# Patient Record
Sex: MALE | Race: WHITE | NOT HISPANIC OR LATINO | Employment: OTHER | URBAN - NONMETROPOLITAN AREA
[De-identification: names, ages, dates, MRNs, and addresses within clinical notes are randomized per-mention and may not be internally consistent; named-entity substitution may affect disease eponyms.]

---

## 2017-03-06 ENCOUNTER — OFFICE VISIT (OUTPATIENT)
Dept: UROLOGY | Facility: CLINIC | Age: 72
End: 2017-03-06

## 2017-03-06 ENCOUNTER — HOSPITAL ENCOUNTER (OUTPATIENT)
Dept: GENERAL RADIOLOGY | Facility: HOSPITAL | Age: 72
Discharge: HOME | End: 2017-03-06
Attending: UROLOGY | Admitting: UROLOGY

## 2017-03-06 VITALS
SYSTOLIC BLOOD PRESSURE: 120 MMHG | TEMPERATURE: 97.5 F | HEIGHT: 67 IN | BODY MASS INDEX: 17.74 KG/M2 | WEIGHT: 113 LBS | DIASTOLIC BLOOD PRESSURE: 80 MMHG

## 2017-03-06 DIAGNOSIS — N20.0 RENAL CALCULUS, BILATERAL: Primary | ICD-10-CM

## 2017-03-06 DIAGNOSIS — N20.0 KIDNEY STONE: Primary | ICD-10-CM

## 2017-03-06 LAB
BILIRUB BLD-MCNC: NEGATIVE MG/DL
CLARITY, POC: CLEAR
COLOR UR: YELLOW
GLUCOSE UR STRIP-MCNC: ABNORMAL MG/DL
KETONES UR QL: NEGATIVE
LEUKOCYTE EST, POC: ABNORMAL
NITRITE UR-MCNC: POSITIVE MG/ML
PH UR: 6.5 [PH] (ref 5–8)
PROT UR STRIP-MCNC: ABNORMAL MG/DL
RBC # UR STRIP: ABNORMAL /UL
SP GR UR: 1.02 (ref 1–1.03)
UROBILINOGEN UR QL: NORMAL

## 2017-03-06 PROCEDURE — 99203 OFFICE O/P NEW LOW 30 MIN: CPT | Performed by: UROLOGY

## 2017-03-06 PROCEDURE — 81003 URINALYSIS AUTO W/O SCOPE: CPT | Performed by: UROLOGY

## 2017-03-06 PROCEDURE — 74000 HC ABDOMEN KUB: CPT

## 2017-03-06 RX ORDER — ATORVASTATIN CALCIUM 80 MG/1
TABLET, FILM COATED ORAL
Status: ON HOLD | COMMUNITY
End: 2018-08-13

## 2017-03-06 RX ORDER — HYDROCODONE BITARTRATE AND ACETAMINOPHEN 10; 325 MG/1; MG/1
1 TABLET ORAL EVERY 6 HOURS PRN
Status: ON HOLD | COMMUNITY
End: 2018-10-07

## 2017-03-06 RX ORDER — CALCIUM CARBONATE 500(1250)
TABLET ORAL
Status: ON HOLD | COMMUNITY
End: 2018-08-13

## 2017-03-06 NOTE — PROGRESS NOTES
Mr. Wilson is 71 y.o. male    CHIEF COMPLAINT: I am here today because of a kidney stone    HPI  Renal Urolithiasis  The patient presents with bilateral renal urolithiasis. This was initially diagnosed approximately 6 months ago. This was identified on CT that included abdominal cuts. This was found in the context of an evaluation of recurrent UTI. This is a(n) established problem for the patient. The onset of this was unknown. The course is stable. The patient is not currently being managed by hydration, alkalinization and citrate supplementation. Current symptoms that may or may not be related to this stone include recurrent UTI.    The following portions of the patient's history were reviewed and updated as appropriate: allergies, current medications, past family history, past medical history, past social history, past surgical history and problem list.    Review of Systems   Constitutional: Negative for appetite change and fever.   HENT: Negative for hearing loss and sore throat.    Eyes: Negative for pain and redness.   Respiratory: Negative for cough and shortness of breath.    Cardiovascular: Negative for chest pain and leg swelling.   Gastrointestinal: Negative for anal bleeding, nausea and vomiting.   Endocrine: Negative for cold intolerance and heat intolerance.   Genitourinary: Positive for flank pain. Negative for dysuria and hematuria.   Musculoskeletal: Negative for joint swelling and myalgias.   Skin: Negative for color change and rash.   Allergic/Immunologic: Negative for immunocompromised state.   Neurological: Negative for dizziness and speech difficulty.   Hematological: Negative for adenopathy. Does not bruise/bleed easily.   Psychiatric/Behavioral: Negative for dysphoric mood and suicidal ideas.         Current Outpatient Prescriptions:   •  atorvastatin (LIPITOR) 80 MG tablet, Take 80 mg by mouth daily, Disp: , Rfl:   •  Calcium 500 MG tablet, Take 1,000 mg by mouth daily, Disp: , Rfl:   •   "HYDROcodone-acetaminophen (NORCO)  MG per tablet, Every 6 (Six) Hours., Disp: , Rfl:   •  insulin aspart (novoLOG) 100 UNIT/ML injection, Inject 7 Units into the skin nightly, Disp: , Rfl:   •  insulin lispro (humaLOG) 100 UNIT/ML injection, Inject 5 Units into the skin 3 times daily (before meals), Disp: , Rfl:   •  metoprolol tartrate (LOPRESSOR) 25 MG tablet, Take 25 mg by mouth 2 times daily Indications: 1/2 tab, Disp: , Rfl:     Past Medical History   Diagnosis Date   • Diabetes mellitus        Past Surgical History   Procedure Laterality Date   • Esophagectomy     • Bladder surgery         Social History     Social History   • Marital status:      Spouse name: N/A   • Number of children: N/A   • Years of education: N/A     Social History Main Topics   • Smoking status: Current Every Day Smoker   • Smokeless tobacco: None   • Alcohol use None   • Drug use: None   • Sexual activity: Not Asked     Other Topics Concern   • None     Social History Narrative   • None       Family History   Problem Relation Age of Onset   • No Known Problems Father    • No Known Problems Mother        Visit Vitals   • /80   • Temp 97.5 °F (36.4 °C)   • Ht 67\" (170.2 cm)   • Wt 113 lb (51.3 kg)   • BMI 17.7 kg/m2       Physical Exam   Constitutional: He is oriented to person, place, and time. He appears well-developed and well-nourished. No distress.   HENT:   Head: Normocephalic and atraumatic.   Right Ear: External ear and ear canal normal.   Left Ear: External ear and ear canal normal.   Nose: No nasal deformity. No epistaxis.   Mouth/Throat: Oropharynx is clear and moist. Mucous membranes are not pale, not dry and not cyanotic. Normal dentition. No oropharyngeal exudate.   Neck: Trachea normal. No tracheal tenderness present. No tracheal deviation present. No thyroid mass and no thyromegaly present.   Pulmonary/Chest: Effort normal. No accessory muscle usage. No respiratory distress. Chest wall is not dull to " percussion (No flatness or hyperresonance). He exhibits no mass and no tenderness.   On palpation, no tactile fremitus. All movements are symmetric. No intercostal retraction noted.    Abdominal: Soft. Normal appearance. He exhibits no distension and no mass. There is no hepatosplenomegaly. There is no tenderness. No hernia.   Rectal examination or stool specimen is not indicated.  Right lower quadrant stoma appears pink and patent   Musculoskeletal:   Normal gait and station. The spine, ribs, and pelvis are examined. No obvious misalignment or asymmetry. ROM is reasonable for age. No instability. No obvious atrophy, flaccidity or spasticity.    Lymphadenopathy:     He has no cervical adenopathy.        Right: No inguinal adenopathy present.        Left: No inguinal adenopathy present.   Neurological: He is alert and oriented to person, place, and time.   Skin: Skin is warm, dry and intact. No lesion and no rash noted. He is not diaphoretic. No cyanosis. No pallor. Nails show no clubbing.   On palpation, there were no induration, subcutaneous nodules, or tightening   Psychiatric: His speech is normal and behavior is normal. Judgment and thought content normal. His mood appears not anxious. His affect is not labile. He does not exhibit a depressed mood.   Vitals reviewed.        Results for orders placed or performed in visit on 03/06/17   POC Urinalysis Dipstick, Automated   Result Value Ref Range    Color Yellow Yellow, Straw, Dark Yellow, Michelle    Clarity, UA Clear Clear    Glucose,  mg/dL (A) Negative, 1000 mg/dL (3+) mg/dL    Bilirubin Negative Negative    Ketones, UA Negative Negative    Specific Gravity  1.025 1.005 - 1.030    Blood, UA Moderate (A) Negative    pH, Urine 6.5 5.0 - 8.0    Protein, POC 30 mg/dL (A) Negative mg/dL    Urobilinogen, UA Normal Normal    Leukocytes Large (3+) (A) Negative    Nitrite, UA Positive (A) Negative       Independent review of CT scan of the abdomen/pelvis The patient  has undergone a CT scan of the abdomen and pelvis Without contrast. The images are available for me to review as an independent interpretation for evaluation and management.  Assessment of the renal parenchyma with regards to thickness, scarring, symmetry in appearance and function, presence of masses both pre-and postcontrast, and calcifications are noted.  The collecting system with regard to dilatation, presence of calcifications, and masses were reviewed.  The course and caliber the ureters also noted.  The renal vessels and retroperitoneum is inspected for pathology.  The solid viscera and bowel pattern are briefly reviewed, but will also be inspected by the radiologist. The renal pelvis is inspected.  This study shows bilateral hydronephrosis much worse on the right side with moderate stones including one at the renal pelvis that appears to be slightly obstructive.  He has a large abdominal aortic aneurysm and at the urinary diversion..         Assessment and Plan  Diagnoses and all orders for this visit:    Renal calculus, bilateral  -     POC Urinalysis Dipstick, Automated    I reviewed the films and think probably the best option for him given the presence of an abdominal aortic aneurysm would be to do percutaneous nephrostolithotomy.  While ESWL would be the optimal management for the stone burden, I do not think he could undergo this secondary to the aneurysm.  Given presence of all these comorbidities as well as a propensity to sepsis, I think this patient should be taken care of at one of the [facility] tertiary hospitals.      Pete Monreal MD  03/06/17  11:33 AM    EMR Dragon/Transcription disclaimer:  Much of this encounter note is an electronic transcription/translation of spoken language to printed text. The electronic translation of spoken language may permit erroneous, or at times, nonsensical words or phrases to be inadvertently transcribed; although I have reviewed the note for such errors,  some may still exist.

## 2018-08-08 ENCOUNTER — APPOINTMENT (OUTPATIENT)
Dept: GENERAL RADIOLOGY | Facility: HOSPITAL | Age: 73
End: 2018-08-08

## 2018-08-08 ENCOUNTER — HOSPITAL ENCOUNTER (INPATIENT)
Facility: HOSPITAL | Age: 73
LOS: 10 days | Discharge: HOME | End: 2018-08-18
Attending: INTERNAL MEDICINE | Admitting: INTERNAL MEDICINE

## 2018-08-08 DIAGNOSIS — I50.22 CHRONIC SYSTOLIC CONGESTIVE HEART FAILURE (HCC): ICD-10-CM

## 2018-08-08 DIAGNOSIS — N18.4 CHRONIC KIDNEY DISEASE, STAGE 4 (SEVERE) (HCC): ICD-10-CM

## 2018-08-08 DIAGNOSIS — Z72.0 TOBACCO USE: Primary | ICD-10-CM

## 2018-08-08 PROBLEM — E87.70 VOLUME OVERLOAD: Status: ACTIVE | Noted: 2018-08-08

## 2018-08-08 LAB
ALBUMIN SERPL-MCNC: 3.7 G/DL (ref 3.5–5)
ANION GAP SERPL CALCULATED.3IONS-SCNC: 16 MMOL/L (ref 4–13)
BUN BLD-MCNC: 39 MG/DL (ref 5–21)
BUN/CREAT SERPL: 12.7 (ref 7–25)
CALCIUM SPEC-SCNC: 8.8 MG/DL (ref 8.4–10.4)
CHLORIDE SERPL-SCNC: 107 MMOL/L (ref 98–110)
CO2 SERPL-SCNC: 16 MMOL/L (ref 24–31)
CREAT BLD-MCNC: 3.06 MG/DL (ref 0.5–1.4)
GFR SERPL CREATININE-BSD FRML MDRD: 20 ML/MIN/1.73
GLUCOSE BLD-MCNC: 237 MG/DL (ref 70–100)
GLUCOSE BLDC GLUCOMTR-MCNC: 282 MG/DL (ref 70–130)
GLUCOSE BLDC GLUCOMTR-MCNC: 314 MG/DL (ref 70–130)
GLUCOSE BLDC GLUCOMTR-MCNC: 340 MG/DL (ref 70–130)
GLUCOSE BLDC GLUCOMTR-MCNC: 350 MG/DL (ref 70–130)
GLUCOSE BLDC GLUCOMTR-MCNC: 365 MG/DL (ref 70–130)
HBA1C MFR BLD: 8 %
PHOSPHATE SERPL-MCNC: 4.7 MG/DL (ref 2.5–4.5)
POTASSIUM BLD-SCNC: 5.6 MMOL/L (ref 3.5–5.3)
SODIUM BLD-SCNC: 139 MMOL/L (ref 135–145)

## 2018-08-08 PROCEDURE — 25010000002 MORPHINE PER 10 MG: Performed by: INTERNAL MEDICINE

## 2018-08-08 PROCEDURE — 63710000001 INSULIN LISPRO (HUMAN) PER 5 UNITS: Performed by: INTERNAL MEDICINE

## 2018-08-08 PROCEDURE — 80069 RENAL FUNCTION PANEL: CPT | Performed by: INTERNAL MEDICINE

## 2018-08-08 PROCEDURE — 94799 UNLISTED PULMONARY SVC/PX: CPT

## 2018-08-08 PROCEDURE — 83036 HEMOGLOBIN GLYCOSYLATED A1C: CPT | Performed by: INTERNAL MEDICINE

## 2018-08-08 PROCEDURE — 94760 N-INVAS EAR/PLS OXIMETRY 1: CPT

## 2018-08-08 PROCEDURE — 99406 BEHAV CHNG SMOKING 3-10 MIN: CPT

## 2018-08-08 PROCEDURE — 82962 GLUCOSE BLOOD TEST: CPT

## 2018-08-08 PROCEDURE — 71045 X-RAY EXAM CHEST 1 VIEW: CPT

## 2018-08-08 RX ORDER — HYDROCODONE BITARTRATE AND ACETAMINOPHEN 10; 325 MG/1; MG/1
1 TABLET ORAL EVERY 4 HOURS PRN
Status: DISCONTINUED | OUTPATIENT
Start: 2018-08-08 | End: 2018-08-18 | Stop reason: HOSPADM

## 2018-08-08 RX ORDER — NICOTINE 21 MG/24HR
1 PATCH, TRANSDERMAL 24 HOURS TRANSDERMAL
Status: DISCONTINUED | OUTPATIENT
Start: 2018-08-08 | End: 2018-08-08

## 2018-08-08 RX ORDER — ASPIRIN 81 MG/1
81 TABLET, CHEWABLE ORAL DAILY
COMMUNITY

## 2018-08-08 RX ORDER — ASPIRIN 81 MG/1
81 TABLET, CHEWABLE ORAL DAILY
Status: DISCONTINUED | OUTPATIENT
Start: 2018-08-08 | End: 2018-08-18 | Stop reason: HOSPADM

## 2018-08-08 RX ORDER — ATORVASTATIN CALCIUM 40 MG/1
80 TABLET, FILM COATED ORAL DAILY
Status: DISCONTINUED | OUTPATIENT
Start: 2018-08-08 | End: 2018-08-18 | Stop reason: HOSPADM

## 2018-08-08 RX ORDER — LORAZEPAM 1 MG/1
1 TABLET ORAL EVERY 6 HOURS PRN
Status: DISPENSED | OUTPATIENT
Start: 2018-08-08 | End: 2018-08-18

## 2018-08-08 RX ORDER — NICOTINE POLACRILEX 4 MG
15 LOZENGE BUCCAL
Status: DISCONTINUED | OUTPATIENT
Start: 2018-08-08 | End: 2018-08-18 | Stop reason: HOSPADM

## 2018-08-08 RX ORDER — MORPHINE SULFATE 4 MG/ML
4 INJECTION, SOLUTION INTRAMUSCULAR; INTRAVENOUS EVERY 4 HOURS PRN
Status: DISCONTINUED | OUTPATIENT
Start: 2018-08-08 | End: 2018-08-09 | Stop reason: ALTCHOICE

## 2018-08-08 RX ORDER — ONDANSETRON 2 MG/ML
4 INJECTION INTRAMUSCULAR; INTRAVENOUS EVERY 6 HOURS PRN
Status: DISCONTINUED | OUTPATIENT
Start: 2018-08-08 | End: 2018-08-18 | Stop reason: HOSPADM

## 2018-08-08 RX ORDER — NICOTINE 21 MG/24HR
1 PATCH, TRANSDERMAL 24 HOURS TRANSDERMAL
Status: DISCONTINUED | OUTPATIENT
Start: 2018-08-08 | End: 2018-08-18 | Stop reason: HOSPADM

## 2018-08-08 RX ORDER — SODIUM CHLORIDE 0.9 % (FLUSH) 0.9 %
1-10 SYRINGE (ML) INJECTION AS NEEDED
Status: DISCONTINUED | OUTPATIENT
Start: 2018-08-08 | End: 2018-08-18 | Stop reason: HOSPADM

## 2018-08-08 RX ORDER — BUMETANIDE 1 MG/1
1 TABLET ORAL ONCE
Status: COMPLETED | OUTPATIENT
Start: 2018-08-08 | End: 2018-08-08

## 2018-08-08 RX ORDER — NALOXONE HCL 0.4 MG/ML
0.4 VIAL (ML) INJECTION
Status: DISCONTINUED | OUTPATIENT
Start: 2018-08-08 | End: 2018-08-18 | Stop reason: HOSPADM

## 2018-08-08 RX ORDER — SODIUM POLYSTYRENE SULFONATE 15 G/60ML
15 SUSPENSION ORAL; RECTAL ONCE
Status: COMPLETED | OUTPATIENT
Start: 2018-08-08 | End: 2018-08-08

## 2018-08-08 RX ORDER — DEXTROSE MONOHYDRATE 25 G/50ML
25 INJECTION, SOLUTION INTRAVENOUS
Status: DISCONTINUED | OUTPATIENT
Start: 2018-08-08 | End: 2018-08-18 | Stop reason: HOSPADM

## 2018-08-08 RX ADMIN — INSULIN LISPRO 3 UNITS: 100 INJECTION, SOLUTION INTRAVENOUS; SUBCUTANEOUS at 08:11

## 2018-08-08 RX ADMIN — INSULIN LISPRO 5 UNITS: 100 INJECTION, SOLUTION INTRAVENOUS; SUBCUTANEOUS at 21:22

## 2018-08-08 RX ADMIN — METOPROLOL TARTRATE 25 MG: 25 TABLET, FILM COATED ORAL at 08:11

## 2018-08-08 RX ADMIN — HYDROCODONE BITARTRATE AND ACETAMINOPHEN 1 TABLET: 10; 325 TABLET ORAL at 17:38

## 2018-08-08 RX ADMIN — SODIUM POLYSTYRENE SULFONATE 15 G: 15 SUSPENSION ORAL; RECTAL at 11:02

## 2018-08-08 RX ADMIN — BUMETANIDE 1 MG: 1 TABLET ORAL at 11:01

## 2018-08-08 RX ADMIN — LORAZEPAM 1 MG: 1 TABLET ORAL at 21:27

## 2018-08-08 RX ADMIN — Medication 1 TABLET: at 08:11

## 2018-08-08 RX ADMIN — METOPROLOL TARTRATE 25 MG: 25 TABLET, FILM COATED ORAL at 21:17

## 2018-08-08 RX ADMIN — HYDROCODONE BITARTRATE AND ACETAMINOPHEN 1 TABLET: 10; 325 TABLET ORAL at 12:11

## 2018-08-08 RX ADMIN — MORPHINE SULFATE 4 MG: 4 INJECTION, SOLUTION INTRAMUSCULAR; INTRAVENOUS at 05:31

## 2018-08-08 RX ADMIN — ASPIRIN 81 MG CHEWABLE TABLET 81 MG: 81 TABLET CHEWABLE at 08:11

## 2018-08-08 RX ADMIN — INSULIN LISPRO 6 UNITS: 100 INJECTION, SOLUTION INTRAVENOUS; SUBCUTANEOUS at 12:17

## 2018-08-08 RX ADMIN — ATORVASTATIN CALCIUM 80 MG: 40 TABLET, FILM COATED ORAL at 08:11

## 2018-08-08 RX ADMIN — HYDROCODONE BITARTRATE AND ACETAMINOPHEN 1 TABLET: 10; 325 TABLET ORAL at 21:22

## 2018-08-08 RX ADMIN — INSULIN LISPRO 5 UNITS: 100 INJECTION, SOLUTION INTRAVENOUS; SUBCUTANEOUS at 17:39

## 2018-08-08 RX ADMIN — NICOTINE 1 PATCH: 14 PATCH, EXTENDED RELEASE TRANSDERMAL at 21:18

## 2018-08-08 NOTE — CONSULTS
Nephrology ([facility]) Consult Note      Patient:  Gavin Wilson  YOB: 1945  Date of Service: 8/8/2018  MRN: [id number]   Acct: [id number]   Primary Care Physician: Provider, No Known  Advance Directive:   Code Status and Medical Interventions:   Ordered at: 08/08/18 0522     Level Of Support Discussed With:    Patient     Code Status:    No CPR     Medical Interventions (Level of Support Prior to Arrest):    Full     Admit Date: 8/8/2018       Hospital Day: 0  Referring Provider: Arcenio Ahn MD      Patient personally seen and examined.  Complete chart including Consults, Notes, Operative Reports, Labs, Cardiology, and Radiology studies reviewed as able.        Subjective:  Gavin Wilson is a 73 y.o. male  whom we were consulted for volume overload in patient approaching end stage renal disease.  Baseline of chronic kidney disease stage 4/5, follows with nephrologist at [facility].  Recently had left arm AV fistula placed (also at [facility]).  History of type 2 diabetes, hypertension, chronic congestive heart failure, prior bladder cancer with urostomy.  Admitted as a direct admit from [facility].  He had presented to their facility with dyspnea and stated he was ready to start dialysis to relieve his symptoms.  Apparently patient has been advised to start dialysis in the past but has deferred any decisions until now.  Denies recent change in urine output, no hematuria.  no n/v/d.  At time of exam patient is up walking in halls; trying to find his nurse in order to get something to eat.  Minimal dyspnea, able to provide a lengthy explanation of current medical condition without distress noted.  No peripheral edema.  Pt does admit he is feeling better today than when he arrived at [facility].  Urine output is nonoliguric.    Allergies:  Sulfa antibiotics    Home Meds:  Prescriptions Prior to Admission   Medication Sig Dispense Refill  Last Dose   • aspirin 81 MG chewable tablet Chew 81 mg Daily.   8/7/2018 at Unknown time   • atorvastatin (LIPITOR) 80 MG tablet Take 80 mg by mouth daily      • Calcium 500 MG tablet Take 1,000 mg by mouth daily      • HYDROcodone-acetaminophen (NORCO)  MG per tablet Every 6 (Six) Hours.      • insulin aspart (novoLOG) 100 UNIT/ML injection Inject 7 Units into the skin nightly      • insulin lispro (humaLOG) 100 UNIT/ML injection Inject 5 Units into the skin 3 times daily (before meals)      • metoprolol tartrate (LOPRESSOR) 25 MG tablet Take 25 mg by mouth 2 times daily Indications: 1/2 tab          Medicines:  Current Facility-Administered Medications   Medication Dose Route Frequency Provider Last Rate Last Dose   • aspirin chewable tablet 81 mg  81 mg Oral Daily Arcenio Ahn MD   81 mg at 08/08/18 0811   • atorvastatin (LIPITOR) tablet 80 mg  80 mg Oral Daily Arcenio Ahn MD   80 mg at 08/08/18 0811   • bumetanide (BUMEX) tablet 1 mg  1 mg Oral Once Roberto Carlos Myrick, APRN       • calcium carb-cholecalciferol 600-800 MG-UNIT tablet 1 tablet  1 tablet Oral Daily Arcenio Ahn MD   1 tablet at 08/08/18 0811   • dextrose (D50W) solution 25 g  25 g Intravenous Q15 Min PRN Arcenio Ahn MD       • dextrose (GLUTOSE) oral gel 15 g  15 g Oral Q15 Min PRN Arcenio Ahn MD       • glucagon (human recombinant) (GLUCAGEN DIAGNOSTIC) injection 1 mg  1 mg Subcutaneous PRN Arcenio Ahn MD       • HYDROcodone-acetaminophen (NORCO)  MG per tablet 1 tablet  1 tablet Oral Q4H PRN Arcenio Ahn MD       • insulin lispro (humaLOG) injection 2-7 Units  2-7 Units Subcutaneous 4x Daily With Meals & Nightly Arcenio Ahn MD   3 Units at 08/08/18 0811   • LORazepam (ATIVAN) tablet 1 mg  1 mg Oral Q6H PRN Arcenio Ahn MD       • metoprolol tartrate (LOPRESSOR) tablet 25 mg  25 mg Oral Q12H Arcenio Ahn MD   25 mg at 08/08/18 0811   • Morphine sulfate (PF) injection 4  mg  4 mg Intravenous Q4H PRN Arcenio Ahn MD   4 mg at 08/08/18 0531    And   • naloxone (NARCAN) injection 0.4 mg  0.4 mg Intravenous Q5 Min PRN Arcenio Ahn MD       • ondansetron (ZOFRAN) injection 4 mg  4 mg Intravenous Q6H PRN Arcenio Ahn MD       • sodium chloride 0.9 % flush 1-10 mL  1-10 mL Intravenous PRN Arcenio Ahn MD       • sodium polystyrene (KAYEXALATE) 15 GM/60ML suspension 15 g  15 g Oral Once Roberto Carlos Myrick, APRN           Past Medical History:  Past Medical History:   Diagnosis Date   • CHF (congestive heart failure) (CMS/HCC)    • Coronary stent occlusion    • Diabetes mellitus (CMS/HCC)    • Hyperlipidemia    • Hypertension    • Stroke (CMS/HCC)        Past Surgical History:  Past Surgical History:   Procedure Laterality Date   • BLADDER SURGERY     • ESOPHAGECTOMY         Family History  Family History   Problem Relation Age of Onset   • No Known Problems Father    • No Known Problems Mother        Social History  Social History     Social History   • Marital status:      Spouse name: N/A   • Number of children: N/A   • Years of education: N/A     Occupational History   • Not on file.     Social History Main Topics   • Smoking status: Current Every Day Smoker   • Smokeless tobacco: Never Used   • Alcohol use No   • Drug use: No   • Sexual activity: Not on file     Other Topics Concern   • Not on file     Social History Narrative   • No narrative on file         Review of Systems:  History obtained from chart review and the patient  General ROS: No fever or chills  Respiratory ROS: No cough, shortness of breath, wheezing  Cardiovascular ROS: positive for - dyspnea on exertion  No chest pain or palpitations  Gastrointestinal ROS: No abdominal pain or melena  Genito-Urinary ROS: No dysuria or hematuria  14 point ROS reviewed with the patient and negative except as noted above and in the HPI unless unable to obtain.    Objective:  /90 (BP Location: Right  "arm, Patient Position: Lying)   Pulse 108   Temp 97.9 °F (36.6 °C) (Temporal Artery )   Resp 18   Ht 170.2 cm (67.01\")   Wt 48.8 kg (107 lb 9.4 oz)   SpO2 98%   BMI 16.85 kg/m²     Intake/Output Summary (Last 24 hours) at 08/08/18 1020  Last data filed at 08/08/18 0443   Gross per 24 hour   Intake                0 ml   Output              500 ml   Net             -500 ml     General: awake/alert   Chest:  bilateral crackles lower lobes  CVS: regular rate and rhythm  Abdominal: soft, nontender, normal bowel sounds  Extremities: no cyanosis or edema  Skin: warm and dry without rash      Labs:            Results from last 7 days  Lab Units 08/08/18  0629   SODIUM mmol/L 139   POTASSIUM mmol/L 5.6*   CHLORIDE mmol/L 107   CO2 mmol/L 16.0*   BUN mg/dL 39*   CREATININE mg/dL 3.06*   CALCIUM mg/dL 8.8   GLUCOSE mg/dL 237*       Radiology:   Imaging Results (last 72 hours)     ** No results found for the last 72 hours. **          Culture:         Assessment   1.  Baseline chronic kidney disease; reportedly nearing stage 5  2.  Acute exacerbation chronic congestive heart failure  3.  Hyperkalemia  4.  Type 2 diabetes  5.  Hypertension  6.  Prior bladder CA; status post urostomy creation    Plan:  1.  Mild hyperkalemia and mild volume overload; no acute indication for urgent dialysis.  2.  Will need to get old records to determine his baseline renal function  3.  Will also need records from vascular surgery at [facility] confirming maturity of AV fistula prior to any use  4.  Bumex 1 mg oral once now  5.  Kayexalate 15 gm once now  6.  Further assessment and plan pending Dr Aragon's evaluation of patient      Thank you for the consult, we appreciate the opportunity to provide care to your patients.  Feel free to contact me if I can be of any further assistance.      Roberto Carlos Myrick, APRN  8/8/2018  10:20 AM  "

## 2018-08-08 NOTE — H&P
[facility] Medicine Services  HISTORY AND PHYSICAL    Date of Admission: 8/8/2018  Primary Care Physician: Provider, No Known    Subjective     Chief Complaint: Shortness of breath    History of Present Illness  Patient is a 73-year-old white male past medical history of peripheral vascular disease and end-stage renal disease stage V with a previous fistula artery placed.  He states that he is an been debating initiating dialysis but has hesitated multiple times recently.  He still makes urine through urostomy.  He states now that he has become more short of breath and never he can no longer go on and he wants to go on dialysis.  He presented to an [facility] hospital that he does not have dialysis.  [facility] so he was transferred here for initiation of dialysis.  He is otherwise stable and no complaints.  His only problem is volume overload his potassium was slightly elevated but otherwise nothing remarkable on his labs at the [facility].  His BUN and creatinine obviously are elevated but not significantly.        Review of Systems   14 point review of systems negative except for as per HPI    Otherwise complete ROS reviewed and negative except as mentioned in the HPI.    Past Medical History:   Past Medical History:   Diagnosis Date   • CHF (congestive heart failure) (CMS/HCC)    • Coronary stent occlusion    • Diabetes mellitus (CMS/HCC)    • Hyperlipidemia    • Hypertension    • Stroke (CMS/HCC)      Past Surgical History:  Past Surgical History:   Procedure Laterality Date   • BLADDER SURGERY     • ESOPHAGECTOMY       Social History:  reports that he has been smoking.  He has never used smokeless tobacco. He reports that he does not drink alcohol or use drugs.    Family History: family history includes No Known Problems in his father and mother.       Allergies:  Allergies   Allergen Reactions  "  • Sulfa Antibiotics      Medications:  Prior to Admission medications    Medication Sig Start Date End Date Taking? Authorizing Provider   aspirin 81 MG chewable tablet Chew 81 mg Daily.   Yes Dom Lou MD   atorvastatin (LIPITOR) 80 MG tablet Take 80 mg by mouth daily    Dom Lou MD   Calcium 500 MG tablet Take 1,000 mg by mouth daily    Dom Lou MD   HYDROcodone-acetaminophen (NORCO)  MG per tablet Every 6 (Six) Hours.    Dom Lou MD   insulin aspart (novoLOG) 100 UNIT/ML injection Inject 7 Units into the skin nightly    Dom Lou MD   insulin lispro (humaLOG) 100 UNIT/ML injection Inject 5 Units into the skin 3 times daily (before meals)    Dom Lou MD   metoprolol tartrate (LOPRESSOR) 25 MG tablet Take 25 mg by mouth 2 times daily Indications: 1/2 tab    Dom Lou MD     Objective     Vital Signs: /78 (BP Location: Right arm, Patient Position: Lying)   Pulse 114   Temp 96.9 °F (36.1 °C) (Temporal Artery )   Resp 18   Ht 170.2 cm (67.01\")   Wt 48.8 kg (107 lb 8 oz)   SpO2 96%   BMI 16.83 kg/m²   Physical Exam  Gen. well-nourished well-developed white male in no acute distress  HEENT: Atraumatic normocephalic pupils equal round reactive to light extraocular movements intact sclerae anicteric TMs negative mucous membranes moist neck is supple without lymphadenopathy no JVD is noted no carotid bruits are auscultated oropharynx is without erythema or exudate  Chest: Patient has rales bilaterally half way up the lung fields  CV: Regular rate and rhythm normal S1-S2 no gallops murmurs or rubs  Abdomen: Soft nontender nondistended active bowel sounds no hepatosplenomegaly no masses no hernias  Extremities: No clubbing edema or cyanosis capillary refill is normal pulses are 2+ and symmetric at radial and dorsalis pedis posterior tibial pulses are intact and 2+ palpable patient has a thrill on his left axilla " consistent with his renal shunt  Neuro: Patient is awake alert and oriented ×3, cranial nerves II through XII are grossly intact, motor is 5 out of 5 bilaterally, DTRs are 2+ and symmetric bilaterally sensory exam is nonfocal  Skin: Warm dry and intact no evidence of breakdown  Sensorium: Normal thought and affect  Nursing notes and vital signs reviewed        Results Reviewed:  Lab Results (last 24 hours)     ** No results found for the last 24 hours. **        Imaging Results (last 24 hours)     ** No results found for the last 24 hours. **        I have personally reviewed and interpreted the radiology studies and ECG obtained at time of admission.     Assessment / Plan     Assessment:   Hospital Problem List     Volume overload      1.  End-stage renal disease and volume overload plans to admit patient consult nephrology for initiation of hemodialysis.  Patient is agreeable to this.  I see no using giving him IV Lasix as they try to get him an outlying hospital due to no good.  We will continue his other medications for blood pressure control and monitor.  2.  Type II diabetes Accu-Cheks sliding scale insulin and hemoglobin A1c monitor while here.  3.  Mild hyperkalemia monitor and recheck labs  4.  Peripheral vascular disease stable continue current treatment.  5.  Tobacco habituation encourage smoking cessation will offer nicotine patch.        Patient seen after midnight          Code Status: No CPR     I discussed the patient's findings and my recommendations with the patient and he designates his nephew not his son as his healthcare power surrogate    Estimated length of stay to be determined    Arcenio Ahn MD   08/08/18   5:23 AM

## 2018-08-08 NOTE — PLAN OF CARE
Problem: Patient Care Overview  Goal: Plan of Care Review  Outcome: Ongoing (interventions implemented as appropriate)   08/08/18 0334   Coping/Psychosocial   Plan of Care Reviewed With patient   Plan of Care Review   Progress no change   OTHER   Outcome Summary Patient arrived to  from EMS transfer direct admit from [facility]. Admitted for CHF. Monitor closely.     Goal: Individualization and Mutuality  Outcome: Ongoing (interventions implemented as appropriate)    Goal: Discharge Needs Assessment  Outcome: Ongoing (interventions implemented as appropriate)    Goal: Interprofessional Rounds/Family Conf  Outcome: Ongoing (interventions implemented as appropriate)      Problem: Cardiac: Heart Failure (Adult)  Goal: Signs and Symptoms of Listed Potential Problems Will be Absent, Minimized or Managed (Cardiac: Heart Failure)  Outcome: Ongoing (interventions implemented as appropriate)

## 2018-08-08 NOTE — PROGRESS NOTES
Discharge Planning Assessment  [facility]     Patient Name: Gavin Wilson  MRN: [id number]  Today's Date: 8/8/2018    Admit Date: 8/8/2018          Discharge Needs Assessment     Row Name 08/08/18 1517       Living Environment    Lives With alone    Current Living Arrangements home/apartment/condo    Primary Care Provided by self    Provides Primary Care For no one    Family Caregiver if Needed child(yudy), adult    Quality of Family Relationships involved    Able to Return to Prior Arrangements yes       Resource/Environmental Concerns    Resource/Environmental Concerns none    Transportation Concerns car, none       Transition Planning    Patient/Family Anticipates Transition to home    Patient/Family Anticipated Services at Transition none    Transportation Anticipated family or friend will provide       Discharge Needs Assessment    Readmission Within the Last 30 Days no previous admission in last 30 days    Concerns to be Addressed no discharge needs identified;denies needs/concerns at this time    Equipment Currently Used at Home colostomy/ostomy supplies;cane, quad;walker, standard    Anticipated Changes Related to Illness none    Equipment Needed After Discharge none            Discharge Plan     Row Name 08/08/18 1518       Plan    Patient/Family in Agreement with Plan yes    Plan Comments PT resides at home alone and is independent. PT has [facility] coverage. PT is requesting HH upon dc and states taht he used [facility] in the past and wants to use them again. This may need to be arranged through the [facility] if PT does not have Medicare        Destination     No service coordination in this encounter.      Durable Medical Equipment     No service coordination in this encounter.      Dialysis/Infusion     No service coordination in this encounter.      Home Medical Care     No service coordination in this encounter.      Social Care     No service coordination in this encounter.                Demographic Summary    No  documentation.           Functional Status    No documentation.           Psychosocial    No documentation.           Abuse/Neglect    No documentation.           Legal    No documentation.           Substance Abuse    No documentation.           Patient Forms    No documentation.         Trisha Moran MSW

## 2018-08-08 NOTE — PROGRESS NOTES
"Patient was admitted earlier this morning by Dr. Ahn and his history and physical have been reviewed.    Patient does not recall when he had placement of his fistula in preparations for dialysis, and it looks like nephrology is going to get old records to determine his baseline renal function, and to see if we can get a better idea of when his AV fistula was placed to confirm maturity prior to use.  Patient was given a dose of Bumex earlier today, and does report having some urine output.    He states that his primary symptom has been worsening shortness of breath that has progressively gotten worse over the past couple of weeks.  He does report having some orthopnea.  He reports that he is been told by his providers in [place] that he will need dialysis soon.  Patient does state that his breathing is a little bit better today as compared to yesterday.  He reports he was able to ambulate senior care down the hallway earlier this morning, and only got symptoms of shortness of breath when he returned to his room.  Typically, patient states that he gets shortness of breath \"trying to put on my socks\".  He reports no chest pain, palpitations.    Patient is a history of bladder cancer in the past, and reports having his bladder removed and currently has a urostomy in place.  At baseline, he reports normally having very little urine output.  Appreciate nephrology input as it pertains to consideration of a dialysis regimen moving forward.  Await outside hospital records.    CBC, conference metabolic profile, magnesium, phosphorus in the a.m.    Chest x-ray today.  Telemetry monitoring    Workup ongoing.    Farooq Edwards MD  08/08/18  11:39 AM    "

## 2018-08-08 NOTE — SIGNIFICANT NOTE
Patient became very agitated when he realized his cigarettes were not in his shirt pocket.  States he will rob someone to get them back.  Tried to explain to patient that I had not seen any cigarettes since he arrived at [facility].  Patient convinced that [facility] stole them.

## 2018-08-08 NOTE — PLAN OF CARE
Problem: Patient Care Overview  Goal: Plan of Care Review  Outcome: Ongoing (interventions implemented as appropriate)   08/08/18 1504   Coping/Psychosocial   Plan of Care Reviewed With patient   Plan of Care Review   Progress no change   OTHER   Outcome Summary Pt co pain, medication given with relief. Dr. Aragon consult for dialysis. Pt had kayexalate for a K level of 5.6. pt had a one time PO bumex. pt has urostomy with good output. cont to monitor.      Goal: Individualization and Mutuality  Outcome: Ongoing (interventions implemented as appropriate)    Goal: Discharge Needs Assessment  Outcome: Ongoing (interventions implemented as appropriate)    Goal: Interprofessional Rounds/Family Conf  Outcome: Ongoing (interventions implemented as appropriate)      Problem: Cardiac: Heart Failure (Adult)  Goal: Signs and Symptoms of Listed Potential Problems Will be Absent, Minimized or Managed (Cardiac: Heart Failure)  Outcome: Ongoing (interventions implemented as appropriate)

## 2018-08-09 LAB
25(OH)D3 SERPL-MCNC: 75.6 NG/ML (ref 30–100)
ALBUMIN SERPL-MCNC: 3.4 G/DL (ref 3.5–5)
ALBUMIN/GLOB SERPL: 1.4 G/DL (ref 1.1–2.5)
ALP SERPL-CCNC: 82 U/L (ref 24–120)
ALT SERPL W P-5'-P-CCNC: 26 U/L (ref 0–54)
ANION GAP SERPL CALCULATED.3IONS-SCNC: 12 MMOL/L (ref 4–13)
AST SERPL-CCNC: 14 U/L (ref 7–45)
BILIRUB SERPL-MCNC: 0.2 MG/DL (ref 0.1–1)
BUN BLD-MCNC: 49 MG/DL (ref 5–21)
BUN/CREAT SERPL: 14.3 (ref 7–25)
CALCIUM SPEC-SCNC: 8.2 MG/DL (ref 8.4–10.4)
CHLORIDE SERPL-SCNC: 106 MMOL/L (ref 98–110)
CO2 SERPL-SCNC: 21 MMOL/L (ref 24–31)
CREAT BLD-MCNC: 3.43 MG/DL (ref 0.5–1.4)
DEPRECATED RDW RBC AUTO: 50.1 FL (ref 40–54)
ERYTHROCYTE [DISTWIDTH] IN BLOOD BY AUTOMATED COUNT: 16.1 % (ref 12–15)
FERRITIN SERPL-MCNC: 27.6 NG/ML (ref 17.9–464)
GFR SERPL CREATININE-BSD FRML MDRD: 18 ML/MIN/1.73
GLOBULIN UR ELPH-MCNC: 2.5 GM/DL
GLUCOSE BLD-MCNC: 146 MG/DL (ref 70–100)
GLUCOSE BLDC GLUCOMTR-MCNC: 122 MG/DL (ref 70–130)
GLUCOSE BLDC GLUCOMTR-MCNC: 139 MG/DL (ref 70–130)
GLUCOSE BLDC GLUCOMTR-MCNC: 226 MG/DL (ref 70–130)
GLUCOSE BLDC GLUCOMTR-MCNC: 361 MG/DL (ref 70–130)
HCT VFR BLD AUTO: 27 % (ref 40–52)
HGB BLD-MCNC: 8.5 G/DL (ref 14–18)
IRON 24H UR-MRATE: 20 MCG/DL (ref 42–180)
IRON SATN MFR SERPL: 6 % (ref 20–45)
MAGNESIUM SERPL-MCNC: 2.2 MG/DL (ref 1.4–2.2)
MCH RBC QN AUTO: 27.2 PG (ref 28–32)
MCHC RBC AUTO-ENTMCNC: 31.5 G/DL (ref 33–36)
MCV RBC AUTO: 86.5 FL (ref 82–95)
PHOSPHATE SERPL-MCNC: 5.4 MG/DL (ref 2.5–4.5)
PLATELET # BLD AUTO: 168 10*3/MM3 (ref 130–400)
PMV BLD AUTO: 9.9 FL (ref 6–12)
POTASSIUM BLD-SCNC: 4.7 MMOL/L (ref 3.5–5.3)
PROT SERPL-MCNC: 5.9 G/DL (ref 6.3–8.7)
PTH-INTACT SERPL-MCNC: 427.4 PG/ML (ref 7.5–53.5)
RBC # BLD AUTO: 3.12 10*6/MM3 (ref 4.8–5.9)
SODIUM BLD-SCNC: 139 MMOL/L (ref 135–145)
TIBC SERPL-MCNC: 345 MCG/DL (ref 225–420)
URATE SERPL-MCNC: 8.4 MG/DL (ref 3.5–8.5)
WBC NRBC COR # BLD: 6.45 10*3/MM3 (ref 4.8–10.8)

## 2018-08-09 PROCEDURE — 82306 VITAMIN D 25 HYDROXY: CPT | Performed by: INTERNAL MEDICINE

## 2018-08-09 PROCEDURE — 84550 ASSAY OF BLOOD/URIC ACID: CPT | Performed by: INTERNAL MEDICINE

## 2018-08-09 PROCEDURE — 94799 UNLISTED PULMONARY SVC/PX: CPT

## 2018-08-09 PROCEDURE — 94760 N-INVAS EAR/PLS OXIMETRY 1: CPT

## 2018-08-09 PROCEDURE — 83550 IRON BINDING TEST: CPT | Performed by: INTERNAL MEDICINE

## 2018-08-09 PROCEDURE — 94640 AIRWAY INHALATION TREATMENT: CPT

## 2018-08-09 PROCEDURE — 80053 COMPREHEN METABOLIC PANEL: CPT | Performed by: INTERNAL MEDICINE

## 2018-08-09 PROCEDURE — 82728 ASSAY OF FERRITIN: CPT | Performed by: INTERNAL MEDICINE

## 2018-08-09 PROCEDURE — 83735 ASSAY OF MAGNESIUM: CPT | Performed by: INTERNAL MEDICINE

## 2018-08-09 PROCEDURE — 82962 GLUCOSE BLOOD TEST: CPT

## 2018-08-09 PROCEDURE — 85027 COMPLETE CBC AUTOMATED: CPT | Performed by: INTERNAL MEDICINE

## 2018-08-09 PROCEDURE — 84100 ASSAY OF PHOSPHORUS: CPT | Performed by: INTERNAL MEDICINE

## 2018-08-09 PROCEDURE — 83540 ASSAY OF IRON: CPT | Performed by: INTERNAL MEDICINE

## 2018-08-09 PROCEDURE — 83970 ASSAY OF PARATHORMONE: CPT | Performed by: INTERNAL MEDICINE

## 2018-08-09 PROCEDURE — 63710000001 INSULIN LISPRO (HUMAN) PER 5 UNITS: Performed by: INTERNAL MEDICINE

## 2018-08-09 RX ORDER — CALCITRIOL 0.25 UG/1
0.25 CAPSULE, LIQUID FILLED ORAL DAILY
Status: DISCONTINUED | OUTPATIENT
Start: 2018-08-09 | End: 2018-08-14

## 2018-08-09 RX ORDER — GUAIFENESIN 600 MG/1
1200 TABLET, EXTENDED RELEASE ORAL EVERY 12 HOURS SCHEDULED
Status: DISCONTINUED | OUTPATIENT
Start: 2018-08-09 | End: 2018-08-18 | Stop reason: HOSPADM

## 2018-08-09 RX ORDER — IPRATROPIUM BROMIDE AND ALBUTEROL SULFATE 2.5; .5 MG/3ML; MG/3ML
3 SOLUTION RESPIRATORY (INHALATION)
Status: DISCONTINUED | OUTPATIENT
Start: 2018-08-09 | End: 2018-08-10

## 2018-08-09 RX ADMIN — IPRATROPIUM BROMIDE AND ALBUTEROL SULFATE 3 ML: 2.5; .5 SOLUTION RESPIRATORY (INHALATION) at 19:05

## 2018-08-09 RX ADMIN — HYDROCODONE BITARTRATE AND ACETAMINOPHEN 1 TABLET: 10; 325 TABLET ORAL at 11:59

## 2018-08-09 RX ADMIN — ASPIRIN 81 MG CHEWABLE TABLET 81 MG: 81 TABLET CHEWABLE at 10:20

## 2018-08-09 RX ADMIN — LORAZEPAM 1 MG: 1 TABLET ORAL at 20:28

## 2018-08-09 RX ADMIN — CALCITRIOL 0.25 MCG: 0.25 CAPSULE ORAL at 17:43

## 2018-08-09 RX ADMIN — GUAIFENESIN 1200 MG: 600 TABLET, EXTENDED RELEASE ORAL at 20:28

## 2018-08-09 RX ADMIN — INSULIN LISPRO 6 UNITS: 100 INJECTION, SOLUTION INTRAVENOUS; SUBCUTANEOUS at 20:28

## 2018-08-09 RX ADMIN — GUAIFENESIN 1200 MG: 600 TABLET, EXTENDED RELEASE ORAL at 13:02

## 2018-08-09 RX ADMIN — HYDROCODONE BITARTRATE AND ACETAMINOPHEN 1 TABLET: 10; 325 TABLET ORAL at 20:28

## 2018-08-09 RX ADMIN — NICOTINE 1 PATCH: 14 PATCH, EXTENDED RELEASE TRANSDERMAL at 20:27

## 2018-08-09 RX ADMIN — INSULIN LISPRO 3 UNITS: 100 INJECTION, SOLUTION INTRAVENOUS; SUBCUTANEOUS at 10:21

## 2018-08-09 RX ADMIN — Medication 1 TABLET: at 10:20

## 2018-08-09 RX ADMIN — METOPROLOL TARTRATE 25 MG: 25 TABLET, FILM COATED ORAL at 20:28

## 2018-08-09 RX ADMIN — IPRATROPIUM BROMIDE AND ALBUTEROL SULFATE 3 ML: 2.5; .5 SOLUTION RESPIRATORY (INHALATION) at 15:02

## 2018-08-09 RX ADMIN — METOPROLOL TARTRATE 25 MG: 25 TABLET, FILM COATED ORAL at 10:21

## 2018-08-09 RX ADMIN — ATORVASTATIN CALCIUM 80 MG: 40 TABLET, FILM COATED ORAL at 10:20

## 2018-08-09 NOTE — PROGRESS NOTES
Nephrology ([facility]) Progress Note      Patient:  Gavin Wilson  YOB: 1945  Date of Service: 8/9/2018  MRN: [id number]   Acct: [id number]   Primary Care Physician: Provider, No Known  Advance Directive:   Code Status and Medical Interventions:   Ordered at: 08/08/18 0522     Level Of Support Discussed With:    Patient     Code Status:    No CPR     Medical Interventions (Level of Support Prior to Arrest):    Full     Admit Date: 8/8/2018       Hospital Day: 1  Referring Provider: Arcenio Ahn MD      Patient personally seen and examined.  Complete chart including Consults, Notes, Operative Reports, Labs, Cardiology, and Radiology studies reviewed as able.        Subjective:  Gavin Wilson is a 73 y.o. male  whom we were consulted for volume overload in patient approaching end stage renal disease.  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  Recently had left arm AV fistula placed (also at [facility]).  History of type 2 diabetes, hypertension, chronic congestive heart failure, prior bladder cancer with urostomy.  Admitted as a direct admit from [facility].  He had presented to their facility with dyspnea and stated he was ready to start dialysis to relieve his symptoms.  Apparently patient has been advised to start dialysis in the past but has deferred any decisions until now.  Denied recent change in urine output, no hematuria.  no n/v/d.  Minimal dyspnea, able to provide a lengthy explanation of current medical condition without distress noted initially.  No peripheral edema.      Today, no new events.  More SOA. No n/v/d.       Allergies:  Sulfa antibiotics    Home Meds:  Prescriptions Prior to Admission   Medication Sig Dispense Refill Last Dose   • aspirin 81 MG chewable tablet Chew 81 mg Daily.   8/7/2018 at Unknown time   • atorvastatin (LIPITOR) 80 MG tablet Take 80 mg by mouth daily      •  Calcium 500 MG tablet Take 1,000 mg by mouth daily      • HYDROcodone-acetaminophen (NORCO)  MG per tablet Every 6 (Six) Hours.      • insulin aspart (novoLOG) 100 UNIT/ML injection Inject 7 Units into the skin nightly      • insulin lispro (humaLOG) 100 UNIT/ML injection Inject 5 Units into the skin 3 times daily (before meals)      • metoprolol tartrate (LOPRESSOR) 25 MG tablet Take 25 mg by mouth 2 times daily Indications: 1/2 tab          Medicines:  Current Facility-Administered Medications   Medication Dose Route Frequency Provider Last Rate Last Dose   • aspirin chewable tablet 81 mg  81 mg Oral Daily Arcenio Ahn MD   81 mg at 08/09/18 1020   • atorvastatin (LIPITOR) tablet 80 mg  80 mg Oral Daily Arcenio Ahn MD   80 mg at 08/09/18 1020   • calcitriol (ROCALTROL) capsule 0.25 mcg  0.25 mcg Oral Daily Samson Aragon MD       • calcium carb-cholecalciferol 600-800 MG-UNIT tablet 1 tablet  1 tablet Oral Daily Arcenio Ahn MD   1 tablet at 08/09/18 1020   • dextrose (D50W) solution 25 g  25 g Intravenous Q15 Min PRN Arcenio Ahn MD       • dextrose (GLUTOSE) oral gel 15 g  15 g Oral Q15 Min PRN Arcenio Ahn MD       • glucagon (human recombinant) (GLUCAGEN DIAGNOSTIC) injection 1 mg  1 mg Subcutaneous PRN Arcenio Ahn MD       • guaiFENesin (MUCINEX) 12 hr tablet 1,200 mg  1,200 mg Oral Q12H Oleg Madrid DO   1,200 mg at 08/09/18 1302   • HYDROcodone-acetaminophen (NORCO)  MG per tablet 1 tablet  1 tablet Oral Q4H PRN Arcenio Ahn MD   1 tablet at 08/09/18 1159   • insulin lispro (humaLOG) injection 2-7 Units  2-7 Units Subcutaneous 4x Daily With Meals & Nightly Arcenio Ahn MD   3 Units at 08/09/18 1021   • ipratropium-albuterol (DUO-NEB) nebulizer solution 3 mL  3 mL Nebulization 4x Daily - RT Oleg Madrid DO   3 mL at 08/09/18 1502   • LORazepam (ATIVAN) tablet 1 mg  1 mg Oral Q6H PRN Arcenio Ahn MD   1 mg at  08/08/18 2127   • metoprolol tartrate (LOPRESSOR) tablet 25 mg  25 mg Oral Q12H Arcenio Ahn MD   25 mg at 08/09/18 1021   • naloxone (NARCAN) injection 0.4 mg  0.4 mg Intravenous Q5 Min PRN Arcenio Ahn MD       • nicotine (NICODERM CQ) 14 MG/24HR patch 1 patch  1 patch Transdermal Q24H Farooq Edwards MD   1 patch at 08/08/18 2118   • ondansetron (ZOFRAN) injection 4 mg  4 mg Intravenous Q6H PRN Arcenio Ahn MD       • sodium chloride 0.9 % flush 1-10 mL  1-10 mL Intravenous PRN Arcenio Ahn MD           Past Medical History:  Past Medical History:   Diagnosis Date   • CHF (congestive heart failure) (CMS/HCC)    • Coronary stent occlusion    • Diabetes mellitus (CMS/HCC)    • Hyperlipidemia    • Hypertension    • Stroke (CMS/HCC)        Past Surgical History:  Past Surgical History:   Procedure Laterality Date   • BLADDER SURGERY     • ESOPHAGECTOMY         Family History  Family History   Problem Relation Age of Onset   • No Known Problems Father    • No Known Problems Mother        Social History  Social History     Social History   • Marital status:      Spouse name: N/A   • Number of children: N/A   • Years of education: N/A     Occupational History   • Not on file.     Social History Main Topics   • Smoking status: Current Every Day Smoker   • Smokeless tobacco: Never Used   • Alcohol use No   • Drug use: No   • Sexual activity: Not on file     Other Topics Concern   • Not on file     Social History Narrative   • No narrative on file         Review of Systems:  History obtained from chart review and the patient  General ROS: No fever or chills  Respiratory ROS: + cough, shortness of breath  Cardiovascular ROS: No chest pain or palpitations  Gastrointestinal ROS: No abdominal pain or melena  Genito-Urinary ROS: No dysuria or hematuria    Objective:  /63 (BP Location: Right arm, Patient Position: Lying)   Pulse 98   Temp 97.4 °F (36.3 °C) (Temporal Artery )   Resp  "20   Ht 170.2 cm (67.01\")   Wt 47.6 kg (105 lb)   SpO2 97%   BMI 16.44 kg/m²     Intake/Output Summary (Last 24 hours) at 08/09/18 1735  Last data filed at 08/09/18 1357   Gross per 24 hour   Intake              120 ml   Output              150 ml   Net              -30 ml     General: awake/alert   Chest:  Bilateral wheezes  CVS: regular rate and rhythm  Abdominal: soft, nontender, normal bowel sounds  Extremities: no cyanosis or edema  Skin: warm and dry without rash      Labs:    Results from last 7 days  Lab Units 08/09/18  0406   WBC 10*3/mm3 6.45   HEMOGLOBIN g/dL 8.5*   HEMATOCRIT % 27.0*   PLATELETS 10*3/mm3 168           Results from last 7 days  Lab Units 08/09/18  0406 08/08/18  0629   SODIUM mmol/L 139 139   POTASSIUM mmol/L 4.7 5.6*   CHLORIDE mmol/L 106 107   CO2 mmol/L 21.0* 16.0*   BUN mg/dL 49* 39*   CREATININE mg/dL 3.43* 3.06*   CALCIUM mg/dL 8.2* 8.8   BILIRUBIN mg/dL 0.2  --    ALK PHOS U/L 82  --    ALT (SGPT) U/L 26  --    AST (SGOT) U/L 14  --    GLUCOSE mg/dL 146* 237*       Radiology:   Imaging Results (last 72 hours)     Procedure Component Value Units Date/Time    XR Chest 1 View [[id number]] Collected:  08/08/18 1450     Updated:  08/08/18 1454    Narrative:       EXAMINATION:   XR CHEST 1 VW-  8/8/2018 2:50 PM CDT     HISTORY: Difficulty breathing     Frontal upright radiograph of the chest 8/8/2018 1:23 PM CDT     COMPARISON: None.     FINDINGS:   Mild basilar interstitial infiltrates are present. Small bilateral  pleural effusions are present. This is suspicious for early congestive  failure.. Cardiac silhouette is upper limits of normal. Vascular  calcifications present aortic arch. Endovascular aortic stent graft is  present in the abdomen..      The osseous structures and surrounding soft tissues demonstrate no acute  abnormality.       Impression:       1. Mild or early changes of pulmonary vascular congestion.        This report was finalized on 08/08/2018 14:51 by " Duc Garibay MD.          Culture:         Assessment   CKD 4  Acute/chronic CHF exacerbation  Hyperkalemia  HTN  DM2  Acidosis  Anemia  Secondary Hyperparathyroidism     Plan:  Continue medical management  No need for HD at this time  D/w pt  Calcitriol  Check iron studies      Samson Aragon MD  8/9/2018  5:35 PM

## 2018-08-09 NOTE — PLAN OF CARE
Problem: Patient Care Overview  Goal: Plan of Care Review  Outcome: Ongoing (interventions implemented as appropriate)   08/09/18 1512   Coping/Psychosocial   Plan of Care Reviewed With patient   Plan of Care Review   Progress no change   OTHER   Outcome Summary VSS, MEDICATED X 1 FOR PAIN WITH RELIEF. SLEEPY TODAY, NOT EATING WELL.      Goal: Individualization and Mutuality  Outcome: Ongoing (interventions implemented as appropriate)    Goal: Discharge Needs Assessment  Outcome: Ongoing (interventions implemented as appropriate)    Goal: Interprofessional Rounds/Family Conf  Outcome: Ongoing (interventions implemented as appropriate)      Problem: Cardiac: Heart Failure (Adult)  Goal: Signs and Symptoms of Listed Potential Problems Will be Absent, Minimized or Managed (Cardiac: Heart Failure)  Outcome: Ongoing (interventions implemented as appropriate)

## 2018-08-09 NOTE — PLAN OF CARE
Problem: Patient Care Overview  Goal: Plan of Care Review  Outcome: Ongoing (interventions implemented as appropriate)   08/09/18 0224   Coping/Psychosocial   Plan of Care Reviewed With patient   Plan of Care Review   Progress no change   OTHER   Outcome Summary VSS, c/o pain with relief from PRN pain med. Ativan given for irritability and sleep. Bumex ongoing. Urostomy intact with good output. Cont to monitor     Goal: Individualization and Mutuality  Outcome: Ongoing (interventions implemented as appropriate)    Goal: Discharge Needs Assessment  Outcome: Ongoing (interventions implemented as appropriate)      Problem: Cardiac: Heart Failure (Adult)  Goal: Signs and Symptoms of Listed Potential Problems Will be Absent, Minimized or Managed (Cardiac: Heart Failure)  Outcome: Ongoing (interventions implemented as appropriate)   08/08/18 1504   Goal/Outcome Evaluation   Problems Assessed (Heart Failure) all   Problems Present (Heart Failure) fluid/electrolyte imbalance;respiratory compromise;situational response

## 2018-08-09 NOTE — PROGRESS NOTES
[facility] Medicine Services  INPATIENT PROGRESS NOTE    Patient Name: Gavin Wilson  Date of Admission: 8/8/2018  Today's Date: 08/09/18  Length of Stay: 1  Primary Care Physician: Provider, No Known    Subjective   Chief Complaint: Cough  HPI   72 yo CM admitted on 8/8/18. Patient states that he does not feel well today. Coughed a lot last night but doesn't feel like he is getting anything up. Sleepy. Hasn't eaten breakfast at bedside. Cheeks are flushed. No family at bedside. No BM since admission. SOA has improved. [facility] [place]         Review of Systems   Cough  All pertinent negatives and positives are as above. All other systems have been reviewed and are negative unless otherwise stated.     Objective    Temp:  [97.5 °F (36.4 °C)-98.6 °F (37 °C)] 97.5 °F (36.4 °C)  Heart Rate:  [] 104  Resp:  [18-20] 18  BP: (108-146)/(59-66) 146/63  Physical Exam   Constitutional:   Thin and frail. Doesn't appear to feel well. Thin and frail.    HENT:   Head: Normocephalic and atraumatic.   Nose: Nose normal.   OM dry. Cheeks flushed.    Eyes: Conjunctivae and EOM are normal.   Neck: Normal range of motion. Neck supple.   Cardiovascular: Normal rate, regular rhythm and normal heart sounds.    Pulmonary/Chest: Effort normal. He has rales.   Abdominal: Soft. Bowel sounds are normal.   Musculoskeletal: He exhibits no edema or tenderness.   Neurological: He is alert. No cranial nerve deficit.   Skin: Skin is warm and dry.   Psychiatric: He has a normal mood and affect.   Vitals reviewed.          Results Review:  I have reviewed the labs, radiology results, and diagnostic studies.    Laboratory Data:     Results from last 7 days  Lab Units 08/09/18  0406   WBC 10*3/mm3 6.45   HEMOGLOBIN g/dL 8.5*   HEMATOCRIT % 27.0*   PLATELETS 10*3/mm3 168          Results from last 7 days  Lab Units 08/09/18  0406 08/08/18  0629   SODIUM mmol/L 139 139   POTASSIUM  mmol/L 4.7 5.6*   CHLORIDE mmol/L 106 107   CO2 mmol/L 21.0* 16.0*   BUN mg/dL 49* 39*   CREATININE mg/dL 3.43* 3.06*   CALCIUM mg/dL 8.2* 8.8   BILIRUBIN mg/dL 0.2  --    ALK PHOS U/L 82  --    ALT (SGPT) U/L 26  --    AST (SGOT) U/L 14  --    GLUCOSE mg/dL 146* 237*       Culture Data:        Radiology Data:   Imaging Results (last 24 hours)     Procedure Component Value Units Date/Time    XR Chest 1 View [[id number]] Collected:  08/08/18 1450     Updated:  08/08/18 1454    Narrative:       EXAMINATION:   XR CHEST 1 VW-  8/8/2018 2:50 PM CDT     HISTORY: Difficulty breathing     Frontal upright radiograph of the chest 8/8/2018 1:23 PM CDT     COMPARISON: None.     FINDINGS:   Mild basilar interstitial infiltrates are present. Small bilateral  pleural effusions are present. This is suspicious for early congestive  failure.. Cardiac silhouette is upper limits of normal. Vascular  calcifications present aortic arch. Endovascular aortic stent graft is  present in the abdomen..      The osseous structures and surrounding soft tissues demonstrate no acute  abnormality.       Impression:       1. Mild or early changes of pulmonary vascular congestion.        This report was finalized on 08/08/2018 14:51 by Dr. Duc Garibay MD.          I have reviewed the patient's current medications.     Assessment/Plan     Hospital Problem List     Volume overload          1. Volume overload with pulmonary vascular congestion   2. ESRD with anticipated dialysis soon, recent AV fistula placed  3. DM II  4. Hyperkalemia  5. PVD  6. Tobacco abuse  7. Anemia, probably related to renal disease.   8. Constipation.    Plan:  Duonebs and mucinex  Consult Nephrology  Continue to follow labs, poatssium, CR and HBG  Start bowel program  SSI and accu-checks        Discharge Planning: I expect the patient to be discharged to home in 2-3 days.    Oleg Madrid DO   08/09/18   11:22 AM

## 2018-08-10 ENCOUNTER — APPOINTMENT (OUTPATIENT)
Dept: GENERAL RADIOLOGY | Facility: HOSPITAL | Age: 73
End: 2018-08-10

## 2018-08-10 LAB
ANION GAP SERPL CALCULATED.3IONS-SCNC: 13 MMOL/L (ref 4–13)
BASOPHILS # BLD AUTO: 0.03 10*3/MM3 (ref 0–0.2)
BASOPHILS NFR BLD AUTO: 0.5 % (ref 0–2)
BUN BLD-MCNC: 46 MG/DL (ref 5–21)
BUN/CREAT SERPL: 13.7 (ref 7–25)
CALCIUM SPEC-SCNC: 8.4 MG/DL (ref 8.4–10.4)
CHLORIDE SERPL-SCNC: 108 MMOL/L (ref 98–110)
CO2 SERPL-SCNC: 21 MMOL/L (ref 24–31)
CREAT BLD-MCNC: 3.35 MG/DL (ref 0.5–1.4)
DEPRECATED RDW RBC AUTO: 52.3 FL (ref 40–54)
EOSINOPHIL # BLD AUTO: 0.24 10*3/MM3 (ref 0–0.7)
EOSINOPHIL NFR BLD AUTO: 3.7 % (ref 0–4)
ERYTHROCYTE [DISTWIDTH] IN BLOOD BY AUTOMATED COUNT: 16.2 % (ref 12–15)
FOLATE SERPL-MCNC: 8.99 NG/ML
GFR SERPL CREATININE-BSD FRML MDRD: 18 ML/MIN/1.73
GLUCOSE BLD-MCNC: 138 MG/DL (ref 70–100)
GLUCOSE BLDC GLUCOMTR-MCNC: 136 MG/DL (ref 70–130)
GLUCOSE BLDC GLUCOMTR-MCNC: 159 MG/DL (ref 70–130)
GLUCOSE BLDC GLUCOMTR-MCNC: 230 MG/DL (ref 70–130)
GLUCOSE BLDC GLUCOMTR-MCNC: 259 MG/DL (ref 70–130)
HCT VFR BLD AUTO: 28.4 % (ref 40–52)
HGB BLD-MCNC: 8.8 G/DL (ref 14–18)
IMM GRANULOCYTES # BLD: 0.01 10*3/MM3 (ref 0–0.03)
IMM GRANULOCYTES NFR BLD: 0.2 % (ref 0–5)
LYMPHOCYTES # BLD AUTO: 1.4 10*3/MM3 (ref 0.72–4.86)
LYMPHOCYTES NFR BLD AUTO: 21.5 % (ref 15–45)
MCH RBC QN AUTO: 27.5 PG (ref 28–32)
MCHC RBC AUTO-ENTMCNC: 31 G/DL (ref 33–36)
MCV RBC AUTO: 88.8 FL (ref 82–95)
MONOCYTES # BLD AUTO: 0.35 10*3/MM3 (ref 0.19–1.3)
MONOCYTES NFR BLD AUTO: 5.4 % (ref 4–12)
NEUTROPHILS # BLD AUTO: 4.49 10*3/MM3 (ref 1.87–8.4)
NEUTROPHILS NFR BLD AUTO: 68.7 % (ref 39–78)
NRBC BLD MANUAL-RTO: 0 /100 WBC (ref 0–0)
PLATELET # BLD AUTO: 160 10*3/MM3 (ref 130–400)
PMV BLD AUTO: 10 FL (ref 6–12)
POTASSIUM BLD-SCNC: 4.1 MMOL/L (ref 3.5–5.3)
RBC # BLD AUTO: 3.2 10*6/MM3 (ref 4.8–5.9)
SODIUM BLD-SCNC: 142 MMOL/L (ref 135–145)
VIT B12 BLD-MCNC: 241 PG/ML (ref 239–931)
WBC NRBC COR # BLD: 6.52 10*3/MM3 (ref 4.8–10.8)

## 2018-08-10 PROCEDURE — 85025 COMPLETE CBC W/AUTO DIFF WBC: CPT | Performed by: INTERNAL MEDICINE

## 2018-08-10 PROCEDURE — 80048 BASIC METABOLIC PNL TOTAL CA: CPT | Performed by: INTERNAL MEDICINE

## 2018-08-10 PROCEDURE — 82962 GLUCOSE BLOOD TEST: CPT

## 2018-08-10 PROCEDURE — 63710000001 INSULIN LISPRO (HUMAN) PER 5 UNITS: Performed by: INTERNAL MEDICINE

## 2018-08-10 PROCEDURE — 94799 UNLISTED PULMONARY SVC/PX: CPT

## 2018-08-10 PROCEDURE — 71046 X-RAY EXAM CHEST 2 VIEWS: CPT

## 2018-08-10 PROCEDURE — 82607 VITAMIN B-12: CPT | Performed by: INTERNAL MEDICINE

## 2018-08-10 PROCEDURE — 94760 N-INVAS EAR/PLS OXIMETRY 1: CPT

## 2018-08-10 PROCEDURE — 82746 ASSAY OF FOLIC ACID SERUM: CPT | Performed by: INTERNAL MEDICINE

## 2018-08-10 RX ORDER — IPRATROPIUM BROMIDE AND ALBUTEROL SULFATE 2.5; .5 MG/3ML; MG/3ML
3 SOLUTION RESPIRATORY (INHALATION) EVERY 4 HOURS PRN
Status: DISCONTINUED | OUTPATIENT
Start: 2018-08-10 | End: 2018-08-18 | Stop reason: HOSPADM

## 2018-08-10 RX ORDER — BUMETANIDE 1 MG/1
1 TABLET ORAL DAILY
Status: DISCONTINUED | OUTPATIENT
Start: 2018-08-10 | End: 2018-08-13

## 2018-08-10 RX ORDER — IPRATROPIUM BROMIDE AND ALBUTEROL SULFATE 2.5; .5 MG/3ML; MG/3ML
3 SOLUTION RESPIRATORY (INHALATION)
Status: DISCONTINUED | OUTPATIENT
Start: 2018-08-10 | End: 2018-08-11

## 2018-08-10 RX ADMIN — HYDROCODONE BITARTRATE AND ACETAMINOPHEN 1 TABLET: 10; 325 TABLET ORAL at 20:47

## 2018-08-10 RX ADMIN — IPRATROPIUM BROMIDE AND ALBUTEROL SULFATE 3 ML: 2.5; .5 SOLUTION RESPIRATORY (INHALATION) at 19:41

## 2018-08-10 RX ADMIN — NICOTINE 1 PATCH: 14 PATCH, EXTENDED RELEASE TRANSDERMAL at 20:48

## 2018-08-10 RX ADMIN — HYDROCODONE BITARTRATE AND ACETAMINOPHEN 1 TABLET: 10; 325 TABLET ORAL at 03:37

## 2018-08-10 RX ADMIN — ATORVASTATIN CALCIUM 80 MG: 40 TABLET, FILM COATED ORAL at 09:06

## 2018-08-10 RX ADMIN — NYSTATIN 500000 UNITS: 100000 SUSPENSION ORAL at 20:48

## 2018-08-10 RX ADMIN — INSULIN LISPRO 4 UNITS: 100 INJECTION, SOLUTION INTRAVENOUS; SUBCUTANEOUS at 20:47

## 2018-08-10 RX ADMIN — METOPROLOL TARTRATE 25 MG: 25 TABLET, FILM COATED ORAL at 09:06

## 2018-08-10 RX ADMIN — IPRATROPIUM BROMIDE AND ALBUTEROL SULFATE 3 ML: 2.5; .5 SOLUTION RESPIRATORY (INHALATION) at 06:42

## 2018-08-10 RX ADMIN — GUAIFENESIN 1200 MG: 600 TABLET, EXTENDED RELEASE ORAL at 09:06

## 2018-08-10 RX ADMIN — CALCITRIOL 0.25 MCG: 0.25 CAPSULE ORAL at 09:06

## 2018-08-10 RX ADMIN — METOPROLOL TARTRATE 25 MG: 25 TABLET, FILM COATED ORAL at 22:59

## 2018-08-10 RX ADMIN — GUAIFENESIN 1200 MG: 600 TABLET, EXTENDED RELEASE ORAL at 20:47

## 2018-08-10 RX ADMIN — Medication 1 TABLET: at 09:06

## 2018-08-10 RX ADMIN — INSULIN LISPRO 2 UNITS: 100 INJECTION, SOLUTION INTRAVENOUS; SUBCUTANEOUS at 09:06

## 2018-08-10 RX ADMIN — BUMETANIDE 1 MG: 1 TABLET ORAL at 09:06

## 2018-08-10 RX ADMIN — ASPIRIN 81 MG CHEWABLE TABLET 81 MG: 81 TABLET CHEWABLE at 09:06

## 2018-08-10 RX ADMIN — IPRATROPIUM BROMIDE AND ALBUTEROL SULFATE 3 ML: 2.5; .5 SOLUTION RESPIRATORY (INHALATION) at 15:29

## 2018-08-10 NOTE — PROGRESS NOTES
Continued Stay Note  [facility]     Patient Name: Gavin Wilson  MRN: [id number]  Today's Date: 8/10/2018    Admit Date: 8/8/2018          Discharge Plan     Row Name 08/10/18 6829       Plan    Plan Home with possible home health    Patient/Family in Agreement with Plan yes    Plan Comments Pt may d/c home soon. Informed Dr. Madrid that pt is requesting home health at d/c.               Discharge Codes    No documentation.           LINDSEY Rivera

## 2018-08-10 NOTE — PROGRESS NOTES
[facility] Medicine Services  INPATIENT PROGRESS NOTE    Patient Name: Gavin Wilson  Date of Admission: 8/8/2018  Today's Date: 08/10/18  Length of Stay: 2  Primary Care Physician: Provider, No Known    Subjective   Chief Complaint: Cough  HPI   72 yo CM admitted on 8/8/18. Patient states that he does not feel well again today. Has not had a coughing episode again but does feel very weak.  No family at bedside. Had a BM last night. SOA has improved. [facility] [place]         Review of Systems   Generalized weakness.     All pertinent negatives and positives are as above. All other systems have been reviewed and are negative unless otherwise stated.     Objective    Temp:  [97.4 °F (36.3 °C)-98.8 °F (37.1 °C)] 98.2 °F (36.8 °C)  Heart Rate:  [] 93  Resp:  [18-20] 18  BP: (110-136)/(58-63) 110/61  Physical Exam   Constitutional:   Thin and frail. Doesn't appear to feel well. Thin and frail.    HENT:   Head: Normocephalic and atraumatic.   Nose: Nose normal.   OM dry.   Eyes: Conjunctivae and EOM are normal.   Neck: Normal range of motion. Neck supple.   Cardiovascular: Normal rate, regular rhythm and normal heart sounds.    Pulmonary/Chest: Effort normal. He has rales.   Abdominal: Soft. Bowel sounds are normal.   Musculoskeletal: He exhibits no edema or tenderness.   Neurological: He is alert. No cranial nerve deficit.   Skin: Skin is warm and dry.   Psychiatric: He has a normal mood and affect.   Vitals reviewed.          Results Review:  I have reviewed the labs, radiology results, and diagnostic studies.    Laboratory Data:     Results from last 7 days  Lab Units 08/10/18  0513 08/09/18  0406   WBC 10*3/mm3 6.52 6.45   HEMOGLOBIN g/dL 8.8* 8.5*   HEMATOCRIT % 28.4* 27.0*   PLATELETS 10*3/mm3 160 168          Results from last 7 days  Lab Units 08/10/18  0513 08/09/18  0406 08/08/18  0629   SODIUM mmol/L 142 139 139   POTASSIUM mmol/L 4.1  4.7 5.6*   CHLORIDE mmol/L 108 106 107   CO2 mmol/L 21.0* 21.0* 16.0*   BUN mg/dL 46* 49* 39*   CREATININE mg/dL 3.35* 3.43* 3.06*   CALCIUM mg/dL 8.4 8.2* 8.8   BILIRUBIN mg/dL  --  0.2  --    ALK PHOS U/L  --  82  --    ALT (SGPT) U/L  --  26  --    AST (SGOT) U/L  --  14  --    GLUCOSE mg/dL 138* 146* 237*       Culture Data:        Radiology Data:   Imaging Results (last 24 hours)     ** No results found for the last 24 hours. **          I have reviewed the patient's current medications.     Assessment/Plan     Hospital Problem List     Volume overload          1. Volume overload with pulmonary vascular congestion   2. ESRD with anticipated dialysis soon, recent AV fistula placed  3. DM II  4. Hyperkalemia  5. PVD  6. Tobacco abuse  7. Anemia, probably related to renal disease.   8. Constipation.    Plan:  Nathenonebs and mucinex  Nephrology consulted, continue Bumex   Continue to follow labs, poatssium, CR and HBG  Start bowel program  SSI and accu-checks  Repeat CXR PA and lateral  Nystatin swish and swallow      Discharge Planning: I expect the patient to be discharged to home in 2-3 days.    Oleg Madrid DO   08/10/18   1:33 PM

## 2018-08-10 NOTE — PLAN OF CARE
Problem: Patient Care Overview  Goal: Plan of Care Review  Outcome: Ongoing (interventions implemented as appropriate)   08/10/18 0038   Coping/Psychosocial   Plan of Care Reviewed With patient   Plan of Care Review   Progress improving   OTHER   Outcome Summary VSS, c/o back pain with relief from PRN pain med. Ativan given for anxiety and sleep. Pt felt much better this afternoon and ambulated down [address] with his friend and I. Did very well. S/ST on tele. Cont to monitor     Goal: Individualization and Mutuality  Outcome: Ongoing (interventions implemented as appropriate)    Goal: Discharge Needs Assessment  Outcome: Ongoing (interventions implemented as appropriate)      Problem: Cardiac: Heart Failure (Adult)  Goal: Signs and Symptoms of Listed Potential Problems Will be Absent, Minimized or Managed (Cardiac: Heart Failure)  Outcome: Ongoing (interventions implemented as appropriate)   08/08/18 1504   Goal/Outcome Evaluation   Problems Assessed (Heart Failure) all   Problems Present (Heart Failure) fluid/electrolyte imbalance;respiratory compromise;situational response

## 2018-08-10 NOTE — PROGRESS NOTES
Nephrology ([facility]) Progress Note      Patient:  Gavin Wilson  YOB: 1945  Date of Service: 8/10/2018  MRN: [id number]   Acct: [id number]   Primary Care Physician: Provider, No Known  Advance Directive:   Code Status and Medical Interventions:   Ordered at: 08/08/18 0522     Level Of Support Discussed With:    Patient     Code Status:    No CPR     Medical Interventions (Level of Support Prior to Arrest):    Full     Admit Date: 8/8/2018       Hospital Day: 2  Referring Provider: Arcenio Ahn MD      Patient personally seen and examined.  Complete chart including Consults, Notes, Operative Reports, Labs, Cardiology, and Radiology studies reviewed as able.        Subjective:  Gavin Wilson is a 73 y.o. male  whom we were consulted for volume overload in patient approaching end stage renal disease.  Baseline of chronic kidney disease stage 4/5, follows with nephrologist at [facility].  Recently had left arm AV fistula placed (also at [facility]).  History of type 2 diabetes, hypertension, chronic congestive heart failure, prior bladder cancer with urostomy.  Admitted as a direct admit from [facility].  He had presented to their facility with dyspnea and stated he was ready to start dialysis to relieve his symptoms.  Apparently patient has been advised to start dialysis in the past but has deferred any decisions until now.  Denies recent change in urine output, no hematuria.  no n/v/d. Hospital course remarkable for good response to PO diuretics, improving dyspnea.    Today is feeling better, still has some fatigue.  Able to ambulate in [place] without dyspnea.  Urine output nonoliguric    Allergies:  Sulfa antibiotics    Home Meds:  Prescriptions Prior to Admission   Medication Sig Dispense Refill Last Dose   • aspirin 81 MG chewable tablet Chew 81 mg Daily.   8/7/2018 at Unknown time   • atorvastatin (LIPITOR) 80 MG tablet Take 80 mg by mouth  daily      • Calcium 500 MG tablet Take 1,000 mg by mouth daily      • HYDROcodone-acetaminophen (NORCO)  MG per tablet Every 6 (Six) Hours.      • insulin aspart (novoLOG) 100 UNIT/ML injection Inject 7 Units into the skin nightly      • insulin lispro (humaLOG) 100 UNIT/ML injection Inject 5 Units into the skin 3 times daily (before meals)      • metoprolol tartrate (LOPRESSOR) 25 MG tablet Take 25 mg by mouth 2 times daily Indications: 1/2 tab          Medicines:  Current Facility-Administered Medications   Medication Dose Route Frequency Provider Last Rate Last Dose   • aspirin chewable tablet 81 mg  81 mg Oral Daily Arcenio Ahn MD   81 mg at 08/10/18 0906   • atorvastatin (LIPITOR) tablet 80 mg  80 mg Oral Daily Arcenio Ahn MD   80 mg at 08/10/18 0906   • bumetanide (BUMEX) tablet 1 mg  1 mg Oral Daily Roberto Carlos Myrick APRN   1 mg at 08/10/18 0906   • calcitriol (ROCALTROL) capsule 0.25 mcg  0.25 mcg Oral Daily Samson Aragon MD   0.25 mcg at 08/10/18 0906   • calcium carb-cholecalciferol 600-800 MG-UNIT tablet 1 tablet  1 tablet Oral Daily Arcenio Ahn MD   1 tablet at 08/10/18 0906   • dextrose (D50W) solution 25 g  25 g Intravenous Q15 Min PRN Arcenio Ahn MD       • dextrose (GLUTOSE) oral gel 15 g  15 g Oral Q15 Min PRN Arcenio Ahn MD       • glucagon (human recombinant) (GLUCAGEN DIAGNOSTIC) injection 1 mg  1 mg Subcutaneous PRN Arcenio Ahn MD       • guaiFENesin (MUCINEX) 12 hr tablet 1,200 mg  1,200 mg Oral Q12H Oleg Madrid DO   1,200 mg at 08/10/18 0906   • HYDROcodone-acetaminophen (NORCO)  MG per tablet 1 tablet  1 tablet Oral Q4H PRN Arcenio Ahn MD   1 tablet at 08/10/18 0337   • insulin lispro (humaLOG) injection 2-7 Units  2-7 Units Subcutaneous 4x Daily With Meals & Nightly Arcenio Ahn MD   2 Units at 08/10/18 0906   • ipratropium-albuterol (DUO-NEB) nebulizer solution 3 mL  3 mL Nebulization Q4H PRN Julius  Oleg Sorto DO       • ipratropium-albuterol (DUO-NEB) nebulizer solution 3 mL  3 mL Nebulization TID - RT Oleg Madrid DO       • LORazepam (ATIVAN) tablet 1 mg  1 mg Oral Q6H PRN Arcenio Ahn MD   1 mg at 08/09/18 2028   • metoprolol tartrate (LOPRESSOR) tablet 25 mg  25 mg Oral Q12H Arcenio Ahn MD   25 mg at 08/10/18 0906   • naloxone (NARCAN) injection 0.4 mg  0.4 mg Intravenous Q5 Min PRN Arcenio Ahn MD       • nicotine (NICODERM CQ) 14 MG/24HR patch 1 patch  1 patch Transdermal Q24H Farooq Edwards MD   1 patch at 08/09/18 2027   • ondansetron (ZOFRAN) injection 4 mg  4 mg Intravenous Q6H PRN Arcenio Ahn MD       • sodium chloride 0.9 % flush 1-10 mL  1-10 mL Intravenous PRN Arcenio Ahn MD           Past Medical History:  Past Medical History:   Diagnosis Date   • CHF (congestive heart failure) (CMS/HCC)    • Coronary stent occlusion    • Diabetes mellitus (CMS/HCC)    • Hyperlipidemia    • Hypertension    • Stroke (CMS/HCC)        Past Surgical History:  Past Surgical History:   Procedure Laterality Date   • BLADDER SURGERY     • ESOPHAGECTOMY         Family History  Family History   Problem Relation Age of Onset   • No Known Problems Father    • No Known Problems Mother        Social History  Social History     Social History   • Marital status:      Spouse name: N/A   • Number of children: N/A   • Years of education: N/A     Occupational History   • Not on file.     Social History Main Topics   • Smoking status: Current Every Day Smoker   • Smokeless tobacco: Never Used   • Alcohol use No   • Drug use: No   • Sexual activity: Not on file     Other Topics Concern   • Not on file     Social History Narrative   • No narrative on file         Review of Systems:  History obtained from chart review and the patient  General ROS: No fever or chills  Respiratory ROS: positive for - shortness of breath  Cardiovascular ROS: positive for - dyspnea on exertion  No  "chest pain or palpitations  Gastrointestinal ROS: No abdominal pain or melena  Genito-Urinary ROS: No dysuria or hematuria    Objective:  /58 (BP Location: Right arm, Patient Position: Lying)   Pulse 99   Temp 98.8 °F (37.1 °C) (Temporal Artery )   Resp 18   Ht 170.2 cm (67.01\")   Wt 46.4 kg (102 lb 6.4 oz)   SpO2 95%   BMI 16.03 kg/m²     Intake/Output Summary (Last 24 hours) at 08/10/18 0917  Last data filed at 08/09/18 1845   Gross per 24 hour   Intake              600 ml   Output              400 ml   Net              200 ml     General: awake/alert   Chest:  clear to auscultation bilaterally without respiratory distress  CVS: regular rate and rhythm  Abdominal: soft, nontender, normal bowel sounds  Extremities: no cyanosis or edema  Skin: warm and dry without rash      Labs:    Results from last 7 days  Lab Units 08/10/18  0513 08/09/18  0406   WBC 10*3/mm3 6.52 6.45   HEMOGLOBIN g/dL 8.8* 8.5*   HEMATOCRIT % 28.4* 27.0*   PLATELETS 10*3/mm3 160 168           Results from last 7 days  Lab Units 08/10/18  0513 08/09/18  0406 08/08/18  0629   SODIUM mmol/L 142 139 139   POTASSIUM mmol/L 4.1 4.7 5.6*   CHLORIDE mmol/L 108 106 107   CO2 mmol/L 21.0* 21.0* 16.0*   BUN mg/dL 46* 49* 39*   CREATININE mg/dL 3.35* 3.43* 3.06*   CALCIUM mg/dL 8.4 8.2* 8.8   BILIRUBIN mg/dL  --  0.2  --    ALK PHOS U/L  --  82  --    ALT (SGPT) U/L  --  26  --    AST (SGOT) U/L  --  14  --    GLUCOSE mg/dL 138* 146* 237*       Radiology:   Imaging Results (last 72 hours)     Procedure Component Value Units Date/Time    XR Chest 1 View [[id number]] Collected:  08/08/18 1450     Updated:  08/08/18 1454    Narrative:       EXAMINATION:   XR CHEST 1 VW-  8/8/2018 2:50 PM CDT     HISTORY: Difficulty breathing     Frontal upright radiograph of the chest 8/8/2018 1:23 PM CDT     COMPARISON: None.     FINDINGS:   Mild basilar interstitial infiltrates are present. Small bilateral  pleural effusions are present. This is suspicious for " early congestive  failure.. Cardiac silhouette is upper limits of normal. Vascular  calcifications present aortic arch. Endovascular aortic stent graft is  present in the abdomen..      The osseous structures and surrounding soft tissues demonstrate no acute  abnormality.       Impression:       1. Mild or early changes of pulmonary vascular congestion.        This report was finalized on 08/08/2018 14:51 by Dr. Duc Garibay MD.          Culture:         Assessment   1.  Chronic kidney disease stage 4  2.  Acute on chronic congestive heart failure  3.  Hyperkalemia--resolved  4.  Hypertension  5.  Type 2 diabetes  6.  Anemia   7.  Secondary hyperparathyroidism    Plan:  1.  Continue diuretic  2.  Dialysis not indicated at this time  3.  Monitor labs      MACO Cole  8/10/2018  9:17 AM

## 2018-08-11 LAB
ALBUMIN SERPL-MCNC: 3.4 G/DL (ref 3.5–5)
ALBUMIN/GLOB SERPL: 1.3 G/DL (ref 1.1–2.5)
ALP SERPL-CCNC: 84 U/L (ref 24–120)
ALT SERPL W P-5'-P-CCNC: 30 U/L (ref 0–54)
ANION GAP SERPL CALCULATED.3IONS-SCNC: 13 MMOL/L (ref 4–13)
AST SERPL-CCNC: 9 U/L (ref 7–45)
BACTERIA UR QL AUTO: ABNORMAL /HPF
BASOPHILS # BLD AUTO: 0.05 10*3/MM3 (ref 0–0.2)
BASOPHILS NFR BLD AUTO: 0.8 % (ref 0–2)
BILIRUB SERPL-MCNC: 0.6 MG/DL (ref 0.1–1)
BILIRUB UR QL STRIP: NEGATIVE
BUN BLD-MCNC: 45 MG/DL (ref 5–21)
BUN/CREAT SERPL: 14.6 (ref 7–25)
CALCIUM SPEC-SCNC: 8.6 MG/DL (ref 8.4–10.4)
CHLORIDE SERPL-SCNC: 110 MMOL/L (ref 98–110)
CLARITY UR: ABNORMAL
CO2 SERPL-SCNC: 21 MMOL/L (ref 24–31)
COLOR UR: YELLOW
CREAT BLD-MCNC: 3.09 MG/DL (ref 0.5–1.4)
DEPRECATED RDW RBC AUTO: 50.7 FL (ref 40–54)
EOSINOPHIL # BLD AUTO: 0.33 10*3/MM3 (ref 0–0.7)
EOSINOPHIL NFR BLD AUTO: 5.2 % (ref 0–4)
ERYTHROCYTE [DISTWIDTH] IN BLOOD BY AUTOMATED COUNT: 16 % (ref 12–15)
GFR SERPL CREATININE-BSD FRML MDRD: 20 ML/MIN/1.73
GLOBULIN UR ELPH-MCNC: 2.6 GM/DL
GLUCOSE BLD-MCNC: 131 MG/DL (ref 70–100)
GLUCOSE BLDC GLUCOMTR-MCNC: 153 MG/DL (ref 70–130)
GLUCOSE BLDC GLUCOMTR-MCNC: 164 MG/DL (ref 70–130)
GLUCOSE BLDC GLUCOMTR-MCNC: 175 MG/DL (ref 70–130)
GLUCOSE BLDC GLUCOMTR-MCNC: 296 MG/DL (ref 70–130)
GLUCOSE UR STRIP-MCNC: ABNORMAL MG/DL
HCT VFR BLD AUTO: 30.2 % (ref 40–52)
HGB BLD-MCNC: 9.3 G/DL (ref 14–18)
HGB UR QL STRIP.AUTO: ABNORMAL
HYALINE CASTS UR QL AUTO: ABNORMAL /LPF
IMM GRANULOCYTES # BLD: 0.03 10*3/MM3 (ref 0–0.03)
IMM GRANULOCYTES NFR BLD: 0.5 % (ref 0–5)
KETONES UR QL STRIP: NEGATIVE
LEUKOCYTE ESTERASE UR QL STRIP.AUTO: ABNORMAL
LYMPHOCYTES # BLD AUTO: 1.29 10*3/MM3 (ref 0.72–4.86)
LYMPHOCYTES NFR BLD AUTO: 20.5 % (ref 15–45)
MCH RBC QN AUTO: 26.9 PG (ref 28–32)
MCHC RBC AUTO-ENTMCNC: 30.8 G/DL (ref 33–36)
MCV RBC AUTO: 87.3 FL (ref 82–95)
MONOCYTES # BLD AUTO: 0.46 10*3/MM3 (ref 0.19–1.3)
MONOCYTES NFR BLD AUTO: 7.3 % (ref 4–12)
NEUTROPHILS # BLD AUTO: 4.14 10*3/MM3 (ref 1.87–8.4)
NEUTROPHILS NFR BLD AUTO: 65.7 % (ref 39–78)
NITRITE UR QL STRIP: NEGATIVE
NRBC BLD MANUAL-RTO: 0 /100 WBC (ref 0–0)
PH UR STRIP.AUTO: 6.5 [PH] (ref 5–8)
PLATELET # BLD AUTO: 159 10*3/MM3 (ref 130–400)
PMV BLD AUTO: 9.5 FL (ref 6–12)
POTASSIUM BLD-SCNC: 4.2 MMOL/L (ref 3.5–5.3)
PROT SERPL-MCNC: 6 G/DL (ref 6.3–8.7)
PROT UR QL STRIP: ABNORMAL
RBC # BLD AUTO: 3.46 10*6/MM3 (ref 4.8–5.9)
RBC # UR: ABNORMAL /HPF
REF LAB TEST METHOD: ABNORMAL
SODIUM BLD-SCNC: 144 MMOL/L (ref 135–145)
SP GR UR STRIP: 1.01 (ref 1–1.03)
SQUAMOUS #/AREA URNS HPF: ABNORMAL /HPF
UROBILINOGEN UR QL STRIP: ABNORMAL
WBC NRBC COR # BLD: 6.3 10*3/MM3 (ref 4.8–10.8)
WBC UR QL AUTO: ABNORMAL /HPF
YEAST URNS QL MICRO: ABNORMAL /HPF

## 2018-08-11 PROCEDURE — 0W993ZX DRAINAGE OF RIGHT PLEURAL CAVITY, PERCUTANEOUS APPROACH, DIAGNOSTIC: ICD-10-PCS | Performed by: RADIOLOGY

## 2018-08-11 PROCEDURE — 82962 GLUCOSE BLOOD TEST: CPT

## 2018-08-11 PROCEDURE — 94760 N-INVAS EAR/PLS OXIMETRY 1: CPT

## 2018-08-11 PROCEDURE — 85025 COMPLETE CBC W/AUTO DIFF WBC: CPT | Performed by: INTERNAL MEDICINE

## 2018-08-11 PROCEDURE — 81001 URINALYSIS AUTO W/SCOPE: CPT | Performed by: INTERNAL MEDICINE

## 2018-08-11 PROCEDURE — 87086 URINE CULTURE/COLONY COUNT: CPT | Performed by: INTERNAL MEDICINE

## 2018-08-11 PROCEDURE — 80053 COMPREHEN METABOLIC PANEL: CPT | Performed by: INTERNAL MEDICINE

## 2018-08-11 PROCEDURE — 94799 UNLISTED PULMONARY SVC/PX: CPT

## 2018-08-11 PROCEDURE — 25010000002 MEROPENEM PER 100 MG: Performed by: INTERNAL MEDICINE

## 2018-08-11 PROCEDURE — 63710000001 INSULIN LISPRO (HUMAN) PER 5 UNITS: Performed by: INTERNAL MEDICINE

## 2018-08-11 RX ORDER — SODIUM BICARBONATE 650 MG/1
325 TABLET ORAL 2 TIMES DAILY
Status: DISCONTINUED | OUTPATIENT
Start: 2018-08-11 | End: 2018-08-13

## 2018-08-11 RX ADMIN — INSULIN LISPRO 2 UNITS: 100 INJECTION, SOLUTION INTRAVENOUS; SUBCUTANEOUS at 12:11

## 2018-08-11 RX ADMIN — ATORVASTATIN CALCIUM 80 MG: 40 TABLET, FILM COATED ORAL at 08:35

## 2018-08-11 RX ADMIN — CALCITRIOL 0.25 MCG: 0.25 CAPSULE ORAL at 08:35

## 2018-08-11 RX ADMIN — INSULIN LISPRO 4 UNITS: 100 INJECTION, SOLUTION INTRAVENOUS; SUBCUTANEOUS at 17:42

## 2018-08-11 RX ADMIN — NYSTATIN 500000 UNITS: 100000 SUSPENSION ORAL at 08:35

## 2018-08-11 RX ADMIN — IPRATROPIUM BROMIDE AND ALBUTEROL SULFATE 3 ML: 2.5; .5 SOLUTION RESPIRATORY (INHALATION) at 07:11

## 2018-08-11 RX ADMIN — INSULIN LISPRO 2 UNITS: 100 INJECTION, SOLUTION INTRAVENOUS; SUBCUTANEOUS at 20:12

## 2018-08-11 RX ADMIN — ASPIRIN 81 MG CHEWABLE TABLET 81 MG: 81 TABLET CHEWABLE at 08:35

## 2018-08-11 RX ADMIN — SODIUM BICARBONATE 325 MG: 650 TABLET ORAL at 12:11

## 2018-08-11 RX ADMIN — NICOTINE 1 PATCH: 14 PATCH, EXTENDED RELEASE TRANSDERMAL at 20:13

## 2018-08-11 RX ADMIN — INSULIN LISPRO 2 UNITS: 100 INJECTION, SOLUTION INTRAVENOUS; SUBCUTANEOUS at 08:33

## 2018-08-11 RX ADMIN — BUMETANIDE 1 MG: 1 TABLET ORAL at 08:35

## 2018-08-11 RX ADMIN — GUAIFENESIN 1200 MG: 600 TABLET, EXTENDED RELEASE ORAL at 20:12

## 2018-08-11 RX ADMIN — MEROPENEM 1 G: 1 INJECTION, POWDER, FOR SOLUTION INTRAVENOUS at 17:41

## 2018-08-11 RX ADMIN — LORAZEPAM 1 MG: 1 TABLET ORAL at 20:12

## 2018-08-11 RX ADMIN — SODIUM BICARBONATE 325 MG: 650 TABLET ORAL at 20:12

## 2018-08-11 RX ADMIN — GUAIFENESIN 1200 MG: 600 TABLET, EXTENDED RELEASE ORAL at 08:35

## 2018-08-11 RX ADMIN — Medication 1 TABLET: at 08:35

## 2018-08-11 RX ADMIN — METOPROLOL TARTRATE 25 MG: 25 TABLET, FILM COATED ORAL at 08:35

## 2018-08-11 RX ADMIN — METOPROLOL TARTRATE 25 MG: 25 TABLET, FILM COATED ORAL at 20:12

## 2018-08-11 RX ADMIN — HYDROCODONE BITARTRATE AND ACETAMINOPHEN 1 TABLET: 10; 325 TABLET ORAL at 20:12

## 2018-08-11 NOTE — PROGRESS NOTES
[facility] Medicine Services  INPATIENT PROGRESS NOTE    Patient Name: Gavin Wilson  Date of Admission: 8/8/2018  Today's Date: 08/11/18  Length of Stay: 3  Primary Care Physician: Provider, No Known    Subjective   Chief Complaint: Cough  HPI   72 yo CM admitted on 8/8/18. Patient states that he does not feel well again today. Says that he normally does not lie around. Has not had a coughing episode again but does feel very weak.  No family at bedside. [facility] [place]         Review of Systems   Generalized weakness.     All pertinent negatives and positives are as above. All other systems have been reviewed and are negative unless otherwise stated.     Objective    Temp:  [98 °F (36.7 °C)-98.7 °F (37.1 °C)] 98.7 °F (37.1 °C)  Heart Rate:  [] 93  Resp:  [12-18] 16  BP: (103-131)/(50-68) 124/59  Physical Exam   Constitutional:   Thin and frail. Doesn't appear to feel well.     HENT:   Head: Normocephalic and atraumatic.   Nose: Nose normal.   OM dry.   Eyes: Conjunctivae and EOM are normal.   Neck: Normal range of motion. Neck supple.   Cardiovascular: Normal rate, regular rhythm and normal heart sounds.    Pulmonary/Chest: Effort normal. He has rales.   Abdominal: Soft. Bowel sounds are normal.   Musculoskeletal: He exhibits no edema or tenderness.   Neurological: He is alert. No cranial nerve deficit.   Skin: Skin is warm and dry.   Psychiatric: He has a normal mood and affect.   Vitals reviewed.          Results Review:  I have reviewed the labs, radiology results, and diagnostic studies.    Laboratory Data:     Results from last 7 days  Lab Units 08/11/18  0554 08/10/18  0513 08/09/18  0406   WBC 10*3/mm3 6.30 6.52 6.45   HEMOGLOBIN g/dL 9.3* 8.8* 8.5*   HEMATOCRIT % 30.2* 28.4* 27.0*   PLATELETS 10*3/mm3 159 160 168          Results from last 7 days  Lab Units 08/11/18  0554 08/10/18  0513 08/09/18  0406   SODIUM mmol/L 144 142 139    POTASSIUM mmol/L 4.2 4.1 4.7   CHLORIDE mmol/L 110 108 106   CO2 mmol/L 21.0* 21.0* 21.0*   BUN mg/dL 45* 46* 49*   CREATININE mg/dL 3.09* 3.35* 3.43*   CALCIUM mg/dL 8.6 8.4 8.2*   BILIRUBIN mg/dL 0.6  --  0.2   ALK PHOS U/L 84  --  82   ALT (SGPT) U/L 30  --  26   AST (SGOT) U/L 9  --  14   GLUCOSE mg/dL 131* 138* 146*       Culture Data:        Radiology Data:   Imaging Results (last 24 hours)     ** No results found for the last 24 hours. **          I have reviewed the patient's current medications.     Assessment/Plan     Hospital Problem List     Volume overload          1. Volume overload with pulmonary vascular congestion   2. ESRD with anticipated dialysis soon, recent AV fistula placed  3. DM II  4. Hyperkalemia  5. PVD  6. Tobacco abuse  7. Anemia, probably related to renal disease.   8. Constipation.  9. Dense retrocardiac consolidation, likely atelectasis or pneumonia.    Plan:  Marco and mucinex  Nephrology consulted, continue Bumex   Continue to follow labs, poatssium, CR and HBG  Bowel program  SSI and accu-checks  Repeat CXR PA and lateral demonstrates possible PNA, may need CT to further characterize. Will start Merrem for now, and see if improves.   Nystatin swish and swallow       Discharge Planning: I expect the patient to be discharged to home in 2-3 days.    Oleg Madrid,    08/11/18   4:20 PM

## 2018-08-11 NOTE — PLAN OF CARE
Problem: Patient Care Overview  Goal: Plan of Care Review  Outcome: Ongoing (interventions implemented as appropriate)   08/11/18 0529   Coping/Psychosocial   Plan of Care Reviewed With patient   Plan of Care Review   Progress improving   OTHER   Outcome Summary VSS. C/o pain in lower back before bed. PRN pain medication given with relief. NSR/ST  on tele. Pt has rested throughout the night. Will cont to monitor.      Goal: Individualization and Mutuality  Outcome: Ongoing (interventions implemented as appropriate)    Goal: Discharge Needs Assessment  Outcome: Ongoing (interventions implemented as appropriate)    Goal: Interprofessional Rounds/Family Conf  Outcome: Ongoing (interventions implemented as appropriate)

## 2018-08-11 NOTE — PLAN OF CARE
Problem: Patient Care Overview  Goal: Plan of Care Review  Outcome: Ongoing (interventions implemented as appropriate)   08/11/18 0682   Coping/Psychosocial   Plan of Care Reviewed With patient   Plan of Care Review   Progress no change   OTHER   Outcome Summary no co pain. Pt started on IV ABX. pt had good urine output. cont to monitor.      Goal: Individualization and Mutuality  Outcome: Ongoing (interventions implemented as appropriate)    Goal: Discharge Needs Assessment  Outcome: Ongoing (interventions implemented as appropriate)    Goal: Interprofessional Rounds/Family Conf  Outcome: Ongoing (interventions implemented as appropriate)      Problem: Cardiac: Heart Failure (Adult)  Goal: Signs and Symptoms of Listed Potential Problems Will be Absent, Minimized or Managed (Cardiac: Heart Failure)  Outcome: Ongoing (interventions implemented as appropriate)

## 2018-08-11 NOTE — PROGRESS NOTES
Nephrology ([facility]) Progress Note      Patient:  Gavin Wilson  YOB: 1945  Date of Service: 8/11/2018  MRN: [id number]   Acct: [id number]   Primary Care Physician: Provider, No Known  Advance Directive:   Code Status and Medical Interventions:   Ordered at: 08/08/18 0522     Level Of Support Discussed With:    Patient     Code Status:    No CPR     Medical Interventions (Level of Support Prior to Arrest):    Full     Admit Date: 8/8/2018       Hospital Day: 3  Referring Provider: Arcenio Ahn MD      Patient personally seen and examined.  Complete chart including Consults, Notes, Operative Reports, Labs, Cardiology, and Radiology studies reviewed as able.        Subjective:  Gavin Wilson is a 73 y.o. male  whom we were consulted for volume overload in patient approaching end stage renal disease.  Baseline of chronic kidney disease stage 4/5, follows with nephrologist at [facility].  Recently had left arm AV fistula placed (also at [facility]).  History of type 2 diabetes, hypertension, chronic congestive heart failure, prior bladder cancer with urostomy.  Admitted as a direct admit from [facility].  He had presented to their facility with dyspnea and stated he was ready to start dialysis to relieve his symptoms.  Apparently patient has been advised to start dialysis in the past but has deferred any decisions until now.  Denied recent change in urine output, no hematuria.  no n/v/d.  Minimal dyspnea, able to provide a lengthy explanation of current medical condition without distress noted initially.  No peripheral edema.      Today, no new events.  Improved SOA. No n/v/d.  No family present.  Anxious for d/c.        Allergies:  Sulfa antibiotics    Home Meds:  Prescriptions Prior to Admission   Medication Sig Dispense Refill Last Dose   • aspirin 81 MG chewable tablet Chew 81 mg Daily.   8/7/2018 at Unknown time   • atorvastatin (LIPITOR) 80  MG tablet Take 80 mg by mouth daily      • Calcium 500 MG tablet Take 1,000 mg by mouth daily      • HYDROcodone-acetaminophen (NORCO)  MG per tablet Every 6 (Six) Hours.      • insulin aspart (novoLOG) 100 UNIT/ML injection Inject 7 Units into the skin nightly      • insulin lispro (humaLOG) 100 UNIT/ML injection Inject 5 Units into the skin 3 times daily (before meals)      • metoprolol tartrate (LOPRESSOR) 25 MG tablet Take 25 mg by mouth 2 times daily Indications: 1/2 tab          Medicines:  Current Facility-Administered Medications   Medication Dose Route Frequency Provider Last Rate Last Dose   • aspirin chewable tablet 81 mg  81 mg Oral Daily Arcenio Ahn MD   81 mg at 08/11/18 0835   • atorvastatin (LIPITOR) tablet 80 mg  80 mg Oral Daily Arcenio Ahn MD   80 mg at 08/11/18 0835   • bumetanide (BUMEX) tablet 1 mg  1 mg Oral Daily Roberto Carlos Myrick APRN   1 mg at 08/11/18 0835   • calcitriol (ROCALTROL) capsule 0.25 mcg  0.25 mcg Oral Daily Samson Aragon MD   0.25 mcg at 08/11/18 0835   • calcium carb-cholecalciferol 600-800 MG-UNIT tablet 1 tablet  1 tablet Oral Daily Arcenio Ahn MD   1 tablet at 08/11/18 0835   • dextrose (D50W) solution 25 g  25 g Intravenous Q15 Min PRN Arcenio Ahn MD       • dextrose (GLUTOSE) oral gel 15 g  15 g Oral Q15 Min PRN Arcenio Ahn MD       • glucagon (human recombinant) (GLUCAGEN DIAGNOSTIC) injection 1 mg  1 mg Subcutaneous PRN Arcenio Ahn MD       • guaiFENesin (MUCINEX) 12 hr tablet 1,200 mg  1,200 mg Oral Q12H Oleg Madrid DO   1,200 mg at 08/11/18 0835   • HYDROcodone-acetaminophen (NORCO)  MG per tablet 1 tablet  1 tablet Oral Q4H PRN Arcenio Ahn MD   1 tablet at 08/10/18 2047   • insulin lispro (humaLOG) injection 2-7 Units  2-7 Units Subcutaneous 4x Daily With Meals & Nightly Arcenio Ahn MD   2 Units at 08/11/18 0825   • ipratropium-albuterol (DUO-NEB) nebulizer solution 3 mL  3  mL Nebulization Q4H PRN Oleg Madrid DO       • LORazepam (ATIVAN) tablet 1 mg  1 mg Oral Q6H PRN Arcenio Ahn MD   1 mg at 08/09/18 2028   • metoprolol tartrate (LOPRESSOR) tablet 25 mg  25 mg Oral Q12H Arcenio Ahn MD   25 mg at 08/11/18 0835   • naloxone (NARCAN) injection 0.4 mg  0.4 mg Intravenous Q5 Min PRN Arcenio Ahn MD       • nicotine (NICODERM CQ) 14 MG/24HR patch 1 patch  1 patch Transdermal Q24H Farooq Edwards MD   1 patch at 08/10/18 2048   • NON FORMULARY  500 mg Oral TID With Meals Samson Aragon MD       • nystatin (MYCOSTATIN) 998744 UNIT/ML suspension 500,000 Units  5 mL Oral 4x Daily Oleg Madrid DO   500,000 Units at 08/11/18 0835   • ondansetron (ZOFRAN) injection 4 mg  4 mg Intravenous Q6H PRN Arcenio Ahn MD       • sodium bicarbonate tablet 325 mg  325 mg Oral BID Samson Aragon MD       • sodium chloride 0.9 % flush 1-10 mL  1-10 mL Intravenous PRN Arcenio Ahn MD           Past Medical History:  Past Medical History:   Diagnosis Date   • CHF (congestive heart failure) (CMS/HCC)    • Coronary stent occlusion    • Diabetes mellitus (CMS/HCC)    • Hyperlipidemia    • Hypertension    • Stroke (CMS/HCC)        Past Surgical History:  Past Surgical History:   Procedure Laterality Date   • BLADDER SURGERY     • ESOPHAGECTOMY         Family History  Family History   Problem Relation Age of Onset   • No Known Problems Father    • No Known Problems Mother        Social History  Social History     Social History   • Marital status:      Spouse name: N/A   • Number of children: N/A   • Years of education: N/A     Occupational History   • Not on file.     Social History Main Topics   • Smoking status: Current Every Day Smoker   • Smokeless tobacco: Never Used   • Alcohol use No   • Drug use: No   • Sexual activity: Not on file     Other Topics Concern   • Not on file     Social History Narrative   • No narrative on file  "        Review of Systems:  History obtained from chart review and the patient  General ROS: No fever or chills  Respiratory ROS: + cough, shortness of breath  Cardiovascular ROS: No chest pain or palpitations  Gastrointestinal ROS: No abdominal pain or melena  Genito-Urinary ROS: No dysuria or hematuria    Objective:  /64 (BP Location: Right arm, Patient Position: Lying)   Pulse 114   Temp 98.3 °F (36.8 °C) (Temporal Artery )   Resp 16   Ht 170.2 cm (67.01\")   Wt 47.8 kg (105 lb 7 oz)   SpO2 100%   BMI 16.51 kg/m²     Intake/Output Summary (Last 24 hours) at 08/11/18 1154  Last data filed at 08/11/18 0837   Gross per 24 hour   Intake              660 ml   Output             1725 ml   Net            -1065 ml     General: awake/alert   Chest:  ctab  CVS: regular rate and rhythm  Abdominal: soft, nontender, normal bowel sounds  Extremities: no cyanosis or edema  Skin: warm and dry without rash      Labs:    Results from last 7 days  Lab Units 08/11/18  0554 08/10/18  0513 08/09/18  0406   WBC 10*3/mm3 6.30 6.52 6.45   HEMOGLOBIN g/dL 9.3* 8.8* 8.5*   HEMATOCRIT % 30.2* 28.4* 27.0*   PLATELETS 10*3/mm3 159 160 168           Results from last 7 days  Lab Units 08/11/18  0554 08/10/18  0513 08/09/18  0406   SODIUM mmol/L 144 142 139   POTASSIUM mmol/L 4.2 4.1 4.7   CHLORIDE mmol/L 110 108 106   CO2 mmol/L 21.0* 21.0* 21.0*   BUN mg/dL 45* 46* 49*   CREATININE mg/dL 3.09* 3.35* 3.43*   CALCIUM mg/dL 8.6 8.4 8.2*   BILIRUBIN mg/dL 0.6  --  0.2   ALK PHOS U/L 84  --  82   ALT (SGPT) U/L 30  --  26   AST (SGOT) U/L 9  --  14   GLUCOSE mg/dL 131* 138* 146*       Radiology:   Imaging Results (last 72 hours)     Procedure Component Value Units Date/Time    XR Chest 1 View [[id number]] Collected:  08/08/18 1450     Updated:  08/08/18 1454    Narrative:       EXAMINATION:   XR CHEST 1 VW-  8/8/2018 2:50 PM CDT     HISTORY: Difficulty breathing     Frontal upright radiograph of the chest 8/8/2018 1:23 PM CDT   "   COMPARISON: None.     FINDINGS:   Mild basilar interstitial infiltrates are present. Small bilateral  pleural effusions are present. This is suspicious for early congestive  failure.. Cardiac silhouette is upper limits of normal. Vascular  calcifications present aortic arch. Endovascular aortic stent graft is  present in the abdomen..      The osseous structures and surrounding soft tissues demonstrate no acute  abnormality.       Impression:       1. Mild or early changes of pulmonary vascular congestion.        This report was finalized on 08/08/2018 14:51 by Dr. Duc Garibay MD.          Culture:         Assessment   CKD 4  Acute/chronic CHF exacerbation  Hyperkalemia  HTN  DM2  Acidosis  Anemia  Secondary Hyperparathyroidism     Plan:  Continue medical management  No need for HD at this time  D/w pt  Calcitriol  Iron (auryxia)  bicarb    Samson Aragon MD  8/11/2018  11:54 AM

## 2018-08-12 ENCOUNTER — APPOINTMENT (OUTPATIENT)
Dept: CT IMAGING | Facility: HOSPITAL | Age: 73
End: 2018-08-12

## 2018-08-12 LAB
ALBUMIN SERPL-MCNC: 3.2 G/DL (ref 3.5–5)
ALBUMIN/GLOB SERPL: 1.3 G/DL (ref 1.1–2.5)
ALP SERPL-CCNC: 83 U/L (ref 24–120)
ALT SERPL W P-5'-P-CCNC: 26 U/L (ref 0–54)
ANION GAP SERPL CALCULATED.3IONS-SCNC: 13 MMOL/L (ref 4–13)
AST SERPL-CCNC: 7 U/L (ref 7–45)
BASOPHILS # BLD AUTO: 0.03 10*3/MM3 (ref 0–0.2)
BASOPHILS NFR BLD AUTO: 0.5 % (ref 0–2)
BILIRUB SERPL-MCNC: 0.6 MG/DL (ref 0.1–1)
BUN BLD-MCNC: 45 MG/DL (ref 5–21)
BUN/CREAT SERPL: 15.4 (ref 7–25)
CALCIUM SPEC-SCNC: 8.4 MG/DL (ref 8.4–10.4)
CHLORIDE SERPL-SCNC: 110 MMOL/L (ref 98–110)
CO2 SERPL-SCNC: 21 MMOL/L (ref 24–31)
CREAT BLD-MCNC: 2.92 MG/DL (ref 0.5–1.4)
DEPRECATED RDW RBC AUTO: 50.6 FL (ref 40–54)
EOSINOPHIL # BLD AUTO: 0.36 10*3/MM3 (ref 0–0.7)
EOSINOPHIL NFR BLD AUTO: 6.5 % (ref 0–4)
ERYTHROCYTE [DISTWIDTH] IN BLOOD BY AUTOMATED COUNT: 16.1 % (ref 12–15)
GFR SERPL CREATININE-BSD FRML MDRD: 21 ML/MIN/1.73
GLOBULIN UR ELPH-MCNC: 2.5 GM/DL
GLUCOSE BLD-MCNC: 139 MG/DL (ref 70–100)
GLUCOSE BLDC GLUCOMTR-MCNC: 149 MG/DL (ref 70–130)
GLUCOSE BLDC GLUCOMTR-MCNC: 170 MG/DL (ref 70–130)
GLUCOSE BLDC GLUCOMTR-MCNC: 218 MG/DL (ref 70–130)
GLUCOSE BLDC GLUCOMTR-MCNC: 294 MG/DL (ref 70–130)
HCT VFR BLD AUTO: 28.5 % (ref 40–52)
HGB BLD-MCNC: 9 G/DL (ref 14–18)
IMM GRANULOCYTES # BLD: 0.02 10*3/MM3 (ref 0–0.03)
IMM GRANULOCYTES NFR BLD: 0.4 % (ref 0–5)
LYMPHOCYTES # BLD AUTO: 0.99 10*3/MM3 (ref 0.72–4.86)
LYMPHOCYTES NFR BLD AUTO: 17.8 % (ref 15–45)
MCH RBC QN AUTO: 27.5 PG (ref 28–32)
MCHC RBC AUTO-ENTMCNC: 31.6 G/DL (ref 33–36)
MCV RBC AUTO: 87.2 FL (ref 82–95)
MONOCYTES # BLD AUTO: 0.47 10*3/MM3 (ref 0.19–1.3)
MONOCYTES NFR BLD AUTO: 8.4 % (ref 4–12)
NEUTROPHILS # BLD AUTO: 3.7 10*3/MM3 (ref 1.87–8.4)
NEUTROPHILS NFR BLD AUTO: 66.4 % (ref 39–78)
NRBC BLD MANUAL-RTO: 0 /100 WBC (ref 0–0)
PLATELET # BLD AUTO: 143 10*3/MM3 (ref 130–400)
PMV BLD AUTO: 9.9 FL (ref 6–12)
POTASSIUM BLD-SCNC: 4.2 MMOL/L (ref 3.5–5.3)
PROT SERPL-MCNC: 5.7 G/DL (ref 6.3–8.7)
RBC # BLD AUTO: 3.27 10*6/MM3 (ref 4.8–5.9)
SODIUM BLD-SCNC: 144 MMOL/L (ref 135–145)
WBC NRBC COR # BLD: 5.57 10*3/MM3 (ref 4.8–10.8)

## 2018-08-12 PROCEDURE — 25010000002 MEROPENEM: Performed by: INTERNAL MEDICINE

## 2018-08-12 PROCEDURE — 71250 CT THORAX DX C-: CPT

## 2018-08-12 PROCEDURE — 85025 COMPLETE CBC W/AUTO DIFF WBC: CPT | Performed by: INTERNAL MEDICINE

## 2018-08-12 PROCEDURE — 82962 GLUCOSE BLOOD TEST: CPT

## 2018-08-12 PROCEDURE — 63710000001 INSULIN LISPRO (HUMAN) PER 5 UNITS: Performed by: INTERNAL MEDICINE

## 2018-08-12 PROCEDURE — 63510000001 EPOETIN ALFA PER 1000 UNITS: Performed by: INTERNAL MEDICINE

## 2018-08-12 PROCEDURE — 80053 COMPREHEN METABOLIC PANEL: CPT | Performed by: INTERNAL MEDICINE

## 2018-08-12 RX ADMIN — Medication 1 TABLET: at 08:27

## 2018-08-12 RX ADMIN — CALCITRIOL 0.25 MCG: 0.25 CAPSULE ORAL at 08:27

## 2018-08-12 RX ADMIN — MEROPENEM 1 G: 1 INJECTION, POWDER, FOR SOLUTION INTRAVENOUS at 03:06

## 2018-08-12 RX ADMIN — GUAIFENESIN 1200 MG: 600 TABLET, EXTENDED RELEASE ORAL at 08:27

## 2018-08-12 RX ADMIN — MEROPENEM 1 G: 1 INJECTION, POWDER, FOR SOLUTION INTRAVENOUS at 15:45

## 2018-08-12 RX ADMIN — GUAIFENESIN 1200 MG: 600 TABLET, EXTENDED RELEASE ORAL at 20:23

## 2018-08-12 RX ADMIN — INSULIN LISPRO 2 UNITS: 100 INJECTION, SOLUTION INTRAVENOUS; SUBCUTANEOUS at 08:29

## 2018-08-12 RX ADMIN — METOPROLOL TARTRATE 25 MG: 25 TABLET, FILM COATED ORAL at 08:27

## 2018-08-12 RX ADMIN — METOPROLOL TARTRATE 25 MG: 25 TABLET, FILM COATED ORAL at 20:23

## 2018-08-12 RX ADMIN — LORAZEPAM 1 MG: 1 TABLET ORAL at 19:43

## 2018-08-12 RX ADMIN — INSULIN LISPRO 3 UNITS: 100 INJECTION, SOLUTION INTRAVENOUS; SUBCUTANEOUS at 12:03

## 2018-08-12 RX ADMIN — INSULIN LISPRO 4 UNITS: 100 INJECTION, SOLUTION INTRAVENOUS; SUBCUTANEOUS at 20:23

## 2018-08-12 RX ADMIN — SODIUM BICARBONATE 325 MG: 650 TABLET ORAL at 20:23

## 2018-08-12 RX ADMIN — ERYTHROPOIETIN 10000 UNITS: 10000 INJECTION, SOLUTION INTRAVENOUS; SUBCUTANEOUS at 15:53

## 2018-08-12 RX ADMIN — ATORVASTATIN CALCIUM 80 MG: 40 TABLET, FILM COATED ORAL at 08:27

## 2018-08-12 RX ADMIN — BUMETANIDE 1 MG: 1 TABLET ORAL at 08:27

## 2018-08-12 RX ADMIN — FERRIC CITRATE 210 MG: 210 TABLET, COATED ORAL at 20:23

## 2018-08-12 RX ADMIN — ASPIRIN 81 MG CHEWABLE TABLET 81 MG: 81 TABLET CHEWABLE at 08:27

## 2018-08-12 RX ADMIN — NICOTINE 1 PATCH: 14 PATCH, EXTENDED RELEASE TRANSDERMAL at 19:43

## 2018-08-12 RX ADMIN — HYDROCODONE BITARTRATE AND ACETAMINOPHEN 1 TABLET: 10; 325 TABLET ORAL at 19:43

## 2018-08-12 RX ADMIN — SODIUM BICARBONATE 325 MG: 650 TABLET ORAL at 08:27

## 2018-08-12 NOTE — PLAN OF CARE
Problem: Patient Care Overview  Goal: Plan of Care Review  Outcome: Ongoing (interventions implemented as appropriate)   08/12/18 1610   Coping/Psychosocial   Plan of Care Reviewed With patient   Plan of Care Review   Progress no change   OTHER   Outcome Summary No co pain. cont IV ABX. CT chest done today. Procrit given. con   08/12/18 1610   Coping/Psychosocial   Plan of Care Reviewed With patient   Plan of Care Review   Progress no change   OTHER   Outcome Summary No co pain. cont IV ABX. CT chest done today. Procrit given. cont to monitor.    t to monitor.      Goal: Individualization and Mutuality  Outcome: Ongoing (interventions implemented as appropriate)    Goal: Discharge Needs Assessment  Outcome: Ongoing (interventions implemented as appropriate)    Goal: Interprofessional Rounds/Family Conf  Outcome: Ongoing (interventions implemented as appropriate)      Problem: Cardiac: Heart Failure (Adult)  Goal: Signs and Symptoms of Listed Potential Problems Will be Absent, Minimized or Managed (Cardiac: Heart Failure)  Outcome: Ongoing (interventions implemented as appropriate)

## 2018-08-12 NOTE — PROGRESS NOTES
Nephrology ([facility]) Progress Note      Patient:  Gavin Wilson  YOB: 1945  Date of Service: 8/12/2018  MRN: [id number]   Acct: [id number]   Primary Care Physician: Provider, No Known  Advance Directive:   Code Status and Medical Interventions:   Ordered at: 08/08/18 0522     Level Of Support Discussed With:    Patient     Code Status:    No CPR     Medical Interventions (Level of Support Prior to Arrest):    Full     Admit Date: 8/8/2018       Hospital Day: 4  Referring Provider: Arcenio Ahn MD      Patient personally seen and examined.  Complete chart including Consults, Notes, Operative Reports, Labs, Cardiology, and Radiology studies reviewed as able.        Subjective:  Gavin Wilson is a 73 y.o. male  whom we were consulted for volume overload in patient approaching end stage renal disease.  Baseline of chronic kidney disease stage 4/5, follows with nephrologist at [facility].  Recently had left arm AV fistula placed (also at [facility]).  History of type 2 diabetes, hypertension, chronic congestive heart failure, prior bladder cancer with urostomy.  Admitted as a direct admit from [facility].  He had presented to their facility with dyspnea and stated he was ready to start dialysis to relieve his symptoms.  Apparently patient has been advised to start dialysis in the past but has deferred any decisions until now.  Denied recent change in urine output, no hematuria.  no n/v/d.  Minimal dyspnea, able to provide a lengthy explanation of current medical condition without distress noted initially.  No peripheral edema.      Today, no new events.  Improved SOA. No n/v/d.  No family present.  Chronic [place].       Allergies:  Sulfa antibiotics    Home Meds:  Prescriptions Prior to Admission   Medication Sig Dispense Refill Last Dose   • aspirin 81 MG chewable tablet Chew 81 mg Daily.   8/7/2018 at Unknown time   • atorvastatin (LIPITOR) 80 MG  tablet Take 80 mg by mouth daily      • Calcium 500 MG tablet Take 1,000 mg by mouth daily      • HYDROcodone-acetaminophen (NORCO)  MG per tablet Every 6 (Six) Hours.      • insulin aspart (novoLOG) 100 UNIT/ML injection Inject 7 Units into the skin nightly      • insulin lispro (humaLOG) 100 UNIT/ML injection Inject 5 Units into the skin 3 times daily (before meals)      • metoprolol tartrate (LOPRESSOR) 25 MG tablet Take 25 mg by mouth 2 times daily Indications: 1/2 tab          Medicines:  Current Facility-Administered Medications   Medication Dose Route Frequency Provider Last Rate Last Dose   • aspirin chewable tablet 81 mg  81 mg Oral Daily Arcenio Ahn MD   81 mg at 08/12/18 0827   • atorvastatin (LIPITOR) tablet 80 mg  80 mg Oral Daily Arcenio Ahn MD   80 mg at 08/12/18 0827   • bumetanide (BUMEX) tablet 1 mg  1 mg Oral Daily Roberto Carlos Myrick APRN   1 mg at 08/12/18 0827   • calcitriol (ROCALTROL) capsule 0.25 mcg  0.25 mcg Oral Daily Samson Aragon MD   0.25 mcg at 08/12/18 0827   • calcium carb-cholecalciferol 600-800 MG-UNIT tablet 1 tablet  1 tablet Oral Daily Arcenio Ahn MD   1 tablet at 08/12/18 0827   • dextrose (D50W) solution 25 g  25 g Intravenous Q15 Min PRN Arcenio Ahn MD       • dextrose (GLUTOSE) oral gel 15 g  15 g Oral Q15 Min PRN Arcenio Ahn MD       • glucagon (human recombinant) (GLUCAGEN DIAGNOSTIC) injection 1 mg  1 mg Subcutaneous PRN Arcenio Ahn MD       • guaiFENesin (MUCINEX) 12 hr tablet 1,200 mg  1,200 mg Oral Q12H Oleg Madrid DO   1,200 mg at 08/12/18 0827   • HYDROcodone-acetaminophen (NORCO)  MG per tablet 1 tablet  1 tablet Oral Q4H PRN Arcenio Ahn MD   1 tablet at 08/11/18 2012   • insulin lispro (humaLOG) injection 2-7 Units  2-7 Units Subcutaneous 4x Daily With Meals & Nightly Arcenio Ahn MD   3 Units at 08/12/18 1203   • ipratropium-albuterol (DUO-NEB) nebulizer solution 3 mL  3 mL  Nebulization Q4H PRN Oleg Madrid DO       • LORazepam (ATIVAN) tablet 1 mg  1 mg Oral Q6H PRN Arcenio Ahn MD   1 mg at 08/11/18 2012   • meropenem (MERREM) 1 g/100 mL 0.9% NS VTB (mbp)  1 g Intravenous Q12H Oleg Madrid DO 0 mL/hr at 08/11/18 1851 1 g at 08/12/18 0306   • metoprolol tartrate (LOPRESSOR) tablet 25 mg  25 mg Oral Q12H Arcenio Ahn MD   25 mg at 08/12/18 0827   • naloxone (NARCAN) injection 0.4 mg  0.4 mg Intravenous Q5 Min PRN Arcenio Ahn MD       • nicotine (NICODERM CQ) 14 MG/24HR patch 1 patch  1 patch Transdermal Q24H Farooq Edwards MD   1 patch at 08/11/18 2013   • NON FORMULARY  500 mg Oral TID With Meals Samson Aragon MD       • nystatin (MYCOSTATIN) 246766 UNIT/ML suspension 500,000 Units  5 mL Oral 4x Daily Oleg Madrid DO   500,000 Units at 08/11/18 0835   • ondansetron (ZOFRAN) injection 4 mg  4 mg Intravenous Q6H PRN Arcenio Ahn MD       • sodium bicarbonate tablet 325 mg  325 mg Oral BID Samson Aragon MD   325 mg at 08/12/18 0827   • sodium chloride 0.9 % flush 1-10 mL  1-10 mL Intravenous PRN Arcenio Ahn MD           Past Medical History:  Past Medical History:   Diagnosis Date   • CHF (congestive heart failure) (CMS/HCC)    • Coronary stent occlusion    • Diabetes mellitus (CMS/HCC)    • Hyperlipidemia    • Hypertension    • Stroke (CMS/HCC)        Past Surgical History:  Past Surgical History:   Procedure Laterality Date   • BLADDER SURGERY     • ESOPHAGECTOMY         Family History  Family History   Problem Relation Age of Onset   • No Known Problems Father    • No Known Problems Mother        Social History  Social History     Social History   • Marital status:      Spouse name: N/A   • Number of children: N/A   • Years of education: N/A     Occupational History   • Not on file.     Social History Main Topics   • Smoking status: Current Every Day Smoker   • Smokeless tobacco: Never Used  "  • Alcohol use No   • Drug use: No   • Sexual activity: Not on file     Other Topics Concern   • Not on file     Social History Narrative   • No narrative on file         Review of Systems:  History obtained from chart review and the patient  General ROS: No fever or chills  Respiratory ROS: + cough, shortness of breath  Cardiovascular ROS: No chest pain or palpitations  Gastrointestinal ROS: No abdominal pain or melena  Genito-Urinary ROS: No dysuria or hematuria    Objective:  /53 (BP Location: Right arm, Patient Position: Lying)   Pulse 89   Temp 97.8 °F (36.6 °C) (Temporal Artery )   Resp 18   Ht 170.2 cm (67.01\")   Wt 45 kg (99 lb 3.2 oz)   SpO2 99%   BMI 15.53 kg/m²     Intake/Output Summary (Last 24 hours) at 08/12/18 1532  Last data filed at 08/12/18 1300   Gross per 24 hour   Intake              700 ml   Output              650 ml   Net               50 ml     General: awake/alert   Chest:  Ctab, decreased air entry  CVS: regular rate and rhythm  Abdominal: soft, nontender, normal bowel sounds  Extremities: no cyanosis or edema  Skin: warm and dry without rash      Labs:    Results from last 7 days  Lab Units 08/12/18  0557 08/11/18  0554 08/10/18  0513   WBC 10*3/mm3 5.57 6.30 6.52   HEMOGLOBIN g/dL 9.0* 9.3* 8.8*   HEMATOCRIT % 28.5* 30.2* 28.4*   PLATELETS 10*3/mm3 143 159 160           Results from last 7 days  Lab Units 08/12/18  0557 08/11/18  0554 08/10/18  0513 08/09/18  0406   SODIUM mmol/L 144 144 142 139   POTASSIUM mmol/L 4.2 4.2 4.1 4.7   CHLORIDE mmol/L 110 110 108 106   CO2 mmol/L 21.0* 21.0* 21.0* 21.0*   BUN mg/dL 45* 45* 46* 49*   CREATININE mg/dL 2.92* 3.09* 3.35* 3.43*   CALCIUM mg/dL 8.4 8.6 8.4 8.2*   BILIRUBIN mg/dL 0.6 0.6  --  0.2   ALK PHOS U/L 83 84  --  82   ALT (SGPT) U/L 26 30  --  26   AST (SGOT) U/L 7 9  --  14   GLUCOSE mg/dL 139* 131* 138* 146*       Radiology:   Imaging Results (last 72 hours)     Procedure Component Value Units Date/Time    XR Chest 1 View " [[id number]] Collected:  08/08/18 1450     Updated:  08/08/18 1454    Narrative:       EXAMINATION:   XR CHEST 1 VW-  8/8/2018 2:50 PM CDT     HISTORY: Difficulty breathing     Frontal upright radiograph of the chest 8/8/2018 1:23 PM CDT     COMPARISON: None.     FINDINGS:   Mild basilar interstitial infiltrates are present. Small bilateral  pleural effusions are present. This is suspicious for early congestive  failure.. Cardiac silhouette is upper limits of normal. Vascular  calcifications present aortic arch. Endovascular aortic stent graft is  present in the abdomen..      The osseous structures and surrounding soft tissues demonstrate no acute  abnormality.       Impression:       1. Mild or early changes of pulmonary vascular congestion.        This report was finalized on 08/08/2018 14:51 by Dr. Duc Garibay MD.          Culture:         Assessment   CKD 4  Acute/chronic CHF exacerbation  Hyperkalemia  HTN  DM2  Acidosis  Anemia  Secondary Hyperparathyroidism     Plan:  Continue medical management  No need for HD at this time  D/w pt  Calcitriol  Iron (auryxia)  Bicarb  MOSES  CT chest report pending    Samson Aragon MD  8/12/2018  3:32 PM

## 2018-08-12 NOTE — PLAN OF CARE
Problem: Patient Care Overview  Goal: Plan of Care Review  Outcome: Ongoing (interventions implemented as appropriate)   08/12/18 0429   Coping/Psychosocial   Plan of Care Reviewed With patient   Plan of Care Review   Progress improving   OTHER   Outcome Summary VSS. S/ST  on tele. C/o pain in lower back early in shift. Norco given PRN with good relief. Pt has been resting throughout night. IV Merrem cont. Will cont to monitor.      Goal: Individualization and Mutuality  Outcome: Ongoing (interventions implemented as appropriate)    Goal: Discharge Needs Assessment  Outcome: Ongoing (interventions implemented as appropriate)    Goal: Interprofessional Rounds/Family Conf  Outcome: Ongoing (interventions implemented as appropriate)      Problem: Cardiac: Heart Failure (Adult)  Goal: Signs and Symptoms of Listed Potential Problems Will be Absent, Minimized or Managed (Cardiac: Heart Failure)  Outcome: Ongoing (interventions implemented as appropriate)

## 2018-08-12 NOTE — PROGRESS NOTES
[facility] Medicine Services  INPATIENT PROGRESS NOTE    Patient Name: Gavin Wilson  Date of Admission: 8/8/2018  Today's Date: 08/12/18  Length of Stay: 4  Primary Care Physician: Provider, No Known    Subjective   Chief Complaint: Cough  HPI   74 yo CM admitted on 8/8/18. Patient states that he does not feel well again today.  He tells me he has not felt well over the last 8 months however has then declining in the past few weeks.  He tells me normally is not lie around like this however currently is unable to even take care of himself.  He has a chronic worsening cough.  He has ongoing tobacco use.  He tells me he has lost at least 15 pounds even just in the last couple of weeks but has had ongoing weight loss for some time now.  He has not been evaluated by physical therapy since being here.  He does not particularly feel better over the last day or 2 at which time this is when antibiotics were initiated.        Review of Systems   Generalized weakness.     All pertinent negatives and positives are as above. All other systems have been reviewed and are negative unless otherwise stated.     Objective    Temp:  [97.7 °F (36.5 °C)-98.2 °F (36.8 °C)] 97.8 °F (36.6 °C)  Heart Rate:  [76-98] 89  Resp:  [16-18] 18  BP: (105-134)/(53-66) 121/53  Physical Exam   Constitutional:   Thin and frail. Doesn't appear to feel well.     HENT:   Head: Normocephalic and atraumatic.   Nose: Nose normal.   OM dry.   Eyes: Conjunctivae and EOM are normal.   Neck: Normal range of motion. Neck supple.   Cardiovascular: Normal rate, regular rhythm and normal heart sounds.    Pulmonary/Chest: Effort normal. He has rales.   Abdominal: Soft. Bowel sounds are normal.   Musculoskeletal: He exhibits no edema or tenderness.   Neurological: He is alert. No cranial nerve deficit.   Skin: Skin is warm and dry.   Psychiatric: He has a normal mood and affect.   Vitals reviewed.          Results Review:  I have  reviewed the labs, radiology results, and diagnostic studies.    Laboratory Data:     Results from last 7 days  Lab Units 08/12/18  0557 08/11/18  0554 08/10/18  0513   WBC 10*3/mm3 5.57 6.30 6.52   HEMOGLOBIN g/dL 9.0* 9.3* 8.8*   HEMATOCRIT % 28.5* 30.2* 28.4*   PLATELETS 10*3/mm3 143 159 160          Results from last 7 days  Lab Units 08/12/18  0557 08/11/18  0554 08/10/18  0513 08/09/18  0406   SODIUM mmol/L 144 144 142 139   POTASSIUM mmol/L 4.2 4.2 4.1 4.7   CHLORIDE mmol/L 110 110 108 106   CO2 mmol/L 21.0* 21.0* 21.0* 21.0*   BUN mg/dL 45* 45* 46* 49*   CREATININE mg/dL 2.92* 3.09* 3.35* 3.43*   CALCIUM mg/dL 8.4 8.6 8.4 8.2*   BILIRUBIN mg/dL 0.6 0.6  --  0.2   ALK PHOS U/L 83 84  --  82   ALT (SGPT) U/L 26 30  --  26   AST (SGOT) U/L 7 9  --  14   GLUCOSE mg/dL 139* 131* 138* 146*       Culture Data:        Radiology Data:   Imaging Results (last 24 hours)     ** No results found for the last 24 hours. **          I have reviewed the patient's current medications.     Assessment/Plan     Hospital Problem List     Volume overload          1. Volume overload with pulmonary vascular congestion   2. ESRD with anticipated dialysis soon, recent AV fistula placed  3. DM II  4. Hyperkalemia  5. PVD  6. Tobacco abuse  7. Anemia, probably related to renal disease.   8. Constipation.  9. Dense retrocardiac consolidation, likely atelectasis or pneumonia.    Plan:  Duonebs and mucinex  Nephrology consulted, continue Bumex , no need for HD now  Continue to follow labs, poatssium, CR and HBG  Bowel program  SSI and accu-checks  Repeat CXR PA and lateral demonstrates possible PNA,will order CT chest without contrast for further eval. . Will start Merrem for now, and see if improves.   Nystatin swish and swallow   PT consult  SW consult      Discharge Planning: I expect the patient to be discharged to home in 1-2 days    Frida Lagunas MD   08/12/18   2:29 PM

## 2018-08-13 ENCOUNTER — APPOINTMENT (OUTPATIENT)
Dept: CARDIOLOGY | Facility: HOSPITAL | Age: 73
End: 2018-08-13
Attending: FAMILY MEDICINE

## 2018-08-13 LAB
ANION GAP SERPL CALCULATED.3IONS-SCNC: 12 MMOL/L (ref 4–13)
BACTERIA SPEC AEROBE CULT: ABNORMAL
BUN BLD-MCNC: 44 MG/DL (ref 5–21)
BUN/CREAT SERPL: 16.3 (ref 7–25)
CALCIUM SPEC-SCNC: 8 MG/DL (ref 8.4–10.4)
CHLORIDE SERPL-SCNC: 113 MMOL/L (ref 98–110)
CO2 SERPL-SCNC: 21 MMOL/L (ref 24–31)
CREAT BLD-MCNC: 2.7 MG/DL (ref 0.5–1.4)
GFR SERPL CREATININE-BSD FRML MDRD: 23 ML/MIN/1.73
GLUCOSE BLD-MCNC: 129 MG/DL (ref 70–100)
GLUCOSE BLDC GLUCOMTR-MCNC: 150 MG/DL (ref 70–130)
GLUCOSE BLDC GLUCOMTR-MCNC: 167 MG/DL (ref 70–130)
GLUCOSE BLDC GLUCOMTR-MCNC: 248 MG/DL (ref 70–130)
GLUCOSE BLDC GLUCOMTR-MCNC: 257 MG/DL (ref 70–130)
POTASSIUM BLD-SCNC: 4.1 MMOL/L (ref 3.5–5.3)
SODIUM BLD-SCNC: 146 MMOL/L (ref 135–145)

## 2018-08-13 PROCEDURE — 93306 TTE W/DOPPLER COMPLETE: CPT | Performed by: INTERNAL MEDICINE

## 2018-08-13 PROCEDURE — 93306 TTE W/DOPPLER COMPLETE: CPT

## 2018-08-13 PROCEDURE — 63710000001 INSULIN LISPRO (HUMAN) PER 5 UNITS: Performed by: INTERNAL MEDICINE

## 2018-08-13 PROCEDURE — 82962 GLUCOSE BLOOD TEST: CPT

## 2018-08-13 PROCEDURE — 80048 BASIC METABOLIC PNL TOTAL CA: CPT | Performed by: INTERNAL MEDICINE

## 2018-08-13 PROCEDURE — 25010000002 MEROPENEM: Performed by: INTERNAL MEDICINE

## 2018-08-13 RX ORDER — SODIUM BICARBONATE 650 MG/1
650 TABLET ORAL 2 TIMES DAILY
Status: DISCONTINUED | OUTPATIENT
Start: 2018-08-13 | End: 2018-08-17

## 2018-08-13 RX ORDER — PHENOL 1.4 %
600 AEROSOL, SPRAY (ML) MUCOUS MEMBRANE DAILY
COMMUNITY

## 2018-08-13 RX ORDER — ATORVASTATIN CALCIUM 80 MG/1
40 TABLET, FILM COATED ORAL DAILY
COMMUNITY

## 2018-08-13 RX ORDER — BUMETANIDE 1 MG/1
1 TABLET ORAL DAILY
Status: DISCONTINUED | OUTPATIENT
Start: 2018-08-14 | End: 2018-08-16

## 2018-08-13 RX ORDER — BUMETANIDE 0.25 MG/ML
1 INJECTION INTRAMUSCULAR; INTRAVENOUS EVERY 12 HOURS
Status: DISCONTINUED | OUTPATIENT
Start: 2018-08-13 | End: 2018-08-13

## 2018-08-13 RX ADMIN — Medication 1 TABLET: at 09:20

## 2018-08-13 RX ADMIN — LORAZEPAM 1 MG: 1 TABLET ORAL at 20:33

## 2018-08-13 RX ADMIN — FERRIC CITRATE 210 MG: 210 TABLET, COATED ORAL at 09:19

## 2018-08-13 RX ADMIN — HYDROCODONE BITARTRATE AND ACETAMINOPHEN 1 TABLET: 10; 325 TABLET ORAL at 13:18

## 2018-08-13 RX ADMIN — NYSTATIN 500000 UNITS: 100000 SUSPENSION ORAL at 20:32

## 2018-08-13 RX ADMIN — MEROPENEM 1 G: 1 INJECTION, POWDER, FOR SOLUTION INTRAVENOUS at 15:41

## 2018-08-13 RX ADMIN — GUAIFENESIN 1200 MG: 600 TABLET, EXTENDED RELEASE ORAL at 20:33

## 2018-08-13 RX ADMIN — NICOTINE 1 PATCH: 14 PATCH, EXTENDED RELEASE TRANSDERMAL at 20:32

## 2018-08-13 RX ADMIN — MEROPENEM 1 G: 1 INJECTION, POWDER, FOR SOLUTION INTRAVENOUS at 04:15

## 2018-08-13 RX ADMIN — FERRIC CITRATE 210 MG: 210 TABLET, COATED ORAL at 12:30

## 2018-08-13 RX ADMIN — BUMETANIDE 1 MG: 1 TABLET ORAL at 09:20

## 2018-08-13 RX ADMIN — FERRIC CITRATE 210 MG: 210 TABLET, COATED ORAL at 17:24

## 2018-08-13 RX ADMIN — METOPROLOL TARTRATE 25 MG: 25 TABLET, FILM COATED ORAL at 09:20

## 2018-08-13 RX ADMIN — GUAIFENESIN 1200 MG: 600 TABLET, EXTENDED RELEASE ORAL at 09:20

## 2018-08-13 RX ADMIN — ASPIRIN 81 MG CHEWABLE TABLET 81 MG: 81 TABLET CHEWABLE at 09:19

## 2018-08-13 RX ADMIN — HYDROCODONE BITARTRATE AND ACETAMINOPHEN 1 TABLET: 10; 325 TABLET ORAL at 20:32

## 2018-08-13 RX ADMIN — METOPROLOL TARTRATE 25 MG: 25 TABLET, FILM COATED ORAL at 20:33

## 2018-08-13 RX ADMIN — SODIUM BICARBONATE 325 MG: 650 TABLET ORAL at 09:20

## 2018-08-13 RX ADMIN — ATORVASTATIN CALCIUM 80 MG: 40 TABLET, FILM COATED ORAL at 09:19

## 2018-08-13 RX ADMIN — CALCITRIOL 0.25 MCG: 0.25 CAPSULE ORAL at 09:20

## 2018-08-13 RX ADMIN — INSULIN LISPRO 4 UNITS: 100 INJECTION, SOLUTION INTRAVENOUS; SUBCUTANEOUS at 12:30

## 2018-08-13 RX ADMIN — SODIUM BICARBONATE 650 MG: 650 TABLET ORAL at 20:33

## 2018-08-13 NOTE — PLAN OF CARE
Problem: Patient Care Overview  Goal: Plan of Care Review  Outcome: Ongoing (interventions implemented as appropriate)   08/13/18 0045   Coping/Psychosocial   Plan of Care Reviewed With patient   Plan of Care Review   Progress no change   OTHER   Outcome Summary Pt cont with fair appetite. His diet has been changed to a regular diet. He has consumed 50% of the last 5 meals. Pt is receiving Glucerna TID with meals. Pt seemed agitated at time of visit and did not want to talk with RD today. Will cont with current POC.

## 2018-08-13 NOTE — PLAN OF CARE
Problem: Patient Care Overview  Goal: Plan of Care Review  Outcome: Ongoing (interventions implemented as appropriate)   08/13/18 1602   Coping/Psychosocial   Plan of Care Reviewed With patient   Plan of Care Review   Progress improving   OTHER   Outcome Summary VSS, medicated for pain per MAR with relief, no need for dialysis currently per Dr. Soto, awaiting PT eval, refusing to wear monitor, will continue to monitor for changes.       Problem: Cardiac: Heart Failure (Adult)  Goal: Signs and Symptoms of Listed Potential Problems Will be Absent, Minimized or Managed (Cardiac: Heart Failure)  Outcome: Ongoing (interventions implemented as appropriate)   08/13/18 1602   Goal/Outcome Evaluation   Problems Assessed (Heart Failure) all   Problems Present (Heart Failure) situational response       Problem: Fall Risk (Adult)  Goal: Identify Related Risk Factors and Signs and Symptoms  Outcome: Outcome(s) achieved Date Met: 08/13/18 08/13/18 1602   Fall Risk (Adult)   Related Risk Factors (Fall Risk) confusion/agitation;gait/mobility problems;history of falls   Signs and Symptoms (Fall Risk) presence of risk factors     Goal: Absence of Fall  Outcome: Ongoing (interventions implemented as appropriate)   08/13/18 1602   Fall Risk (Adult)   Absence of Fall making progress toward outcome

## 2018-08-13 NOTE — PROGRESS NOTES
Nephrology ([facility]) Progress Note      Patient:  Gavin Wilson  YOB: 1945  Date of Service: 8/13/2018  MRN: [id number]   Acct: [id number]   Primary Care Physician: Provider, No Known  Advance Directive:   Code Status and Medical Interventions:   Ordered at: 08/08/18 0522     Level Of Support Discussed With:    Patient     Code Status:    No CPR     Medical Interventions (Level of Support Prior to Arrest):    Full     Admit Date: 8/8/2018       Hospital Day: 5  Referring Provider: Arcenio Ahn MD      Patient personally seen and examined.  Complete chart including Consults, Notes, Operative Reports, Labs, Cardiology, and Radiology studies reviewed as able.        Subjective:  Gavin Wilson is a 73 y.o. male  whom we were consulted for volume overload in patient approaching end stage renal disease.  Baseline of chronic kidney disease stage 4/5, follows with nephrologist at [facility].  Recently had left arm AV fistula placed (also at [facility]).  History of type 2 diabetes, hypertension, chronic congestive heart failure, prior bladder cancer with urostomy.  Admitted as a direct admit from [facility].  He had presented to their facility with dyspnea.  Apparently patient has been advised to start dialysis in the past but has deferred any decisions until now.  Denies recent change in urine output, no hematuria.  no n/v/d. Hospital course remarkable for good response to PO diuretics, improving dyspnea.    Today has complaint of fatigue, productive cough.  No new overnight issues.  Urine output nonoliguric    Allergies:  Sulfa antibiotics    Home Meds:  Prescriptions Prior to Admission   Medication Sig Dispense Refill Last Dose   • aspirin 81 MG chewable tablet Chew 81 mg Daily.   8/7/2018 at Unknown time   • atorvastatin (LIPITOR) 80 MG tablet Take 80 mg by mouth daily      • Calcium 500 MG tablet Take 1,000 mg by mouth daily      •  HYDROcodone-acetaminophen (NORCO)  MG per tablet Every 6 (Six) Hours.      • insulin aspart (novoLOG) 100 UNIT/ML injection Inject 7 Units into the skin nightly      • insulin lispro (humaLOG) 100 UNIT/ML injection Inject 5 Units into the skin 3 times daily (before meals)      • metoprolol tartrate (LOPRESSOR) 25 MG tablet Take 25 mg by mouth 2 times daily Indications: 1/2 tab          Medicines:  Current Facility-Administered Medications   Medication Dose Route Frequency Provider Last Rate Last Dose   • aspirin chewable tablet 81 mg  81 mg Oral Daily Arcenio Ahn MD   81 mg at 08/13/18 0919   • atorvastatin (LIPITOR) tablet 80 mg  80 mg Oral Daily Arcenio Ahn MD   80 mg at 08/13/18 0919   • bumetanide (BUMEX) tablet 1 mg  1 mg Oral Daily Roberto Carlos Myrick APRN   1 mg at 08/13/18 0920   • calcitriol (ROCALTROL) capsule 0.25 mcg  0.25 mcg Oral Daily Samson Aragon MD   0.25 mcg at 08/13/18 0920   • calcium carb-cholecalciferol 600-800 MG-UNIT tablet 1 tablet  1 tablet Oral Daily Arcenio Ahn MD   1 tablet at 08/13/18 0920   • dextrose (D50W) solution 25 g  25 g Intravenous Q15 Min PRN Arcenio Ahn MD       • dextrose (GLUTOSE) oral gel 15 g  15 g Oral Q15 Min PRN Arcenio Ahn MD       • Ferric Citrate tablet 210 mg  210 mg Oral TID With Meals Samson Aragon MD   210 mg at 08/13/18 0919   • glucagon (human recombinant) (GLUCAGEN DIAGNOSTIC) injection 1 mg  1 mg Subcutaneous PRN Arcenio Ahn MD       • guaiFENesin (MUCINEX) 12 hr tablet 1,200 mg  1,200 mg Oral Q12H Oleg Madrid DO   1,200 mg at 08/13/18 0920   • HYDROcodone-acetaminophen (NORCO)  MG per tablet 1 tablet  1 tablet Oral Q4H PRN Arcenio Ahn MD   1 tablet at 08/12/18 1943   • insulin lispro (humaLOG) injection 2-7 Units  2-7 Units Subcutaneous 4x Daily With Meals & Nightly Arcenio Ahn MD   4 Units at 08/12/18 2023   • ipratropium-albuterol (DUO-NEB) nebulizer  solution 3 mL  3 mL Nebulization Q4H PRN Oleg Madrid DO       • LORazepam (ATIVAN) tablet 1 mg  1 mg Oral Q6H PRN Arcenio Ahn MD   1 mg at 08/12/18 1943   • meropenem (MERREM) 1 g/100 mL 0.9% NS VTB (mbp)  1 g Intravenous Q12H Oleg Madrid DO 0 mL/hr at 08/11/18 1851 1 g at 08/13/18 0415   • metoprolol tartrate (LOPRESSOR) tablet 25 mg  25 mg Oral Q12H Arcenio Ahn MD   25 mg at 08/13/18 0920   • naloxone (NARCAN) injection 0.4 mg  0.4 mg Intravenous Q5 Min PRN Arcenio Ahn MD       • nicotine (NICODERM CQ) 14 MG/24HR patch 1 patch  1 patch Transdermal Q24H Farooq Edwards MD   1 patch at 08/12/18 1943   • nystatin (MYCOSTATIN) 611000 UNIT/ML suspension 500,000 Units  5 mL Oral 4x Daily Oleg Madrid DO   500,000 Units at 08/11/18 0835   • ondansetron (ZOFRAN) injection 4 mg  4 mg Intravenous Q6H PRN Arcenio Ahn MD       • sodium bicarbonate tablet 325 mg  325 mg Oral BID Samson Aragon MD   325 mg at 08/13/18 0920   • sodium chloride 0.9 % flush 1-10 mL  1-10 mL Intravenous PRN Arcenio Ahn MD           Past Medical History:  Past Medical History:   Diagnosis Date   • CHF (congestive heart failure) (CMS/HCC)    • Coronary stent occlusion    • Diabetes mellitus (CMS/HCC)    • Hyperlipidemia    • Hypertension    • Stroke (CMS/HCC)        Past Surgical History:  Past Surgical History:   Procedure Laterality Date   • BLADDER SURGERY     • ESOPHAGECTOMY         Family History  Family History   Problem Relation Age of Onset   • No Known Problems Father    • No Known Problems Mother        Social History  Social History     Social History   • Marital status:      Spouse name: N/A   • Number of children: N/A   • Years of education: N/A     Occupational History   • Not on file.     Social History Main Topics   • Smoking status: Current Every Day Smoker   • Smokeless tobacco: Never Used   • Alcohol use No   • Drug use: No   • Sexual activity: Not  "on file     Other Topics Concern   • Not on file     Social History Narrative   • No narrative on file         Review of Systems:  History obtained from chart review and the patient  General ROS: No fever or chills, +fatigue  Respiratory ROS: positive for  shortness of breath, +cough  Cardiovascular ROS: positive for  dyspnea on exertion  No chest pain or palpitations  Gastrointestinal ROS: No abdominal pain or melena  Genito-Urinary ROS: No dysuria or hematuria  Neurological ROS: no headache or dizziness    Objective:  /58 (BP Location: Right arm, Patient Position: Lying)   Pulse 97   Temp 97.3 °F (36.3 °C) (Temporal Artery )   Resp 18   Ht 170.2 cm (67.01\")   Wt 44.5 kg (98 lb 2 oz)   SpO2 94%   BMI 15.36 kg/m²     Intake/Output Summary (Last 24 hours) at 08/13/18 1007  Last data filed at 08/13/18 0700   Gross per 24 hour   Intake              600 ml   Output              950 ml   Net             -350 ml     General: awake/alert    Neck: Supple, no JVD  Chest:  bilateral wheezes  CVS: regular rate and rhythm  Abdominal: soft, nontender, normal bowel sounds  Extremities: no cyanosis or edema  Skin: warm and dry without rash  Neuro: no focal motor deficits    Labs:    Results from last 7 days  Lab Units 08/12/18  0557 08/11/18  0554 08/10/18  0513   WBC 10*3/mm3 5.57 6.30 6.52   HEMOGLOBIN g/dL 9.0* 9.3* 8.8*   HEMATOCRIT % 28.5* 30.2* 28.4*   PLATELETS 10*3/mm3 143 159 160           Results from last 7 days  Lab Units 08/13/18  0658 08/12/18  0557 08/11/18  0554  08/09/18  0406   SODIUM mmol/L 146* 144 144  < > 139   POTASSIUM mmol/L 4.1 4.2 4.2  < > 4.7   CHLORIDE mmol/L 113* 110 110  < > 106   CO2 mmol/L 21.0* 21.0* 21.0*  < > 21.0*   BUN mg/dL 44* 45* 45*  < > 49*   CREATININE mg/dL 2.70* 2.92* 3.09*  < > 3.43*   CALCIUM mg/dL 8.0* 8.4 8.6  < > 8.2*   BILIRUBIN mg/dL  --  0.6 0.6  --  0.2   ALK PHOS U/L  --  83 84  --  82   ALT (SGPT) U/L  --  26 30  --  26   AST (SGOT) U/L  --  7 9  --  14 "   GLUCOSE mg/dL 129* 139* 131*  < > 146*   < > = values in this interval not displayed.    Radiology:   Imaging Results (last 72 hours)     Procedure Component Value Units Date/Time    XR Chest 1 View [[id number]] Collected:  08/08/18 1450     Updated:  08/08/18 1454    Narrative:       EXAMINATION:   XR CHEST 1 VW-  8/8/2018 2:50 PM CDT     HISTORY: Difficulty breathing     Frontal upright radiograph of the chest 8/8/2018 1:23 PM CDT     COMPARISON: None.     FINDINGS:   Mild basilar interstitial infiltrates are present. Small bilateral  pleural effusions are present. This is suspicious for early congestive  failure.. Cardiac silhouette is upper limits of normal. Vascular  calcifications present aortic arch. Endovascular aortic stent graft is  present in the abdomen..      The osseous structures and surrounding soft tissues demonstrate no acute  abnormality.       Impression:       1. Mild or early changes of pulmonary vascular congestion.        This report was finalized on 08/08/2018 14:51 by Dr. Duc Garibay MD.          Culture:         Assessment   1.  Chronic kidney disease stage 4  2.  Acute on chronic congestive heart failure  3.  Hyperkalemia--resolved  4.  Hypertension  5.  Type 2 diabetes  6.  Anemia   7.  Secondary hyperparathyroidism  8.  Acidosis     Plan:  1.  Continue oral Bumex  2.  Increase Bicarbonate dose  3.  Monitor labs      Roberto Carlos Myrick, MACO  8/13/2018  10:07 AM

## 2018-08-13 NOTE — PROGRESS NOTES
Continued Stay Note   [place]     Patient Name: Gavin Wilson  MRN: [id number]  Today's Date: 8/13/2018    Admit Date: 8/8/2018          Discharge Plan     Row Name 08/13/18 0841       Plan    Plan Home with HH    Plan Comments Social service consult received regarding disposition.  As previously documented, patient plans to return home with possible HH services.  Attending physician was notified Friday that patient is requesting HH.  Will need physician's orders to arrange HH.  Patient may benefit from physical therapy if mobility is a concern prior to discharge.              Discharge Codes    No documentation.           MARIPOSA Vila

## 2018-08-13 NOTE — PLAN OF CARE
Problem: Patient Care Overview  Goal: Plan of Care Review  Outcome: Ongoing (interventions implemented as appropriate)   08/13/18 0357   Coping/Psychosocial   Plan of Care Reviewed With patient   Plan of Care Review   Progress no change   OTHER   Outcome Summary S/ST  on tele. VSS. C/o pain in lower back. Pain medication given PRN with relief. Cont IV Merrem. Started first dose of Ferric Citrate last night. Will cont to monitor.      Goal: Individualization and Mutuality  Outcome: Ongoing (interventions implemented as appropriate)    Goal: Discharge Needs Assessment  Outcome: Ongoing (interventions implemented as appropriate)    Goal: Interprofessional Rounds/Family Conf  Outcome: Ongoing (interventions implemented as appropriate)      Problem: Cardiac: Heart Failure (Adult)  Goal: Signs and Symptoms of Listed Potential Problems Will be Absent, Minimized or Managed (Cardiac: Heart Failure)  Outcome: Ongoing (interventions implemented as appropriate)

## 2018-08-13 NOTE — PROGRESS NOTES
[facility] Medicine Services  INPATIENT PROGRESS NOTE    Patient Name: Gavin Wilson  Date of Admission: 8/8/2018  Today's Date: 08/13/18  Length of Stay: 5  Primary Care Physician: Provider, No Known    Subjective   Chief Complaint: Cough  HPI   72 yo CM admitted on 8/8/18. Patient tells me he is maybe feeling a little bit better.  He is feeling a little stronger.  Still has SOB. Cough is a little better        Review of Systems   Generalized weakness.     All pertinent negatives and positives are as above. All other systems have been reviewed and are negative unless otherwise stated.     Objective    Temp:  [97.3 °F (36.3 °C)-97.9 °F (36.6 °C)] 97.3 °F (36.3 °C)  Heart Rate:  [] 97  Resp:  [18-20] 18  BP: (119-133)/(58-60) 133/58  Physical Exam   Constitutional:   Thin and frail. Appears a little better than yesterday   HENT:   Head: Normocephalic and atraumatic.   Nose: Nose normal.   OM dry.   Eyes: Conjunctivae and EOM are normal.   Neck: Normal range of motion. Neck supple.   Cardiovascular: Normal rate, regular rhythm and normal heart sounds.    Pulmonary/Chest: Effort normal. He has rales.   Abdominal: Soft. Bowel sounds are normal.   Musculoskeletal: He exhibits no edema or tenderness.   Neurological: He is alert. No cranial nerve deficit.   Skin: Skin is warm and dry.   Psychiatric: He has a normal mood and affect.   Vitals reviewed.          Results Review:  I have reviewed the labs, radiology results, and diagnostic studies.    Laboratory Data:     Results from last 7 days  Lab Units 08/12/18  0557 08/11/18  0554 08/10/18  0513   WBC 10*3/mm3 5.57 6.30 6.52   HEMOGLOBIN g/dL 9.0* 9.3* 8.8*   HEMATOCRIT % 28.5* 30.2* 28.4*   PLATELETS 10*3/mm3 143 159 160          Results from last 7 days  Lab Units 08/13/18  0658 08/12/18  0557 08/11/18  0554  08/09/18  0406   SODIUM mmol/L 146* 144 144  < > 139   POTASSIUM mmol/L 4.1 4.2 4.2  < > 4.7   CHLORIDE mmol/L 113* 110  110  < > 106   CO2 mmol/L 21.0* 21.0* 21.0*  < > 21.0*   BUN mg/dL 44* 45* 45*  < > 49*   CREATININE mg/dL 2.70* 2.92* 3.09*  < > 3.43*   CALCIUM mg/dL 8.0* 8.4 8.6  < > 8.2*   BILIRUBIN mg/dL  --  0.6 0.6  --  0.2   ALK PHOS U/L  --  83 84  --  82   ALT (SGPT) U/L  --  26 30  --  26   AST (SGOT) U/L  --  7 9  --  14   GLUCOSE mg/dL 129* 139* 131*  < > 146*   < > = values in this interval not displayed.    Culture Data:        Radiology Data:   Imaging Results (last 24 hours)     Procedure Component Value Units Date/Time    CT Chest Without Contrast [[id number]] Collected:  08/12/18 1526     Updated:  08/12/18 1534    Narrative:       CT CHEST without contrast dated 8/12/2018 3:14 PM CDT     HISTORY: Dyspnea chronic, prior xray     COMPARISON: Chest x-ray August 10, 2018     DLP: 160 mGy cm     TECHNIQUE: Serial helical tomographic images of the chest were acquired.  Bone and soft tissue algorithms were provided. Coronal reformatted  images were also provided for review.     FINDINGS:  The imaged portion of the neck and thyroid gland is unremarkable.      Moderate to large right pleural effusion moderate to large left pleural  effusion. This obscures the right and left lower lobe..    . The trachea  and bronchial tree are patent.     Extensive vascular calcifications present in the aortic arch and the  great vessels coronary stents and calcifications are noted. There is no  pericardial effusion. No enlarged axillary or mediastinal lymph nodes  are present.      The osseous structures of the thorax and surrounding soft tissues are  unremarkable.     In the upper abdomen in the endovascular aortic stent graft is present  in an abdominal aortic aneurysm       Impression:       1. Moderate to large bilateral pleural effusions most likely this is  congestive failure  2. Endovascular aortic stent graft is present in an abdominal aortic  aneurysm the native aorta is 5 cm.        This report was finalized on 08/12/2018 15:31  by Dr. Duc Garibay MD.          I have reviewed the patient's current medications.     Assessment/Plan     Hospital Problem List     Volume overload          1. Volume overload with pulmonary vascular congestion   2. ESRD, recent AV fistula placed  3. DM II  4. Hyperkalemia  5. PVD  6. Tobacco abuse  7. Anemia, probably related to renal disease.   8. Constipation.  9. Dense retrocardiac consolidation, likely atelectasis or pneumonia.  10. Large bilateral pleural effusions by CT chest  11. 70-80K yeast in urine    Plan:  Marco and mucinex  Nephrology consulted, continue Bumex , no need for HD now, although will ask them opinion on HD versus inc bumex versus thoracentesis for large pleural effusions  Continue to follow labs, poatssium, CR and HBG  Bowel program  SSI and accu-checks.   Nystatin swish and swallow   PT consult  SW consult  Plans for home with HH. Has not worked with PT. Pt doest not want to go to rehab  Continue abx for now as pt is feeling some better today      Discharge Planning: I expect the patient to be discharged to home in 1-2 days    Frida Lagunas MD   08/13/18   11:30 AM

## 2018-08-14 LAB
ANION GAP SERPL CALCULATED.3IONS-SCNC: 11 MMOL/L (ref 4–13)
BH CV ECHO MEAS - AO MAX PG (FULL): 24.6 MMHG
BH CV ECHO MEAS - AO MAX PG: 27.2 MMHG
BH CV ECHO MEAS - AO MEAN PG (FULL): 15 MMHG
BH CV ECHO MEAS - AO MEAN PG: 16 MMHG
BH CV ECHO MEAS - AO ROOT AREA (BSA CORRECTED): 2.1
BH CV ECHO MEAS - AO ROOT AREA: 7.5 CM^2
BH CV ECHO MEAS - AO ROOT DIAM: 3.1 CM
BH CV ECHO MEAS - AO V2 MAX: 261 CM/SEC
BH CV ECHO MEAS - AO V2 MEAN: 185 CM/SEC
BH CV ECHO MEAS - AO V2 VTI: 43.9 CM
BH CV ECHO MEAS - AVA(I,A): 1.1 CM^2
BH CV ECHO MEAS - AVA(I,D): 1.1 CM^2
BH CV ECHO MEAS - AVA(V,A): 0.99 CM^2
BH CV ECHO MEAS - AVA(V,D): 0.99 CM^2
BH CV ECHO MEAS - BSA(HAYCOCK): 1.4 M^2
BH CV ECHO MEAS - BSA: 1.5 M^2
BH CV ECHO MEAS - BZI_BMI: 15.3 KILOGRAMS/M^2
BH CV ECHO MEAS - BZI_METRIC_HEIGHT: 170.2 CM
BH CV ECHO MEAS - BZI_METRIC_WEIGHT: 44.5 KG
BH CV ECHO MEAS - CONTRAST EF (2CH): 34.8 ML/M^2
BH CV ECHO MEAS - CONTRAST EF 4CH: 38.6 ML/M^2
BH CV ECHO MEAS - EDV(CUBED): 135.8 ML
BH CV ECHO MEAS - EDV(MOD-SP2): 124 ML
BH CV ECHO MEAS - EDV(MOD-SP4): 132 ML
BH CV ECHO MEAS - EDV(TEICH): 126.1 ML
BH CV ECHO MEAS - EF(CUBED): 48.1 %
BH CV ECHO MEAS - EF(MOD-SP2): 34.8 %
BH CV ECHO MEAS - EF(MOD-SP4): 38.6 %
BH CV ECHO MEAS - EF(TEICH): 40.1 %
BH CV ECHO MEAS - ESV(CUBED): 70.4 ML
BH CV ECHO MEAS - ESV(MOD-SP2): 80.8 ML
BH CV ECHO MEAS - ESV(MOD-SP4): 81 ML
BH CV ECHO MEAS - ESV(TEICH): 75.5 ML
BH CV ECHO MEAS - FS: 19.6 %
BH CV ECHO MEAS - IVS/LVPW: 0.73
BH CV ECHO MEAS - IVSD: 1.2 CM
BH CV ECHO MEAS - LA DIMENSION: 3.6 CM
BH CV ECHO MEAS - LA/AO: 1.2
BH CV ECHO MEAS - LAT PEAK E' VEL: 5.6 CM/SEC
BH CV ECHO MEAS - LV DIASTOLIC VOL/BSA (35-75): 88.4 ML/M^2
BH CV ECHO MEAS - LV MASS(C)D: 305.7 GRAMS
BH CV ECHO MEAS - LV MASS(C)DI: 204.7 GRAMS/M^2
BH CV ECHO MEAS - LV MAX PG: 2.7 MMHG
BH CV ECHO MEAS - LV MEAN PG: 1 MMHG
BH CV ECHO MEAS - LV SYSTOLIC VOL/BSA (12-30): 54.2 ML/M^2
BH CV ECHO MEAS - LV V1 MAX: 82 CM/SEC
BH CV ECHO MEAS - LV V1 MEAN: 54.1 CM/SEC
BH CV ECHO MEAS - LV V1 VTI: 15.2 CM
BH CV ECHO MEAS - LVIDD: 5.1 CM
BH CV ECHO MEAS - LVIDS: 4.1 CM
BH CV ECHO MEAS - LVLD AP2: 8.5 CM
BH CV ECHO MEAS - LVLD AP4: 8.4 CM
BH CV ECHO MEAS - LVLS AP2: 8.5 CM
BH CV ECHO MEAS - LVLS AP4: 8.2 CM
BH CV ECHO MEAS - LVOT AREA (M): 3.1 CM^2
BH CV ECHO MEAS - LVOT AREA: 3.1 CM^2
BH CV ECHO MEAS - LVOT DIAM: 2 CM
BH CV ECHO MEAS - LVPWD: 1.6 CM
BH CV ECHO MEAS - MV A MAX VEL: 95.6 CM/SEC
BH CV ECHO MEAS - MV DEC TIME: 0.12 SEC
BH CV ECHO MEAS - MV E MAX VEL: 55.3 CM/SEC
BH CV ECHO MEAS - MV E/A: 0.58
BH CV ECHO MEAS - SI(AO): 221.9 ML/M^2
BH CV ECHO MEAS - SI(CUBED): 43.8 ML/M^2
BH CV ECHO MEAS - SI(LVOT): 32 ML/M^2
BH CV ECHO MEAS - SI(MOD-SP2): 28.9 ML/M^2
BH CV ECHO MEAS - SI(MOD-SP4): 34.2 ML/M^2
BH CV ECHO MEAS - SI(TEICH): 33.9 ML/M^2
BH CV ECHO MEAS - SV(AO): 331.3 ML
BH CV ECHO MEAS - SV(CUBED): 65.4 ML
BH CV ECHO MEAS - SV(LVOT): 47.8 ML
BH CV ECHO MEAS - SV(MOD-SP2): 43.2 ML
BH CV ECHO MEAS - SV(MOD-SP4): 51 ML
BH CV ECHO MEAS - SV(TEICH): 50.6 ML
BUN BLD-MCNC: 45 MG/DL (ref 5–21)
BUN/CREAT SERPL: 15 (ref 7–25)
CALCIUM SPEC-SCNC: 8.5 MG/DL (ref 8.4–10.4)
CHLORIDE SERPL-SCNC: 112 MMOL/L (ref 98–110)
CO2 SERPL-SCNC: 23 MMOL/L (ref 24–31)
CREAT BLD-MCNC: 3.01 MG/DL (ref 0.5–1.4)
GFR SERPL CREATININE-BSD FRML MDRD: 20 ML/MIN/1.73
GLUCOSE BLD-MCNC: 134 MG/DL (ref 70–100)
GLUCOSE BLDC GLUCOMTR-MCNC: 148 MG/DL (ref 70–130)
GLUCOSE BLDC GLUCOMTR-MCNC: 197 MG/DL (ref 70–130)
GLUCOSE BLDC GLUCOMTR-MCNC: 199 MG/DL (ref 70–130)
GLUCOSE BLDC GLUCOMTR-MCNC: 242 MG/DL (ref 70–130)
LEFT ATRIUM VOLUME INDEX: 58 ML/M2
MAXIMAL PREDICTED HEART RATE: 146 BPM
POTASSIUM BLD-SCNC: 4.5 MMOL/L (ref 3.5–5.3)
SODIUM BLD-SCNC: 146 MMOL/L (ref 135–145)
STRESS TARGET HR: 124 BPM

## 2018-08-14 PROCEDURE — 97161 PT EVAL LOW COMPLEX 20 MIN: CPT | Performed by: PHYSICAL THERAPIST

## 2018-08-14 PROCEDURE — 94640 AIRWAY INHALATION TREATMENT: CPT

## 2018-08-14 PROCEDURE — G8978 MOBILITY CURRENT STATUS: HCPCS | Performed by: PHYSICAL THERAPIST

## 2018-08-14 PROCEDURE — G8979 MOBILITY GOAL STATUS: HCPCS | Performed by: PHYSICAL THERAPIST

## 2018-08-14 PROCEDURE — 25010000002 MEROPENEM: Performed by: INTERNAL MEDICINE

## 2018-08-14 PROCEDURE — 80048 BASIC METABOLIC PNL TOTAL CA: CPT | Performed by: INTERNAL MEDICINE

## 2018-08-14 PROCEDURE — G8980 MOBILITY D/C STATUS: HCPCS | Performed by: PHYSICAL THERAPIST

## 2018-08-14 PROCEDURE — 82962 GLUCOSE BLOOD TEST: CPT

## 2018-08-14 PROCEDURE — 94799 UNLISTED PULMONARY SVC/PX: CPT

## 2018-08-14 PROCEDURE — 94760 N-INVAS EAR/PLS OXIMETRY 1: CPT

## 2018-08-14 PROCEDURE — 63710000001 INSULIN LISPRO (HUMAN) PER 5 UNITS: Performed by: INTERNAL MEDICINE

## 2018-08-14 RX ORDER — CALCITRIOL 0.25 UG/1
0.5 CAPSULE, LIQUID FILLED ORAL DAILY
Status: DISCONTINUED | OUTPATIENT
Start: 2018-08-15 | End: 2018-08-18 | Stop reason: HOSPADM

## 2018-08-14 RX ORDER — BUDESONIDE AND FORMOTEROL FUMARATE DIHYDRATE 160; 4.5 UG/1; UG/1
2 AEROSOL RESPIRATORY (INHALATION)
Status: DISCONTINUED | OUTPATIENT
Start: 2018-08-14 | End: 2018-08-18 | Stop reason: HOSPADM

## 2018-08-14 RX ADMIN — ATORVASTATIN CALCIUM 80 MG: 40 TABLET, FILM COATED ORAL at 10:13

## 2018-08-14 RX ADMIN — HYDROCODONE BITARTRATE AND ACETAMINOPHEN 1 TABLET: 10; 325 TABLET ORAL at 21:50

## 2018-08-14 RX ADMIN — MEROPENEM 1 G: 1 INJECTION, POWDER, FOR SOLUTION INTRAVENOUS at 06:12

## 2018-08-14 RX ADMIN — SODIUM BICARBONATE 650 MG: 650 TABLET ORAL at 10:13

## 2018-08-14 RX ADMIN — INSULIN LISPRO 3 UNITS: 100 INJECTION, SOLUTION INTRAVENOUS; SUBCUTANEOUS at 12:44

## 2018-08-14 RX ADMIN — INSULIN LISPRO 2 UNITS: 100 INJECTION, SOLUTION INTRAVENOUS; SUBCUTANEOUS at 20:53

## 2018-08-14 RX ADMIN — METOPROLOL TARTRATE 25 MG: 25 TABLET, FILM COATED ORAL at 10:13

## 2018-08-14 RX ADMIN — NYSTATIN 500000 UNITS: 100000 SUSPENSION ORAL at 10:12

## 2018-08-14 RX ADMIN — ASPIRIN 81 MG CHEWABLE TABLET 81 MG: 81 TABLET CHEWABLE at 10:13

## 2018-08-14 RX ADMIN — FERRIC CITRATE 210 MG: 210 TABLET, COATED ORAL at 12:44

## 2018-08-14 RX ADMIN — GUAIFENESIN 1200 MG: 600 TABLET, EXTENDED RELEASE ORAL at 20:52

## 2018-08-14 RX ADMIN — NICOTINE 1 PATCH: 14 PATCH, EXTENDED RELEASE TRANSDERMAL at 20:52

## 2018-08-14 RX ADMIN — SODIUM BICARBONATE 650 MG: 650 TABLET ORAL at 20:53

## 2018-08-14 RX ADMIN — METOPROLOL TARTRATE 25 MG: 25 TABLET, FILM COATED ORAL at 20:52

## 2018-08-14 RX ADMIN — BUMETANIDE 1 MG: 1 TABLET ORAL at 10:13

## 2018-08-14 RX ADMIN — LORAZEPAM 1 MG: 1 TABLET ORAL at 21:50

## 2018-08-14 RX ADMIN — GUAIFENESIN 1200 MG: 600 TABLET, EXTENDED RELEASE ORAL at 10:13

## 2018-08-14 RX ADMIN — INSULIN LISPRO 2 UNITS: 100 INJECTION, SOLUTION INTRAVENOUS; SUBCUTANEOUS at 18:17

## 2018-08-14 RX ADMIN — FERRIC CITRATE 210 MG: 210 TABLET, COATED ORAL at 18:17

## 2018-08-14 RX ADMIN — FERRIC CITRATE 210 MG: 210 TABLET, COATED ORAL at 10:13

## 2018-08-14 RX ADMIN — CALCITRIOL 0.25 MCG: 0.25 CAPSULE ORAL at 12:44

## 2018-08-14 RX ADMIN — Medication 1 TABLET: at 10:13

## 2018-08-14 RX ADMIN — BUDESONIDE AND FORMOTEROL FUMARATE DIHYDRATE 2 PUFF: 160; 4.5 AEROSOL RESPIRATORY (INHALATION) at 20:36

## 2018-08-14 RX ADMIN — INSULIN LISPRO 3 UNITS: 100 INJECTION, SOLUTION INTRAVENOUS; SUBCUTANEOUS at 01:57

## 2018-08-14 RX ADMIN — MEROPENEM 1 G: 1 INJECTION, POWDER, FOR SOLUTION INTRAVENOUS at 18:18

## 2018-08-14 NOTE — PLAN OF CARE
Problem: Patient Care Overview  Goal: Plan of Care Review  Outcome: Ongoing (interventions implemented as appropriate)   08/14/18 7018   Coping/Psychosocial   Plan of Care Reviewed With patient   Plan of Care Review   Progress no change   OTHER   Outcome Summary PT evaluation completed. The patient was agitiated that PT was ordered for him and that he had to get up out of bed. He states that he is going home and that he will be able to care for himself. He walked down to the nutrition room and prepared himself a cup of coffee and was able to carry it back to his room without spilling it. He does not want therapy and at this time the patient is most lilkely at his functional baseline. He does demonstrate slight balance deficits with standing and gait which is most likely from his stiffness from arthritis. PT will sign off per patient request.

## 2018-08-14 NOTE — THERAPY DISCHARGE NOTE
Acute Care - Physical Therapy Initial Eval/Discharge  [facility]     Patient Name: Gavin Wilson  : 1945  MRN: [id number]  Today's Date: 2018   Onset of Illness/Injury or Date of Surgery: 18     Referring Physician: Dr. Lagunas      Admit Date: 2018    Visit Dx:    ICD-10-CM ICD-9-CM   1. Tobacco use Z72.0 305.1     Patient Active Problem List   Diagnosis   • Volume overload     Past Medical History:   Diagnosis Date   • CHF (congestive heart failure) (CMS/HCC)    • Coronary stent occlusion    • Diabetes mellitus (CMS/HCC)    • Hyperlipidemia    • Hypertension    • Stroke (CMS/HCC)      Past Surgical History:   Procedure Laterality Date   • BLADDER SURGERY     • ESOPHAGECTOMY            PT ASSESSMENT (last 12 hours)      Physical Therapy Evaluation     Row Name 18 1040          PT Evaluation Time/Intention    Subjective Information complains of;pain  -MS     Document Type evaluation  -MS     Mode of Treatment physical therapy  -MS     Row Name 18 1040          General Information    Patient Profile Reviewed? yes  -MS     Onset of Illness/Injury or Date of Surgery 18  -MS     Referring Physician Dr. Lagunas  -MS     Patient Observations alert;agree to therapy  -MS     General Observations of Patient pt laying in bed, awake and in no apparent distress  -MS     Prior Level of Function independent:;all household mobility;ADL's  -MS     Pertinent History of Current Functional Problem volume overload, ESRD-recent AV fistula placed, large B pleural effusions  -MS     Risks Reviewed patient:;increased discomfort  -MS     Benefits Reviewed patient:;increase knowledge;improve function  -MS     Barriers to Rehab uncooperative  -MS     Row Name 18 1040          Cognitive Assessment/Interventions    Additional Documentation Cognitive Assessment/Intervention (Group)  -MS     Row Name 18 1040          Cognitive Assessment/Intervention- PT/OT    Affect/Mental Status (Cognitive)  agitated  -MS     Orientation Status (Cognition) oriented x 4  -MS     Follows Commands (Cognition) WNL  -MS     Personal Safety Interventions nonskid shoes/slippers when out of bed;supervised activity   pt refused gait belt  -MS     Row Name 08/14/18 1040          Safety Issues, Functional Mobility    Impairments Affecting Function (Mobility) balance  -MS     Row Name 08/14/18 1040          Bed Mobility Assessment/Treatment    Bed Mobility Assessment/Treatment supine-sit-supine  -MS     Supine-Sit-Supine Wayne (Bed Mobility) conditional independence  -MS     Assistive Device (Bed Mobility) bed rails;head of bed elevated  -MS     Row Name 08/14/18 1040          Transfer Assessment/Treatment    Transfer Assessment/Treatment sit-stand transfer;stand-sit transfer  -MS     Sit-Stand Wayne (Transfers) independent  -MS     Stand-Sit Wayne (Transfers) independent  -MS     Row Name 08/14/18 1040          Gait/Stairs Assessment/Training    Wayne Level (Gait) supervision  -MS     Distance in Feet (Gait) 200ft with a stop in the nutrition room for coffee  -MS     Row Name 08/14/18 1040          General ROM    GENERAL ROM COMMENTS WFL, pt is overall stiff from his arthritis  -MS     Row Name 08/14/18 1040          General Assessment (Manual Muscle Testing)    Comment, General Manual Muscle Testing (MMT) Assessment functionally 5/5  -MS     Row Name 08/14/18 1040          Motor Assessment/Intervention    Additional Documentation Balance (Group)  -MS     Row Name 08/14/18 1040          Balance    Balance dynamic standing balance  -MS     Row Name 08/14/18 1040          Dynamic Standing Balance    Comment, Reaches Outside Midline (Standing, Dynamic Balance) pt was able to walk down the hallway with a cup of coffee and reach to prepare his coffee  -MS     Row Name 08/14/18 1040          Pain Assessment    Additional Documentation Pain Scale: FACES Pre/Post-Treatment (Group)  -MS     Row Name 08/14/18  1040          Pain Scale: Numbers Pre/Post-Treatment    Pain Location - Orientation generalized  -MS     Pain Intervention(s) Ambulation/increased activity  -MS     Row Name 08/14/18 1040          Pain Scale: FACES Pre/Post-Treatment    Pain: FACES Scale, Pretreatment 2-->hurts little bit  -MS     Pain: FACES Scale, Post-Treatment 2-->hurts little bit  -MS     Pre/Post Treatment Pain Comment no change  -MS     Row Name 08/14/18 1040          Plan of Care Review    Plan of Care Reviewed With patient  -MS     Row Name 08/14/18 1040          Physical Therapy Clinical Impression    Patient/Family Goals Statement (PT Clinical Impression) go home  -MS     Criteria for Skilled Interventions Met (PT Clinical Impression) patient/family refuse skilled intervention at this time  -MS     Pathology/Pathophysiology Noted (Describe Specifically for Each System) musculoskeletal  -MS     Impairments Found (describe specific impairments) gait, locomotion, and balance;joint integrity and mobility  -MS     Row Name 08/14/18 1040          Positioning and Restraints    Post Treatment Position bed  -MS     In Bed fowlers;encouraged to call for assist;call light within reach;side rails up x2  -MS     Row Name 08/14/18 1040          Physical Therapy Discharge Summary    Additional Documentation Discharge Summary, PT Eval (Group)  -MS     Row Name 08/14/18 1040          Discharge Summary, PT Eval    Reason for Discharge (PT Discharge Summary) patient/family request discontinuation of services  -MS     Outcomes Achieved Upon Discharge (PT Discharge Summary) evaluation only  -MS       User Key  (r) = Recorded By, (t) = Taken By, (c) = Cosigned By    Initials Name Provider Type    MS Indiana Benavides R, PT, DPT, NCS Physical Therapist              PT Recommendation and Plan  Anticipated Discharge Disposition (PT): home  Therapy Frequency (PT Clinical Impression): evaluation only  Outcome Summary/Treatment Plan (PT)  Anticipated Discharge  Disposition (PT): home  Reason for Discharge (PT Discharge Summary): patient/family request discontinuation of services  Plan of Care Reviewed With: patient  Progress: no change  Outcome Summary: PT evaluation completed. The patient was agitiated that PT was ordered for him and that he had to get up out of bed. He states that he is going home and that he will be able to care for himself. He walked down to the nutrition room and prepared himself a cup of coffee and was able to carry it back to his room without spilling it. He does not want therapy and at this time the patient is most lilkely at his functional  baseline. He does demonstrate slight balance deficits with standing and gait which is most likely from his stiffness from arthritis. PT will sign off per patient request.           Outcome Measures     Row Name 08/14/18 1040             How much help from another person do you currently need...    Turning from your back to your side while in flat bed without using bedrails? 4  -MS      Moving from lying on back to sitting on the side of a flat bed without bedrails? 4  -MS      Moving to and from a bed to a chair (including a wheelchair)? 4  -MS      Standing up from a chair using your arms (e.g., wheelchair, bedside chair)? 4  -MS      Climbing 3-5 steps with a railing? 4  -MS      To walk in hospital room? 4  -MS      AM-PAC 6 Clicks Score 24  -MS         Functional Assessment    Outcome Measure Options AM-PAC 6 Clicks Basic Mobility (PT)  -MS        User Key  (r) = Recorded By, (t) = Taken By, (c) = Cosigned By    Initials Name Provider Type    MS Indiana Benavides ANNAMARIA, PT, DPT, NCS Physical Therapist           Time Calculation:         PT Charges     Row Name 08/14/18 1201             Time Calculation    Start Time 1040  -MS      Stop Time 1121  -MS      Time Calculation (min) 41 min  -MS      PT Received On 08/14/18  -MS        User Key  (r) = Recorded By, (t) = Taken By, (c) = Cosigned By    Initials Name  Provider Type    MS Indiana Benavides PT, DPT, NCS Physical Therapist        Therapy Suggested Charges     Code   Minutes Charges    None           Therapy Charges for Today     Code Description Service Date Service Provider Modifiers Qty    27908454175 HC PT MOBILITY CURRENT 8/14/2018 Indiana Benavides, PT, DPT, NCS GP,  1    53940217513 HC PT MOBILITY PROJECTED 8/14/2018 Indiana Benavides PT, DPT, NCS GP,  1    18428183499 HC PT MOBILITY DISCHARGE 8/14/2018 Indiana Benavides PT, DPT, NCS GP,  1    90157115808 HC PT EVAL LOW COMPLEXITY 3 8/14/2018 Indiana Benavides PT, DPT, NCS GP 1          PT G-Codes  Outcome Measure Options: AM-PAC 6 Clicks Basic Mobility (PT)  Score: 24  Functional Limitation: Mobility: Walking and moving around  Mobility: Walking and Moving Around Current Status (): 0 percent impaired, limited or restricted  Mobility: Walking and Moving Around Goal Status (): 0 percent impaired, limited or restricted  Mobility: Walking and Moving Around Discharge Status (): 0 percent impaired, limited or restricted    PT Discharge Summary  Anticipated Discharge Disposition (PT): home  Reason for Discharge: Patient/Caregiver request  Outcomes Achieved: Refer to plan of care for updates on goals achieved  Discharge Destination: Home    Indiana Benavides PT, GIUSEPPE, ARNEL  8/14/2018

## 2018-08-14 NOTE — PROGRESS NOTES
Nephrology ([facility]) Progress Note      Patient:  Gavin Wilson  YOB: 1945  Date of Service: 8/14/2018  MRN: [id number]   Acct: [id number]   Primary Care Physician: Provider, No Known  Advance Directive:   Code Status and Medical Interventions:   Ordered at: 08/08/18 0522     Level Of Support Discussed With:    Patient     Code Status:    No CPR     Medical Interventions (Level of Support Prior to Arrest):    Full     Admit Date: 8/8/2018       Hospital Day: 6  Referring Provider: Arcenio Ahn MD      Patient personally seen and examined.  Complete chart including Consults, Notes, Operative Reports, Labs, Cardiology, and Radiology studies reviewed as able.        Subjective:  Gavin Wilson is a 73 y.o. male  whom we were consulted for volume overload in patient approaching end stage renal disease.  Baseline of chronic kidney disease stage 4/5, follows with nephrologist at [facility].  Recently had left arm AV fistula placed (also at [facility]).  History of type 2 diabetes, hypertension, chronic congestive heart failure, prior bladder cancer with urostomy.  Admitted as a direct admit from [facility].  He had presented to their facility with dyspnea.  Apparently patient has been advised to start dialysis in the past but has deferred any decisions until now.  Denies recent change in urine output, no hematuria.  no n/v/d. Hospital course remarkable for good response to PO diuretics, improving dyspnea. Patient has severe hearing impairment but offered no new complaints today.     Allergies:  Sulfa antibiotics    Home Meds:  Prescriptions Prior to Admission   Medication Sig Dispense Refill Last Dose   • aspirin 81 MG chewable tablet Chew 81 mg Daily.   Past Week at Unknown time   • atorvastatin (LIPITOR) 80 MG tablet Take 80 mg by mouth Daily.   Past Week at Unknown time   • calcium carbonate (OS-TASHI) 600 MG tablet Take 600 mg by mouth Daily.    Past Week at Unknown time   • HYDROcodone-acetaminophen (NORCO)  MG per tablet Take 1 tablet by mouth Every 6 (Six) Hours As Needed (back pain).   Past Week at Unknown time   • insulin aspart (novoLOG) 100 UNIT/ML injection Inject 6 Units under the skin into the appropriate area as directed Every Night.   Past Week at Unknown time   • insulin lispro (humaLOG) 100 UNIT/ML injection Inject 6 Units under the skin into the appropriate area as directed 3 (Three) Times a Day Before Meals.   Past Week at Unknown time   • metoprolol tartrate (LOPRESSOR) 25 MG tablet Take 25 mg by mouth 2 (Two) Times a Day.   Past Week at Unknown time       Medicines:  Current Facility-Administered Medications   Medication Dose Route Frequency Provider Last Rate Last Dose   • aspirin chewable tablet 81 mg  81 mg Oral Daily Arcenio Ahn MD   81 mg at 08/14/18 1013   • atorvastatin (LIPITOR) tablet 80 mg  80 mg Oral Daily Arcenio Ahn MD   80 mg at 08/14/18 1013   • bumetanide (BUMEX) tablet 1 mg  1 mg Oral Daily Frida Lagunas MD   1 mg at 08/14/18 1013   • calcitriol (ROCALTROL) capsule 0.25 mcg  0.25 mcg Oral Daily Samson Aragon MD   0.25 mcg at 08/14/18 1244   • calcium carb-cholecalciferol 600-800 MG-UNIT tablet 1 tablet  1 tablet Oral Daily Arcenio Ahn MD   1 tablet at 08/14/18 1013   • dextrose (D50W) solution 25 g  25 g Intravenous Q15 Min PRN Arcenio Ahn MD       • dextrose (GLUTOSE) oral gel 15 g  15 g Oral Q15 Min PRN Arcenio Ahn MD       • Ferric Citrate tablet 210 mg  210 mg Oral TID With Meals Samson Aragon MD   210 mg at 08/14/18 1244   • glucagon (human recombinant) (GLUCAGEN DIAGNOSTIC) injection 1 mg  1 mg Subcutaneous PRN Arcenio Ahn MD       • guaiFENesin (MUCINEX) 12 hr tablet 1,200 mg  1,200 mg Oral Q12H Oleg Madrid DO   1,200 mg at 08/14/18 1013   • HYDROcodone-acetaminophen (NORCO)  MG per tablet 1 tablet  1 tablet Oral Q4H PRN  Arcenio Ahn MD   1 tablet at 08/13/18 2032   • insulin lispro (humaLOG) injection 2-7 Units  2-7 Units Subcutaneous 4x Daily With Meals & Nightly Arcenio Ahn MD   3 Units at 08/14/18 1244   • ipratropium-albuterol (DUO-NEB) nebulizer solution 3 mL  3 mL Nebulization Q4H PRN Oleg Madrid DO       • LORazepam (ATIVAN) tablet 1 mg  1 mg Oral Q6H PRN Arcenio Ahn MD   1 mg at 08/13/18 2033   • meropenem (MERREM) 1 g/100 mL 0.9% NS VTB (mbp)  1 g Intravenous Q12H Oleg Madrid DO   1 g at 08/14/18 0616   • metoprolol tartrate (LOPRESSOR) tablet 25 mg  25 mg Oral Q12H Arcenio Ahn MD   25 mg at 08/14/18 1013   • naloxone (NARCAN) injection 0.4 mg  0.4 mg Intravenous Q5 Min PRN Arcenio Ahn MD       • nicotine (NICODERM CQ) 14 MG/24HR patch 1 patch  1 patch Transdermal Q24H Farooq Edwards MD   1 patch at 08/13/18 2032   • nystatin (MYCOSTATIN) 755833 UNIT/ML suspension 500,000 Units  5 mL Oral 4x Daily Oleg Madrid DO   500,000 Units at 08/14/18 1012   • ondansetron (ZOFRAN) injection 4 mg  4 mg Intravenous Q6H PRN Arcenio Ahn MD       • sodium bicarbonate tablet 650 mg  650 mg Oral BID Roberto Carlos Myrick, APRN   650 mg at 08/14/18 1013   • sodium chloride 0.9 % flush 1-10 mL  1-10 mL Intravenous PRN Arcenio Ahn MD           Past Medical History:  Past Medical History:   Diagnosis Date   • CHF (congestive heart failure) (CMS/HCC)    • Coronary stent occlusion    • Diabetes mellitus (CMS/HCC)    • Hyperlipidemia    • Hypertension    • Stroke (CMS/HCC)        Past Surgical History:  Past Surgical History:   Procedure Laterality Date   • BLADDER SURGERY     • ESOPHAGECTOMY         Family History  Family History   Problem Relation Age of Onset   • No Known Problems Father    • No Known Problems Mother        Social History  Social History     Social History   • Marital status:      Spouse name: N/A   • Number of children: N/A   • Years of  "education: N/A     Occupational History   • Not on file.     Social History Main Topics   • Smoking status: Current Every Day Smoker   • Smokeless tobacco: Never Used   • Alcohol use No   • Drug use: No   • Sexual activity: Not on file     Other Topics Concern   • Not on file     Social History Narrative   • No narrative on file         Review of Systems:  History obtained from chart review and the patient  General ROS: No fever or chills, +fatigue  Respiratory ROS: positive for  shortness of breath, +cough  Cardiovascular ROS: positive for  dyspnea on exertion  No chest pain or palpitations  Gastrointestinal ROS: No abdominal pain or melena  Genito-Urinary ROS: No dysuria or hematuria  Neurological ROS: no headache or dizziness    Objective:  /50 (BP Location: Right arm, Patient Position: Lying)   Pulse 86   Temp 98.3 °F (36.8 °C) (Temporal Artery )   Resp 16   Ht 170.2 cm (67.01\")   Wt 44.5 kg (98 lb)   SpO2 96%   BMI 15.35 kg/m²     Intake/Output Summary (Last 24 hours) at 08/14/18 1314  Last data filed at 08/14/18 0616   Gross per 24 hour   Intake              460 ml   Output              600 ml   Net             -140 ml     General: awake/alert    Neck: Supple, no JVD  Chest:  bilateral wheezes  CVS: regular rate and rhythm, 3/6 soft systolic murmur  Abdominal: soft, nontender, normal bowel sounds  Extremities: no cyanosis or edema  Skin: warm and dry without rash  Neuro: no focal motor deficits    Labs:    Results from last 7 days  Lab Units 08/12/18  0557 08/11/18  0554 08/10/18  0513   WBC 10*3/mm3 5.57 6.30 6.52   HEMOGLOBIN g/dL 9.0* 9.3* 8.8*   HEMATOCRIT % 28.5* 30.2* 28.4*   PLATELETS 10*3/mm3 143 159 160           Results from last 7 days  Lab Units 08/14/18  0820 08/13/18  0658 08/12/18  0557 08/11/18  0554  08/09/18  0406   SODIUM mmol/L 146* 146* 144 144  < > 139   POTASSIUM mmol/L 4.5 4.1 4.2 4.2  < > 4.7   CHLORIDE mmol/L 112* 113* 110 110  < > 106   CO2 mmol/L 23.0* 21.0* 21.0* 21.0* "  < > 21.0*   BUN mg/dL 45* 44* 45* 45*  < > 49*   CREATININE mg/dL 3.01* 2.70* 2.92* 3.09*  < > 3.43*   CALCIUM mg/dL 8.5 8.0* 8.4 8.6  < > 8.2*   BILIRUBIN mg/dL  --   --  0.6 0.6  --  0.2   ALK PHOS U/L  --   --  83 84  --  82   ALT (SGPT) U/L  --   --  26 30  --  26   AST (SGOT) U/L  --   --  7 9  --  14   GLUCOSE mg/dL 134* 129* 139* 131*  < > 146*   < > = values in this interval not displayed.    Radiology:   Imaging Results (last 72 hours)     Procedure Component Value Units Date/Time    XR Chest 1 View [[id number]] Collected:  08/08/18 1450     Updated:  08/08/18 1454    Narrative:       EXAMINATION:   XR CHEST 1 VW-  8/8/2018 2:50 PM CDT     HISTORY: Difficulty breathing     Frontal upright radiograph of the chest 8/8/2018 1:23 PM CDT     COMPARISON: None.     FINDINGS:   Mild basilar interstitial infiltrates are present. Small bilateral  pleural effusions are present. This is suspicious for early congestive  failure.. Cardiac silhouette is upper limits of normal. Vascular  calcifications present aortic arch. Endovascular aortic stent graft is  present in the abdomen..      The osseous structures and surrounding soft tissues demonstrate no acute  abnormality.       Impression:       1. Mild or early changes of pulmonary vascular congestion.        This report was finalized on 08/08/2018 14:51 by Dr. Duc Garibay MD.          Culture:         Assessment   1.  Chronic kidney disease stage 4  2.  Acute on chronic congestive heart failure  3.  Hyperkalemia--resolved  4.  Hypertension  5.  Type 2 diabetes  6.  Anemia   7.  Secondary hyperparathyroidism  8.  Acidosis     Plan:  1.  Continue oral Bumex  2.  Increase calcitriol dose  3.  Monitor labs  4.  If patient is discharged, then will need to follow up at the renal clinic within 7-10 days.       Scott Soto MD  8/14/2018  1:14 PM

## 2018-08-14 NOTE — PROGRESS NOTES
"    [facility] Medicine Services  INPATIENT PROGRESS NOTE    Patient Name: Gavin Wilson  Date of Admission: 8/8/2018  Today's Date: 08/14/18  Length of Stay: 6  Primary Care Physician: Provider, No Known    Subjective   Chief Complaint: \"I need a couple of more days\"  HPI   Patient feels like he needs a couple of more days in the hospital.  He reports that after being started on IV Merrem, which is currently having at his bedside, that he is feeling much better.  He was able to get up and ambulate to the nurse min room earlier today, and ambulate back with little difficulty.  Physical therapy notes a been reviewed.  It looks like that he wanted to perform well so that it was clear of his desire that he does not need any help at home.  Daughter is at bedside and has been updated on his clinical status.        Review of Systems     All pertinent negatives and positives are as above. All other systems have been reviewed and are negative unless otherwise stated.     Objective    Temp:  [97.8 °F (36.6 °C)-99 °F (37.2 °C)] 99 °F (37.2 °C)  Heart Rate:  [] 97  Resp:  [16-18] 16  BP: (108-117)/(50-58) 113/58  Physical Exam   Constitutional: He is oriented to person, place, and time. No distress.   Currently on no supplemental 02   HENT:   Head: Normocephalic.   Mouth/Throat: No oropharyngeal exudate.   Eyes: No scleral icterus.   Pulmonary/Chest: He has wheezes.   Diminished BSs at bases   Abdominal:   Urostomy in place   Musculoskeletal: He exhibits no edema.   Neurological: He is alert and oriented to person, place, and time.   Skin: Skin is warm and dry. He is not diaphoretic.   Psychiatric: He has a normal mood and affect.   Vitals reviewed.      Results Review:  I have reviewed the labs, radiology results, and diagnostic studies.    Laboratory Data:     Results from last 7 days  Lab Units 08/12/18  0557 08/11/18  0554 08/10/18  0513   WBC 10*3/mm3 5.57 6.30 6.52   HEMOGLOBIN " g/dL 9.0* 9.3* 8.8*   HEMATOCRIT % 28.5* 30.2* 28.4*   PLATELETS 10*3/mm3 143 159 160          Results from last 7 days  Lab Units 08/14/18  0820 08/13/18  0658 08/12/18  0557 08/11/18  0554  08/09/18  0406   SODIUM mmol/L 146* 146* 144 144  < > 139   POTASSIUM mmol/L 4.5 4.1 4.2 4.2  < > 4.7   CHLORIDE mmol/L 112* 113* 110 110  < > 106   CO2 mmol/L 23.0* 21.0* 21.0* 21.0*  < > 21.0*   BUN mg/dL 45* 44* 45* 45*  < > 49*   CREATININE mg/dL 3.01* 2.70* 2.92* 3.09*  < > 3.43*   CALCIUM mg/dL 8.5 8.0* 8.4 8.6  < > 8.2*   BILIRUBIN mg/dL  --   --  0.6 0.6  --  0.2   ALK PHOS U/L  --   --  83 84  --  82   ALT (SGPT) U/L  --   --  26 30  --  26   AST (SGOT) U/L  --   --  7 9  --  14   GLUCOSE mg/dL 134* 129* 139* 131*  < > 146*   < > = values in this interval not displayed.    Culture Data:   Urine Culture   Date Value Ref Range Status   08/11/2018 70,000-80,000 CFU/mL Yeast isolated (A)  Final       Radiology Data:   Imaging Results (last 24 hours)     ** No results found for the last 24 hours. **          I have reviewed the patient's current medications.     Assessment/Plan     Hospital Problem List     Volume overload        1. Volume overload with pulmonary vascular congestion   2. ESRD, recent AV fistula placed  3. DM II  4. Hyperkalemia - improved  5. PVD  6. Tobacco abuse  7. Anemia, probably related to chronic renal disease.   8. Constipation.  9. Dense retrocardiac consolidation, likely compressive atelectasis but cannot rule out pneumonia.  10. Moderate to large bilateral pleural effusions by CT chest    Plan:  1.  Update daughter today of patient's clinical status  2.  Patient reports he's starting to feel better after IV Merrem was started; continue Merrem for now  3.  Bilateral lower lobes are obscured by pleural effusion and patient likely has compressive atelectasis; less likely pneumonia.  WBC has been normal.  Afebrile.  Check procalcitonin and BMP in AM.  4.  Physical therapy note reviewed; patient does  not want physical therapy; patient feels confident that he can go home and feels that he will be able to take care of himself  5.  PO Bumex  6.  PA and LAT CXR in AM tomorrow  7.  Off supplemental 02  8.  Scheduled nebs  9.  Trial of Symbicort  10.  Anticipate home in 1-2 days; daughter at bedside reports that she doesn't think he is ready for discharge today.    Farooq Edwards MD   08/14/18   4:23 PM

## 2018-08-14 NOTE — PLAN OF CARE
Problem: Patient Care Overview  Goal: Plan of Care Review  Outcome: Ongoing (interventions implemented as appropriate)   08/14/18 6630   Coping/Psychosocial   Plan of Care Reviewed With patient   Plan of Care Review   Progress no change   OTHER   Outcome Summary Patient becomes agitated with care. No c/o pain, very hard of hearing. Assisted pt with emptying of urostomy.     Goal: Individualization and Mutuality  Outcome: Ongoing (interventions implemented as appropriate)    Goal: Discharge Needs Assessment  Outcome: Ongoing (interventions implemented as appropriate)    Goal: Interprofessional Rounds/Family Conf  Outcome: Ongoing (interventions implemented as appropriate)      Problem: Cardiac: Heart Failure (Adult)  Goal: Signs and Symptoms of Listed Potential Problems Will be Absent, Minimized or Managed (Cardiac: Heart Failure)  Outcome: Ongoing (interventions implemented as appropriate)      Problem: Fall Risk (Adult)  Goal: Absence of Fall  Outcome: Ongoing (interventions implemented as appropriate)

## 2018-08-14 NOTE — PLAN OF CARE
Problem: Patient Care Overview  Goal: Plan of Care Review  Outcome: Ongoing (interventions implemented as appropriate)   08/14/18 0640   Coping/Psychosocial   Plan of Care Reviewed With patient   Plan of Care Review   Progress improving   OTHER   Outcome Summary Medicated for pain x1. Sleeping between care this shift. Cont. to refuse to wear telemetry. Urostomy bag emptied 2x this shift total 600cc urine. Hoping to go home soon.        Problem: Cardiac: Heart Failure (Adult)  Goal: Signs and Symptoms of Listed Potential Problems Will be Absent, Minimized or Managed (Cardiac: Heart Failure)  Outcome: Ongoing (interventions implemented as appropriate)   08/14/18 0640   Goal/Outcome Evaluation   Problems Assessed (Heart Failure) all   Problems Present (Heart Failure) situational response       Problem: Fall Risk (Adult)  Goal: Absence of Fall  Outcome: Ongoing (interventions implemented as appropriate)   08/14/18 0640   Fall Risk (Adult)   Absence of Fall making progress toward outcome

## 2018-08-15 ENCOUNTER — APPOINTMENT (OUTPATIENT)
Dept: GENERAL RADIOLOGY | Facility: HOSPITAL | Age: 73
End: 2018-08-15

## 2018-08-15 LAB
ANION GAP SERPL CALCULATED.3IONS-SCNC: 10 MMOL/L (ref 4–13)
BUN BLD-MCNC: 44 MG/DL (ref 5–21)
BUN/CREAT SERPL: 14.3 (ref 7–25)
CALCIUM SPEC-SCNC: 8.3 MG/DL (ref 8.4–10.4)
CHLORIDE SERPL-SCNC: 109 MMOL/L (ref 98–110)
CO2 SERPL-SCNC: 24 MMOL/L (ref 24–31)
CREAT BLD-MCNC: 3.08 MG/DL (ref 0.5–1.4)
GFR SERPL CREATININE-BSD FRML MDRD: 20 ML/MIN/1.73
GLUCOSE BLD-MCNC: 124 MG/DL (ref 70–100)
GLUCOSE BLDC GLUCOMTR-MCNC: 140 MG/DL (ref 70–130)
GLUCOSE BLDC GLUCOMTR-MCNC: 218 MG/DL (ref 70–130)
GLUCOSE BLDC GLUCOMTR-MCNC: 237 MG/DL (ref 70–130)
GLUCOSE BLDC GLUCOMTR-MCNC: 293 MG/DL (ref 70–130)
POTASSIUM BLD-SCNC: 4.5 MMOL/L (ref 3.5–5.3)
PROCALCITONIN SERPL-MCNC: <0.25 NG/ML
SODIUM BLD-SCNC: 143 MMOL/L (ref 135–145)

## 2018-08-15 PROCEDURE — 25010000002 MEROPENEM PER 100 MG: Performed by: INTERNAL MEDICINE

## 2018-08-15 PROCEDURE — 80048 BASIC METABOLIC PNL TOTAL CA: CPT | Performed by: INTERNAL MEDICINE

## 2018-08-15 PROCEDURE — 71046 X-RAY EXAM CHEST 2 VIEWS: CPT

## 2018-08-15 PROCEDURE — 94799 UNLISTED PULMONARY SVC/PX: CPT

## 2018-08-15 PROCEDURE — 63710000001 INSULIN LISPRO (HUMAN) PER 5 UNITS: Performed by: INTERNAL MEDICINE

## 2018-08-15 PROCEDURE — 82962 GLUCOSE BLOOD TEST: CPT

## 2018-08-15 PROCEDURE — 94760 N-INVAS EAR/PLS OXIMETRY 1: CPT

## 2018-08-15 PROCEDURE — 84145 PROCALCITONIN (PCT): CPT | Performed by: INTERNAL MEDICINE

## 2018-08-15 RX ORDER — MEGESTROL ACETATE 40 MG/ML
800 SUSPENSION ORAL DAILY
Status: DISCONTINUED | OUTPATIENT
Start: 2018-08-15 | End: 2018-08-18 | Stop reason: HOSPADM

## 2018-08-15 RX ADMIN — MEGESTROL ACETATE 800 MG: 40 SUSPENSION ORAL at 16:49

## 2018-08-15 RX ADMIN — BUDESONIDE AND FORMOTEROL FUMARATE DIHYDRATE 2 PUFF: 160; 4.5 AEROSOL RESPIRATORY (INHALATION) at 09:19

## 2018-08-15 RX ADMIN — BUMETANIDE 1 MG: 1 TABLET ORAL at 08:43

## 2018-08-15 RX ADMIN — MEROPENEM 1 G: 1 INJECTION, POWDER, FOR SOLUTION INTRAVENOUS at 06:20

## 2018-08-15 RX ADMIN — SODIUM BICARBONATE 650 MG: 650 TABLET ORAL at 20:48

## 2018-08-15 RX ADMIN — MEROPENEM 1 G: 1 INJECTION, POWDER, FOR SOLUTION INTRAVENOUS at 17:48

## 2018-08-15 RX ADMIN — METOPROLOL TARTRATE 25 MG: 25 TABLET, FILM COATED ORAL at 20:48

## 2018-08-15 RX ADMIN — METOPROLOL TARTRATE 25 MG: 25 TABLET, FILM COATED ORAL at 08:42

## 2018-08-15 RX ADMIN — CALCITRIOL 0.5 MCG: 0.25 CAPSULE ORAL at 08:43

## 2018-08-15 RX ADMIN — INSULIN LISPRO 3 UNITS: 100 INJECTION, SOLUTION INTRAVENOUS; SUBCUTANEOUS at 20:48

## 2018-08-15 RX ADMIN — Medication 10 ML: at 06:20

## 2018-08-15 RX ADMIN — NICOTINE 1 PATCH: 14 PATCH, EXTENDED RELEASE TRANSDERMAL at 20:49

## 2018-08-15 RX ADMIN — GUAIFENESIN 1200 MG: 600 TABLET, EXTENDED RELEASE ORAL at 08:43

## 2018-08-15 RX ADMIN — FERRIC CITRATE 210 MG: 210 TABLET, COATED ORAL at 08:43

## 2018-08-15 RX ADMIN — Medication 1 TABLET: at 08:43

## 2018-08-15 RX ADMIN — INSULIN LISPRO 4 UNITS: 100 INJECTION, SOLUTION INTRAVENOUS; SUBCUTANEOUS at 12:18

## 2018-08-15 RX ADMIN — FERRIC CITRATE 210 MG: 210 TABLET, COATED ORAL at 12:19

## 2018-08-15 RX ADMIN — NYSTATIN 500000 UNITS: 100000 SUSPENSION ORAL at 08:42

## 2018-08-15 RX ADMIN — GUAIFENESIN 1200 MG: 600 TABLET, EXTENDED RELEASE ORAL at 20:48

## 2018-08-15 RX ADMIN — ATORVASTATIN CALCIUM 80 MG: 40 TABLET, FILM COATED ORAL at 08:43

## 2018-08-15 RX ADMIN — SODIUM BICARBONATE 650 MG: 650 TABLET ORAL at 08:43

## 2018-08-15 RX ADMIN — BUDESONIDE AND FORMOTEROL FUMARATE DIHYDRATE 2 PUFF: 160; 4.5 AEROSOL RESPIRATORY (INHALATION) at 19:18

## 2018-08-15 RX ADMIN — INSULIN LISPRO 3 UNITS: 100 INJECTION, SOLUTION INTRAVENOUS; SUBCUTANEOUS at 17:18

## 2018-08-15 RX ADMIN — ASPIRIN 81 MG CHEWABLE TABLET 81 MG: 81 TABLET CHEWABLE at 08:43

## 2018-08-15 RX ADMIN — FERRIC CITRATE 210 MG: 210 TABLET, COATED ORAL at 17:18

## 2018-08-15 NOTE — PLAN OF CARE
Problem: Patient Care Overview  Goal: Plan of Care Review  Outcome: Ongoing (interventions implemented as appropriate)   08/15/18 2969   Coping/Psychosocial   Plan of Care Reviewed With patient   Plan of Care Review   Progress improving   OTHER   Outcome Summary Patient had some complaints of pain earlier this am but refuses prn pain medication. Has had no complaints of pain since. Did have some diarrhea earlier today but has had none further. Started on megace today for appetite.

## 2018-08-15 NOTE — PLAN OF CARE
Problem: Patient Care Overview  Goal: Plan of Care Review  Outcome: Ongoing (interventions implemented as appropriate)   08/15/18 7031   Plan of Care Review   Progress improving   OTHER   Outcome Summary RD nutrition follow up completed. Pt with po intake for last 48 hrs is 75% of 1 meal charted. Supplements offered. Will continue to follow for d/c needs, however none are noted at this time

## 2018-08-15 NOTE — PLAN OF CARE
Problem: Patient Care Overview  Goal: Plan of Care Review  Outcome: Ongoing (interventions implemented as appropriate)   08/15/18 0232   Coping/Psychosocial   Plan of Care Reviewed With patient   Plan of Care Review   Progress no change   OTHER   Outcome Summary No c/o SOA, medicated for chronic pain sleeping after med given. Productive cough. Cont. to refuse to wear telemetry.       Problem: Cardiac: Heart Failure (Adult)  Goal: Signs and Symptoms of Listed Potential Problems Will be Absent, Minimized or Managed (Cardiac: Heart Failure)  Outcome: Ongoing (interventions implemented as appropriate)   08/15/18 0232   Goal/Outcome Evaluation   Problems Assessed (Heart Failure) all   Problems Present (Heart Failure) functional decline/self-care deficit;situational response       Problem: Fall Risk (Adult)  Goal: Absence of Fall  Outcome: Ongoing (interventions implemented as appropriate)   08/15/18 0232   Fall Risk (Adult)   Absence of Fall making progress toward outcome

## 2018-08-15 NOTE — PROGRESS NOTES
"    [facility]  INPATIENT PROGRESS NOTE    Patient Name: Gavin Wilson  Date of Admission: 8/8/2018  Today's Date: 08/15/18  Length of Stay: 7  Primary Care Physician: Provider, No Known    Subjective   Chief Complaint: \"I need a couple of more days\"  HPI   Patient feels like he needs a couple of more days in the hospital.  Continues to tell me that he isn't ready to go home. Thin. Lives alone. When I discussed with him going to rehab for a couple weeks to improve, he telss me that he doesn't want to go because he doesn't want people staring at him and his ostomy.       Review of Systems     All pertinent negatives and positives are as above. All other systems have been reviewed and are negative unless otherwise stated.     Objective    Temp:  [97.8 °F (36.6 °C)-99 °F (37.2 °C)] 98.1 °F (36.7 °C)  Heart Rate:  [] 90  Resp:  [12-16] 16  BP: (103-113)/(50-60) 106/60  Physical Exam   Constitutional: He is oriented to person, place, and time. No distress.   Currently on no supplemental 02   HENT:   Head: Normocephalic.   Mouth/Throat: No oropharyngeal exudate.   Eyes: No scleral icterus.   Pulmonary/Chest: He has wheezes.   Diminished BSs at bases   Abdominal:   Urostomy in place   Musculoskeletal: He exhibits no edema.   Neurological: He is alert and oriented to person, place, and time.   Skin: Skin is warm and dry. He is not diaphoretic.   Excoriation at the bottom of ostomy site.    Psychiatric: He has a normal mood and affect.   Vitals reviewed.      Results Review:  I have reviewed the labs, radiology results, and diagnostic studies.    Laboratory Data:     Results from last 7 days  Lab Units 08/12/18  0557 08/11/18  0554 08/10/18  0513   WBC 10*3/mm3 5.57 6.30 6.52   HEMOGLOBIN g/dL 9.0* 9.3* 8.8*   HEMATOCRIT % 28.5* 30.2* 28.4*   PLATELETS 10*3/mm3 143 159 160          Results from last 7 days  Lab Units 08/15/18  0542 08/14/18  0820 08/13/18  0658 " 08/12/18  0557 08/11/18  0554  08/09/18  0406   SODIUM mmol/L 143 146* 146* 144 144  < > 139   POTASSIUM mmol/L 4.5 4.5 4.1 4.2 4.2  < > 4.7   CHLORIDE mmol/L 109 112* 113* 110 110  < > 106   CO2 mmol/L 24.0 23.0* 21.0* 21.0* 21.0*  < > 21.0*   BUN mg/dL 44* 45* 44* 45* 45*  < > 49*   CREATININE mg/dL 3.08* 3.01* 2.70* 2.92* 3.09*  < > 3.43*   CALCIUM mg/dL 8.3* 8.5 8.0* 8.4 8.6  < > 8.2*   BILIRUBIN mg/dL  --   --   --  0.6 0.6  --  0.2   ALK PHOS U/L  --   --   --  83 84  --  82   ALT (SGPT) U/L  --   --   --  26 30  --  26   AST (SGOT) U/L  --   --   --  7 9  --  14   GLUCOSE mg/dL 124* 134* 129* 139* 131*  < > 146*   < > = values in this interval not displayed.    Culture Data:   Urine Culture   Date Value Ref Range Status   08/11/2018 70,000-80,000 CFU/mL Yeast isolated (A)  Final       Radiology Data:   Imaging Results (last 24 hours)     Procedure Component Value Units Date/Time    XR Chest PA & Lateral [[id number]] Collected:  08/15/18 1131     Updated:  08/15/18 1135    Narrative:       EXAMINATION: XR CHEST PA AND LATERAL-     8/15/2018 11:21 AM CDT     HISTORY: follow-up effusions/infiltrates; Z72.0-Tobacco use.     2 view chest x-ray compared with 08/10/2018.     Stable heart size.  Aortic arch calcification.     Improved aeration of the retrocardiac left lower lobe compatible with  resolution of infiltrate or atelectasis.     Chronic interstitial lung disease with mild hyperexpansion.     Trace pleural fluid is decreased and there may be a component of pleural  thickening.     There is no new or increased lung disease.     Summary:  1. Chronic lung changes with decreased basilar infiltrate/atelectasis  and decreased trace pleural fluid.  2. No new abnormality.  This report was finalized on 08/15/2018 11:32 by Dr. Vijay Beach MD.          I have reviewed the patient's current medications.     Assessment/Plan     Hospital Problem List     Volume overload        1. Volume overload with pulmonary vascular  congestion   2. ESRD, recent AV fistula placed  3. DM II  4. Hyperkalemia - improved  5. PVD  6. Tobacco abuse  7. Anemia, probably related to chronic renal disease.   8. Constipation.  9. Dense retrocardiac consolidation, likely compressive atelectasis but cannot rule out pneumonia.  10. Moderate to large bilateral pleural effusions by CT chest    Plan:  1.  No family present today.   2.  Patient reports he's starting to feel better after IV Merrem was started; continue Merrem for now  3.  Bilateral lower lobes are obscured by pleural effusion and patient likely has compressive atelectasis; less likely pneumonia.  WBC has been normal.  Afebrile.  Check procalcitonin and BMP in AM.  4.  Physical therapy note reviewed; patient does not want physical therapy; patient feels confident that he can go home and feels that he will be able to take care of himself  5.  PO Bumex  6.  PA and LAT CXR appears improved   7.  Off supplemental 02  8.  Scheduled nebs  9.  Trial of Symbicort  10.  Trial of Kishan Madrid DO   08/15/18   3:24 PM

## 2018-08-15 NOTE — PROGRESS NOTES
Nephrology ([facility]) Progress Note      Patient:  Gavin Wilson  YOB: 1945  Date of Service: 8/15/2018  MRN: [id number]   Acct: [id number]   Primary Care Physician: Provider, No Known  Advance Directive:   Code Status and Medical Interventions:   Ordered at: 08/08/18 0522     Level Of Support Discussed With:    Patient     Code Status:    No CPR     Medical Interventions (Level of Support Prior to Arrest):    Full     Admit Date: 8/8/2018       Hospital Day: 7  Referring Provider: Arcenio Ahn MD      Patient personally seen and examined.  Complete chart including Consults, Notes, Operative Reports, Labs, Cardiology, and Radiology studies reviewed as able.        Subjective:  Gavin Wilson is a 73 y.o. male  whom we were consulted for volume overload in patient approaching end stage renal disease.  Baseline of chronic kidney disease stage 4/5, follows with nephrologist at [facility].  Recently had left arm AV fistula placed (also at [facility]).  History of type 2 diabetes, hypertension, chronic congestive heart failure, prior bladder cancer with urostomy.  Admitted as a direct admit from [facility].  He had presented to their facility with dyspnea.  Apparently patient has been advised to start dialysis in the past but has deferred any decisions until now.  Denies recent change in urine output, no hematuria.  no n/v/d. Hospital course remarkable for good response to PO diuretics, improving dyspnea. Patient has severe hearing impairment. Today, he has been doing well with no new complaints.      Allergies:  Sulfa antibiotics    Home Meds:  Prescriptions Prior to Admission   Medication Sig Dispense Refill Last Dose   • aspirin 81 MG chewable tablet Chew 81 mg Daily.   Past Week at Unknown time   • atorvastatin (LIPITOR) 80 MG tablet Take 80 mg by mouth Daily.   Past Week at Unknown time   • calcium carbonate (OS-TASHI) 600 MG tablet Take 600 mg  by mouth Daily.   Past Week at Unknown time   • HYDROcodone-acetaminophen (NORCO)  MG per tablet Take 1 tablet by mouth Every 6 (Six) Hours As Needed (back pain).   Past Week at Unknown time   • insulin aspart (novoLOG) 100 UNIT/ML injection Inject 6 Units under the skin into the appropriate area as directed Every Night.   Past Week at Unknown time   • insulin lispro (humaLOG) 100 UNIT/ML injection Inject 6 Units under the skin into the appropriate area as directed 3 (Three) Times a Day Before Meals.   Past Week at Unknown time   • metoprolol tartrate (LOPRESSOR) 25 MG tablet Take 25 mg by mouth 2 (Two) Times a Day.   Past Week at Unknown time       Medicines:  Current Facility-Administered Medications   Medication Dose Route Frequency Provider Last Rate Last Dose   • aspirin chewable tablet 81 mg  81 mg Oral Daily Arcenio Ahn MD   81 mg at 08/15/18 0843   • atorvastatin (LIPITOR) tablet 80 mg  80 mg Oral Daily Arcenio Ahn MD   80 mg at 08/15/18 0843   • budesonide-formoterol (SYMBICORT) 160-4.5 MCG/ACT inhaler 2 puff  2 puff Inhalation BID - RT Farooq Edwards MD   2 puff at 08/15/18 0919   • bumetanide (BUMEX) tablet 1 mg  1 mg Oral Daily Frida Lagunas MD   1 mg at 08/15/18 0843   • calcitriol (ROCALTROL) capsule 0.5 mcg  0.5 mcg Oral Daily Scott Soto MD   0.5 mcg at 08/15/18 0843   • calcium carb-cholecalciferol 600-800 MG-UNIT tablet 1 tablet  1 tablet Oral Daily Arcenio Ahn MD   1 tablet at 08/15/18 0843   • dextrose (D50W) solution 25 g  25 g Intravenous Q15 Min PRN Arcenio Ahn MD       • dextrose (GLUTOSE) oral gel 15 g  15 g Oral Q15 Min PRN Arcenio Ahn MD       • Ferric Citrate tablet 210 mg  210 mg Oral TID With Meals Samson Aragon MD   210 mg at 08/15/18 1219   • glucagon (human recombinant) (GLUCAGEN DIAGNOSTIC) injection 1 mg  1 mg Subcutaneous PRN Arcenio Ahn MD       • guaiFENesin (MUCINEX) 12 hr tablet 1,200 mg   1,200 mg Oral Q12H Oleg Madrid DO   1,200 mg at 08/15/18 0843   • HYDROcodone-acetaminophen (NORCO)  MG per tablet 1 tablet  1 tablet Oral Q4H PRN Arcenio Ahn MD   1 tablet at 08/14/18 2150   • insulin lispro (humaLOG) injection 2-7 Units  2-7 Units Subcutaneous 4x Daily With Meals & Nightly Arcenio Ahn MD   4 Units at 08/15/18 1218   • ipratropium-albuterol (DUO-NEB) nebulizer solution 3 mL  3 mL Nebulization Q4H PRN Oleg Madrid DO       • LORazepam (ATIVAN) tablet 1 mg  1 mg Oral Q6H PRN Arcenio Ahn MD   1 mg at 08/14/18 2150   • megestrol (MEGACE) 40 MG/ML suspension 800 mg  800 mg Oral Daily Oleg Madrid DO       • meropenem (MERREM) 1 g/100 mL 0.9% NS VTB (mbp)  1 g Intravenous Q12H Oleg Madrid DO 0 mL/hr at 08/14/18 0800 1 g at 08/15/18 0620   • metoprolol tartrate (LOPRESSOR) tablet 25 mg  25 mg Oral Q12H Arcenio Ahn MD   25 mg at 08/15/18 0842   • naloxone (NARCAN) injection 0.4 mg  0.4 mg Intravenous Q5 Min PRN Arcenio Ahn MD       • nicotine (NICODERM CQ) 14 MG/24HR patch 1 patch  1 patch Transdermal Q24H Farooq Edwards MD   1 patch at 08/14/18 2052   • nystatin (MYCOSTATIN) 652754 UNIT/ML suspension 500,000 Units  5 mL Oral 4x Daily Oleg Madrid DO   500,000 Units at 08/15/18 0842   • ondansetron (ZOFRAN) injection 4 mg  4 mg Intravenous Q6H PRN Arcenio Ahn MD       • sodium bicarbonate tablet 650 mg  650 mg Oral BID Roberto Carlos Myrick APRN   650 mg at 08/15/18 0843   • sodium chloride 0.9 % flush 1-10 mL  1-10 mL Intravenous PRN Arcenio Ahn, MD   10 mL at 08/15/18 0620       Past Medical History:  Past Medical History:   Diagnosis Date   • CHF (congestive heart failure) (CMS/HCC)    • Coronary stent occlusion    • Diabetes mellitus (CMS/HCC)    • Hyperlipidemia    • Hypertension    • Stroke (CMS/HCC)        Past Surgical History:  Past Surgical History:   Procedure Laterality Date   • BLADDER SURGERY    "  • ESOPHAGECTOMY         Family History  Family History   Problem Relation Age of Onset   • No Known Problems Father    • No Known Problems Mother        Social History  Social History     Social History   • Marital status:      Spouse name: N/A   • Number of children: N/A   • Years of education: N/A     Occupational History   • Not on file.     Social History Main Topics   • Smoking status: Current Every Day Smoker   • Smokeless tobacco: Never Used   • Alcohol use No   • Drug use: No   • Sexual activity: Not on file     Other Topics Concern   • Not on file     Social History Narrative   • No narrative on file         Review of Systems:  History obtained from chart review and the patient  General ROS: No fever or chills, +fatigue  Respiratory ROS: positive for  shortness of breath, +cough  Cardiovascular ROS: positive for  dyspnea on exertion  No chest pain or palpitations  Gastrointestinal ROS: No abdominal pain or melena  Genito-Urinary ROS: No dysuria or hematuria  Neurological ROS: no headache or dizziness    Objective:  /60 (BP Location: Right arm, Patient Position: Lying)   Pulse 90   Temp 98.1 °F (36.7 °C) (Temporal Artery )   Resp 16   Ht 170.2 cm (67.01\")   Wt 45 kg (99 lb 2 oz)   SpO2 94%   BMI 15.52 kg/m²     Intake/Output Summary (Last 24 hours) at 08/15/18 1538  Last data filed at 08/15/18 1432   Gross per 24 hour   Intake              600 ml   Output              700 ml   Net             -100 ml     General: awake/alert    Neck: Supple, no JVD  Chest:  bilateral wheezes  CVS: regular rate and rhythm, 3/6 soft systolic murmur  Abdominal: soft, nontender, normal bowel sounds  Extremities: no cyanosis or edema  Skin: warm and dry without rash  Neuro: no focal motor deficits    Labs:    Results from last 7 days  Lab Units 08/12/18  0557 08/11/18  0554 08/10/18  0513   WBC 10*3/mm3 5.57 6.30 6.52   HEMOGLOBIN g/dL 9.0* 9.3* 8.8*   HEMATOCRIT % 28.5* 30.2* 28.4*   PLATELETS 10*3/mm3 143 " 159 160           Results from last 7 days  Lab Units 08/15/18  0542 08/14/18  0820 08/13/18  0658 08/12/18  0557 08/11/18  0554  08/09/18  0406   SODIUM mmol/L 143 146* 146* 144 144  < > 139   POTASSIUM mmol/L 4.5 4.5 4.1 4.2 4.2  < > 4.7   CHLORIDE mmol/L 109 112* 113* 110 110  < > 106   CO2 mmol/L 24.0 23.0* 21.0* 21.0* 21.0*  < > 21.0*   BUN mg/dL 44* 45* 44* 45* 45*  < > 49*   CREATININE mg/dL 3.08* 3.01* 2.70* 2.92* 3.09*  < > 3.43*   CALCIUM mg/dL 8.3* 8.5 8.0* 8.4 8.6  < > 8.2*   BILIRUBIN mg/dL  --   --   --  0.6 0.6  --  0.2   ALK PHOS U/L  --   --   --  83 84  --  82   ALT (SGPT) U/L  --   --   --  26 30  --  26   AST (SGOT) U/L  --   --   --  7 9  --  14   GLUCOSE mg/dL 124* 134* 129* 139* 131*  < > 146*   < > = values in this interval not displayed.    Radiology:   Imaging Results (last 72 hours)     Procedure Component Value Units Date/Time    XR Chest 1 View [[id number]] Collected:  08/08/18 1450     Updated:  08/08/18 1454    Narrative:       EXAMINATION:   XR CHEST 1 VW-  8/8/2018 2:50 PM CDT     HISTORY: Difficulty breathing     Frontal upright radiograph of the chest 8/8/2018 1:23 PM CDT     COMPARISON: None.     FINDINGS:   Mild basilar interstitial infiltrates are present. Small bilateral  pleural effusions are present. This is suspicious for early congestive  failure.. Cardiac silhouette is upper limits of normal. Vascular  calcifications present aortic arch. Endovascular aortic stent graft is  present in the abdomen..      The osseous structures and surrounding soft tissues demonstrate no acute  abnormality.       Impression:       1. Mild or early changes of pulmonary vascular congestion.        This report was finalized on 08/08/2018 14:51 by Dr. Duc Garibay MD.          Culture:         Assessment   1.  Chronic kidney disease stage 4  2.  Acute on chronic congestive heart failure  3.  Hyperkalemia--resolved  4.  Hypertension  5.  Type 2 diabetes  6.  Anemia   7.  Secondary  hyperparathyroidism  8.  Acidosis     Plan:  1.  Continue oral Bumex  2.  Continue calcitriol and follow up PTH level  3.  Monitor labs  4.  If patient is discharged, then will need to follow up at the renal clinic within 7-10 days.       Scott Soto MD  8/15/2018  3:38 PM

## 2018-08-15 NOTE — NURSING NOTE
08/15/18 1405   Urostomy RLQ   Placement Date/Time: 08/09/18 0800   Inserted by: present on admission, just now being assessed, previously charted as nephrostomy   Location: RLQ   Stomal Appliance Changed;1 piece;Drainable   Stoma Appearance moist;rosebud appearance;protruding above skin level   Peristomal Assessment Blanchable erythema;Excoriation   Treatment Bag change;Site care  (No Sting Barrier Film wipes used)   Output (mL) 250 mL     Patient presents with Urostomy that he has had since '97.  Patient has [place] Urostomy bags at bedside from home.  Removed bag to visualize reported rash.  Patient does have skin breakdown proximal and distal to stoma.  Peristomal skin is red and irritated with open areas that are slightly bleeding.  Spoke with patient about use of stomahesive paste and other products that are available.  He refused everything except the No Sting Barrier Film.  He stated they didn't work, but would use those.  Area was cleaned and barrier wipe was used.  Applied appliance.  Discussed type of appliance he was using because the bag is a convex wafer 1-piece.  Patient's stoma is protruding about 1.5 to 2 cm above skin level.  Patient stated he was not interested in changing bags because that is what he has used since '97.      Spoke with JERMAN Lozano.  Suggested use of barrier film before applying appliance and to provide patient with some to take home.  No other needs at this time.  Will continue to follow.

## 2018-08-15 NOTE — PROGRESS NOTES
Continued Stay Note  [facility]     Patient Name: Gavin Wilson  MRN: [id number]  Today's Date: 8/15/2018    Admit Date: 8/8/2018          Discharge Plan     Row Name 08/15/18 1555       Plan    Plan Home w/HH    Plan Comments PT is anticipating DC for 8/16/18 and is declining any type of inpatient rehab or SNF placement. PT is agreeable to  services and would like to use [facility] as he has in the past. SW will arrange HH once orders are written. Dr. Madrid has been notified PT is agreeable to HH and needs orders.     Final Discharge Disposition Code 06 - home with home health care              Discharge Codes    No documentation.           ISSA Batres

## 2018-08-16 ENCOUNTER — APPOINTMENT (OUTPATIENT)
Dept: GENERAL RADIOLOGY | Facility: HOSPITAL | Age: 73
End: 2018-08-16

## 2018-08-16 LAB
GLUCOSE BLDC GLUCOMTR-MCNC: 150 MG/DL (ref 70–130)
GLUCOSE BLDC GLUCOMTR-MCNC: 160 MG/DL (ref 70–130)
GLUCOSE BLDC GLUCOMTR-MCNC: 232 MG/DL (ref 70–130)
GLUCOSE BLDC GLUCOMTR-MCNC: 238 MG/DL (ref 70–130)

## 2018-08-16 PROCEDURE — 94799 UNLISTED PULMONARY SVC/PX: CPT

## 2018-08-16 PROCEDURE — 25010000002 MEROPENEM: Performed by: INTERNAL MEDICINE

## 2018-08-16 PROCEDURE — 94760 N-INVAS EAR/PLS OXIMETRY 1: CPT

## 2018-08-16 PROCEDURE — 63710000001 INSULIN LISPRO (HUMAN) PER 5 UNITS: Performed by: INTERNAL MEDICINE

## 2018-08-16 PROCEDURE — 82962 GLUCOSE BLOOD TEST: CPT

## 2018-08-16 PROCEDURE — 71046 X-RAY EXAM CHEST 2 VIEWS: CPT

## 2018-08-16 PROCEDURE — 25010000002 MEROPENEM PER 100 MG: Performed by: INTERNAL MEDICINE

## 2018-08-16 RX ORDER — BUMETANIDE 2 MG/1
2 TABLET ORAL
Status: DISCONTINUED | OUTPATIENT
Start: 2018-08-16 | End: 2018-08-18 | Stop reason: HOSPADM

## 2018-08-16 RX ORDER — METOLAZONE 5 MG/1
5 TABLET ORAL ONCE
Status: COMPLETED | OUTPATIENT
Start: 2018-08-16 | End: 2018-08-16

## 2018-08-16 RX ORDER — BUMETANIDE 1 MG/1
1 TABLET ORAL
Status: DISCONTINUED | OUTPATIENT
Start: 2018-08-16 | End: 2018-08-16

## 2018-08-16 RX ADMIN — FERRIC CITRATE 210 MG: 210 TABLET, COATED ORAL at 12:58

## 2018-08-16 RX ADMIN — SODIUM BICARBONATE 650 MG: 650 TABLET ORAL at 10:19

## 2018-08-16 RX ADMIN — ASPIRIN 81 MG CHEWABLE TABLET 81 MG: 81 TABLET CHEWABLE at 10:19

## 2018-08-16 RX ADMIN — SODIUM BICARBONATE 650 MG: 650 TABLET ORAL at 20:24

## 2018-08-16 RX ADMIN — INSULIN LISPRO 3 UNITS: 100 INJECTION, SOLUTION INTRAVENOUS; SUBCUTANEOUS at 12:58

## 2018-08-16 RX ADMIN — HYDROCODONE BITARTRATE AND ACETAMINOPHEN 1 TABLET: 10; 325 TABLET ORAL at 20:29

## 2018-08-16 RX ADMIN — BUDESONIDE AND FORMOTEROL FUMARATE DIHYDRATE 2 PUFF: 160; 4.5 AEROSOL RESPIRATORY (INHALATION) at 19:09

## 2018-08-16 RX ADMIN — BUMETANIDE 1 MG: 1 TABLET ORAL at 10:19

## 2018-08-16 RX ADMIN — Medication 10 ML: at 06:12

## 2018-08-16 RX ADMIN — GUAIFENESIN 1200 MG: 600 TABLET, EXTENDED RELEASE ORAL at 20:24

## 2018-08-16 RX ADMIN — LORAZEPAM 1 MG: 1 TABLET ORAL at 20:29

## 2018-08-16 RX ADMIN — CALCITRIOL 0.5 MCG: 0.25 CAPSULE ORAL at 10:19

## 2018-08-16 RX ADMIN — INSULIN LISPRO 3 UNITS: 100 INJECTION, SOLUTION INTRAVENOUS; SUBCUTANEOUS at 17:42

## 2018-08-16 RX ADMIN — MEROPENEM 1 G: 1 INJECTION, POWDER, FOR SOLUTION INTRAVENOUS at 17:43

## 2018-08-16 RX ADMIN — ATORVASTATIN CALCIUM 80 MG: 40 TABLET, FILM COATED ORAL at 10:19

## 2018-08-16 RX ADMIN — FERRIC CITRATE 210 MG: 210 TABLET, COATED ORAL at 10:19

## 2018-08-16 RX ADMIN — FERRIC CITRATE 210 MG: 210 TABLET, COATED ORAL at 17:42

## 2018-08-16 RX ADMIN — Medication 1 TABLET: at 10:19

## 2018-08-16 RX ADMIN — METOPROLOL TARTRATE 25 MG: 25 TABLET, FILM COATED ORAL at 20:24

## 2018-08-16 RX ADMIN — NICOTINE 1 PATCH: 14 PATCH, EXTENDED RELEASE TRANSDERMAL at 20:25

## 2018-08-16 RX ADMIN — BUDESONIDE AND FORMOTEROL FUMARATE DIHYDRATE 2 PUFF: 160; 4.5 AEROSOL RESPIRATORY (INHALATION) at 07:45

## 2018-08-16 RX ADMIN — MEROPENEM 1 G: 1 INJECTION, POWDER, FOR SOLUTION INTRAVENOUS at 06:12

## 2018-08-16 RX ADMIN — METOPROLOL TARTRATE 25 MG: 25 TABLET, FILM COATED ORAL at 10:19

## 2018-08-16 RX ADMIN — GUAIFENESIN 1200 MG: 600 TABLET, EXTENDED RELEASE ORAL at 10:18

## 2018-08-16 RX ADMIN — METOLAZONE 5 MG: 5 TABLET ORAL at 17:42

## 2018-08-16 RX ADMIN — BUMETANIDE 2 MG: 2 TABLET ORAL at 17:43

## 2018-08-16 NOTE — PROGRESS NOTES
Nephrology ([facility]) Progress Note      Patient:  Gavin Wilson  YOB: 1945  Date of Service: 8/16/2018  MRN: [id number]   Acct: [id number]   Primary Care Physician: Provider, No Known  Advance Directive:   Code Status and Medical Interventions:   Ordered at: 08/08/18 0522     Level Of Support Discussed With:    Patient     Code Status:    No CPR     Medical Interventions (Level of Support Prior to Arrest):    Full     Admit Date: 8/8/2018       Hospital Day: 8  Referring Provider: Arcenio Ahn MD      Patient personally seen and examined.  Complete chart including Consults, Notes, Operative Reports, Labs, Cardiology, and Radiology studies reviewed as able.        Subjective:  Gavin Wilson is a 73 y.o. male  whom we were consulted for volume overload in patient approaching end stage renal disease.  Baseline of chronic kidney disease stage 4/5, follows with nephrologist at [facility].  Recently had left arm AV fistula placed (also at [facility]).  History of type 2 diabetes, hypertension, chronic congestive heart failure, prior bladder cancer with urostomy.  Admitted as a direct admit from [facility].  He had presented to their facility with dyspnea.  Apparently patient has been advised to start dialysis in the past but has deferred any decisions until now.  Denies recent change in urine output, no hematuria.  no n/v/d. Hospital course remarkable for good response to PO diuretics, improving dyspnea. Patient has severe hearing impairment.   Today, he has been doing well with no new complaints. He denies any significant dyspnea.     Allergies:  Sulfa antibiotics    Home Meds:  Prescriptions Prior to Admission   Medication Sig Dispense Refill Last Dose   • aspirin 81 MG chewable tablet Chew 81 mg Daily.   Past Week at Unknown time   • atorvastatin (LIPITOR) 80 MG tablet Take 80 mg by mouth Daily.   Past Week at Unknown time   • calcium carbonate  (OS-TASHI) 600 MG tablet Take 600 mg by mouth Daily.   Past Week at Unknown time   • HYDROcodone-acetaminophen (NORCO)  MG per tablet Take 1 tablet by mouth Every 6 (Six) Hours As Needed (back pain).   Past Week at Unknown time   • insulin aspart (novoLOG) 100 UNIT/ML injection Inject 6 Units under the skin into the appropriate area as directed Every Night.   Past Week at Unknown time   • insulin lispro (humaLOG) 100 UNIT/ML injection Inject 6 Units under the skin into the appropriate area as directed 3 (Three) Times a Day Before Meals.   Past Week at Unknown time   • metoprolol tartrate (LOPRESSOR) 25 MG tablet Take 25 mg by mouth 2 (Two) Times a Day.   Past Week at Unknown time       Medicines:  Current Facility-Administered Medications   Medication Dose Route Frequency Provider Last Rate Last Dose   • aspirin chewable tablet 81 mg  81 mg Oral Daily Arcenio Ahn MD   81 mg at 08/16/18 1019   • atorvastatin (LIPITOR) tablet 80 mg  80 mg Oral Daily Arcenio Ahn MD   80 mg at 08/16/18 1019   • budesonide-formoterol (SYMBICORT) 160-4.5 MCG/ACT inhaler 2 puff  2 puff Inhalation BID - RT Farooq Edwards MD   2 puff at 08/16/18 0745   • bumetanide (BUMEX) tablet 1 mg  1 mg Oral BID Oleg Madrid,        • calcitriol (ROCALTROL) capsule 0.5 mcg  0.5 mcg Oral Daily Scott Soto MD   0.5 mcg at 08/16/18 1019   • calcium carb-cholecalciferol 600-800 MG-UNIT tablet 1 tablet  1 tablet Oral Daily Arcenio Ahn MD   1 tablet at 08/16/18 1019   • dextrose (D50W) solution 25 g  25 g Intravenous Q15 Min PRN Arcenio Ahn MD       • dextrose (GLUTOSE) oral gel 15 g  15 g Oral Q15 Min PRN Arcenio Ahn MD       • Ferric Citrate tablet 210 mg  210 mg Oral TID With Meals Samson Aragon MD   210 mg at 08/16/18 1258   • glucagon (human recombinant) (GLUCAGEN DIAGNOSTIC) injection 1 mg  1 mg Subcutaneous PRN Arcenio Ahn MD       • guaiFENesin (MUCINEX) 12 hr tablet  1,200 mg  1,200 mg Oral Q12H Oleg Madrid DO   1,200 mg at 08/16/18 1018   • Hold medication  1 each Does not apply Continuous PRN Oleg Madrid DO       • HYDROcodone-acetaminophen (NORCO)  MG per tablet 1 tablet  1 tablet Oral Q4H PRN Arcenio Ahn MD   1 tablet at 08/14/18 2150   • insulin lispro (humaLOG) injection 2-7 Units  2-7 Units Subcutaneous 4x Daily With Meals & Nightly Arcenio Ahn MD   3 Units at 08/16/18 1258   • ipratropium-albuterol (DUO-NEB) nebulizer solution 3 mL  3 mL Nebulization Q4H PRN Oleg Madrid DO       • LORazepam (ATIVAN) tablet 1 mg  1 mg Oral Q6H PRN Arcenio Ahn MD   1 mg at 08/14/18 2150   • megestrol (MEGACE) 40 MG/ML suspension 800 mg  800 mg Oral Daily Oleg Madrid DO   800 mg at 08/15/18 1649   • meropenem (MERREM) 1 g/100 mL 0.9% NS VTB (mbp)  1 g Intravenous Q12H Oleg Madrid DO 0 mL/hr at 08/15/18 0830 1 g at 08/16/18 0612   • metoprolol tartrate (LOPRESSOR) tablet 25 mg  25 mg Oral Q12H Arcenio Ahn MD   25 mg at 08/16/18 1019   • naloxone (NARCAN) injection 0.4 mg  0.4 mg Intravenous Q5 Min PRN Arcenio Ahn MD       • nicotine (NICODERM CQ) 14 MG/24HR patch 1 patch  1 patch Transdermal Q24H Farooq Edwards MD   1 patch at 08/15/18 2049   • nystatin (MYCOSTATIN) 060777 UNIT/ML suspension 500,000 Units  5 mL Oral 4x Daily Oleg Madrid DO   500,000 Units at 08/15/18 0842   • ondansetron (ZOFRAN) injection 4 mg  4 mg Intravenous Q6H PRN Arcenio Ahn MD       • sodium bicarbonate tablet 650 mg  650 mg Oral BID Roberto Carlos Myrick APRN   650 mg at 08/16/18 1019   • sodium chloride 0.9 % flush 1-10 mL  1-10 mL Intravenous PRN Arcenio Ahn MD   10 mL at 08/16/18 0612       Past Medical History:  Past Medical History:   Diagnosis Date   • CHF (congestive heart failure) (CMS/[facility])    • Coronary stent occlusion    • Diabetes mellitus (CMS/[facility])    • Hyperlipidemia    • Hypertension    • Stroke  "(CMS/[facility])        Past Surgical History:  Past Surgical History:   Procedure Laterality Date   • BLADDER SURGERY     • ESOPHAGECTOMY         Family History  Family History   Problem Relation Age of Onset   • No Known Problems Father    • No Known Problems Mother        Social History  Social History     Social History   • Marital status:      Spouse name: N/A   • Number of children: N/A   • Years of education: N/A     Occupational History   • Not on file.     Social History Main Topics   • Smoking status: Current Every Day Smoker   • Smokeless tobacco: Never Used   • Alcohol use No   • Drug use: No   • Sexual activity: Not on file     Other Topics Concern   • Not on file     Social History Narrative   • No narrative on file         Review of Systems:  History obtained from chart review and the patient  General ROS: No fever or chills, +fatigue  Respiratory ROS: positive for  shortness of breath, +cough  Cardiovascular ROS: positive for  dyspnea on exertion  No chest pain or palpitations  Gastrointestinal ROS: No abdominal pain or melena  Genito-Urinary ROS: No dysuria or hematuria  Neurological ROS: no headache or dizziness    Objective:  BP 98/49 (BP Location: Right arm, Patient Position: Lying)   Pulse 93   Temp 98.3 °F (36.8 °C) (Oral)   Resp 20   Ht 170.2 cm (67.01\")   Wt 45.2 kg (99 lb 9.6 oz)   SpO2 98%   BMI 15.60 kg/m²     Intake/Output Summary (Last 24 hours) at 08/16/18 1651  Last data filed at 08/16/18 1406   Gross per 24 hour   Intake             1080 ml   Output              900 ml   Net              180 ml     General: awake/alert    Neck: Supple, no JVD  Chest:  Decreased breath sounds at the bases  CVS: regular rate and rhythm, 3/6 soft systolic murmur  Abdominal: soft, nontender, normal bowel sounds  Extremities: no cyanosis or edema  Skin: warm and dry without rash  Neuro: no focal motor deficits    Labs:    Results from last 7 days  Lab Units 08/12/18  0557 08/11/18  0554 " 08/10/18  0513   WBC 10*3/mm3 5.57 6.30 6.52   HEMOGLOBIN g/dL 9.0* 9.3* 8.8*   HEMATOCRIT % 28.5* 30.2* 28.4*   PLATELETS 10*3/mm3 143 159 160           Results from last 7 days  Lab Units 08/15/18  0542 08/14/18  0820 08/13/18  0658 08/12/18  0557 08/11/18  0554   SODIUM mmol/L 143 146* 146* 144 144   POTASSIUM mmol/L 4.5 4.5 4.1 4.2 4.2   CHLORIDE mmol/L 109 112* 113* 110 110   CO2 mmol/L 24.0 23.0* 21.0* 21.0* 21.0*   BUN mg/dL 44* 45* 44* 45* 45*   CREATININE mg/dL 3.08* 3.01* 2.70* 2.92* 3.09*   CALCIUM mg/dL 8.3* 8.5 8.0* 8.4 8.6   BILIRUBIN mg/dL  --   --   --  0.6 0.6   ALK PHOS U/L  --   --   --  83 84   ALT (SGPT) U/L  --   --   --  26 30   AST (SGOT) U/L  --   --   --  7 9   GLUCOSE mg/dL 124* 134* 129* 139* 131*       Radiology:   Imaging Results (last 72 hours)     Procedure Component Value Units Date/Time    XR Chest 1 View [[id number]] Collected:  08/08/18 1450     Updated:  08/08/18 1454    Narrative:       EXAMINATION:   XR CHEST 1 VW-  8/8/2018 2:50 PM CDT     HISTORY: Difficulty breathing     Frontal upright radiograph of the chest 8/8/2018 1:23 PM CDT     COMPARISON: None.     FINDINGS:   Mild basilar interstitial infiltrates are present. Small bilateral  pleural effusions are present. This is suspicious for early congestive  failure.. Cardiac silhouette is upper limits of normal. Vascular  calcifications present aortic arch. Endovascular aortic stent graft is  present in the abdomen..      The osseous structures and surrounding soft tissues demonstrate no acute  abnormality.       Impression:       1. Mild or early changes of pulmonary vascular congestion.        This report was finalized on 08/08/2018 14:51 by Dr. Duc Garibay MD.          Culture:         Assessment   1.  Chronic kidney disease stage 4  2.  Acute on chronic congestive heart failure  3.  Hyperkalemia--resolved  4.  Hypertension  5.  Type 2 diabetes  6.  Anemia of ckd  7.  Secondary hyperparathyroidism  8.  Acidosis   9..   Bilateral pleural effusions (mostly noticed on the CT scan and not the chest xray)    Plan:  1.  Will increase Bumex to 2 mg po bid and give one dose of metolazone to optimize diuretic therapy and leaving dialysis to the last resort since he has no uremic signs  2.  Continue calcitriol and follow up PTH level  3.  Suggest pleurocentesis if not done for further rinvestigation   4.  If patient is discharged, then will need to follow up at the renal clinic within 7-10 days.       Scott Soto MD  8/16/2018  4:51 PM

## 2018-08-16 NOTE — PLAN OF CARE
Problem: Cardiac: Heart Failure (Adult)  Goal: Signs and Symptoms of Listed Potential Problems Will be Absent, Minimized or Managed (Cardiac: Heart Failure)  Outcome: Ongoing (interventions implemented as appropriate)   08/16/18 0249   Goal/Outcome Evaluation   Problems Assessed (Heart Failure) all   Problems Present (Heart Failure) functional decline/self-care deficit;situational response       Problem: Fall Risk (Adult)  Goal: Absence of Fall  Outcome: Ongoing (interventions implemented as appropriate)   08/16/18 0249   Fall Risk (Adult)   Absence of Fall making progress toward outcome

## 2018-08-16 NOTE — PROGRESS NOTES
"    [facility] Medicine Services  INPATIENT PROGRESS NOTE    Patient Name: Gavin Wilson  Date of Admission: 8/8/2018  Today's Date: 08/16/18  Length of Stay: 8  Primary Care Physician: Provider, No Known    Subjective   Chief Complaint: \"I need a couple of more days\"  HPI   Patient feels like he needs a couple of more days in the hospital.  Continues to tell me that he isn't ready to go home. Thin. Lives alone. When I discussed with him going to rehab for a couple weeks to improve, he telss me that he doesn't want to go because he doesn't want people staring at him and his ostomy.     Patient remains SOA. Worsens with activity. Echo reviewed.     Review of Systems     All pertinent negatives and positives are as above. All other systems have been reviewed and are negative unless otherwise stated.     Objective    Temp:  [97.4 °F (36.3 °C)-99.8 °F (37.7 °C)] 98.3 °F (36.8 °C)  Heart Rate:  [84-95] 84  Resp:  [16-18] 18  BP: ()/(49-60) 103/54  Physical Exam   Constitutional: He is oriented to person, place, and time. No distress.   Currently on no supplemental 02   HENT:   Head: Normocephalic.   Mouth/Throat: No oropharyngeal exudate.   Eyes: No scleral icterus.   Pulmonary/Chest: He has wheezes.   Diminished BSs at bases   Abdominal:   Urostomy in place   Musculoskeletal: He exhibits no edema.   Neurological: He is alert and oriented to person, place, and time.   Skin: Skin is warm and dry. He is not diaphoretic.   Excoriation at the bottom of ostomy site.    Psychiatric: He has a normal mood and affect.   Vitals reviewed.  Thin and frail    Results Review:  I have reviewed the labs, radiology results, and diagnostic studies.    Laboratory Data:     Results from last 7 days  Lab Units 08/12/18  0557 08/11/18  0554 08/10/18  0513   WBC 10*3/mm3 5.57 6.30 6.52   HEMOGLOBIN g/dL 9.0* 9.3* 8.8*   HEMATOCRIT % 28.5* 30.2* 28.4*   PLATELETS 10*3/mm3 143 159 160          Results from " last 7 days  Lab Units 08/15/18  0542 08/14/18  0820 08/13/18  0658 08/12/18  0557 08/11/18  0554   SODIUM mmol/L 143 146* 146* 144 144   POTASSIUM mmol/L 4.5 4.5 4.1 4.2 4.2   CHLORIDE mmol/L 109 112* 113* 110 110   CO2 mmol/L 24.0 23.0* 21.0* 21.0* 21.0*   BUN mg/dL 44* 45* 44* 45* 45*   CREATININE mg/dL 3.08* 3.01* 2.70* 2.92* 3.09*   CALCIUM mg/dL 8.3* 8.5 8.0* 8.4 8.6   BILIRUBIN mg/dL  --   --   --  0.6 0.6   ALK PHOS U/L  --   --   --  83 84   ALT (SGPT) U/L  --   --   --  26 30   AST (SGOT) U/L  --   --   --  7 9   GLUCOSE mg/dL 124* 134* 129* 139* 131*       Culture Data:   Urine Culture   Date Value Ref Range Status   08/11/2018 70,000-80,000 CFU/mL Yeast isolated (A)  Final       Radiology Data:   Imaging Results (last 24 hours)     ** No results found for the last 24 hours. **          I have reviewed the patient's current medications.     Assessment/Plan     Hospital Problem List     Volume overload        1. Volume overload with pulmonary vascular congestion   2. ESRD, recent AV fistula placed  3. DM II  4. Hyperkalemia - improved  5. PVD  6. Tobacco abuse  7. Anemia, probably related to chronic renal disease.   8. Constipation.  9. Dense retrocardiac consolidation, likely compressive atelectasis but cannot rule out pneumonia.  10. Moderate to large bilateral pleural effusions by CT chest    Plan:  1.  No family present today.   2.  DC Merrem  3.  Continued pulmonary effusions. Question if the patient needs dialysis to assist with removal of these, increase in diuretic, or thoracentesis. Consult radiology for guided thoracentesis and evaluate fluid to see if it is transudate. Patient does have HX of bladder cancer.   4.  Patient now agreeable to home health.   5.  PO Bumex, increase dose  6.  PA and LAT CXR appears improved   7.  Off supplemental 02  8.  Scheduled nebs  9.  Trial of Symbicort  10.  Trial of Megace   11. Question if patient needs stress test:  ·        Left ventricular systolic function  is moderately decreased. Estimated EF appears to be in the range of 31 - 35%.  · Left ventricular diastolic dysfunction (grade I) consistent with impaired relaxation.  · Left ventricular wall thickness is consistent with moderate posterior asymmetric hypertrophy.  · Mild mitral valve regurgitation is present  · Mild to moderate aortic valve regurgitation is present.  Mild aortic valve stenosis is present.    Oleg Madrid DO   08/16/18   4:05 PM

## 2018-08-16 NOTE — PLAN OF CARE
Problem: Patient Care Overview  Goal: Plan of Care Review  Outcome: Ongoing (interventions implemented as appropriate)   08/16/18 0250   Coping/Psychosocial   Plan of Care Reviewed With patient   Plan of Care Review   Progress improving   OTHER   Outcome Summary No c/o voiced this shift. Up with assist to BR with steady gait., pt states walks fine just weakness at times. No further diarrhea this shift. Refusing to wear tele.       Problem: Cardiac: Heart Failure (Adult)  Goal: Signs and Symptoms of Listed Potential Problems Will be Absent, Minimized or Managed (Cardiac: Heart Failure)  Outcome: Ongoing (interventions implemented as appropriate)   08/16/18 0250   Goal/Outcome Evaluation   Problems Assessed (Heart Failure) all   Problems Present (Heart Failure) cardiac pump dysfunction;functional decline/self-care deficit;situational response

## 2018-08-16 NOTE — PLAN OF CARE
Problem: Patient Care Overview  Goal: Plan of Care Review  Outcome: Ongoing (interventions implemented as appropriate)   08/16/18 0250 08/16/18 0800 08/16/18 1644   Coping/Psychosocial   Plan of Care Reviewed With --  patient --    Plan of Care Review   Progress improving --  --    OTHER   Outcome Summary --  --  Patient up ad aleta, no c/o pain, thoracentesis ordered. Refuses Nystatin and Megace.     Goal: Individualization and Mutuality  Outcome: Ongoing (interventions implemented as appropriate)    Goal: Discharge Needs Assessment  Outcome: Ongoing (interventions implemented as appropriate)    Goal: Interprofessional Rounds/Family Conf  Outcome: Ongoing (interventions implemented as appropriate)      Problem: Cardiac: Heart Failure (Adult)  Goal: Signs and Symptoms of Listed Potential Problems Will be Absent, Minimized or Managed (Cardiac: Heart Failure)  Outcome: Ongoing (interventions implemented as appropriate)      Problem: Fall Risk (Adult)  Goal: Absence of Fall  Outcome: Ongoing (interventions implemented as appropriate)

## 2018-08-17 ENCOUNTER — APPOINTMENT (OUTPATIENT)
Dept: GENERAL RADIOLOGY | Facility: HOSPITAL | Age: 73
End: 2018-08-17

## 2018-08-17 ENCOUNTER — APPOINTMENT (OUTPATIENT)
Dept: ULTRASOUND IMAGING | Facility: HOSPITAL | Age: 73
End: 2018-08-17

## 2018-08-17 LAB
ANION GAP SERPL CALCULATED.3IONS-SCNC: 11 MMOL/L (ref 4–13)
APPEARANCE FLD: ABNORMAL
BASOPHILS # BLD AUTO: 0.02 10*3/MM3 (ref 0–0.2)
BASOPHILS NFR BLD AUTO: 0.5 % (ref 0–2)
BUN BLD-MCNC: 42 MG/DL (ref 5–21)
BUN/CREAT SERPL: 14 (ref 7–25)
CALCIUM SPEC-SCNC: 8.6 MG/DL (ref 8.4–10.4)
CHLORIDE SERPL-SCNC: 105 MMOL/L (ref 98–110)
CO2 SERPL-SCNC: 24 MMOL/L (ref 24–31)
COLOR FLD: YELLOW
CREAT BLD-MCNC: 3.01 MG/DL (ref 0.5–1.4)
DEPRECATED RDW RBC AUTO: 50.7 FL (ref 40–54)
EOSINOPHIL # BLD AUTO: 0.23 10*3/MM3 (ref 0–0.7)
EOSINOPHIL NFR BLD AUTO: 6.2 % (ref 0–4)
ERYTHROCYTE [DISTWIDTH] IN BLOOD BY AUTOMATED COUNT: 16.4 % (ref 12–15)
GFR SERPL CREATININE-BSD FRML MDRD: 20 ML/MIN/1.73
GLUCOSE BLD-MCNC: 198 MG/DL (ref 70–100)
GLUCOSE BLDC GLUCOMTR-MCNC: 180 MG/DL (ref 70–130)
GLUCOSE BLDC GLUCOMTR-MCNC: 218 MG/DL (ref 70–130)
GLUCOSE BLDC GLUCOMTR-MCNC: 263 MG/DL (ref 70–130)
GLUCOSE BLDC GLUCOMTR-MCNC: 267 MG/DL (ref 70–130)
HCT VFR BLD AUTO: 27.3 % (ref 40–52)
HGB BLD-MCNC: 8.7 G/DL (ref 14–18)
IMM GRANULOCYTES # BLD: 0.01 10*3/MM3 (ref 0–0.03)
IMM GRANULOCYTES NFR BLD: 0.3 % (ref 0–5)
INR PPP: 1.09 (ref 0.91–1.09)
LYMPHOCYTES # BLD AUTO: 1 10*3/MM3 (ref 0.72–4.86)
LYMPHOCYTES NFR BLD AUTO: 27 % (ref 15–45)
LYMPHOCYTES NFR FLD MANUAL: 43 %
MCH RBC QN AUTO: 27.6 PG (ref 28–32)
MCHC RBC AUTO-ENTMCNC: 31.9 G/DL (ref 33–36)
MCV RBC AUTO: 86.7 FL (ref 82–95)
MONOCYTES # BLD AUTO: 0.4 10*3/MM3 (ref 0.19–1.3)
MONOCYTES NFR BLD AUTO: 10.8 % (ref 4–12)
MONOCYTES NFR FLD: 5 %
NEUTROPHILS # BLD AUTO: 2.05 10*3/MM3 (ref 1.87–8.4)
NEUTROPHILS NFR BLD AUTO: 55.2 % (ref 39–78)
NEUTROPHILS NFR FLD MANUAL: 12 %
NRBC BLD MANUAL-RTO: 0 /100 WBC (ref 0–0)
PLATELET # BLD AUTO: 130 10*3/MM3 (ref 130–400)
PMV BLD AUTO: 10.4 FL (ref 6–12)
POTASSIUM BLD-SCNC: 4.3 MMOL/L (ref 3.5–5.3)
PROTHROMBIN TIME: 14.5 SECONDS (ref 11.9–14.6)
PTH-INTACT SERPL-MCNC: 242.6 PG/ML (ref 7.5–53.5)
RBC # BLD AUTO: 3.15 10*6/MM3 (ref 4.8–5.9)
RBC # FLD AUTO: 1000 /MM3
SODIUM BLD-SCNC: 140 MMOL/L (ref 135–145)
UNCLASSIFIED CELLS, FLUID: 40 %
WBC # FLD: 323 /MM3
WBC NRBC COR # BLD: 3.71 10*3/MM3 (ref 4.8–10.8)

## 2018-08-17 PROCEDURE — 80048 BASIC METABOLIC PNL TOTAL CA: CPT | Performed by: INTERNAL MEDICINE

## 2018-08-17 PROCEDURE — 71045 X-RAY EXAM CHEST 1 VIEW: CPT

## 2018-08-17 PROCEDURE — 94799 UNLISTED PULMONARY SVC/PX: CPT

## 2018-08-17 PROCEDURE — 87015 SPECIMEN INFECT AGNT CONCNTJ: CPT | Performed by: INTERNAL MEDICINE

## 2018-08-17 PROCEDURE — 83970 ASSAY OF PARATHORMONE: CPT | Performed by: INTERNAL MEDICINE

## 2018-08-17 PROCEDURE — 99222 1ST HOSP IP/OBS MODERATE 55: CPT | Performed by: INTERNAL MEDICINE

## 2018-08-17 PROCEDURE — 63510000001 EPOETIN ALFA PER 1000 UNITS: Performed by: INTERNAL MEDICINE

## 2018-08-17 PROCEDURE — 85610 PROTHROMBIN TIME: CPT | Performed by: INTERNAL MEDICINE

## 2018-08-17 PROCEDURE — 83615 LACTATE (LD) (LDH) ENZYME: CPT | Performed by: INTERNAL MEDICINE

## 2018-08-17 PROCEDURE — 76942 ECHO GUIDE FOR BIOPSY: CPT

## 2018-08-17 PROCEDURE — 25010000002 MEROPENEM PER 100 MG: Performed by: INTERNAL MEDICINE

## 2018-08-17 PROCEDURE — 88112 CYTOPATH CELL ENHANCE TECH: CPT | Performed by: INTERNAL MEDICINE

## 2018-08-17 PROCEDURE — 94760 N-INVAS EAR/PLS OXIMETRY 1: CPT

## 2018-08-17 PROCEDURE — 82962 GLUCOSE BLOOD TEST: CPT

## 2018-08-17 PROCEDURE — 89051 BODY FLUID CELL COUNT: CPT | Performed by: INTERNAL MEDICINE

## 2018-08-17 PROCEDURE — 87205 SMEAR GRAM STAIN: CPT | Performed by: INTERNAL MEDICINE

## 2018-08-17 PROCEDURE — 63710000001 INSULIN LISPRO (HUMAN) PER 5 UNITS: Performed by: INTERNAL MEDICINE

## 2018-08-17 PROCEDURE — 88312 SPECIAL STAINS GROUP 1: CPT | Performed by: INTERNAL MEDICINE

## 2018-08-17 PROCEDURE — 85025 COMPLETE CBC W/AUTO DIFF WBC: CPT | Performed by: INTERNAL MEDICINE

## 2018-08-17 PROCEDURE — 88305 TISSUE EXAM BY PATHOLOGIST: CPT | Performed by: INTERNAL MEDICINE

## 2018-08-17 PROCEDURE — 84157 ASSAY OF PROTEIN OTHER: CPT | Performed by: INTERNAL MEDICINE

## 2018-08-17 PROCEDURE — 94640 AIRWAY INHALATION TREATMENT: CPT

## 2018-08-17 PROCEDURE — 76604 US EXAM CHEST: CPT

## 2018-08-17 PROCEDURE — 82042 OTHER SOURCE ALBUMIN QUAN EA: CPT | Performed by: INTERNAL MEDICINE

## 2018-08-17 PROCEDURE — 87070 CULTURE OTHR SPECIMN AEROBIC: CPT | Performed by: INTERNAL MEDICINE

## 2018-08-17 RX ORDER — SODIUM BICARBONATE 650 MG/1
325 TABLET ORAL 2 TIMES DAILY
Status: DISCONTINUED | OUTPATIENT
Start: 2018-08-17 | End: 2018-08-18 | Stop reason: HOSPADM

## 2018-08-17 RX ADMIN — HYDROCODONE BITARTRATE AND ACETAMINOPHEN 1 TABLET: 10; 325 TABLET ORAL at 04:06

## 2018-08-17 RX ADMIN — FERRIC CITRATE 210 MG: 210 TABLET, COATED ORAL at 17:30

## 2018-08-17 RX ADMIN — LORAZEPAM 1 MG: 1 TABLET ORAL at 20:18

## 2018-08-17 RX ADMIN — ERYTHROPOIETIN 10000 UNITS: 10000 INJECTION, SOLUTION INTRAVENOUS; SUBCUTANEOUS at 17:31

## 2018-08-17 RX ADMIN — BUDESONIDE AND FORMOTEROL FUMARATE DIHYDRATE 2 PUFF: 160; 4.5 AEROSOL RESPIRATORY (INHALATION) at 07:37

## 2018-08-17 RX ADMIN — SODIUM BICARBONATE 650 MG: 650 TABLET ORAL at 08:33

## 2018-08-17 RX ADMIN — ASPIRIN 81 MG CHEWABLE TABLET 81 MG: 81 TABLET CHEWABLE at 08:34

## 2018-08-17 RX ADMIN — INSULIN LISPRO 4 UNITS: 100 INJECTION, SOLUTION INTRAVENOUS; SUBCUTANEOUS at 11:17

## 2018-08-17 RX ADMIN — FERRIC CITRATE 210 MG: 210 TABLET, COATED ORAL at 08:34

## 2018-08-17 RX ADMIN — CALCITRIOL 0.5 MCG: 0.25 CAPSULE ORAL at 11:15

## 2018-08-17 RX ADMIN — GUAIFENESIN 1200 MG: 600 TABLET, EXTENDED RELEASE ORAL at 08:34

## 2018-08-17 RX ADMIN — BUMETANIDE 2 MG: 2 TABLET ORAL at 08:34

## 2018-08-17 RX ADMIN — GUAIFENESIN 1200 MG: 600 TABLET, EXTENDED RELEASE ORAL at 20:18

## 2018-08-17 RX ADMIN — LORAZEPAM 1 MG: 1 TABLET ORAL at 04:06

## 2018-08-17 RX ADMIN — INSULIN LISPRO 4 UNITS: 100 INJECTION, SOLUTION INTRAVENOUS; SUBCUTANEOUS at 20:18

## 2018-08-17 RX ADMIN — NICOTINE 1 PATCH: 14 PATCH, EXTENDED RELEASE TRANSDERMAL at 20:18

## 2018-08-17 RX ADMIN — BUDESONIDE AND FORMOTEROL FUMARATE DIHYDRATE 2 PUFF: 160; 4.5 AEROSOL RESPIRATORY (INHALATION) at 20:54

## 2018-08-17 RX ADMIN — HYDROCODONE BITARTRATE AND ACETAMINOPHEN 1 TABLET: 10; 325 TABLET ORAL at 21:27

## 2018-08-17 RX ADMIN — INSULIN LISPRO 3 UNITS: 100 INJECTION, SOLUTION INTRAVENOUS; SUBCUTANEOUS at 17:30

## 2018-08-17 RX ADMIN — MEROPENEM 1 G: 1 INJECTION, POWDER, FOR SOLUTION INTRAVENOUS at 06:15

## 2018-08-17 RX ADMIN — METOPROLOL TARTRATE 25 MG: 25 TABLET, FILM COATED ORAL at 20:18

## 2018-08-17 RX ADMIN — INSULIN LISPRO 2 UNITS: 100 INJECTION, SOLUTION INTRAVENOUS; SUBCUTANEOUS at 08:33

## 2018-08-17 RX ADMIN — SODIUM BICARBONATE 325 MG: 650 TABLET ORAL at 20:18

## 2018-08-17 RX ADMIN — Medication 1 TABLET: at 08:34

## 2018-08-17 RX ADMIN — HYDROCODONE BITARTRATE AND ACETAMINOPHEN 1 TABLET: 10; 325 TABLET ORAL at 17:41

## 2018-08-17 RX ADMIN — BUMETANIDE 2 MG: 2 TABLET ORAL at 17:30

## 2018-08-17 RX ADMIN — FERRIC CITRATE 210 MG: 210 TABLET, COATED ORAL at 11:15

## 2018-08-17 RX ADMIN — ATORVASTATIN CALCIUM 80 MG: 40 TABLET, FILM COATED ORAL at 08:34

## 2018-08-17 NOTE — PROGRESS NOTES
"    [facility] Medicine Services  INPATIENT PROGRESS NOTE    Patient Name: Gavin Wilson  Date of Admission: 8/8/2018  Today's Date: 08/17/18  Length of Stay: 9  Primary Care Physician: Provider, No Known    Subjective   Chief Complaint: \"I need a couple of more days\"  HPI   Patient feels like he can go home tomorrow. Patient is not a candidate for dialysis currently and not a candidate for heart cath at this time. Patient states that today he is starting to feel better. No acute bowel or kidney changes. SP thoracentesis with removal of 450cc of fluid. Cytology pending.      Patient remains SOA. Worsens with activity. Echo reviewed.     Review of Systems     All pertinent negatives and positives are as above. All other systems have been reviewed and are negative unless otherwise stated.     Objective    Temp:  [98 °F (36.7 °C)-98.7 °F (37.1 °C)] 98 °F (36.7 °C)  Heart Rate:  [] 76  Resp:  [18-20] 18  BP: ()/(49-59) 96/56  Physical Exam   Constitutional: He is oriented to person, place, and time. No distress.   Currently on no supplemental 02   HENT:   Head: Normocephalic.   Mouth/Throat: No oropharyngeal exudate.   Eyes: No scleral icterus.   Pulmonary/Chest: He has wheezes.   Diminished BSs at bases   Abdominal:   Urostomy in place   Musculoskeletal: He exhibits no edema.   Neurological: He is alert and oriented to person, place, and time.   Skin: Skin is warm and dry. He is not diaphoretic.   Excoriation at the bottom of ostomy site.    Psychiatric: He has a normal mood and affect.   Vitals reviewed.  Thin and frail    Results Review:  I have reviewed the labs, radiology results, and diagnostic studies.    Laboratory Data:     Results from last 7 days  Lab Units 08/17/18  0535 08/12/18  0557 08/11/18  0554   WBC 10*3/mm3 3.71* 5.57 6.30   HEMOGLOBIN g/dL 8.7* 9.0* 9.3*   HEMATOCRIT % 27.3* 28.5* 30.2*   PLATELETS 10*3/mm3 130 143 159          Results from last 7 " days  Lab Units 08/17/18  0535 08/15/18  0542 08/14/18  0820  08/12/18  0557 08/11/18  0554   SODIUM mmol/L 140 143 146*  < > 144 144   POTASSIUM mmol/L 4.3 4.5 4.5  < > 4.2 4.2   CHLORIDE mmol/L 105 109 112*  < > 110 110   CO2 mmol/L 24.0 24.0 23.0*  < > 21.0* 21.0*   BUN mg/dL 42* 44* 45*  < > 45* 45*   CREATININE mg/dL 3.01* 3.08* 3.01*  < > 2.92* 3.09*   CALCIUM mg/dL 8.6 8.3* 8.5  < > 8.4 8.6   BILIRUBIN mg/dL  --   --   --   --  0.6 0.6   ALK PHOS U/L  --   --   --   --  83 84   ALT (SGPT) U/L  --   --   --   --  26 30   AST (SGOT) U/L  --   --   --   --  7 9   GLUCOSE mg/dL 198* 124* 134*  < > 139* 131*   < > = values in this interval not displayed.    Culture Data:   Urine Culture   Date Value Ref Range Status   08/11/2018 70,000-80,000 CFU/mL Yeast isolated (A)  Final       Radiology Data:   Imaging Results (last 24 hours)     Procedure Component Value Units Date/Time    XR Chest 1 View [[id number]] Collected:  08/17/18 1411     Updated:  08/17/18 1418    Narrative:       HISTORY: Postthoracentesis on the right     CXR: Frontal view the chest is performed.     COMPARISON: 8/16/2018     FINDINGS: The lungs are hyperinflated. There is no appreciable  pneumothorax following right-sided thoracentesis. Patient has undergone  previous esophagectomy with gastric pull-through.  There is no focal  parenchymal consolidation. There is thoracic aortic calcification.  Mediastinal contours are similar. Small left and trace right pleural  effusions suspected on the current exam. There is a right convexity  thoracic spine.       Impression:       1. No pneumothorax following right-sided thoracentesis. Hyperinflated  lungs. There are small left and trace right pleural effusions suspected.  Probable basilar atelectasis. Prior esophagectomy.  This report was finalized on 08/17/2018 14:15 by Dr. Sheri Johnson MD.     Thoracentesis [[id number]] Collected:  08/17/18 1346    Specimen:  Body Fluid Updated:  08/17/18 1358     Narrative:       EXAMINATION: US THORACENTESIS- 8/17/2018 1:46 PM CDT     HISTORY: Shortness breath, history of bladder cancer     COMPARISON: Chest radiograph 8/16/2018     PREPROCEDURE: The risks, benefits, and alternatives to the procedure  were discussed in detail with the patient, including, but not limited  to, bleeding, infection, and damage to surrounding structures. Specific  risk of possible pneumothorax with chest tube placement was discussed.  The patient verbalized understanding of these risks, and informed  consent was signed. All questions were answered prior to the procedure.  A timeout was performed prior to the procedure.     PROCEDURE: An appropriate entry site for thoracentesis was marked over  the posterior right chest under ultrasound guidance. This area was  prepped and draped in the usual sterile fashion. 1% lidocaine was used  for local anesthesia. Next, a 5 Lao InflowControl catheter was advanced into  the pleural space without difficulty. Aspiration was performed with  removal of 450 mL of clear, straw-colored fluid. The catheter was then  removed. The patient tolerated the procedure well, and there were no  immediate complications. A post procedure chest radiograph is pending.       Impression:       Successful ultrasound-guided right thoracentesis yielding  450 mL of clear, straw-colored fluid.     This report was finalized on 08/17/2018 13:50 by Dr. Florentino Rodgers MD.    US Chest [[id number]] Updated:  08/17/18 1343    XR Chest 2 View [[id number]] Collected:  08/16/18 1727     Updated:  08/16/18 1732    Narrative:       EXAMINATION: Chest 2 views 8/16/2018     HISTORY: Preprocedure thoracentesis     FINDINGS: Upright frontal and lateral projection of the chest  demonstrate small effusions with minimal blunting of the lateral  costophrenic angles. The posterior angles are blunted. These are  unchanged from the previous study of 8/15/2018. They are diminished from  a previous study of 8/8/2018.        Impression:       . Small bilateral pleural effusions. The lungs are otherwise  clear.  This report was finalized on 08/16/2018 17:29 by Dr. Slava Snider MD.          I have reviewed the patient's current medications.     Assessment/Plan     Hospital Problem List     Volume overload        1. Volume overload with pulmonary vascular congestion   2. ESRD, recent AV fistula placed  3. DM II  4. Hyperkalemia - improved  5. PVD  6. Tobacco abuse  7. Anemia, probably related to chronic renal disease.   8. Constipation.  9. Dense retrocardiac consolidation, likely compressive atelectasis but cannot rule out pneumonia.  10. Moderate to large bilateral pleural effusions by CT chest    Plan:  1.  No family present today.   2.  DC [place]  3. Radiology removed small amount of pleural fluids but the patient states that this has not helped, cytology pending.    4.  Patient now agreeable to home health.   5.  PO Bumex,  Dose increased  6.  PA and LAT CXR appears improved   7.  Off supplemental 02  8.  Scheduled nebs  9.  Trial of Symbicort  10.  Trial of Megace   11. Question if patient needs stress test:  ·        Left ventricular systolic function is moderately decreased. Estimated EF appears to be in the range of 31 - 35%.  · Left ventricular diastolic dysfunction (grade I) consistent with impaired relaxation.  · Left ventricular wall thickness is consistent with moderate posterior asymmetric hypertrophy.  · Mild mitral valve regurgitation is present  · Mild to moderate aortic valve regurgitation is present.  Mild aortic valve stenosis is present.  Home tomorrow.  Not currently ready for dialysis or stress test.     Oleg Madrid,    08/17/18   3:52 PM

## 2018-08-17 NOTE — PROGRESS NOTES
Continued Stay Note   Jie     Patient Name: Gavin Wilson  MRN: [id number]  Today's Date: 8/17/2018    Admit Date: 8/8/2018          Discharge Plan     Row Name 08/17/18 0683       Plan    Plan Comments PT continues to plan to dc home and agrees to HH services upon dc. SW notified [facility]; however, [facility] is [facility] by [facility] [place]. [facility] is not in network with [facility]. PT will either need to use Medicare and have a civilian PCP to use [facility]. PT states he does not see anyone other than the [facility] and is unsure of any other coverage. HH will need to be arranged through [facility] once orders are written.               Discharge Codes    No documentation.           ISSA Batres

## 2018-08-17 NOTE — PROGRESS NOTES
Nephrology ([facility]) Progress Note      Patient:  Gavin Wilson  YOB: 1945  Date of Service: 8/17/2018  MRN: [id number]   Acct: [id number]   Primary Care Physician: Provider, No Known  Advance Directive:   Code Status and Medical Interventions:   Ordered at: 08/08/18 0522     Level Of Support Discussed With:    Patient     Code Status:    No CPR     Medical Interventions (Level of Support Prior to Arrest):    Full     Admit Date: 8/8/2018       Hospital Day: 9  Referring Provider: Arcenio Ahn MD      Patient personally seen and examined.  Complete chart including Consults, Notes, Operative Reports, Labs, Cardiology, and Radiology studies reviewed as able.        Subjective:  Gavin Wilson is a 73 y.o. male  whom we were consulted for volume overload in patient approaching end stage renal disease.  Baseline of chronic kidney disease stage 4/5, follows with nephrologist at [facility].  Recently had left arm AV fistula placed (also at [facility]).  History of type 2 diabetes, hypertension, chronic congestive heart failure, prior bladder cancer with urostomy.  Admitted as a direct admit from [facility].  He had presented to their facility with dyspnea.  Apparently patient has been advised to start dialysis in the past but has deferred any decisions until now.  Denies recent change in urine output, no hematuria.  no n/v/d. Hospital course remarkable for good response to PO diuretics, improving dyspnea. Patient has severe hearing impairment.   Today, he underwent right thoracocentesis. He feels a bit better since the procedure.     Allergies:  Sulfa antibiotics    Home Meds:  Prescriptions Prior to Admission   Medication Sig Dispense Refill Last Dose   • aspirin 81 MG chewable tablet Chew 81 mg Daily.   Past Week at Unknown time   • atorvastatin (LIPITOR) 80 MG tablet Take 80 mg by mouth Daily.   Past Week at Unknown time   • calcium carbonate  (OS-TASHI) 600 MG tablet Take 600 mg by mouth Daily.   Past Week at Unknown time   • HYDROcodone-acetaminophen (NORCO)  MG per tablet Take 1 tablet by mouth Every 6 (Six) Hours As Needed (back pain).   Past Week at Unknown time   • insulin aspart (novoLOG) 100 UNIT/ML injection Inject 6 Units under the skin into the appropriate area as directed Every Night.   Past Week at Unknown time   • insulin lispro (humaLOG) 100 UNIT/ML injection Inject 6 Units under the skin into the appropriate area as directed 3 (Three) Times a Day Before Meals.   Past Week at Unknown time   • metoprolol tartrate (LOPRESSOR) 25 MG tablet Take 25 mg by mouth 2 (Two) Times a Day.   Past Week at Unknown time       Medicines:  Current Facility-Administered Medications   Medication Dose Route Frequency Provider Last Rate Last Dose   • aspirin chewable tablet 81 mg  81 mg Oral Daily Arcenio Ahn MD   81 mg at 08/17/18 0834   • atorvastatin (LIPITOR) tablet 80 mg  80 mg Oral Daily Arcenio Ahn MD   80 mg at 08/17/18 0834   • budesonide-formoterol (SYMBICORT) 160-4.5 MCG/ACT inhaler 2 puff  2 puff Inhalation BID - RT Farooq Edwards MD   2 puff at 08/17/18 0737   • bumetanide (BUMEX) tablet 2 mg  2 mg Oral BID Scott Soto MD   2 mg at 08/17/18 0834   • calcitriol (ROCALTROL) capsule 0.5 mcg  0.5 mcg Oral Daily Scott Soto MD   0.5 mcg at 08/17/18 1115   • calcium carb-cholecalciferol 600-800 MG-UNIT tablet 1 tablet  1 tablet Oral Daily Arcenio Ahn MD   1 tablet at 08/17/18 0834   • dextrose (D50W) solution 25 g  25 g Intravenous Q15 Min PRN Arcenio Ahn MD       • dextrose (GLUTOSE) oral gel 15 g  15 g Oral Q15 Min PRN Arcenio Ahn MD       • Ferric Citrate tablet 210 mg  210 mg Oral TID With Meals Samson Aragon MD   210 mg at 08/17/18 1115   • glucagon (human recombinant) (GLUCAGEN DIAGNOSTIC) injection 1 mg  1 mg Subcutaneous PRN Arcenio Ahn MD       •  guaiFENesin (MUCINEX) 12 hr tablet 1,200 mg  1,200 mg Oral Q12H Oleg Madrid DO   1,200 mg at 08/17/18 0834   • Hold medication  1 each Does not apply Continuous PRN Oleg Madrid DO       • HYDROcodone-acetaminophen (NORCO)  MG per tablet 1 tablet  1 tablet Oral Q4H PRN Arcenio Ahn MD   1 tablet at 08/17/18 0406   • insulin lispro (humaLOG) injection 2-7 Units  2-7 Units Subcutaneous 4x Daily With Meals & Nightly Arcenio Ahn MD   4 Units at 08/17/18 1117   • ipratropium-albuterol (DUO-NEB) nebulizer solution 3 mL  3 mL Nebulization Q4H PRN Oleg Madrid DO       • LORazepam (ATIVAN) tablet 1 mg  1 mg Oral Q6H PRN Arcenio Ahn MD   1 mg at 08/17/18 0406   • megestrol (MEGACE) 40 MG/ML suspension 800 mg  800 mg Oral Daily Oleg Madrid DO   800 mg at 08/15/18 1649   • metoprolol tartrate (LOPRESSOR) tablet 25 mg  25 mg Oral Q12H Arcenio Ahn MD   25 mg at 08/16/18 2024   • naloxone (NARCAN) injection 0.4 mg  0.4 mg Intravenous Q5 Min PRN Arcenio Ahn MD       • nicotine (NICODERM CQ) 14 MG/24HR patch 1 patch  1 patch Transdermal Q24H Farooq Edwards MD   1 patch at 08/16/18 2025   • nystatin (MYCOSTATIN) 213056 UNIT/ML suspension 500,000 Units  5 mL Oral 4x Daily Oleg Madrid DO   500,000 Units at 08/15/18 0842   • ondansetron (ZOFRAN) injection 4 mg  4 mg Intravenous Q6H PRN Arcenio Ahn MD       • sodium bicarbonate tablet 650 mg  650 mg Oral BID Roberto Carlos Myrick APRN   650 mg at 08/17/18 0833   • sodium chloride 0.9 % flush 1-10 mL  1-10 mL Intravenous PRN Arcenio Ahn MD   10 mL at 08/16/18 0612       Past Medical History:  Past Medical History:   Diagnosis Date   • CHF (congestive heart failure) (CMS/HCC)    • Coronary stent occlusion    • Diabetes mellitus (CMS/HCC)    • Hyperlipidemia    • Hypertension    • Stroke (CMS/HCC)        Past Surgical History:  Past Surgical History:   Procedure Laterality Date   • BLADDER  "SURGERY     • ESOPHAGECTOMY         Family History  Family History   Problem Relation Age of Onset   • No Known Problems Father    • No Known Problems Mother        Social History  Social History     Social History   • Marital status:      Spouse name: N/A   • Number of children: N/A   • Years of education: N/A     Occupational History   • Not on file.     Social History Main Topics   • Smoking status: Current Every Day Smoker   • Smokeless tobacco: Never Used   • Alcohol use No   • Drug use: No   • Sexual activity: Not on file     Other Topics Concern   • Not on file     Social History Narrative   • No narrative on file         Review of Systems:  History obtained from chart review and the patient  General ROS: No fever or chills, +fatigue  Respiratory ROS: positive for  shortness of breath, +cough  Cardiovascular ROS: positive for  dyspnea on exertion  No chest pain or palpitations  Gastrointestinal ROS: No abdominal pain or melena  Genito-Urinary ROS: No dysuria or hematuria  Neurological ROS: no headache or dizziness    Objective:  BP 96/56 (BP Location: Right arm, Patient Position: Lying)   Pulse 76   Temp 98 °F (36.7 °C) (Temporal Artery )   Resp 18   Ht 170.2 cm (67.01\")   Wt 45 kg (99 lb 3.2 oz)   SpO2 98%   BMI 15.53 kg/m²     Intake/Output Summary (Last 24 hours) at 08/17/18 1557  Last data filed at 08/17/18 0724   Gross per 24 hour   Intake              100 ml   Output                0 ml   Net              100 ml     General: awake/alert    Neck: Supple, no JVD  Chest:  Decreased breath sounds at the bases  CVS: regular rate and rhythm, 3/6 soft systolic murmur  Abdominal: soft, nontender, normal bowel sounds  Extremities: no cyanosis or edema  Skin: warm and dry without rash  Neuro: no focal motor deficits    Labs:    Results from last 7 days  Lab Units 08/17/18  0535 08/12/18  0557 08/11/18  0554   WBC 10*3/mm3 3.71* 5.57 6.30   HEMOGLOBIN g/dL 8.7* 9.0* 9.3*   HEMATOCRIT % 27.3* 28.5* " 30.2*   PLATELETS 10*3/mm3 130 143 159           Results from last 7 days  Lab Units 08/17/18  0535 08/15/18  0542 08/14/18  0820  08/12/18  0557 08/11/18  0554   SODIUM mmol/L 140 143 146*  < > 144 144   POTASSIUM mmol/L 4.3 4.5 4.5  < > 4.2 4.2   CHLORIDE mmol/L 105 109 112*  < > 110 110   CO2 mmol/L 24.0 24.0 23.0*  < > 21.0* 21.0*   BUN mg/dL 42* 44* 45*  < > 45* 45*   CREATININE mg/dL 3.01* 3.08* 3.01*  < > 2.92* 3.09*   CALCIUM mg/dL 8.6 8.3* 8.5  < > 8.4 8.6   BILIRUBIN mg/dL  --   --   --   --  0.6 0.6   ALK PHOS U/L  --   --   --   --  83 84   ALT (SGPT) U/L  --   --   --   --  26 30   AST (SGOT) U/L  --   --   --   --  7 9   GLUCOSE mg/dL 198* 124* 134*  < > 139* 131*   < > = values in this interval not displayed.    Radiology:   Imaging Results (last 72 hours)     Procedure Component Value Units Date/Time    XR Chest 1 View [[id number]] Collected:  08/08/18 1450     Updated:  08/08/18 1454    Narrative:       EXAMINATION:   XR CHEST 1 VW-  8/8/2018 2:50 PM CDT     HISTORY: Difficulty breathing     Frontal upright radiograph of the chest 8/8/2018 1:23 PM CDT     COMPARISON: None.     FINDINGS:   Mild basilar interstitial infiltrates are present. Small bilateral  pleural effusions are present. This is suspicious for early congestive  failure.. Cardiac silhouette is upper limits of normal. Vascular  calcifications present aortic arch. Endovascular aortic stent graft is  present in the abdomen..      The osseous structures and surrounding soft tissues demonstrate no acute  abnormality.       Impression:       1. Mild or early changes of pulmonary vascular congestion.        This report was finalized on 08/08/2018 14:51 by Dr. Duc Garibay MD.          Culture:         Assessment   1.  Chronic kidney disease stage 4  2.  Acute on chronic congestive heart failure  3.  Hyperkalemia--resolved  4.  Hypertension  5.  Type 2 diabetes  6.  Anemia of ckd  7.  Secondary hyperparathyroidism (PTH has improved under  calcitriol)  8.  Acidosis   9..  Bilateral pleural effusions (mostly noticed on the CT scan and not the chest xray)    Plan:  -Continue Bumex at current dose, therapy and would leave dialysis to the last resort since he has no uremic signs  -Would add procrit for anemia management  -If patient is discharged, then will need to follow up at the renal clinic within 7-10 days.       Scott Soto MD  8/17/2018  3:57 PM

## 2018-08-17 NOTE — PLAN OF CARE
Problem: Patient Care Overview  Goal: Plan of Care Review  Outcome: Ongoing (interventions implemented as appropriate)   08/17/18 0543   Coping/Psychosocial   Plan of Care Reviewed With patient   Plan of Care Review   Progress improving   OTHER   Outcome Summary Patient to have thorocentesis (guided) Right today, consent signed. Minimal edema noted. Good urine output of urostomy. Stoma raised and pink. Patient showered due to bag leakage. Refused nystatin. C/o Back pain, prn pain med given with good results. No falls noted, patient up ad aleta in room. AV fistula Left upper arm, never used, bruit and thrill noted. Patient refuses tele. Patient very underweight. Encourage food. Continue to monitor.      Goal: Individualization and Mutuality  Outcome: Ongoing (interventions implemented as appropriate)    Goal: Discharge Needs Assessment  Outcome: Ongoing (interventions implemented as appropriate)    Goal: Interprofessional Rounds/Family Conf  Outcome: Ongoing (interventions implemented as appropriate)      Problem: Cardiac: Heart Failure (Adult)  Goal: Signs and Symptoms of Listed Potential Problems Will be Absent, Minimized or Managed (Cardiac: Heart Failure)  Outcome: Ongoing (interventions implemented as appropriate)      Problem: Fall Risk (Adult)  Goal: Absence of Fall  Outcome: Ongoing (interventions implemented as appropriate)

## 2018-08-17 NOTE — PLAN OF CARE
Problem: Patient Care Overview  Goal: Plan of Care Review   08/17/18 1333   Coping/Psychosocial   Plan of Care Reviewed With patient   Plan of Care Review   Progress improving   OTHER   Outcome Summary Pt down for thorocentesis today. Denies SOB, but does C/O fatigue. Refuses his nystatin and megace. Denies pain. Creat 3- but good urine out of urostomy. Pt refusing tele. Encouraged pt to eat. Cont to monitor      Goal: Individualization and Mutuality  Outcome: Ongoing (interventions implemented as appropriate)   08/14/18 1632   Individualization   Patient Specific Interventions Very hard of hearing, speak loudly while looking at patient.      Goal: Discharge Needs Assessment  Outcome: Ongoing (interventions implemented as appropriate)   08/08/18 1517 08/14/18 1632   Discharge Needs Assessment   Readmission Within the Last 30 Days --  no previous admission in last 30 days   Concerns to be Addressed no discharge needs identified;denies needs/concerns at this time --    Patient/Family Anticipates Transition to home --    Patient/Family Anticipated Services at Transition none --    Transportation Concerns car, none --    Transportation Anticipated family or friend will provide --    Anticipated Changes Related to Illness none --    Equipment Needed After Discharge none --    Disability   Equipment Currently Used at Home colostomy/ostomy supplies;cane, quad;walker, standard --        Problem: Cardiac: Heart Failure (Adult)  Goal: Signs and Symptoms of Listed Potential Problems Will be Absent, Minimized or Managed (Cardiac: Heart Failure)  Outcome: Ongoing (interventions implemented as appropriate)   08/16/18 0250 08/16/18 1644   Goal/Outcome Evaluation   Problems Assessed (Heart Failure) --  all   Problems Present (Heart Failure) cardiac pump dysfunction;functional decline/self-care deficit;situational response --        Problem: Fall Risk (Adult)  Goal: Absence of Fall  Outcome: Ongoing (interventions implemented as  appropriate)   08/16/18 9728   Fall Risk (Adult)   Absence of Fall making progress toward outcome

## 2018-08-17 NOTE — PROGRESS NOTES
Chief Complaint: Shortness of breath    HPI: This is a 73-year-old male with a history of coronary artery disease status post reported PCI, reported congestive heart failure, previous bacterial endocarditis according to the patient's report, near end-stage renal disease, hypertension, hyperlipidemia, tobacco abuse who presents with a chief complaint of shortness of breath.  The patient presented on 8/8/18 with complaints of shortness of breath and concern for volume overload.  Apparently, this patient has been wavering on his decision to start dialysis for quite some time.  It was some concern that he may start dialysis urgently therefore he was transferred here.  The patient denies any chest pain.  He continues to have some shortness of breath that he says has improved but is still present.  When he arrived, he was complaining of orthopnea but he says this is no longer the case.  The patient denies any lower extremity edema.  He does have some occasional wheezing and occasional cough but denies any recent fevers.  The patient was found to have pleural effusions and is reportedly scheduled to undergo a thoracentesis later today.  Cardiology is asked to see this patient given an abnormal echocardiogram and concern for possible need for stress test.  In talking with the patient, the patient tells me that he had bacterial endocarditis that was treated with long-term IV antibiotics.  He says that at that time he was told that his heart was somewhat weak but he does not know the full details of this.  Most of his care has been at the [facility] in [place].  He tells me that there have been multiple echocardiograms done there.  As stated, he still is having shortness of breath but he says that he is improved.  He admits to generalized weakness and fatigue.    Past Medical History:   Diagnosis Date   • CHF (congestive heart failure) (CMS/[facility])    • Coronary stent occlusion    • Diabetes mellitus (CMS/[facility])    •  Hyperlipidemia    • Hypertension    • Stroke (CMS/HCC)      Past Surgical History:   Procedure Laterality Date   • BLADDER SURGERY     • ESOPHAGECTOMY         Current Facility-Administered Medications:   •  aspirin chewable tablet 81 mg, 81 mg, Oral, Daily, Arcenio Ahn MD, 81 mg at 08/16/18 1019  •  atorvastatin (LIPITOR) tablet 80 mg, 80 mg, Oral, Daily, Arcenio Ahn MD, 80 mg at 08/16/18 1019  •  budesonide-formoterol (SYMBICORT) 160-4.5 MCG/ACT inhaler 2 puff, 2 puff, Inhalation, BID - RT, Farooq Edwards MD, 2 puff at 08/16/18 1909  •  bumetanide (BUMEX) tablet 2 mg, 2 mg, Oral, BID, Scott Soto MD, 2 mg at 08/16/18 1743  •  calcitriol (ROCALTROL) capsule 0.5 mcg, 0.5 mcg, Oral, Daily, Scott Soto MD, 0.5 mcg at 08/16/18 1019  •  calcium carb-cholecalciferol 600-800 MG-UNIT tablet 1 tablet, 1 tablet, Oral, Daily, Arcenio Ahn MD, 1 tablet at 08/16/18 1019  •  dextrose (D50W) solution 25 g, 25 g, Intravenous, Q15 Min PRN, Arcenio Ahn MD  •  dextrose (GLUTOSE) oral gel 15 g, 15 g, Oral, Q15 Min PRN, Arcenio Ahn MD  •  Ferric Citrate tablet 210 mg, 210 mg, Oral, TID With Meals, Samson Aragon MD, 210 mg at 08/16/18 1742  •  glucagon (human recombinant) (GLUCAGEN DIAGNOSTIC) injection 1 mg, 1 mg, Subcutaneous, PRN, Arcenio Anh MD  •  guaiFENesin (MUCINEX) 12 hr tablet 1,200 mg, 1,200 mg, Oral, Q12H, Oleg Madrid DO, 1,200 mg at 08/16/18 2024  •  Hold medication, 1 each, Does not apply, Continuous PRN, Oleg Madrid DO  •  HYDROcodone-acetaminophen (NORCO)  MG per tablet 1 tablet, 1 tablet, Oral, Q4H PRN, Arcenio Ahn MD, 1 tablet at 08/17/18 0406  •  insulin lispro (humaLOG) injection 2-7 Units, 2-7 Units, Subcutaneous, 4x Daily With Meals & Nightly, Arcenio hAn MD, 3 Units at 08/16/18 1742  •  ipratropium-albuterol (DUO-NEB) nebulizer solution 3 mL, 3 mL, Nebulization, Q4H PRN, Oleg Madrid,  DO  •  LORazepam (ATIVAN) tablet 1 mg, 1 mg, Oral, Q6H PRN, Arcenio Ahn MD, 1 mg at 08/17/18 0406  •  megestrol (MEGACE) 40 MG/ML suspension 800 mg, 800 mg, Oral, Daily, Oleg Madrid DO, 800 mg at 08/15/18 1649  •  meropenem (MERREM) 1 g/100 mL 0.9% NS VTB (mbp), 1 g, Intravenous, Q12H, Oleg Madrid DO, Stopped at 08/17/18 0724  •  metoprolol tartrate (LOPRESSOR) tablet 25 mg, 25 mg, Oral, Q12H, Arcenio Ahn MD, 25 mg at 08/16/18 2024  •  [DISCONTINUED] Morphine sulfate (PF) injection 4 mg, 4 mg, Intravenous, Q4H PRN, 4 mg at 08/08/18 0531 **AND** naloxone (NARCAN) injection 0.4 mg, 0.4 mg, Intravenous, Q5 Min PRN, Arcenio Ahn MD  •  nicotine (NICODERM CQ) 14 MG/24HR patch 1 patch, 1 patch, Transdermal, Q24H, Farooq Edwards MD, 1 patch at 08/16/18 2025  •  nystatin (MYCOSTATIN) 557768 UNIT/ML suspension 500,000 Units, 5 mL, Oral, 4x Daily, Oleg Madrid DO, 500,000 Units at 08/15/18 0842  •  ondansetron (ZOFRAN) injection 4 mg, 4 mg, Intravenous, Q6H PRN, Arcenio Ahn MD  •  sodium bicarbonate tablet 650 mg, 650 mg, Oral, BID, Roberto Carlos Myrick, APRN, 650 mg at 08/16/18 2024  •  sodium chloride 0.9 % flush 1-10 mL, 1-10 mL, Intravenous, PRN, Arcenio Ahn MD, 10 mL at 08/16/18 0612    Allergies   Allergen Reactions   • Sulfa Antibiotics      Social History   Substance Use Topics   • Smoking status: Current Every Day Smoker   • Smokeless tobacco: Never Used   • Alcohol use No     Family History   Problem Relation Age of Onset   • No Known Problems Father    • No Known Problems Mother      Review of Systems   Constitution: Positive for decreased appetite, weakness, malaise/fatigue and weight loss. Negative for chills, fever and night sweats.   HENT: Negative for congestion and hearing loss.    Eyes: Negative for blurred vision and pain.   Cardiovascular: Positive for dyspnea on exertion. Negative for chest pain, claudication, irregular heartbeat, leg  "swelling, orthopnea, palpitations, paroxysmal nocturnal dyspnea and syncope.   Respiratory: Positive for cough and shortness of breath. Negative for hemoptysis and wheezing.    Endocrine: Negative for cold intolerance, heat intolerance, polydipsia and polyuria.   Hematologic/Lymphatic: Negative for adenopathy and bleeding problem. Does not bruise/bleed easily.   Skin: Negative for color change, poor wound healing and rash.   Musculoskeletal: Positive for arthritis and back pain. Negative for joint pain, joint swelling, myalgias and neck pain.   Gastrointestinal: Negative for abdominal pain, change in bowel habit, constipation, diarrhea, heartburn, hematochezia, melena, nausea and vomiting.   Genitourinary: Negative for bladder incontinence, dysuria, frequency, hematuria and nocturia.   Neurological: Negative for dizziness, focal weakness, headaches, light-headedness, loss of balance, numbness and seizures.   Psychiatric/Behavioral: Negative for altered mental status, memory loss and substance abuse.   Allergic/Immunologic: Negative for hives and persistent infections.     Physical Exam:    /59 (BP Location: Right arm, Patient Position: Lying)   Pulse 101   Temp 98.7 °F (37.1 °C) (Temporal Artery )   Resp 20   Ht 170.2 cm (67.01\")   Wt 45 kg (99 lb 3.2 oz)   SpO2 99%   BMI 15.53 kg/m²     Physical Exam   Constitutional: He is oriented to person, place, and time. Vital signs are normal. He appears well-nourished. He appears cachectic. He is cooperative.  Non-toxic appearance. He appears ill. No distress.   HENT:   Head: Normocephalic and atraumatic.   Right Ear: External ear normal.   Left Ear: External ear normal.   Nose: Nose normal.   Mouth/Throat: Uvula is midline, oropharynx is clear and moist and mucous membranes are normal. Mucous membranes are not pale, not dry and not cyanotic. No oropharyngeal exudate.   Eyes: Pupils are equal, round, and reactive to light. EOM and lids are normal.   Neck: " Normal range of motion. Neck supple. No hepatojugular reflux and no JVD present. Carotid bruit is not present. No tracheal deviation and no edema present. No thyroid mass and no thyromegaly present.   Cardiovascular: Normal rate, regular rhythm, S1 normal, S2 normal, normal heart sounds, intact distal pulses and normal pulses.   No extrasystoles are present. PMI is not displaced.  Exam reveals no gallop and no friction rub.    No murmur heard.  Pulses:       Radial pulses are 2+ on the right side, and 2+ on the left side.        Femoral pulses are 2+ on the right side, and 2+ on the left side.       Dorsalis pedis pulses are 2+ on the right side, and 2+ on the left side.        Posterior tibial pulses are 2+ on the right side, and 2+ on the left side.   Pulmonary/Chest: Effort normal. No accessory muscle usage. No respiratory distress. He has decreased breath sounds. He has wheezes. He has no rales. He exhibits no tenderness.   Abdominal: Soft. Normal appearance and bowel sounds are normal. He exhibits no distension, no abdominal bruit and no pulsatile midline mass. There is no hepatosplenomegaly. There is no tenderness.   Musculoskeletal: Normal range of motion. He exhibits no edema, tenderness or deformity.   Lymphadenopathy:     He has no cervical adenopathy.   Neurological: He is oriented to person, place, and time. He has normal strength. No cranial nerve deficit.   Skin: Skin is warm, dry and intact. No rash noted. He is not diaphoretic. No cyanosis or erythema. Nails show no clubbing.   Psychiatric: He has a normal mood and affect. His speech is normal and behavior is normal. Thought content normal.   Vitals reviewed.    Diagnostic Data:    Lab Results   Component Value Date    WBC 3.71 (L) 08/17/2018    HGB 8.7 (L) 08/17/2018    HCT 27.3 (L) 08/17/2018    MCV 86.7 08/17/2018     08/17/2018     Lab Results   Component Value Date    GLUCOSE 198 (H) 08/17/2018    CALCIUM 8.6 08/17/2018      08/17/2018    K 4.3 08/17/2018    CO2 24.0 08/17/2018     08/17/2018    BUN 42 (H) 08/17/2018    CREATININE 3.01 (H) 08/17/2018    EGFRIFNONA 20 (L) 08/17/2018    BCR 14.0 08/17/2018    ANIONGAP 11.0 08/17/2018     Echocardiogram:  · Left ventricular systolic function is moderately decreased. Estimated EF appears to be in the range of 31 - 35%.  · Left ventricular diastolic dysfunction (grade I) consistent with impaired relaxation.  · Left ventricular wall thickness is consistent with moderate posterior asymmetric hypertrophy.  · Mild mitral valve regurgitation is present  · Mild to moderate aortic valve regurgitation is present.  · Mild aortic valve stenosis is present.    ASSESSMENT/PLAN:    1.  Chronic left ventricular systolic dysfunction and diastolic dysfunction, likely some degree of decompensation in the setting of #2  2.  Chronic kidney disease, stage IV  3.  Shortness of breath, potentially multifactorial in the setting of problem #1,2 and 4  4.  Pleural effusion, bilateral  5.  Aortic stenosis and aortic insufficiency, native, nonrheumatic  6.  Anemia  7.  Volume overload on arrival, a product of problem #2 predominantly but also probably some contribution from #1  8.  Tobacco abuse  9.  Peripheral vascular disease    - At this time, we are asked to see this patient regarding the possible need for cardiac stress testing.  I did discuss at length with the patient has cardiac history.  It seems like that he has had chronic systolic dysfunction in the past, although there are no echocardiograms currently available.  I have requested records from the  in [place].  I am not certain that the patient shortness of breath is related to his systolic dysfunction alone and he likely has multifactorial reasons for his shortness of breath.  On examination today, he seems to be near euvolemic, although he still has pleural effusions and is still short of breath.  He is to undergo thoracentesis  later today according to his report.  - I do not feel that there is any urgent need for inpatient cardiac stress testing.  Certainly, any abnormal cardiac stress test may lead to the recommendation of cardiac catheterization which the patient has currently refused due to his advanced kidney disease and the potential for dialysis.  Therefore, with no plans for cardiac catheterization, do not feel that this patient is in need of an inpatient cardiac stress test.  - Continue diuretic management per nephrology.  - As stated, we have requested records from the [facility] in [place], although the results of previous echocardiograms would not necessarily change the current recommendations are management.  - With chronic left ventricular systolic dysfunction, patient would likely benefit from ACE inhibitor therapy if this is feasible from nephrology standpoint although with the patient's near end-stage renal disease, this may not be indicated at this particular time.  The patient is on beta blocker therapy as well as high-dose statin and aspirin therapies in the setting of his heart failure and coronary artery disease.  - Thank you for this consultation.  Please feel free to call if there are further questions regarding the care of this patient.

## 2018-08-18 VITALS
RESPIRATION RATE: 18 BRPM | HEART RATE: 101 BPM | OXYGEN SATURATION: 95 % | SYSTOLIC BLOOD PRESSURE: 96 MMHG | WEIGHT: 96.2 LBS | DIASTOLIC BLOOD PRESSURE: 49 MMHG | HEIGHT: 67 IN | BODY MASS INDEX: 15.1 KG/M2 | TEMPERATURE: 97.7 F

## 2018-08-18 PROBLEM — N18.4 CHRONIC KIDNEY DISEASE, STAGE 4 (SEVERE) (HCC): Status: ACTIVE | Noted: 2018-08-18

## 2018-08-18 PROBLEM — I50.22 CHRONIC SYSTOLIC CONGESTIVE HEART FAILURE (HCC): Status: ACTIVE | Noted: 2018-08-18

## 2018-08-18 PROBLEM — J90 PLEURAL EFFUSION: Status: ACTIVE | Noted: 2018-08-18

## 2018-08-18 LAB
ALBUMIN FLD-MCNC: 2.1 G/DL
ANION GAP SERPL CALCULATED.3IONS-SCNC: 10 MMOL/L (ref 4–13)
BUN BLD-MCNC: 46 MG/DL (ref 5–21)
BUN/CREAT SERPL: 14.4 (ref 7–25)
CALCIUM SPEC-SCNC: 8.4 MG/DL (ref 8.4–10.4)
CHLORIDE SERPL-SCNC: 100 MMOL/L (ref 98–110)
CO2 SERPL-SCNC: 27 MMOL/L (ref 24–31)
CREAT BLD-MCNC: 3.19 MG/DL (ref 0.5–1.4)
GFR SERPL CREATININE-BSD FRML MDRD: 19 ML/MIN/1.73
GLUCOSE BLD-MCNC: 129 MG/DL (ref 70–100)
GLUCOSE BLDC GLUCOMTR-MCNC: 138 MG/DL (ref 70–130)
LDH FLD-CCNC: 111 IU/L
POTASSIUM BLD-SCNC: 4.2 MMOL/L (ref 3.5–5.3)
PROT FLD-MCNC: 3.4 G/DL
SODIUM BLD-SCNC: 137 MMOL/L (ref 135–145)

## 2018-08-18 PROCEDURE — 94799 UNLISTED PULMONARY SVC/PX: CPT

## 2018-08-18 PROCEDURE — 80048 BASIC METABOLIC PNL TOTAL CA: CPT | Performed by: INTERNAL MEDICINE

## 2018-08-18 PROCEDURE — 82962 GLUCOSE BLOOD TEST: CPT

## 2018-08-18 PROCEDURE — 94760 N-INVAS EAR/PLS OXIMETRY 1: CPT

## 2018-08-18 PROCEDURE — 99233 SBSQ HOSP IP/OBS HIGH 50: CPT | Performed by: INTERNAL MEDICINE

## 2018-08-18 RX ORDER — SODIUM BICARBONATE 325 MG/1
325 TABLET ORAL 2 TIMES DAILY
Qty: 60 TABLET | Refills: 1 | Status: ON HOLD | OUTPATIENT
Start: 2018-08-18 | End: 2018-10-07

## 2018-08-18 RX ORDER — BUMETANIDE 2 MG/1
2 TABLET ORAL DAILY
Qty: 30 TABLET | Refills: 0 | Status: SHIPPED | OUTPATIENT
Start: 2018-08-18 | End: 2018-10-25 | Stop reason: HOSPADM

## 2018-08-18 RX ORDER — BUDESONIDE AND FORMOTEROL FUMARATE DIHYDRATE 160; 4.5 UG/1; UG/1
2 AEROSOL RESPIRATORY (INHALATION)
Qty: 1 INHALER | Refills: 2 | Status: SHIPPED | OUTPATIENT
Start: 2018-08-18

## 2018-08-18 RX ORDER — CALCITRIOL 0.5 UG/1
0.5 CAPSULE, LIQUID FILLED ORAL DAILY
Qty: 30 CAPSULE | Refills: 2 | Status: ON HOLD | OUTPATIENT
Start: 2018-08-19 | End: 2018-10-07

## 2018-08-18 RX ADMIN — ASPIRIN 81 MG CHEWABLE TABLET 81 MG: 81 TABLET CHEWABLE at 09:09

## 2018-08-18 RX ADMIN — GUAIFENESIN 1200 MG: 600 TABLET, EXTENDED RELEASE ORAL at 09:09

## 2018-08-18 RX ADMIN — SODIUM BICARBONATE 325 MG: 650 TABLET ORAL at 09:09

## 2018-08-18 RX ADMIN — Medication 1 TABLET: at 09:08

## 2018-08-18 RX ADMIN — ATORVASTATIN CALCIUM 80 MG: 40 TABLET, FILM COATED ORAL at 09:09

## 2018-08-18 RX ADMIN — FERRIC CITRATE 210 MG: 210 TABLET, COATED ORAL at 12:28

## 2018-08-18 RX ADMIN — BUMETANIDE 2 MG: 2 TABLET ORAL at 09:08

## 2018-08-18 RX ADMIN — FERRIC CITRATE 210 MG: 210 TABLET, COATED ORAL at 09:08

## 2018-08-18 RX ADMIN — BUDESONIDE AND FORMOTEROL FUMARATE DIHYDRATE 2 PUFF: 160; 4.5 AEROSOL RESPIRATORY (INHALATION) at 07:53

## 2018-08-18 RX ADMIN — CALCITRIOL 0.5 MCG: 0.25 CAPSULE ORAL at 09:09

## 2018-08-18 RX ADMIN — METOPROLOL TARTRATE 25 MG: 25 TABLET, FILM COATED ORAL at 09:08

## 2018-08-18 NOTE — PROGRESS NOTES
Nephrology ([facility]) Progress Note      Patient:  Gavin Wilson  YOB: 1945  Date of Service: 8/18/2018  MRN: [id number]   Acct: [id number]   Primary Care Physician: Provider, No Known  Advance Directive:   Code Status and Medical Interventions:   Ordered at: 08/08/18 0522     Level Of Support Discussed With:    Patient     Code Status:    No CPR     Medical Interventions (Level of Support Prior to Arrest):    Full     Admit Date: 8/8/2018       Hospital Day: 10  Referring Provider: Arcenio Ahn MD      Patient personally seen and examined.  Complete chart including Consults, Notes, Operative Reports, Labs, Cardiology, and Radiology studies reviewed as able.        Subjective:  Gavin Wilson is a 73 y.o. male  whom we were consulted for volume overload in patient approaching end stage renal disease.  Baseline of chronic kidney disease stage 4/5, follows with nephrologist at [facility].  Recently had left arm AV fistula placed (also at [facility]).  History of type 2 diabetes, hypertension, chronic congestive heart failure, prior bladder cancer with urostomy.  Admitted as a direct admit from [facility].  He had presented to their facility with dyspnea.  Apparently patient has been advised to start dialysis in the past but has deferred any decisions until now.  Denies recent change in urine output, no hematuria.  no n/v/d. Hospital course remarkable for good response to PO diuretics, improving dyspnea. Patient has severe hearing impairment.   On 8/17, he underwent right thoracocentesis and has felt better since the procedure. Today, he is being discharged.     Allergies:  Sulfa antibiotics    Home Meds:  Prescriptions Prior to Admission   Medication Sig Dispense Refill Last Dose   • aspirin 81 MG chewable tablet Chew 81 mg Daily.   Past Week at Unknown time   • atorvastatin (LIPITOR) 80 MG tablet Take 80 mg by mouth Daily.   Past Week at Unknown  time   • calcium carbonate (OS-TASHI) 600 MG tablet Take 600 mg by mouth Daily.   Past Week at Unknown time   • HYDROcodone-acetaminophen (NORCO)  MG per tablet Take 1 tablet by mouth Every 6 (Six) Hours As Needed (back pain).   Past Week at Unknown time   • insulin aspart (novoLOG) 100 UNIT/ML injection Inject 6 Units under the skin into the appropriate area as directed Every Night.   Past Week at Unknown time   • insulin lispro (humaLOG) 100 UNIT/ML injection Inject 6 Units under the skin into the appropriate area as directed 3 (Three) Times a Day Before Meals.   Past Week at Unknown time   • metoprolol tartrate (LOPRESSOR) 25 MG tablet Take 25 mg by mouth 2 (Two) Times a Day.   Past Week at Unknown time       Medicines:  Current Facility-Administered Medications   Medication Dose Route Frequency Provider Last Rate Last Dose   • aspirin chewable tablet 81 mg  81 mg Oral Daily Arcenio Ahn MD   81 mg at 08/18/18 0909   • atorvastatin (LIPITOR) tablet 80 mg  80 mg Oral Daily Arcenio Ahn MD   80 mg at 08/18/18 0909   • budesonide-formoterol (SYMBICORT) 160-4.5 MCG/ACT inhaler 2 puff  2 puff Inhalation BID - RT Farooq Edwards MD   2 puff at 08/18/18 0753   • bumetanide (BUMEX) tablet 2 mg  2 mg Oral BID Scott Soto MD   2 mg at 08/18/18 0908   • calcitriol (ROCALTROL) capsule 0.5 mcg  0.5 mcg Oral Daily Scott Soto MD   0.5 mcg at 08/18/18 0909   • calcium carb-cholecalciferol 600-800 MG-UNIT tablet 1 tablet  1 tablet Oral Daily Arcenio Ahn MD   1 tablet at 08/18/18 0908   • dextrose (D50W) solution 25 g  25 g Intravenous Q15 Min PRN Arcenio Ahn MD       • dextrose (GLUTOSE) oral gel 15 g  15 g Oral Q15 Min PRN Arcenio Ahn MD       • epoetin efraín (EPOGEN,PROCRIT) injection 10,000 Units  10,000 Units Subcutaneous Weekly Scott Soto MD   10,000 Units at 08/17/18 1731   • Ferric Citrate tablet 210 mg  210 mg Oral TID With Meals Mahesh  Samson Moore MD   210 mg at 08/18/18 1228   • glucagon (human recombinant) (GLUCAGEN DIAGNOSTIC) injection 1 mg  1 mg Subcutaneous PRN Arcenio Ahn MD       • guaiFENesin (MUCINEX) 12 hr tablet 1,200 mg  1,200 mg Oral Q12H Oleg Madrid DO   1,200 mg at 08/18/18 0909   • HYDROcodone-acetaminophen (NORCO)  MG per tablet 1 tablet  1 tablet Oral Q4H PRN Arcenio Ahn MD   1 tablet at 08/17/18 2127   • insulin lispro (humaLOG) injection 2-7 Units  2-7 Units Subcutaneous 4x Daily With Meals & Nightly Arcenio Ahn MD   4 Units at 08/17/18 2018   • ipratropium-albuterol (DUO-NEB) nebulizer solution 3 mL  3 mL Nebulization Q4H PRN Oleg Madrid DO       • megestrol (MEGACE) 40 MG/ML suspension 800 mg  800 mg Oral Daily Oleg Madrid DO   800 mg at 08/15/18 1649   • metoprolol tartrate (LOPRESSOR) tablet 25 mg  25 mg Oral Q12H Arcenio Ahn MD   25 mg at 08/18/18 0908   • naloxone (NARCAN) injection 0.4 mg  0.4 mg Intravenous Q5 Min PRN Arcenio Ahn MD       • nicotine (NICODERM CQ) 14 MG/24HR patch 1 patch  1 patch Transdermal Q24H Farooq Edwards MD   1 patch at 08/17/18 2018   • nystatin (MYCOSTATIN) [id number] UNIT/ML suspension 500,000 Units  5 mL Oral 4x Daily Oleg Madrid DO   500,000 Units at 08/15/18 0842   • ondansetron (ZOFRAN) injection 4 mg  4 mg Intravenous Q6H PRN Arcenio Ahn MD       • sodium bicarbonate tablet 325 mg  325 mg Oral BID Scott Soto MD   325 mg at 08/18/18 0909   • sodium chloride 0.9 % flush 1-10 mL  1-10 mL Intravenous PRN Arcenio Ahn MD   10 mL at 08/16/18 0612       Past Medical History:  Past Medical History:   Diagnosis Date   • CHF (congestive heart failure) (CMS/HCC)    • Coronary stent occlusion    • Diabetes mellitus (CMS/HCC)    • Hyperlipidemia    • Hypertension    • Stroke (CMS/HCC)        Past Surgical History:  Past Surgical History:   Procedure Laterality Date   • BLADDER SURGERY     •  "ESOPHAGECTOMY         Family History  Family History   Problem Relation Age of Onset   • No Known Problems Father    • No Known Problems Mother        Social History  Social History     Social History   • Marital status:      Spouse name: N/A   • Number of children: N/A   • Years of education: N/A     Occupational History   • Not on file.     Social History Main Topics   • Smoking status: Current Every Day Smoker   • Smokeless tobacco: Never Used   • Alcohol use No   • Drug use: No   • Sexual activity: Not on file     Other Topics Concern   • Not on file     Social History Narrative   • No narrative on file         Review of Systems:  History obtained from chart review and the patient  General ROS: No fever or chills, +fatigue  Respiratory ROS: positive for  shortness of breath, +cough  Cardiovascular ROS: positive for  dyspnea on exertion  No chest pain or palpitations  Gastrointestinal ROS: No abdominal pain or melena  Genito-Urinary ROS: No dysuria or hematuria  Neurological ROS: no headache or dizziness    Objective:  BP 96/49 (BP Location: Right arm, Patient Position: Sitting)   Pulse 101   Temp 97.7 °F (36.5 °C) (Oral)   Resp 18   Ht 170.2 cm (67.01\")   Wt 43.6 kg (96 lb 3.2 oz)   SpO2 95%   BMI 15.06 kg/m²     Intake/Output Summary (Last 24 hours) at 08/18/18 1306  Last data filed at 08/18/18 1022   Gross per 24 hour   Intake              240 ml   Output                0 ml   Net              240 ml     General: awake/alert    Neck: Supple, no JVD  Chest:  Decreased breath sounds at the bases  CVS: regular rate and rhythm, 3/6 soft systolic murmur  Abdominal: soft, nontender, normal bowel sounds  Extremities: no cyanosis or edema  Skin: warm and dry without rash  Neuro: no focal motor deficits    Labs:    Results from last 7 days  Lab Units 08/17/18  0535 08/12/18  0557   WBC 10*3/mm3 3.71* 5.57   HEMOGLOBIN g/dL 8.7* 9.0*   HEMATOCRIT % 27.3* 28.5*   PLATELETS 10*3/mm3 130 143 "           Results from last 7 days  Lab Units 08/18/18  0435 08/17/18  0535 08/15/18  0542  08/12/18  0557   SODIUM mmol/L 137 140 143  < > 144   POTASSIUM mmol/L 4.2 4.3 4.5  < > 4.2   CHLORIDE mmol/L 100 105 109  < > 110   CO2 mmol/L 27.0 24.0 24.0  < > 21.0*   BUN mg/dL 46* 42* 44*  < > 45*   CREATININE mg/dL 3.19* 3.01* 3.08*  < > 2.92*   CALCIUM mg/dL 8.4 8.6 8.3*  < > 8.4   BILIRUBIN mg/dL  --   --   --   --  0.6   ALK PHOS U/L  --   --   --   --  83   ALT (SGPT) U/L  --   --   --   --  26   AST (SGOT) U/L  --   --   --   --  7   GLUCOSE mg/dL 129* 198* 124*  < > 139*   < > = values in this interval not displayed.    Radiology:   Imaging Results (last 72 hours)     Procedure Component Value Units Date/Time    XR Chest 1 View [[id number]] Collected:  08/08/18 1450     Updated:  08/08/18 1454    Narrative:       EXAMINATION:   XR CHEST 1 VW-  8/8/2018 2:50 PM CDT     HISTORY: Difficulty breathing     Frontal upright radiograph of the chest 8/8/2018 1:23 PM CDT     COMPARISON: None.     FINDINGS:   Mild basilar interstitial infiltrates are present. Small bilateral  pleural effusions are present. This is suspicious for early congestive  failure.. Cardiac silhouette is upper limits of normal. Vascular  calcifications present aortic arch. Endovascular aortic stent graft is  present in the abdomen..      The osseous structures and surrounding soft tissues demonstrate no acute  abnormality.       Impression:       1. Mild or early changes of pulmonary vascular congestion.        This report was finalized on 08/08/2018 14:51 by Dr. Duc Garibay MD.          Culture:         Assessment   1.  Chronic kidney disease stage 4  2.  Acute on chronic congestive heart failure  3.  Hyperkalemia--resolved  4.  Hypertension  5.  Type 2 diabetes  6.  Anemia of ckd  7.  Secondary hyperparathyroidism (PTH has improved under calcitriol)  8.  Acidosis   9..  Bilateral pleural effusions (mostly noticed on the CT scan and not the chest  xray)    Plan:  -Continue Bumex, [facility],   -Will need ouptaient hematology referral for  procrit injection and anemia management  -After patient is discharged,he will need to follow up at the renal clinic within 7-10 days. [facility].       Scott Soto MD  8/18/2018  1:06 PM

## 2018-08-18 NOTE — PROGRESS NOTES
Continued Stay Note   Jie     Patient Name: Gavin Wilson  MRN: [id number]  Today's Date: 8/18/2018    Admit Date: 8/8/2018          Discharge Plan     Row Name 08/18/18 1115       Plan    Patient/Family in Agreement with Plan yes    Final Discharge Disposition Code 06 - home with home health care    Final Note  [facility].  Pt will dc today.  MARIPOSA Soto.              Discharge Codes    No documentation.       Expected Discharge Date and Time     Expected Discharge Date Expected Discharge Time    Aug 18, 2018             MARIPOSA Herrera

## 2018-08-18 NOTE — DISCHARGE SUMMARY
[facility] Medicine Services  DISCHARGE SUMMARY       Date of Admission: 8/8/2018  Date of Discharge:  8/18/2018  Primary Care Physician: Provider, No Known    Presenting Problem/History of Present Illness:  Volume overload [E87.70]     Final Discharge Diagnoses:  Hospital Problem List     Volume overload    Chronic kidney disease, stage 4 (severe) (CMS/HCC)    Chronic systolic congestive heart failure (CMS/HCC)    Pleural effusion        Discharge Diagnosis  1. Volume overload with pulmonary vascular congestion; suspect acute on chronic systolic congestive heart failuroe  2. Chronic kidney disease stage IV with recent AV fistula placed; [facility] [place], [place]  3. Diabetes mellitus type II insulin dependent with A1c 8.0  4. Hyperkalemia - improved  5. PVD  6. Tobacco abuse  7. Anemia, probably related to chronic renal disease.   8. Constipation.  9. Dense retrocardiac consolidation, likely compressive atelectasis but cannot rule out pneumonia.  10. Moderate to large bilateral pleural effusions by CT chest; now s/p right thoracentesis on 8/17 with 450ml removed  11.  History of AAA with endovascular stent graft     Consults: Nephrology and Cardiology    Procedures Performed:   Imaging Results (last 7 days)     Procedure Component Value Units Date/Time    XR Chest 1 View [[id number]] Collected:  08/17/18 1411     Updated:  08/17/18 1418    Narrative:       HISTORY: Postthoracentesis on the right     CXR: Frontal view the chest is performed.     COMPARISON: 8/16/2018     FINDINGS: The lungs are hyperinflated. There is no appreciable  pneumothorax following right-sided thoracentesis. Patient has undergone  previous esophagectomy with gastric pull-through.  There is no focal  parenchymal consolidation. There is thoracic aortic calcification.  Mediastinal contours are similar. Small left and trace right pleural  effusions suspected on the current exam. There is a  right convexity  thoracic spine.       Impression:       1. No pneumothorax following right-sided thoracentesis. Hyperinflated  lungs. There are small left and trace right pleural effusions suspected.  Probable basilar atelectasis. Prior esophagectomy.  This report was finalized on 08/17/2018 14:15 by Dr. Sheri Johnson MD.    US Thoracentesis [[id number]] Collected:  08/17/18 1346    Specimen:  Body Fluid Updated:  08/17/18 4150    Narrative:       EXAMINATION: US THORACENTESIS- 8/17/2018 1:46 PM CDT     HISTORY: Shortness breath, history of bladder cancer     COMPARISON: Chest radiograph 8/16/2018     PREPROCEDURE: The risks, benefits, and alternatives to the procedure  were discussed in detail with the patient, including, but not limited  to, bleeding, infection, and damage to surrounding structures. Specific  risk of possible pneumothorax with chest tube placement was discussed.  The patient verbalized understanding of these risks, and informed  consent was signed. All questions were answered prior to the procedure.  A timeout was performed prior to the procedure.     PROCEDURE: An appropriate entry site for thoracentesis was marked over  the posterior right chest under ultrasound guidance. This area was  prepped and draped in the usual sterile fashion. 1% lidocaine was used  for local anesthesia. Next, a 5 Ukrainian Yueh catheter was advanced into  the pleural space without difficulty. Aspiration was performed with  removal of 450 mL of clear, straw-colored fluid. The catheter was then  removed. The patient tolerated the procedure well, and there were no  immediate complications. A post procedure chest radiograph is pending.       Impression:       Successful ultrasound-guided right thoracentesis yielding  450 mL of clear, straw-colored fluid.     This report was finalized on 08/17/2018 13:50 by Dr. Florentino Rodgers MD.    US Chest [[id number]] Updated:  08/17/18 1343    XR Chest 2 View [[id number]] Collected:   08/16/18 1727     Updated:  08/16/18 1732    Narrative:       EXAMINATION: Chest 2 views 8/16/2018     HISTORY: Preprocedure thoracentesis     FINDINGS: Upright frontal and lateral projection of the chest  demonstrate small effusions with minimal blunting of the lateral  costophrenic angles. The posterior angles are blunted. These are  unchanged from the previous study of 8/15/2018. They are diminished from  a previous study of 8/8/2018.       Impression:       . Small bilateral pleural effusions. The lungs are otherwise  clear.  This report was finalized on 08/16/2018 17:29 by Dr. Slava Snider MD.    XR Chest PA & Lateral [[id number]] Collected:  08/15/18 1131     Updated:  08/15/18 1135    Narrative:       EXAMINATION: XR CHEST PA AND LATERAL-     8/15/2018 11:21 AM CDT     HISTORY: follow-up effusions/infiltrates; Z72.0-Tobacco use.     2 view chest x-ray compared with 08/10/2018.     Stable heart size.  Aortic arch calcification.     Improved aeration of the retrocardiac left lower lobe compatible with  resolution of infiltrate or atelectasis.     Chronic interstitial lung disease with mild hyperexpansion.     Trace pleural fluid is decreased and there may be a component of pleural  thickening.     There is no new or increased lung disease.     Summary:  1. Chronic lung changes with decreased basilar infiltrate/atelectasis  and decreased trace pleural fluid.  2. No new abnormality.  This report was finalized on 08/15/2018 11:32 by Dr. Vijay Beach MD.    CT Chest Without Contrast [[id number]] Collected:  08/12/18 1526     Updated:  08/12/18 1534    Narrative:       CT CHEST without contrast dated 8/12/2018 3:14 PM CDT     HISTORY: Dyspnea chronic, prior xray     COMPARISON: Chest x-ray August 10, 2018     DLP: 160 mGy cm     TECHNIQUE: Serial helical tomographic images of the chest were acquired.  Bone and soft tissue algorithms were provided. Coronal reformatted  images were also provided for review.      FINDINGS:  The imaged portion of the neck and thyroid gland is unremarkable.      Moderate to large right pleural effusion moderate to large left pleural  effusion. This obscures the right and left lower lobe..    . The trachea  and bronchial tree are patent.     Extensive vascular calcifications present in the aortic arch and the  great vessels coronary stents and calcifications are noted. There is no  pericardial effusion. No enlarged axillary or mediastinal lymph nodes  are present.      The osseous structures of the thorax and surrounding soft tissues are  unremarkable.     In the upper abdomen in the endovascular aortic stent graft is present  in an abdominal aortic aneurysm       Impression:       1. Moderate to large bilateral pleural effusions most likely this is  congestive failure  2. Endovascular aortic stent graft is present in an abdominal aortic  aneurysm the native aorta is 5 cm.        This report was finalized on 08/12/2018 15:31 by Dr. Duc Garibay MD.        Interpretation Summary of Echocardiogram    · Left ventricular systolic function is moderately decreased. Estimated EF appears to be in the range of 31 - 35%.  · Left ventricular diastolic dysfunction (grade I) consistent with impaired relaxation.  · Left ventricular wall thickness is consistent with moderate posterior asymmetric hypertrophy.  · Mild mitral valve regurgitation is present  · Mild to moderate aortic valve regurgitation is present.  · Mild aortic valve stenosis is present.       Pertinent Test Results:   Lab Results (last 24 hours)     Procedure Component Value Units Date/Time    Lactate Dehydrogenase, Body Fluid - Body Fluid, Pleural Cavity [[id number]] Collected:  08/17/18 1334    Specimen:  Body Fluid from Pleural Cavity Updated:  08/18/18 0910     LD, Fluid 111 IU/L      Comment:  __________________________________________________________  : Peritoneal :       Pleural        : Synovial :    CSF     :  :____________:______________________:__________:___________:  :            : Transudate: Exudate  :          :           :  :____________:___________:__________:__________:___________:  : Not Estab. : <200 U/L  : >200 U/L : <240 U/L : Not Estab.:  :____________:___________:__________:______________________:   The method performance specifications have not been   established for this test in body fluid. The test result   should be integrated into the clinical context for   interpretation.  The reference intervals and other method performance specifications  have not been established for this test. The test result should be  integrated into the clinical context for interpretation.       Narrative:       Performed at:  [address]  [id number]  : Sonu Dunne PhD, Phone:  [phone]    Protein, Body Fluid - Pleural Fluid, Pleural Cavity [[id number]] Collected:  08/17/18 1335    Specimen:  Pleural Fluid from Pleural Cavity Updated:  08/18/18 0910     Protein, Fluid 3.4 g/dL      Comment:  ________________________________________________________  :  Peritoneal  :       Pleural          :   Synovial     :  :______________:________________________:________________:  :              : Transudate :  Exudate  :                :  :______________:____________:___________:________________:  :  Not Estab.  :   <3 g/dL  :  >3 g/dL  :    <2.5 g/dL   :  :______________:____________:___________:________________:   The method performance specifications have not been   established for this test in body fluid. The test result   should be integrated into the clinical context for   interpretation.  The method performance specifications have not been established for  this test in body fluid.  The test result should be integrated into  the clinical context for interpretation.       Narrative:       Performed at:   - [address]  [id number]  :  Sonu Dunne PhD, Phone:  [phone]    Albumin, Fluid - Pleural Fluid, Pleural Cavity [[id number]] Collected:  08/17/18 1334    Specimen:  Pleural Fluid from Pleural Cavity Updated:  08/18/18 0910     Albumin, Fluid 2.1 g/dL      Comment:  ________________________________________________________  :  Peritoneal  :       Pleural          :   Synovial     :  :______________:________________________:________________:  :              : Transudate :  Exudate  :                :  :______________:____________:___________:________________:  :  Not Estab.  : Not Estab. : Not Estab.:   Not Estab.   :  :______________:____________:___________:________________:   The method performance specifications have not been   established for this test in body fluid. The test result   should be integrated into the clinical context for   interpretation.  The reference intervals and other method performance specifications  have not been established for this test. The test result should be  integrated into the clinical context for interpretation.       Narrative:       Performed at:  [address]  [id number]  : Sonu Dunne PhD, Phone:  [phone]    POC Glucose Once [[id number]]  (Abnormal) Collected:  08/18/18 0811    Specimen:  Blood Updated:  08/18/18 0822     Glucose 138 (H) mg/dL      Comment: : [id number] Luisa KaleeMeter ID: [id number]       Basic Metabolic Panel [[id number]]  (Abnormal) Collected:  08/18/18 0435    Specimen:  Blood Updated:  08/18/18 0521     Glucose 129 (H) mg/dL      BUN 46 (H) mg/dL      Creatinine 3.19 (H) mg/dL      Sodium 137 mmol/L      Potassium 4.2 mmol/L      Chloride 100 mmol/L      CO2 27.0 mmol/L      Calcium 8.4 mg/dL      eGFR Non African Amer 19 (L) mL/min/1.73      BUN/Creatinine Ratio 14.4     Anion Gap 10.0 mmol/L     Narrative:       The MDRD GFR formula is only valid for adults with stable renal function between ages 18 and 70.     POC Glucose Once [[id number]]  (Abnormal) Collected:  08/17/18 2009    Specimen:  Blood Updated:  08/17/18 2038     Glucose 263 (H) mg/dL      Comment: : [id number] Albert Souza ID: [id number]       POC Glucose Once [[id number]]  (Abnormal) Collected:  08/17/18 1702    Specimen:  Blood Updated:  08/17/18 1715     Glucose 218 (H) mg/dL      Comment: : [id number] Alexandra CottonMeter ID: [id number]       Body Fluid Cell Count With Differential - Body Fluid, Pleural Cavity [[id number]] Collected:  08/17/18 1334    Specimen:  Body Fluid from Pleural Cavity Updated:  08/17/18 1603    Narrative:       The following orders were created for panel order Body Fluid Cell Count With Differential - Body Fluid, Pleural Cavity.  Procedure                               Abnormality         Status                     ---------                               -----------         ------                     Body fluid cell count - ...[[id number]]  Abnormal            Final result               Body fluid differential ...[[id number]]                      Final result                 Please view results for these tests on the individual orders.    Body fluid differential - Body Fluid, [[id number]] Collected:  08/17/18 1334    Specimen:  Body Fluid from Pleural Cavity Updated:  08/17/18 1603     Neutrophils, Fluid 12 %      Lymphocytes, Fluid 43 %      Monocytes, Fluid 5 %      Unclassified Cells, Fluid 40 %     Non-gynecologic Cytology [[id number]] Collected:  08/17/18 1351    Specimen:  Body Fluid from Pleural Cavity Updated:  08/17/18 1522    Body fluid cell count - Body Fluid, [[id number]]  (Abnormal) Collected:  08/17/18 1334    Specimen:  Body Fluid from Pleural Cavity Updated:  08/17/18 1509     Color, Fluid Yellow     Appearance, Fluid Hazy (A)     WBC, Fluid 323 /mm3      RBC, Fluid 1,000 /mm3     Body Fluid Culture - Body Fluid, Pleural Cavity [[id number]] Collected:  08/17/18 1334    Specimen:  Body Fluid from Pleural  "Cavity Updated:  08/17/18 1342    POC Glucose Once [[id number]]  (Abnormal) Collected:  08/17/18 1115    Specimen:  Blood Updated:  08/17/18 1129     Glucose 267 (H) mg/dL      Comment: : [id number] Alexandra Romero ID: [id number]             Chief Complaint on Day of Discharge: \"I want to go home today\"    Hospital Course:  The patient is a 73 y.o. male who presented to [facility] with a chief complaint of shortness of breath.  Patient has a known history of stage IV chronic kidney disease and [facility] [place], [place].  In fact, he recently had a left upper extremity AV fistula placed.  He also has a known history of peripheral arterial disease, suspect chronic obstructive pulmonary disease, reported chronic systolic congestive heart failure, history of prior bladder cancer with urostomy, in addition to insulin-dependent diabetes.  He was initially admitted to the hospitalist service given concern for volume overload, and concern for progression to possible end-stage renal disease with questionable need for hemodialysis.    Nephrology was consult and followed along with us during this hospitalization.  Patient was started on a diuretic regimen, which is now been transitioned to oral Bumex.  Nephrology has reported that no dialysis indicated at this time, and would leave decision to begin dialysis as more of a last resort as at this time he is experiencing no signs of uremia, his electrolytes remained stable, and his volume status is also stabilized.  Nephrology did add Procrit for anemia management with recommendations for Procrit every week.  Patient did have bilateral pleural effusions which were seen on chest imaging, and did undergo a right-sided thoracentesis yesterday with removal of approximately 450 cc of pleural fluid.  By LDH criteria suspect this is likely a transudate effusion related to his known history of chronic systolic congestive " "heart failure in addition to stage IV chronic kidney disease.  Final results of pleural fluid culture and cytology are pending at this time and will need to be followed up on an outpatient basis.    Nephrology is also started patient on calcitriol during this hospitalization.  Patient reports that he is very eager for discharge home today, and reports that he R he has follow-[facility] [place], [place] on 08/28/2018.  He will need a repeat basic metabolic panel that time.  He will be discharged on once daily Bumex therapy with instructions to abide by a cardiac, renal, diabetic diet.    Patient is agreeable to home health services to include physical therapy, occupational therapy, in addition to nursing care to make sure the patient is taking his medications appropriately and to continue monitoring for cardiopulmonary assessments especially given his history of chronic systolic congestive heart failure.  An echocardiogram was performed during this hospitalization which reveals an ejection fraction of 31-35%.  Unfortunately, patient reported to Dr. Stout that he does not want any further cardiology evaluation.  He refuses to wear telemetry.  He will not consider AICD if required in the future per Dr. Stout's progress note.  He does not want follow-up by cardiology here at [facility], but [facility] [place].  He is tolerating beta blocker at this time, but we have been unable to add an ACE inhibitor or ARB given BP trend.      This morning upon entering the room patient reports \"Doc, I want to go home.\"  He reports that his breathing is much better as compared to his symptoms upon admission.  He reports that he will work with home health on an outpatient basis, with efforts to continue to regain his strength, and he readily admits that he does have a component of generalized weakness and deconditioning related to his chronic medical problems " "and comorbidities.  He reports that he will follow up closely with his providers to the [facility] system in [place], [place] on an outpatient basis.  I would like patient to follow-up with his primary care provider within the next 1 week, and fortunately patient reports he R he has follow-up established with his nephrologist in [place] on 08/28/2018.      Condition on Discharge:  Medically stable    Physical Exam on Discharge:  BP 96/49 (BP Location: Right arm, Patient Position: Sitting)   Pulse 101   Temp 97.7 °F (36.5 °C) (Oral)   Resp 18   Ht 170.2 cm (67.01\")   Wt 43.6 kg (96 lb 3.2 oz)   SpO2 95%   BMI 15.06 kg/m²   Physical Exam  No acute distress, not requiring any supplemental oxygen, resting comfortably in bed and eager for discharge home, no significant wheezes, rhonchi, or Rales, but does have some mildly diminished breath sounds at bases.    Discharge Disposition:  Home or Self Care    Discharge Medications:     Discharge Medications      New Medications      Instructions Start Date   budesonide-formoterol 160-4.5 MCG/ACT inhaler  Commonly known as:  SYMBICORT   2 puffs, Inhalation, 2 Times Daily - RT      bumetanide 2 MG tablet  Commonly known as:  BUMEX   2 mg, Oral, Daily      calcitriol 0.5 MCG capsule  Commonly known as:  ROCALTROL   0.5 mcg, Oral, Daily      Ferric Citrate 1  MG(Fe) tablet  Commonly known as:  AURYXIA   210 mg, Oral, 3 Times Daily With Meals      sodium bicarbonate 325 MG tablet   325 mg, Oral, 2 Times Daily         Continue These Medications      Instructions Start Date   aspirin 81 MG chewable tablet   81 mg, Oral, Daily      atorvastatin 80 MG tablet  Commonly known as:  LIPITOR   80 mg, Oral, Daily      calcium carbonate 600 MG tablet  Commonly known as:  OS-TSAHI   600 mg, Oral, Daily      HYDROcodone-acetaminophen  MG per tablet  Commonly known as:  NORCO   1 tablet, Oral, Every 6 Hours PRN      insulin lispro 100 UNIT/ML injection  Commonly known as:  " humaLOG   6 Units, Subcutaneous, 3 Times Daily Before Meals      metoprolol tartrate 25 MG tablet  Commonly known as:  LOPRESSOR   25 mg, Oral, 2 Times Daily         Stop These Medications    insulin aspart 100 UNIT/ML injection  Commonly known as:  novoLOG            Discharge Diet: diabetic, renal, cardiac diet    Activity at Discharge: as tolerated    Discharge Care Plan/Instructions: Needs close outpatient follow-up within 1 week    Follow-up Appointments:   Patient reports that he has follow-up established with his nephrologist in [place], [place] on 08/28/2018.  I would like him to have follow-up with his primary care provider within one week as well for post hospitalization assessment regarding the above mentioned issues        Farooq Edwards MD  08/18/18  10:40 AM    Time: 35 min

## 2018-08-18 NOTE — DISCHARGE PLACEMENT REQUEST
"To:  [facility]  From:  MARIPOSA Soto  [phone]    Shiela Martinez (73 y.o. Male)     Date of Birth Social Security Number Address Home Phone MRN    [id number]  [id number] [place] [address] [phone] [id number]    Hinduism Marital Status          Druze        Admission Date Admission Type Admitting Provider Attending Provider Department, Room/Bed    8/8/18 Urgent Farooq Edwards MD Thompson, Benjamin H, MD [address], 409/1    Discharge Date Discharge Disposition Discharge Destination         Home or Self Care              Attending Provider:  Farooq Edwards MD    Allergies:  Sulfa Antibiotics    Isolation:  None   Infection:  None   Code Status:  No CPR    Ht:  170.2 cm (67.01\")   Wt:  43.6 kg (96 lb 3.2 oz)    Admission Cmt:  None   Principal Problem:  None                Active Insurance as of 8/8/2018     Primary Coverage     Payor Plan Insurance Group Employer/Plan Group    [employer] [id number]      Payor Plan Address Payor Plan Phone Number Effective From Effective To    [employer] [phone] 8/6/2018     [address] [facility] [address]       Subscriber Name Subscriber Birth Date Member ID       SHIELA MARTINEZ 1945 [id number]                 Emergency Contacts      (Rel.) Home Phone Work Phone Mobile Phone    Familia Martinez (Son) [phone] -- --    Tracey Prajapati (Other) [phone] -- --        Ambulatory Referral to Home Health [[id number]] (Order [id number])   Order   Date: 8/18/2018 Department: [address] Ordering/Authorizing: Farooq Edwards MD   Lab and Collection     Ambulatory Referral to Home Health (Order #[id number]) on 8/18/2018 - Lab and Collection Information   Order History   Outpatient   Date/Time Action Taken User Additional Information   08/18/18 1039 Sign Farooq Edwards MD    Order Details     Frequency Duration Priority Order Class   None None Routine " Internal Referral   Start Date/Time     Start Date   08/18/18   Order Questions     Question Answer Comment   Face to Face Visit Date 8/18/2018    Follow-up Provider for Plan of Care? I treated the patient in an acute care facility and will not continue treatment after discharge.    Follow-up Provider SONNY DURÁN [facility] [place] but cannot recall specific PCP's name   Reason/Clinical Findings stage IV CKD; chronic systolic CHF; generalized weakness    Describe mobility limitations that make leaving home difficult generalized weakness; stage IV CKD; chronic systolic CHF    Nursing/Therapeutic Services Requested Skilled Nursing     Physical Therapy     Occupational Therapy    Skilled nursing orders: Medication education     CHF management     Cardiopulmonary assessments    PT orders: Therapeutic exercise     Gait Training     Transfer training     Strengthening     Home safety assessment    Weight Bearing Status As Tolerated    Frequency: 1 Week 1           Source Order Set / Preference List     Preference List   AMB IM REFERRALS [[id number]]   Clinical Indications      ICD-10-CM ICD-9-CM   Chronic kidney disease, stage 4 (severe) (CMS/[facility])     N18.4 585.4   Chronic systolic congestive heart failure (CMS/[facility])     I50.22 428.22  428.0   Reprint Order Requisition     AMB REFERRAL TO HOME HEALTH (Order #[id number]) on 8/18/18   Encounter-Level Cardiology Documents:     There are no encounter-level cardiology documents.   Encounter     View Encounter       Order Provider Info         Office phone Pager E-mail   Ordering User Farooq Edwards [phone] -- --   Authorizing Provider Farooq Edwards [phone] -- --   Attending Provider Farooq Edwards [phone] -- --   Linked Charges     No charges linked to this procedure   Order Transmittal Tracking     AMB REFERRAL TO HOME HEALTH (Order #[id number]) on 8/18/18   Authorized by:  Farooq Edwards MD   (NPI: [id number])       Lab Component SmartPhrase Guide     AMB REFERRAL TO HOME HEALTH (Order #[id number]) on 8/18/18            History & Physical      Arcenio Ahn MD at 8/8/2018  5:22 AM              [facility] Medicine Services  HISTORY AND PHYSICAL    Date of Admission: 8/8/2018  Primary Care Physician: Provider, No Known    Subjective     Chief Complaint: Shortness of breath    History of Present Illness  Patient is a 73-year-old white male past medical history of peripheral vascular disease and end-stage renal disease stage V with a previous fistula artery placed.  He states that he is an been debating initiating dialysis but has hesitated multiple times recently.  He still makes urine through urostomy.  He states now that he has become more short of breath and never he can no longer go on and he wants to go on dialysis.  He presented to an [facility] hospital that he does not have dialysis.  [facility] and unable to [facility] him so he was transferred here for initiation of dialysis.  He is otherwise stable and no complaints.  His only problem is volume overload his potassium was slightly elevated but otherwise nothing remarkable on his labs at the [facility].  His BUN and creatinine obviously are elevated but not significantly.        Review of Systems   14 point review of systems negative except for as per HPI    Otherwise complete ROS reviewed and negative except as mentioned in the HPI.    Past Medical History:   Past Medical History:   Diagnosis Date   • CHF (congestive heart failure) (CMS/HCC)    • Coronary stent occlusion    • Diabetes mellitus (CMS/HCC)    • Hyperlipidemia    • Hypertension    • Stroke (CMS/HCC)      Past Surgical History:  Past Surgical History:   Procedure Laterality Date   • BLADDER SURGERY     • ESOPHAGECTOMY       Social History:  reports that he has been smoking.  He has never used  "smokeless tobacco. He reports that he does not drink alcohol or use drugs.    Family History: family history includes No Known Problems in his father and mother.       Allergies:  Allergies   Allergen Reactions   • Sulfa Antibiotics      Medications:  Prior to Admission medications    Medication Sig Start Date End Date Taking? Authorizing Provider   aspirin 81 MG chewable tablet Chew 81 mg Daily.   Yes Dom Lou MD   atorvastatin (LIPITOR) 80 MG tablet Take 80 mg by mouth daily    Dmo Lou MD   Calcium 500 MG tablet Take 1,000 mg by mouth daily    Dom Lou MD   HYDROcodone-acetaminophen (NORCO)  MG per tablet Every 6 (Six) Hours.    Dom Lou MD   insulin aspart (novoLOG) 100 UNIT/ML injection Inject 7 Units into the skin nightly    Dom Lou MD   insulin lispro (humaLOG) 100 UNIT/ML injection Inject 5 Units into the skin 3 times daily (before meals)    Dom Lou MD   metoprolol tartrate (LOPRESSOR) 25 MG tablet Take 25 mg by mouth 2 times daily Indications: 1/2 tab    Dom Lou MD     Objective     Vital Signs: /78 (BP Location: Right arm, Patient Position: Lying)   Pulse 114   Temp 96.9 °F (36.1 °C) (Temporal Artery )   Resp 18   Ht 170.2 cm (67.01\")   Wt 48.8 kg (107 lb 8 oz)   SpO2 96%   BMI 16.83 kg/m²    Physical Exam  Gen. well-nourished well-developed white male in no acute distress  HEENT: Atraumatic normocephalic pupils equal round reactive to light extraocular movements intact sclerae anicteric TMs negative mucous membranes moist neck is supple without lymphadenopathy no JVD is noted no carotid bruits are auscultated oropharynx is without erythema or exudate  Chest: Patient has rales bilaterally half way up the lung fields  CV: Regular rate and rhythm normal S1-S2 no gallops murmurs or rubs  Abdomen: Soft nontender nondistended active bowel sounds no hepatosplenomegaly no masses no " hernias  Extremities: No clubbing edema or cyanosis capillary refill is normal pulses are 2+ and symmetric at radial and dorsalis pedis posterior tibial pulses are intact and 2+ palpable patient has a thrill on his left axilla consistent with his renal shunt  Neuro: Patient is awake alert and oriented ×3, cranial nerves II through XII are grossly intact, motor is 5 out of 5 bilaterally, DTRs are 2+ and symmetric bilaterally sensory exam is nonfocal  Skin: Warm dry and intact no evidence of breakdown  Sensorium: Normal thought and affect  Nursing notes and vital signs reviewed        Results Reviewed:  Lab Results (last 24 hours)     ** No results found for the last 24 hours. **        Imaging Results (last 24 hours)     ** No results found for the last 24 hours. **        I have personally reviewed and interpreted the radiology studies and ECG obtained at time of admission.     Assessment / Plan     Assessment:   Hospital Problem List     Volume overload      1.  End-stage renal disease and volume overload plans to admit patient consult nephrology for initiation of hemodialysis.  Patient is agreeable to this.  I see no using giving him IV Lasix as they try to get him an outlying hospital due to no good.  We will continue his other medications for blood pressure control and monitor.  2.  Type II diabetes Accu-Cheks sliding scale insulin and hemoglobin A1c monitor while here.  3.  Mild hyperkalemia monitor and recheck labs  4.  Peripheral vascular disease stable continue current treatment.  5.  Tobacco habituation encourage smoking cessation will offer nicotine patch.        Patient seen after midnight          Code Status: No CPR     I discussed the patient's findings and my recommendations with the patient and he designates his nephew not his son as his healthcare power surrogate    Estimated length of stay to be determined    Arcenio Ahn MD   08/08/18   5:23 AM              Electronically signed by Anabelle  Arcenio CROWDER MD at 8/8/2018  5:32 AM       Operative/Procedure Notes (most recent note)     No notes of this type exist for this encounter.           Physician Progress Notes (most recent note)      Abdiel Stout MD at 8/18/2018  7:59 AM             LOS: 10 days   Patient Care Team:  Provider, No Known as PCP - General    Chief Complaint: Active Problems:    Volume overload    Shortness of breath    Subjective  Feeling  better  No chest pain   No excessive shortness of breath  No new complaints  Anxious  Generalized weakness  Easy fatigability    Interval History: Improved overall    Patient Complaints: Heart failure shortness of breath generalized weakness and fatigue      Telemetry: Refuses telemetry  Review of Systems     Constitutional: No chills   Has fatigue   No fever.   HENT: Negative.    Eyes: Negative.      Respiratory: Negative for cough,   No chest wall soreness,   Shortness of breath,   no wheezing, no stridor.      Cardiovascular: Negative for chest pain,   No palpitations   No significant  leg swelling.     Gastrointestinal: Negative for abdominal distention,  No abdominal pain,   No blood in stool,   No constipation,   No diarrhea,   No nausea   No vomiting.     Endocrine: Negative.    Genitourinary: Negative for difficulty urinating, dysuria, flank pain and hematuria.     Musculoskeletal: Negative.    Skin: Negative for rash and wound.   Allergic/Immunologic: Negative.      Neurological: Negative for dizziness, syncope, weakness,   No light-headedness  No  headaches.     Hematological: Does not bruise/bleed easily.     Psychiatric/Behavioral: Negative for agitation or behavioral problems,   No confusion,   the patient is  nervous/anxious.       History:   Past Medical History:   Diagnosis Date   • CHF (congestive heart failure) (CMS/HCC)    • Coronary stent occlusion    • Diabetes mellitus (CMS/HCC)    • Hyperlipidemia    • Hypertension    • Stroke (CMS/HCC)      Past Surgical History:   Procedure  Laterality Date   • BLADDER SURGERY     • ESOPHAGECTOMY       Social History     Social History   • Marital status:      Spouse name: N/A   • Number of children: N/A   • Years of education: N/A     Occupational History   • Not on file.     Social History Main Topics   • Smoking status: Current Every Day Smoker   • Smokeless tobacco: Never Used   • Alcohol use No   • Drug use: No   • Sexual activity: Not on file     Other Topics Concern   • Not on file     Social History Narrative   • No narrative on file     Family History   Problem Relation Age of Onset   • No Known Problems Father    • No Known Problems Mother        Labs:  WBC WBC   Date Value Ref Range Status   08/17/2018 3.71 (L) 4.80 - 10.80 10*3/mm3 Final      HGB Hemoglobin   Date Value Ref Range Status   08/17/2018 8.7 (L) 14.0 - 18.0 g/dL Final      HCT Hematocrit   Date Value Ref Range Status   08/17/2018 27.3 (L) 40.0 - 52.0 % Final      Platelets Platelets   Date Value Ref Range Status   08/17/2018 130 130 - 400 10*3/mm3 Final      MCV MCV   Date Value Ref Range Status   08/17/2018 86.7 82.0 - 95.0 fL Final        Results from last 7 days  Lab Units 08/18/18  0435 08/17/18  0535 08/15/18  0542  08/12/18  0557   SODIUM mmol/L 137 140 143  < > 144   POTASSIUM mmol/L 4.2 4.3 4.5  < > 4.2   CHLORIDE mmol/L 100 105 109  < > 110   CO2 mmol/L 27.0 24.0 24.0  < > 21.0*   BUN mg/dL 46* 42* 44*  < > 45*   CREATININE mg/dL 3.19* 3.01* 3.08*  < > 2.92*   CALCIUM mg/dL 8.4 8.6 8.3*  < > 8.4   BILIRUBIN mg/dL  --   --   --   --  0.6   ALK PHOS U/L  --   --   --   --  83   ALT (SGPT) U/L  --   --   --   --  26   AST (SGOT) U/L  --   --   --   --  7   GLUCOSE mg/dL 129* 198* 124*  < > 139*   < > = values in this interval not displayed.No results found for: CKTOTAL, CKMB, CKMBINDEX, TROPONINI, TROPONINT  PT/INR:    Protime   Date Value Ref Range Status   08/17/2018 14.5 11.9 - 14.6 Seconds Final   /  INR   Date Value Ref Range Status   08/17/2018 1.09 0.91 -  1.09 Final       Imaging Results (last 72 hours)     Procedure Component Value Units Date/Time    XR Chest 1 View [[id number]] Collected:  08/17/18 1411     Updated:  08/17/18 1418    Narrative:       HISTORY: Postthoracentesis on the right     CXR: Frontal view the chest is performed.     COMPARISON: 8/16/2018     FINDINGS: The lungs are hyperinflated. There is no appreciable  pneumothorax following right-sided thoracentesis. Patient has undergone  previous esophagectomy with gastric pull-through.  There is no focal  parenchymal consolidation. There is thoracic aortic calcification.  Mediastinal contours are similar. Small left and trace right pleural  effusions suspected on the current exam. There is a right convexity  thoracic spine.       Impression:       1. No pneumothorax following right-sided thoracentesis. Hyperinflated  lungs. There are small left and trace right pleural effusions suspected.  Probable basilar atelectasis. Prior esophagectomy.  This report was finalized on 08/17/2018 14:15 by Dr. Sheri Johnson MD.    US Thoracentesis [[id number]] Collected:  08/17/18 1346    Specimen:  Body Fluid Updated:  08/17/18 1353    Narrative:       EXAMINATION: US THORACENTESIS- 8/17/2018 1:46 PM CDT     HISTORY: Shortness breath, history of bladder cancer     COMPARISON: Chest radiograph 8/16/2018     PREPROCEDURE: The risks, benefits, and alternatives to the procedure  were discussed in detail with the patient, including, but not limited  to, bleeding, infection, and damage to surrounding structures. Specific  risk of possible pneumothorax with chest tube placement was discussed.  The patient verbalized understanding of these risks, and informed  consent was signed. All questions were answered prior to the procedure.  A timeout was performed prior to the procedure.     PROCEDURE: An appropriate entry site for thoracentesis was marked over  the posterior right chest under ultrasound guidance. This area was  prepped  and draped in the usual sterile fashion. 1% lidocaine was used  for local anesthesia. Next, a 5 Monegasque Yueh catheter was advanced into  the pleural space without difficulty. Aspiration was performed with  removal of 450 mL of clear, straw-colored fluid. The catheter was then  removed. The patient tolerated the procedure well, and there were no  immediate complications. A post procedure chest radiograph is pending.       Impression:       Successful ultrasound-guided right thoracentesis yielding  450 mL of clear, straw-colored fluid.     This report was finalized on 08/17/2018 13:50 by Dr. Florentino Rodgers MD.    US Chest [[id number]] Updated:  08/17/18 1343    XR Chest 2 View [[id number]] Collected:  08/16/18 1727     Updated:  08/16/18 1732    Narrative:       EXAMINATION: Chest 2 views 8/16/2018     HISTORY: Preprocedure thoracentesis     FINDINGS: Upright frontal and lateral projection of the chest  demonstrate small effusions with minimal blunting of the lateral  costophrenic angles. The posterior angles are blunted. These are  unchanged from the previous study of 8/15/2018. They are diminished from  a previous study of 8/8/2018.       Impression:       . Small bilateral pleural effusions. The lungs are otherwise  clear.  This report was finalized on 08/16/2018 17:29 by Dr. Slava Snider MD.    XR Chest PA & Lateral [[id number]] Collected:  08/15/18 1131     Updated:  08/15/18 1135    Narrative:       EXAMINATION: XR CHEST PA AND LATERAL-     8/15/2018 11:21 AM CDT     HISTORY: follow-up effusions/infiltrates; Z72.0-Tobacco use.     2 view chest x-ray compared with 08/10/2018.     Stable heart size.  Aortic arch calcification.     Improved aeration of the retrocardiac left lower lobe compatible with  resolution of infiltrate or atelectasis.     Chronic interstitial lung disease with mild hyperexpansion.     Trace pleural fluid is decreased and there may be a component of pleural  thickening.     There is no new  or increased lung disease.     Summary:  1. Chronic lung changes with decreased basilar infiltrate/atelectasis  and decreased trace pleural fluid.  2. No new abnormality.  This report was finalized on 08/15/2018 11:32 by Dr. Vijay Beach MD.          Objective     Allergies   Allergen Reactions   • Sulfa Antibiotics        Medication Review: Performed  Current Facility-Administered Medications   Medication Dose Route Frequency Provider Last Rate Last Dose   • aspirin chewable tablet 81 mg  81 mg Oral Daily Arcenio Ahn MD   81 mg at 08/17/18 0834   • atorvastatin (LIPITOR) tablet 80 mg  80 mg Oral Daily Arcenio Ahn MD   80 mg at 08/17/18 0834   • budesonide-formoterol (SYMBICORT) 160-4.5 MCG/ACT inhaler 2 puff  2 puff Inhalation BID - RT Farooq Edwards MD   2 puff at 08/18/18 0753   • bumetanide (BUMEX) tablet 2 mg  2 mg Oral BID Scott Soto MD   2 mg at 08/17/18 1730   • calcitriol (ROCALTROL) capsule 0.5 mcg  0.5 mcg Oral Daily Scott Soto MD   0.5 mcg at 08/17/18 1115   • calcium carb-cholecalciferol 600-800 MG-UNIT tablet 1 tablet  1 tablet Oral Daily Arcenio Ahn MD   1 tablet at 08/17/18 0834   • dextrose (D50W) solution 25 g  25 g Intravenous Q15 Min PRN Arcenio Ahn MD       • dextrose (GLUTOSE) oral gel 15 g  15 g Oral Q15 Min PRN Arcenio Ahn MD       • epoetin efraín (EPOGEN,PROCRIT) injection 10,000 Units  10,000 Units Subcutaneous Weekly Scott Soto MD   10,000 Units at 08/17/18 1731   • Ferric Citrate tablet 210 mg  210 mg Oral TID With Meals Samson Aragon MD   210 mg at 08/17/18 1730   • glucagon (human recombinant) (GLUCAGEN DIAGNOSTIC) injection 1 mg  1 mg Subcutaneous PRN Arcenio Ahn MD       • guaiFENesin (MUCINEX) 12 hr tablet 1,200 mg  1,200 mg Oral Q12H Oleg Madrid DO   1,200 mg at 08/17/18 2018   • HYDROcodone-acetaminophen (NORCO)  MG per tablet 1 tablet  1 tablet Oral Q4H PRN Anabelle  "Arcenio CROWDER MD   1 tablet at 08/17/18 2127   • insulin lispro (humaLOG) injection 2-7 Units  2-7 Units Subcutaneous 4x Daily With Meals & Nightly Arcenio Ahn MD   4 Units at 08/17/18 2018   • ipratropium-albuterol (DUO-NEB) nebulizer solution 3 mL  3 mL Nebulization Q4H PRN Oleg Madrid DO       • megestrol (MEGACE) 40 MG/ML suspension 800 mg  800 mg Oral Daily Oleg Madrid DO   800 mg at 08/15/18 1649   • metoprolol tartrate (LOPRESSOR) tablet 25 mg  25 mg Oral Q12H Arcenio Ahn MD   25 mg at 08/17/18 2018   • naloxone (NARCAN) injection 0.4 mg  0.4 mg Intravenous Q5 Min PRN Arcenio Ahn MD       • nicotine (NICODERM CQ) 14 MG/24HR patch 1 patch  1 patch Transdermal Q24H Farooq Edwards MD   1 patch at 08/17/18 2018   • nystatin (MYCOSTATIN) 867130 UNIT/ML suspension 500,000 Units  5 mL Oral 4x Daily Oleg Madrid DO   500,000 Units at 08/15/18 0842   • ondansetron (ZOFRAN) injection 4 mg  4 mg Intravenous Q6H PRN Arcenio Ahn MD       • sodium bicarbonate tablet 325 mg  325 mg Oral BID Scott Soto MD   325 mg at 08/17/18 2018   • sodium chloride 0.9 % flush 1-10 mL  1-10 mL Intravenous PRN Arcenio Ahn MD   10 mL at 08/16/18 0612       Vital Sign Min/Max for last 24 hours  Temp  Min: 97.7 °F (36.5 °C)  Max: 98.4 °F (36.9 °C)   BP  Min: 88/43  Max: 109/62   Pulse  Min: 72  Max: 109   Resp  Min: 16  Max: 18   SpO2  Min: 97 %  Max: 100 %   No Data Recorded   Weight  Min: 43.6 kg (96 lb 3.2 oz)  Max: 43.6 kg (96 lb 3.2 oz)     Flowsheet Rows      First Filed Value   Admission Height  170.2 cm (67.01\") Documented at 08/08/2018 0331   Admission Weight  48.8 kg (107 lb 8 oz) Documented at 08/08/2018 0331          Results for orders placed during the hospital encounter of 08/08/18   Adult Transthoracic Echo Complete W/ Cont if Necessary Per Protocol    Narrative · Left ventricular systolic function is moderately decreased. Estimated EF   appears to be in " the range of 31 - 35%.  · Left ventricular diastolic dysfunction (grade I) consistent with   impaired relaxation.  · Left ventricular wall thickness is consistent with moderate posterior   asymmetric hypertrophy.  · Mild mitral valve regurgitation is present  · Mild to moderate aortic valve regurgitation is present.  · Mild aortic valve stenosis is present.          Physical Exam:    General Appearance: Awake, alert, in no acute distress  Eyes: Pupils equal and reactive    Ears: Appear intact with no abnormalities noted  Nose: Nares normal, no drainage  Neck: supple, trachea midline, no carotid bruit and no JVD  Back: no kyphosis present,    Lungs: respirations regular, respirations even and respirations unlabored    Heart: normal S1, S2,  2/6 pansystolic murmur in the left sternal border,  no rub and no click    Abdomen: normal bowel sounds, no tenderness   Skin: no bleeding, bruising or rash  Extremities: no cyanosis  Psychiatric/Behavioral: Negative for agitation, behavioral problems, confusion, the patient does  appear to be nervous/anxious.          Results Review:   I reviewed the patient's new clinical results.  I reviewed the patient's new imaging results and agree with the interpretation.  I reviewed the patient's other test results and agree with the interpretation  I personally viewed and interpreted the patient's EKG/Telemetry data  Discussed with patient    Reviewed available prior notes, consults, prior visits, laboratory findings, radiology and cardiology relevant reports. Updated chart as applicable. I have reviewed the patient's medical history in detail and updated the computerized patient record as relevant.    Updated patient regarding any new or relevant abnormalities on review of records or any new findings on physical exam. Mentioned to patient about purpose of visit and desirable health short and long term goals and objectives.     Assessment/Plan     Active Problems:    Volume  overload  Chronic severe left ventricular systolic dysfunction  Chronic kidney disease stage IV  Pleural effusion  Aortic regurgitation aortic stenosis  Anemia  Tobacco abuse  Peripheral vascular disease  Issues with compliance to medical therapy and dietary advice      Plan      Patient does not want any further cardiology evaluation  Treat medically  Refuses to wear telemetry  Does not want to consider any AED placement or defibrillator if required in future  Wants to be seen by [facility] and does not want follow-up with Dr. Bateman his cardiologist here  For the time being continue current medical therapy  Supportive care  Due to hypotension cannot add further medications for heart failure  Telemetry  Avoid excessive beta agonists  Optimal medical therapy  Deep vein thrombosis prophylaxis/precautions  Appropriate diet, fluid, sodium, caffeine, stimulants intake   Compliance to diet and medications   Avoid NSAIDS    Abdiel Stout MD  08/18/18  7:59 AM    EMR Dragon/Transcription was used to dictate part of this note     Electronically signed by Abdiel Stout MD at 8/18/2018  8:02 AM          Consult Notes (most recent note)      Roberto Carlos Myrick APRN at 8/8/2018 10:20 AM      Consult Orders:    1. Inpatient Nephrology Consult [[id number]] ordered by Arcenio Ahn MD at 08/08/18 0522           Attestation signed by Samson Aragon MD at 8/8/2018  8:26 PM (Updated)    I have independently interviewed and examined the patient and reviewed the laboratory, imaging, notes and all other records as available.  I have discussed key elements of the care plan with the APRN and agree with the findings and care plan documented above except as noted.      Subjective:  Initially consulted for volume overload in patient approaching end stage renal disease.  Baseline of chronic kidney disease stage 4/5, follows with nephrologist at [facility].  Recently had left arm AV fistula placed (also at [facility]).   History of type 2 diabetes, hypertension, chronic congestive heart failure, prior bladder cancer with urostomy.  Admitted as a direct admit from [facility].  He had presented to their facility with dyspnea and stated he was ready to start dialysis to relieve his symptoms.  Apparently patient has been advised to start dialysis in the past but has deferred any decisions until now.  Denies recent change in urine output, no hematuria.  no n/v/d.  Minimal dyspnea, able to provide a lengthy explanation of current medical condition without distress noted.  No peripheral edema.  Pt does admit he is feeling better today than when he arrived at [facility].  Urine output is nonoliguric.  Pt anxious for d/c or starting dialysis    Objective:  Vitals/labs/studies/notes all reviewed  Exam independently verified with additional comments or findings as noted:  Ext: tr ble edema    Assessment:  CKD 4  Acute/chronic CHF exacerbation  Hyperkalemia  HTN  DM2    Plan:  Continue medical management  No need for HD at this time, responded symptomatically well to oral bumex  D/w pt/family    Samson Aragon MD  8/8/2018  8:18 PM                    Nephrology ([facility]) Consult Note      Patient:  Gavin Wilson  YOB: 1945  Date of Service: 8/8/2018  MRN: [id number]   Acct: [id number]   Primary Care Physician: Provider, No Known  Advance Directive:   Code Status and Medical Interventions:   Ordered at: 08/08/18 0522     Level Of Support Discussed With:    Patient     Code Status:    No CPR     Medical Interventions (Level of Support Prior to Arrest):    Full     Admit Date: 8/8/2018       Hospital Day: 0  Referring Provider: Arcenio Ahn MD      Patient personally seen and examined.  Complete chart including Consults, Notes, Operative Reports, Labs, Cardiology, and Radiology studies reviewed as able.        Subjective:  Gavin Wilson is a 73 y.o. male  whom  we were consulted for volume overload in patient approaching end stage renal disease.  Baseline of chronic kidney disease stage 4/5, follows with nephrologist at [facility].  Recently had left arm AV fistula placed (also at [facility]).  History of type 2 diabetes, hypertension, chronic congestive heart failure, prior bladder cancer with urostomy.  Admitted as a direct admit from [facility].  He had presented to their facility with dyspnea and stated he was ready to start dialysis to relieve his symptoms.  Apparently patient has been advised to start dialysis in the past but has deferred any decisions until now.  Denies recent change in urine output, no hematuria.  no n/v/d.  At time of exam patient is up walking in halls; trying to find his nurse in order to get something to eat.  Minimal dyspnea, able to provide a lengthy explanation of current medical condition without distress noted.  No peripheral edema.  Pt does admit he is feeling better today than when he arrived at [facility].  Urine output is nonoliguric.    Allergies:  Sulfa antibiotics    Home Meds:  Prescriptions Prior to Admission   Medication Sig Dispense Refill Last Dose   • aspirin 81 MG chewable tablet Chew 81 mg Daily.   8/7/2018 at Unknown time   • atorvastatin (LIPITOR) 80 MG tablet Take 80 mg by mouth daily      • Calcium 500 MG tablet Take 1,000 mg by mouth daily      • HYDROcodone-acetaminophen (NORCO)  MG per tablet Every 6 (Six) Hours.      • insulin aspart (novoLOG) 100 UNIT/ML injection Inject 7 Units into the skin nightly      • insulin lispro (humaLOG) 100 UNIT/ML injection Inject 5 Units into the skin 3 times daily (before meals)      • metoprolol tartrate (LOPRESSOR) 25 MG tablet Take 25 mg by mouth 2 times daily Indications: 1/2 tab          Medicines:  Current Facility-Administered Medications   Medication Dose Route Frequency Provider Last Rate Last Dose   • aspirin chewable tablet 81 mg  81 mg  Oral Daily Arcenio Ahn MD   81 mg at 08/08/18 0811   • atorvastatin (LIPITOR) tablet 80 mg  80 mg Oral Daily Arcenio Ahn MD   80 mg at 08/08/18 0811   • bumetanide (BUMEX) tablet 1 mg  1 mg Oral Once Roberto Carlos Myrick, APRN       • calcium carb-cholecalciferol 600-800 MG-UNIT tablet 1 tablet  1 tablet Oral Daily Arcenio Ahn MD   1 tablet at 08/08/18 0811   • dextrose (D50W) solution 25 g  25 g Intravenous Q15 Min PRN Arcenio Ahn MD       • dextrose (GLUTOSE) oral gel 15 g  15 g Oral Q15 Min PRN Arcenio Ahn MD       • glucagon (human recombinant) (GLUCAGEN DIAGNOSTIC) injection 1 mg  1 mg Subcutaneous PRN Arcenio Ahn MD       • HYDROcodone-acetaminophen (NORCO)  MG per tablet 1 tablet  1 tablet Oral Q4H PRN Arcenio Ahn MD       • insulin lispro (humaLOG) injection 2-7 Units  2-7 Units Subcutaneous 4x Daily With Meals & Nightly Arcenio Ahn MD   3 Units at 08/08/18 0811   • LORazepam (ATIVAN) tablet 1 mg  1 mg Oral Q6H PRN Arcenio Ahn MD       • metoprolol tartrate (LOPRESSOR) tablet 25 mg  25 mg Oral Q12H Arcenio Ahn MD   25 mg at 08/08/18 0811   • Morphine sulfate (PF) injection 4 mg  4 mg Intravenous Q4H PRN Arcenio Ahn MD   4 mg at 08/08/18 0531    And   • naloxone (NARCAN) injection 0.4 mg  0.4 mg Intravenous Q5 Min PRN Arcenio Ahn MD       • ondansetron (ZOFRAN) injection 4 mg  4 mg Intravenous Q6H PRN Arcenio Ahn MD       • sodium chloride 0.9 % flush 1-10 mL  1-10 mL Intravenous PRN Arcenio Ahn MD       • sodium polystyrene (KAYEXALATE) 15 GM/60ML suspension 15 g  15 g Oral Once Roberto Carlos Myrick, APRN           Past Medical History:  Past Medical History:   Diagnosis Date   • CHF (congestive heart failure) (CMS/HCC)    • Coronary stent occlusion    • Diabetes mellitus (CMS/HCC)    • Hyperlipidemia    • Hypertension    • Stroke (CMS/HCC)        Past Surgical History:  Past Surgical History:   Procedure  "Laterality Date   • BLADDER SURGERY     • ESOPHAGECTOMY         Family History  Family History   Problem Relation Age of Onset   • No Known Problems Father    • No Known Problems Mother        Social History  Social History     Social History   • Marital status:      Spouse name: N/A   • Number of children: N/A   • Years of education: N/A     Occupational History   • Not on file.     Social History Main Topics   • Smoking status: Current Every Day Smoker   • Smokeless tobacco: Never Used   • Alcohol use No   • Drug use: No   • Sexual activity: Not on file     Other Topics Concern   • Not on file     Social History Narrative   • No narrative on file         Review of Systems:  History obtained from chart review and the patient  General ROS: No fever or chills  Respiratory ROS: No cough, shortness of breath, wheezing  Cardiovascular ROS: positive for - dyspnea on exertion  No chest pain or palpitations  Gastrointestinal ROS: No abdominal pain or melena  Genito-Urinary ROS: No dysuria or hematuria  14 point ROS reviewed with the patient and negative except as noted above and in the HPI unless unable to obtain.    Objective:  /90 (BP Location: Right arm, Patient Position: Lying)   Pulse 108   Temp 97.9 °F (36.6 °C) (Temporal Artery )   Resp 18   Ht 170.2 cm (67.01\")   Wt 48.8 kg (107 lb 9.4 oz)   SpO2 98%   BMI 16.85 kg/m²      Intake/Output Summary (Last 24 hours) at 08/08/18 1020  Last data filed at 08/08/18 0443   Gross per 24 hour   Intake                0 ml   Output              500 ml   Net             -500 ml     General: awake/alert   Chest:  bilateral crackles lower lobes  CVS: regular rate and rhythm  Abdominal: soft, nontender, normal bowel sounds  Extremities: no cyanosis or edema  Skin: warm and dry without rash      Labs:            Results from last 7 days  Lab Units 08/08/18  0629   SODIUM mmol/L 139   POTASSIUM mmol/L 5.6*   CHLORIDE mmol/L 107   CO2 mmol/L 16.0*   BUN mg/dL 39* "   CREATININE mg/dL 3.06*   CALCIUM mg/dL 8.8   GLUCOSE mg/dL 237*       Radiology:   Imaging Results (last 72 hours)     ** No results found for the last 72 hours. **          Culture:         Assessment   1.  Baseline chronic kidney disease; reportedly nearing stage 5  2.  Acute exacerbation chronic congestive heart failure  3.  Hyperkalemia  4.  Type 2 diabetes  5.  Hypertension  6.  Prior bladder CA; status post urostomy creation    Plan:  1.  Mild hyperkalemia and mild volume overload; no acute indication for urgent dialysis.  2.  Will need to get old records to determine his baseline renal function  3.  Will also need records from vascular surgery at [facility] confirming maturity of AV fistula prior to any use  4.  Bumex 1 mg oral once now  5.  Kayexalate 15 gm once now  6.  Further assessment and plan pending Dr Aragon's evaluation of patient      Thank you for the consult, we appreciate the opportunity to provide care to your patients.  Feel free to contact me if I can be of any further assistance.      Roberto Carlos Myrick, APRN  2018  10:20 AM    Electronically signed by Samson Aragon MD at 2018  8:26 PM          Physical Therapy Notes (most recent note)      Indiana Benavides, PT, DPT, NCS at 2018 12:01 PM  Version 1 of 1         Acute Care - Physical Therapy Initial Eval/Discharge   [place]     Patient Name: Gavin Wilson  : 1945  MRN: [id number]  Today's Date: 2018   Onset of Illness/Injury or Date of Surgery: 18     Referring Physician: Dr. Lagunas      Admit Date: 2018    Visit Dx:    ICD-10-CM ICD-9-CM   1. Tobacco use Z72.0 305.1     Patient Active Problem List   Diagnosis   • Volume overload     Past Medical History:   Diagnosis Date   • CHF (congestive heart failure) (CMS/HCC)    • Coronary stent occlusion    • Diabetes mellitus (CMS/HCC)    • Hyperlipidemia    • Hypertension    • Stroke (CMS/HCC)      Past Surgical History:   Procedure  Laterality Date   • BLADDER SURGERY     • ESOPHAGECTOMY            PT ASSESSMENT (last 12 hours)      Physical Therapy Evaluation     Row Name 08/14/18 1040          PT Evaluation Time/Intention    Subjective Information complains of;pain  -MS     Document Type evaluation  -MS     Mode of Treatment physical therapy  -MS     Row Name 08/14/18 1040          General Information    Patient Profile Reviewed? yes  -MS     Onset of Illness/Injury or Date of Surgery 08/08/18  -MS     Referring Physician Dr. Lagunas  -MS     Patient Observations alert;agree to therapy  -MS     General Observations of Patient pt laying in bed, awake and in no apparent distress  -MS     Prior Level of Function independent:;all household mobility;ADL's  -MS     Pertinent History of Current Functional Problem volume overload, ESRD-recent AV fistula placed, large B pleural effusions  -MS     Risks Reviewed patient:;increased discomfort  -MS     Benefits Reviewed patient:;increase knowledge;improve function  -MS     Barriers to Rehab uncooperative  -MS     Row Name 08/14/18 1040          Cognitive Assessment/Interventions    Additional Documentation Cognitive Assessment/Intervention (Group)  -MS     Row Name 08/14/18 1040          Cognitive Assessment/Intervention- PT/OT    Affect/Mental Status (Cognitive) agitated  -MS     Orientation Status (Cognition) oriented x 4  -MS     Follows Commands (Cognition) WNL  -MS     Personal Safety Interventions nonskid shoes/slippers when out of bed;supervised activity   pt refused gait belt  -MS     Row Name 08/14/18 1040          Safety Issues, Functional Mobility    Impairments Affecting Function (Mobility) balance  -MS     Row Name 08/14/18 1040          Bed Mobility Assessment/Treatment    Bed Mobility Assessment/Treatment supine-sit-supine  -MS     Supine-Sit-Supine [place] (Bed Mobility) conditional independence  -MS     Assistive Device (Bed Mobility) bed rails;head of bed elevated  -MS     Row Name  08/14/18 1040          Transfer Assessment/Treatment    Transfer Assessment/Treatment sit-stand transfer;stand-sit transfer  -MS     Sit-Stand [place] (Transfers) independent  -MS     Stand-Sit [place] (Transfers) independent  -MS     Row Name 08/14/18 1040          Gait/Stairs Assessment/Training    [place] Level (Gait) supervision  -MS     Distance in Feet (Gait) 200ft with a stop in the nutrition room for coffee  -MS     Row Name 08/14/18 1040          General ROM    GENERAL ROM COMMENTS WFL, pt is overall stiff from his arthritis  -MS     Row Name 08/14/18 1040          General Assessment (Manual Muscle Testing)    Comment, General Manual Muscle Testing (MMT) Assessment functionally 5/5  -MS     Row Name 08/14/18 1040          Motor Assessment/Intervention    Additional Documentation Balance (Group)  -MS     Row Name 08/14/18 1040          Balance    Balance dynamic standing balance  -MS     Row Name 08/14/18 1040          Dynamic Standing Balance    Comment, Reaches Outside Midline (Standing, Dynamic Balance) pt was able to walk down the hallway with a cup of coffee and reach to prepare his coffee  -MS     Row Name 08/14/18 1040          Pain Assessment    Additional Documentation Pain Scale: FACES Pre/Post-Treatment (Group)  -MS     Row Name 08/14/18 1040          Pain Scale: Numbers Pre/Post-Treatment    Pain Location - Orientation generalized  -MS     Pain Intervention(s) Ambulation/increased activity  -MS     Row Name 08/14/18 1040          Pain Scale: FACES Pre/Post-Treatment    Pain: FACES Scale, Pretreatment 2-->hurts little bit  -MS     Pain: FACES Scale, Post-Treatment 2-->hurts little bit  -MS     Pre/Post Treatment Pain Comment no change  -MS     Row Name 08/14/18 1040          Plan of Care Review    Plan of Care Reviewed With patient  -MS     Row Name 08/14/18 1040          Physical Therapy Clinical Impression    Patient/Family Goals Statement (PT Clinical Impression) go home  -MS      Criteria for Skilled Interventions Met (PT Clinical Impression) patient/family refuse skilled intervention at this time  -MS     Pathology/Pathophysiology Noted (Describe Specifically for Each System) musculoskeletal  -MS     Impairments Found (describe specific impairments) gait, locomotion, and balance;joint integrity and mobility  -MS     Row Name 08/14/18 1040          Positioning and Restraints    Post Treatment Position bed  -MS     In Bed fowlers;encouraged to call for assist;call light within reach;side rails up x2  -MS     Row Name 08/14/18 1040          Physical Therapy Discharge Summary    Additional Documentation Discharge Summary, PT Eval (Group)  -MS     Row Name 08/14/18 1040          Discharge Summary, PT Eval    Reason for Discharge (PT Discharge Summary) patient/family request discontinuation of services  -MS     Outcomes Achieved Upon Discharge (PT Discharge Summary) evaluation only  -MS       User Key  (r) = Recorded By, (t) = Taken By, (c) = Cosigned By    Initials Name Provider Type    Indiana Quijano R, PT, DPT, NCS Physical Therapist              PT Recommendation and Plan  Anticipated Discharge Disposition (PT): home  Therapy Frequency (PT Clinical Impression): evaluation only  Outcome Summary/Treatment Plan (PT)  Anticipated Discharge Disposition (PT): home  Reason for Discharge (PT Discharge Summary): patient/family request discontinuation of services  Plan of Care Reviewed With: patient  Progress: no change  Outcome Summary: PT evaluation completed. The patient was agitiated that PT was ordered for him and that he had to get up out of bed. He states that he is going home and that he will be able to care for himself. He walked down to the nutrition room and prepared himself a cup of coffee and was able to carry it back to his room without spilling it. He does not want therapy and at this time the patient is most lilkely at his functional  baseline. He does demonstrate slight balance  deficits with standing and gait which is most likely from his stiffness from arthritis. PT will sign off per patient request.           Outcome Measures     Row Name 08/14/18 1040             How much help from another person do you currently need...    Turning from your back to your side while in flat bed without using bedrails? 4  -MS      Moving from lying on back to sitting on the side of a flat bed without bedrails? 4  -MS      Moving to and from a bed to a chair (including a wheelchair)? 4  -MS      Standing up from a chair using your arms (e.g., wheelchair, bedside chair)? 4  -MS      Climbing 3-5 steps with a railing? 4  -MS      To walk in hospital room? 4  -MS      AM-PAC 6 Clicks Score 24  -MS         Functional Assessment    Outcome Measure Options AM-PAC 6 Clicks Basic Mobility (PT)  -MS        User Key  (r) = Recorded By, (t) = Taken By, (c) = Cosigned By    Initials Name Provider Type    MS Indiana Benavides PT, DPT, NCS Physical Therapist           Time Calculation:         PT Charges     Row Name 08/14/18 1201             Time Calculation    Start Time 1040  -MS      Stop Time 1121  -MS      Time Calculation (min) 41 min  -MS      PT Received On 08/14/18  -MS        User Key  (r) = Recorded By, (t) = Taken By, (c) = Cosigned By    Initials Name Provider Type    MS Indiana Benavides PT, DPT, NCS Physical Therapist        Therapy Suggested Charges     Code   Minutes Charges    None           Therapy Charges for Today     Code Description Service Date Service Provider Modifiers Qty    82537519299 HC PT MOBILITY CURRENT 8/14/2018 Indiana Benavides PT, DPT, NCS GP,  1    86047849571 HC PT MOBILITY PROJECTED 8/14/2018 Indiana Benavides PT, DPT, NCS GP,  1    44746777352 HC PT MOBILITY DISCHARGE 8/14/2018 Indiana Benavides PT, DPT, NCS GP,  1    38976718504 HC PT EVAL LOW COMPLEXITY 3 8/14/2018 Indiana Benavides PT, DPT, NCS GP 1          PT G-Codes  Outcome Measure Options: AM-PAC 6 Clicks Basic  Mobility (PT)  Score: 24  Functional Limitation: Mobility: Walking and moving around  Mobility: Walking and Moving Around Current Status (): 0 percent impaired, limited or restricted  Mobility: Walking and Moving Around Goal Status (): 0 percent impaired, limited or restricted  Mobility: Walking and Moving Around Discharge Status (): 0 percent impaired, limited or restricted    PT Discharge Summary  Anticipated Discharge Disposition (PT): home  Reason for Discharge: Patient/Caregiver request  Outcomes Achieved: Refer to plan of care for updates on goals achieved  Discharge Destination: Home    Indiana Benavides, PT, DPT, NCS  8/14/2018         Electronically signed by Indiana Benavides PT, DPT, NCS at 8/14/2018 12:01 PM            Discharge Summary      Farooq Edwards MD at 8/18/2018 10:40 AM              [facility] Medicine Services  DISCHARGE SUMMARY       Date of Admission: 8/8/2018  Date of Discharge:  8/18/2018  Primary Care Physician: Provider, No Known    Presenting Problem/History of Present Illness:  Volume overload [E87.70]     Final Discharge Diagnoses:  Hospital Problem List     Volume overload    Chronic kidney disease, stage 4 (severe) (CMS/HCC)    Chronic systolic congestive heart failure (CMS/HCC)    Pleural effusion        Discharge Diagnosis  1. Volume overload with pulmonary vascular congestion; suspect acute on chronic systolic congestive heart failuroe  2. Chronic kidney disease stage IV with recent AV fistula placed; followed by [facility] in [place], [place]  3. Diabetes mellitus type II insulin dependent with A1c 8.0  4. Hyperkalemia - improved  5. PVD  6. Tobacco abuse  7. Anemia, probably related to chronic renal disease.   8. Constipation.  9. Dense retrocardiac consolidation, likely compressive atelectasis but cannot rule out pneumonia.  10. Moderate to large bilateral pleural effusions by CT chest; now s/p right thoracentesis on 8/17  with 450ml removed  11.  History of AAA with endovascular stent graft     Consults: Nephrology and Cardiology    Procedures Performed:   Imaging Results (last 7 days)     Procedure Component Value Units Date/Time    XR Chest 1 View [[id number]] Collected:  08/17/18 1411     Updated:  08/17/18 1418    Narrative:       HISTORY: Postthoracentesis on the right     CXR: Frontal view the chest is performed.     COMPARISON: 8/16/2018     FINDINGS: The lungs are hyperinflated. There is no appreciable  pneumothorax following right-sided thoracentesis. Patient has undergone  previous esophagectomy with gastric pull-through.  There is no focal  parenchymal consolidation. There is thoracic aortic calcification.  Mediastinal contours are similar. Small left and trace right pleural  effusions suspected on the current exam. There is a right convexity  thoracic spine.       Impression:       1. No pneumothorax following right-sided thoracentesis. Hyperinflated  lungs. There are small left and trace right pleural effusions suspected.  Probable basilar atelectasis. Prior esophagectomy.  This report was finalized on 08/17/2018 14:15 by Dr. Sheri Johnson MD.    US Thoracentesis [[id number]] Collected:  08/17/18 1346    Specimen:  Body Fluid Updated:  08/17/18 1353    Narrative:       EXAMINATION: US THORACENTESIS- 8/17/2018 1:46 PM CDT     HISTORY: Shortness breath, history of bladder cancer     COMPARISON: Chest radiograph 8/16/2018     PREPROCEDURE: The risks, benefits, and alternatives to the procedure  were discussed in detail with the patient, including, but not limited  to, bleeding, infection, and damage to surrounding structures. Specific  risk of possible pneumothorax with chest tube placement was discussed.  The patient verbalized understanding of these risks, and informed  consent was signed. All questions were answered prior to the procedure.  A timeout was performed prior to the procedure.     PROCEDURE: An appropriate  entry site for thoracentesis was marked over  the posterior right chest under ultrasound guidance. This area was  prepped and draped in the usual sterile fashion. 1% lidocaine was used  for local anesthesia. Next, a 5 Bulgarian Yueh catheter was advanced into  the pleural space without difficulty. Aspiration was performed with  removal of 450 mL of clear, straw-colored fluid. The catheter was then  removed. The patient tolerated the procedure well, and there were no  immediate complications. A post procedure chest radiograph is pending.       Impression:       Successful ultrasound-guided right thoracentesis yielding  450 mL of clear, straw-colored fluid.     This report was finalized on 08/17/2018 13:50 by Dr. Florentino Rodgers MD.    US Chest [[id number]] Updated:  08/17/18 1343    XR Chest 2 View [[id number]] Collected:  08/16/18 1727     Updated:  08/16/18 1732    Narrative:       EXAMINATION: Chest 2 views 8/16/2018     HISTORY: Preprocedure thoracentesis     FINDINGS: Upright frontal and lateral projection of the chest  demonstrate small effusions with minimal blunting of the lateral  costophrenic angles. The posterior angles are blunted. These are  unchanged from the previous study of 8/15/2018. They are diminished from  a previous study of 8/8/2018.       Impression:       . Small bilateral pleural effusions. The lungs are otherwise  clear.  This report was finalized on 08/16/2018 17:29 by Dr. Slava Snider MD.    XR Chest PA & Lateral [[id number]] Collected:  08/15/18 1131     Updated:  08/15/18 1135    Narrative:       EXAMINATION: XR CHEST PA AND LATERAL-     8/15/2018 11:21 AM CDT     HISTORY: follow-up effusions/infiltrates; Z72.0-Tobacco use.     2 view chest x-ray compared with 08/10/2018.     Stable heart size.  Aortic arch calcification.     Improved aeration of the retrocardiac left lower lobe compatible with  resolution of infiltrate or atelectasis.     Chronic interstitial lung disease with mild  hyperexpansion.     Trace pleural fluid is decreased and there may be a component of pleural  thickening.     There is no new or increased lung disease.     Summary:  1. Chronic lung changes with decreased basilar infiltrate/atelectasis  and decreased trace pleural fluid.  2. No new abnormality.  This report was finalized on 08/15/2018 11:32 by Dr. Vijay Beach MD.    CT Chest Without Contrast [[id number]] Collected:  08/12/18 1526     Updated:  08/12/18 1534    Narrative:       CT CHEST without contrast dated 8/12/2018 3:14 PM CDT     HISTORY: Dyspnea chronic, prior xray     COMPARISON: Chest x-ray August 10, 2018     DLP: 160 mGy cm     TECHNIQUE: Serial helical tomographic images of the chest were acquired.  Bone and soft tissue algorithms were provided. Coronal reformatted  images were also provided for review.     FINDINGS:  The imaged portion of the neck and thyroid gland is unremarkable.      Moderate to large right pleural effusion moderate to large left pleural  effusion. This obscures the right and left lower lobe..    . The trachea  and bronchial tree are patent.     Extensive vascular calcifications present in the aortic arch and the  great vessels coronary stents and calcifications are noted. There is no  pericardial effusion. No enlarged axillary or mediastinal lymph nodes  are present.      The osseous structures of the thorax and surrounding soft tissues are  unremarkable.     In the upper abdomen in the endovascular aortic stent graft is present  in an abdominal aortic aneurysm       Impression:       1. Moderate to large bilateral pleural effusions most likely this is  congestive failure  2. Endovascular aortic stent graft is present in an abdominal aortic  aneurysm the native aorta is 5 cm.        This report was finalized on 08/12/2018 15:31 by Dr. Duc Garibay MD.        Interpretation Summary of Echocardiogram    · Left ventricular systolic function is moderately decreased. Estimated EF  appears to be in the range of 31 - 35%.  · Left ventricular diastolic dysfunction (grade I) consistent with impaired relaxation.  · Left ventricular wall thickness is consistent with moderate posterior asymmetric hypertrophy.  · Mild mitral valve regurgitation is present  · Mild to moderate aortic valve regurgitation is present.  · Mild aortic valve stenosis is present.       Pertinent Test Results:   Lab Results (last 24 hours)     Procedure Component Value Units Date/Time    Lactate Dehydrogenase, Body Fluid - Body Fluid, Pleural Cavity [[id number]] Collected:  08/17/18 1334    Specimen:  Body Fluid from Pleural Cavity Updated:  08/18/18 0910     LD, Fluid 111 IU/L      Comment:  __________________________________________________________  : Peritoneal :       Pleural        : Synovial :    CSF    :  :____________:______________________:__________:___________:  :            : Transudate: Exudate  :          :           :  :____________:___________:__________:__________:___________:  : Not Estab. : <200 U/L  : >200 U/L : <240 U/L : Not Estab.:  :____________:___________:__________:______________________:   The method performance specifications have not been   established for this test in body fluid. The test result   should be integrated into the clinical context for   interpretation.  The reference intervals and other method performance specifications  have not been established for this test. The test result should be  integrated into the clinical context for interpretation.       Narrative:       Performed at:  [address] [place] [address]  [id number]  : Sonu Dunne PhD, Phone:  [phone]    Protein, Body Fluid - Pleural Fluid, Pleural Cavity [[id number]] Collected:  08/17/18 1335    Specimen:  Pleural Fluid from Pleural Cavity Updated:  08/18/18 0910     Protein, Fluid 3.4 g/dL      Comment:  ________________________________________________________  :  Peritoneal  :        Pleural          :   Synovial     :  :______________:________________________:________________:  :              : Transudate :  Exudate  :                :  :______________:____________:___________:________________:  :  Not Estab.  :   <3 g/dL  :  >3 g/dL  :    <2.5 g/dL   :  :______________:____________:___________:________________:   The method performance specifications have not been   established for this test in body fluid. The test result   should be integrated into the clinical context for   interpretation.  The method performance specifications have not been established for  this test in body fluid.  The test result should be integrated into  the clinical context for interpretation.       Narrative:       Performed at:   - [address]  [id number]  : Sonu Dunne PhD, Phone:  [phone]    Albumin, Fluid - Pleural Fluid, Pleural Cavity [[id number]] Collected:  08/17/18 1334    Specimen:  Pleural Fluid from Pleural Cavity Updated:  08/18/18 0910     Albumin, Fluid 2.1 g/dL      Comment:  ________________________________________________________  :  Peritoneal  :       Pleural          :   Synovial     :  :______________:________________________:________________:  :              : Transudate :  Exudate  :                :  :______________:____________:___________:________________:  :  Not Estab.  : Not Estab. : Not Estab.:   Not Estab.   :  :______________:____________:___________:________________:   The method performance specifications have not been   established for this test in body fluid. The test result   should be integrated into the clinical context for   interpretation.  The reference intervals and other method performance specifications  have not been established for this test. The test result should be  integrated into the clinical context for interpretation.       Narrative:       Performed at:  [address]   [id number]  : Sonu Dunne PhD, Phone:  [phone]    POC Glucose Once [[id number]]  (Abnormal) Collected:  08/18/18 0811    Specimen:  Blood Updated:  08/18/18 0822     Glucose 138 (H) mg/dL      Comment: : [id number] Luisa RubiMeter ID: [id number]       Basic Metabolic Panel [[id number]]  (Abnormal) Collected:  08/18/18 0435    Specimen:  Blood Updated:  08/18/18 0521     Glucose 129 (H) mg/dL      BUN 46 (H) mg/dL      Creatinine 3.19 (H) mg/dL      Sodium 137 mmol/L      Potassium 4.2 mmol/L      Chloride 100 mmol/L      CO2 27.0 mmol/L      Calcium 8.4 mg/dL      eGFR Non African Amer 19 (L) mL/min/1.73      BUN/Creatinine Ratio 14.4     Anion Gap 10.0 mmol/L     Narrative:       The MDRD GFR formula is only valid for adults with stable renal function between ages 18 and 70.    POC Glucose Once [[id number]]  (Abnormal) Collected:  08/17/18 2009    Specimen:  Blood Updated:  08/17/18 2038     Glucose 263 (H) mg/dL      Comment: : [id number] Albert Pa  GMeter ID: [id number]       POC Glucose Once [[id number]]  (Abnormal) Collected:  08/17/18 1702    Specimen:  Blood Updated:  08/17/18 1715     Glucose 218 (H) mg/dL      Comment: : [id number] Kaylastevieestelita ChristinaMeter ID: [id number]       Body Fluid Cell Count With Differential - Body Fluid, Pleural Cavity [[id number]] Collected:  08/17/18 1334    Specimen:  Body Fluid from Pleural Cavity Updated:  08/17/18 1603    Narrative:       The following orders were created for panel order Body Fluid Cell Count With Differential - Body Fluid, Pleural Cavity.  Procedure                               Abnormality         Status                     ---------                               -----------         ------                     Body fluid cell count - ...[[id number]]  Abnormal            Final result               Body fluid differential ...[[id number]]                      Final result                 Please view results for these tests on the  "individual orders.    Body fluid differential - Body Fluid, [[id number]] Collected:  08/17/18 1334    Specimen:  Body Fluid from Pleural Cavity Updated:  08/17/18 1603     Neutrophils, Fluid 12 %      Lymphocytes, Fluid 43 %      Monocytes, Fluid 5 %      Unclassified Cells, Fluid 40 %     Non-gynecologic Cytology [[id number]] Collected:  08/17/18 1351    Specimen:  Body Fluid from Pleural Cavity Updated:  08/17/18 1522    Body fluid cell count - Body Fluid, [[id number]]  (Abnormal) Collected:  08/17/18 1334    Specimen:  Body Fluid from Pleural Cavity Updated:  08/17/18 1509     Color, Fluid Yellow     Appearance, Fluid Hazy (A)     WBC, Fluid 323 /mm3      RBC, Fluid 1,000 /mm3     Body Fluid Culture - Body Fluid, Pleural Cavity [[id number]] Collected:  08/17/18 1334    Specimen:  Body Fluid from Pleural Cavity Updated:  08/17/18 1342    POC Glucose Once [[id number]]  (Abnormal) Collected:  08/17/18 1115    Specimen:  Blood Updated:  08/17/18 1129     Glucose 267 (H) mg/dL      Comment: : [id number] Kaylaabdelrahman FerrarainaMeter ID: [id number]             Chief Complaint on Day of Discharge: \"I want to go home today\"    Hospital Course:  The patient is a 73 y.o. male who presented to [facility] with a chief complaint of shortness of breath.  Patient has a known history of stage IV chronic kidney disease and is [facility] [place], [place].  In fact, he recently had a left upper extremity AV fistula placed.  He also has a known history of peripheral arterial disease, suspect chronic obstructive pulmonary disease, reported chronic systolic congestive heart failure, history of prior bladder cancer with urostomy, in addition to insulin-dependent diabetes.  He was initially admitted to the hospitalist service given concern for volume overload, and concern for progression to possible end-stage renal disease with questionable need for hemodialysis.    Nephrology was consult and " followed along with us during this hospitalization.  Patient was started on a diuretic regimen, which is now been transitioned to oral Bumex.  Nephrology has reported that no dialysis indicated at this time, and would leave decision to begin dialysis as more of a last resort as at this time he is experiencing no signs of uremia, his electrolytes remained stable, and his volume status is also stabilized.  Nephrology did add Procrit for anemia management with recommendations for Procrit every week.  Patient did have bilateral pleural effusions which were seen on chest imaging, and did undergo a right-sided thoracentesis yesterday with removal of approximately 450 cc of pleural fluid.  By LDH criteria suspect this is likely a transudate effusion related to his known history of chronic systolic congestive heart failure in addition to stage IV chronic kidney disease.  Final results of pleural fluid culture and cytology are pending at this time and will need to be followed up on an outpatient basis.    Nephrology is also started patient on calcitriol during this hospitalization.  Patient reports that he is very eager for discharge home today, and reports that he R he has follow-up [facility] [place], [place] on 08/28/2018.  He will need a repeat basic metabolic panel that time.  He will be discharged on once daily Bumex therapy with instructions to abide by a cardiac, renal, diabetic diet.    Patient is agreeable to home health services to include physical therapy, occupational therapy, in addition to nursing care to make sure the patient is taking his medications appropriately and to continue monitoring for cardiopulmonary assessments especially given his history of chronic systolic congestive heart failure.  An echocardiogram was performed during this hospitalization which reveals an ejection fraction of 31-35%.  Unfortunately, patient reported to Dr. Stout that he does not want any  "further cardiology evaluation.  He refuses to wear telemetry.  He will not consider AICD if required in the future per Dr. Stout's progress note.  He does not want follow-up by cardiology here at [facility], but will [facility] with the [facility] in [place].  He is tolerating beta blocker at this time, but we have been unable to add an ACE inhibitor or ARB given BP trend.      This morning upon entering the room patient reports \"Doc, I want to go home.\"  He reports that his breathing is much better as compared to his symptoms upon admission.  He reports that he will work with home health on an outpatient basis, with efforts to continue to regain his strength, and he readily admits that he does have a component of generalized weakness and deconditioning related to his chronic medical problems and comorbidities.  He reports that he will follow up closely with his providers [facility] [place], [place] on an outpatient basis.  I would like patient to follow-up with his primary care provider within the next 1 week, and fortunately patient reports he R he has follow-up established with his nephrologist in [place] on 08/28/2018.      Condition on Discharge:  Medically stable    Physical Exam on Discharge:  BP 96/49 (BP Location: Right arm, Patient Position: Sitting)   Pulse 101   Temp 97.7 °F (36.5 °C) (Oral)   Resp 18   Ht 170.2 cm (67.01\")   Wt 43.6 kg (96 lb 3.2 oz)   SpO2 95%   BMI 15.06 kg/m²    Physical Exam  No acute distress, not requiring any supplemental oxygen, resting comfortably in bed and eager for discharge home, no significant wheezes, rhonchi, or Rales, but does have some mildly diminished breath sounds at bases.    Discharge Disposition:  Home or Self Care    Discharge Medications:     Discharge Medications      New Medications      Instructions Start Date   budesonide-formoterol 160-4.5 MCG/ACT inhaler  Commonly known as:  SYMBICORT   2 puffs, Inhalation, 2 Times " Daily - RT      bumetanide 2 MG tablet  Commonly known as:  BUMEX   2 mg, Oral, Daily      calcitriol 0.5 MCG capsule  Commonly known as:  ROCALTROL   0.5 mcg, Oral, Daily      Ferric Citrate 1  MG(Fe) tablet  Commonly known as:  AURYXIA   210 mg, Oral, 3 Times Daily With Meals      sodium bicarbonate 325 MG tablet   325 mg, Oral, 2 Times Daily         Continue These Medications      Instructions Start Date   aspirin 81 MG chewable tablet   81 mg, Oral, Daily      atorvastatin 80 MG tablet  Commonly known as:  LIPITOR   80 mg, Oral, Daily      calcium carbonate 600 MG tablet  Commonly known as:  OS-TASHI   600 mg, Oral, Daily      HYDROcodone-acetaminophen  MG per tablet  Commonly known as:  NORCO   1 tablet, Oral, Every 6 Hours PRN      insulin lispro 100 UNIT/ML injection  Commonly known as:  humaLOG   6 Units, Subcutaneous, 3 Times Daily Before Meals      metoprolol tartrate 25 MG tablet  Commonly known as:  LOPRESSOR   25 mg, Oral, 2 Times Daily         Stop These Medications    insulin aspart 100 UNIT/ML injection  Commonly known as:  novoLOG            Discharge Diet: diabetic, renal, cardiac diet    Activity at Discharge: as tolerated    Discharge Care Plan/Instructions: Needs close outpatient follow-up within 1 week    Follow-up Appointments:   Patient reports that he has follow-up established with his nephrologist in [place], [place] on 08/28/2018.  I would like him to have follow-up with his primary care provider within one week as well for post hospitalization assessment regarding the above mentioned issues        Farooq Edwards MD  08/18/18  10:40 AM    Time: 35 min        Electronically signed by Farooq Edwards MD at 8/18/2018 10:55 AM       Discharge Order     Start     Ordered    08/18/18 1034  Discharge patient  Once     Expected Discharge Date:  08/18/18    Discharge Disposition:  Home or Self Care    Physician of Record for Attribution - Please select from Treatment  Team:  EVER PATRICIA [[id number]]    Review needed by CMO to determine Physician of Record:  No       Question Answer Comment   Physician of Record for Attribution - Please select from Treatment Team EVER PATRICIA    Review needed by CMO to determine Physician of Record No        08/18/18 103

## 2018-08-18 NOTE — PROGRESS NOTES
LOS: 10 days   Patient Care Team:  Provider, No Known as PCP - General    Chief Complaint: Active Problems:    Volume overload    Shortness of breath    Subjective  Feeling  better  No chest pain   No excessive shortness of breath  No new complaints  Anxious  Generalized weakness  Easy fatigability    Interval History: Improved overall    Patient Complaints: Heart failure shortness of breath generalized weakness and fatigue      Telemetry: Refuses telemetry  Review of Systems     Constitutional: No chills   Has fatigue   No fever.   HENT: Negative.    Eyes: Negative.      Respiratory: Negative for cough,   No chest wall soreness,   Shortness of breath,   no wheezing, no stridor.      Cardiovascular: Negative for chest pain,   No palpitations   No significant  leg swelling.     Gastrointestinal: Negative for abdominal distention,  No abdominal pain,   No blood in stool,   No constipation,   No diarrhea,   No nausea   No vomiting.     Endocrine: Negative.    Genitourinary: Negative for difficulty urinating, dysuria, flank pain and hematuria.     Musculoskeletal: Negative.    Skin: Negative for rash and wound.   Allergic/Immunologic: Negative.      Neurological: Negative for dizziness, syncope, weakness,   No light-headedness  No  headaches.     Hematological: Does not bruise/bleed easily.     Psychiatric/Behavioral: Negative for agitation or behavioral problems,   No confusion,   the patient is  nervous/anxious.       History:   Past Medical History:   Diagnosis Date   • CHF (congestive heart failure) (CMS/HCC)    • Coronary stent occlusion    • Diabetes mellitus (CMS/HCC)    • Hyperlipidemia    • Hypertension    • Stroke (CMS/HCC)      Past Surgical History:   Procedure Laterality Date   • BLADDER SURGERY     • ESOPHAGECTOMY       Social History     Social History   • Marital status:      Spouse name: N/A   • Number of children: N/A   • Years of education: N/A     Occupational History   • Not on file.      Social History Main Topics   • Smoking status: Current Every Day Smoker   • Smokeless tobacco: Never Used   • Alcohol use No   • Drug use: No   • Sexual activity: Not on file     Other Topics Concern   • Not on file     Social History Narrative   • No narrative on file     Family History   Problem Relation Age of Onset   • No Known Problems Father    • No Known Problems Mother        Labs:  WBC WBC   Date Value Ref Range Status   08/17/2018 3.71 (L) 4.80 - 10.80 10*3/mm3 Final      HGB Hemoglobin   Date Value Ref Range Status   08/17/2018 8.7 (L) 14.0 - 18.0 g/dL Final      HCT Hematocrit   Date Value Ref Range Status   08/17/2018 27.3 (L) 40.0 - 52.0 % Final      Platelets Platelets   Date Value Ref Range Status   08/17/2018 130 130 - 400 10*3/mm3 Final      MCV MCV   Date Value Ref Range Status   08/17/2018 86.7 82.0 - 95.0 fL Final        Results from last 7 days  Lab Units 08/18/18  0435 08/17/18  0535 08/15/18  0542  08/12/18  0557   SODIUM mmol/L 137 140 143  < > 144   POTASSIUM mmol/L 4.2 4.3 4.5  < > 4.2   CHLORIDE mmol/L 100 105 109  < > 110   CO2 mmol/L 27.0 24.0 24.0  < > 21.0*   BUN mg/dL 46* 42* 44*  < > 45*   CREATININE mg/dL 3.19* 3.01* 3.08*  < > 2.92*   CALCIUM mg/dL 8.4 8.6 8.3*  < > 8.4   BILIRUBIN mg/dL  --   --   --   --  0.6   ALK PHOS U/L  --   --   --   --  83   ALT (SGPT) U/L  --   --   --   --  26   AST (SGOT) U/L  --   --   --   --  7   GLUCOSE mg/dL 129* 198* 124*  < > 139*   < > = values in this interval not displayed.No results found for: CKTOTAL, CKMB, CKMBINDEX, TROPONINI, TROPONINT  PT/INR:    Protime   Date Value Ref Range Status   08/17/2018 14.5 11.9 - 14.6 Seconds Final   /  INR   Date Value Ref Range Status   08/17/2018 1.09 0.91 - 1.09 Final       Imaging Results (last 72 hours)     Procedure Component Value Units Date/Time    XR Chest 1 View [[id number]] Collected:  08/17/18 1411     Updated:  08/17/18 1418    Narrative:       HISTORY: Postthoracentesis on the right      CXR: Frontal view the chest is performed.     COMPARISON: 8/16/2018     FINDINGS: The lungs are hyperinflated. There is no appreciable  pneumothorax following right-sided thoracentesis. Patient has undergone  previous esophagectomy with gastric pull-through.  There is no focal  parenchymal consolidation. There is thoracic aortic calcification.  Mediastinal contours are similar. Small left and trace right pleural  effusions suspected on the current exam. There is a right convexity  thoracic spine.       Impression:       1. No pneumothorax following right-sided thoracentesis. Hyperinflated  lungs. There are small left and trace right pleural effusions suspected.  Probable basilar atelectasis. Prior esophagectomy.  This report was finalized on 08/17/2018 14:15 by Dr. Sheri Johnson MD.    US Thoracentesis [[id number]] Collected:  08/17/18 1346    Specimen:  Body Fluid Updated:  08/17/18 1353    Narrative:       EXAMINATION: US THORACENTESIS- 8/17/2018 1:46 PM CDT     HISTORY: Shortness breath, history of bladder cancer     COMPARISON: Chest radiograph 8/16/2018     PREPROCEDURE: The risks, benefits, and alternatives to the procedure  were discussed in detail with the patient, including, but not limited  to, bleeding, infection, and damage to surrounding structures. Specific  risk of possible pneumothorax with chest tube placement was discussed.  The patient verbalized understanding of these risks, and informed  consent was signed. All questions were answered prior to the procedure.  A timeout was performed prior to the procedure.     PROCEDURE: An appropriate entry site for thoracentesis was marked over  the posterior right chest under ultrasound guidance. This area was  prepped and draped in the usual sterile fashion. 1% lidocaine was used  for local anesthesia. Next, a 5 Sinhala ThemBideh catheter was advanced into  the pleural space without difficulty. Aspiration was performed with  removal of 450 mL of clear,  straw-colored fluid. The catheter was then  removed. The patient tolerated the procedure well, and there were no  immediate complications. A post procedure chest radiograph is pending.       Impression:       Successful ultrasound-guided right thoracentesis yielding  450 mL of clear, straw-colored fluid.     This report was finalized on 08/17/2018 13:50 by Dr. Florentino Rodgers MD.    US Chest [[id number]] Updated:  08/17/18 1343    XR Chest 2 View [[id number]] Collected:  08/16/18 1727     Updated:  08/16/18 1732    Narrative:       EXAMINATION: Chest 2 views 8/16/2018     HISTORY: Preprocedure thoracentesis     FINDINGS: Upright frontal and lateral projection of the chest  demonstrate small effusions with minimal blunting of the lateral  costophrenic angles. The posterior angles are blunted. These are  unchanged from the previous study of 8/15/2018. They are diminished from  a previous study of 8/8/2018.       Impression:       . Small bilateral pleural effusions. The lungs are otherwise  clear.  This report was finalized on 08/16/2018 17:29 by Dr. Slava Snider MD.    XR Chest PA & Lateral [[id number]] Collected:  08/15/18 1131     Updated:  08/15/18 1135    Narrative:       EXAMINATION: XR CHEST PA AND LATERAL-     8/15/2018 11:21 AM CDT     HISTORY: follow-up effusions/infiltrates; Z72.0-Tobacco use.     2 view chest x-ray compared with 08/10/2018.     Stable heart size.  Aortic arch calcification.     Improved aeration of the retrocardiac left lower lobe compatible with  resolution of infiltrate or atelectasis.     Chronic interstitial lung disease with mild hyperexpansion.     Trace pleural fluid is decreased and there may be a component of pleural  thickening.     There is no new or increased lung disease.     Summary:  1. Chronic lung changes with decreased basilar infiltrate/atelectasis  and decreased trace pleural fluid.  2. No new abnormality.  This report was finalized on 08/15/2018 11:32 by Dr. Linares  MD [place].          Objective     Allergies   Allergen Reactions   • Sulfa Antibiotics        Medication Review: Performed  Current Facility-Administered Medications   Medication Dose Route Frequency Provider Last Rate Last Dose   • aspirin chewable tablet 81 mg  81 mg Oral Daily Arcenio Ahn MD   81 mg at 08/17/18 0834   • atorvastatin (LIPITOR) tablet 80 mg  80 mg Oral Daily Arcenio Ahn MD   80 mg at 08/17/18 0834   • budesonide-formoterol (SYMBICORT) 160-4.5 MCG/ACT inhaler 2 puff  2 puff Inhalation BID - RT Farooq Edwards MD   2 puff at 08/18/18 0753   • bumetanide (BUMEX) tablet 2 mg  2 mg Oral BID Scott Soto MD   2 mg at 08/17/18 1730   • calcitriol (ROCALTROL) capsule 0.5 mcg  0.5 mcg Oral Daily Scott Soto MD   0.5 mcg at 08/17/18 1115   • calcium carb-cholecalciferol 600-800 MG-UNIT tablet 1 tablet  1 tablet Oral Daily Arcenio Ahn MD   1 tablet at 08/17/18 0834   • dextrose (D50W) solution 25 g  25 g Intravenous Q15 Min PRN Arcenio Ahn MD       • dextrose (GLUTOSE) oral gel 15 g  15 g Oral Q15 Min PRN Arcenio Ahn MD       • epoetin efraín (EPOGEN,PROCRIT) injection 10,000 Units  10,000 Units Subcutaneous Weekly Scott Soto MD   10,000 Units at 08/17/18 1731   • Ferric Citrate tablet 210 mg  210 mg Oral TID With Meals Samson Aragon MD   210 mg at 08/17/18 1730   • glucagon (human recombinant) (GLUCAGEN DIAGNOSTIC) injection 1 mg  1 mg Subcutaneous PRN Arcenio Ahn MD       • guaiFENesin (MUCINEX) 12 hr tablet 1,200 mg  1,200 mg Oral Q12H Oleg Madrid DO   1,200 mg at 08/17/18 2018   • HYDROcodone-acetaminophen (NORCO)  MG per tablet 1 tablet  1 tablet Oral Q4H PRN Arcenio Ahn MD   1 tablet at 08/17/18 2127   • insulin lispro (humaLOG) injection 2-7 Units  2-7 Units Subcutaneous 4x Daily With Meals & Nightly Arcenio Ahn MD   4 Units at 08/17/18 2018   • ipratropium-albuterol (DUO-NEB)  "nebulizer solution 3 mL  3 mL Nebulization Q4H PRN Oleg Madrid DO       • megestrol (MEGACE) 40 MG/ML suspension 800 mg  800 mg Oral Daily Oleg Madrid DO   800 mg at 08/15/18 1649   • metoprolol tartrate (LOPRESSOR) tablet 25 mg  25 mg Oral Q12H Arcenio Ahn MD   25 mg at 08/17/18 2018   • naloxone (NARCAN) injection 0.4 mg  0.4 mg Intravenous Q5 Min PRN Arcenio Ahn MD       • nicotine (NICODERM CQ) 14 MG/24HR patch 1 patch  1 patch Transdermal Q24H Farooq Edwards MD   1 patch at 08/17/18 2018   • nystatin (MYCOSTATIN) 420685 UNIT/ML suspension 500,000 Units  5 mL Oral 4x Daily Oleg Madrid DO   500,000 Units at 08/15/18 0842   • ondansetron (ZOFRAN) injection 4 mg  4 mg Intravenous Q6H PRN Arcenio Ahn MD       • sodium bicarbonate tablet 325 mg  325 mg Oral BID Scott Soto MD   325 mg at 08/17/18 2018   • sodium chloride 0.9 % flush 1-10 mL  1-10 mL Intravenous PRN Arcenio Ahn MD   10 mL at 08/16/18 0612       Vital Sign Min/Max for last 24 hours  Temp  Min: 97.7 °F (36.5 °C)  Max: 98.4 °F (36.9 °C)   BP  Min: 88/43  Max: 109/62   Pulse  Min: 72  Max: 109   Resp  Min: 16  Max: 18   SpO2  Min: 97 %  Max: 100 %   No Data Recorded   Weight  Min: 43.6 kg (96 lb 3.2 oz)  Max: 43.6 kg (96 lb 3.2 oz)     Flowsheet Rows      First Filed Value   Admission Height  170.2 cm (67.01\") Documented at 08/08/2018 0331   Admission Weight  48.8 kg (107 lb 8 oz) Documented at 08/08/2018 0331          Results for orders placed during the hospital encounter of 08/08/18   Adult Transthoracic Echo Complete W/ Cont if Necessary Per Protocol    Narrative · Left ventricular systolic function is moderately decreased. Estimated EF   appears to be in the range of 31 - 35%.  · Left ventricular diastolic dysfunction (grade I) consistent with   impaired relaxation.  · Left ventricular wall thickness is consistent with moderate posterior   asymmetric hypertrophy.  · Mild mitral " valve regurgitation is present  · Mild to moderate aortic valve regurgitation is present.  · Mild aortic valve stenosis is present.          Physical Exam:    General Appearance: Awake, alert, in no acute distress  Eyes: Pupils equal and reactive    Ears: Appear intact with no abnormalities noted  Nose: Nares normal, no drainage  Neck: supple, trachea midline, no carotid bruit and no JVD  Back: no kyphosis present,    Lungs: respirations regular, respirations even and respirations unlabored    Heart: normal S1, S2,  2/6 pansystolic murmur in the left sternal border,  no rub and no click    Abdomen: normal bowel sounds, no tenderness   Skin: no bleeding, bruising or rash  Extremities: no cyanosis  Psychiatric/Behavioral: Negative for agitation, behavioral problems, confusion, the patient does  appear to be nervous/anxious.          Results Review:   I reviewed the patient's new clinical results.  I reviewed the patient's new imaging results and agree with the interpretation.  I reviewed the patient's other test results and agree with the interpretation  I personally viewed and interpreted the patient's EKG/Telemetry data  Discussed with patient    Reviewed available prior notes, consults, prior visits, laboratory findings, radiology and cardiology relevant reports. Updated chart as applicable. I have reviewed the patient's medical history in detail and updated the computerized patient record as relevant.    Updated patient regarding any new or relevant abnormalities on review of records or any new findings on physical exam. Mentioned to patient about purpose of visit and desirable health short and long term goals and objectives.     Assessment/Plan     Active Problems:    Volume overload  Chronic severe left ventricular systolic dysfunction  Chronic kidney disease stage IV  Pleural effusion  Aortic regurgitation aortic stenosis  Anemia  Tobacco abuse  Peripheral vascular disease  Issues with compliance to medical  therapy and dietary advice      Plan      Patient does not want any further cardiology evaluation  Treat medically  Refuses to wear telemetry  Does not want to consider any AED placement or defibrillator if required in future  Wants to be seen by [facility] and does not want follow-up with Dr. Bateman his cardiologist here  For the time being continue current medical therapy  Supportive care  Due to hypotension cannot add further medications for heart failure  Telemetry  Avoid excessive beta agonists  Optimal medical therapy  Deep vein thrombosis prophylaxis/precautions  Appropriate diet, fluid, sodium, caffeine, stimulants intake   Compliance to diet and medications   Avoid NSAIDS    Abdiel Stout MD  08/18/18  7:59 AM    EMR Dragon/Transcription was used to dictate part of this note

## 2018-08-18 NOTE — PLAN OF CARE
Problem: Patient Care Overview  Goal: Plan of Care Review  Outcome: Ongoing (interventions implemented as appropriate)   08/18/18 4069   Coping/Psychosocial   Plan of Care Reviewed With patient   Plan of Care Review   Progress no change   OTHER   Outcome Summary Patient c/o generalized pain, relief with PO pain meds. Thoracentesis removed 450 ml fluid, bandaid in place, CDI. AV fisula bruit and thrill noted, not used. Encouraged pt to eat. Ate 2 chocolate ice creams. Refused nystatin. No falls noted, up to BR. Continue to monitor.      Goal: Individualization and Mutuality  Outcome: Ongoing (interventions implemented as appropriate)    Goal: Discharge Needs Assessment  Outcome: Ongoing (interventions implemented as appropriate)    Goal: Interprofessional Rounds/Family Conf  Outcome: Ongoing (interventions implemented as appropriate)      Problem: Cardiac: Heart Failure (Adult)  Goal: Signs and Symptoms of Listed Potential Problems Will be Absent, Minimized or Managed (Cardiac: Heart Failure)  Outcome: Ongoing (interventions implemented as appropriate)      Problem: Fall Risk (Adult)  Goal: Absence of Fall  Outcome: Ongoing (interventions implemented as appropriate)

## 2018-08-20 LAB
CYTO UR: NORMAL
LAB AP CASE REPORT: NORMAL
PATH REPORT.FINAL DX SPEC: NORMAL
PATH REPORT.GROSS SPEC: NORMAL

## 2018-08-20 NOTE — PAYOR COMM NOTE
"DC HOME 8-18-18  UR PHONE       Shiela Martinez (73 y.o. Male)     Date of Birth Social Security Number Address Home Phone MRN    [id number]  [id number] [address] [phone] [id number]    Gnosticist Marital Status          Rastafarian        Admission Date Admission Type Admitting Provider Attending Provider Department, Room/Bed    8/8/18 Urgent Farooq Edwards MD  [facility] 4B, 409/1    Discharge Date Discharge Disposition Discharge Destination        8/18/2018 Home or Self Care              Attending Provider:  (none)   Allergies:  Sulfa Antibiotics    Isolation:  None   Infection:  None   Code Status:  Prior    Ht:  170.2 cm (67.01\")   Wt:  43.6 kg (96 lb 3.2 oz)    Admission Cmt:  None   Principal Problem:  None                Active Insurance as of 8/8/2018     Primary Coverage     Payor Plan Insurance Group Employer/Plan Group    [employer] [id number]      Payor Plan Address Payor Plan Phone Number Effective From Effective To    [employer]  [phone] 8/6/2018     [address] [place] [id number]       Subscriber Name Subscriber Birth Date Member ID       SHIELA MARTINEZ 1945 [id number]                 Emergency Contacts      (Rel.) Home Phone Work Phone Mobile Phone    Familia Martinez (Son) [phone] -- --    Tracey Prajapati (Other) [phone] -- --               Discharge Summary      Farooq Edwards MD at 8/18/2018 10:40 AM              [facility] Medicine Services  DISCHARGE SUMMARY       Date of Admission: 8/8/2018  Date of Discharge:  8/18/2018  Primary Care Physician: Provider, No Known    Presenting Problem/History of Present Illness:  Volume overload [E87.70]     Final Discharge Diagnoses:  Hospital Problem List     Volume overload    Chronic kidney disease, stage 4 (severe) (CMS/HCC)    Chronic systolic congestive heart failure (CMS/HCC)    Pleural effusion    "     Discharge Diagnosis  1. Volume overload with pulmonary vascular congestion; suspect acute on chronic systolic congestive heart failuroe  2. Chronic kidney disease stage IV with recent AV fistula placed; [facility] [place] [place]  3. Diabetes mellitus type II insulin dependent with A1c 8.0  4. Hyperkalemia - improved  5. PVD  6. Tobacco abuse  7. Anemia, probably related to chronic renal disease.   8. Constipation.  9. Dense retrocardiac consolidation, likely compressive atelectasis but cannot rule out pneumonia.  10. Moderate to large bilateral pleural effusions by CT chest; now s/p right thoracentesis on 8/17 with 450ml removed  11.  History of AAA with endovascular stent graft     Consults: Nephrology and Cardiology    Procedures Performed:   Imaging Results (last 7 days)     Procedure Component Value Units Date/Time    XR Chest 1 View [[id number]] Collected:  08/17/18 1411     Updated:  08/17/18 1418    Narrative:       HISTORY: Postthoracentesis on the right     CXR: Frontal view the chest is performed.     COMPARISON: 8/16/2018     FINDINGS: The lungs are hyperinflated. There is no appreciable  pneumothorax following right-sided thoracentesis. Patient has undergone  previous esophagectomy with gastric pull-through.  There is no focal  parenchymal consolidation. There is thoracic aortic calcification.  Mediastinal contours are similar. Small left and trace right pleural  effusions suspected on the current exam. There is a right convexity  thoracic spine.       Impression:       1. No pneumothorax following right-sided thoracentesis. Hyperinflated  lungs. There are small left and trace right pleural effusions suspected.  Probable basilar atelectasis. Prior esophagectomy.  This report was finalized on 08/17/2018 14:15 by Dr. Sheri Johnson MD.    US Thoracentesis [[id number]] Collected:  08/17/18 1346    Specimen:  Body Fluid Updated:  08/17/18 1353    Narrative:       EXAMINATION: US  THORACENTESIS- 8/17/2018 1:46 PM CDT     HISTORY: Shortness breath, history of bladder cancer     COMPARISON: Chest radiograph 8/16/2018     PREPROCEDURE: The risks, benefits, and alternatives to the procedure  were discussed in detail with the patient, including, but not limited  to, bleeding, infection, and damage to surrounding structures. Specific  risk of possible pneumothorax with chest tube placement was discussed.  The patient verbalized understanding of these risks, and informed  consent was signed. All questions were answered prior to the procedure.  A timeout was performed prior to the procedure.     PROCEDURE: An appropriate entry site for thoracentesis was marked over  the posterior right chest under ultrasound guidance. This area was  prepped and draped in the usual sterile fashion. 1% lidocaine was used  for local anesthesia. Next, a 5 [place] [employer]" catheter was advanced into  the pleural space without difficulty. Aspiration was performed with  removal of 450 mL of clear, straw-colored fluid. The catheter was then  removed. The patient tolerated the procedure well, and there were no  immediate complications. A post procedure chest radiograph is pending.       Impression:       Successful ultrasound-guided right thoracentesis yielding  450 mL of clear, straw-colored fluid.     This report was finalized on 08/17/2018 13:50 by Dr. Florentino Rodgers MD.    US Chest [[id number]] Updated:  08/17/18 1343    XR Chest 2 View [[id number]] Collected:  08/16/18 1727     Updated:  08/16/18 1732    Narrative:       EXAMINATION: Chest 2 views 8/16/2018     HISTORY: Preprocedure thoracentesis     FINDINGS: Upright frontal and lateral projection of the chest  demonstrate small effusions with minimal blunting of the lateral  costophrenic angles. The posterior angles are blunted. These are  unchanged from the previous study of 8/15/2018. They are diminished from  a previous study of 8/8/2018.       Impression:       . Small  bilateral pleural effusions. The lungs are otherwise  clear.  This report was finalized on 08/16/2018 17:29 by Dr. Slava Snider MD.    XR Chest PA & Lateral [[id number]] Collected:  08/15/18 1131     Updated:  08/15/18 1135    Narrative:       EXAMINATION: XR CHEST PA AND LATERAL-     8/15/2018 11:21 AM CDT     HISTORY: follow-up effusions/infiltrates; Z72.0-Tobacco use.     2 view chest x-ray compared with 08/10/2018.     Stable heart size.  Aortic arch calcification.     Improved aeration of the retrocardiac left lower lobe compatible with  resolution of infiltrate or atelectasis.     Chronic interstitial lung disease with mild hyperexpansion.     Trace pleural fluid is decreased and there may be a component of pleural  thickening.     There is no new or increased lung disease.     Summary:  1. Chronic lung changes with decreased basilar infiltrate/atelectasis  and decreased trace pleural fluid.  2. No new abnormality.  This report was finalized on 08/15/2018 11:32 by Dr. Vijay Beach MD.    CT Chest Without Contrast [[id number]] Collected:  08/12/18 1526     Updated:  08/12/18 1534    Narrative:       CT CHEST without contrast dated 8/12/2018 3:14 PM CDT     HISTORY: Dyspnea chronic, prior xray     COMPARISON: Chest x-ray August 10, 2018     DLP: 160 mGy cm     TECHNIQUE: Serial helical tomographic images of the chest were acquired.  Bone and soft tissue algorithms were provided. Coronal reformatted  images were also provided for review.     FINDINGS:  The imaged portion of the neck and thyroid gland is unremarkable.      Moderate to large right pleural effusion moderate to large left pleural  effusion. This obscures the right and left lower lobe..    . The trachea  and bronchial tree are patent.     Extensive vascular calcifications present in the aortic arch and the  great vessels coronary stents and calcifications are noted. There is no  pericardial effusion. No enlarged axillary or mediastinal lymph  nodes  are present.      The osseous structures of the thorax and surrounding soft tissues are  unremarkable.     In the upper abdomen in the endovascular aortic stent graft is present  in an abdominal aortic aneurysm       Impression:       1. Moderate to large bilateral pleural effusions most likely this is  congestive failure  2. Endovascular aortic stent graft is present in an abdominal aortic  aneurysm the native aorta is 5 cm.        This report was finalized on 08/12/2018 15:31 by Dr. Duc Garibay MD.        Interpretation Summary of Echocardiogram    · Left ventricular systolic function is moderately decreased. Estimated EF appears to be in the range of 31 - 35%.  · Left ventricular diastolic dysfunction (grade I) consistent with impaired relaxation.  · Left ventricular wall thickness is consistent with moderate posterior asymmetric hypertrophy.  · Mild mitral valve regurgitation is present  · Mild to moderate aortic valve regurgitation is present.  · Mild aortic valve stenosis is present.       Pertinent Test Results:   Lab Results (last 24 hours)     Procedure Component Value Units Date/Time    Lactate Dehydrogenase, Body Fluid - Body Fluid, Pleural Cavity [[id number]] Collected:  08/17/18 1334    Specimen:  Body Fluid from Pleural Cavity Updated:  08/18/18 0910     LD, Fluid 111 IU/L      Comment:  __________________________________________________________  : Peritoneal :       Pleural        : Synovial :    CSF    :  :____________:______________________:__________:___________:  :            : Transudate: Exudate  :          :           :  :____________:___________:__________:__________:___________:  : Not Estab. : <200 U/L  : >200 U/L : <240 U/L : Not Estab.:  :____________:___________:__________:______________________:   The method performance specifications have not been   established for this test in body fluid. The test result   should be integrated into the clinical context for    interpretation.  The reference intervals and other method performance specifications  have not been established for this test. The test result should be  integrated into the clinical context for interpretation.       Narrative:       Performed at:  [address]  [id number]  : Sonu Dunne PhD, Phone:  [phone]    Protein, Body Fluid - Pleural Fluid, Pleural Cavity [[id number]] Collected:  08/17/18 1335    Specimen:  Pleural Fluid from Pleural Cavity Updated:  08/18/18 0910     Protein, Fluid 3.4 g/dL      Comment:  ________________________________________________________  :  Peritoneal  :       Pleural          :   Synovial     :  :______________:________________________:________________:  :              : Transudate :  Exudate  :                :  :______________:____________:___________:________________:  :  Not Estab.  :   <3 g/dL  :  >3 g/dL  :    <2.5 g/dL   :  :______________:____________:___________:________________:   The method performance specifications have not been   established for this test in body fluid. The test result   should be integrated into the clinical context for   interpretation.  The method performance specifications have not been established for  this test in body fluid.  The test result should be integrated into  the clinical context for interpretation.       Narrative:       Performed at:  [address] [facility]  [id number]  : Sonu Dunne PhD, Phone:  [phone]    Albumin, Fluid - Pleural Fluid, Pleural Cavity [[id number]] Collected:  08/17/18 1334    Specimen:  Pleural Fluid from Pleural Cavity Updated:  08/18/18 0910     Albumin, Fluid 2.1 g/dL      Comment:  ________________________________________________________  :  Peritoneal  :       Pleural          :   Synovial     :  :______________:________________________:________________:  :              : Transudate :  Exudate  :                 :  :______________:____________:___________:________________:  :  Not Estab.  : Not Estab. : Not Estab.:   Not Estab.   :  :______________:____________:___________:________________:   The method performance specifications have not been   established for this test in body fluid. The test result   should be integrated into the clinical context for   interpretation.  The reference intervals and other method performance specifications  have not been established for this test. The test result should be  integrated into the clinical context for interpretation.       Narrative:       Performed at:  [address] [place] [address]  [id number]  : Sonu Dunne PhD, Phone:  [phone]    POC Glucose Once [[id number]]  (Abnormal) Collected:  08/18/18 0811    Specimen:  Blood Updated:  08/18/18 0822     Glucose 138 (H) mg/dL      Comment: : [id number] Luisa EliciaMeter ID: [id number]       Basic Metabolic Panel [[id number]]  (Abnormal) Collected:  08/18/18 0435    Specimen:  Blood Updated:  08/18/18 0521     Glucose 129 (H) mg/dL      BUN 46 (H) mg/dL      Creatinine 3.19 (H) mg/dL      Sodium 137 mmol/L      Potassium 4.2 mmol/L      Chloride 100 mmol/L      CO2 27.0 mmol/L      Calcium 8.4 mg/dL      eGFR Non African Amer 19 (L) mL/min/1.73      BUN/Creatinine Ratio 14.4     Anion Gap 10.0 mmol/L     Narrative:       The MDRD GFR formula is only valid for adults with stable renal function between ages 18 and 70.    POC Glucose Once [[id number]]  (Abnormal) Collected:  08/17/18 2009    Specimen:  Blood Updated:  08/17/18 2038     Glucose 263 (H) mg/dL      Comment: : [id number] Albert Pa  GMeter ID: [id number]       POC Glucose Once [[id number]]  (Abnormal) Collected:  08/17/18 1702    Specimen:  Blood Updated:  08/17/18 1715     Glucose 218 (H) mg/dL      Comment: : [id number] [facility]UNC [facility] ChristinaMeter ID: [id number]       Body Fluid Cell Count With Differential - Body Fluid,  "Pleural Cavity [[id number]] Collected:  08/17/18 1334    Specimen:  Body Fluid from Pleural Cavity Updated:  08/17/18 1603    Narrative:       The following orders were created for panel order Body Fluid Cell Count With Differential - Body Fluid, Pleural Cavity.  Procedure                               Abnormality         Status                     ---------                               -----------         ------                     Body fluid cell count - ...[[id number]]  Abnormal            Final result               Body fluid differential ...[[id number]]                      Final result                 Please view results for these tests on the individual orders.    Body fluid differential - Body Fluid, [[id number]] Collected:  08/17/18 1334    Specimen:  Body Fluid from Pleural Cavity Updated:  08/17/18 1603     Neutrophils, Fluid 12 %      Lymphocytes, Fluid 43 %      Monocytes, Fluid 5 %      Unclassified Cells, Fluid 40 %     Non-gynecologic Cytology [[id number]] Collected:  08/17/18 1351    Specimen:  Body Fluid from Pleural Cavity Updated:  08/17/18 1522    Body fluid cell count - Body Fluid, [[id number]]  (Abnormal) Collected:  08/17/18 1334    Specimen:  Body Fluid from Pleural Cavity Updated:  08/17/18 1509     Color, Fluid Yellow     Appearance, Fluid Hazy (A)     WBC, Fluid 323 /mm3      RBC, Fluid 1,000 /mm3     Body Fluid Culture - Body Fluid, Pleural Cavity [[id number]] Collected:  08/17/18 1334    Specimen:  Body Fluid from Pleural Cavity Updated:  08/17/18 1342    POC Glucose Once [[id number]]  (Abnormal) Collected:  08/17/18 1115    Specimen:  Blood Updated:  08/17/18 1129     Glucose 267 (H) mg/dL      Comment: : [id number] Romero ID: [id number]             Chief Complaint on Day of Discharge: \"I want to go home today\"    Hospital Course:  The patient is a 73 y.o. male who presented to [facility] with a chief complaint of shortness of breath.  Patient has a known " history of stage IV chronic kidney disease and [facility] [place], [place].  In fact, he recently had a left upper extremity AV fistula placed.  He also has a known history of peripheral arterial disease, suspect chronic obstructive pulmonary disease, reported chronic systolic congestive heart failure, history of prior bladder cancer with urostomy, in addition to insulin-dependent diabetes.  He was initially admitted to the hospitalist service given concern for volume overload, and concern for progression to possible end-stage renal disease with questionable need for hemodialysis.    Nephrology was consult and followed along with us during this hospitalization.  Patient was started on a diuretic regimen, which is now been transitioned to oral Bumex.  Nephrology has reported that no dialysis indicated at this time, and would leave decision to begin dialysis as more of a last resort as at this time he is experiencing no signs of uremia, his electrolytes remained stable, and his volume status is also stabilized.  Nephrology did add Procrit for anemia management with recommendations for Procrit every week.  Patient did have bilateral pleural effusions which were seen on chest imaging, and did undergo a right-sided thoracentesis yesterday with removal of approximately 450 cc of pleural fluid.  By LDH criteria suspect this is likely a transudate effusion related to his known history of chronic systolic congestive heart failure in addition to stage IV chronic kidney disease.  Final results of pleural fluid culture and cytology are pending at this time and will need to be followed up on an outpatient basis.    Nephrology is also started patient on calcitriol during this hospitalization.  Patient reports that he is very eager for discharge home today, and reports that he R he has follow-up with [facility] [place], [place] on 08/28/2018.  He will need a  "repeat basic metabolic panel that time.  He will be discharged on once daily Bumex therapy with instructions to abide by a cardiac, renal, diabetic diet.    Patient is agreeable to home health services to include physical therapy, occupational therapy, in addition to nursing care to make sure the patient is taking his medications appropriately and to continue monitoring for cardiopulmonary assessments especially given his history of chronic systolic congestive heart failure.  An echocardiogram was performed during this hospitalization which reveals an ejection fraction of 31-35%.  Unfortunately, patient reported to Dr. Stout that he does not want any further cardiology evaluation.  He refuses to wear telemetry.  He will not consider AICD if required in the future per Dr. Stout's progress note.  He does not want follow-up by cardiology here at [facility], but [facility] [place].  He is tolerating beta blocker at this time, but we have been unable to add an ACE inhibitor or ARB given BP trend.      This morning upon entering the room patient reports \"Doc, I want to go home.\"  He reports that his breathing is much better as compared to his symptoms upon admission.  He reports that he will work with home health on an outpatient basis, with efforts to continue to regain his strength, and he readily admits that he does have a component of generalized weakness and deconditioning related to his chronic medical problems and comorbidities.  He reports that he will follow up closely with [facility] [place], [place] on an outpatient basis.  I would like patient to follow-up with his primary care provider within the next 1 week, and fortunately patient reports he R he has follow-up established with his nephrologist in [place] on 08/28/2018.      Condition on Discharge:  Medically stable    Physical Exam on Discharge:  BP 96/49 (BP Location: Right arm, " "Patient Position: Sitting)   Pulse 101   Temp 97.7 °F (36.5 °C) (Oral)   Resp 18   Ht 170.2 cm (67.01\")   Wt 43.6 kg (96 lb 3.2 oz)   SpO2 95%   BMI 15.06 kg/m²    Physical Exam  No acute distress, not requiring any supplemental oxygen, resting comfortably in bed and eager for discharge home, no significant wheezes, rhonchi, or Rales, but does have some mildly diminished breath sounds at bases.    Discharge Disposition:  Home or Self Care    Discharge Medications:     Discharge Medications      New Medications      Instructions Start Date   budesonide-formoterol 160-4.5 MCG/ACT inhaler  Commonly known as:  SYMBICORT   2 puffs, Inhalation, 2 Times Daily - RT      bumetanide 2 MG tablet  Commonly known as:  BUMEX   2 mg, Oral, Daily      calcitriol 0.5 MCG capsule  Commonly known as:  ROCALTROL   0.5 mcg, Oral, Daily      Ferric Citrate 1  MG(Fe) tablet  Commonly known as:  AURYXIA   210 mg, Oral, 3 Times Daily With Meals      sodium bicarbonate 325 MG tablet   325 mg, Oral, 2 Times Daily         Continue These Medications      Instructions Start Date   aspirin 81 MG chewable tablet   81 mg, Oral, Daily      atorvastatin 80 MG tablet  Commonly known as:  LIPITOR   80 mg, Oral, Daily      calcium carbonate 600 MG tablet  Commonly known as:  OS-TASHI   600 mg, Oral, Daily      HYDROcodone-acetaminophen  MG per tablet  Commonly known as:  NORCO   1 tablet, Oral, Every 6 Hours PRN      insulin lispro 100 UNIT/ML injection  Commonly known as:  humaLOG   6 Units, Subcutaneous, 3 Times Daily Before Meals      metoprolol tartrate 25 MG tablet  Commonly known as:  LOPRESSOR   25 mg, Oral, 2 Times Daily         Stop These Medications    insulin aspart 100 UNIT/ML injection  Commonly known as:  novoLOG            Discharge Diet: diabetic, renal, cardiac diet    Activity at Discharge: as tolerated    Discharge Care Plan/Instructions: Needs close outpatient follow-up within 1 week    Follow-up Appointments: "   Patient reports that he has follow-up established with his nephrologist in [place], [place] on 08/28/2018.  I would like him to have follow-up with his primary care provider within one week as well for post hospitalization assessment regarding the above mentioned issues        Farooq Edwards MD  08/18/18  10:40 AM    Time: 35 min        Electronically signed by Farooq Edwards MD at 8/18/2018 10:55 AM

## 2018-08-22 LAB
BACTERIA FLD CULT: NORMAL
GRAM STN SPEC: NORMAL
GRAM STN SPEC: NORMAL

## 2018-08-24 ENCOUNTER — APPOINTMENT (OUTPATIENT)
Dept: PULMONOLOGY | Facility: HOSPITAL | Age: 73
End: 2018-08-24
Attending: INTERNAL MEDICINE

## 2018-10-06 ENCOUNTER — HOSPITAL ENCOUNTER (INPATIENT)
Facility: HOSPITAL | Age: 73
LOS: 19 days | Discharge: NURSING, REHAB OR LONG-TERM CARE | End: 2018-10-25
Attending: INTERNAL MEDICINE | Admitting: FAMILY MEDICINE

## 2018-10-06 ENCOUNTER — APPOINTMENT (OUTPATIENT)
Dept: CARDIOLOGY | Facility: HOSPITAL | Age: 73
End: 2018-10-06
Attending: INTERNAL MEDICINE

## 2018-10-06 ENCOUNTER — APPOINTMENT (OUTPATIENT)
Dept: CT IMAGING | Facility: HOSPITAL | Age: 73
End: 2018-10-06

## 2018-10-06 DIAGNOSIS — Z78.9 IMPAIRED MOBILITY AND ADLS: ICD-10-CM

## 2018-10-06 DIAGNOSIS — Z74.09 IMPAIRED MOBILITY AND ADLS: ICD-10-CM

## 2018-10-06 DIAGNOSIS — R13.19 ESOPHAGEAL DYSPHAGIA: Primary | ICD-10-CM

## 2018-10-06 DIAGNOSIS — N18.4 CHRONIC KIDNEY DISEASE, STAGE 4 (SEVERE) (HCC): ICD-10-CM

## 2018-10-06 DIAGNOSIS — Z74.09 IMPAIRED FUNCTIONAL MOBILITY, BALANCE, GAIT, AND ENDURANCE: ICD-10-CM

## 2018-10-06 PROBLEM — A41.9 SEPSIS (HCC): Status: ACTIVE | Noted: 2018-10-06

## 2018-10-06 LAB
ALBUMIN SERPL-MCNC: 3.1 G/DL (ref 3.5–5)
ALBUMIN/GLOB SERPL: 1.1 G/DL (ref 1.1–2.5)
ALP SERPL-CCNC: 66 U/L (ref 24–120)
ALT SERPL W P-5'-P-CCNC: 30 U/L (ref 0–54)
ANION GAP SERPL CALCULATED.3IONS-SCNC: 11 MMOL/L (ref 4–13)
ARTERIAL PATENCY WRIST A: ABNORMAL
AST SERPL-CCNC: 20 U/L (ref 7–45)
ATMOSPHERIC PRESS: 752 MMHG
BASE EXCESS BLDA CALC-SCNC: -9.2 MMOL/L (ref 0–2)
BASOPHILS # BLD AUTO: 0.01 10*3/MM3 (ref 0–0.2)
BASOPHILS NFR BLD AUTO: 0.1 % (ref 0–2)
BDY SITE: ABNORMAL
BH CV ECHO MEAS - AI DEC SLOPE: 395 CM/SEC^2
BH CV ECHO MEAS - AI MAX PG: 57.8 MMHG
BH CV ECHO MEAS - AI MAX VEL: 380 CM/SEC
BH CV ECHO MEAS - AI P1/2T: 281.8 MSEC
BH CV ECHO MEAS - AO MAX PG (FULL): 19.1 MMHG
BH CV ECHO MEAS - AO MAX PG: 23.8 MMHG
BH CV ECHO MEAS - AO MEAN PG (FULL): 13 MMHG
BH CV ECHO MEAS - AO MEAN PG: 15 MMHG
BH CV ECHO MEAS - AO ROOT AREA (BSA CORRECTED): 1.9
BH CV ECHO MEAS - AO ROOT AREA: 6.6 CM^2
BH CV ECHO MEAS - AO ROOT DIAM: 2.9 CM
BH CV ECHO MEAS - AO V2 MAX: 244 CM/SEC
BH CV ECHO MEAS - AO V2 MEAN: 178 CM/SEC
BH CV ECHO MEAS - AO V2 VTI: 46.4 CM
BH CV ECHO MEAS - AVA(I,A): 1.1 CM^2
BH CV ECHO MEAS - AVA(I,D): 1.1 CM^2
BH CV ECHO MEAS - AVA(V,A): 1.3 CM^2
BH CV ECHO MEAS - AVA(V,D): 1.3 CM^2
BH CV ECHO MEAS - BSA(HAYCOCK): 1.5 M^2
BH CV ECHO MEAS - BSA: 1.5 M^2
BH CV ECHO MEAS - BZI_BMI: 18.4 KILOGRAMS/M^2
BH CV ECHO MEAS - BZI_METRIC_HEIGHT: 162.6 CM
BH CV ECHO MEAS - BZI_METRIC_WEIGHT: 48.5 KG
BH CV ECHO MEAS - EDV(MOD-SP4): 121 ML
BH CV ECHO MEAS - EF(MOD-SP4): 36.7 %
BH CV ECHO MEAS - ESV(MOD-SP4): 76.6 ML
BH CV ECHO MEAS - LV DIASTOLIC VOL/BSA (35-75): 80.7 ML/M^2
BH CV ECHO MEAS - LV MAX PG: 4.7 MMHG
BH CV ECHO MEAS - LV MEAN PG: 2 MMHG
BH CV ECHO MEAS - LV SYSTOLIC VOL/BSA (12-30): 51.1 ML/M^2
BH CV ECHO MEAS - LV V1 MAX: 108 CM/SEC
BH CV ECHO MEAS - LV V1 MEAN: 61.8 CM/SEC
BH CV ECHO MEAS - LV V1 VTI: 18.4 CM
BH CV ECHO MEAS - LVLD AP4: 8.3 CM
BH CV ECHO MEAS - LVLS AP4: 7.5 CM
BH CV ECHO MEAS - LVOT AREA (M): 2.8 CM^2
BH CV ECHO MEAS - LVOT AREA: 2.8 CM^2
BH CV ECHO MEAS - LVOT DIAM: 1.9 CM
BH CV ECHO MEAS - MV A MAX VEL: 128 CM/SEC
BH CV ECHO MEAS - MV DEC TIME: 0.1 SEC
BH CV ECHO MEAS - MV E MAX VEL: 106 CM/SEC
BH CV ECHO MEAS - MV E/A: 0.83
BH CV ECHO MEAS - RAP SYSTOLE: 5 MMHG
BH CV ECHO MEAS - RVSP: 29.8 MMHG
BH CV ECHO MEAS - SI(AO): 204.4 ML/M^2
BH CV ECHO MEAS - SI(LVOT): 34.8 ML/M^2
BH CV ECHO MEAS - SI(MOD-SP4): 29.6 ML/M^2
BH CV ECHO MEAS - SV(AO): 306.5 ML
BH CV ECHO MEAS - SV(LVOT): 52.2 ML
BH CV ECHO MEAS - SV(MOD-SP4): 44.4 ML
BH CV ECHO MEAS - TR MAX VEL: 249 CM/SEC
BILIRUB SERPL-MCNC: 0.5 MG/DL (ref 0.1–1)
BODY TEMPERATURE: 37 C
BUN BLD-MCNC: 46 MG/DL (ref 5–21)
BUN/CREAT SERPL: 12.9 (ref 7–25)
CALCIUM SPEC-SCNC: 8.1 MG/DL (ref 8.4–10.4)
CHLORIDE SERPL-SCNC: 113 MMOL/L (ref 98–110)
CO2 SERPL-SCNC: 17 MMOL/L (ref 24–31)
CREAT BLD-MCNC: 3.57 MG/DL (ref 0.5–1.4)
D-LACTATE SERPL-SCNC: 1 MMOL/L (ref 0.5–2)
DEPRECATED RDW RBC AUTO: 58 FL (ref 40–54)
EOSINOPHIL # BLD AUTO: 0 10*3/MM3 (ref 0–0.7)
EOSINOPHIL NFR BLD AUTO: 0 % (ref 0–4)
ERYTHROCYTE [DISTWIDTH] IN BLOOD BY AUTOMATED COUNT: 18.2 % (ref 12–15)
GFR SERPL CREATININE-BSD FRML MDRD: 17 ML/MIN/1.73
GLOBULIN UR ELPH-MCNC: 2.7 GM/DL
GLUCOSE BLD-MCNC: 170 MG/DL (ref 70–100)
GLUCOSE BLDC GLUCOMTR-MCNC: 201 MG/DL (ref 70–130)
GLUCOSE BLDC GLUCOMTR-MCNC: 249 MG/DL (ref 70–130)
HBA1C MFR BLD: 8.1 %
HCO3 BLDA-SCNC: 15.2 MMOL/L (ref 20–26)
HCT VFR BLD AUTO: 27 % (ref 40–52)
HGB BLD-MCNC: 8.6 G/DL (ref 14–18)
IMM GRANULOCYTES # BLD: 0.03 10*3/MM3 (ref 0–0.03)
IMM GRANULOCYTES NFR BLD: 0.3 % (ref 0–5)
LV EF 2D ECHO EST: 37 %
LYMPHOCYTES # BLD AUTO: 0.4 10*3/MM3 (ref 0.72–4.86)
LYMPHOCYTES NFR BLD AUTO: 4.7 % (ref 15–45)
Lab: ABNORMAL
MCH RBC QN AUTO: 28 PG (ref 28–32)
MCHC RBC AUTO-ENTMCNC: 31.9 G/DL (ref 33–36)
MCV RBC AUTO: 87.9 FL (ref 82–95)
MODALITY: ABNORMAL
MONOCYTES # BLD AUTO: 0.36 10*3/MM3 (ref 0.19–1.3)
MONOCYTES NFR BLD AUTO: 4.2 % (ref 4–12)
NEUTROPHILS # BLD AUTO: 7.78 10*3/MM3 (ref 1.87–8.4)
NEUTROPHILS NFR BLD AUTO: 90.7 % (ref 39–78)
NRBC BLD MANUAL-RTO: 0 /100 WBC (ref 0–0)
PCO2 BLDA: 27 MM HG (ref 35–45)
PH BLDA: 7.36 PH UNITS (ref 7.35–7.45)
PLATELET # BLD AUTO: 136 10*3/MM3 (ref 130–400)
PMV BLD AUTO: 10 FL (ref 6–12)
PO2 BLDA: 70.3 MM HG (ref 83–108)
POTASSIUM BLD-SCNC: 3.6 MMOL/L (ref 3.5–5.3)
PROCALCITONIN SERPL-MCNC: 8.86 NG/ML
PROT SERPL-MCNC: 5.8 G/DL (ref 6.3–8.7)
RBC # BLD AUTO: 3.07 10*6/MM3 (ref 4.8–5.9)
SAO2 % BLDCOA: 93.2 % (ref 94–99)
SODIUM BLD-SCNC: 141 MMOL/L (ref 135–145)
TROPONIN I SERPL-MCNC: 3.71 NG/ML (ref 0–0.03)
TROPONIN I SERPL-MCNC: 4.09 NG/ML (ref 0–0.03)
TROPONIN I SERPL-MCNC: 4.65 NG/ML (ref 0–0.03)
TROPONIN I SERPL-MCNC: 5.08 NG/ML (ref 0–0.03)
TROPONIN I SERPL-MCNC: 5.23 NG/ML (ref 0–0.03)
VENTILATOR MODE: ABNORMAL
WBC NRBC COR # BLD: 8.58 10*3/MM3 (ref 4.8–10.8)

## 2018-10-06 PROCEDURE — 25010000002 HEPARIN (PORCINE) PER 1000 UNITS: Performed by: INTERNAL MEDICINE

## 2018-10-06 PROCEDURE — 93308 TTE F-UP OR LMTD: CPT | Performed by: INTERNAL MEDICINE

## 2018-10-06 PROCEDURE — 93325 DOPPLER ECHO COLOR FLOW MAPG: CPT | Performed by: INTERNAL MEDICINE

## 2018-10-06 PROCEDURE — 84484 ASSAY OF TROPONIN QUANT: CPT | Performed by: INTERNAL MEDICINE

## 2018-10-06 PROCEDURE — 94799 UNLISTED PULMONARY SVC/PX: CPT

## 2018-10-06 PROCEDURE — 93321 DOPPLER ECHO F-UP/LMTD STD: CPT | Performed by: INTERNAL MEDICINE

## 2018-10-06 PROCEDURE — 63710000001 INSULIN LISPRO (HUMAN) PER 5 UNITS: Performed by: INTERNAL MEDICINE

## 2018-10-06 PROCEDURE — 84145 PROCALCITONIN (PCT): CPT | Performed by: INTERNAL MEDICINE

## 2018-10-06 PROCEDURE — 93010 ELECTROCARDIOGRAM REPORT: CPT | Performed by: INTERNAL MEDICINE

## 2018-10-06 PROCEDURE — 25010000002 PIPERACILLIN SOD-TAZOBACTAM PER 1 G: Performed by: INTERNAL MEDICINE

## 2018-10-06 PROCEDURE — 84550 ASSAY OF BLOOD/URIC ACID: CPT | Performed by: INTERNAL MEDICINE

## 2018-10-06 PROCEDURE — 94640 AIRWAY INHALATION TREATMENT: CPT

## 2018-10-06 PROCEDURE — 83970 ASSAY OF PARATHORMONE: CPT | Performed by: INTERNAL MEDICINE

## 2018-10-06 PROCEDURE — 94760 N-INVAS EAR/PLS OXIMETRY 1: CPT

## 2018-10-06 PROCEDURE — 93321 DOPPLER ECHO F-UP/LMTD STD: CPT

## 2018-10-06 PROCEDURE — 93005 ELECTROCARDIOGRAM TRACING: CPT | Performed by: INTERNAL MEDICINE

## 2018-10-06 PROCEDURE — 83605 ASSAY OF LACTIC ACID: CPT | Performed by: INTERNAL MEDICINE

## 2018-10-06 PROCEDURE — 83550 IRON BINDING TEST: CPT | Performed by: INTERNAL MEDICINE

## 2018-10-06 PROCEDURE — 36600 WITHDRAWAL OF ARTERIAL BLOOD: CPT

## 2018-10-06 PROCEDURE — 87081 CULTURE SCREEN ONLY: CPT | Performed by: INTERNAL MEDICINE

## 2018-10-06 PROCEDURE — 83540 ASSAY OF IRON: CPT | Performed by: INTERNAL MEDICINE

## 2018-10-06 PROCEDURE — 25010000002 VANCOMYCIN 10 G RECONSTITUTED SOLUTION: Performed by: INTERNAL MEDICINE

## 2018-10-06 PROCEDURE — 80053 COMPREHEN METABOLIC PANEL: CPT | Performed by: INTERNAL MEDICINE

## 2018-10-06 PROCEDURE — 71250 CT THORAX DX C-: CPT

## 2018-10-06 PROCEDURE — 93308 TTE F-UP OR LMTD: CPT

## 2018-10-06 PROCEDURE — 82962 GLUCOSE BLOOD TEST: CPT

## 2018-10-06 PROCEDURE — 83036 HEMOGLOBIN GLYCOSYLATED A1C: CPT | Performed by: INTERNAL MEDICINE

## 2018-10-06 PROCEDURE — 93325 DOPPLER ECHO COLOR FLOW MAPG: CPT

## 2018-10-06 PROCEDURE — 85025 COMPLETE CBC W/AUTO DIFF WBC: CPT | Performed by: INTERNAL MEDICINE

## 2018-10-06 PROCEDURE — 99222 1ST HOSP IP/OBS MODERATE 55: CPT | Performed by: INTERNAL MEDICINE

## 2018-10-06 PROCEDURE — 25010000002 CEFEPIME PER 500 MG: Performed by: INTERNAL MEDICINE

## 2018-10-06 PROCEDURE — 82803 BLOOD GASES ANY COMBINATION: CPT

## 2018-10-06 PROCEDURE — 87040 BLOOD CULTURE FOR BACTERIA: CPT | Performed by: INTERNAL MEDICINE

## 2018-10-06 RX ORDER — ATORVASTATIN CALCIUM 40 MG/1
80 TABLET, FILM COATED ORAL DAILY
Status: DISCONTINUED | OUTPATIENT
Start: 2018-10-06 | End: 2018-10-25 | Stop reason: HOSPADM

## 2018-10-06 RX ORDER — NICOTINE 21 MG/24HR
1 PATCH, TRANSDERMAL 24 HOURS TRANSDERMAL
Status: DISCONTINUED | OUTPATIENT
Start: 2018-10-07 | End: 2018-10-06

## 2018-10-06 RX ORDER — NICOTINE 21 MG/24HR
1 PATCH, TRANSDERMAL 24 HOURS TRANSDERMAL EVERY 24 HOURS
Status: DISCONTINUED | OUTPATIENT
Start: 2018-10-06 | End: 2018-10-25 | Stop reason: HOSPADM

## 2018-10-06 RX ORDER — LEVOFLOXACIN 5 MG/ML
750 INJECTION, SOLUTION INTRAVENOUS ONCE
Status: DISCONTINUED | OUTPATIENT
Start: 2018-10-06 | End: 2018-10-06

## 2018-10-06 RX ORDER — ASPIRIN 81 MG/1
81 TABLET, CHEWABLE ORAL DAILY
Status: DISCONTINUED | OUTPATIENT
Start: 2018-10-06 | End: 2018-10-07

## 2018-10-06 RX ORDER — BUDESONIDE AND FORMOTEROL FUMARATE DIHYDRATE 160; 4.5 UG/1; UG/1
2 AEROSOL RESPIRATORY (INHALATION)
Status: DISCONTINUED | OUTPATIENT
Start: 2018-10-06 | End: 2018-10-25 | Stop reason: HOSPADM

## 2018-10-06 RX ORDER — CALCITRIOL 0.25 UG/1
0.5 CAPSULE, LIQUID FILLED ORAL DAILY
Status: DISCONTINUED | OUTPATIENT
Start: 2018-10-06 | End: 2018-10-25 | Stop reason: HOSPADM

## 2018-10-06 RX ORDER — HYDROCODONE BITARTRATE AND ACETAMINOPHEN 10; 325 MG/1; MG/1
1 TABLET ORAL EVERY 6 HOURS PRN
Status: DISCONTINUED | OUTPATIENT
Start: 2018-10-06 | End: 2018-10-25 | Stop reason: HOSPADM

## 2018-10-06 RX ORDER — IPRATROPIUM BROMIDE AND ALBUTEROL SULFATE 2.5; .5 MG/3ML; MG/3ML
3 SOLUTION RESPIRATORY (INHALATION) EVERY 6 HOURS PRN
Status: DISCONTINUED | OUTPATIENT
Start: 2018-10-06 | End: 2018-10-09

## 2018-10-06 RX ORDER — ONDANSETRON 2 MG/ML
4 INJECTION INTRAMUSCULAR; INTRAVENOUS EVERY 6 HOURS PRN
Status: DISCONTINUED | OUTPATIENT
Start: 2018-10-06 | End: 2018-10-12

## 2018-10-06 RX ORDER — ACETAMINOPHEN 325 MG/1
650 TABLET ORAL EVERY 4 HOURS PRN
Status: DISCONTINUED | OUTPATIENT
Start: 2018-10-06 | End: 2018-10-25 | Stop reason: HOSPADM

## 2018-10-06 RX ORDER — HEPARIN SODIUM 5000 [USP'U]/ML
5000 INJECTION, SOLUTION INTRAVENOUS; SUBCUTANEOUS EVERY 12 HOURS SCHEDULED
Status: DISCONTINUED | OUTPATIENT
Start: 2018-10-06 | End: 2018-10-25 | Stop reason: HOSPADM

## 2018-10-06 RX ORDER — SODIUM CHLORIDE 0.9 % (FLUSH) 0.9 %
3 SYRINGE (ML) INJECTION EVERY 12 HOURS SCHEDULED
Status: DISCONTINUED | OUTPATIENT
Start: 2018-10-06 | End: 2018-10-25 | Stop reason: HOSPADM

## 2018-10-06 RX ORDER — LEVOFLOXACIN 5 MG/ML
500 INJECTION, SOLUTION INTRAVENOUS
Status: DISCONTINUED | OUTPATIENT
Start: 2018-10-08 | End: 2018-10-06

## 2018-10-06 RX ORDER — NICOTINE POLACRILEX 4 MG
15 LOZENGE BUCCAL
Status: DISCONTINUED | OUTPATIENT
Start: 2018-10-06 | End: 2018-10-25 | Stop reason: HOSPADM

## 2018-10-06 RX ORDER — DEXTROSE MONOHYDRATE 25 G/50ML
25 INJECTION, SOLUTION INTRAVENOUS
Status: DISCONTINUED | OUTPATIENT
Start: 2018-10-06 | End: 2018-10-25 | Stop reason: HOSPADM

## 2018-10-06 RX ORDER — BUMETANIDE 0.25 MG/ML
1 INJECTION INTRAMUSCULAR; INTRAVENOUS EVERY 8 HOURS
Status: DISCONTINUED | OUTPATIENT
Start: 2018-10-06 | End: 2018-10-07

## 2018-10-06 RX ORDER — SODIUM BICARBONATE 650 MG/1
325 TABLET ORAL 2 TIMES DAILY
Status: DISCONTINUED | OUTPATIENT
Start: 2018-10-06 | End: 2018-10-08

## 2018-10-06 RX ORDER — SODIUM CHLORIDE 0.9 % (FLUSH) 0.9 %
3-10 SYRINGE (ML) INJECTION AS NEEDED
Status: DISCONTINUED | OUTPATIENT
Start: 2018-10-06 | End: 2018-10-25 | Stop reason: HOSPADM

## 2018-10-06 RX ORDER — IPRATROPIUM BROMIDE AND ALBUTEROL SULFATE 2.5; .5 MG/3ML; MG/3ML
3 SOLUTION RESPIRATORY (INHALATION)
Status: DISCONTINUED | OUTPATIENT
Start: 2018-10-06 | End: 2018-10-06

## 2018-10-06 RX ADMIN — SODIUM BICARBONATE 325 MG: 650 TABLET ORAL at 21:50

## 2018-10-06 RX ADMIN — BUMETANIDE 1 MG: 0.25 INJECTION INTRAMUSCULAR; INTRAVENOUS at 06:47

## 2018-10-06 RX ADMIN — ATORVASTATIN CALCIUM 80 MG: 40 TABLET, FILM COATED ORAL at 10:45

## 2018-10-06 RX ADMIN — Medication 1 TABLET: at 10:44

## 2018-10-06 RX ADMIN — IPRATROPIUM BROMIDE AND ALBUTEROL SULFATE 3 ML: 2.5; .5 SOLUTION RESPIRATORY (INHALATION) at 07:12

## 2018-10-06 RX ADMIN — Medication 3 ML: at 10:46

## 2018-10-06 RX ADMIN — CEFEPIME HYDROCHLORIDE 2 G: 2 INJECTION, POWDER, FOR SOLUTION INTRAVENOUS at 10:38

## 2018-10-06 RX ADMIN — HYDROCODONE BITARTRATE AND ACETAMINOPHEN 1 TABLET: 10; 325 TABLET ORAL at 19:58

## 2018-10-06 RX ADMIN — SODIUM BICARBONATE 325 MG: 650 TABLET ORAL at 10:46

## 2018-10-06 RX ADMIN — HEPARIN SODIUM 5000 UNITS: 5000 INJECTION, SOLUTION INTRAVENOUS; SUBCUTANEOUS at 21:51

## 2018-10-06 RX ADMIN — FERRIC CITRATE 210 MG: 210 TABLET, COATED ORAL at 15:59

## 2018-10-06 RX ADMIN — Medication 3 ML: at 21:52

## 2018-10-06 RX ADMIN — BUDESONIDE AND FORMOTEROL FUMARATE DIHYDRATE 2 PUFF: 160; 4.5 AEROSOL RESPIRATORY (INHALATION) at 07:13

## 2018-10-06 RX ADMIN — HEPARIN SODIUM 5000 UNITS: 5000 INJECTION, SOLUTION INTRAVENOUS; SUBCUTANEOUS at 10:53

## 2018-10-06 RX ADMIN — METOPROLOL TARTRATE 25 MG: 25 TABLET, FILM COATED ORAL at 21:52

## 2018-10-06 RX ADMIN — HYDROCODONE BITARTRATE AND ACETAMINOPHEN 1 TABLET: 10; 325 TABLET ORAL at 10:53

## 2018-10-06 RX ADMIN — CALCITRIOL CAPSULES 0.25 MCG 0.5 MCG: 0.25 CAPSULE ORAL at 10:44

## 2018-10-06 RX ADMIN — INSULIN LISPRO 4 UNITS: 100 INJECTION, SOLUTION INTRAVENOUS; SUBCUTANEOUS at 16:13

## 2018-10-06 RX ADMIN — VANCOMYCIN HYDROCHLORIDE 500 MG: 10 INJECTION, POWDER, LYOPHILIZED, FOR SOLUTION INTRAVENOUS at 10:38

## 2018-10-06 RX ADMIN — HYPROMELLOSE 2906 (4000 MPA.S) 1 DROP: 25 SOLUTION OPHTHALMIC at 21:51

## 2018-10-06 RX ADMIN — NICOTINE 1 PATCH: 21 PATCH, EXTENDED RELEASE TRANSDERMAL at 21:51

## 2018-10-06 RX ADMIN — TAZOBACTAM SODIUM AND PIPERACILLIN SODIUM 3.38 G: 375; 3 INJECTION, SOLUTION INTRAVENOUS at 06:47

## 2018-10-06 RX ADMIN — IPRATROPIUM BROMIDE AND ALBUTEROL SULFATE 3 ML: 2.5; .5 SOLUTION RESPIRATORY (INHALATION) at 14:52

## 2018-10-06 RX ADMIN — INSULIN LISPRO 4 UNITS: 100 INJECTION, SOLUTION INTRAVENOUS; SUBCUTANEOUS at 21:50

## 2018-10-06 RX ADMIN — FERRIC CITRATE 210 MG: 210 TABLET, COATED ORAL at 10:44

## 2018-10-06 RX ADMIN — IPRATROPIUM BROMIDE AND ALBUTEROL SULFATE 3 ML: 2.5; .5 SOLUTION RESPIRATORY (INHALATION) at 10:53

## 2018-10-06 NOTE — CONSULTS
LOS: 0 days   Patient Care Team:  Provider, No Known as PCP - General    Chief Complaint: Active Problems:    Sepsis (CMS/[facility])    Reason for consultation: Elevated troponin    Exceedingly pleasant 73-year-old  male who has known coronary artery disease with prior stent placement more than 10 years ago in [place] who is an established patient of my partner Dr. Bateman with known severe left ventricular systolic dysfunction with mild aortic stenosis moderate aortic regurgitation mild mitral regurgitation with chronic kidney disease presented with shortness of breath and cough.  Was in overt respiratory failure with hypoxia with bilateral pneumonia and sepsis.  Patient was subsequently treated for pneumonia which is ongoing.  He has had the occasional chest pain mostly at rest.  There is no radiation.  Last for a few minutes.  Has had shortness of breath and cough.  Labs reviewed  Troponins were drawn and it is elevated.  Echocardiogram from August the was reviewed  Currently resting comfortably has mild shortness of breath denies any chest pain.  No significant arrhythmia  EKG has not been done but ordered  Patient continues in a DO NOT RESUSCITATE status.    Interval History: Improved overall    Patient Complaints: Shortness of breath    Telemetry: no malignant arrhythmia. No significant pauses.    Review of Systems     Constitutional: No chills   Has fatigue   No fever.   HENT: Negative.    Eyes: Negative.      Respiratory: Positive for cough,   No chest wall soreness,   Shortness of breath,   no wheezing, no stridor.      Cardiovascular: Positive for chest pain,   No palpitations   No significant  leg swelling.     Gastrointestinal: Negative for abdominal distention,  No abdominal pain,   No blood in stool,   No constipation,   No diarrhea,   No nausea   No vomiting.     Endocrine: Negative.    Genitourinary: Negative for difficulty urinating, dysuria, flank pain and hematuria.     Musculoskeletal:  Negative.    Skin: Negative for rash and wound.   Allergic/Immunologic: Negative.      Neurological: Negative for dizziness, syncope, weakness,   No light-headedness  No  headaches.     Hematological: Does not bruise/bleed easily.     Psychiatric/Behavioral: Negative for agitation or behavioral problems,   No confusion,   the patient is  nervous/anxious.       History:   Past Medical History:   Diagnosis Date   • CHF (congestive heart failure) (CMS/HCC)    • Coronary stent occlusion    • Diabetes mellitus (CMS/HCC)    • Hyperlipidemia    • Hypertension    • Stroke (CMS/HCC)      Past Surgical History:   Procedure Laterality Date   • BLADDER SURGERY     • ESOPHAGECTOMY       Social History     Social History   • Marital status:      Spouse name: N/A   • Number of children: N/A   • Years of education: N/A     Occupational History   • Not on file.     Social History Main Topics   • Smoking status: Current Every Day Smoker   • Smokeless tobacco: Never Used   • Alcohol use No   • Drug use: No   • Sexual activity: Not on file     Other Topics Concern   • Not on file     Social History Narrative   • No narrative on file     Family History   Problem Relation Age of Onset   • No Known Problems Father    • No Known Problems Mother        Labs:  WBC WBC   Date Value Ref Range Status   10/06/2018 8.58 4.80 - 10.80 10*3/mm3 Final      HGB Hemoglobin   Date Value Ref Range Status   10/06/2018 8.6 (L) 14.0 - 18.0 g/dL Final      HCT Hematocrit   Date Value Ref Range Status   10/06/2018 27.0 (L) 40.0 - 52.0 % Final      Platelets Platelets   Date Value Ref Range Status   10/06/2018 136 130 - 400 10*3/mm3 Final      MCV MCV   Date Value Ref Range Status   10/06/2018 87.9 82.0 - 95.0 fL Final        Results from last 7 days  Lab Units 10/06/18  0545   SODIUM mmol/L 141   POTASSIUM mmol/L 3.6   CHLORIDE mmol/L 113*   CO2 mmol/L 17.0*   BUN mg/dL 46*   CREATININE mg/dL 3.57*   CALCIUM mg/dL 8.1*   BILIRUBIN mg/dL 0.5   ALK PHOS  U/L 66   ALT (SGPT) U/L 30   AST (SGOT) U/L 20   GLUCOSE mg/dL 170*     Lab Results   Component Value Date    TROPONINI 4.650 (C) 10/06/2018     PT/INR:  No results found for: PROTIME/No results found for: INR    Imaging Results (last 72 hours)     ** No results found for the last 72 hours. **          Objective     Allergies   Allergen Reactions   • Sulfa Antibiotics        Medication Review: Performed  Current Facility-Administered Medications   Medication Dose Route Frequency Provider Last Rate Last Dose   • acetaminophen (TYLENOL) tablet 650 mg  650 mg Oral Q4H PRN Max Jones MD       • aspirin chewable tablet 81 mg  81 mg Oral Daily Max Jones MD       • atorvastatin (LIPITOR) tablet 80 mg  80 mg Oral Daily Max Jones MD       • budesonide-formoterol (SYMBICORT) 160-4.5 MCG/ACT inhaler 2 puff  2 puff Inhalation BID - RT Max Jones MD   2 puff at 10/06/18 0713   • bumetanide (BUMEX) injection 1 mg  1 mg Intravenous Q8H Max Jones MD   1 mg at 10/06/18 0647   • calcitriol (ROCALTROL) capsule 0.5 mcg  0.5 mcg Oral Daily Max Jones MD       • calcium carb-cholecalciferol 600-800 MG-UNIT tablet 1 tablet  1 tablet Oral Daily Max Jones MD       • cefepime (MAXIPIME) 2 g/100 mL 0.9% NS (mbp)  2 g Intravenous Once Vu Michael MD       • [START ON 10/7/2018] cefepime (MAXIPIME) 2 g/100 mL 0.9% NS (mbp)  2 g Intravenous Q24H Vu Michael MD       • dextrose (D50W) 25 g/ 50mL Intravenous Solution 25 g  25 g Intravenous Q15 Min PRN Max Jones MD       • dextrose (GLUTOSE) oral gel 15 g  15 g Oral Q15 Min PRN Max Jones MD       • Ferric Citrate tablet 210 mg  210 mg Oral TID With Meals Max Jones MD       • glucagon (human recombinant) (GLUCAGEN DIAGNOSTIC) injection 1 mg  1 mg Subcutaneous PRN Max Jones MD       • heparin (porcine) 5000 UNIT/ML  injection 5,000 Units  5,000 Units Subcutaneous Q12H Max Jones MD       • HYDROcodone-acetaminophen (NORCO)  MG per tablet 1 tablet  1 tablet Oral Q6H PRN Max Jones MD       • Influenza Vac Subunit Quad (FLUCELVAX) injection 0.5 mL  0.5 mL Intramuscular During Hospitalization Max Jones MD       • insulin lispro (humaLOG) injection 0-9 Units  0-9 Units Subcutaneous 4x Daily With Meals & Nightly Max Jones MD       • insulin lispro (humaLOG) injection 6 Units  6 Units Subcutaneous TID AC Max Jones MD       • ipratropium-albuterol (DUO-NEB) nebulizer solution 3 mL  3 mL Nebulization 4x Daily - RT Max Jones MD   3 mL at 10/06/18 0712   • [START ON 10/8/2018] levoFLOXacin (LEVAQUIN) 500 mg/100 mL D5W (premix) (LEVAQUIN) 500 mg  500 mg Intravenous Q48H Max Jones MD       • magnesium hydroxide (MILK OF MAGNESIA) suspension 2400 mg/10mL 10 mL  10 mL Oral Daily PRN Max Jones MD       • metoprolol tartrate (LOPRESSOR) tablet 25 mg  25 mg Oral Q12H Max Jones MD       • ondansetron (ZOFRAN) injection 4 mg  4 mg Intravenous Q6H PRN Max Jones MD       • Pharmacy Consult - Pharmacy to dose   Does not apply Continuous PRN Vu Michael MD       • sodium bicarbonate tablet 325 mg  325 mg Oral BID Max Jones MD       • sodium chloride 0.9 % flush 3 mL  3 mL Intravenous Q12H Max Jones MD       • sodium chloride 0.9 % flush 3-10 mL  3-10 mL Intravenous PRN aMx Jones MD       • vancomycin 500 mg/100 mL 0.9% NS IVPB ([facility])  500 mg Intravenous Q48H uV Michael MD           Vital Sign Min/Max for last 24 hours  Temp  Min: 97.1 °F (36.2 °C)  Max: 97.1 °F (36.2 °C)   BP  Min: 98/80  Max: 102/62   Pulse  Min: 89  Max: 96   Resp  Min: 16  Max: 17   SpO2  Min: 94 %  Max: 100 %   No Data Recorded   Weight  Min: 48.9 kg (107  "lb 12.8 oz)  Max: 48.9 kg (107 lb 12.8 oz)     Flowsheet Rows      First Filed Value   Admission Height  162.6 cm (64\") Documented at 10/06/2018 0400   Admission Weight  48.9 kg (107 lb 12.8 oz) Documented at 10/06/2018 0400          Results for orders placed during the hospital encounter of 08/08/18   Adult Transthoracic Echo Complete W/ Cont if Necessary Per Protocol    Narrative · Left ventricular systolic function is moderately decreased. Estimated EF   appears to be in the range of 31 - 35%.  · Left ventricular diastolic dysfunction (grade I) consistent with   impaired relaxation.  · Left ventricular wall thickness is consistent with moderate posterior   asymmetric hypertrophy.  · Mild mitral valve regurgitation is present  · Mild to moderate aortic valve regurgitation is present.  · Mild aortic valve stenosis is present.          Physical Exam:    General Appearance: Awake, alert, in no acute distress  Eyes: Pupils equal and reactive    Ears: Appear intact with no abnormalities noted  Nose: Nares normal, no drainage  Neck: supple, trachea midline, no carotid bruit and no JVD  Back: no kyphosis present,    Lungs: respirations regular, respirations even and respirations unlabored    Heart: normal S1, S2,  2/6 pansystolic murmur in the left sternal border,  no rub and no click    Abdomen: normal bowel sounds, no tenderness   Skin: no bleeding, bruising or rash  Extremities: no cyanosis  Psychiatric/Behavioral: Negative for agitation, behavioral problems, confusion, the patient does  appear to be nervous/anxious.          Results Review:   I reviewed the patient's new clinical results.  I reviewed the patient's new imaging results and agree with the interpretation.  I reviewed the patient's other test results and agree with the interpretation  I personally viewed and interpreted the patient's EKG/Telemetry data  Discussed with patient    Reviewed available prior notes, consults, prior visits, laboratory findings, " radiology and cardiology relevant reports. Updated chart as applicable. I have reviewed the patient's medical history in detail and updated the computerized patient record as relevant.    Updated patient regarding any new or relevant abnormalities on review of records or any new findings on physical exam. Mentioned to patient about purpose of visit and desirable health short and long term goals and objectives.     Assessment/Plan     Active Problems:    Sepsis (CMS/HCC)  Non-STEMI  Known coronary artery disease next the prior stent placement more than 10 years ago  Severe left ventricular systolic dysfunction  Moderate aortic regurgitation  Mild aortic stenosis  Mild mitral regurgitation  Pneumonia  Sepsis  Hypoxia  Kidney failure          Plan    ECG  Check limited echocardiogram  Trend enzymes  Patient is in overall poor health with significant weight loss, chronic kidney disease, and with ongoing hypoxia and respiratory failure with pneumonia and sepsis is not a candidate for cardiac catheterization currently but may be so in future  In addition patient is DO NOT RESUSCITATE  Dr. Bateman might partner who is his primary cardiologist will assume care on Monday today being Saturday.  Discussed with nurse as well as patient  Continue with current management  Will follow patient  Supportive care  Telemetry  Optimal medical therapy  Deep vein thrombosis prophylaxis/precautions  Appropriate diet, fluid, sodium, caffeine, stimulants intake   Compliance to diet and medications   Avoid NSAIDS    Abdiel Stout MD  10/06/18  8:44 AM    EMR Dragon/Transcription was used to dictate part of this note

## 2018-10-06 NOTE — PROGRESS NOTES
"Pharmacy Dosing Service  Automatic Renal Adjustment  El Zosyn    Assessment/Action/Plan:  Based on prescribing information provided by the drug , Zosyn 3.375  gm IV every 8 hours, has been changed to Zosyn 3.375 gm IV every 12 hours. Pharmacy will continue to monitor daily and make further adjustment(s) accordingly.     Subjective:  Gavin Wilson is a 73 y.o. male     Additional Factors Considered:  • Patient disposition per documentation  • Disease state or condition being treated    Objective:  Ht: 162.6 cm (64\"); Wt: 48.9 kg (107 lb 12.8 oz)  Estimated Creatinine Clearance: 12.6 mL/min (A) (by C-G formula based on SCr of 3.57 mg/dL (H)).   Lab Results   Component Value Date    CREATININE 3.57 (H) 10/06/2018    CREATININE 3.19 (H) 08/18/2018    CREATININE 3.01 (H) 08/17/2018       Chelly Hines [facility]  10/06/18 6:43 AM    "

## 2018-10-06 NOTE — H&P
"    [facility] Medicine Services  HISTORY AND PHYSICAL    Date of Admission: 10/6/2018  Primary Care Physician: Provider, No Known    Subjective     Chief Complaint: \" my lung\"    History of Present Illness  73-year-old  man transferred from [facility] for further evaluation of shortness of breath.  He came to their emergency room with shortness of breath and chest pain.  ER physician requested transfer for further evaluation management of bilateral pneumonia, sepsis, hypoxia.  Patient received 4.5 g of Zosyn and was initially placed on BiPAP as he was described to be short of breath and had coarse noise on examination.  Lactic acid was 2.6, blood gas showed pH of 7.27, PCO2 of 25.  White count reportedly at 9000 with predominance of neutrophils at 89%.  Creatinine is 3.9 with BUN of 47, potassium 4.4.  Chest x-ray described to me as bilateral infiltrate.  Patient received 500 mL bolus of IV fluid and subsequently placed on 125 ML per hour.  Clinically the ER physician did not feel necessity for more fluid as his blood pressure was normal and lactic acid is no more than 4.  He was concerned that patient may be overloaded if given a 30 mL per kilogram fluid.    He is very poor historian. He said he came to the hospital because of his \"lungs\", back pain and knee pain.  He claims he has arthritis.  He did not endorse any cough or fever although record from transferring hospital indicates  symptoms of fever, cough, shortness of breath, vomiting, diarrhea, blood in urine.        Comparing above x-ray to x-ray below, obviously, the bilateral costophrenic and cardiophrenic angles are blurred.  Also difference in level aeration  of bilateral lung fields      08/17/2018 chest x-ray    Review of record indicates that patient was recently hospitalized on August 8 to 18, 2018.  At time he had volume overload with pulmonary vascular congestion with suspicion for acute on " chronic systolic heart failure.    Was the description of the dense retrocardiac consulate patient likely compressive atelectasis but cannot rule out pneumonia.  He had a CT scan of the chest were he was described to have moderate to large bilateral pleural effusion and underwent thoracentesis on August 17 with 450 mL removed.    Review of Systems   Patient is poor historian  Otherwise complete ROS reviewed and negative except as mentioned in the HPI.    Past Medical History:   Past Medical History:   Diagnosis Date   • CHF (congestive heart failure) (CMS/HCC)    • Coronary stent occlusion    • Diabetes mellitus (CMS/HCC)    • Hyperlipidemia    • Hypertension    • Stroke (CMS/HCC)      Past Surgical History:  Past Surgical History:   Procedure Laterality Date   • BLADDER SURGERY     • ESOPHAGECTOMY       Social History:  reports that he has been smoking.  He has never used smokeless tobacco. He reports that he does not drink alcohol or use drugs.    Family History: diabetes    Allergies:  Allergies   Allergen Reactions   • Sulfa Antibiotics      Medications:  Prior to Admission medications    Medication Sig Start Date End Date Taking? Authorizing Provider   aspirin 81 MG chewable tablet Chew 81 mg Daily.    ProviderDom MD   atorvastatin (LIPITOR) 80 MG tablet Take 80 mg by mouth Daily.    Provider, MD Dom   budesonide-formoterol (SYMBICORT) 160-4.5 MCG/ACT inhaler Inhale 2 puffs 2 (Two) Times a Day. 8/18/18   Farooq Edwards MD   bumetanide (BUMEX) 2 MG tablet Take 1 tablet by mouth Daily. 8/18/18   Farooq Edwards MD   calcitriol (ROCALTROL) 0.5 MCG capsule Take 1 capsule by mouth Daily. 8/19/18   Farooq Edwards MD   calcium carbonate (OS-TASHI) 600 MG tablet Take 600 mg by mouth Daily.    ProviderDom MD   Ferric Citrate (AURYXIA) 1  MG(Fe) tablet Take 1 tablet by mouth 3 (Three) Times a Day With Meals. 8/18/18   Farooq Edwards MD   HYDROcodone-acetaminophen  "(NORCO)  MG per tablet Take 1 tablet by mouth Every 6 (Six) Hours As Needed (back pain).    ProviderDom MD   insulin lispro (humaLOG) 100 UNIT/ML injection Inject 6 Units under the skin into the appropriate area as directed 3 (Three) Times a Day Before Meals.    Provider, MD Dom   metoprolol tartrate (LOPRESSOR) 25 MG tablet Take 25 mg by mouth 2 (Two) Times a Day.    ProviderDom MD   sodium bicarbonate 325 MG tablet Take 1 tablet by mouth 2 (Two) Times a Day. 8/18/18   Farooq Edwards MD     Objective     Vital Signs: BP 98/80   Pulse 96   Temp 97.1 °F (36.2 °C) (Oral)   Resp 17   Ht 162.6 cm (64\")   Wt 48.9 kg (107 lb 12.8 oz)   SpO2 94%   BMI 18.50 kg/m²   Physical Exam   Constitutional: He is oriented to person, place, and time. No distress.   Very thin man. BMI 18.5. no distress, pleasant, speaks in full sentences, no accessory muscle use, 21 mg nicotine patch on his right shoulder   HENT:   Head: Normocephalic and atraumatic.   Right Ear: External ear normal.   Left Ear: External ear normal.   Nose: Nose normal.   + hearing aid, dry oral mucosa   Eyes: Pupils are equal, round, and reactive to light. Conjunctivae and EOM are normal. Right eye exhibits no discharge. Left eye exhibits no discharge. No scleral icterus.   Neck: Normal range of motion. Neck supple. No JVD present. No tracheal deviation present. No thyromegaly present.   Cardiovascular: Normal rate, regular rhythm, normal heart sounds and intact distal pulses.    No murmur heard.  Pulmonary/Chest:   diminished breath, no wheezes or crackles, no accessory muscle use    Abdominal: Soft. Bowel sounds are normal. He exhibits no distension. There is no tenderness. There is no guarding.   Scaphoid abdomen; + urostomy with bag containing clear yellow urine   Musculoskeletal: He exhibits no edema or tenderness.   + thrill from the fistula on his left arm   Lymphadenopathy:     He has no cervical adenopathy. "   Neurological: He is alert and oriented to person, place, and time. He displays normal reflexes. No cranial nerve deficit. He exhibits normal muscle tone.   Skin: Skin is warm and dry. Capillary refill takes less than 2 seconds. He is not diaphoretic. No erythema. No pallor.   Psychiatric: He has a normal mood and affect. His behavior is normal. Judgment and thought content normal.   Vitals reviewed.          Results Reviewed:  Blood gas showed pH of 7.27, PCO2 of 25, PaO2 of 187, bicarbonate of 11.5 on 100% FiO2 arterial lactic acid is 2.6  White count is 9.1, hemoglobin 10.1, hematocrit 33, platelet of 165 MCV of 93, predominance of neutrophils at 89%  Troponin is 0.16; EKG showed sinus tachycardia without acute ST-T wave changes  Creatinine 3.9, BUN 47, carbon dioxide 19, potassium 4.4, sodium 140, total bilirubin 0.4, calcium 8.1, ALT 27, AST 16 glucose of 254  Lab Results (last 24 hours)     ** No results found for the last 24 hours. **        Imaging Results (last 24 hours)     ** No results found for the last 24 hours. **        I have personally reviewed and interpreted the radiology studies and ECG obtained at time of admission.       Echo in August 2018   · Left ventricular systolic function is moderately decreased. Estimated EF appears to be in the range of 31 - 35%.  · Left ventricular diastolic dysfunction (grade I) consistent with impaired relaxation.  · Left ventricular wall thickness is consistent with moderate posterior asymmetric hypertrophy.  · Mild mitral valve regurgitation is present  · Mild to moderate aortic valve regurgitation is present.  · Mild aortic valve stenosis is present.       Assessment / Plan     Assessment:     Hypoxia, acute respiratory failure - resolved  Sepsis from PNA (SIRS Hr 150, rr 30  T 100.3 from transferring facility   Abnormal CXR; suspected PNA with pleural effusion bilateral vs fluid overload from heart failure or combination of both   CKD Stave IV; AV fistula left   Arm  DM type 2 insulin treated; last known aic level 8%  Hx of PVD  Hx of AAA with endovascular stent graft  Protein calorie malnutrition prob moderate  Lactic acidosis  Chronic Systolic heart failure probably with acute component (EF 31-35%  Urostomy status from hx of bladder cancer    Plan:   Cont empiric abxs  Diurese, follow volume status and electrolytes  ssi  Oxygen supplement to keep spo2 > 92%  dvt proph  Nutritionist consult  Other plan per orders    Code Status: dnr     I discussed the patient's findings and my recommendations with the patient and nurse    Estimated length of stay to be determine    Max Jones MD   10/06/18   4:21 AM

## 2018-10-06 NOTE — PLAN OF CARE
Problem: Patient Care Overview  Goal: Plan of Care Review  Outcome: Ongoing (interventions implemented as appropriate)   10/06/18 1701   Plan of Care Review   Progress no change   OTHER   Outcome Summary Pt reports his appetite to be fair however he also states he is always hungry. Offered supplements, pt declined. Noted preferences. Requested standing scale weight as pts previous admission wt was 96lbs (standing scale). MSA submitted to MD, will continue to follow.        Problem: Nutrition, Imbalanced: Inadequate Oral Intake (Adult)  Goal: Identify Related Risk Factors and Signs and Symptoms  Outcome: Ongoing (interventions implemented as appropriate)   10/06/18 1701   Nutrition, Imbalanced: Inadequate Oral Intake (Adult)   Signs and Symptoms (Nutrition Imbalance, Inadequate Oral Intake: Signs and Symptoms) loss of subcutaneous fat;muscle mass/strength decreased;weakness/lethargy;weight decreased (percent weight loss, percent usual body weight, body mass index less than 18.5) (Adults)

## 2018-10-06 NOTE — PROGRESS NOTES
Malnutrition Severity Assessment    Patient Name:  Gavin Wilson  YOB: 1945  MRN: [id number]  Admit Date:  10/6/2018    Patient meets criteria for : Severe malnutrition    Comments:  If in agreement with malnutrition assessment, please attest documentation. Thanks.     Malnutrition Type: Social/Environmental Circumstance Malnutrition     Malnutrition Type (last 8 hours)      Malnutrition Severity Assessment     Row Name 10/06/18 1646       Malnutrition Severity Assessment    Malnutrition Type Social/Environmental Circumstance Malnutrition    Row Name 10/06/18 1646       Physical Signs of Malnutrition (Social/Environmental)    Muscle Wasting Severe   temples- depressed, hollow, scooped; clavicle- protruding and prominent; shoulder to arm is square, acromion protrusion prominent; patellar region c bones prominent, little sign of muscle around knee; thighs depressed, thin; calves c little muscle tone    Fat Loss Severe   orbital region hollow, depressed, dark circles, loose skin; moderate fat loss of buccal fat pads; upper arm with some fat but not ample; ribs apparent with depressions between them prominent    Row Name 10/06/18 1646       Weight Status (Social/Environmental)    BMI Mod (<17)    %IBW Mod (<80%)   74%    Row Name 10/06/18 1646       Criteria Met (Must meet criteria for severity in at least 2 of these categories: M Wasting, Fat Loss, Fluid, Secondary Signs, Wt. Status, Intake)    Patient meets criteria for  Severe malnutrition          Electronically signed by:  Kimberly Chan RD, VINEET  10/06/18 4:57 PM

## 2018-10-06 NOTE — CONSULTS
Lab Results   Component Value Date    CREATININE 3.57 (H) 10/06/2018     Estimated Creatinine Clearance: 12.6 mL/min (A) (by C-G formula based on SCr of 3.57 mg/dL (H)).  Initiated Levaquin 750mg IV once, followed by Levaquin 500 mg IV Q48H for 7 days for the indication of sepsis

## 2018-10-06 NOTE — PROGRESS NOTES
History of physical reviewed.  Note was after midnight.  Assess the patient at bedside, he is unable to have much discussion this morning, as he indicates is very fatigued.  Pro-calcitonin significantly elevated.  Treat patient with vancomycin and cefepime, I will discontinue the levofloxacin.    Cardiology was consulted, they recommend supportive care at this time.  And a limited echo.    Consult nephrology due to significant SIENA on CKD.      MRSA swab of nares.

## 2018-10-06 NOTE — CONSULTS
"Pharmacy Dosing Service  Pharmacokinetics  Vancomycin Initial Evaluation    Assessment/Action/Plan:  Initiated Vancomycin 500 mg IVPB every 48 hours.  Vancomycin trough ordered when pharmacokinetically appropriate.  Patient is also receiving El Zosyn.  Pharmacy will monitor renal function and adjust dose accordingly.     Subjective:  Gavin Wilson is a 73 y.o. male with a Vancomycin \"Pharmacy to Dose\" consult for the treatment of PNA, sepsis . Day 1 of therapy.    Objective:  Ht: 162.6 cm (64\"); Wt: 48.9 kg (107 lb 12.8 oz)  Estimated Creatinine Clearance: 12.6 mL/min (A) (by C-G formula based on SCr of 3.57 mg/dL (H)).   Lab Results   Component Value Date    CREATININE 3.57 (H) 10/06/2018    CREATININE 3.19 (H) 08/18/2018    CREATININE 3.01 (H) 08/17/2018      Lab Results   Component Value Date    WBC 8.58 10/06/2018    WBC 3.71 (L) 08/17/2018    WBC 5.57 08/12/2018      Baseline culture results:  Microbiology Results (last 10 days)       ** No results found for the last 240 hours. **            Chelly Hines RPH  10/06/18 7:09 AM    "

## 2018-10-07 ENCOUNTER — APPOINTMENT (OUTPATIENT)
Dept: ULTRASOUND IMAGING | Facility: HOSPITAL | Age: 73
End: 2018-10-07

## 2018-10-07 ENCOUNTER — APPOINTMENT (OUTPATIENT)
Dept: GENERAL RADIOLOGY | Facility: HOSPITAL | Age: 73
End: 2018-10-07

## 2018-10-07 LAB
ANION GAP SERPL CALCULATED.3IONS-SCNC: 13 MMOL/L (ref 4–13)
APPEARANCE FLD: CLEAR
BACTERIA UR QL AUTO: ABNORMAL /HPF
BASOPHILS # BLD AUTO: 0.03 10*3/MM3 (ref 0–0.2)
BASOPHILS NFR BLD AUTO: 0.6 % (ref 0–2)
BILIRUB UR QL STRIP: NEGATIVE
BUN BLD-MCNC: 50 MG/DL (ref 5–21)
BUN/CREAT SERPL: 13.8 (ref 7–25)
CALCIUM SPEC-SCNC: 7.9 MG/DL (ref 8.4–10.4)
CHLORIDE SERPL-SCNC: 112 MMOL/L (ref 98–110)
CLARITY UR: CLEAR
CO2 SERPL-SCNC: 17 MMOL/L (ref 24–31)
COLOR FLD: YELLOW
COLOR UR: YELLOW
CREAT BLD-MCNC: 3.62 MG/DL (ref 0.5–1.4)
DEPRECATED RDW RBC AUTO: 61.3 FL (ref 40–54)
EOSINOPHIL # BLD AUTO: 0.13 10*3/MM3 (ref 0–0.7)
EOSINOPHIL NFR BLD AUTO: 2.6 % (ref 0–4)
ERYTHROCYTE [DISTWIDTH] IN BLOOD BY AUTOMATED COUNT: 18.3 % (ref 12–15)
GFR SERPL CREATININE-BSD FRML MDRD: 17 ML/MIN/1.73
GLUCOSE BLD-MCNC: 118 MG/DL (ref 70–100)
GLUCOSE BLDC GLUCOMTR-MCNC: 121 MG/DL (ref 70–130)
GLUCOSE BLDC GLUCOMTR-MCNC: 132 MG/DL (ref 70–130)
GLUCOSE BLDC GLUCOMTR-MCNC: 184 MG/DL (ref 70–130)
GLUCOSE BLDC GLUCOMTR-MCNC: 50 MG/DL (ref 70–130)
GLUCOSE UR STRIP-MCNC: NEGATIVE MG/DL
HCT VFR BLD AUTO: 23.9 % (ref 40–52)
HGB BLD-MCNC: 7.6 G/DL (ref 14–18)
HGB UR QL STRIP.AUTO: ABNORMAL
HYALINE CASTS UR QL AUTO: ABNORMAL /LPF
IMM GRANULOCYTES # BLD: 0.01 10*3/MM3 (ref 0–0.03)
IMM GRANULOCYTES NFR BLD: 0.2 % (ref 0–5)
INR PPP: 1.16 (ref 0.91–1.09)
IRON 24H UR-MRATE: 21 MCG/DL (ref 42–180)
IRON SATN MFR SERPL: 6 % (ref 20–45)
KETONES UR QL STRIP: NEGATIVE
LDH SERPL-CCNC: 750 U/L (ref 265–665)
LEUKOCYTE ESTERASE UR QL STRIP.AUTO: ABNORMAL
LYMPHOCYTES # BLD AUTO: 0.76 10*3/MM3 (ref 0.72–4.86)
LYMPHOCYTES NFR BLD AUTO: 15.1 % (ref 15–45)
LYMPHOCYTES NFR FLD MANUAL: 28 %
MCH RBC QN AUTO: 29 PG (ref 28–32)
MCHC RBC AUTO-ENTMCNC: 31.8 G/DL (ref 33–36)
MCV RBC AUTO: 91.2 FL (ref 82–95)
MONOCYTES # BLD AUTO: 0.38 10*3/MM3 (ref 0.19–1.3)
MONOCYTES NFR BLD AUTO: 7.6 % (ref 4–12)
MONOCYTES NFR FLD: 3 %
MRSA SPEC QL CULT: ABNORMAL
NEUTROPHILS # BLD AUTO: 3.72 10*3/MM3 (ref 1.87–8.4)
NEUTROPHILS NFR BLD AUTO: 73.9 % (ref 39–78)
NEUTROPHILS NFR FLD MANUAL: 36 %
NITRITE UR QL STRIP: NEGATIVE
NRBC BLD MANUAL-RTO: 0.4 /100 WBC (ref 0–0)
PH UR STRIP.AUTO: 6 [PH] (ref 5–8)
PLATELET # BLD AUTO: 110 10*3/MM3 (ref 130–400)
PMV BLD AUTO: 10.3 FL (ref 6–12)
POTASSIUM BLD-SCNC: 3.7 MMOL/L (ref 3.5–5.3)
PROT UR QL STRIP: ABNORMAL
PROTHROMBIN TIME: 15.2 SECONDS (ref 11.9–14.6)
PTH-INTACT SERPL-MCNC: 463.6 PG/ML (ref 7.5–53.5)
RBC # BLD AUTO: 2.62 10*6/MM3 (ref 4.8–5.9)
RBC # FLD AUTO: <1000 /MM3
RBC # UR: ABNORMAL /HPF
REF LAB TEST METHOD: ABNORMAL
SODIUM BLD-SCNC: 142 MMOL/L (ref 135–145)
SODIUM UR-SCNC: 90 MMOL/L (ref 30–90)
SP GR UR STRIP: 1.01 (ref 1–1.03)
SQUAMOUS #/AREA URNS HPF: ABNORMAL /HPF
TIBC SERPL-MCNC: 334 MCG/DL (ref 225–420)
UNCLASSIFIED CELLS, FLUID: 33 %
URATE SERPL-MCNC: 9.4 MG/DL (ref 3.5–8.5)
UROBILINOGEN UR QL STRIP: ABNORMAL
WBC # FLD: 145 /MM3
WBC NRBC COR # BLD: 5.03 10*3/MM3 (ref 4.8–10.8)
WBC UR QL AUTO: ABNORMAL /HPF
YEAST URNS QL MICRO: ABNORMAL /HPF

## 2018-10-07 PROCEDURE — 25010000002 ONDANSETRON PER 1 MG: Performed by: INTERNAL MEDICINE

## 2018-10-07 PROCEDURE — 88305 TISSUE EXAM BY PATHOLOGIST: CPT | Performed by: INTERNAL MEDICINE

## 2018-10-07 PROCEDURE — 82042 OTHER SOURCE ALBUMIN QUAN EA: CPT | Performed by: INTERNAL MEDICINE

## 2018-10-07 PROCEDURE — 63710000001 INSULIN LISPRO (HUMAN) PER 5 UNITS: Performed by: INTERNAL MEDICINE

## 2018-10-07 PROCEDURE — G8996 SWALLOW CURRENT STATUS: HCPCS | Performed by: SPEECH-LANGUAGE PATHOLOGIST

## 2018-10-07 PROCEDURE — 88185 FLOWCYTOMETRY/TC ADD-ON: CPT | Performed by: INTERNAL MEDICINE

## 2018-10-07 PROCEDURE — 83615 LACTATE (LD) (LDH) ENZYME: CPT | Performed by: INTERNAL MEDICINE

## 2018-10-07 PROCEDURE — G8998 SWALLOW D/C STATUS: HCPCS | Performed by: SPEECH-LANGUAGE PATHOLOGIST

## 2018-10-07 PROCEDURE — 76942 ECHO GUIDE FOR BIOPSY: CPT

## 2018-10-07 PROCEDURE — 94799 UNLISTED PULMONARY SVC/PX: CPT

## 2018-10-07 PROCEDURE — 85610 PROTHROMBIN TIME: CPT | Performed by: INTERNAL MEDICINE

## 2018-10-07 PROCEDURE — 76604 US EXAM CHEST: CPT

## 2018-10-07 PROCEDURE — 81001 URINALYSIS AUTO W/SCOPE: CPT | Performed by: INTERNAL MEDICINE

## 2018-10-07 PROCEDURE — 89051 BODY FLUID CELL COUNT: CPT | Performed by: INTERNAL MEDICINE

## 2018-10-07 PROCEDURE — 71045 X-RAY EXAM CHEST 1 VIEW: CPT

## 2018-10-07 PROCEDURE — 84300 ASSAY OF URINE SODIUM: CPT | Performed by: INTERNAL MEDICINE

## 2018-10-07 PROCEDURE — G8997 SWALLOW GOAL STATUS: HCPCS | Performed by: SPEECH-LANGUAGE PATHOLOGIST

## 2018-10-07 PROCEDURE — 87205 SMEAR GRAM STAIN: CPT | Performed by: INTERNAL MEDICINE

## 2018-10-07 PROCEDURE — 85025 COMPLETE CBC W/AUTO DIFF WBC: CPT | Performed by: INTERNAL MEDICINE

## 2018-10-07 PROCEDURE — 88112 CYTOPATH CELL ENHANCE TECH: CPT | Performed by: INTERNAL MEDICINE

## 2018-10-07 PROCEDURE — 94640 AIRWAY INHALATION TREATMENT: CPT

## 2018-10-07 PROCEDURE — 82962 GLUCOSE BLOOD TEST: CPT

## 2018-10-07 PROCEDURE — 92610 EVALUATE SWALLOWING FUNCTION: CPT | Performed by: SPEECH-LANGUAGE PATHOLOGIST

## 2018-10-07 PROCEDURE — 88312 SPECIAL STAINS GROUP 1: CPT | Performed by: INTERNAL MEDICINE

## 2018-10-07 PROCEDURE — 25010000002 HEPARIN (PORCINE) PER 1000 UNITS: Performed by: INTERNAL MEDICINE

## 2018-10-07 PROCEDURE — 88184 FLOWCYTOMETRY/ TC 1 MARKER: CPT | Performed by: INTERNAL MEDICINE

## 2018-10-07 PROCEDURE — 87015 SPECIMEN INFECT AGNT CONCNTJ: CPT | Performed by: INTERNAL MEDICINE

## 2018-10-07 PROCEDURE — 0W9B3ZX DRAINAGE OF LEFT PLEURAL CAVITY, PERCUTANEOUS APPROACH, DIAGNOSTIC: ICD-10-PCS | Performed by: RADIOLOGY

## 2018-10-07 PROCEDURE — 87070 CULTURE OTHR SPECIMN AEROBIC: CPT | Performed by: INTERNAL MEDICINE

## 2018-10-07 PROCEDURE — 80048 BASIC METABOLIC PNL TOTAL CA: CPT | Performed by: INTERNAL MEDICINE

## 2018-10-07 PROCEDURE — 84478 ASSAY OF TRIGLYCERIDES: CPT | Performed by: INTERNAL MEDICINE

## 2018-10-07 PROCEDURE — 94760 N-INVAS EAR/PLS OXIMETRY 1: CPT

## 2018-10-07 PROCEDURE — 87075 CULTR BACTERIA EXCEPT BLOOD: CPT | Performed by: INTERNAL MEDICINE

## 2018-10-07 PROCEDURE — 87086 URINE CULTURE/COLONY COUNT: CPT | Performed by: INTERNAL MEDICINE

## 2018-10-07 PROCEDURE — 99233 SBSQ HOSP IP/OBS HIGH 50: CPT | Performed by: INTERNAL MEDICINE

## 2018-10-07 PROCEDURE — 25010000002 CEFEPIME 2 G/NS 100 ML SOLUTION: Performed by: INTERNAL MEDICINE

## 2018-10-07 PROCEDURE — 82945 GLUCOSE OTHER FLUID: CPT | Performed by: INTERNAL MEDICINE

## 2018-10-07 PROCEDURE — 84157 ASSAY OF PROTEIN OTHER: CPT | Performed by: INTERNAL MEDICINE

## 2018-10-07 RX ORDER — ALBUTEROL SULFATE 90 UG/1
2 AEROSOL, METERED RESPIRATORY (INHALATION) EVERY 6 HOURS PRN
COMMUNITY

## 2018-10-07 RX ORDER — POTASSIUM CHLORIDE 750 MG/1
20 CAPSULE, EXTENDED RELEASE ORAL DAILY
Status: DISCONTINUED | OUTPATIENT
Start: 2018-10-07 | End: 2018-10-15

## 2018-10-07 RX ORDER — LOPERAMIDE HYDROCHLORIDE 2 MG/1
2 CAPSULE ORAL DAILY PRN
COMMUNITY

## 2018-10-07 RX ORDER — ACETAMINOPHEN 325 MG/1
650 TABLET ORAL EVERY 6 HOURS PRN
COMMUNITY

## 2018-10-07 RX ORDER — BUMETANIDE 0.25 MG/ML
1 INJECTION INTRAMUSCULAR; INTRAVENOUS
Status: DISCONTINUED | OUTPATIENT
Start: 2018-10-08 | End: 2018-10-09

## 2018-10-07 RX ORDER — OXYCODONE HYDROCHLORIDE 5 MG/1
5 TABLET ORAL EVERY 6 HOURS PRN
Status: ON HOLD | COMMUNITY
End: 2018-10-25

## 2018-10-07 RX ORDER — MELATONIN
3000 DAILY
COMMUNITY

## 2018-10-07 RX ORDER — ASPIRIN 81 MG/1
81 TABLET, CHEWABLE ORAL DAILY
Status: DISCONTINUED | OUTPATIENT
Start: 2018-10-08 | End: 2018-10-07

## 2018-10-07 RX ORDER — ASPIRIN 81 MG/1
81 TABLET, CHEWABLE ORAL DAILY
Status: DISCONTINUED | OUTPATIENT
Start: 2018-10-08 | End: 2018-10-25 | Stop reason: HOSPADM

## 2018-10-07 RX ORDER — SODIUM CHLORIDE, SODIUM LACTATE, POTASSIUM CHLORIDE, CALCIUM CHLORIDE 600; 310; 30; 20 MG/100ML; MG/100ML; MG/100ML; MG/100ML
50 INJECTION, SOLUTION INTRAVENOUS CONTINUOUS
Status: DISPENSED | OUTPATIENT
Start: 2018-10-07 | End: 2018-10-07

## 2018-10-07 RX ORDER — OMEPRAZOLE 20 MG/1
20 CAPSULE, DELAYED RELEASE ORAL DAILY
COMMUNITY

## 2018-10-07 RX ORDER — SILDENAFIL 100 MG/1
100 TABLET, FILM COATED ORAL DAILY PRN
COMMUNITY
End: 2018-10-25 | Stop reason: HOSPADM

## 2018-10-07 RX ADMIN — METOPROLOL TARTRATE 25 MG: 25 TABLET, FILM COATED ORAL at 08:37

## 2018-10-07 RX ADMIN — INSULIN LISPRO 2 UNITS: 100 INJECTION, SOLUTION INTRAVENOUS; SUBCUTANEOUS at 20:46

## 2018-10-07 RX ADMIN — METOPROLOL TARTRATE 25 MG: 25 TABLET, FILM COATED ORAL at 20:46

## 2018-10-07 RX ADMIN — Medication 1 TABLET: at 08:37

## 2018-10-07 RX ADMIN — SODIUM BICARBONATE 325 MG: 650 TABLET ORAL at 20:46

## 2018-10-07 RX ADMIN — NICOTINE 1 PATCH: 21 PATCH, EXTENDED RELEASE TRANSDERMAL at 20:54

## 2018-10-07 RX ADMIN — HYDROCODONE BITARTRATE AND ACETAMINOPHEN 1 TABLET: 10; 325 TABLET ORAL at 02:17

## 2018-10-07 RX ADMIN — SODIUM BICARBONATE 325 MG: 650 TABLET ORAL at 08:37

## 2018-10-07 RX ADMIN — POTASSIUM CHLORIDE 20 MEQ: 750 CAPSULE, EXTENDED RELEASE ORAL at 08:37

## 2018-10-07 RX ADMIN — CEFEPIME HYDROCHLORIDE 2 G: 2 INJECTION, POWDER, FOR SOLUTION INTRAVENOUS at 09:14

## 2018-10-07 RX ADMIN — INSULIN LISPRO 6 UNITS: 100 INJECTION, SOLUTION INTRAVENOUS; SUBCUTANEOUS at 08:37

## 2018-10-07 RX ADMIN — ATORVASTATIN CALCIUM 80 MG: 40 TABLET, FILM COATED ORAL at 08:37

## 2018-10-07 RX ADMIN — Medication 3 ML: at 08:19

## 2018-10-07 RX ADMIN — BUMETANIDE 1 MG: 0.25 INJECTION INTRAMUSCULAR; INTRAVENOUS at 13:49

## 2018-10-07 RX ADMIN — INSULIN LISPRO 6 UNITS: 100 INJECTION, SOLUTION INTRAVENOUS; SUBCUTANEOUS at 13:49

## 2018-10-07 RX ADMIN — ONDANSETRON 4 MG: 2 INJECTION INTRAMUSCULAR; INTRAVENOUS at 23:41

## 2018-10-07 RX ADMIN — HYDROCODONE BITARTRATE AND ACETAMINOPHEN 1 TABLET: 10; 325 TABLET ORAL at 17:59

## 2018-10-07 RX ADMIN — SODIUM CHLORIDE, POTASSIUM CHLORIDE, SODIUM LACTATE AND CALCIUM CHLORIDE 50 ML/HR: 600; 310; 30; 20 INJECTION, SOLUTION INTRAVENOUS at 13:49

## 2018-10-07 RX ADMIN — ONDANSETRON 4 MG: 2 INJECTION INTRAMUSCULAR; INTRAVENOUS at 03:24

## 2018-10-07 RX ADMIN — CALCITRIOL CAPSULES 0.25 MCG 0.5 MCG: 0.25 CAPSULE ORAL at 08:37

## 2018-10-07 RX ADMIN — SODIUM CHLORIDE 250 ML: 9 INJECTION, SOLUTION INTRAVENOUS at 01:28

## 2018-10-07 RX ADMIN — FERRIC CITRATE 210 MG: 210 TABLET, COATED ORAL at 09:14

## 2018-10-07 RX ADMIN — HEPARIN SODIUM 5000 UNITS: 5000 INJECTION, SOLUTION INTRAVENOUS; SUBCUTANEOUS at 20:46

## 2018-10-07 RX ADMIN — IPRATROPIUM BROMIDE AND ALBUTEROL SULFATE 3 ML: 2.5; .5 SOLUTION RESPIRATORY (INHALATION) at 18:52

## 2018-10-07 RX ADMIN — BUDESONIDE AND FORMOTEROL FUMARATE DIHYDRATE 2 PUFF: 160; 4.5 AEROSOL RESPIRATORY (INHALATION) at 07:42

## 2018-10-07 RX ADMIN — BUDESONIDE AND FORMOTEROL FUMARATE DIHYDRATE 2 PUFF: 160; 4.5 AEROSOL RESPIRATORY (INHALATION) at 21:53

## 2018-10-07 NOTE — PLAN OF CARE
Problem: Patient Care Overview  Goal: Plan of Care Review   10/07/18 0923   Plan of Care Review   Progress improving   OTHER   Outcome Summary SLP completed bedside swallow evaluation. Patient was alert and cooperative and agreed to evaluation. Patient apparently had some coughing after his meal last night and was made NPO. Patient previously had esophagectomy with gastric pull-through. He does cough with solid foods. Did not trial solid foods today as patient does not have his dentures. His daughter is to bring them later today. Chest X ray and CT show no pneumonia or infiltrates. OK for pt to resume diet, will do puree foods for now and thin liquids, ok for RN/MD to advance to mechanical soft when pt has his dentures. Follow aspiration precautions and strict oral care. No further ST for swallowing warranted. Please have MD reconsult SLP if there is concern for aspiration and SLP will do instrumental evaluation (VFSS).    Coping/Psychosocial   Plan of Care Reviewed With patient   Coping/Psychosocial   Patient Agreement with Plan of Care agrees

## 2018-10-07 NOTE — CONSULTS
Nephrology ([facility]) Consult Note      Patient:  Gavin Wilson  YOB: 1945  Date of Service: 10/7/2018  MRN: [id number]   Acct: [id number]   Primary Care Physician: Provider, No Known  Advance Directive:   Code Status and Medical Interventions:   Ordered at: 10/07/18 1309     Limited Support to NOT Include:    Intubation     Level Of Support Discussed With:    Patient     Code Status:    No CPR     Medical Interventions (Level of Support Prior to Arrest):    Limited     Admit Date: 10/6/2018       Hospital Day: 1  Referring Provider: Max Jones*      Patient Seen, Chart, Consults, Notes, Labs, Radiology studies reviewed.    Chief complaint: Abnormal labs.    Subjective:  Gavin Wilson is a 73 y.o. male  whom we were consulted for management of chronic kidney disease stage IV.  Patient goes to [facility] and sees nephrologist.  He already has left upper arm primary fistula in preparation for upcoming dialysis.  Presented with increasing shortness of breath, dyspnea on exertion.  he was diagnosed with bilateral pneumonia/pleural effusion.  He is currently being treated for both IV getting IV antibiotics and intravenous diuretics.  His serum creatinine is 3.2 mg.  His baseline creatinine is not available.  Nephrology is consulted for evaluation and treatment of chronic kidney disease.    Allergies:  Sulfa antibiotics    Home Meds:  Prescriptions Prior to Admission   Medication Sig Dispense Refill Last Dose   • aspirin 81 MG chewable tablet Chew 81 mg Daily.   Past Week at Unknown time   • atorvastatin (LIPITOR) 80 MG tablet Take 80 mg by mouth Daily.   Past Week at Unknown time   • budesonide-formoterol (SYMBICORT) 160-4.5 MCG/ACT inhaler Inhale 2 puffs 2 (Two) Times a Day. 1 inhaler 2    • bumetanide (BUMEX) 2 MG tablet Take 1 tablet by mouth Daily. 30 tablet 0    • calcitriol (ROCALTROL) 0.5 MCG capsule Take 1 capsule by mouth  Daily. 30 capsule 2    • calcium carbonate (OS-TASHI) 600 MG tablet Take 600 mg by mouth Daily.   Past Week at Unknown time   • Ferric Citrate (AURYXIA) 1  MG(Fe) tablet Take 1 tablet by mouth 3 (Three) Times a Day With Meals. 270 tablet 1    • HYDROcodone-acetaminophen (NORCO)  MG per tablet Take 1 tablet by mouth Every 6 (Six) Hours As Needed (back pain).   Past Week at Unknown time   • insulin lispro (humaLOG) 100 UNIT/ML injection Inject 6 Units under the skin into the appropriate area as directed 3 (Three) Times a Day Before Meals.   Past Week at Unknown time   • metoprolol tartrate (LOPRESSOR) 25 MG tablet Take 25 mg by mouth 2 (Two) Times a Day.   Past Week at Unknown time   • sodium bicarbonate 325 MG tablet Take 1 tablet by mouth 2 (Two) Times a Day. 60 tablet 1        Medicines:  Current Facility-Administered Medications   Medication Dose Route Frequency Provider Last Rate Last Dose   • acetaminophen (TYLENOL) tablet 650 mg  650 mg Oral Q4H PRN Max Jones MD       • [START ON 10/8/2018] aspirin chewable tablet 81 mg  81 mg Oral Daily Vu Michael MD       • atorvastatin (LIPITOR) tablet 80 mg  80 mg Oral Daily Max Jones MD   80 mg at 10/07/18 0837   • budesonide-formoterol (SYMBICORT) 160-4.5 MCG/ACT inhaler 2 puff  2 puff Inhalation BID - RT Max Jones MD   2 puff at 10/07/18 0742   • bumetanide (BUMEX) injection 1 mg  1 mg Intravenous Q8H Max Jones MD   1 mg at 10/07/18 1349   • calcitriol (ROCALTROL) capsule 0.5 mcg  0.5 mcg Oral Daily Max Jones MD   0.5 mcg at 10/07/18 0837   • calcium carb-cholecalciferol 600-800 MG-UNIT tablet 1 tablet  1 tablet Oral Daily Max Jones MD   1 tablet at 10/07/18 0837   • cefepime (MAXIPIME) 2 g/100 mL 0.9% NS (mbp)  2 g Intravenous Q24H Vu Michael MD   2 g at 10/07/18 0914   • dextrose (D50W) 25 g/ 50mL Intravenous Solution 25 g  25 g Intravenous Q15  Min PRN Max Jones MD       • dextrose (GLUTOSE) oral gel 15 g  15 g Oral Q15 Min PRN Max Jones MD       • Ferric Citrate tablet 210 mg  210 mg Oral TID With Meals Max Jones MD   210 mg at 10/07/18 0914   • glucagon (human recombinant) (GLUCAGEN DIAGNOSTIC) injection 1 mg  1 mg Subcutaneous PRN Max Jones MD       • heparin (porcine) 5000 UNIT/ML injection 5,000 Units  5,000 Units Subcutaneous Q12H Max Jones MD   5,000 Units at 10/06/18 2151   • Hold medication  1 each Does not apply Continuous PRN Vu Michael MD       • HYDROcodone-acetaminophen (NORCO)  MG per tablet 1 tablet  1 tablet Oral Q6H PRN Max Jones MD   1 tablet at 10/07/18 0217   • hydroxypropyl methylcellulose (ISOPTO TEARS) 2.5 % ophthalmic solution 1 drop  1 drop Both Eyes TID PRN Vu Michael MD   1 drop at 10/06/18 2151   • Influenza Vac Subunit Quad (FLUCELVAX) injection 0.5 mL  0.5 mL Intramuscular During Hospitalization Max Jones MD       • insulin lispro (humaLOG) injection 0-9 Units  0-9 Units Subcutaneous 4x Daily With Meals & Nightly Max Jones MD   4 Units at 10/06/18 2150   • insulin lispro (humaLOG) injection 6 Units  6 Units Subcutaneous TID AC Max Jones MD   6 Units at 10/07/18 1349   • ipratropium-albuterol (DUO-NEB) nebulizer solution 3 mL  3 mL Nebulization Q6H PRN Vu Michael MD       • lactated ringers infusion  50 mL/hr Intravenous Continuous Vu Michael MD 50 mL/hr at 10/07/18 1349 50 mL/hr at 10/07/18 1349   • magnesium hydroxide (MILK OF MAGNESIA) suspension 2400 mg/10mL 10 mL  10 mL Oral Daily PRN Max Jones MD       • metoprolol tartrate (LOPRESSOR) tablet 25 mg  25 mg Oral Q12H Max Jones MD   25 mg at 10/07/18 0837   • nicotine (NICODERM CQ) 21 MG/24HR patch 1 patch  1 patch Transdermal Q24H Ashutosh Palafox MD   1 patch  at 10/06/18 2151   • ondansetron (ZOFRAN) injection 4 mg  4 mg Intravenous Q6H PRN Max Jones MD   4 mg at 10/07/18 0324   • Pharmacy Consult - Pharmacy to dose   Does not apply Continuous PRN Vu Michael MD       • potassium chloride (MICRO-K) CR capsule 20 mEq  20 mEq Oral Daily Vu Michael MD   20 mEq at 10/07/18 0837   • sodium bicarbonate tablet 325 mg  325 mg Oral BID Max Jones MD   325 mg at 10/07/18 0837   • sodium chloride 0.9 % flush 3 mL  3 mL Intravenous Q12H Max Jones MD   3 mL at 10/07/18 0819   • sodium chloride 0.9 % flush 3-10 mL  3-10 mL Intravenous PRN Max Jones MD       • vancomycin 500 mg/100 mL 0.9% NS IVPB ([facility])  500 mg Intravenous Q48H Vu Michael MD   500 mg at 10/06/18 1038       Past Medical History:  Past Medical History:   Diagnosis Date   • CHF (congestive heart failure) (CMS/HCC)    • Coronary stent occlusion    • Diabetes mellitus (CMS/HCC)    • Hyperlipidemia    • Hypertension    • Stroke (CMS/HCC)        Past Surgical History:  Past Surgical History:   Procedure Laterality Date   • BLADDER SURGERY     • ESOPHAGECTOMY         Family History  Family History   Problem Relation Age of Onset   • No Known Problems Father    • No Known Problems Mother        Social History  Social History     Social History   • Marital status:      Spouse name: N/A   • Number of children: N/A   • Years of education: N/A     Occupational History   • Not on file.     Social History Main Topics   • Smoking status: Current Every Day Smoker   • Smokeless tobacco: Never Used   • Alcohol use No   • Drug use: No   • Sexual activity: Not on file     Other Topics Concern   • Not on file     Social History Narrative   • No narrative on file         Review of Systems:  History obtained from chart review and the patient  General ROS: No fever or chills  Respiratory ROS: positive for - shortness of breath  Cardiovascular ROS:  "positive for - dyspnea on exertion  Gastrointestinal ROS: No abdominal pain or melena  Genito-Urinary ROS: No dysuria or hematuria  14 point ROS reviewed with the patient and negative except as noted above and in the HPI unless unable to obtain.    Objective:  BP 97/62   Pulse 94   Temp 98.2 °F (36.8 °C) (Tympanic)   Resp 18   Ht 162.6 cm (64\")   Wt 48.9 kg (107 lb 12.8 oz)   SpO2 99%   BMI 18.50 kg/m²     Intake/Output Summary (Last 24 hours) at 10/07/18 1434  Last data filed at 10/07/18 1358   Gross per 24 hour   Intake              120 ml   Output              760 ml   Net             -640 ml     General: awake/alert   HEENT: Normocephalic atraumatic head  Neck: Supple with no JVD or carotid bruits.  Chest:  Decreased breath sound at the bases with crackles.  CVS: regular rate and rhythm  Abdominal: soft, nontender, normal bowel sounds  Extremities: no cyanosis or edema  Skin: warm and dry without rash      Labs:      Results from last 7 days  Lab Units 10/07/18  0243 10/06/18  0545   WBC 10*3/mm3 5.03 8.58   HEMOGLOBIN g/dL 7.6* 8.6*   HEMATOCRIT % 23.9* 27.0*   PLATELETS 10*3/mm3 110* 136         Results from last 7 days  Lab Units 10/07/18  0243 10/06/18  0545   SODIUM mmol/L 142 141   POTASSIUM mmol/L 3.7 3.6   CHLORIDE mmol/L 112* 113*   CO2 mmol/L 17.0* 17.0*   BUN mg/dL 50* 46*   CREATININE mg/dL 3.62* 3.57*   CALCIUM mg/dL 7.9* 8.1*   BILIRUBIN mg/dL  --  0.5   ALK PHOS U/L  --  66   ALT (SGPT) U/L  --  30   AST (SGOT) U/L  --  20   GLUCOSE mg/dL 118* 170*         Radiology:   Imaging Results (last 24 hours)     Procedure Component Value Units Date/Time    XR Chest 1 View [[id number]] Updated:  10/07/18 1419    US Thoracentesis [[id number]] Collected:  10/07/18 1323    Specimen:  Body Fluid Updated:  10/07/18 1329    Narrative:       DATE OF PROCEDURE:  10/7/2018.     PREPROCEDURE DIAGNOSIS:  Left pleural effusion.  POSTPROCEDURE DIAGNOSIS:  Left pleural effusion.          INDICATIONS FOR PROCEDURE: " Shortness of breath.     PROCEDURE:   1. Thoracentesis, diagnostic and therapeutic.  2. Ultrasound guidance for thoracentesis, imaging supervision and  interpretation.     ANESTHESIA: 1% lidocaine, injected locally.          COMPLICATIONS: None.        EXAMINATIONS FOR COMPARISON:  CT chest dated 10/6/2018.     DESCRIPTION OF PROCEDURE:   The risk and benefits of the procedure were explained to the patient.   Specifically, the risks of bleeding, infection, pneumothorax (possible  thoracostomy tube), and damage to surrounding structures were discussed  extensively. The patient's questions were answered. The patient opted to  proceed. Written and verbal consent were obtained from the patient.     TIME OUT was taken and the patient's name, medical record number, date  of birth, procedure, entry site, and listen allergies were confirmed.  The site of the procedure was confirmed and marked.      The leftposterior thorax was prepped and draped in sterile fashion. The  area was anesthetized with buffered lidocaine 2%.      A 5 Beninese 10 cm  catheter was inserted into the pleural effusion  using  ultrasound guidance. Approximately 400 mL of clear fluid was recovered  and sent to the pathology lab for analysis.      The patient tolerated the procedure well.   No immediate complications  were noted.       Impression:       Successful ultrasound guided leftthoracentesis. Approximately 400 mL of  clear fluid was recovered and sent to the pathology lab for analysis.   No immediate complications were noted.              This report was finalized on 10/07/2018 13:26 by Dr. Petra Mckinley MD.    XR Chest 1 View [[id number]] Collected:  10/07/18 1241     Updated:  10/07/18 1249    Narrative:       Exam:   XR CHEST 1 VW-       Date:  10/7/2018      History:  Male, age  73 years;POST THORACENTESIS; R13.10-Dysphagia,  unspecified     COMPARISON:  CT chest dated 10/60/18     Findings :  Interval left-sided thoracentesis. There is a  questionable trace left  pneumothorax versus artifact on the left. The right lung is unchanged,  with moderate pleural effusion and associated opacities. The heart is  unchanged in size.       Impression:       Impression:     Interval left thoracentesis questionable trace left pneumothorax versus  averaging. Recommend repeat upright chest x-ray AP in one hour.     This report was finalized on 10/07/2018 12:46 by Dr. Petra Mckinley MD.    US Chest [[id number]] Updated:  10/07/18 1215    CT Chest Without Contrast [[id number]] Collected:  10/06/18 1752     Updated:  10/06/18 1759    Narrative:       EXAMINATION:   CT CHEST WO CONTRAST-  10/6/2018 5:52 PM CDT     CT CHEST WO CONTRAST- 10/6/2018 5:33 PM CDT     HISTORY: Shortness of breath     COMPARISON: August 12, 2018 DOSE LENGTH PRODUCT: 185 mGy cm. Automated  exposure control was also utilized to decrease patient radiation dose.     TECHNIQUE: Serial helical tomographic images of the chest were acquired.  Multiplanar reformatted images were provided for review.     FINDINGS:  The imaged portion of the neck and thyroid gland is unremarkable.      The upper lungs are clear.. The lower lobes are obscured by large  bilateral pleural effusions.. The trachea and bronchial tree are patent.     Cardiac silhouette may be mildly enlarged. Extensive vascular  calcifications noted in the aortic arch origin of the great vessels and  coronary arteries.. Endovascular aortic stent graft is noted below the  diaphragm.    .      No acute findings are seen in the bones or surrounding soft tissues.        .       Impression:       1. Large bilateral pleural effusions which essentially obscures the  lower lobes.  2. Extensive vascular calcifications present  3. Endovascular stent graft is present in the abdomen.        This report was finalized on 10/06/2018 17:55 by Dr. Duc Garibay MD.          Culture:  No components found for: WOUNDCUL, 3  No components found for: CSFCUL, 3  No  components found for: BC, 3  No components found for: URINECUL, 3      Assessment   1.  Stage IV chronic kidney disease baseline.  2.  Diabetic nephropathy.  3.  Insulin-dependent type II diabetes.  4.  Bilateral pneumonia.  5.  Anemia in chronic kidney disease.  6.  Bilateral pleural effusion, status post pleurocentesis of the left side.  7.  Congestive heart failure with low ejection fraction.  8.  Secondary hyperparathyroidism.    Plan:  1.  Baseline renal ultrasound.  2.  Urinary electrolytes.  3.  Serum PTH level.  4.  Baseline iron studies.  5.  Intravenous diuretics.  6.  May need dialysis as he already has matured fistula.      Thank you for the consult, we appreciate the opportunity to provide care to your patients.  Feel free to contact me if I can be of any further assistance.      Ulises Dejesus MD  10/7/2018  2:34 PM

## 2018-10-07 NOTE — PROGRESS NOTES
Discharge Planning Assessment  [facility]     Patient Name: Gavin Wilson  MRN: [id number]  Today's Date: 10/7/2018    Admit Date: 10/6/2018          Discharge Needs Assessment     Row Name 10/07/18 1025       Living Environment    Lives With alone    Current Living Arrangements home/apartment/condo    Primary Care Provided by self    Provides Primary Care For no one       Resource/Environmental Concerns    Resource/Environmental Concerns none       Transition Planning    Patient/Family Anticipates Transition to home with help/services;long term care facility    Patient/Family Anticipated Services at Transition home health care;skilled nursing    Transportation Anticipated family or friend will provide       Discharge Needs Assessment    Readmission Within the Last 30 Days no previous admission in last 30 days    Concerns to be Addressed no discharge needs identified    Equipment Currently Used at Home cane, quad;walker, rolling;colostomy/ostomy supplies    Equipment Needed After Discharge none    Current Discharge Risk chronically ill;lives alone    Discharge Coordination/Progress Pt has PCP and RX coverage through [facility].  Pt is currently in ICU.  SW will follow.  HH vs SNF?            Discharge Plan    No documentation.       Destination     No service coordination in this encounter.      Durable Medical Equipment     No service coordination in this encounter.      Dialysis/Infusion     No service coordination in this encounter.      Home Medical Care     No service coordination in this encounter.      Social Care     No service coordination in this encounter.                Demographic Summary    No documentation.           Functional Status    No documentation.           Psychosocial    No documentation.           Abuse/Neglect    No documentation.           Legal    No documentation.           Substance Abuse    No documentation.           Patient Forms    No documentation.         Jonelle Martinez,  BSW

## 2018-10-07 NOTE — PROGRESS NOTES
LOS: 1 day   Patient Care Team:  Provider, No Known as PCP - General    Chief Complaint: Active Problems:    Sepsis (CMS/HCC)    Shortness of breath    Subjective  Feeling  better  Has chest pain mostly on coughing and pleuritic in nature  Persistent mild shortness of breath  No other new complaints  Anxious  Generalized weakness  Easy fatigability  Mild dysphagia to solids    Interval History: Improved overall    Telemetry: no malignant arrhythmia. No significant pauses.    Review of Systems     Constitutional: No chills   Has fatigue   No fever.   HENT: Negative.    Eyes: Negative.      Respiratory: Positive for cough,   No chest wall soreness,   Shortness of breath,   no wheezing, no stridor.      Cardiovascular: Atypical chest pain,   No palpitations   No significant  leg swelling.     Gastrointestinal: Negative for abdominal distention,  No abdominal pain,   No blood in stool,   No constipation,   No diarrhea,   No nausea   No vomiting.     Endocrine: Negative.    Genitourinary: Negative for difficulty urinating, dysuria, flank pain and hematuria.     Musculoskeletal: Negative.    Skin: Negative for rash and wound.   Allergic/Immunologic: Negative.      Neurological: Negative for dizziness, syncope, weakness,   No light-headedness  No  headaches.     Hematological: Does not bruise/bleed easily.     Psychiatric/Behavioral: Negative for agitation or behavioral problems,   No confusion,   the patient is  nervous/anxious.       History:   Past Medical History:   Diagnosis Date   • CHF (congestive heart failure) (CMS/HCC)    • Coronary stent occlusion    • Diabetes mellitus (CMS/HCC)    • Hyperlipidemia    • Hypertension    • Stroke (CMS/HCC)      Past Surgical History:   Procedure Laterality Date   • BLADDER SURGERY     • ESOPHAGECTOMY       Social History     Social History   • Marital status:      Spouse name: N/A   • Number of children: N/A   • Years of education: N/A     Occupational History   • Not  on file.     Social History Main Topics   • Smoking status: Current Every Day Smoker   • Smokeless tobacco: Never Used   • Alcohol use No   • Drug use: No   • Sexual activity: Not on file     Other Topics Concern   • Not on file     Social History Narrative   • No narrative on file     Family History   Problem Relation Age of Onset   • No Known Problems Father    • No Known Problems Mother        Labs:  WBC WBC   Date Value Ref Range Status   10/07/2018 5.03 4.80 - 10.80 10*3/mm3 Final   10/06/2018 8.58 4.80 - 10.80 10*3/mm3 Final      HGB Hemoglobin   Date Value Ref Range Status   10/07/2018 7.6 (L) 14.0 - 18.0 g/dL Final   10/06/2018 8.6 (L) 14.0 - 18.0 g/dL Final      HCT Hematocrit   Date Value Ref Range Status   10/07/2018 23.9 (L) 40.0 - 52.0 % Final   10/06/2018 27.0 (L) 40.0 - 52.0 % Final      Platelets Platelets   Date Value Ref Range Status   10/07/2018 110 (L) 130 - 400 10*3/mm3 Final   10/06/2018 136 130 - 400 10*3/mm3 Final      MCV MCV   Date Value Ref Range Status   10/07/2018 91.2 82.0 - 95.0 fL Final   10/06/2018 87.9 82.0 - 95.0 fL Final        Results from last 7 days  Lab Units 10/07/18  0243 10/06/18  0545   SODIUM mmol/L 142 141   POTASSIUM mmol/L 3.7 3.6   CHLORIDE mmol/L 112* 113*   CO2 mmol/L 17.0* 17.0*   BUN mg/dL 50* 46*   CREATININE mg/dL 3.62* 3.57*   CALCIUM mg/dL 7.9* 8.1*   BILIRUBIN mg/dL  --  0.5   ALK PHOS U/L  --  66   ALT (SGPT) U/L  --  30   AST (SGOT) U/L  --  20   GLUCOSE mg/dL 118* 170*     Lab Results   Component Value Date    TROPONINI 4.090 (C) 10/06/2018     PT/INR:    Protime   Date Value Ref Range Status   10/07/2018 15.2 (H) 11.9 - 14.6 Seconds Final   /  INR   Date Value Ref Range Status   10/07/2018 1.16 (H) 0.91 - 1.09 Final       Imaging Results (last 72 hours)     Procedure Component Value Units Date/Time    CT Chest Without Contrast [[id number]] Collected:  10/06/18 1752     Updated:  10/06/18 1759    Narrative:       EXAMINATION:   CT CHEST WO CONTRAST-   10/6/2018 5:52 PM CDT     CT CHEST WO CONTRAST- 10/6/2018 5:33 PM CDT     HISTORY: Shortness of breath     COMPARISON: August 12, 2018 DOSE LENGTH PRODUCT: 185 mGy cm. Automated  exposure control was also utilized to decrease patient radiation dose.     TECHNIQUE: Serial helical tomographic images of the chest were acquired.  Multiplanar reformatted images were provided for review.     FINDINGS:  The imaged portion of the neck and thyroid gland is unremarkable.      The upper lungs are clear.. The lower lobes are obscured by large  bilateral pleural effusions.. The trachea and bronchial tree are patent.     Cardiac silhouette may be mildly enlarged. Extensive vascular  calcifications noted in the aortic arch origin of the great vessels and  coronary arteries.. Endovascular aortic stent graft is noted below the  diaphragm.    .      No acute findings are seen in the bones or surrounding soft tissues.        .       Impression:       1. Large bilateral pleural effusions which essentially obscures the  lower lobes.  2. Extensive vascular calcifications present  3. Endovascular stent graft is present in the abdomen.        This report was finalized on 10/06/2018 17:55 by Dr. Duc Garibay MD.          Objective     Allergies   Allergen Reactions   • Sulfa Antibiotics        Medication Review: Performed  Current Facility-Administered Medications   Medication Dose Route Frequency Provider Last Rate Last Dose   • acetaminophen (TYLENOL) tablet 650 mg  650 mg Oral Q4H PRN Max Jones MD       • [START ON 10/8/2018] aspirin chewable tablet 81 mg  81 mg Oral Daily Vu Michael MD       • atorvastatin (LIPITOR) tablet 80 mg  80 mg Oral Daily Max Jones MD   80 mg at 10/07/18 0837   • budesonide-formoterol (SYMBICORT) 160-4.5 MCG/ACT inhaler 2 puff  2 puff Inhalation BID - RT Max Jones MD   2 puff at 10/07/18 0742   • bumetanide (BUMEX) injection 1 mg  1 mg Intravenous Q8H  Max Jones MD   Stopped at 10/06/18 2156   • calcitriol (ROCALTROL) capsule 0.5 mcg  0.5 mcg Oral Daily Max Jones MD   0.5 mcg at 10/07/18 0837   • calcium carb-cholecalciferol 600-800 MG-UNIT tablet 1 tablet  1 tablet Oral Daily Max Jones MD   1 tablet at 10/07/18 0837   • cefepime (MAXIPIME) 2 g/100 mL 0.9% NS (mbp)  2 g Intravenous Q24H Vu Michael MD   2 g at 10/07/18 0914   • dextrose (D50W) 25 g/ 50mL Intravenous Solution 25 g  25 g Intravenous Q15 Min PRN Max Jones MD       • dextrose (GLUTOSE) oral gel 15 g  15 g Oral Q15 Min PRN Max Jones MD       • Ferric Citrate tablet 210 mg  210 mg Oral TID With Meals Max Jones MD   210 mg at 10/07/18 0914   • glucagon (human recombinant) (GLUCAGEN DIAGNOSTIC) injection 1 mg  1 mg Subcutaneous PRN Max Jones MD       • heparin (porcine) 5000 UNIT/ML injection 5,000 Units  5,000 Units Subcutaneous Q12H Max Jones MD   5,000 Units at 10/06/18 2151   • Hold medication  1 each Does not apply Continuous PRN Vu Michael MD       • HYDROcodone-acetaminophen (NORCO)  MG per tablet 1 tablet  1 tablet Oral Q6H PRN Max Jones MD   1 tablet at 10/07/18 0217   • hydroxypropyl methylcellulose (ISOPTO TEARS) 2.5 % ophthalmic solution 1 drop  1 drop Both Eyes TID PRN Vu Michael MD   1 drop at 10/06/18 2151   • Influenza Vac Subunit Quad (FLUCELVAX) injection 0.5 mL  0.5 mL Intramuscular During Hospitalization Max Jones MD       • insulin lispro (humaLOG) injection 0-9 Units  0-9 Units Subcutaneous 4x Daily With Meals & Nightly Max Jones MD   4 Units at 10/06/18 2150   • insulin lispro (humaLOG) injection 6 Units  6 Units Subcutaneous TID AC Max Jones MD   6 Units at 10/07/18 0837   • ipratropium-albuterol (DUO-NEB) nebulizer solution 3 mL  3 mL Nebulization Q6H PRN  "Vu Michael MD       • lactated ringers infusion  50 mL/hr Intravenous Continuous Vu Michael MD       • magnesium hydroxide (MILK OF MAGNESIA) suspension 2400 mg/10mL 10 mL  10 mL Oral Daily PRN Max Jones MD       • metoprolol tartrate (LOPRESSOR) tablet 25 mg  25 mg Oral Q12H Max Jones MD   25 mg at 10/07/18 0837   • nicotine (NICODERM CQ) 21 MG/24HR patch 1 patch  1 patch Transdermal Q24H Ashutosh Palafox MD   1 patch at 10/06/18 2151   • ondansetron (ZOFRAN) injection 4 mg  4 mg Intravenous Q6H PRN Max Jones MD   4 mg at 10/07/18 0324   • Pharmacy Consult - Pharmacy to dose   Does not apply Continuous PRN Vu Michael MD       • potassium chloride (MICRO-K) CR capsule 20 mEq  20 mEq Oral Daily Vu Michael MD   20 mEq at 10/07/18 0837   • sodium bicarbonate tablet 325 mg  325 mg Oral BID Max Jones MD   325 mg at 10/07/18 0837   • sodium chloride 0.9 % flush 3 mL  3 mL Intravenous Q12H Max Jones MD   3 mL at 10/07/18 0819   • sodium chloride 0.9 % flush 3-10 mL  3-10 mL Intravenous PRN Max Jones MD       • vancomycin 500 mg/100 mL 0.9% NS IVPB ([facility])  500 mg Intravenous Q48H Vu Michael MD   500 mg at 10/06/18 1038       Vital Sign Min/Max for last 24 hours  Temp  Min: 97.2 °F (36.2 °C)  Max: 98.9 °F (37.2 °C)   BP  Min: 78/62  Max: 120/73   Pulse  Min: 84  Max: 108   Resp  Min: 12  Max: 18   SpO2  Min: 90 %  Max: 100 %   No Data Recorded   No Data Recorded     Flowsheet Rows      First Filed Value   Admission Height  162.6 cm (64\") Documented at 10/06/2018 0400   Admission Weight  48.9 kg (107 lb 12.8 oz) Documented at 10/06/2018 0400          Results for orders placed during the hospital encounter of 10/06/18   Adult Transthoracic Echo Limited W/ Cont if Necessary Per Protocol    Addendum · Estimated EF = 37%. · Left ventricular diastolic dysfunction. · Mild aortic valve stenosis " is present. · No evidence of pulmonary hypertension is present. · There is a trivial pericardial effusion. There is no evidence of cardiac  tamponade. · There is a moderate size left pleural effusion.        Abdiel Stout MD 10/6/2018  7:24 PM                  Physical Exam:    General Appearance: Awake, alert, in no acute distress  Eyes: Pupils equal and reactive    Ears: Appear intact with no abnormalities noted  Nose: Nares normal, no drainage  Neck: supple, trachea midline, no carotid bruit and has JVD  Back: no kyphosis present,    Lungs: respirations regular, respirations even and respirations unlabored  Basilar crackles  Decrease air entry bilateral bases    Heart: normal S1, S2,  2/6 pansystolic murmur in the left sternal border,  no rub and no click    Abdomen: normal bowel sounds, no tenderness   Skin: no bleeding, bruising or rash  Extremities: no cyanosis  Psychiatric/Behavioral: Negative for agitation, behavioral problems, confusion, the patient does  appear to be nervous/anxious.          Results Review:   I reviewed the patient's new clinical results.  I reviewed the patient's new imaging results and agree with the interpretation.  I reviewed the patient's other test results and agree with the interpretation  I personally viewed and interpreted the patient's EKG/Telemetry data  Discussed with patient    Reviewed available prior notes, consults, prior visits, laboratory findings, radiology and cardiology relevant reports. Updated chart as applicable. I have reviewed the patient's medical history in detail and updated the computerized patient record as relevant.    Updated patient regarding any new or relevant abnormalities on review of records or any new findings on physical exam. Mentioned to patient about purpose of visit and desirable health short and long term goals and objectives.     Assessment/Plan     Active Problems:    Sepsis (CMS/HCC)  Non-STEMI  Known coronary artery disease next the prior  stent placement more than 10 years ago  Severe left ventricular systolic dysfunction  Moderate aortic regurgitation  Mild aortic stenosis  Mild mitral regurgitation  Pneumonia  Sepsis  Hypoxia  Kidney failure              Plan      Troponin has flat trend   No additional troponin measurements required  Acceptable risk of thoracentesis  Depending on results of pleural fluid findings further risk stratification from cardiac perspective in future  Patient is in overall poor health with significant weight loss, chronic kidney disease, and with ongoing hypoxia and respiratory failure with pneumonia and sepsis is   not a candidate for cardiac catheterization currently but may be so in future  In addition patient is DO NOT RESUSCITATE  Continue with current management  Will follow patient  Supportive care  Telemetry  Optimal medical therapy  Deep vein thrombosis prophylaxis/precautions  Appropriate diet, fluid, sodium, caffeine, stimulants intake   Compliance to diet and medications   Avoid NSAIDS  Abdiel Stout MD  10/07/18  10:13 AM    EMR Dragon/Transcription was used to dictate part of this note

## 2018-10-07 NOTE — PROGRESS NOTES
[facility]  INPATIENT PROGRESS NOTE    Patient Name: Gavin Wilson  Date of Admission: 10/6/2018  Today's Date: 10/07/18  Length of Stay: 1  Primary Care Physician: Provider, No Known    Subjective   Chief Complaint: shortness of breath   HPI     Patient seen and examined at bedside.  Patient is feeling much better today.  He is alert and oriented this morning.  He is not having any more shortness of breath.  We discussed possibly doing a thoracentesis to the left side, as it has been worsening over the past couple weeks.  He is agreeable to do this.  Patient had no fevers or chills overnight.  Patient to be transferred up to the floor.        Review of Systems   Constitutional: Negative for chills and fever.   Respiratory: Positive for shortness of breath. Negative for cough.    Cardiovascular: Negative for chest pain and palpitations.   Gastrointestinal: Negative for abdominal distention, abdominal pain, diarrhea, nausea and vomiting.        All pertinent negatives and positives are as above. All other systems have been reviewed and are negative unless otherwise stated.     Objective    Temp:  [97.2 °F (36.2 °C)-98.9 °F (37.2 °C)] 97.6 °F (36.4 °C)  Heart Rate:  [] 98  Resp:  [12-18] 14  BP: ()/() 120/73  Physical Exam   Constitutional: He is oriented to person, place, and time. No distress.   Malnourished; chronically ill appearing   HENT:   Head: Normocephalic and atraumatic.   Eyes: Conjunctivae are normal. No scleral icterus.   Cardiovascular: Normal rate, regular rhythm and intact distal pulses.  Exam reveals no gallop and no friction rub.    Murmur heard.  Pulmonary/Chest: Effort normal. No respiratory distress. He has no wheezes. He has no rales.   Diminished at bases    Abdominal: Soft. Bowel sounds are normal. He exhibits no distension. There is no tenderness.   Musculoskeletal: He exhibits no edema.   Neurological: He is alert and  oriented to person, place, and time.   Skin: Skin is warm and dry. He is not diaphoretic.   Psychiatric: He has a normal mood and affect. His behavior is normal.   Vitals reviewed.          Results Review:  I have reviewed the labs, radiology results, and diagnostic studies.    Laboratory Data:     Results from last 7 days  Lab Units 10/07/18  0243 10/06/18  0545   WBC 10*3/mm3 5.03 8.58   HEMOGLOBIN g/dL 7.6* 8.6*   HEMATOCRIT % 23.9* 27.0*   PLATELETS 10*3/mm3 110* 136          Results from last 7 days  Lab Units 10/07/18  0243 10/06/18  0545   SODIUM mmol/L 142 141   POTASSIUM mmol/L 3.7 3.6   CHLORIDE mmol/L 112* 113*   CO2 mmol/L 17.0* 17.0*   BUN mg/dL 50* 46*   CREATININE mg/dL 3.62* 3.57*   CALCIUM mg/dL 7.9* 8.1*   BILIRUBIN mg/dL  --  0.5   ALK PHOS U/L  --  66   ALT (SGPT) U/L  --  30   AST (SGOT) U/L  --  20   GLUCOSE mg/dL 118* 170*       Culture Data:   [unfilled]    Radiology Data:   Imaging Results (last 24 hours)     Procedure Component Value Units Date/Time    CT Chest Without Contrast [[id number]] Collected:  10/06/18 1752     Updated:  10/06/18 1759    Narrative:       EXAMINATION:   CT CHEST WO CONTRAST-  10/6/2018 5:52 PM CDT     CT CHEST WO CONTRAST- 10/6/2018 5:33 PM CDT     HISTORY: Shortness of breath     COMPARISON: August 12, 2018 DOSE LENGTH PRODUCT: 185 mGy cm. Automated  exposure control was also utilized to decrease patient radiation dose.     TECHNIQUE: Serial helical tomographic images of the chest were acquired.  Multiplanar reformatted images were provided for review.     FINDINGS:  The imaged portion of the neck and thyroid gland is unremarkable.      The upper lungs are clear.. The lower lobes are obscured by large  bilateral pleural effusions.. The trachea and bronchial tree are patent.     Cardiac silhouette may be mildly enlarged. Extensive vascular  calcifications noted in the aortic arch origin of the great vessels and  coronary arteries.. Endovascular aortic stent graft  is noted below the  diaphragm.    .      No acute findings are seen in the bones or surrounding soft tissues.        .       Impression:       1. Large bilateral pleural effusions which essentially obscures the  lower lobes.  2. Extensive vascular calcifications present  3. Endovascular stent graft is present in the abdomen.        This report was finalized on 10/06/2018 17:55 by Dr. Duc Garibay MD.          I have reviewed the patient's current medications.     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         Discharge Planning: I expect the patient to be discharged to in 2-4 days.     Vu Michael MD   10/07/18   7:53 AM

## 2018-10-07 NOTE — PLAN OF CARE
Problem: Pneumonia (Adult)  Goal: Signs and Symptoms of Listed Potential Problems Will be Absent, Minimized or Managed (Pneumonia)  Outcome: Ongoing (interventions implemented as appropriate)      Problem: Fall Risk (Adult)  Goal: Identify Related Risk Factors and Signs and Symptoms  Outcome: Ongoing (interventions implemented as appropriate)    Goal: Absence of Fall  Outcome: Ongoing (interventions implemented as appropriate)      Problem: Skin Injury Risk (Adult)  Goal: Identify Related Risk Factors and Signs and Symptoms  Outcome: Ongoing (interventions implemented as appropriate)    Goal: Skin Health and Integrity  Outcome: Ongoing (interventions implemented as appropriate)      Problem: Patient Care Overview  Goal: Plan of Care Review  Outcome: Ongoing (interventions implemented as appropriate)      Problem: Nutrition, Imbalanced: Inadequate Oral Intake (Adult)  Goal: Improved Oral Intake  Outcome: Ongoing (interventions implemented as appropriate)    Goal: Prevent Further Weight Loss  Outcome: Ongoing (interventions implemented as appropriate)      Problem: Patient Care Overview  Goal: Plan of Care Review  Outcome: Ongoing (interventions implemented as appropriate)   10/07/18 0520   Plan of Care Review   Progress improving   OTHER   Outcome Summary Pt was nice to me, just reported that he was having a hard time hearing me. He thinks that his hearing aid batteries are dead. Pt is A&O x4 and Sarah. I called Dr. Henriquez at 1 AM this morning in order to consult him on the patient's BP trending down. He decided to order the patient a 250 mL bolus to raise his BP. We had to be careful with fluid administration due to him having CHF. Outside of BP, VS are stable.    Coping/Psychosocial   Plan of Care Reviewed With patient

## 2018-10-07 NOTE — PLAN OF CARE
Problem: Patient Care Overview  Goal: Plan of Care Review  Outcome: Ongoing (interventions implemented as appropriate)   10/06/18 4411   Plan of Care Review   Progress no change   OTHER   Outcome Summary pt was alert and oriented all shift he did cuss most of the staff however today he did not cuss me. He complained of pain one time. BP was 80's to 100 systolic he ate well however after his dinner tray he coughed more than I felt he should so I added a speech consult just to be sure he is not aspirating on anything. He was afebrile awake most of the day. He also went to CT of the chest.   Coping/Psychosocial   Plan of Care Reviewed With patient

## 2018-10-07 NOTE — THERAPY DISCHARGE NOTE
Acute Care - Speech Language Pathology   Swallow Eval/Discharge  [place]     Patient Name: Gavin Wilson  : 1945  MRN: [id number]  Today's Date: 10/7/2018               Admit Date: 10/6/2018    Visit Dx:    ICD-10-CM ICD-9-CM   1. Esophageal dysphagia R13.10 787.20     Patient Active Problem List   Diagnosis   • Volume overload   • Chronic kidney disease, stage 4 (severe) (CMS/HCC)   • Chronic systolic congestive heart failure (CMS/HCC)   • Pleural effusion   • Sepsis (CMS/HCC)     Past Medical History:   Diagnosis Date   • CHF (congestive heart failure) (CMS/HCC)    • Coronary stent occlusion    • Diabetes mellitus (CMS/HCC)    • Hyperlipidemia    • Hypertension    • Stroke (CMS/HCC)      Past Surgical History:   Procedure Laterality Date   • BLADDER SURGERY     • ESOPHAGECTOMY       SLP completed bedside swallow evaluation. Patient was alert and cooperative and agreed to evaluation. Patient apparently had some coughing after his meal last night and was made NPO. Patient previously had esophagectomy with gastric pull-through. He does cough with solid foods. Did not trial solid foods today as patient does not have his dentures. His daughter is to bring them later today. Patient was given puree and thin water trials with no overt s/s of aspiration.  Chest X ray and CT show no pneumonia or infiltrates. OK for pt to resume diet, will do puree foods for now and thin liquids, ok for RN/MD to advance to mechanical soft when pt has his dentures. Follow aspiration precautions and strict oral care. No further ST for swallowing warranted. Please have MD reconsult SLP if there is concern for aspiration and SLP will do instrumental evaluation (VFSS).       SWALLOW EVALUATION (last 72 hours)      SLP Adult Swallow Evaluation     Row Name 10/07/18 0900                   Rehab Evaluation    Document Type evaluation  -KG        Subjective Information no complaints  -KG        Patient Observations alert;cooperative;agree  to therapy  -KG        Patient Effort good  -KG        Symptoms Noted During/After Treatment none  -KG           General Information    Patient Profile Reviewed yes  -KG        Pertinent History Of Current Problem esophagectomy with gastric pull-through  -KG        Current Method of Nutrition NPO  -KG        Precautions/Limitations, Vision WFL;for purposes of eval  -KG        Precautions/Limitations, Hearing hearing impairment, bilaterally  -KG        Prior Level of Function-Communication WFL  -KG        Prior Level of Function-Swallowing regular textures;thin liquids;esophageal concerns  -KG        Plans/Goals Discussed with patient  -KG        Barriers to Rehab medically complex  -KG        Patient's Goals for Discharge return to regular diet  -KG           Pain Assessment    Additional Documentation Pain Scale: Numbers Pre/Post-Treatment (Group)  -KG           Pain Scale: Numbers Pre/Post-Treatment    Pain Scale: Numbers, Pretreatment 7/10  -KG        Pain Scale: Numbers, Post-Treatment 7/10  -KG        Pain Location abdomen  -KG           Oral Motor and Function    Dentition Assessment missing teeth;teeth are in poor condition;edentulous, does not have dentures;other (see comments)   Daughter bringing dentures later today.  -KG        Secretion Management WNL/WFL  -KG        Mucosal Quality moist, healthy  -KG        Gag Response WFL  -KG        Volitional Swallow WFL  -KG        Volitional Cough WFL  -KG           General Eating/Swallowing Observations    Respiratory Support Currently in Use room air  -KG        Eating/Swallowing Skills fed by SLP  -KG        Positioning During Eating upright in bed  -KG        Utensils Used spoon;straw  -KG        Consistencies Trialed pureed;thin liquids  -KG           Respiratory    Respiratory Status WFL  -KG           Clinical Swallow Eval    Oral Prep Phase WFL  -KG        Oral Transit WFL  -KG        Oral Residue WFL  -KG        Pharyngeal Phase no overt signs/symptoms  of pharyngeal impairment  -KG        Esophageal Phase --   Patient does not have an esophgus. Had gastric pull through  -KG        Clinical Swallow Evaluation Summary Patient had esophagectomy with gastric pull-through. He does cough with solid foods. Did not trial solid foods today as patient does not have his dentures. His daughter is to bring them later today. Chest X ray and CT show no pneumonia or infiltrates. OK for pt to resume diet, will do puree foods for now and thin liquids, ok for RN/MD to advance to mechanical soft when pt has his dentures. No further ST for swallowing warranted. MD to reconsult if there is concern for aspiration and SLP will do instrumental evaluation (VFSS).    -KG           Clinical Impression    SLP Swallowing Diagnosis esophageal dysfunction;other (see comments)   esophagectomy with gastric pull-through  -KG        Functional Impact risk of malnutrition;risk of dehydration  -KG        Rehab Potential/Prognosis, Swallowing other (see comments)   No therapy warranted at this time.   -KG           Recommendations    Therapy Frequency (Swallow) evaluation only  -KG        SLP Diet Recommendation puree;soft textures;thin liquids;other (see comments)   Puree/thin until dentures arrive, then ok for [facility] soft/thin  -KG        Recommended Diagnostics VFSS (MBS);other (see comments)   only if MD concerned with aspiration  -KG        Recommended Precautions and Strategies upright posture during/after eating;small bites of food and sips of liquid  -KG        SLP Rec. for Method of Medication Administration as tolerated  -KG        Monitor for Signs of Aspiration yes;notify SLP if any concerns  -KG        Demonstrates Need for Referral to Another Service clinical nutrition services/dietitian  -KG          User Key  (r) = Recorded By, (t) = Taken By, (c) = Cosigned By    Initials Name Effective Dates    KG Barb Blanchard, CCC-SLP 06/08/18 -         EDUCATION  The patient has been educated in  the following areas:   Dysphagia (Swallowing Impairment) Oral Care/Hydration Modified Diet Instruction.    SLP Recommendation and Plan  SLP Swallowing Diagnosis: esophageal dysfunction, other (see comments) (esophagectomy with gastric pull-through)  SLP Diet Recommendation: puree, soft textures, thin liquids, other (see comments) (Puree/thin until dentures arrive, then ok for mech soft/thin)     Monitor for Signs of Aspiration: yes, notify SLP if any concerns  Recommended Diagnostics: VFSS (MBS), other (see comments) (only if MD concerned with aspiration)     Anticipated Dischage Disposition: unknown  Rehab Potential/Prognosis, Swallowing: other (see comments) (No therapy warranted at this time. )  Therapy Frequency (Swallow): evaluation only     Demonstrates Need for Referral to Another Service: clinical nutrition services/dietitian    Progress: improving  Outcome Summary: SLP completed bedside swallow evaluation. Patient was alert and cooperative and agreed to evaluation. Patient apparently had some coughing after his meal last night and was made NPO. Patient previously  had esophagectomy with gastric pull-through. He does cough with solid foods. Did not trial solid foods today as patient does not have his dentures. His daughter is to bring them later today. Chest X ray and CT show no pneumonia or infiltrates. OK for pt to resume diet, will do puree foods for now and thin liquids, ok for RN/MD to advance to mechanical soft when pt has his dentures. Follow aspiration precautions and strict oral care.  No further ST for swallowing warranted. Please have MD reconsult SLP if there is concern for aspiration and SLP will do instrumental evaluation (VFSS).             SLP Outcome Measures (last 72 hours)      SLP Outcome Measures     Row Name 10/07/18 0900             SLP Outcome Measures    Outcome Measure Used? Adult NOMS  -KG         Adult FCM Scores    FCM Chosen Swallowing  -KG      Swallowing FCM Score 6  -KG         User Key  (r) = Recorded By, (t) = Taken By, (c) = Cosigned By    Initials Name Effective Dates    SEMAJ Barb Blanchard CCC-SLP 06/08/18 -            Time Calculation:         Time Calculation- SLP     Row Name 10/07/18 0931             Time Calculation- SLP    SLP Start Time 0815  -KG      SLP Stop Time 0915  -KG      SLP Time Calculation (min) 60 min  -KG      SLP Received On 10/06/18  -KG        User Key  (r) = Recorded By, (t) = Taken By, (c) = Cosigned By    Initials Name Provider Type    SEMAJ Barb Blanchard CCC-SLP Speech and Language Pathologist          Therapy Charges for Today     Code Description Service Date Service Provider Modifiers Qty    06063251328 HC ST SWALLOWING CURRENT STATUS 10/7/2018 Barb Blanchard CCC-SLP GN, CI 1    30203688442 HC ST SWALLOWING PROJECTED 10/7/2018 Barb Blanchard CCC-SLP GN, CI 1    [id number] HC ST SWALLOWING DISCHARGE 10/7/2018 Barb Blanchard CCC-SLP GN, CI 1    [id number] HC ST EVAL ORAL PHARYNG SWALLOW 4 10/7/2018 Barb Blanchard CCC-SLP GN 1          SLP G-Codes  SLP NOMS Used?: Yes  Functional Limitations: Swallowing  Swallow Current Status (): At least 1 percent but less than 20 percent impaired, limited or restricted  Swallow Goal Status (): At least 1 percent but less than 20 percent impaired, limited or restricted  Swallow Discharge Status (): At least 1 percent but less than 20 percent impaired, limited or restricted    SLP Discharge Summary  Anticipated Dischage Disposition: unknown  Reason for Discharge: no further expectation of functional progress  Progress Toward Achieving Short/long Term Goals: other (see comments) (Evaluation complete, no therapy warranted at this time. )  Discharge Destination: unknown    AUDREY Hardy  10/7/2018

## 2018-10-08 ENCOUNTER — APPOINTMENT (OUTPATIENT)
Dept: ULTRASOUND IMAGING | Facility: HOSPITAL | Age: 73
End: 2018-10-08

## 2018-10-08 LAB
ALBUMIN SERPL-MCNC: 3.1 G/DL (ref 3.5–5)
ALBUMIN/GLOB SERPL: 1.2 G/DL (ref 1.1–2.5)
ALP SERPL-CCNC: 83 U/L (ref 24–120)
ALT SERPL W P-5'-P-CCNC: 53 U/L (ref 0–54)
ANION GAP SERPL CALCULATED.3IONS-SCNC: 15 MMOL/L (ref 4–13)
AST SERPL-CCNC: 27 U/L (ref 7–45)
BASOPHILS # BLD AUTO: 0.04 10*3/MM3 (ref 0–0.2)
BASOPHILS NFR BLD AUTO: 0.8 % (ref 0–2)
BILIRUB SERPL-MCNC: 0.4 MG/DL (ref 0.1–1)
BUN BLD-MCNC: 50 MG/DL (ref 5–21)
BUN/CREAT SERPL: 13.2 (ref 7–25)
CALCIUM SPEC-SCNC: 8.5 MG/DL (ref 8.4–10.4)
CHLORIDE SERPL-SCNC: 110 MMOL/L (ref 98–110)
CO2 SERPL-SCNC: 16 MMOL/L (ref 24–31)
CREAT BLD-MCNC: 3.78 MG/DL (ref 0.5–1.4)
DEPRECATED RDW RBC AUTO: 60.7 FL (ref 40–54)
EOSINOPHIL # BLD AUTO: 0.12 10*3/MM3 (ref 0–0.7)
EOSINOPHIL NFR BLD AUTO: 2.5 % (ref 0–4)
ERYTHROCYTE [DISTWIDTH] IN BLOOD BY AUTOMATED COUNT: 18.3 % (ref 12–15)
GFR SERPL CREATININE-BSD FRML MDRD: 16 ML/MIN/1.73
GLOBULIN UR ELPH-MCNC: 2.6 GM/DL
GLUCOSE BLD-MCNC: 131 MG/DL (ref 70–100)
GLUCOSE BLDC GLUCOMTR-MCNC: 121 MG/DL (ref 70–130)
GLUCOSE BLDC GLUCOMTR-MCNC: 148 MG/DL (ref 70–130)
GLUCOSE BLDC GLUCOMTR-MCNC: 150 MG/DL (ref 70–130)
GLUCOSE BLDC GLUCOMTR-MCNC: 158 MG/DL (ref 70–130)
GLUCOSE BLDC GLUCOMTR-MCNC: 84 MG/DL (ref 70–130)
HCT VFR BLD AUTO: 26.9 % (ref 40–52)
HGB BLD-MCNC: 8.5 G/DL (ref 14–18)
IMM GRANULOCYTES # BLD: 0.03 10*3/MM3 (ref 0–0.03)
IMM GRANULOCYTES NFR BLD: 0.6 % (ref 0–5)
LYMPHOCYTES # BLD AUTO: 0.82 10*3/MM3 (ref 0.72–4.86)
LYMPHOCYTES NFR BLD AUTO: 17.3 % (ref 15–45)
MCH RBC QN AUTO: 28.5 PG (ref 28–32)
MCHC RBC AUTO-ENTMCNC: 31.6 G/DL (ref 33–36)
MCV RBC AUTO: 90.3 FL (ref 82–95)
MONOCYTES # BLD AUTO: 0.31 10*3/MM3 (ref 0.19–1.3)
MONOCYTES NFR BLD AUTO: 6.6 % (ref 4–12)
NEUTROPHILS # BLD AUTO: 3.41 10*3/MM3 (ref 1.87–8.4)
NEUTROPHILS NFR BLD AUTO: 72.2 % (ref 39–78)
NRBC BLD MANUAL-RTO: 0 /100 WBC (ref 0–0)
PHOSPHATE SERPL-MCNC: 5.2 MG/DL (ref 2.5–4.5)
PLATELET # BLD AUTO: 139 10*3/MM3 (ref 130–400)
PMV BLD AUTO: 10.3 FL (ref 6–12)
POTASSIUM BLD-SCNC: 4.5 MMOL/L (ref 3.5–5.3)
PROT SERPL-MCNC: 5.7 G/DL (ref 6.3–8.7)
RBC # BLD AUTO: 2.98 10*6/MM3 (ref 4.8–5.9)
SODIUM BLD-SCNC: 141 MMOL/L (ref 135–145)
WBC NRBC COR # BLD: 4.73 10*3/MM3 (ref 4.8–10.8)

## 2018-10-08 PROCEDURE — 94799 UNLISTED PULMONARY SVC/PX: CPT

## 2018-10-08 PROCEDURE — 25010000002 HEPARIN (PORCINE) PER 1000 UNITS: Performed by: INTERNAL MEDICINE

## 2018-10-08 PROCEDURE — 97161 PT EVAL LOW COMPLEX 20 MIN: CPT

## 2018-10-08 PROCEDURE — G8978 MOBILITY CURRENT STATUS: HCPCS

## 2018-10-08 PROCEDURE — 85025 COMPLETE CBC W/AUTO DIFF WBC: CPT | Performed by: INTERNAL MEDICINE

## 2018-10-08 PROCEDURE — 25010000002 VANCOMYCIN 10 G RECONSTITUTED SOLUTION: Performed by: INTERNAL MEDICINE

## 2018-10-08 PROCEDURE — 82962 GLUCOSE BLOOD TEST: CPT

## 2018-10-08 PROCEDURE — 99233 SBSQ HOSP IP/OBS HIGH 50: CPT | Performed by: INTERNAL MEDICINE

## 2018-10-08 PROCEDURE — 84100 ASSAY OF PHOSPHORUS: CPT | Performed by: INTERNAL MEDICINE

## 2018-10-08 PROCEDURE — G8987 SELF CARE CURRENT STATUS: HCPCS

## 2018-10-08 PROCEDURE — 80053 COMPREHEN METABOLIC PANEL: CPT | Performed by: INTERNAL MEDICINE

## 2018-10-08 PROCEDURE — 94760 N-INVAS EAR/PLS OXIMETRY 1: CPT

## 2018-10-08 PROCEDURE — 76775 US EXAM ABDO BACK WALL LIM: CPT

## 2018-10-08 PROCEDURE — 97530 THERAPEUTIC ACTIVITIES: CPT

## 2018-10-08 PROCEDURE — 63710000001 INSULIN LISPRO (HUMAN) PER 5 UNITS: Performed by: INTERNAL MEDICINE

## 2018-10-08 PROCEDURE — G8988 SELF CARE GOAL STATUS: HCPCS

## 2018-10-08 PROCEDURE — 97166 OT EVAL MOD COMPLEX 45 MIN: CPT

## 2018-10-08 PROCEDURE — 25010000002 CEFEPIME PER 500 MG: Performed by: INTERNAL MEDICINE

## 2018-10-08 PROCEDURE — G8979 MOBILITY GOAL STATUS: HCPCS

## 2018-10-08 PROCEDURE — 25010000002 ONDANSETRON PER 1 MG: Performed by: INTERNAL MEDICINE

## 2018-10-08 RX ORDER — SODIUM BICARBONATE 650 MG/1
650 TABLET ORAL 2 TIMES DAILY
Status: DISCONTINUED | OUTPATIENT
Start: 2018-10-08 | End: 2018-10-14

## 2018-10-08 RX ADMIN — HEPARIN SODIUM 5000 UNITS: 5000 INJECTION, SOLUTION INTRAVENOUS; SUBCUTANEOUS at 08:41

## 2018-10-08 RX ADMIN — NICOTINE 1 PATCH: 21 PATCH, EXTENDED RELEASE TRANSDERMAL at 20:24

## 2018-10-08 RX ADMIN — METOPROLOL TARTRATE 25 MG: 25 TABLET, FILM COATED ORAL at 08:47

## 2018-10-08 RX ADMIN — SODIUM BICARBONATE 325 MG: 650 TABLET ORAL at 08:47

## 2018-10-08 RX ADMIN — FERRIC CITRATE 210 MG: 210 TABLET, COATED ORAL at 17:17

## 2018-10-08 RX ADMIN — POTASSIUM CHLORIDE 20 MEQ: 750 CAPSULE, EXTENDED RELEASE ORAL at 08:47

## 2018-10-08 RX ADMIN — BUMETANIDE 1 MG: 0.25 INJECTION INTRAMUSCULAR; INTRAVENOUS at 17:16

## 2018-10-08 RX ADMIN — CALCITRIOL CAPSULES 0.25 MCG 0.5 MCG: 0.25 CAPSULE ORAL at 08:48

## 2018-10-08 RX ADMIN — HEPARIN SODIUM 5000 UNITS: 5000 INJECTION, SOLUTION INTRAVENOUS; SUBCUTANEOUS at 20:23

## 2018-10-08 RX ADMIN — FERRIC CITRATE 210 MG: 210 TABLET, COATED ORAL at 08:41

## 2018-10-08 RX ADMIN — VANCOMYCIN HYDROCHLORIDE 500 MG: 10 INJECTION, POWDER, LYOPHILIZED, FOR SOLUTION INTRAVENOUS at 08:44

## 2018-10-08 RX ADMIN — ATORVASTATIN CALCIUM 80 MG: 40 TABLET, FILM COATED ORAL at 08:47

## 2018-10-08 RX ADMIN — BUDESONIDE AND FORMOTEROL FUMARATE DIHYDRATE 2 PUFF: 160; 4.5 AEROSOL RESPIRATORY (INHALATION) at 22:07

## 2018-10-08 RX ADMIN — BUMETANIDE 1 MG: 0.25 INJECTION INTRAMUSCULAR; INTRAVENOUS at 08:40

## 2018-10-08 RX ADMIN — FERRIC CITRATE 210 MG: 210 TABLET, COATED ORAL at 12:12

## 2018-10-08 RX ADMIN — INSULIN LISPRO 6 UNITS: 100 INJECTION, SOLUTION INTRAVENOUS; SUBCUTANEOUS at 09:02

## 2018-10-08 RX ADMIN — INSULIN LISPRO 6 UNITS: 100 INJECTION, SOLUTION INTRAVENOUS; SUBCUTANEOUS at 12:06

## 2018-10-08 RX ADMIN — CEFEPIME HYDROCHLORIDE 2 G: 2 INJECTION, POWDER, FOR SOLUTION INTRAVENOUS at 10:01

## 2018-10-08 RX ADMIN — BUDESONIDE AND FORMOTEROL FUMARATE DIHYDRATE 2 PUFF: 160; 4.5 AEROSOL RESPIRATORY (INHALATION) at 08:22

## 2018-10-08 RX ADMIN — HYDROCODONE BITARTRATE AND ACETAMINOPHEN 1 TABLET: 10; 325 TABLET ORAL at 05:22

## 2018-10-08 RX ADMIN — ONDANSETRON 4 MG: 2 INJECTION INTRAMUSCULAR; INTRAVENOUS at 22:25

## 2018-10-08 RX ADMIN — SODIUM BICARBONATE 650 MG: 650 TABLET ORAL at 20:23

## 2018-10-08 RX ADMIN — SODIUM BICARBONATE 50 ML/HR: 84 INJECTION, SOLUTION INTRAVENOUS at 12:08

## 2018-10-08 RX ADMIN — Medication 1 TABLET: at 08:47

## 2018-10-08 RX ADMIN — Medication 3 ML: at 08:43

## 2018-10-08 RX ADMIN — ASPIRIN 81 MG CHEWABLE TABLET 81 MG: 81 TABLET CHEWABLE at 08:47

## 2018-10-08 RX ADMIN — ONDANSETRON 4 MG: 2 INJECTION INTRAMUSCULAR; INTRAVENOUS at 16:30

## 2018-10-08 RX ADMIN — METOPROLOL TARTRATE 25 MG: 25 TABLET, FILM COATED ORAL at 20:23

## 2018-10-08 NOTE — THERAPY EVALUATION
Acute Care - Occupational Therapy Initial Evaluation  [facility]     Patient Name: Gavin Wilson  : 1945  MRN: [id number]  Today's Date: 10/8/2018  Onset of Illness/Injury or Date of Surgery: 10/06/18  Date of Referral to OT: 10/08/18  Referring Physician: Dr. Michael    Admit Date: 10/6/2018       ICD-10-CM ICD-9-CM   1. Esophageal dysphagia R13.10 787.20   2. Impaired mobility and ADLs Z74.09 799.89   3. Impaired functional mobility, balance, gait, and endurance Z74.09 V49.89     Patient Active Problem List   Diagnosis   • Volume overload   • Chronic kidney disease, stage 4 (severe) (CMS/HCC)   • Chronic systolic congestive heart failure (CMS/HCC)   • Pleural effusion   • Sepsis (CMS/HCC)     Past Medical History:   Diagnosis Date   • CHF (congestive heart failure) (CMS/HCC)    • Coronary stent occlusion    • Diabetes mellitus (CMS/HCC)    • Hyperlipidemia    • Hypertension    • Stroke (CMS/HCC)      Past Surgical History:   Procedure Laterality Date   • BLADDER SURGERY     • ESOPHAGECTOMY            OT ASSESSMENT FLOWSHEET (last 72 hours)      Occupational Therapy Evaluation     Row Name 10/08/18 1030                   OT Evaluation Time/Intention    Subjective Information complains of;pain  -MW        Document Type evaluation  -MW        Mode of Treatment occupational therapy  -MW           General Information    Patient Profile Reviewed? yes  -MW        Onset of Illness/Injury or Date of Surgery 10/06/18  -        Referring Physician Dr. Michael  -        Patient Observations alert;cooperative;agree to therapy  -MW        Patient/Family Observations no family present  -        General Observations of Patient fowlers in bed, IV, urostomy  -MW        Prior Level of Function independent:;all household mobility;community mobility;ADL's;driving  -MW        Equipment Currently Used at Home cane, quad;walker, rolling;bath bench;grab bar;commode, bedside   hand held shower head; bath bench does not fit in  tub  -MW        Pertinent History of Current Functional Problem transferred from outlying facility, pt reports falling yesterday and unable to get up for two hours. dx: acute resp failure, DM, kidney disease  -MW        Existing Precautions/Restrictions fall   urostomy  -MW        Equipment Issued to Patient walker, front wheeled  -MW        Risks Reviewed patient:;LOB;nausea/vomiting;dizziness;increased discomfort;change in vital signs;increased drainage;lines disloged  -MW        Benefits Reviewed patient:;improve function;increase independence;increase strength;increase balance;decrease pain;decrease risk of DVT;improve skin integrity;increase knowledge  -MW        Barriers to Rehab medically complex  -MW           Relationship/Environment    Lives With alone  -MW           Resource/Environmental Concerns    Current Living Arrangements home/apartment/condo   trailer  -MW           Home Main Entrance    Number of Stairs, Main Entrance four  -MW        Stair Railings, Main Entrance railings on both sides of stairs  -MW           Cognitive Assessment/Interventions    Additional Documentation Cognitive Assessment/Intervention (Group)  -MW           Cognitive Assessment/Intervention- PT/OT    Affect/Mental Status (Cognitive) WNL  -MW        Behavioral Issues (Cognitive) overwhelmed easily  -MW        Orientation Status (Cognition) oriented x 4  -MW        Follows Commands (Cognition) follows one step commands;over 90% accuracy;verbal cues/prompting required  -MW        Personal Safety Interventions fall prevention program maintained;gait belt;nonskid shoes/slippers when out of bed;supervised activity  -MW           Safety Issues, Functional Mobility    Impairments Affecting Function (Mobility) balance;endurance/activity tolerance;pain;shortness of breath;strength  -MW           Bed Mobility Assessment/Treatment    Bed Mobility Assessment/Treatment scooting/bridging;supine-sit;sit-supine  -MW        Scooting/Bridging  "[place] (Bed Mobility) conditional independence  -        Supine-Sit [place] (Bed Mobility) minimum assist (75% patient effort)  -        Sit-Supine [place] (Bed Mobility) supervision;verbal cues  -        Assistive Device (Bed Mobility) head of bed elevated;bed rails  -        Comment (Bed Mobility) HHA provided to come to EOB, increased WOB w bed mobility, no difficulty w return to supine  -           Functional Mobility    Functional Mobility- Ind. Level contact guard assist;2 person assist required  -        Functional Mobility- Device rolling walker  -        Functional Mobility- Safety Issues step length decreased  -        Functional Mobility- Comment pt takes 4 shuffle steps w RW, unable to amb further d/t fatigue requiring return to bed  -           Transfer Assessment/Treatment    Transfer Assessment/Treatment sit-stand transfer;stand-sit transfer  -           Sit-Stand Transfer    Sit-Stand [place] (Transfers) contact guard;verbal cues  -        Assistive Device (Sit-Stand Transfers) walker, front-wheeled  -           Stand-Sit Transfer    Stand-Sit [place] (Transfers) contact guard;verbal cues  -        Assistive Device (Stand-Sit Transfers) walker, front-wheeled  -           ADL Assessment/Intervention    BADL Assessment/Intervention lower body dressing  -           Lower Body Dressing Assessment/Training    Lower Body Dressing [place] Level don;doff;socks;maximum assist (25% patient effort)  -        Lower Body Dressing Position edge of bed sitting  -        Comment (Lower Body Dressing) pt refused to complete activity reporting \"I cannot do that\" and that his daugther had to help his last night; at baseline pt has no difficulty w LB ADL  -MW           BADL Safety/Performance    Impairments, BADL Safety/Performance balance;endurance/activity tolerance;pain;shortness of breath;strength  -           General ROM    GENERAL ROM COMMENTS AROM " WFL BUE  -MW           MMT (Manual Muscle Testing)    General MMT Comments grossly 4-/5 BUE, pt reports L  weaker than R, however symmetrical w MMT  -           Motor Assessment/Interventions    Additional Documentation Balance (Group)  -MW           Balance    Balance static sitting balance;static standing balance;dynamic sitting balance;dynamic standing balance  -           Static Sitting Balance    Level of [place] (Unsupported Sitting, Static Balance) supervision;standby assist  -        Sitting Position (Unsupported Sitting, Static Balance) sitting on edge of bed  -           Dynamic Sitting Balance    Level of [place], Reaches Outside Midline (Sitting, Dynamic Balance) supervision  -MW        Sitting Position, Reaches Outside Midline (Sitting, Dynamic Balance) sitting on edge of bed  -           Static Standing Balance    Level of [place] (Supported Standing, Static Balance) contact guard assist  -        Assistive Device Utilized (Supported Standing, Static Balance) rolling walker  -           Dynamic Standing Balance    Level of [place], Reaches Outside Midline (Standing, Dynamic Balance) contact guard assist  -           Sensory Assessment/Intervention    Sensory General Assessment light touch sensation deficits identified   pt reports L hand light touch diminished compared to R  -MW           Positioning and Restraints    Pre-Treatment Position in bed  -MW        Post Treatment Position bed  -MW        In Bed fowlers;call light within reach;encouraged to call for assist;notified nsg;side rails up x2  -           Pain Assessment    Additional Documentation Pain Scale: FACES Pre/Post-Treatment (Group)  -           Pain Scale: Numbers Pre/Post-Treatment    Pre/Post Treatment Pain Comment c/o pain in B knees (R worse than L, thoracic vertebrae, and chest   -        Pain Intervention(s) Repositioned;Ambulation/increased activity  -           Pain Scale: FACES  Pre/Post-Treatment    Pain: FACES Scale, Pretreatment 6-->hurts even more  -MW        Pain: FACES Scale, Post-Treatment 6-->hurts even more  -MW           Wound 10/07/18 1255 Bilateral posterior coccyx    Wound - Properties Group Date first assessed: 10/07/18  -SH Time first assessed: 1255  -SH Side: Bilateral  -SH Orientation: posterior  -SH Location: coccyx  -SH       Plan of Care Review    Plan of Care Reviewed With patient  -MW           Clinical Impression (OT)    Date of Referral to OT 10/08/18  -MW        OT Diagnosis decline in ADL  -MW        Prognosis (OT Eval) good  -MW        Patient/Family Goals Statement (OT Eval) return home, increase independence to baseline  -MW        Criteria for Skilled Therapeutic Interventions Met (OT Eval) yes;treatment indicated  -        Rehab Potential (OT Eval) good, to achieve stated therapy goals  -        Therapy Frequency (OT Eval) 5 times/wk  -MW        Predicted Duration of Therapy Intervention (Therapy Eval) until d/c  -MW        Care Plan Review (OT) evaluation/treatment results reviewed;care plan/treatment goals reviewed;risks/benefits reviewed;current/potential barriers reviewed;patient/other agree to care plan  -        Anticipated Discharge Disposition (OT) assisted living facility ([facility]);home with home health;home with assist;skilled nursing facility  -           Planned OT Interventions    Planned Therapy Interventions (OT Eval) activity tolerance training;BADL retraining;functional balance retraining;occupation/activity based interventions;patient/caregiver education/training;strengthening exercise;transfer/mobility retraining  -           OT Goals    Transfer Goal Selection (OT) transfer, OT goal 1  -MW        Bathing Goal Selection (OT) bathing, OT goal 1  -MW        Dressing Goal Selection (OT) dressing, OT goal 1  -MW        Endurance Goal Selection (OT) endurance, OT goal 1  -MW        Additional Documentation Endurance Goal Selection (OT)  (Row)  -MW           Transfer Goal 1 (OT)    Activity/Assistive Device (Transfer Goal 1, OT) toilet;tub;bed-to-chair/chair-to-bed;tub bench  -MW        Real Level/Cues Needed (Transfer Goal 1, OT) supervision required  -MW        Time Frame (Transfer Goal 1, OT) long term goal (LTG);by discharge  -MW        Progress/Outcome (Transfer Goal 1, OT) goal ongoing  -MW           Bathing Goal 1 (OT)    Activity/Assistive Device (Bathing Goal 1, OT) bathing skills, all;tub bench  -MW        Real Level/Cues Needed (Bathing Goal 1, OT) supervision required  -MW        Time Frame (Bathing Goal 1, OT) long term goal (LTG);by discharge  -MW        Progress/Outcomes (Bathing Goal 1, OT) goal ongoing  -MW           Dressing Goal 1 (OT)    Activity/Assistive Device (Dressing Goal 1, OT) lower body dressing  -MW        Real/Cues Needed (Dressing Goal 1, OT) minimum assist (75% or more patient effort)  -MW        Time Frame (Dressing Goal 1, OT) long term goal (LTG);by discharge  -MW        Progress/Outcome (Dressing Goal 1, OT) goal ongoing  -MW            Endurance Goal 1 (OT)    Endurance Goal 1 (OT) pt will complete functional activity demo good endurance to increase tolerance and safety for activity  -MW        Activity Level (Endurance Goal 1, OT) endurance 2 good -  -MW        Time Frame (Endurance Goal 1, OT) long term goal (LTG);by discharge  -MW        Progress/Outcome (Endurance Goal 1, OT) goal ongoing  -MW           Living Environment    Home Accessibility stairs to enter home;tub/shower is not walk in   grab bars and HH shower head in shower  -MW          User Key  (r) = Recorded By, (t) = Taken By, (c) = Cosigned By    Initials Name Effective Dates    SH Virginia Ahn RN 08/28/17 -     MW Sharee Becerra OTR/VERNA 08/28/18 -            Occupational Therapy Education     Title: PT OT SLP Therapies (Done)     Topic: Occupational Therapy (Done)     Point: ADL training (Done)     Description:  Instruct learner(s) on proper safety adaptation and remediation techniques during self care or transfers.   Instruct in proper use of assistive devices.   Learning Progress Summary     Learner Status Readiness Method Response Comment Documented by    Patient Done Acceptance E VU benefit of increased activity, transfer training, bed mobility, ADL, OT POC  10/08/18 1513                      User Key     Initials Effective Dates Name Provider Type Discipline     08/28/18 -  Sharee Becerra, OTR/L Occupational Therapist OT                  OT Recommendation and Plan  Outcome Summary/Treatment Plan (OT)  Anticipated Discharge Disposition (OT): assisted living facility ([facility]), home with home health, home with assist, skilled nursing facility  Planned Therapy Interventions (OT Eval): activity tolerance training, BADL retraining, functional balance retraining, occupation/activity based interventions, patient/caregiver education/training, strengthening exercise, transfer/mobility retraining  Therapy Frequency (OT Eval): 5 times/wk  Plan of Care Review  Plan of Care Reviewed With: patient  Plan of Care Reviewed With: patient  Outcome Summary: OT eval completed. Pt c/o of pain in many locations, recalls full hx of recent events leading to admission. At baseline pt is independent w all ADL and mobility, but has had increased difficulty w IADL and home management tasks. WIth activity pt demos significant decline from his baseline, taking only minimal shuffle steps away from EOB w RW. Skilled OT required to address deficits impacting pt safety and independence w ADL and decreasing his risk for falls. OT recommends d/c to SNF vs transitional care for continued rehab upon d/c as pt is not safe to return home alone at this time.          Outcome Measures     Row Name 10/08/18 1400 10/08/18 1148 10/08/18 1100       How much help from another person do you currently need...    Turning from your back to your side while in flat bed  without using bedrails? (P)  3  -TD 3  -JE (r) TD (t) JE (c)  --    Moving from lying on back to sitting on the side of a flat bed without bedrails? (P)  4  -TD 3  -JE (r) TD (t) JE (c)  --    Moving to and from a bed to a chair (including a wheelchair)? (P)  3  -TD 3  -JE (r) TD (t) JE (c)  --    Standing up from a chair using your arms (e.g., wheelchair, bedside chair)? (P)  3  -TD 3  -JE (r) TD (t) JE (c)  --    Climbing 3-5 steps with a railing? (P)  3  -TD 3  -JE (r) TD (t) JE (c)  --    To walk in hospital room? (P)  3  -TD 3  -JE (r) TD (t) JE (c)  --    AM-PAC 6 Clicks Score (P)  19  -MW (r) TD (t) 18  -JE (r) TD (t)  --       How much help from another is currently needed...    Putting on and taking off regular lower body clothing?  --  -- 2  -MW    Bathing (including washing, rinsing, and drying)  --  -- 2  -MW    Toileting (which includes using toilet bed pan or urinal)  --  -- 2  -MW    Putting on and taking off regular upper body clothing  --  -- 3  -MW    Taking care of personal grooming (such as brushing teeth)  --  -- 3  -MW    Eating meals  --  -- 4  -MW    Score  --  -- 16  -MW       Functional Assessment    Outcome Measure Options (P)  AM-PAC 6 Clicks Basic Mobility (PT)  -TD AM-PAC 6 Clicks Basic Mobility (PT)  -JE (r) TD (t) JE (c) AM-PAC 6 Clicks Daily Activity (OT)  -MW      User Key  (r) = Recorded By, (t) = Taken By, (c) = Cosigned By    Initials Name Provider Type    MW Sharee Becerra, OTR/L Occupational Therapist    Beatriz Kinney, PT Physical Therapist    Rita Glover, PT Student PT Student          Time Calculation:         Time Calculation- OT     Row Name 10/08/18 1143             Time Calculation- OT    OT Start Time 1025  -MW      OT Stop Time 1110  -MW      OT Time Calculation (min) 45 min  -MW      OT Received On 10/08/18  -MW      OT Goal Re-Cert Due Date 10/18/18  -MW        User Key  (r) = Recorded By, (t) = Taken By, (c) = Cosigned By    Initials Name Provider Type     Sharee Bacon, OTR/L Occupational Therapist        Therapy Suggested Charges     Code   Minutes Charges    None           Therapy Charges for Today     Code Description Service Date Service Provider Modifiers Qty    [id number]  OT SELFCARE CURRENT 10/8/2018 Sharee Becerra OTR/L GO, CK 1    [id number]  OT SELFCARE PROJECTED 10/8/2018 Sharee Becerra OTR/L GO, CJ 1    [id number]  OT EVAL MOD COMPLEXITY 3 10/8/2018 Sharee Becerra OTR/L GO, KX 1          OT G-codes  OT Professional Judgement Used?: Yes  OT Functional Scales Options: AM-PAC 6 Clicks Daily Activity (OT)  Score: 16  Functional Limitation: Self care  Self Care Current Status (): At least 40 percent but less than 60 percent impaired, limited or restricted  Self Care Goal Status (): At least 20 percent but less than 40 percent impaired, limited or restricted    MATIAS Vu/VERNA  10/8/2018

## 2018-10-08 NOTE — PLAN OF CARE
Problem: Pneumonia (Adult)  Goal: Signs and Symptoms of Listed Potential Problems Will be Absent, Minimized or Managed (Pneumonia)  Outcome: Ongoing (interventions implemented as appropriate)      Problem: Fall Risk (Adult)  Goal: Absence of Fall  Outcome: Ongoing (interventions implemented as appropriate)      Problem: Skin Injury Risk (Adult)  Goal: Skin Health and Integrity  Outcome: Ongoing (interventions implemented as appropriate)      Problem: Nutrition, Imbalanced: Inadequate Oral Intake (Adult)  Goal: Improved Oral Intake  Outcome: Ongoing (interventions implemented as appropriate)    Goal: Prevent Further Weight Loss  Outcome: Ongoing (interventions implemented as appropriate)      Problem: Patient Care Overview  Goal: Plan of Care Review  Outcome: Ongoing (interventions implemented as appropriate)   10/08/18 1501   Plan of Care Review   Progress no change   OTHER   Outcome Summary No co pain. pt had renal US today. Fluid changed to bicarb. cont IV ABX. cont to monitor.    Coping/Psychosocial   Plan of Care Reviewed With patient     Goal: Individualization and Mutuality  Outcome: Ongoing (interventions implemented as appropriate)    Goal: Discharge Needs Assessment  Outcome: Ongoing (interventions implemented as appropriate)    Goal: Interprofessional Rounds/Family Conf  Outcome: Ongoing (interventions implemented as appropriate)

## 2018-10-08 NOTE — PROGRESS NOTES
[facility] Medicine Services  INPATIENT PROGRESS NOTE    Patient Name: Gavin Wilson  Date of Admission: 10/6/2018  Today's Date: 10/08/18  Length of Stay: 2  Primary Care Physician: Provider, No Known    Subjective   Chief Complaint: shortness of breath  HPI     Patient seen and examined at bedside.  Patient is improved, he still feels very weak.  Patient was a home alone.  He feels as though his breathing is greatly improved.  Patient indicates that he is willing to undergo dialysis if it is recommended by the renal doctor.  He does have a mature fistula.        Review of Systems   Constitutional: Positive for activity change. Negative for chills and fever.   Respiratory: Negative for cough and shortness of breath.    Cardiovascular: Negative for chest pain, palpitations and leg swelling.   Gastrointestinal: Negative for abdominal distention, abdominal pain, constipation, diarrhea, nausea and vomiting.   Neurological: Positive for weakness. Negative for light-headedness.        All pertinent negatives and positives are as above. All other systems have been reviewed and are negative unless otherwise stated.     Objective    Temp:  [97.5 °F (36.4 °C)-98.9 °F (37.2 °C)] 97.5 °F (36.4 °C)  Heart Rate:  [] 99  Resp:  [14-20] 16  BP: ()/(50-74) 110/60  Physical Exam   Constitutional: He is oriented to person, place, and time. No distress.   Thin, chronically ill   HENT:   Head: Atraumatic.   Temporal muscle wasting   Eyes: Conjunctivae are normal. No scleral icterus.   Cardiovascular: Normal rate, regular rhythm and intact distal pulses.    Murmur heard.  Pulmonary/Chest: Effort normal. No respiratory distress. He has wheezes. He has no rales. He exhibits no tenderness.   Decreased at right base; adequate air movement   Abdominal: Soft. Bowel sounds are normal. He exhibits no distension. There is no tenderness. There is no guarding.   Musculoskeletal: He exhibits no  edema or tenderness.   Neurological: He is alert and oriented to person, place, and time.   Skin: Skin is warm and dry. He is not diaphoretic.   Scattered ecchymosis   Psychiatric: He has a normal mood and affect. His behavior is normal.   Vitals reviewed.          Results Review:  I have reviewed the labs, radiology results, and diagnostic studies.    Laboratory Data:     Results from last 7 days  Lab Units 10/08/18  0435 10/07/18  0243 10/06/18  0545   WBC 10*3/mm3 4.73* 5.03 8.58   HEMOGLOBIN g/dL 8.5* 7.6* 8.6*   HEMATOCRIT % 26.9* 23.9* 27.0*   PLATELETS 10*3/mm3 139 110* 136          Results from last 7 days  Lab Units 10/08/18  0435 10/07/18  0243 10/06/18  0545   SODIUM mmol/L 141 142 141   POTASSIUM mmol/L 4.5 3.7 3.6   CHLORIDE mmol/L 110 112* 113*   CO2 mmol/L 16.0* 17.0* 17.0*   BUN mg/dL 50* 50* 46*   CREATININE mg/dL 3.78* 3.62* 3.57*   CALCIUM mg/dL 8.5 7.9* 8.1*   BILIRUBIN mg/dL 0.4  --  0.5   ALK PHOS U/L 83  --  66   ALT (SGPT) U/L 53  --  30   AST (SGOT) U/L 27  --  20   GLUCOSE mg/dL 131* 118* 170*       Culture Data:   @[facility]    Radiology Data:   Imaging Results (last 24 hours)     Procedure Component Value Units Date/Time    US Renal Bilateral [[id number]] Collected:  10/08/18 0936     Updated:  10/08/18 0958    Narrative:       EXAMINATION: US RENAL BILATERAL- 10/8/2018 9:36 AM CDT     HISTORY: Acute renal insufficiency; R13.10-Dysphagia, unspecified.     REPORT: Sonographic images of the kidneys were obtained bilaterally.  There are no comparison studies.     The proximal abdominal aorta is visualized and measures 2.7 cm in  diameter, there is moderate atherosclerosis of the aorta. The IVC  measures 2.2 cm in diameter, normal. Small bilateral pleural effusions  are present. The right kidney measures 7.6 x 2.9 x 3.5 cm and has  increased cortical echogenicity diffusely. No mass or hydronephrosis is  identified. At the inferior pole of the right kidney there is a tiny  cyst that measures  5 x 6 mm. This appears benign. Tiny scattered  nephrolithiasis are present on the right. Color flow imaging  demonstrates vascular flow within the right kidney. Left kidney measures  7.4 x 3.3 x 3.4 cm and has diffusely increased cortical echogenicity. No  mass or hydronephrosis is identified. Several tiny scattered  nephrolithiasis are present in the left kidney. Color flow imaging  demonstrates vascular flow within the left kidney. There is a small  amount of free fluid lateral to the liver.       Impression:       1. Bilateral renal atrophy with increased renal cortical echogenicity,  most likely related to chronic renal disease. No renal masses seen and  there is no hydronephrosis.  2. Small benign-appearing 5 x 6 mm cyst at the inferior pole the right  kidney.  3. There are small nonobstructing nephrolithiasis bilaterally.  4. Small bilateral pleural effusions.  5. Small amount of perihepatic ascites.     This report was finalized on 10/08/2018 09:55 by Dr. Lucian Jones MD.    XR Chest 1 View [[id number]] Collected:  10/07/18 1512     Updated:  10/07/18 1518    Narrative:       Exam:   XR CHEST 1 VW-       Date:  10/7/2018      History:  Male, age  73 years;questionable small pneumo status post  thora; R13.10-Dysphagia, unspecified     COMPARISON:  Chest x-ray dated earlier today     Findings :     Status post left thoracentesis, without measurable pneumothorax on this  examination. Right pleural effusion persists.       Impression:       Impression:     No measurable pneumothorax.     This report was finalized on 10/07/2018 15:15 by Dr. Petra Mckinley MD.    US Thoracentesis [[id number]] Collected:  10/07/18 1323    Specimen:  Body Fluid Updated:  10/07/18 1329    Narrative:       DATE OF PROCEDURE:  10/7/2018.     PREPROCEDURE DIAGNOSIS:  Left pleural effusion.  POSTPROCEDURE DIAGNOSIS:  Left pleural effusion.          INDICATIONS FOR PROCEDURE: Shortness of breath.     PROCEDURE:   1. Thoracentesis,  diagnostic and therapeutic.  2. Ultrasound guidance for thoracentesis, imaging supervision and  interpretation.     ANESTHESIA: 1% lidocaine, injected locally.          COMPLICATIONS: None.        EXAMINATIONS FOR COMPARISON:  CT chest dated 10/6/2018.     DESCRIPTION OF PROCEDURE:   The risk and benefits of the procedure were explained to the patient.   Specifically, the risks of bleeding, infection, pneumothorax (possible  thoracostomy tube), and damage to surrounding structures were discussed  extensively. The patient's questions were answered. The patient opted to  proceed. Written and verbal consent were obtained from the patient.     TIME OUT was taken and the patient's name, medical record number, date  of birth, procedure, entry site, and listen allergies were confirmed.  The site of the procedure was confirmed and marked.      The leftposterior thorax was prepped and draped in sterile fashion. The  area was anesthetized with buffered lidocaine 2%.      A 5 Sinhala 10 cm  catheter was inserted into the pleural effusion  using  ultrasound guidance. Approximately 400 mL of clear fluid was recovered  and sent to the pathology lab for analysis.      The patient tolerated the procedure well.   No immediate complications  were noted.       Impression:       Successful ultrasound guided leftthoracentesis. Approximately 400 mL of  clear fluid was recovered and sent to the pathology lab for analysis.   No immediate complications were noted.              This report was finalized on 10/07/2018 13:26 by Dr. Petra Mckinley MD.    XR Chest 1 View [[id number]] Collected:  10/07/18 1241     Updated:  10/07/18 1249    Narrative:       Exam:   XR CHEST 1 VW-       Date:  10/7/2018      History:  Male, age  73 years;POST THORACENTESIS; R13.10-Dysphagia,  unspecified     COMPARISON:  CT chest dated 10/60/18     Findings :  Interval left-sided thoracentesis. There is a questionable trace left  pneumothorax versus artifact on the  left. The right lung is unchanged,  with moderate pleural effusion and associated opacities. The heart is  unchanged in size.       Impression:       Impression:     Interval left thoracentesis questionable trace left pneumothorax versus  averaging. Recommend repeat upright chest x-ray AP in one hour.     This report was finalized on 10/07/2018 12:46 by Dr. Petra Mckinley MD.    US Chest [[id number]] Updated:  10/07/18 1215          I have reviewed the patient's current medications.     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%)  7) Acute kidney injury on chronic kidney disease stage 4 - Suspected cardiorenal   8) Chronic normocytic anemia due to CKD  9) Thrombocytopenia  10) Mild hypokalemia  11) Hyperphosphotemia  12) Metabolic acidosis, anion gap due to worsening uremia     Plan:  1) Cardiology note reviewed  2) Nephrology consult pending  3) MRSA nasal swab positive for MRSA - Continue vancomycin  4) Continue cefepime  5) Procal 8.86, continue broad-spectrum antibiotics as above   6) Monitor H&H, stable  7) Nephrology considering dialysis, patient has matured fistula  8) Thoracentesis 10/7 - 400 cc removed   9) Cultures pending (blood, urine, pleural)  10) Will give 50 mL/hr bicarb gtt for 10 hours for worsening acidosis, continue PO bicarb    11) Continue symbicort, PRN duonebs   12) Continue diuresis with IV bumex, electrolyte replacement as needed  13) PT/OT consult   14) Nutrition consult - Supplements for renal patient     CXR from yesterday reviewed by me, no pneumothroax  post-procedure; aortic calcifications, right pleural effusion stable               Discharge Planning: I expect the patient to be discharged to home or SNF pending improvement    Vu Michael MD   10/08/18   10:12 AM

## 2018-10-08 NOTE — PROGRESS NOTES
Nephrology ([facility]) Progress Note      Patient:  Gavin Wilson  YOB: 1945  Date of Service: 10/8/2018  MRN: [id number]   Acct: [id number]   Primary Care Physician: Provider, No Known  Advance Directive:   Code Status and Medical Interventions:   Ordered at: 10/07/18 1309     Limited Support to NOT Include:    Intubation     Level Of Support Discussed With:    Patient     Code Status:    No CPR     Medical Interventions (Level of Support Prior to Arrest):    Limited     Admit Date: 10/6/2018       Hospital Day: 2  Referring Provider: Max Jones*      Patient personally seen and examined.  Complete chart including Consults, Notes, Operative Reports, Labs, Cardiology, and Radiology studies reviewed as able.        Subjective:  Gavin Wilson is a 73 y.o. male  whom we were consulted for management of chronic kidney disease stage IV.  He has a history of bladder cancer s/p cystectomy and ureterostomy formation. He also has a history of esophogeal and prostate cancer.   Patient goes to [facility] in [place] and sees nephrology.  He already has a left upper arm primary fistula in preparation for upcoming dialysis.  Presented with increasing shortness of breath, dyspnea on exertion.  He was diagnosed with bilateral pneumonia/pleural effusion.  He is currently being treated for both, getting IV antibiotics and intravenous diuretics. His serum creatinine is 3.2 mg.  He states when he was seen by Nephrology last his GFR was 20 and it has been for two years.  Nephrology was consulted for evaluation and treatment of chronic kidney disease.        Allergies:  Sulfa antibiotics    Home Meds:  Prescriptions Prior to Admission   Medication Sig Dispense Refill Last Dose   • acetaminophen (TYLENOL) 325 MG tablet Take 650 mg by mouth Every 6 (Six) Hours As Needed for Mild Pain .   Past Month at Unknown time   • albuterol (PROVENTIL HFA;VENTOLIN HFA) 108  (90 Base) MCG/ACT inhaler Inhale 2 puffs Every 6 (Six) Hours As Needed for Wheezing.   Past Week at Unknown time   • aspirin 81 MG chewable tablet Chew 81 mg Daily.   10/5/2018 at Unknown time   • atorvastatin (LIPITOR) 80 MG tablet Take 40 mg by mouth Daily.   10/5/2018 at Unknown time   • budesonide-formoterol (SYMBICORT) 160-4.5 MCG/ACT inhaler Inhale 2 puffs 2 (Two) Times a Day. 1 inhaler 2 10/5/2018 at Unknown time   • bumetanide (BUMEX) 2 MG tablet Take 1 tablet by mouth Daily. 30 tablet 0 10/5/2018 at Unknown time   • calcium carbonate (OS-TASHI) 600 MG tablet Take 600 mg by mouth Daily.   10/5/2018 at Unknown time   • cholecalciferol (VITAMIN D3) 1000 units tablet Take 3,000 Units by mouth Daily.   10/5/2018 at Unknown time   • insulin aspart (novoLOG FLEXPEN) 100 UNIT/ML solution pen-injector sc pen Inject 6 Units under the skin into the appropriate area as directed 3 (Three) Times a Day With Meals.   10/5/2018 at Unknown time   • insulin detemir (LEVEMIR) 100 UNIT/ML injection Inject 6 Units under the skin into the appropriate area as directed 2 (Two) Times a Day.   10/5/2018 at Unknown time   • loperamide (IMODIUM) 2 MG capsule Take 2 mg by mouth Daily As Needed for Diarrhea.   Past Month at Unknown time   • metoprolol tartrate (LOPRESSOR) 25 MG tablet Take 12.5 mg by mouth 2 (Two) Times a Day.   10/5/2018 at Unknown time   • omeprazole (priLOSEC) 20 MG capsule Take 20 mg by mouth Daily.   10/5/2018 at Unknown time   • oxyCODONE (ROXICODONE) 5 MG immediate release tablet Take 5 mg by mouth Every 6 (Six) Hours As Needed for Moderate Pain .   Past Week at Unknown time   • sildenafil (VIAGRA) 100 MG tablet Take 100 mg by mouth Daily As Needed for erectile dysfunction.   More than a month at Unknown time       Medicines:  Current Facility-Administered Medications   Medication Dose Route Frequency Provider Last Rate Last Dose   • acetaminophen (TYLENOL) tablet 650 mg  650 mg Oral Q4H Max Drew  MD Trevon       • aspirin chewable tablet 81 mg  81 mg Oral Daily Vu Michael MD   81 mg at 10/08/18 0847   • atorvastatin (LIPITOR) tablet 80 mg  80 mg Oral Daily Max Jones MD   80 mg at 10/08/18 0847   • budesonide-formoterol (SYMBICORT) 160-4.5 MCG/ACT inhaler 2 puff  2 puff Inhalation BID - RT Max Jones MD   2 puff at 10/08/18 0822   • bumetanide (BUMEX) injection 1 mg  1 mg Intravenous BID Ulises Dejesus MD   1 mg at 10/08/18 0840   • calcitriol (ROCALTROL) capsule 0.5 mcg  0.5 mcg Oral Daily Max Jones MD   0.5 mcg at 10/08/18 0848   • calcium carb-cholecalciferol 600-800 MG-UNIT tablet 1 tablet  1 tablet Oral Daily Max Jones MD   1 tablet at 10/08/18 0847   • cefepime (MAXIPIME) 2 g/100 mL 0.9% NS (mbp)  2 g Intravenous Q24H Vu Michael MD   2 g at 10/08/18 1001   • dextrose (D50W) 25 g/ 50mL Intravenous Solution 25 g  25 g Intravenous Q15 Min PRN Max Jones MD       • dextrose (GLUTOSE) oral gel 15 g  15 g Oral Q15 Min PRN Max Jones MD       • Ferric Citrate tablet 210 mg  210 mg Oral TID With Meals Max Jones MD   210 mg at 10/08/18 1212   • glucagon (human recombinant) (GLUCAGEN DIAGNOSTIC) injection 1 mg  1 mg Subcutaneous PRN Max Jones MD       • heparin (porcine) 5000 UNIT/ML injection 5,000 Units  5,000 Units Subcutaneous Q12H Max Jones MD   5,000 Units at 10/08/18 0841   • Hold medication  1 each Does not apply Continuous PRN Vu Michael MD       • HYDROcodone-acetaminophen (NORCO)  MG per tablet 1 tablet  1 tablet Oral Q6H PRN Max Jones MD   1 tablet at 10/08/18 0522   • hydroxypropyl methylcellulose (ISOPTO TEARS) 2.5 % ophthalmic solution 1 drop  1 drop Both Eyes TID PRN Vu Michael MD   1 drop at 10/06/18 4221   • Influenza Vac Subunit Quad (FLUCELVAX) injection 0.5 mL  0.5 mL Intramuscular During  Hospitalization Max Jones MD       • insulin lispro (humaLOG) injection 0-9 Units  0-9 Units Subcutaneous 4x Daily With Meals & Nightly Max Jones MD   2 Units at 10/07/18 2046   • insulin lispro (humaLOG) injection 6 Units  6 Units Subcutaneous TID AC Max Jones MD   6 Units at 10/08/18 1206   • ipratropium-albuterol (DUO-NEB) nebulizer solution 3 mL  3 mL Nebulization Q6H PRN Vu Michael MD   3 mL at 10/07/18 1852   • magnesium hydroxide (MILK OF MAGNESIA) suspension 2400 mg/10mL 10 mL  10 mL Oral Daily PRN Max Jones MD       • metoprolol tartrate (LOPRESSOR) tablet 25 mg  25 mg Oral Q12H Max Jones MD   25 mg at 10/08/18 0847   • nicotine (NICODERM CQ) 21 MG/24HR patch 1 patch  1 patch Transdermal Q24H Ashutosh Palafox MD   1 patch at 10/07/18 2054   • ondansetron (ZOFRAN) injection 4 mg  4 mg Intravenous Q6H PRN Max Jones MD   4 mg at 10/07/18 2341   • Pharmacy Consult - Pharmacy to dose   Does not apply Continuous PRN Vu Michael MD       • potassium chloride (MICRO-K) CR capsule 20 mEq  20 mEq Oral Daily Vu Michael MD   20 mEq at 10/08/18 0847   • sodium bicarbonate 8.4 % 150 mEq in dextrose (D5W) 5 % 1,000 mL infusion (greater than 75 mEq)  50 mL/hr Intravenous Continuous Vu Michael MD 50 mL/hr at 10/08/18 1208 50 mL/hr at 10/08/18 1208   • sodium bicarbonate tablet 325 mg  325 mg Oral BID Max Jones MD   325 mg at 10/08/18 0847   • sodium chloride 0.9 % flush 3 mL  3 mL Intravenous Q12H Max Jones MD   3 mL at 10/08/18 0843   • sodium chloride 0.9 % flush 3-10 mL  3-10 mL Intravenous Max Drew MD           Past Medical History:  Past Medical History:   Diagnosis Date   • CHF (congestive heart failure) (CMS/HCC)    • Coronary stent occlusion    • Diabetes mellitus (CMS/HCC)    • Hyperlipidemia    • Hypertension    • Stroke (CMS/HCC)   "      Past Surgical History:  Past Surgical History:   Procedure Laterality Date   • BLADDER SURGERY     • ESOPHAGECTOMY         Family History  Family History   Problem Relation Age of Onset   • No Known Problems Father    • No Known Problems Mother        Social History  Social History     Social History   • Marital status:      Spouse name: N/A   • Number of children: N/A   • Years of education: N/A     Occupational History   • Not on file.     Social History Main Topics   • Smoking status: Current Every Day Smoker   • Smokeless tobacco: Never Used   • Alcohol use No   • Drug use: No   • Sexual activity: Not on file     Other Topics Concern   • Not on file     Social History Narrative   • No narrative on file         Review of Systems:  History obtained from chart review and the patient  General ROS: Patient complains of chills and fever  Respiratory ROS: positive for - cough and shortness of breath  negative for - hemoptysis  Cardiovascular ROS: positive for - chest pain and dyspnea on exertion  negative for - palpitations  Gastrointestinal ROS: No abdominal pain or melena  Genito-Urinary ROS: Urostomy   Objective:  /56 (BP Location: Right arm)   Pulse 92   Temp 98.6 °F (37 °C) (Temporal Artery )   Resp 18   Ht 170.2 cm (67\")   Wt 49.9 kg (110 lb)   SpO2 96%   BMI 17.23 kg/m²     Intake/Output Summary (Last 24 hours) at 10/08/18 1512  Last data filed at 10/08/18 1300   Gross per 24 hour   Intake             1040 ml   Output              950 ml   Net               90 ml     General: awake/alert   Chest:  Diminished breath sounds, scattered rhonchi  CVS: regular rate and rhythm 2/6 systolic murmur  Abdominal: soft, nontender, normal bowel sounds  Extremities: no cyanosis or edema  Skin: warm and dry without rash      Labs:    Results from last 7 days  Lab Units 10/08/18  0435 10/07/18  0243 10/06/18  0545   WBC 10*3/mm3 4.73* 5.03 8.58   HEMOGLOBIN g/dL 8.5* 7.6* 8.6*   HEMATOCRIT % 26.9* " 23.9* 27.0*   PLATELETS 10*3/mm3 139 110* 136           Results from last 7 days  Lab Units 10/08/18  0435 10/07/18  0243 10/06/18  0545   SODIUM mmol/L 141 142 141   POTASSIUM mmol/L 4.5 3.7 3.6   CHLORIDE mmol/L 110 112* 113*   CO2 mmol/L 16.0* 17.0* 17.0*   BUN mg/dL 50* 50* 46*   CREATININE mg/dL 3.78* 3.62* 3.57*   CALCIUM mg/dL 8.5 7.9* 8.1*   BILIRUBIN mg/dL 0.4  --  0.5   ALK PHOS U/L 83  --  66   ALT (SGPT) U/L 53  --  30   AST (SGOT) U/L 27  --  20   GLUCOSE mg/dL 131* 118* 170*       Radiology:   Imaging Results (last 72 hours)     Procedure Component Value Units Date/Time    US Renal Bilateral [[id number]] Collected:  10/08/18 0936     Updated:  10/08/18 0958    Narrative:       EXAMINATION: US RENAL BILATERAL- 10/8/2018 9:36 AM CDT     HISTORY: Acute renal insufficiency; R13.10-Dysphagia, unspecified.     REPORT: Sonographic images of the kidneys were obtained bilaterally.  There are no comparison studies.     The proximal abdominal aorta is visualized and measures 2.7 cm in  diameter, there is moderate atherosclerosis of the aorta. The IVC  measures 2.2 cm in diameter, normal. Small bilateral pleural effusions  are present. The right kidney measures 7.6 x 2.9 x 3.5 cm and has  increased cortical echogenicity diffusely. No mass or hydronephrosis is  identified. At the inferior pole of the right kidney there is a tiny  cyst that measures 5 x 6 mm. This appears benign. Tiny scattered  nephrolithiasis are present on the right. Color flow imaging  demonstrates vascular flow within the right kidney. Left kidney measures  7.4 x 3.3 x 3.4 cm and has diffusely increased cortical echogenicity. No  mass or hydronephrosis is identified. Several tiny scattered  nephrolithiasis are present in the left kidney. Color flow imaging  demonstrates vascular flow within the left kidney. There is a small  amount of free fluid lateral to the liver.       Impression:       1. Bilateral renal atrophy with increased renal  cortical echogenicity,  most likely related to chronic renal disease. No renal masses seen and  there is no hydronephrosis.  2. Small benign-appearing 5 x 6 mm cyst at the inferior pole the right  kidney.  3. There are small nonobstructing nephrolithiasis bilaterally.  4. Small bilateral pleural effusions.  5. Small amount of perihepatic ascites.     This report was finalized on 10/08/2018 09:55 by Dr. Lucian Jones MD.    XR Chest 1 View [[id number]] Collected:  10/07/18 1512     Updated:  10/07/18 1518    Narrative:       Exam:   XR CHEST 1 VW-       Date:  10/7/2018      History:  Male, age  73 years;questionable small pneumo status post  thora; R13.10-Dysphagia, unspecified     COMPARISON:  Chest x-ray dated earlier today     Findings :     Status post left thoracentesis, without measurable pneumothorax on this  examination. Right pleural effusion persists.       Impression:       Impression:     No measurable pneumothorax.     This report was finalized on 10/07/2018 15:15 by Dr. Petra Mckinley MD.    US Thoracentesis [[id number]] Collected:  10/07/18 1323    Specimen:  Body Fluid Updated:  10/07/18 1329    Narrative:       DATE OF PROCEDURE:  10/7/2018.     PREPROCEDURE DIAGNOSIS:  Left pleural effusion.  POSTPROCEDURE DIAGNOSIS:  Left pleural effusion.          INDICATIONS FOR PROCEDURE: Shortness of breath.     PROCEDURE:   1. Thoracentesis, diagnostic and therapeutic.  2. Ultrasound guidance for thoracentesis, imaging supervision and  interpretation.     ANESTHESIA: 1% lidocaine, injected locally.          COMPLICATIONS: None.        EXAMINATIONS FOR COMPARISON:  CT chest dated 10/6/2018.     DESCRIPTION OF PROCEDURE:   The risk and benefits of the procedure were explained to the patient.   Specifically, the risks of bleeding, infection, pneumothorax (possible  thoracostomy tube), and damage to surrounding structures were discussed  extensively. The patient's questions were answered. The patient opted  to  proceed. Written and verbal consent were obtained from the patient.     TIME OUT was taken and the patient's name, medical record number, date  of birth, procedure, entry site, and listen allergies were confirmed.  The site of the procedure was confirmed and marked.      The leftposterior thorax was prepped and draped in sterile fashion. The  area was anesthetized with buffered lidocaine 2%.      A 5 [place] 10 cm  catheter was inserted into the pleural effusion  using  ultrasound guidance. Approximately 400 mL of clear fluid was recovered  and sent to the pathology lab for analysis.      The patient tolerated the procedure well.   No immediate complications  were noted.       Impression:       Successful ultrasound guided leftthoracentesis. Approximately 400 mL of  clear fluid was recovered and sent to the pathology lab for analysis.   No immediate complications were noted.              This report was finalized on 10/07/2018 13:26 by Dr. Petra Mckinley MD.    XR Chest 1 View [[id number]] Collected:  10/07/18 1241     Updated:  10/07/18 1249    Narrative:       Exam:   XR CHEST 1 VW-       Date:  10/7/2018      History:  Male, age  73 years;POST THORACENTESIS; R13.10-Dysphagia,  unspecified     COMPARISON:  CT chest dated 10/60/18     Findings :  Interval left-sided thoracentesis. There is a questionable trace left  pneumothorax versus artifact on the left. The right lung is unchanged,  with moderate pleural effusion and associated opacities. The heart is  unchanged in size.       Impression:       Impression:     Interval left thoracentesis questionable trace left pneumothorax versus  averaging. Recommend repeat upright chest x-ray AP in one hour.     This report was finalized on 10/07/2018 12:46 by Dr. Petra Mckinley MD.    US Chest [[id number]] Updated:  10/07/18 1215    CT Chest Without Contrast [[id number]] Collected:  10/06/18 1752     Updated:  10/06/18 1759    Narrative:       EXAMINATION:   CT CHEST WO  CONTRAST-  10/6/2018 5:52 PM CDT     CT CHEST WO CONTRAST- 10/6/2018 5:33 PM CDT     HISTORY: Shortness of breath     COMPARISON: August 12, 2018 DOSE LENGTH PRODUCT: 185 mGy cm. Automated  exposure control was also utilized to decrease patient radiation dose.     TECHNIQUE: Serial helical tomographic images of the chest were acquired.  Multiplanar reformatted images were provided for review.     FINDINGS:  The imaged portion of the neck and thyroid gland is unremarkable.      The upper lungs are clear.. The lower lobes are obscured by large  bilateral pleural effusions.. The trachea and bronchial tree are patent.     Cardiac silhouette may be mildly enlarged. Extensive vascular  calcifications noted in the aortic arch origin of the great vessels and  coronary arteries.. Endovascular aortic stent graft is noted below the  diaphragm.    .      No acute findings are seen in the bones or surrounding soft tissues.        .       Impression:       1. Large bilateral pleural effusions which essentially obscures the  lower lobes.  2. Extensive vascular calcifications present  3. Endovascular stent graft is present in the abdomen.        This report was finalized on 10/06/2018 17:55 by Dr. Duc Garibay MD.          Culture:  Blood Culture   Date Value Ref Range Status   10/06/2018 No growth at 2 days  Preliminary   10/06/2018 No growth at 2 days  Preliminary     Urine Culture   Date Value Ref Range Status   10/07/2018 >100,000 CFU/mL Yeast isolated (A)  Preliminary     MRSA SCREEN CX   Date Value Ref Range Status   10/06/2018 Staphylococcus aureus, MRSA (A)  Final     Comment:       Methicillin resistant Staphylococcus aureus, Patient may be an isolation risk.         Assessment   CKD IV secondary to diabetic nephropathy with worsening renal function  DM II  Bilateral pneumonia  Iron deficient anemia  Bilateral pleural effusion  Diastolic CHF   Metabolic Acidosis  Secondary Hyperparathyroidism    Plan:  Discussed in  length the possibility of natural progression of the underlying CKD and potential need for HD. He does have a mature LUE AVF with excellent thrill.  Will continue to monitor closely.  Calcitriol started on 10/6/18  Iron supplement started on 10/6/18  Increase Sodium Bicarb to 650mg BID  Am labs       Delmy Mullins, MACO  10/8/2018  3:12 PM

## 2018-10-08 NOTE — THERAPY EVALUATION
Acute Care - Physical Therapy Initial Evaluation  [facility]     Patient Name: Gavin Wilson  : 1945  MRN: [id number]  Today's Date: 10/8/2018   Onset of Illness/Injury or Date of Surgery: 10/06/18  Date of Referral to PT: 10/08/18  Referring Physician: Dr. Michael      Admit Date: 10/6/2018    Visit Dx:     ICD-10-CM ICD-9-CM   1. Esophageal dysphagia R13.10 787.20   2. Impaired mobility and ADLs Z74.09 799.89   3. Impaired functional mobility, balance, gait, and endurance Z74.09 V49.89     Patient Active Problem List   Diagnosis   • Volume overload   • Chronic kidney disease, stage 4 (severe) (CMS/HCC)   • Chronic systolic congestive heart failure (CMS/HCC)   • Pleural effusion   • Sepsis (CMS/HCC)     Past Medical History:   Diagnosis Date   • CHF (congestive heart failure) (CMS/HCC)    • Coronary stent occlusion    • Diabetes mellitus (CMS/HCC)    • Hyperlipidemia    • Hypertension    • Stroke (CMS/HCC)      Past Surgical History:   Procedure Laterality Date   • BLADDER SURGERY     • ESOPHAGECTOMY          PT ASSESSMENT (last 12 hours)      Physical Therapy Evaluation     Row Name 10/08/18 1039          PT Evaluation Time/Intention    Subjective Information complains of;pain  -JE (r) TD (t) JE (c)     Document Type evaluation  -JE (r) TD (t) JE (c)     Mode of Treatment physical therapy  -JE (r) TD (t) JE (c)     Patient Effort good  -JE (r) TD (t) JE (c)     Symptoms Noted During/After Treatment shortness of breath;fatigue  -JE (r) TD (t) JE (c)     Comment Pt reports having nausea/vomitting when he sat up last.   -JE (r) TD (t) JE (c)     Row Name 10/08/18 1039          General Information    Patient Profile Reviewed? yes  -JE (r) TD (t) JE (c)     Onset of Illness/Injury or Date of Surgery 10/06/18  -JE (r) TD (t) JE (c)     Referring Physician Dr. Michael  -JE (r) TD (t) JE (c)     Patient Observations alert;cooperative;agree to therapy  -JE (r) TD (t) JE (c)     Patient/Family Observations no family  present  -JE (r) TD (t) JE (c)     General Observations of Patient fowlers in bed, IV, urostomy  -JE (r) TD (t) JE (c)     Prior Level of Function independent:;all household mobility;gait;community mobility;ADL's;transfer;driving  -JE (r) TD (t) JE (c)     Equipment Currently Used at Home cane, quad;walker, rolling;colostomy/ostomy supplies;grab bar;commode, bedside;shower chair   uses cane regularly.   -JE (r) TD (t) JE (c)     Pertinent History of Current Functional Problem Pt was transferred from [facility] for further eval of SOB and management of B pneumonia, sepsis, and hypoxia. Dx: acute respiratory failure, kidney disease. PMH: CHF, diabetes, hyperlipidemia, HTN, stroke  -JE (r) TD (t) JE (c)     Existing Precautions/Restrictions fall;other (see comments)   urostomy  -JE (r) TD (t) JE (c)     Equipment Issued to Patient walker, front wheeled  -JE (r) TD (t) JE (c)     Risks Reviewed patient:;LOB;nausea/vomiting;dizziness;increased discomfort;change in vital signs  -JE (r) TD (t) JE (c)     Benefits Reviewed patient:;improve function;increase independence;increase strength;increase balance;decrease pain;decrease risk of DVT  -JE (r) TD (t) JE (c)     Barriers to Rehab medically complex  -JE (r) TD (t) JE (c)     Row Name 10/08/18 1039          Relationship/Environment    Lives With alone  -JE (r) TD (t) JE (c)     Row Name 10/08/18 1039          Resource/Environmental Concerns    Current Living Arrangements home/apartment/condo  -JE (r) TD (t) JE (c)     Resource/Environmental Concerns none  -JE (r) TD (t) JE (c)     Row Name 10/08/18 1039          Home Main Entrance    Number of Stairs, Main Entrance four  -JE (r) TD (t) JE (c)     Stair Railings, Main Entrance railings on both sides of stairs  -JE (r) TD (t) JE (c)     Row Name 10/08/18 1039          Cognitive Assessment/Interventions    Additional Documentation Cognitive Assessment/Intervention (Group)  -JE (r) TD (t) JE (c)     Row Name  10/08/18 1039          Cognitive Assessment/Intervention- PT/OT    Affect/Mental Status (Cognitive) WNL  -JE (r) TD (t) JE (c)     Behavioral Issues (Cognitive) overwhelmed easily  -JE (r) TD (t) JE (c)     Orientation Status (Cognition) oriented x 4  -JE (r) TD (t) JE (c)     Follows Commands (Cognition) follows one step commands;over 90% accuracy;verbal cues/prompting required  -JE (r) TD (t) JE (c)     Personal Safety Interventions fall prevention program maintained;gait belt;muscle strengthening facilitated;nonskid shoes/slippers when out of bed;supervised activity  -JE (r) TD (t) JE (c)     Row Name 10/08/18 1039          Safety Issues, Functional Mobility    Safety Issues Affecting Function (Mobility) ability to follow commands  -JE (r) TD (t) JE (c)     Impairments Affecting Function (Mobility) balance;endurance/activity tolerance;strength;shortness of breath;pain  -JE (r) TD (t) JE (c)     Row Name 10/08/18 1039          Bed Mobility Assessment/Treatment    Bed Mobility Assessment/Treatment scooting/bridging;supine-sit;sit-supine  -JE (r) TD (t) JE (c)     Scooting/Bridging [place] (Bed Mobility) conditional independence  -JE (r) TD (t) JE (c)     Supine-Sit [place] (Bed Mobility) minimum assist (75% patient effort)  -JE (r) TD (t) JE (c)     Sit-Supine [place] (Bed Mobility) supervision;verbal cues  -JE (r) TD (t) JE (c)     Assistive Device (Bed Mobility) bed rails;head of bed elevated  -JE (r) TD (t) JE (c)     Comment (Bed Mobility) Pt required hand held assist with supine >sit.  -JE (r) TD (t) JE (c)     Row Name 10/08/18 1039          Transfer Assessment/Treatment    Transfer Assessment/Treatment stand-sit transfer;sit-stand transfer  -JE (r) TD (t) JE (c)     Sit-Stand [place] (Transfers) contact guard;verbal cues  -JE (r) TD (t) JE (c)     Stand-Sit [place] (Transfers) contact guard;verbal cues  -JE (r) TD (t) JE (c)     Row Name 10/08/18 1039          Sit-Stand Transfer     Assistive Device (Sit-Stand Transfers) walker, front-wheeled  -JE (r) TD (t) JE (c)     Row Name 10/08/18 1039          Stand-Sit Transfer    Assistive Device (Stand-Sit Transfers) walker, front-wheeled  -JE (r) TD (t) JE (c)     Row Name 10/08/18 1039          Gait/Stairs Assessment/Training    [place] Level (Gait) contact guard;verbal cues  -JE (r) TD (t) JE (c)     Assistive Device (Gait) walker, front-wheeled  -JE (r) TD (t) JE (c)     Distance in Feet (Gait) 5 steps forward/backward  -JE (r) TD (t) JE (c)     Pattern (Gait) step-to  -JE (r) TD (t) JE (c)     Deviations/Abnormal Patterns (Gait) stride length decreased;gait speed decreased;festinating/shuffling;base of support, narrow  -JE (r) TD (t) JE (c)     Bilateral Gait Deviations heel strike decreased  -JE (r) TD (t) JE (c)     Comment (Gait/Stairs) Pt was able to take a few steps forward/backward. Requested to return to bed due to SOB.  -JE (r) TD (t) JE (c)     Row Name 10/08/18 1039          General ROM    GENERAL ROM COMMENTS BUE AROM WFL, BLE AROM WFL  -JE (r) TD (t) JE (c)     Row Name 10/08/18 1039          MMT (Manual Muscle Testing)    General MMT Comments BUE 4-/5, BLE 4/5 functionally.   -JE (r) TD (t) JE (c)     Row Name 10/08/18 1039          Balance    Balance static sitting balance;static standing balance;dynamic sitting balance;dynamic standing balance  -JE (r) TD (t) JE (c)     Row Name 10/08/18 1039          Static Sitting Balance    Level of [place] (Unsupported Sitting, Static Balance) supervision;standby assist  -JE (r) TD (t) JE (c)     Sitting Position (Unsupported Sitting, Static Balance) sitting on edge of bed  -JE (r) TD (t) JE (c)     Row Name 10/08/18 1039          Dynamic Sitting Balance    Level of [place], Reaches Outside Midline (Sitting, Dynamic Balance) supervision;standby assist  -JE (r) TD (t) JE (c)     Sitting Position, Reaches Outside Midline (Sitting, Dynamic Balance) sitting on edge of bed  -JE (r) TD  (t) JE (c)     Comment, Reaches Outside Midline (Sitting, Dynamic Balance) ROM/MMT assessed in this position. Pt did not lose balance.   -JE (r) TD (t) JE (c)     Row Name 10/08/18 1039          Static Standing Balance    Level of [place] (Supported Standing, Static Balance) contact guard assist  -JE (r) TD (t) JE (c)     Time Able to Maintain Position (Supported Standing, Static Balance) 1 to 2 minutes  -JE (r) TD (t) JE (c)     Assistive Device Utilized (Supported Standing, Static Balance) rolling walker  -JE (r) TD (t) JE (c)     Row Name 10/08/18 1039          Dynamic Standing Balance    Level of [place], Reaches Outside Midline (Standing, Dynamic Balance) contact guard assist  -JE (r) TD (t) JE (c)     Time Able to Maintain Position, Reaches Outside Midline (Standing, Dynamic Balance) 1 to 2 minutes  -JE (r) TD (t) JE (c)     Row Name 10/08/18 1039          Sensory Assessment/Intervention    Sensory General Assessment light touch sensation deficits identified   Identified in L hand diminished compared to R. LE not assess  -JE (r) TD (t) JE (c)     Row Name 10/08/18 1039          Light Touch Sensation Assessment    Left Upper Extremity: Light Touch Sensation Assessment mild impairment, 75% or more correct responses  -JE (r) TD (t) JE (c)     Right Upper Extremity: Light Touch Sensation Assessment intact  -JE (r) TD (t) JE (c)     Left Lower Extremity: Light Touch Sensation Assessment not tested  -JE (r) TD (t) JE (c)     Comment, Left Lower Extremity: Light Touch Sensation Assessment Not assessed due to pt being overwhelmed.   -JE (r) TD (t) JE (c)     Right Lower Extremity: Light Touch Sensation Assessment not tested  -JE (r) TD (t) JE (c)     Comment, Right Lower Extremity: Light Touch Sensation Assessment Not assessed due to pt being overwhelmed  -JE (r) TD (t) JE (c)     Row Name 10/08/18 1039          Pain Assessment    Additional Documentation Pain Scale: FACES Pre/Post-Treatment (Group)  -JE (r)  TD (t) JE (c)     Row Name 10/08/18 1039          Pain Scale: Numbers Pre/Post-Treatment    Pre/Post Treatment Pain Comment C/O pain in B knees, chest, and thoracic vertebrae  -JE (r) TD (t) JE (c)     Pain Intervention(s) Repositioned;Ambulation/increased activity  -JE (r) TD (t) JE (c)     Row Name 10/08/18 1039          Pain Scale: FACES Pre/Post-Treatment    Pain: FACES Scale, Pretreatment 6-->hurts even more  -JE (r) TD (t) JE (c)     Pain: FACES Scale, Post-Treatment 6-->hurts even more  -JE (r) TD (t) JE (c)     Row Name             Wound 10/07/18 1255 Bilateral posterior coccyx    Wound - Properties Group Date first assessed: 10/07/18  -SH Time first assessed: 1255  -SH Side: Bilateral  -SH Orientation: posterior  -SH Location: coccyx  -SH    Row Name 10/08/18 1039          Coping    Observed Emotional State frustrated;overwhelmed  -JE (r) TD (t) JE (c)     Verbalized Emotional State (P)  frustration   Pt verbalized that he felt like he was being rushed.  -TD     Row Name 10/08/18 1039          Plan of Care Review    Plan of Care Reviewed With patient  -JOSTIN (r) TD (t) JE (c)     Row Name 10/08/18 1039          Physical Therapy Clinical Impression    Date of Referral to PT 10/08/18  -JOSTIN (r) TD (t) JE (c)     PT Diagnosis (PT Clinical Impression) Impaired functional mobility, strength, endurance, and impaired gait pattern  -JOSTIN (r) TD (t) JE (c)     Patient/Family Goals Statement (PT Clinical Impression) Go to assisted living facility  -JOSTIN (r) TD (t) JE (c)     Criteria for Skilled Interventions Met (PT Clinical Impression) yes;treatment indicated   To improve strength, mobility, endurance, and gait pattern  -JE (r) TD (t) JE (c)     Pathology/Pathophysiology Noted (Describe Specifically for Each System) endocrine/metabolic  -JOSTIN (r) TD (t) JE (c)     Impairments Found (describe specific impairments) aerobic capacity/endurance;gait, locomotion, and balance;muscle performance;arousal, attention, and cognition  -JOSTIN  (r) TD (t) JE (c)     Functional Limitations in Following Categories (Describe Specific Limitations) self-care;home management;community/leisure  -JE (r) TD (t) JE (c)     Rehab Potential (PT Clinical Summary) good, to achieve stated therapy goals  -JE (r) TD (t) JE (c)     Predicted Duration of Therapy (PT) until d/c or goals achieved  -JE (r) TD (t) JE (c)     Care Plan Review (PT) evaluation/treatment results reviewed;care plan/treatment goals reviewed;risks/benefits reviewed;current/potential barriers reviewed;patient/other agree to care plan  -JE (r) TD (t) JE (c)     Row Name 10/08/18 1036          Physical Therapy Goals    Bed Mobility Goal Selection (PT) bed mobility, PT goal 1  -JE (r) TD (t) JE (c)     Transfer Goal Selection (PT) transfer, PT goal 1  -JE (r) TD (t) JE (c)     Gait Training Goal Selection (PT) gait training, PT goal 1  -JE (r) TD (t) JE (c)     Row Name 10/08/18 103          Bed Mobility Goal 1 (PT)    Activity/Assistive Device (Bed Mobility Goal 1, PT) bed mobility activities, all  -JE (r) TD (t) JE (c)     [place] Level/Cues Needed (Bed Mobility Goal 1, PT) conditional independence  -JE (r) TD (t) JE (c)     Time Frame (Bed Mobility Goal 1, PT) by discharge  -JE (r) TD (t) JE (c)     Progress/Outcomes (Bed Mobility Goal 1, PT) goal ongoing  -JE (r) TD (t) JE (c)     Row Name 10/08/18 1031          Transfer Goal 1 (PT)    Activity/Assistive Device (Transfer Goal 1, PT) transfers, all  -JE (r) TD (t) JE (c)     [place] Level/Cues Needed (Transfer Goal 1, PT) conditional independence  -JE (r) TD (t) JE (c)     Time Frame (Transfer Goal 1, PT) by discharge  -JE (r) TD (t) JE (c)     Progress/Outcome (Transfer Goal 1, PT) goal ongoing  -JE (r) TD (t) JE (c)     Row Name 10/08/18 1036          Gait Training Goal 1 (PT)    Activity/Assistive Device (Gait Training Goal 1, PT) gait (walking locomotion);assistive device use;decrease fall risk;diminish gait deviation;improve balance and  speed;increase endurance/gait distance;increase energy conservation  -JE (r) TD (t) JE (c)     [place] Level (Gait Training Goal 1, PT) standby assist  -JE (r) TD (t) JE (c)     Distance (Gait Goal 1, PT) 50 feet  -JE (r) TD (t) JE (c)     Time Frame (Gait Training Goal 1, PT) by discharge  -JE (r) TD (t) JE (c)     Progress/Outcome (Gait Training Goal 1, PT) goal ongoing  -JE (r) TD (t) JE (c)     Row Name 10/08/18 1039          Patient Education Goal (PT)    Activity (Patient Education Goal, PT) Safety Precaution/Awareness: Pt able to understand safety precautions/body mechanics with transfers/mobility  -JE (r) TD (t) JE (c)     [place]/Cues/Accuracy (Memory Goal 2, PT) demonstrates adequately;verbalizes understanding  -JE (r) TD (t) JE (c)     Time Frame (Patient Education Goal, PT) by discharge  -JE (r) TD (t) JE (c)     Progress/Outcome (Patient Education Goal, PT) goal ongoing  -JE (r) TD (t) JE (c)     Row Name 10/08/18 1039          Positioning and Restraints    Pre-Treatment Position in bed  -JE (r) TD (t) JE (c)     Post Treatment Position bed  -JE (r) TD (t) JE (c)     In Bed fowlers;call light within reach;encouraged to call for assist;notified nsg;side rails up x2  -JE (r) TD (t) JE (c)     Row Name 10/08/18 1039          Living Environment    Home Accessibility stairs to enter home;tub/shower is not walk in  -JE (r) TD (t) JE (c)       User Key  (r) = Recorded By, (t) = Taken By, (c) = Cosigned By    Initials Name Provider Type    Virginia Diallo, RN Registered Nurse    Beatriz Kinney, PT Physical Therapist    Rita Glover, PT Student PT Student          Physical Therapy Education     Title: PT OT SLP Therapies (Done)     Topic: Physical Therapy (Done)     Point: Mobility training (Done)    Learning Progress Summary     Learner Status Readiness Method Response Comment Documented by    Patient Done Acceptance E VU,NR Pt  was educated on benefits ot PT and PT POC. Pt was  edcuated on body mechanics with transfers (pushing with one arm on bed to stand up/having legs touch bed before sitting down) and safety precautions (socks on, gait belt on) with transfers and gait. TD 10/08/18 1149          Point: Body mechanics (Done)    Learning Progress Summary     Learner Status Readiness Method Response Comment Documented by    Patient Done Acceptance E VU,NR Pt  was educated on benefits ot PT and PT POC. Pt was edcuated on body mechanics with transfers (pushing with one arm on bed to stand up/having legs touch bed before sitting down) and safety precautions (socks on, gait belt on) with transfers and gait. TD 10/08/18 1149          Point: Precautions (Done)    Learning Progress Summary     Learner Status Readiness Method Response Comment Documented by    Patient Done Acceptance E VU,NR Pt  was educated on benefits ot PT and PT POC. Pt was edcuated on body mechanics with transfers (pushing with one arm on bed to stand up/having legs touch bed before sitting down) and safety precautions (socks on, gait belt on) with transfers and gait. TD 10/08/18 1149                      User Key     Initials Effective Dates Name Provider Type Discipline    TD 09/07/18 -  Rita Grullon, PT Student PT Student PT                PT Recommendation and Plan  Anticipated Discharge Disposition (PT): skilled nursing facility, home with home health, assisted living facility ([facility]), home with assist  Planned Therapy Interventions (PT Eval): balance training, bed mobility training, gait training, strengthening, transfer training  Therapy Frequency (PT Clinical Impression): 2 times/day  Outcome Summary/Treatment Plan (PT)  Anticipated Discharge Disposition (PT): skilled nursing facility, home with home health, assisted living facility (senior living), home with assist  Plan of Care Reviewed With: patient  Progress: no change  Outcome Summary: PT eval completed. Pt c/o pain in many locations. Prior to hospitilization, pt was I w/  all mobility and ADLs. Today, pt demonstrates decreased strength, endurancee,  impaired balance, gait pattern, and functional mobility. Pt required Min A x1 for bed mobility and CGA for transfers and ambulation. Pt is a high risk fall. Pt would benefit from skilled PT in order to address impairments and improve safety and independence with functional mobility. It is recommended that pt be d/c to SNF upon d/c due to safety concern of pt returning home alone at this time.            Outcome Measures     Row Name 10/08/18 1148 10/08/18 1100          How much help from another person do you currently need...    Turning from your back to your side while in flat bed without using bedrails? 3  -JE (r) TD (t) JE (c)  --     Moving from lying on back to sitting on the side of a flat bed without bedrails? 3  -JE (r) TD (t) JE (c)  --     Moving to and from a bed to a chair (including a wheelchair)? 3  -JE (r) TD (t) JE (c)  --     Standing up from a chair using your arms (e.g., wheelchair, bedside chair)? 3  -JE (r) TD (t) JE (c)  --     Climbing 3-5 steps with a railing? 3  -JE (r) TD (t) JE (c)  --     To walk in hospital room? 3  -JE (r) TD (t) JE (c)  --     AM-PAC 6 Clicks Score 18  -JE (r) TD (t)  --        How much help from another is currently needed...    Putting on and taking off regular lower body clothing?  -- 2  -MW     Bathing (including washing, rinsing, and drying)  -- 2  -MW     Toileting (which includes using toilet bed pan or urinal)  -- 2  -MW     Putting on and taking off regular upper body clothing  -- 3  -MW     Taking care of personal grooming (such as brushing teeth)  -- 3  -MW     Eating meals  -- 4  -MW     Score  -- 16  -MW        Functional Assessment    Outcome Measure Options AM-PAC 6 Clicks Basic Mobility (PT)  -JE (r) TD (t) JE (c) AM-PAC 6 Clicks Daily Activity (OT)  -MW       User Key  (r) = Recorded By, (t) = Taken By, (c) = Cosigned By    Initials Name Provider Type    MW Becerra, Sharee  K, OTR/L Occupational Therapist    Beatriz Kinney, PT Physical Therapist    Rita Glover, PT Student PT Student           Time Calculation:         PT Charges     Row Name 10/08/18 1150             Time Calculation    Start Time 1035   chart review: 0774 - 9684  -JE (r) TD (t) JE (c)      Stop Time 1105  -JE (r) TD (t) JE (c)      Time Calculation (min) 30 min  -JE (r) TD (t)      PT Received On 10/08/18  -JE (r) TD (t) JE (c)      PT Goal Re-Cert Due Date 10/18/18  -JE (r) TD (t) JE (c)        User Key  (r) = Recorded By, (t) = Taken By, (c) = Cosigned By    Initials Name Provider Type    Beatriz Kinney, PT Physical Therapist    Rita Glover, PT Student PT Student        Therapy Suggested Charges     Code   Minutes Charges    None           Therapy Charges for Today     Code Description Service Date Service Provider Modifiers Qty    45665065902 HC PT EVAL LOW COMPLEXITY 3 10/8/2018 Rita Grullon, PT Student GP, KX 1          PT G-Codes  PT Professional Judgement Used?: Yes  Outcome Measure Options: AM-PAC 6 Clicks Basic Mobility (PT)  AM-PAC 6 Clicks Score: 18  Score: 16  Functional Limitation: Mobility: Walking and moving around  Mobility: Walking and Moving Around Current Status (): At least 40 percent but less than 60 percent impaired, limited or restricted  Mobility: Walking and Moving Around Goal Status (): At least 20 percent but less than 40 percent impaired, limited or restricted      Rita Grullon PT Student  10/8/2018

## 2018-10-08 NOTE — PLAN OF CARE
Problem: Pneumonia (Adult)  Goal: Signs and Symptoms of Listed Potential Problems Will be Absent, Minimized or Managed (Pneumonia)  Outcome: Ongoing (interventions implemented as appropriate)   10/08/18 0458   Goal/Outcome Evaluation   Problems Assessed (Pneumonia) all   Problems Present (Pneumonia) fluid/electrolyte imbalance       Problem: Fall Risk (Adult)  Goal: Identify Related Risk Factors and Signs and Symptoms  Outcome: Ongoing (interventions implemented as appropriate)   10/08/18 0458   Fall Risk (Adult)   Related Risk Factors (Fall Risk) age-related changes   Signs and Symptoms (Fall Risk) presence of risk factors       Problem: Skin Injury Risk (Adult)  Goal: Identify Related Risk Factors and Signs and Symptoms  Outcome: Ongoing (interventions implemented as appropriate)   10/08/18 0458   Skin Injury Risk (Adult)   Related Risk Factors (Skin Injury Risk) advanced age;fluid intake inadequate;infection;mobility impaired

## 2018-10-08 NOTE — PLAN OF CARE
Problem: Patient Care Overview  Goal: Plan of Care Review   10/08/18 7766   Plan of Care Review   Progress no change   OTHER   Outcome Summary PT treatment complete. Pt did not tolerate therapy well today and had moderate complaints of pain on chest. Notified nsg of patients complaint of chest pain and its limitations it has on the pt from participating in therapy. Pt was able to demonstrate more independence with bed mobility. Pt required multiple rest breaks throughout treatment. Pt refused to transfer or ambulate. Pt was educated on the importance/benefits of getting out of bed. PT will continue to work with him to improve his impairments.    Coping/Psychosocial   Plan of Care Reviewed With patient   Coping/Psychosocial   Patient Agreement with Plan of Care agrees

## 2018-10-08 NOTE — THERAPY TREATMENT NOTE
Acute Care - Physical Therapy Treatment Note  [facility]     Patient Name: Gavin Wilson  : 1945  MRN: [id number]  Today's Date: 10/8/2018  Onset of Illness/Injury or Date of Surgery: 10/06/18  Date of Referral to PT: 10/08/18  Referring Physician: Dr. Michael    Admit Date: 10/6/2018    Visit Dx:    ICD-10-CM ICD-9-CM   1. Esophageal dysphagia R13.10 787.20   2. Impaired mobility and ADLs Z74.09 799.89   3. Impaired functional mobility, balance, gait, and endurance Z74.09 V49.89     Patient Active Problem List   Diagnosis   • Volume overload   • Chronic kidney disease, stage 4 (severe) (CMS/HCC)   • Chronic systolic congestive heart failure (CMS/HCC)   • Pleural effusion   • Sepsis (CMS/HCC)       Therapy Treatment          Rehabilitation Treatment Summary     Row Name 10/08/18 1400             Treatment Time/Intention    Discipline physical therapist  -JOSTIN,TD,JE2      Document Type therapy note (daily note)  -JOSTIN,DAWN,JE2      Subjective Information complains of;pain  -JOSTIN,TD,JE2      Mode of Treatment physical therapy  -JOSTIN,TD,JE2      Patient/Family Observations no familypresent  -JOSTIN,TD,JE2      Care Plan Review evaluation/treatment results reviewed;care plan/treatment goals reviewed;risks/benefits reviewed;current/potential barriers reviewed;patient/other agree to care plan  -JOSTIN,TD,JE2      Total Minutes, Physical Therapy Treatment 17  -JOSTIN,TD,JE2      Therapy Frequency (PT Clinical Impression) 2 times/day  -JOSTIN,TD,JE2      Patient Effort fair  -JOSTINTD,JE2      Existing Precautions/Restrictions fall;other (see comments)   urostomy  -JOSTIN,TD,JE2      Equipment Issued to Patient walker, front wheeled  -JOSTIN,TD,JE2      Patient Response to Treatment Pt did not respond well to treatment today. Had moderate complaints of pain especially on chest. Pt required a lot of rest breaks and would not attempt to transfer or ambulate. Pt reports he knows he should get up and walk but is not in the mood and is just waiting for it  to be tomorrow.   -JE,TD,JE2      Recorded by [JE,TD,JE2] Beatriz Zimmer PT (r) Rita Grullon PT Student (t) Beatriz Zimmer PT (c) 10/08/18 1535      Row Name 10/08/18 1400             Cognitive Assessment/Intervention- PT/OT    Personal Safety Interventions fall prevention program maintained;gait belt;muscle strengthening facilitated;nonskid shoes/slippers when out of bed;supervised activity  -JE,TD,JE2      Recorded by [JE,TD,JE2] Beatriz Zimmer PT (r) Rita Grullon PT Student (t) Beatriz Zimmer PT (c) 10/08/18 1535      Row Name 10/08/18 1400             Bed Mobility Assessment/Treatment    Bed Mobility Assessment/Treatment scooting/bridging;supine-sit;sit-supine  -JE,TD,JE2      Scooting/Bridging [place] (Bed Mobility) conditional independence  -JE,TD,JE2      Supine-Sit [place] (Bed Mobility) supervision;verbal cues  -JE,TD,JE2      Sit-Supine [place] (Bed Mobility) supervision;verbal cues  -JE,TD,JE2      Assistive Device (Bed Mobility) bed rails;head of bed elevated  -JE,TD,JE2      Comment (Bed Mobility) Performed heel slides 10 x each leg and 10 biceps curls bilaterally in supine.  -JE,TD,JE2      Recorded by [JE,TD,JE2] Beatriz Zimmer PT (r) Rita Grullon PT Student (t) Beatriz Zimmer PT (c) 10/08/18 1535      Row Name 10/08/18 1400             Transfer Assessment/Treatment    Comment (Transfers) Pt refused to get out of bed. Stated he will try tomorrow.   -JE,TD,JE2      Recorded by [JE,TD,JE2] Beatriz Zimmer PT (r) Rita Grullon PT Student (t) Beatriz Zimmer PT (c) 10/08/18 1535      Row Name 10/08/18 1400             Gait/Stairs Assessment/Training    Comment (Gait/Stairs) Pt refused to get outof bed. Educated on importance of movement.   -JE,TD,JE2      Recorded by [JE,TD,JE2] Beatriz Zimmer PT (r) Rita Grullon PT Student (t) Beatriz Zimmer, PT (c) 10/08/18 1535      Row Name 10/08/18 1400             Static Sitting Balance    Level of  [place] (Unsupported Sitting, Static Balance) supervision;standby assist  -JE,TD,JE2      Sitting Position (Unsupported Sitting, Static Balance) sitting on edge of bed  -JE,TD,JE2      Recorded by [JE,TD,JE2] Beatriz Zimmer, PT (r) Rita Grullon PT Student (t) Beatriz Zimmer, PT (c) 10/08/18 1535      Row Name 10/08/18 1400             Dynamic Sitting Balance    Level of [place], Reaches Outside Midline (Sitting, Dynamic Balance) supervision;standby assist  -JE,TD,JE2      Sitting Position, Reaches Outside Midline (Sitting, Dynamic Balance) sitting on edge of bed  -JE,TD,JE2      Comment, Reaches Outside Midline (Sitting, Dynamic Balance) Performed sitting marches 2 sets of 10 reps. Performed 1 set of 10 reps of toe taps.   -JE,TD,JE2      Recorded by [JE,TD,JE2] Beatriz Zimmer, PT (r) Rita Gurllon, PT Student (t) Beatriz Zimmer, PT (c) 10/08/18 1535      Row Name 10/08/18 1400             Positioning and Restraints    Pre-Treatment Position in bed  -JE,TD,JE2      Post Treatment Position bed  -JE,TD,JE2      In Bed fowlers;call light within reach;encouraged to call for assist;notified nsg  -JE,TD,JE2      Recorded by [JE,TD,JE2] Beatriz Zimmer, PT (r) Rita Grullon PT Student (t) Beatriz Zimmer, PT (c) 10/08/18 1535      Row Name 10/08/18 1400             Pain Scale: Numbers Pre/Post-Treatment    Pre/Post Treatment Pain Comment Reports pain is still in knees, back, and chest.   -JE,TD,JE2      Pain Intervention(s) Repositioned;Ambulation/increased activity  -JE,TD,JE2      Recorded by [JE,TD,JE2] Beatriz Zimmer, PT (r) Rita Grullon PT Student (t) Beatriz Zimmer, PT (c) 10/08/18 1535      Row Name 10/08/18 1400             Pain Scale: FACES Pre/Post-Treatment    Pain: FACES Scale, Pretreatment 6-->hurts even more  -JE,TD,JE2      Pain: FACES Scale, Post-Treatment 6-->hurts even more  -JOSTIN,DAWN,JE2      Recorded by [JOSTIN,DAWN,JE2] Beatriz Zimmer, PT (r) Rita Grullon, JUVENTINO Student  (t) Beatriz Zimmer, PT (c) 10/08/18 1535      Row Name                Wound 10/07/18 1255 Bilateral posterior coccyx    Wound - Properties Group Date first assessed: 10/07/18 [SH] Time first assessed: 1255 [SH] Side: Bilateral [SH] Orientation: posterior [SH] Location: coccyx [SH] Recorded by:  [] Virginia Ahn RN 10/07/18 1256    Row Name 10/08/18 1400             Plan of Care Review    Plan of Care Reviewed With patient  -JE,TD,JE2      Recorded by [JETD,JE2] Beatriz Zimmer, PT (r) Rita Grullon, PT Student (t) Beatriz Zimmer, PT (c) 10/08/18 1535      Row Name 10/08/18 1400             Outcome Summary/Treatment Plan (PT)    Daily Summary of Progress (PT) progress toward functional goals is gradual  -JETD,JE2      Barriers to Overall Progress (PT) Motivation and pain  -JE,TD,JE2      Plan for Continued Treatment (PT) Keep encouraging out of bed  -JETD,JE2      Anticipated Discharge Disposition (PT) skilled nursing facility  -JE,TD,JE2      Recorded by [DAWN FRANKEL,JE2] Beatriz Zimmer, PT (r) Rita Grullon, PT Student (t) Beatriz Zimmer, PT (c) 10/08/18 1535        User Key  (r) = Recorded By, (t) = Taken By, (c) = Cosigned By    Initials Name Effective Dates Discipline     Virginia Ahn RN 08/28/17 -  Nurse    Beatriz Kinney, PT 08/02/18 -  PT    Rita Glover PT Student 09/07/18 -  PT          Wound 10/07/18 1255 Bilateral posterior coccyx (Active)   Dressing Appearance dry;intact 10/8/2018  8:45 AM   Periwound intact 10/8/2018  8:45 AM   Drainage Amount none 10/8/2018  8:45 AM   Dressing Care, Wound foam 10/8/2018  8:45 AM               PT Rehab Goals     Row Name 10/08/18 1039             Bed Mobility Goal 1 (PT)    Activity/Assistive Device (Bed Mobility Goal 1, PT) bed mobility activities, all  -JE (r) TD (t) JE (c)      [place] Level/Cues Needed (Bed Mobility Goal 1, PT) conditional independence  -JE (r) TD (t) JE (c)      Time Frame (Bed Mobility Goal 1, PT) by  discharge  -JE (r) TD (t) JE (c)      Progress/Outcomes (Bed Mobility Goal 1, PT) goal ongoing  -JE (r) TD (t) JE (c)         Transfer Goal 1 (PT)    Activity/Assistive Device (Transfer Goal 1, PT) transfers, all  -JE (r) TD (t) JE (c)      [place] Level/Cues Needed (Transfer Goal 1, PT) conditional independence  -JE (r) TD (t) JE (c)      Time Frame (Transfer Goal 1, PT) by discharge  -JE (r) TD (t) JE (c)      Progress/Outcome (Transfer Goal 1, PT) goal ongoing  -JE (r) TD (t) JE (c)         Gait Training Goal 1 (PT)    Activity/Assistive Device (Gait Training Goal 1, PT) gait (walking locomotion);assistive device use;decrease fall risk;diminish gait deviation;improve balance and speed;increase endurance/gait distance;increase energy conservation  -JE (r) TD (t) JE (c)      [place] Level (Gait Training Goal 1, PT) standby assist  -JE (r) TD (t) JE (c)      Distance (Gait Goal 1, PT) 50 feet  -JE (r) TD (t) JE (c)      Time Frame (Gait Training Goal 1, PT) by discharge  -JE (r) TD (t) JE (c)      Progress/Outcome (Gait Training Goal 1, PT) goal ongoing  -JE (r) TD (t) JE (c)         Patient Education Goal (PT)    Activity (Patient Education Goal, PT) Safety Precaution/Awareness: Pt able to understand safety precautions/body mechanics with transfers/mobility  -JE (r) TD (t) JE (c)      [place]/Cues/Accuracy (Memory Goal 2, PT) demonstrates adequately;verbalizes understanding  -JE (r) TD (t) JE (c)      Time Frame (Patient Education Goal, PT) by discharge  -JE (r) TD (t) JE (c)      Progress/Outcome (Patient Education Goal, PT) goal ongoing  -JE (r) TD (t) JE (c)        User Key  (r) = Recorded By, (t) = Taken By, (c) = Cosigned By    Initials Name Provider Type Discipline    Beatriz Kinney, PT Physical Therapist PT    Rita Glover, PT Student PT Student PT          Physical Therapy Education     Title: PT OT SLP Therapies (Done)     Topic: Physical Therapy (Done)     Point: Mobility  training (Done)    Learning Progress Summary     Learner Status Readiness Method Response Comment Documented by    Patient Done Acceptance GUNJAN YEH,NR Pt was educated on the importance and benefits of getting out of bed and how it affects his overall health. Pt was educated on why PT is working with him. Pt was encouraged to do more in treatment session. TD 10/08/18 1430     Done Acceptance GUNJAN YEHNR Pt  was educated on benefits ot PT and PT POC. Pt was edcuated on body mechanics with transfers (pushing with one arm on bed to stand up/having legs touch bed before sitting down) and safety precautions (socks on, gait belt on) with transfers and gait. TD 10/08/18 1149          Point: Body mechanics (Done)    Learning Progress Summary     Learner Status Readiness Method Response Comment Documented by    Patient Done Acceptance GUNJAN YEHNR Pt was educated on the importance and benefits of getting out of bed and how it affects his overall health. Pt was educated on why PT is working with him. Pt was encouraged to do more in treatment session. TD 10/08/18 1430     Done Acceptance GUNJAN YEHNR Pt  was educated on benefits ot PT and PT POC. Pt was edcuated on body mechanics with transfers (pushing with one arm on bed to stand up/having legs touch bed before sitting down) and safety precautions (socks on, gait belt on) with transfers and gait. TD 10/08/18 1149          Point: Precautions (Done)    Learning Progress Summary     Learner Status Readiness Method Response Comment Documented by    Patient Done Acceptance GUNJAN YEHNR Pt was educated on the importance and benefits of getting out of bed and how it affects his overall health. Pt was educated on why PT is working with him. Pt was encouraged to do more in treatment session. TD 10/08/18 1430     Done Acceptance GUNJAN YEHNR Pt  was educated on benefits ot PT and PT POC. Pt was edcuated on body mechanics with transfers (pushing with one arm on bed to stand up/having legs touch bed before sitting  down) and safety precautions (socks on, gait belt on) with transfers and gait. TD 10/08/18 1149                      User Key     Initials Effective Dates Name Provider Type Discipline    TD 09/07/18 -  Rita Grullon, PT Student PT Student PT                    PT Recommendation and Plan  Anticipated Discharge Disposition (PT): skilled nursing facility  Planned Therapy Interventions (PT Eval): balance training, bed mobility training, gait training, strengthening, transfer training  Therapy Frequency (PT Clinical Impression): 2 times/day  Outcome Summary/Treatment Plan (PT)  Daily Summary of Progress (PT): progress toward functional goals is gradual  Barriers to Overall Progress (PT): Motivation and pain  Plan for Continued Treatment (PT): Keep encouraging out of bed  Anticipated Discharge Disposition (PT): skilled nursing facility  Plan of Care Reviewed With: patient  Progress: no change  Outcome Summary: PT treatment complete.  Pt did not tolerate therapy well today and had moderate complaints of pain on chest. Notified nsg of patients complaint of chest pain and its limitations it has on the pt from participating in therapy.  Pt was able to demonstrate more independence with bed mobility. Pt required multiple rest breaks throughout treatment. Pt refused to transfer or ambulate. Pt was educated on the importance/benefits of getting out of bed. PT will continue to work with him to improve his impairments.           Outcome Measures     Row Name 10/08/18 1400 10/08/18 1148 10/08/18 1100       How much help from another person do you currently need...    Turning from your back to your side while in flat bed without using bedrails? 3  -JE (r) TD (t) JE (c) 3  -JE (r) TD (t) JE (c)  --    Moving from lying on back to sitting on the side of a flat bed without bedrails? 4  -JE (r) TD (t) JE (c) 3  -JE (r) TD (t) JE (c)  --    Moving to and from a bed to a chair (including a wheelchair)? 3  -JE (r) TD (t) JE (c) 3  -JE  (r) TD (t) JE (c)  --    Standing up from a chair using your arms (e.g., wheelchair, bedside chair)? 3  -JE (r) TD (t) JE (c) 3  -JE (r) TD (t) JE (c)  --    Climbing 3-5 steps with a railing? 3  -JE (r) TD (t) JE (c) 3  -JE (r) TD (t) JE (c)  --    To walk in hospital room? 3  -JE (r) TD (t) JE (c) 3  -JE (r) TD (t) JE (c)  --    AM-PAC 6 Clicks Score 19  -JE (r) TD (t) 18  -JE (r) TD (t)  --       How much help from another is currently needed...    Putting on and taking off regular lower body clothing?  --  -- 2  -MW    Bathing (including washing, rinsing, and drying)  --  -- 2  -MW    Toileting (which includes using toilet bed pan or urinal)  --  -- 2  -MW    Putting on and taking off regular upper body clothing  --  -- 3  -MW    Taking care of personal grooming (such as brushing teeth)  --  -- 3  -MW    Eating meals  --  -- 4  -MW    Score  --  -- 16  -MW       Functional Assessment    Outcome Measure Options AM-PAC 6 Clicks Basic Mobility (PT)  -JE (r) TD (t) JE (c) AM-PAC 6 Clicks Basic Mobility (PT)  -JE (r) TD (t) JE (c) AM-PAC 6 Clicks Daily Activity (OT)  -      User Key  (r) = Recorded By, (t) = Taken By, (c) = Cosigned By    Initials Name Provider Type    MW Sharee Becerra, OTR/L Occupational Therapist    Beatriz Kinney, PT Physical Therapist    Rita Glover, PT Student PT Student           Time Calculation:         PT Charges     Row Name 10/08/18 1432 10/08/18 1150          Time Calculation    Start Time 1353  -JE (r) TD (t) JE (c) 1035   chart review: 8104 - 4226  -JE (r) TD (t) JE (c)     Stop Time 1410  -JE (r) TD (t) JE (c) 1105  -JE (r) TD (t) JE (c)     Time Calculation (min) 17 min  -JE (r) TD (t) 30 min  -JE (r) TD (t)     PT Received On 10/08/18  -JE (r) TD (t) JE (c) 10/08/18  -JE (r) TD (t) JE (c)     PT Goal Re-Cert Due Date 10/18/18  -JE (r) TD (t) JE (c) 10/18/18  -JE (r) TD (t) JE (c)        Time Calculation- PT    Total Timed Code Minutes- PT 17 minute(s)  -JE (r) TD (t)  JOSTIN (priya)  --       User Key  (r) = Recorded By, (t) = Taken By, (c) = Cosigned By    Initials Name Provider Type    Beatriz Kinney, PT Physical Therapist    TD Rita Grullon, PT Student PT Student        Therapy Suggested Charges     Code   Minutes Charges    None           Therapy Charges for Today     Code Description Service Date Service Provider Modifiers Qty    19367058585 HC PT EVAL LOW COMPLEXITY 3 10/8/2018 Rita Grullon, PT Student GP, KX 1    75444804349 HC PT THERAPEUTIC ACT EA 15 MIN 10/8/2018 Rita Grullon, PT Student GP, KX 1          PT G-Codes  PT Professional Judgement Used?: Yes  Outcome Measure Options: AM-PAC 6 Clicks Basic Mobility (PT)  AM-PAC 6 Clicks Score: 19  Score: 16  Functional Limitation: Mobility: Walking and moving around  Mobility: Walking and Moving Around Current Status (): At least 20 percent but less than 40 percent impaired, limited or restricted  Mobility: Walking and Moving Around Goal Status (): At least 1 percent but less than 20 percent impaired, limited or restricted    Rita Grullon PT Student  10/8/2018

## 2018-10-08 NOTE — CONSULTS
Nutrition Services    Patient Name:  Gavin Wilson  YOB: 1945  MRN: [id number]  Admit Date:  10/6/2018    Pt declines all nutrition supplements. He has been snacking between meals and requesting snacks per nursing. Will cont to encourage intake with meals.     Electronically signed by:  Juliana Arias RDN, VINEET  10/08/18 12:51 PM

## 2018-10-08 NOTE — PROGRESS NOTES
LOS: 2 days   Patient Care Team:  Provider, No Known as PCP - General    Chief Complaint: Active Problems:    Sepsis (CMS/HCC)    Shortness of breath    Subjective  Feeling  better  Currently on telemetry floor.  States cannot eat much as gets more short winded  Has generalized weakness and fatigue  Slept somewhat better  No other new complaints  Anxious  Generalized weakness  Easy fatigability  Mild dysphagia to solids    Interval History: Improved overall    Telemetry: no malignant arrhythmia. No significant pauses.    Review of Systems     Constitutional: No chills   Has fatigue   No fever.   HENT: Negative.    Eyes: Negative.      Respiratory: Positive for cough,   No chest wall soreness,   Shortness of breath,   no wheezing, no stridor.      Cardiovascular: Atypical chest pain,   No palpitations   No significant  leg swelling.     Gastrointestinal: Negative for abdominal distention,  No abdominal pain,   No blood in stool,   No constipation,   No diarrhea,   No nausea   No vomiting.     Endocrine: Negative.    Genitourinary: Negative for difficulty urinating, dysuria, flank pain and hematuria.     Musculoskeletal: Negative.    Skin: Negative for rash and wound.   Allergic/Immunologic: Negative.      Neurological: Negative for dizziness, syncope, weakness,   No light-headedness  No  headaches.     Hematological: Does not bruise/bleed easily.     Psychiatric/Behavioral: Negative for agitation or behavioral problems,   No confusion,   the patient is  nervous/anxious.       History:   Past Medical History:   Diagnosis Date   • CHF (congestive heart failure) (CMS/HCC)    • Coronary stent occlusion    • Diabetes mellitus (CMS/HCC)    • Hyperlipidemia    • Hypertension    • Stroke (CMS/HCC)      Past Surgical History:   Procedure Laterality Date   • BLADDER SURGERY     • ESOPHAGECTOMY       Social History     Social History   • Marital status:      Spouse name: N/A   • Number of children: N/A   • Years of  education: N/A     Occupational History   • Not on file.     Social History Main Topics   • Smoking status: Current Every Day Smoker   • Smokeless tobacco: Never Used   • Alcohol use No   • Drug use: No   • Sexual activity: Not on file     Other Topics Concern   • Not on file     Social History Narrative   • No narrative on file     Family History   Problem Relation Age of Onset   • No Known Problems Father    • No Known Problems Mother        Labs:  WBC WBC   Date Value Ref Range Status   10/08/2018 4.73 (L) 4.80 - 10.80 10*3/mm3 Final   10/07/2018 5.03 4.80 - 10.80 10*3/mm3 Final   10/06/2018 8.58 4.80 - 10.80 10*3/mm3 Final      HGB Hemoglobin   Date Value Ref Range Status   10/08/2018 8.5 (L) 14.0 - 18.0 g/dL Final   10/07/2018 7.6 (L) 14.0 - 18.0 g/dL Final   10/06/2018 8.6 (L) 14.0 - 18.0 g/dL Final      HCT Hematocrit   Date Value Ref Range Status   10/08/2018 26.9 (L) 40.0 - 52.0 % Final   10/07/2018 23.9 (L) 40.0 - 52.0 % Final   10/06/2018 27.0 (L) 40.0 - 52.0 % Final      Platelets Platelets   Date Value Ref Range Status   10/08/2018 139 130 - 400 10*3/mm3 Final   10/07/2018 110 (L) 130 - 400 10*3/mm3 Final   10/06/2018 136 130 - 400 10*3/mm3 Final      MCV MCV   Date Value Ref Range Status   10/08/2018 90.3 82.0 - 95.0 fL Final   10/07/2018 91.2 82.0 - 95.0 fL Final   10/06/2018 87.9 82.0 - 95.0 fL Final          Results from last 7 days  Lab Units 10/08/18  0435 10/07/18  0243 10/06/18  0545   SODIUM mmol/L 141 142 141   POTASSIUM mmol/L 4.5 3.7 3.6   CHLORIDE mmol/L 110 112* 113*   CO2 mmol/L 16.0* 17.0* 17.0*   BUN mg/dL 50* 50* 46*   CREATININE mg/dL 3.78* 3.62* 3.57*   CALCIUM mg/dL 8.5 7.9* 8.1*   BILIRUBIN mg/dL 0.4  --  0.5   ALK PHOS U/L 83  --  66   ALT (SGPT) U/L 53  --  30   AST (SGOT) U/L 27  --  20   GLUCOSE mg/dL 131* 118* 170*     Lab Results   Component Value Date    TROPONINI 4.090 (C) 10/06/2018     PT/INR:    Protime   Date Value Ref Range Status   10/07/2018 15.2 (H) 11.9 - 14.6  Seconds Final   /  INR   Date Value Ref Range Status   10/07/2018 1.16 (H) 0.91 - 1.09 Final       Imaging Results (last 72 hours)     Procedure Component Value Units Date/Time    CT Chest Without Contrast [[id number]] Collected:  10/06/18 1752     Updated:  10/06/18 1759    Narrative:       EXAMINATION:   CT CHEST WO CONTRAST-  10/6/2018 5:52 PM CDT     CT CHEST WO CONTRAST- 10/6/2018 5:33 PM CDT     HISTORY: Shortness of breath     COMPARISON: August 12, 2018 DOSE LENGTH PRODUCT: 185 mGy cm. Automated  exposure control was also utilized to decrease patient radiation dose.     TECHNIQUE: Serial helical tomographic images of the chest were acquired.  Multiplanar reformatted images were provided for review.     FINDINGS:  The imaged portion of the neck and thyroid gland is unremarkable.      The upper lungs are clear.. The lower lobes are obscured by large  bilateral pleural effusions.. The trachea and bronchial tree are patent.     Cardiac silhouette may be mildly enlarged. Extensive vascular  calcifications noted in the aortic arch origin of the great vessels and  coronary arteries.. Endovascular aortic stent graft is noted below the  diaphragm.    .      No acute findings are seen in the bones or surrounding soft tissues.        .       Impression:       1. Large bilateral pleural effusions which essentially obscures the  lower lobes.  2. Extensive vascular calcifications present  3. Endovascular stent graft is present in the abdomen.        This report was finalized on 10/06/2018 17:55 by Dr. Duc Garibay MD.          Objective     Allergies   Allergen Reactions   • Sulfa Antibiotics        Medication Review: Performed  Current Facility-Administered Medications   Medication Dose Route Frequency Provider Last Rate Last Dose   • acetaminophen (TYLENOL) tablet 650 mg  650 mg Oral Q4H PRN Max Jones MD       • aspirin chewable tablet 81 mg  81 mg Oral Daily Vu Michael MD       • atorvastatin  (LIPITOR) tablet 80 mg  80 mg Oral Daily Max Jones MD   80 mg at 10/07/18 0837   • budesonide-formoterol (SYMBICORT) 160-4.5 MCG/ACT inhaler 2 puff  2 puff Inhalation BID - RT Max Jones MD   2 puff at 10/07/18 2153   • bumetanide (BUMEX) injection 1 mg  1 mg Intravenous BID Ulises Dejesus MD       • calcitriol (ROCALTROL) capsule 0.5 mcg  0.5 mcg Oral Daily Max Jones MD   0.5 mcg at 10/07/18 0837   • calcium carb-cholecalciferol 600-800 MG-UNIT tablet 1 tablet  1 tablet Oral Daily Max Jones MD   1 tablet at 10/07/18 0837   • cefepime (MAXIPIME) 2 g/100 mL 0.9% NS (mbp)  2 g Intravenous Q24H Vu Michael MD   2 g at 10/07/18 0914   • dextrose (D50W) 25 g/ 50mL Intravenous Solution 25 g  25 g Intravenous Q15 Min PRN Max Jones MD       • dextrose (GLUTOSE) oral gel 15 g  15 g Oral Q15 Min PRN Max Jones MD       • Ferric Citrate tablet 210 mg  210 mg Oral TID With Meals Max Jones MD   210 mg at 10/07/18 0914   • glucagon (human recombinant) (GLUCAGEN DIAGNOSTIC) injection 1 mg  1 mg Subcutaneous PRN Max Jones MD       • heparin (porcine) 5000 UNIT/ML injection 5,000 Units  5,000 Units Subcutaneous Q12H Max Jones MD   5,000 Units at 10/07/18 2046   • Hold medication  1 each Does not apply Continuous PRN Vu Michael MD       • HYDROcodone-acetaminophen (NORCO)  MG per tablet 1 tablet  1 tablet Oral Q6H PRN Max Jones MD   1 tablet at 10/08/18 0522   • hydroxypropyl methylcellulose (ISOPTO TEARS) 2.5 % ophthalmic solution 1 drop  1 drop Both Eyes TID PRN Vu Michael MD   1 drop at 10/06/18 2154   • Influenza Vac Subunit Quad (FLUCELVAX) injection 0.5 mL  0.5 mL Intramuscular During Hospitalization Mxa Jones MD       • insulin lispro (humaLOG) injection 0-9 Units  0-9 Units Subcutaneous 4x Daily With Meals & Nightly  "Max Jones MD   2 Units at 10/07/18 2046   • insulin lispro (humaLOG) injection 6 Units  6 Units Subcutaneous TID AC aMx Jones MD   6 Units at 10/07/18 1349   • ipratropium-albuterol (DUO-NEB) nebulizer solution 3 mL  3 mL Nebulization Q6H PRN Vu Michael MD   3 mL at 10/07/18 1852   • magnesium hydroxide (MILK OF MAGNESIA) suspension 2400 mg/10mL 10 mL  10 mL Oral Daily PRN Max Jones MD       • metoprolol tartrate (LOPRESSOR) tablet 25 mg  25 mg Oral Q12H Max Jones MD   25 mg at 10/07/18 2046   • nicotine (NICODERM CQ) 21 MG/24HR patch 1 patch  1 patch Transdermal Q24H Ashutosh Palafox MD   1 patch at 10/07/18 2054   • ondansetron (ZOFRAN) injection 4 mg  4 mg Intravenous Q6H PRN Max Jones MD   4 mg at 10/07/18 2341   • Pharmacy Consult - Pharmacy to dose   Does not apply Continuous PRN Vu Michael MD       • potassium chloride (MICRO-K) CR capsule 20 mEq  20 mEq Oral Daily Vu Michael MD   20 mEq at 10/07/18 0837   • sodium bicarbonate tablet 325 mg  325 mg Oral BID Max Jones MD   325 mg at 10/07/18 2046   • sodium chloride 0.9 % flush 3 mL  3 mL Intravenous Q12H Max Jones MD   3 mL at 10/07/18 0819   • sodium chloride 0.9 % flush 3-10 mL  3-10 mL Intravenous PRN Max Jones MD       • vancomycin 500 mg/100 mL 0.9% NS IVPB ([facility])  500 mg Intravenous Q48H Vu Michael MD   500 mg at 10/06/18 1038       Vital Sign Min/Max for last 24 hours  Temp  Min: 97.5 °F (36.4 °C)  Max: 98.9 °F (37.2 °C)   BP  Min: 96/54  Max: 145/74   Pulse  Min: 86  Max: 107   Resp  Min: 14  Max: 20   SpO2  Min: 92 %  Max: 100 %   No Data Recorded   Weight  Min: 49.9 kg (110 lb)  Max: 49.9 kg (110 lb)     Flowsheet Rows      First Filed Value   Admission Height  162.6 cm (64\") Documented at 10/06/2018 0400   Admission Weight  48.9 kg (107 lb 12.8 oz) Documented at 10/06/2018 0400    "       Results for orders placed during the hospital encounter of 10/06/18   Adult Transthoracic Echo Limited W/ Cont if Necessary Per Protocol    Addendum · Estimated EF = 37%. · Left ventricular diastolic dysfunction. · Mild aortic valve stenosis is present. · No evidence of pulmonary hypertension is present. · There is a trivial pericardial effusion. There is no evidence of cardiac  tamponade. · There is a moderate size left pleural effusion.        Abdiel Stout MD 10/6/2018  7:24 PM                  Physical Exam:    General Appearance: Awake, alert, in no acute distress  Eyes: Pupils equal and reactive    Ears: Appear intact with no abnormalities noted  Nose: Nares normal, no drainage  Neck: supple, trachea midline, no carotid bruit and has JVD  Back: no kyphosis present,    Lungs: respirations regular, respirations even and respirations unlabored  Basilar crackles  Decrease air entry bilateral bases    Heart: normal S1, S2,  2/6 pansystolic murmur in the left sternal border,  no rub and no click    Abdomen: normal bowel sounds, no tenderness   Skin: no bleeding, bruising or rash  Extremities: no cyanosis  Psychiatric/Behavioral: Negative for agitation, behavioral problems, confusion, the patient does  appear to be nervous/anxious.          Results Review:   I reviewed the patient's new clinical results.  I reviewed the patient's new imaging results and agree with the interpretation.  I reviewed the patient's other test results and agree with the interpretation  I personally viewed and interpreted the patient's EKG/Telemetry data  Discussed with patient    Reviewed available prior notes, consults, prior visits, laboratory findings, radiology and cardiology relevant reports. Updated chart as applicable. I have reviewed the patient's medical history in detail and updated the computerized patient record as relevant.    Updated patient regarding any new or relevant abnormalities on review of records or any new  findings on physical exam. Mentioned to patient about purpose of visit and desirable health short and long term goals and objectives.     Assessment/Plan     Active Problems:    Sepsis (CMS/HCC)  Non-STEMI  Known coronary artery disease next the prior stent placement more than 10 years ago  Severe left ventricular systolic dysfunction  Moderate aortic regurgitation  Mild aortic stenosis  Mild mitral regurgitation  Pneumonia  Sepsis  Hypoxia  Kidney failure              Plan      Continue current medications  Symptomatic treatment  patient is DO NOT RESUSCITATE  Continue with current management  Will follow patient  Supportive care  Telemetry  Optimal medical therapy  Deep vein thrombosis prophylaxis/precautions  Appropriate diet, fluid, sodium, caffeine, stimulants intake   Compliance to diet and medications   Avoid NSAIDS  Abdiel Stout MD  10/08/18  8:21 AM    EMR Dragon/Transcription was used to dictate part of this note

## 2018-10-08 NOTE — PLAN OF CARE
Problem: Patient Care Overview  Goal: Plan of Care Review   10/08/18 1152   Plan of Care Review   Progress no change   OTHER   Outcome Summary PT eval completed. Pt c/o pain in many locations. Prior to hospitilization, pt was I w/ all mobility and ADLs. Today, pt demonstrates decreased strength, endurancee, impaired balance, gait pattern, and functional mobility. Pt required Min A x1 for bed mobility and CGA for transfers and ambulation. Pt is a high risk fall. Pt would benefit from skilled PT in order to address impairments and improve safety and independence with functional mobility. It is recommended that pt be d/c to SNF upon d/c due to safety concern of pt returning home alone at this time.    Coping/Psychosocial   Plan of Care Reviewed With patient   Coping/Psychosocial   Patient Agreement with Plan of Care agrees

## 2018-10-08 NOTE — PLAN OF CARE
Problem: Patient Care Overview  Goal: Plan of Care Review  Outcome: Ongoing (interventions implemented as appropriate)   10/08/18 1136   Plan of Care Review   Progress no change   OTHER   Outcome Summary OT eval completed. Pt c/o of pain in many locations, recalls full hx of recent events leading to admission. At baseline pt is independent w all ADL and mobility, but has had increased difficulty w IADL and home management tasks. WIth activity pt demos significant decline from his baseline, taking only minimal shuffle steps away from EOB w RW. Skilled OT required to address deficits impacting pt safety and independence w ADL and decreasing his risk for falls. OT recommends d/c to SNF vs transitional care for continued rehab upon d/c as pt is not safe to return home alone at this time.   Coping/Psychosocial   Plan of Care Reviewed With patient   Coping/Psychosocial   Patient Agreement with Plan of Care agrees

## 2018-10-08 NOTE — PLAN OF CARE
Problem: Patient Care Overview  Goal: Plan of Care Review   10/08/18 2346   Plan of Care Review   Progress no change   OTHER   Outcome Summary PT treatment complete. Pt did not tolerate therapy well today and had moderate complaints of pain on chest. Notified nsg of patients complaint of chest pain and its limitations it has on the pt from participating in therapy. Pt was able to demonstrate more independence with bed mobility. Pt required multiple rest breaks throughout treatment. Pt refused to transfer or ambulate. Pt was educated on the importance/benefits of getting out of bed. PT will continue to work with him to improve his impairments.    Coping/Psychosocial   Plan of Care Reviewed With patient   Coping/Psychosocial   Patient Agreement with Plan of Care agrees

## 2018-10-09 LAB
ALBUMIN FLD-MCNC: 1.1 G/DL
ALBUMIN SERPL-MCNC: 3.1 G/DL (ref 3.5–5)
ANION GAP SERPL CALCULATED.3IONS-SCNC: 14 MMOL/L (ref 4–13)
BACTERIA SPEC AEROBE CULT: ABNORMAL
BUN BLD-MCNC: 54 MG/DL (ref 5–21)
BUN/CREAT SERPL: 14.6 (ref 7–25)
CALCIUM SPEC-SCNC: 8.7 MG/DL (ref 8.4–10.4)
CHLORIDE SERPL-SCNC: 108 MMOL/L (ref 98–110)
CO2 SERPL-SCNC: 20 MMOL/L (ref 24–31)
CREAT BLD-MCNC: 3.7 MG/DL (ref 0.5–1.4)
DEPRECATED RDW RBC AUTO: 58.2 FL (ref 40–54)
ERYTHROCYTE [DISTWIDTH] IN BLOOD BY AUTOMATED COUNT: 18 % (ref 12–15)
FERRITIN SERPL-MCNC: 47.2 NG/ML (ref 17.9–464)
FOLATE SERPL-MCNC: 9.65 NG/ML
GFR SERPL CREATININE-BSD FRML MDRD: 16 ML/MIN/1.73
GLUCOSE BLD-MCNC: 218 MG/DL (ref 70–100)
GLUCOSE BLDC GLUCOMTR-MCNC: 138 MG/DL (ref 70–130)
GLUCOSE BLDC GLUCOMTR-MCNC: 140 MG/DL (ref 70–130)
GLUCOSE BLDC GLUCOMTR-MCNC: 180 MG/DL (ref 70–130)
GLUCOSE BLDC GLUCOMTR-MCNC: 99 MG/DL (ref 70–130)
GLUCOSE FLD-MCNC: 111 MG/DL
HCT VFR BLD AUTO: 30.4 % (ref 40–52)
HGB BLD-MCNC: 9.8 G/DL (ref 14–18)
IRON 24H UR-MRATE: 148 MCG/DL (ref 42–180)
IRON SATN MFR SERPL: 47 % (ref 20–45)
LDH FLD-CCNC: 88 IU/L
MCH RBC QN AUTO: 28.5 PG (ref 28–32)
MCHC RBC AUTO-ENTMCNC: 32.2 G/DL (ref 33–36)
MCV RBC AUTO: 88.4 FL (ref 82–95)
PHOSPHATE SERPL-MCNC: 4.7 MG/DL (ref 2.5–4.5)
PLATELET # BLD AUTO: 180 10*3/MM3 (ref 130–400)
PMV BLD AUTO: 10.8 FL (ref 6–12)
POTASSIUM BLD-SCNC: 4.6 MMOL/L (ref 3.5–5.3)
PROT FLD-MCNC: 1.9 G/DL
RBC # BLD AUTO: 3.44 10*6/MM3 (ref 4.8–5.9)
SODIUM BLD-SCNC: 142 MMOL/L (ref 135–145)
TIBC SERPL-MCNC: 315 MCG/DL (ref 225–420)
TRIGL FLD-MCNC: 20 MG/DL
VIT B12 BLD-MCNC: 420 PG/ML (ref 239–931)
WBC NRBC COR # BLD: 5.17 10*3/MM3 (ref 4.8–10.8)

## 2018-10-09 PROCEDURE — 83540 ASSAY OF IRON: CPT | Performed by: NURSE PRACTITIONER

## 2018-10-09 PROCEDURE — 83550 IRON BINDING TEST: CPT | Performed by: NURSE PRACTITIONER

## 2018-10-09 PROCEDURE — 97535 SELF CARE MNGMENT TRAINING: CPT

## 2018-10-09 PROCEDURE — 82728 ASSAY OF FERRITIN: CPT | Performed by: NURSE PRACTITIONER

## 2018-10-09 PROCEDURE — 94799 UNLISTED PULMONARY SVC/PX: CPT

## 2018-10-09 PROCEDURE — 25010000002 ONDANSETRON PER 1 MG: Performed by: INTERNAL MEDICINE

## 2018-10-09 PROCEDURE — 63710000001 INSULIN LISPRO (HUMAN) PER 5 UNITS: Performed by: INTERNAL MEDICINE

## 2018-10-09 PROCEDURE — 85027 COMPLETE CBC AUTOMATED: CPT | Performed by: INTERNAL MEDICINE

## 2018-10-09 PROCEDURE — 82962 GLUCOSE BLOOD TEST: CPT

## 2018-10-09 PROCEDURE — 94760 N-INVAS EAR/PLS OXIMETRY 1: CPT

## 2018-10-09 PROCEDURE — 99406 BEHAV CHNG SMOKING 3-10 MIN: CPT

## 2018-10-09 PROCEDURE — 80069 RENAL FUNCTION PANEL: CPT | Performed by: INTERNAL MEDICINE

## 2018-10-09 PROCEDURE — 25010000002 CEFEPIME PER 500 MG: Performed by: INTERNAL MEDICINE

## 2018-10-09 PROCEDURE — 99233 SBSQ HOSP IP/OBS HIGH 50: CPT | Performed by: INTERNAL MEDICINE

## 2018-10-09 PROCEDURE — 82746 ASSAY OF FOLIC ACID SERUM: CPT | Performed by: NURSE PRACTITIONER

## 2018-10-09 PROCEDURE — 82607 VITAMIN B-12: CPT | Performed by: NURSE PRACTITIONER

## 2018-10-09 PROCEDURE — 97530 THERAPEUTIC ACTIVITIES: CPT

## 2018-10-09 PROCEDURE — 97110 THERAPEUTIC EXERCISES: CPT

## 2018-10-09 PROCEDURE — 25010000002 HEPARIN (PORCINE) PER 1000 UNITS: Performed by: INTERNAL MEDICINE

## 2018-10-09 RX ORDER — IPRATROPIUM BROMIDE AND ALBUTEROL SULFATE 2.5; .5 MG/3ML; MG/3ML
3 SOLUTION RESPIRATORY (INHALATION)
Status: DISCONTINUED | OUTPATIENT
Start: 2018-10-09 | End: 2018-10-12

## 2018-10-09 RX ORDER — SENNA AND DOCUSATE SODIUM 50; 8.6 MG/1; MG/1
1 TABLET, FILM COATED ORAL 2 TIMES DAILY
Status: DISCONTINUED | OUTPATIENT
Start: 2018-10-09 | End: 2018-10-16

## 2018-10-09 RX ORDER — BUMETANIDE 1 MG/1
1 TABLET ORAL
Status: DISCONTINUED | OUTPATIENT
Start: 2018-10-09 | End: 2018-10-15

## 2018-10-09 RX ADMIN — HYDROCODONE BITARTRATE AND ACETAMINOPHEN 1 TABLET: 10; 325 TABLET ORAL at 12:59

## 2018-10-09 RX ADMIN — Medication 3 ML: at 20:22

## 2018-10-09 RX ADMIN — HYDROCODONE BITARTRATE AND ACETAMINOPHEN 1 TABLET: 10; 325 TABLET ORAL at 20:46

## 2018-10-09 RX ADMIN — IPRATROPIUM BROMIDE AND ALBUTEROL SULFATE 3 ML: 2.5; .5 SOLUTION RESPIRATORY (INHALATION) at 21:02

## 2018-10-09 RX ADMIN — BUDESONIDE AND FORMOTEROL FUMARATE DIHYDRATE 2 PUFF: 160; 4.5 AEROSOL RESPIRATORY (INHALATION) at 21:02

## 2018-10-09 RX ADMIN — BUDESONIDE AND FORMOTEROL FUMARATE DIHYDRATE 2 PUFF: 160; 4.5 AEROSOL RESPIRATORY (INHALATION) at 07:38

## 2018-10-09 RX ADMIN — BUMETANIDE 1 MG: 1 TABLET ORAL at 17:17

## 2018-10-09 RX ADMIN — BUMETANIDE 1 MG: 0.25 INJECTION INTRAMUSCULAR; INTRAVENOUS at 08:31

## 2018-10-09 RX ADMIN — METOPROLOL TARTRATE 25 MG: 25 TABLET, FILM COATED ORAL at 08:27

## 2018-10-09 RX ADMIN — POTASSIUM CHLORIDE 20 MEQ: 750 CAPSULE, EXTENDED RELEASE ORAL at 08:27

## 2018-10-09 RX ADMIN — Medication 1 TABLET: at 08:27

## 2018-10-09 RX ADMIN — METOPROLOL TARTRATE 25 MG: 25 TABLET, FILM COATED ORAL at 20:20

## 2018-10-09 RX ADMIN — CEFEPIME HYDROCHLORIDE 2 G: 2 INJECTION, POWDER, FOR SOLUTION INTRAVENOUS at 08:40

## 2018-10-09 RX ADMIN — FERRIC CITRATE 210 MG: 210 TABLET, COATED ORAL at 12:39

## 2018-10-09 RX ADMIN — DOCUSATE SODIUM -SENNOSIDES 1 TABLET: 50; 8.6 TABLET, COATED ORAL at 09:39

## 2018-10-09 RX ADMIN — ATORVASTATIN CALCIUM 80 MG: 40 TABLET, FILM COATED ORAL at 08:28

## 2018-10-09 RX ADMIN — NICOTINE 1 PATCH: 21 PATCH, EXTENDED RELEASE TRANSDERMAL at 20:22

## 2018-10-09 RX ADMIN — DOCUSATE SODIUM -SENNOSIDES 1 TABLET: 50; 8.6 TABLET, COATED ORAL at 20:20

## 2018-10-09 RX ADMIN — FERRIC CITRATE 210 MG: 210 TABLET, COATED ORAL at 17:17

## 2018-10-09 RX ADMIN — INSULIN LISPRO 2 UNITS: 100 INJECTION, SOLUTION INTRAVENOUS; SUBCUTANEOUS at 08:37

## 2018-10-09 RX ADMIN — INSULIN LISPRO 6 UNITS: 100 INJECTION, SOLUTION INTRAVENOUS; SUBCUTANEOUS at 12:59

## 2018-10-09 RX ADMIN — IPRATROPIUM BROMIDE AND ALBUTEROL SULFATE 3 ML: 2.5; .5 SOLUTION RESPIRATORY (INHALATION) at 15:48

## 2018-10-09 RX ADMIN — CALCITRIOL CAPSULES 0.25 MCG 0.5 MCG: 0.25 CAPSULE ORAL at 08:27

## 2018-10-09 RX ADMIN — SODIUM BICARBONATE 650 MG: 650 TABLET ORAL at 08:27

## 2018-10-09 RX ADMIN — FERRIC CITRATE 210 MG: 210 TABLET, COATED ORAL at 08:27

## 2018-10-09 RX ADMIN — INSULIN LISPRO 6 UNITS: 100 INJECTION, SOLUTION INTRAVENOUS; SUBCUTANEOUS at 08:37

## 2018-10-09 RX ADMIN — SODIUM BICARBONATE 650 MG: 650 TABLET ORAL at 20:20

## 2018-10-09 RX ADMIN — ONDANSETRON 4 MG: 2 INJECTION INTRAMUSCULAR; INTRAVENOUS at 04:28

## 2018-10-09 RX ADMIN — ASPIRIN 81 MG CHEWABLE TABLET 81 MG: 81 TABLET CHEWABLE at 08:27

## 2018-10-09 RX ADMIN — HEPARIN SODIUM 5000 UNITS: 5000 INJECTION, SOLUTION INTRAVENOUS; SUBCUTANEOUS at 20:20

## 2018-10-09 RX ADMIN — HEPARIN SODIUM 5000 UNITS: 5000 INJECTION, SOLUTION INTRAVENOUS; SUBCUTANEOUS at 08:37

## 2018-10-09 NOTE — PLAN OF CARE
Problem: Patient Care Overview  Goal: Plan of Care Review  Outcome: Ongoing (interventions implemented as appropriate)   10/09/18 1511   OTHER   Outcome Summary Pt. progressing gradually with his ot poc tasks, bathing/dressing performed sitting in rolling shower chair   Coping/Psychosocial   Plan of Care Reviewed With patient

## 2018-10-09 NOTE — PROGRESS NOTES
Continued Stay Note   [place]     Patient Name: Gavin Wilson  MRN: [id number]  Today's Date: 10/9/2018    Admit Date: 10/6/2018          Discharge Plan     Row Name 10/09/18 8851       Plan    Plan SNF    Patient/Family in Agreement with Plan yes    Plan Comments PT has agreed to rehab placement. [facility] by [facility]. He uses the [facility]. HERNÁN has reached out to [facility], Oswaldo Neri [phone] to determine if PT has SNF benefits through the [facility] and which SNF facilities near the [facility] they may be contracted with. There may be SNF in the [place] area around [place] that would be closer to PT's residence than [place] [facility]. HERNÁN had to leave a message for Mr. Neri, will await a return call.               Discharge Codes    No documentation.           ISSA Batres

## 2018-10-09 NOTE — PLAN OF CARE
Problem: Pneumonia (Adult)  Goal: Signs and Symptoms of Listed Potential Problems Will be Absent, Minimized or Managed (Pneumonia)  Outcome: Ongoing (interventions implemented as appropriate)      Problem: Fall Risk (Adult)  Goal: Identify Related Risk Factors and Signs and Symptoms  Outcome: Outcome(s) achieved Date Met: 10/09/18    Goal: Absence of Fall  Outcome: Ongoing (interventions implemented as appropriate)      Problem: Skin Injury Risk (Adult)  Goal: Identify Related Risk Factors and Signs and Symptoms  Outcome: Outcome(s) achieved Date Met: 10/09/18    Goal: Skin Health and Integrity  Outcome: Ongoing (interventions implemented as appropriate)      Problem: Nutrition, Imbalanced: Inadequate Oral Intake (Adult)  Goal: Identify Related Risk Factors and Signs and Symptoms  Outcome: Ongoing (interventions implemented as appropriate)      Problem: Patient Care Overview  Goal: Plan of Care Review   10/09/18 0654   Plan of Care Review   Progress no change   Coping/Psychosocial   Plan of Care Reviewed With patient

## 2018-10-09 NOTE — PLAN OF CARE
Problem: Patient Care Overview  Goal: Plan of Care Review  Outcome: Ongoing (interventions implemented as appropriate)   10/09/18 1152   OTHER   Outcome Summary Pt. not responding well to request to sit EOB or perform LE exercises. Did agree to sit EOB. Only agreed to sitting and a few ankle pumps.    Coping/Psychosocial   Plan of Care Reviewed With patient

## 2018-10-09 NOTE — PLAN OF CARE
Problem: Pneumonia (Adult)  Goal: Signs and Symptoms of Listed Potential Problems Will be Absent, Minimized or Managed (Pneumonia)  Outcome: Ongoing (interventions implemented as appropriate)      Problem: Fall Risk (Adult)  Goal: Absence of Fall  Outcome: Ongoing (interventions implemented as appropriate)      Problem: Skin Injury Risk (Adult)  Goal: Skin Health and Integrity  Outcome: Ongoing (interventions implemented as appropriate)      Problem: Nutrition, Imbalanced: Inadequate Oral Intake (Adult)  Goal: Improved Oral Intake  Outcome: Ongoing (interventions implemented as appropriate)    Goal: Prevent Further Weight Loss  Outcome: Ongoing (interventions implemented as appropriate)      Problem: Patient Care Overview  Goal: Plan of Care Review  Outcome: Ongoing (interventions implemented as appropriate)   10/09/18 1503   Plan of Care Review   Progress no change   OTHER   Outcome Summary Pt co pain once, medication given with relief. Cont IV ABX. PO intake is decreased.cont to monitor.    Coping/Psychosocial   Plan of Care Reviewed With patient     Goal: Individualization and Mutuality  Outcome: Ongoing (interventions implemented as appropriate)    Goal: Discharge Needs Assessment  Outcome: Ongoing (interventions implemented as appropriate)    Goal: Interprofessional Rounds/Family Conf  Outcome: Ongoing (interventions implemented as appropriate)

## 2018-10-09 NOTE — PROGRESS NOTES
LOS: 3 days   Patient Care Team:  Provider, No Known as PCP - General    Chief Complaint:   Sepsis (CMS/HCC)    Shortness of breath    Subjective  Not feeling better today  Currently on telemetry floor.  Continues to have shortness of breath  Feels weak and tired  Overall feels very exhausted  No other new complaints  Anxious  Generalized weakness  Easy fatigability      Interval History: Improved overall    Telemetry: no malignant arrhythmia. No significant pauses.    Review of Systems     Constitutional: No chills   Has fatigue   No fever.   HENT: Negative.    Eyes: Negative.      Respiratory: Positive for cough,   No chest wall soreness,   Shortness of breath,   no wheezing, no stridor.      Cardiovascular: Atypical chest pain,   No palpitations   No significant  leg swelling.     Gastrointestinal: Negative for abdominal distention,  No abdominal pain,   No blood in stool,   No constipation,   No diarrhea,   No nausea   No vomiting.     Endocrine: Negative.    Genitourinary: Negative for difficulty urinating, dysuria, flank pain and hematuria.     Musculoskeletal: Negative.    Skin: Negative for rash and wound.   Allergic/Immunologic: Negative.      Neurological: Negative for dizziness, syncope, weakness,   No light-headedness  No  headaches.     Hematological: Does not bruise/bleed easily.     Psychiatric/Behavioral: Negative for agitation or behavioral problems,   No confusion,   the patient is  nervous/anxious.       History:   Past Medical History:   Diagnosis Date   • CHF (congestive heart failure) (CMS/HCC)    • Coronary stent occlusion    • Diabetes mellitus (CMS/HCC)    • Hyperlipidemia    • Hypertension    • Stroke (CMS/HCC)      Past Surgical History:   Procedure Laterality Date   • BLADDER SURGERY     • ESOPHAGECTOMY       Social History     Social History   • Marital status:      Spouse name: N/A   • Number of children: N/A   • Years of education: N/A     Occupational History   • Not on  file.     Social History Main Topics   • Smoking status: Current Every Day Smoker   • Smokeless tobacco: Never Used   • Alcohol use No   • Drug use: No   • Sexual activity: Not on file     Other Topics Concern   • Not on file     Social History Narrative   • No narrative on file     Family History   Problem Relation Age of Onset   • No Known Problems Father    • No Known Problems Mother        Labs:  WBC WBC   Date Value Ref Range Status   10/09/2018 5.17 4.80 - 10.80 10*3/mm3 Final   10/08/2018 4.73 (L) 4.80 - 10.80 10*3/mm3 Final   10/07/2018 5.03 4.80 - 10.80 10*3/mm3 Final      HGB Hemoglobin   Date Value Ref Range Status   10/09/2018 9.8 (L) 14.0 - 18.0 g/dL Final   10/08/2018 8.5 (L) 14.0 - 18.0 g/dL Final   10/07/2018 7.6 (L) 14.0 - 18.0 g/dL Final      HCT Hematocrit   Date Value Ref Range Status   10/09/2018 30.4 (L) 40.0 - 52.0 % Final   10/08/2018 26.9 (L) 40.0 - 52.0 % Final   10/07/2018 23.9 (L) 40.0 - 52.0 % Final      Platelets Platelets   Date Value Ref Range Status   10/09/2018 180 130 - 400 10*3/mm3 Final   10/08/2018 139 130 - 400 10*3/mm3 Final   10/07/2018 110 (L) 130 - 400 10*3/mm3 Final      MCV MCV   Date Value Ref Range Status   10/09/2018 88.4 82.0 - 95.0 fL Final   10/08/2018 90.3 82.0 - 95.0 fL Final   10/07/2018 91.2 82.0 - 95.0 fL Final          Results from last 7 days  Lab Units 10/09/18  0306 10/08/18  0435 10/07/18  0243 10/06/18  0545   SODIUM mmol/L 142 141 142 141   POTASSIUM mmol/L 4.6 4.5 3.7 3.6   CHLORIDE mmol/L 108 110 112* 113*   CO2 mmol/L 20.0* 16.0* 17.0* 17.0*   BUN mg/dL 54* 50* 50* 46*   CREATININE mg/dL 3.70* 3.78* 3.62* 3.57*   CALCIUM mg/dL 8.7 8.5 7.9* 8.1*   BILIRUBIN mg/dL  --  0.4  --  0.5   ALK PHOS U/L  --  83  --  66   ALT (SGPT) U/L  --  53  --  30   AST (SGOT) U/L  --  27  --  20   GLUCOSE mg/dL 218* 131* 118* 170*     Lab Results   Component Value Date    TROPONINI 4.090 (C) 10/06/2018     PT/INR:    Protime   Date Value Ref Range Status   10/07/2018 15.2  (H) 11.9 - 14.6 Seconds Final   /  INR   Date Value Ref Range Status   10/07/2018 1.16 (H) 0.91 - 1.09 Final       Imaging Results (last 72 hours)     Procedure Component Value Units Date/Time    CT Chest Without Contrast [[id number]] Collected:  10/06/18 1752     Updated:  10/06/18 1759    Narrative:       EXAMINATION:   CT CHEST WO CONTRAST-  10/6/2018 5:52 PM CDT     CT CHEST WO CONTRAST- 10/6/2018 5:33 PM CDT     HISTORY: Shortness of breath     COMPARISON: August 12, 2018 DOSE LENGTH PRODUCT: 185 mGy cm. Automated  exposure control was also utilized to decrease patient radiation dose.     TECHNIQUE: Serial helical tomographic images of the chest were acquired.  Multiplanar reformatted images were provided for review.     FINDINGS:  The imaged portion of the neck and thyroid gland is unremarkable.      The upper lungs are clear.. The lower lobes are obscured by large  bilateral pleural effusions.. The trachea and bronchial tree are patent.     Cardiac silhouette may be mildly enlarged. Extensive vascular  calcifications noted in the aortic arch origin of the great vessels and  coronary arteries.. Endovascular aortic stent graft is noted below the  diaphragm.    .      No acute findings are seen in the bones or surrounding soft tissues.        .       Impression:       1. Large bilateral pleural effusions which essentially obscures the  lower lobes.  2. Extensive vascular calcifications present  3. Endovascular stent graft is present in the abdomen.        This report was finalized on 10/06/2018 17:55 by Dr. Duc Garibay MD.          Objective     Allergies   Allergen Reactions   • Sulfa Antibiotics        Medication Review: Performed  Current Facility-Administered Medications   Medication Dose Route Frequency Provider Last Rate Last Dose   • acetaminophen (TYLENOL) tablet 650 mg  650 mg Oral Q4H PRN Max Jones MD       • aspirin chewable tablet 81 mg  81 mg Oral Daily Vu Michael MD   81  mg at 10/09/18 0827   • atorvastatin (LIPITOR) tablet 80 mg  80 mg Oral Daily Max Jones MD   80 mg at 10/09/18 0828   • budesonide-formoterol (SYMBICORT) 160-4.5 MCG/ACT inhaler 2 puff  2 puff Inhalation BID - RT Max Jones MD   2 puff at 10/09/18 0738   • bumetanide (BUMEX) tablet 1 mg  1 mg Oral BID Vu Michael MD       • calcitriol (ROCALTROL) capsule 0.5 mcg  0.5 mcg Oral Daily Max Jones MD   0.5 mcg at 10/09/18 0827   • calcium carb-cholecalciferol 600-800 MG-UNIT tablet 1 tablet  1 tablet Oral Daily Max Jones MD   1 tablet at 10/09/18 0827   • cefepime (MAXIPIME) 2 g/100 mL 0.9% NS (mbp)  2 g Intravenous Q24H Vu Michael MD   2 g at 10/09/18 0840   • dextrose (D50W) 25 g/ 50mL Intravenous Solution 25 g  25 g Intravenous Q15 Min PRN Max Jones MD       • dextrose (GLUTOSE) oral gel 15 g  15 g Oral Q15 Min PRN Max Jones MD       • Ferric Citrate tablet 210 mg  210 mg Oral TID With Meals Max Jones MD   210 mg at 10/09/18 1239   • glucagon (human recombinant) (GLUCAGEN DIAGNOSTIC) injection 1 mg  1 mg Subcutaneous PRN Max Jones MD       • heparin (porcine) 5000 UNIT/ML injection 5,000 Units  5,000 Units Subcutaneous Q12H Max Jones MD   5,000 Units at 10/09/18 0837   • Hold medication  1 each Does not apply Continuous PRN Vu Michael MD       • HYDROcodone-acetaminophen (NORCO)  MG per tablet 1 tablet  1 tablet Oral Q6H PRN Max Jones MD   1 tablet at 10/09/18 1259   • hydroxypropyl methylcellulose (ISOPTO TEARS) 2.5 % ophthalmic solution 1 drop  1 drop Both Eyes TID PRN Vu Michael MD   1 drop at 10/06/18 3723   • Influenza Vac Subunit Quad (FLUCELVAX) injection 0.5 mL  0.5 mL Intramuscular During Hospitalization Max Jones MD       • insulin lispro (humaLOG) injection 0-9 Units  0-9 Units Subcutaneous 4x  "Daily With Meals & Nightly Max Jones MD   2 Units at 10/09/18 0837   • insulin lispro (humaLOG) injection 6 Units  6 Units Subcutaneous TID AC Max Joens MD   6 Units at 10/09/18 1259   • ipratropium-albuterol (DUO-NEB) nebulizer solution 3 mL  3 mL Nebulization 4x Daily - RT uV Michael MD   3 mL at 10/09/18 1548   • magnesium hydroxide (MILK OF MAGNESIA) suspension 2400 mg/10mL 10 mL  10 mL Oral Daily PRN Max Jones MD       • metoprolol tartrate (LOPRESSOR) tablet 25 mg  25 mg Oral Q12H Max Jones MD   25 mg at 10/09/18 0827   • nicotine (NICODERM CQ) 21 MG/24HR patch 1 patch  1 patch Transdermal Q24H Ashutosh Palafox MD   1 patch at 10/08/18 2024   • ondansetron (ZOFRAN) injection 4 mg  4 mg Intravenous Q6H PRN Max Jones MD   4 mg at 10/09/18 0428   • Pharmacy Consult - Pharmacy to dose   Does not apply Continuous PRN Vu Michael MD       • potassium chloride (MICRO-K) CR capsule 20 mEq  20 mEq Oral Daily Vu Michael MD   20 mEq at 10/09/18 0827   • sennosides-docusate sodium (SENOKOT-S) 8.6-50 MG tablet 1 tablet  1 tablet Oral BID Vu Michael MD   1 tablet at 10/09/18 0939   • sodium bicarbonate tablet 650 mg  650 mg Oral BID Delmy Mullins APRN   650 mg at 10/09/18 0827   • sodium chloride 0.9 % flush 3 mL  3 mL Intravenous Q12H Max Jones MD   3 mL at 10/08/18 0843   • sodium chloride 0.9 % flush 3-10 mL  3-10 mL Intravenous PRN Max Jones MD           Vital Sign Min/Max for last 24 hours  Temp  Min: 96.2 °F (35.7 °C)  Max: 96.9 °F (36.1 °C)   BP  Min: 108/68  Max: 122/56   Pulse  Min: 94  Max: 101   Resp  Min: 16  Max: 16   SpO2  Min: 93 %  Max: 98 %   No Data Recorded   Weight  Min: 51.7 kg (114 lb)  Max: 51.7 kg (114 lb)     Flowsheet Rows      First Filed Value   Admission Height  162.6 cm (64\") Documented at 10/06/2018 0400   Admission Weight  48.9 kg (107 lb " 12.8 oz) Documented at 10/06/2018 0400          Results for orders placed during the hospital encounter of 10/06/18   Adult Transthoracic Echo Limited W/ Cont if Necessary Per Protocol    Addendum · Estimated EF = 37%. · Left ventricular diastolic dysfunction. · Mild aortic valve stenosis is present. · No evidence of pulmonary hypertension is present. · There is a trivial pericardial effusion. There is no evidence of cardiac  tamponade. · There is a moderate size left pleural effusion.        Abdiel Stout MD 10/6/2018  7:24 PM                  Physical Exam:    General Appearance: Awake, alert, in no acute distress  Eyes: Pupils equal and reactive    Ears: Appear intact with no abnormalities noted  Nose: Nares normal, no drainage  Neck: supple, trachea midline, no carotid bruit and has JVD  Back: no kyphosis present,    Lungs: respirations regular, respirations even and respirations unlabored  Basilar crackles  Decrease air entry bilateral bases    Heart: normal S1, S2,  2/6 pansystolic murmur in the left sternal border,  no rub and no click    Abdomen: normal bowel sounds, no tenderness   Skin: no bleeding, bruising or rash  Extremities: no cyanosis  Psychiatric/Behavioral: Negative for agitation, behavioral problems, confusion, the patient does  appear to be nervous/anxious.          Results Review:   I reviewed the patient's new clinical results.  I reviewed the patient's new imaging results and agree with the interpretation.  I reviewed the patient's other test results and agree with the interpretation  I personally viewed and interpreted the patient's EKG/Telemetry data  Discussed with patient    Reviewed available prior notes, consults, prior visits, laboratory findings, radiology and cardiology relevant reports. Updated chart as applicable. I have reviewed the patient's medical history in detail and updated the computerized patient record as relevant.    Updated patient regarding any new or relevant  abnormalities on review of records or any new findings on physical exam. Mentioned to patient about purpose of visit and desirable health short and long term goals and objectives.     Assessment/Plan     Active Problems:    Sepsis (CMS/HCC)  Non-STEMI  Known coronary artery disease next the prior stent placement more than 10 years ago  Prior severe left ventricular systolic dysfunction, currently left ventricle ejection fraction moderately depressed to 37%  Moderate aortic regurgitation  Mild aortic stenosis  Mild mitral regurgitation  Pneumonia  Sepsis  Hypoxia  Kidney failure              Plan      Continue current medications  Symptomatic treatment  patient is DO NOT RESUSCITATE  Agree with suggestion of dialysis as it would not only help his renal function but would help maintain him in euvolemic status  In addition, heart failure medications can be used more liberally if patient is on dialysis and we do not have to worry about worsening kidney failure  Agree he would benefit from ongoing rehabilitation including fistula cardiac rehabilitation and physical therapy  Will follow patient will see less often  Dr. Bateman his primary cardiologist will resume care in future currently Dr. Bateman is not available  Supportive care  Telemetry  Optimal medical therapy  Deep vein thrombosis prophylaxis/precautions  Appropriate diet, fluid, sodium, caffeine, stimulants intake   Compliance to diet and medications   Avoid NSAIDS  Abdiel Stout MD  10/09/18  4:39 PM    EMR Dragon/Transcription was used to dictate part of this note

## 2018-10-09 NOTE — PROGRESS NOTES
"    [facility]  INPATIENT PROGRESS NOTE    Patient Name: Gavin Wilson  Date of Admission: 10/6/2018  Today's Date: 10/09/18  Length of Stay: 3  Primary Care Physician: Provider, No Known    Subjective   Chief Complaint: shortness of breath  HPI     Patient seen and examined at bedside.  Patient feels \"very rough\" this morning.  Indicates that he feels very nauseous this morning and does not feel as though he can eat.  He feels as though shortness of breath is about the same.  It will will get worse than yesterday, but overall better since he has been here.    A discussion with patient regarding his overall goals, he indicates that he was worried that he would \"through this hospitalization\".  Patient indicates that he is willing to go to rehabilitation if he continues to get better.  Patient also indicates that he is willing to undergo dialysis if needed.        Review of Systems   Constitutional: Positive for activity change and appetite change. Negative for chills and fever.   Respiratory: Negative for cough, chest tightness and shortness of breath.    Cardiovascular: Negative for chest pain and palpitations.   Gastrointestinal: Positive for constipation and nausea. Negative for abdominal distention, abdominal pain and vomiting.   Skin: Positive for pallor.   Neurological: Positive for weakness.        All pertinent negatives and positives are as above. All other systems have been reviewed and are negative unless otherwise stated.     Objective    Temp:  [96.2 °F (35.7 °C)-98.6 °F (37 °C)] 96.2 °F (35.7 °C)  Heart Rate:  [] 100  Resp:  [16-18] 16  BP: (104-122)/(48-68) 120/64  Physical Exam   Constitutional: He is oriented to person, place, and time. No distress.   HENT:   Head: Atraumatic.   Temporal muscle wasting; prominent wasting around the neck and supraclavicular    Eyes: Conjunctivae are normal. No scleral icterus.   Cardiovascular: Normal rate, " regular rhythm and intact distal pulses.    Murmur heard.  Pulmonary/Chest: Effort normal. No respiratory distress. He has wheezes. He has no rales.   Abdominal: Soft. Bowel sounds are normal. He exhibits no distension. There is no tenderness.   RLQ ostomy    Neurological: He is alert and oriented to person, place, and time.   Skin: Skin is warm and dry. He is not diaphoretic.   Psychiatric: He has a normal mood and affect. His behavior is normal.           Results Review:  I have reviewed the labs, radiology results, and diagnostic studies.    Laboratory Data:     Results from last 7 days  Lab Units 10/09/18  0306 10/08/18  0435 10/07/18  0243   WBC 10*3/mm3 5.17 4.73* 5.03   HEMOGLOBIN g/dL 9.8* 8.5* 7.6*   HEMATOCRIT % 30.4* 26.9* 23.9*   PLATELETS 10*3/mm3 180 139 110*          Results from last 7 days  Lab Units 10/09/18  0306 10/08/18  0435 10/07/18  0243 10/06/18  0545   SODIUM mmol/L 142 141 142 141   POTASSIUM mmol/L 4.6 4.5 3.7 3.6   CHLORIDE mmol/L 108 110 112* 113*   CO2 mmol/L 20.0* 16.0* 17.0* 17.0*   BUN mg/dL 54* 50* 50* 46*   CREATININE mg/dL 3.70* 3.78* 3.62* 3.57*   CALCIUM mg/dL 8.7 8.5 7.9* 8.1*   BILIRUBIN mg/dL  --  0.4  --  0.5   ALK PHOS U/L  --  83  --  66   ALT (SGPT) U/L  --  53  --  30   AST (SGOT) U/L  --  27  --  20   GLUCOSE mg/dL 218* 131* 118* 170*       Culture Data:   [facility]    Radiology Data:   Imaging Results (last 24 hours)     Procedure Component Value Units Date/Time    US Renal Bilateral [[id number]] Collected:  10/08/18 0936     Updated:  10/08/18 0958    Narrative:       EXAMINATION: US RENAL BILATERAL- 10/8/2018 9:36 AM CDT     HISTORY: Acute renal insufficiency; R13.10-Dysphagia, unspecified.     REPORT: Sonographic images of the kidneys were obtained bilaterally.  There are no comparison studies.     The proximal abdominal aorta is visualized and measures 2.7 cm in  diameter, there is moderate atherosclerosis of the aorta. The IVC  measures 2.2 cm in diameter,  normal. Small bilateral pleural effusions  are present. The right kidney measures 7.6 x 2.9 x 3.5 cm and has  increased cortical echogenicity diffusely. No mass or hydronephrosis is  identified. At the inferior pole of the right kidney there is a tiny  cyst that measures 5 x 6 mm. This appears benign. Tiny scattered  nephrolithiasis are present on the right. Color flow imaging  demonstrates vascular flow within the right kidney. Left kidney measures  7.4 x 3.3 x 3.4 cm and has diffusely increased cortical echogenicity. No  mass or hydronephrosis is identified. Several tiny scattered  nephrolithiasis are present in the left kidney. Color flow imaging  demonstrates vascular flow within the left kidney. There is a small  amount of free fluid lateral to the liver.       Impression:       1. Bilateral renal atrophy with increased renal cortical echogenicity,  most likely related to chronic renal disease. No renal masses seen and  there is no hydronephrosis.  2. Small benign-appearing 5 x 6 mm cyst at the inferior pole the right  kidney.  3. There are small nonobstructing nephrolithiasis bilaterally.  4. Small bilateral pleural effusions.  5. Small amount of perihepatic ascites.     This report was finalized on 10/08/2018 09:55 by Dr. Lucian Jones MD.          I have reviewed the patient's current medications.     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%)  7) Acute kidney injury on chronic kidney disease stage 4 - Suspected cardiorenal   8) Chronic normocytic anemia due to  CKD  9) Thrombocytopenia  10) Mild hypokalemia  11) Hyperphosphotemia  12) Metabolic acidosis, anion gap due to worsening uremia     Plan:  1) Cardiology note reviewed  2) Nephrology consult, may require dialysis - Note reviewed   3) Low suspicion for MRSA despite nasal swabs, will d/c vancomycin   4) Continue cefepime  5) Procal 8.86, continue broad-spectrum antibiotics as above   6) Monitor H&H, stable  7) Nephrology considering dialysis, patient has matured fistula  8) Thoracentesis 10/7 - 400 cc removed   9) Urine culture yeast - Likely colonization  10) Blood cultures and pleural flood cultures pending  11) Continue PO bicarb  12) Switch IV bumex to PO bumex   13) PT/OT  14) Consider referral to rehab pending clinical improvement          Discharge Planning: I expect the patient to be discharged pending improvement    Vu Michael MD   10/09/18   8:39 AM

## 2018-10-09 NOTE — PROGRESS NOTES
Nephrology ([facility]) Progress Note      Patient:  Gavin Wilson  YOB: 1945  Date of Service: 10/9/2018  MRN: [id number]   Acct: [id number]   Primary Care Physician: Provider, No Known  Advance Directive:   Code Status and Medical Interventions:   Ordered at: 10/07/18 1309     Limited Support to NOT Include:    Intubation     Level Of Support Discussed With:    Patient     Code Status:    No CPR     Medical Interventions (Level of Support Prior to Arrest):    Limited     Admit Date: 10/6/2018       Hospital Day: 3  Referring Provider: Max Jones*      Patient personally seen and examined.  Complete chart including Consults, Notes, Operative Reports, Labs, Cardiology, and Radiology studies reviewed as able.        Subjective:  Gavin Wilson is a 73 y.o. male  whom we were consulted for management of chronic kidney disease stage IV.  He has a history of bladder cancer s/p cystectomy and ureterostomy formation. He also has a history of esophogeal and prostate cancer.   Patient goes to [facility] in [place] and sees nephrology.  He already has a left upper arm primary fistula in preparation for upcoming dialysis.  Presented with increasing shortness of breath, dyspnea on exertion.  He was diagnosed with bilateral pneumonia/pleural effusion.  He is currently being treated for both, getting IV antibiotics and intravenous diuretics. His serum creatinine is 3.2 mg.  He states when he was seen by Nephrology last his GFR was 20 and it has been for two years.  Nephrology was consulted for evaluation and treatment of chronic kidney disease. GFR is stable at 16. He states he feels he is breathing a little better today. Does report intermittent nausea.         Allergies:  Sulfa antibiotics    Home Meds:  Prescriptions Prior to Admission   Medication Sig Dispense Refill Last Dose   • acetaminophen (TYLENOL) 325 MG tablet Take 650 mg by mouth Every 6  (Six) Hours As Needed for Mild Pain .   Past Month at Unknown time   • albuterol (PROVENTIL HFA;VENTOLIN HFA) 108 (90 Base) MCG/ACT inhaler Inhale 2 puffs Every 6 (Six) Hours As Needed for Wheezing.   Past Week at Unknown time   • aspirin 81 MG chewable tablet Chew 81 mg Daily.   10/5/2018 at Unknown time   • atorvastatin (LIPITOR) 80 MG tablet Take 40 mg by mouth Daily.   10/5/2018 at Unknown time   • budesonide-formoterol (SYMBICORT) 160-4.5 MCG/ACT inhaler Inhale 2 puffs 2 (Two) Times a Day. 1 inhaler 2 10/5/2018 at Unknown time   • bumetanide (BUMEX) 2 MG tablet Take 1 tablet by mouth Daily. 30 tablet 0 10/5/2018 at Unknown time   • calcium carbonate (OS-TASHI) 600 MG tablet Take 600 mg by mouth Daily.   10/5/2018 at Unknown time   • cholecalciferol (VITAMIN D3) 1000 units tablet Take 3,000 Units by mouth Daily.   10/5/2018 at Unknown time   • insulin aspart (novoLOG FLEXPEN) 100 UNIT/ML solution pen-injector sc pen Inject 6 Units under the skin into the appropriate area as directed 3 (Three) Times a Day With Meals.   10/5/2018 at Unknown time   • insulin detemir (LEVEMIR) 100 UNIT/ML injection Inject 6 Units under the skin into the appropriate area as directed 2 (Two) Times a Day.   10/5/2018 at Unknown time   • loperamide (IMODIUM) 2 MG capsule Take 2 mg by mouth Daily As Needed for Diarrhea.   Past Month at Unknown time   • metoprolol tartrate (LOPRESSOR) 25 MG tablet Take 12.5 mg by mouth 2 (Two) Times a Day.   10/5/2018 at Unknown time   • omeprazole (priLOSEC) 20 MG capsule Take 20 mg by mouth Daily.   10/5/2018 at Unknown time   • oxyCODONE (ROXICODONE) 5 MG immediate release tablet Take 5 mg by mouth Every 6 (Six) Hours As Needed for Moderate Pain .   Past Week at Unknown time   • sildenafil (VIAGRA) 100 MG tablet Take 100 mg by mouth Daily As Needed for erectile dysfunction.   More than a month at Unknown time       Medicines:  Current Facility-Administered Medications   Medication Dose Route Frequency  Provider Last Rate Last Dose   • acetaminophen (TYLENOL) tablet 650 mg  650 mg Oral Q4H PRN Max Jones MD       • aspirin chewable tablet 81 mg  81 mg Oral Daily Vu Michael MD   81 mg at 10/09/18 0827   • atorvastatin (LIPITOR) tablet 80 mg  80 mg Oral Daily Max Jones MD   80 mg at 10/09/18 0828   • budesonide-formoterol (SYMBICORT) 160-4.5 MCG/ACT inhaler 2 puff  2 puff Inhalation BID - RT Max Jones MD   2 puff at 10/09/18 0738   • bumetanide (BUMEX) tablet 1 mg  1 mg Oral BID Vu Michael MD       • calcitriol (ROCALTROL) capsule 0.5 mcg  0.5 mcg Oral Daily Max Jones MD   0.5 mcg at 10/09/18 0827   • calcium carb-cholecalciferol 600-800 MG-UNIT tablet 1 tablet  1 tablet Oral Daily Max Jones MD   1 tablet at 10/09/18 0827   • cefepime (MAXIPIME) 2 g/100 mL 0.9% NS (mbp)  2 g Intravenous Q24H Vu Michael MD   2 g at 10/09/18 0840   • dextrose (D50W) 25 g/ 50mL Intravenous Solution 25 g  25 g Intravenous Q15 Min PRN Max Jones MD       • dextrose (GLUTOSE) oral gel 15 g  15 g Oral Q15 Min PRN Max Jones MD       • Ferric Citrate tablet 210 mg  210 mg Oral TID With Meals Max Jones MD   210 mg at 10/09/18 1239   • glucagon (human recombinant) (GLUCAGEN DIAGNOSTIC) injection 1 mg  1 mg Subcutaneous PRN Max Jones MD       • heparin (porcine) 5000 UNIT/ML injection 5,000 Units  5,000 Units Subcutaneous Q12H Max Jones MD   5,000 Units at 10/09/18 0837   • Hold medication  1 each Does not apply Continuous PRN Vu Michael MD       • HYDROcodone-acetaminophen (NORCO)  MG per tablet 1 tablet  1 tablet Oral Q6H PRN Max Jones MD   1 tablet at 10/09/18 1259   • hydroxypropyl methylcellulose (ISOPTO TEARS) 2.5 % ophthalmic solution 1 drop  1 drop Both Eyes TID PRN Vu Michael MD   1 drop at 10/06/18 4977   •  Influenza Vac Subunit Quad (FLUCELVAX) injection 0.5 mL  0.5 mL Intramuscular During Hospitalization Max Jones MD       • insulin lispro (humaLOG) injection 0-9 Units  0-9 Units Subcutaneous 4x Daily With Meals & Nightly Max Jones MD   2 Units at 10/09/18 0837   • insulin lispro (humaLOG) injection 6 Units  6 Units Subcutaneous TID AC Max Jones MD   6 Units at 10/09/18 1259   • ipratropium-albuterol (DUO-NEB) nebulizer solution 3 mL  3 mL Nebulization 4x Daily - RT Vu Michael MD       • magnesium hydroxide (MILK OF MAGNESIA) suspension 2400 mg/10mL 10 mL  10 mL Oral Daily PRN Max Jones MD       • metoprolol tartrate (LOPRESSOR) tablet 25 mg  25 mg Oral Q12H Max Jones MD   25 mg at 10/09/18 0827   • nicotine (NICODERM CQ) 21 MG/24HR patch 1 patch  1 patch Transdermal Q24H Ashutosh Palafox MD   1 patch at 10/08/18 2024   • ondansetron (ZOFRAN) injection 4 mg  4 mg Intravenous Q6H PRN Max Jones MD   4 mg at 10/09/18 0428   • Pharmacy Consult - Pharmacy to dose   Does not apply Continuous PRN Vu Michael MD       • potassium chloride (MICRO-K) CR capsule 20 mEq  20 mEq Oral Daily Vu Michael MD   20 mEq at 10/09/18 0827   • sennosides-docusate sodium (SENOKOT-S) 8.6-50 MG tablet 1 tablet  1 tablet Oral BID Vu Michael MD   1 tablet at 10/09/18 0939   • sodium bicarbonate tablet 650 mg  650 mg Oral BID Delmy Mullins APRN   650 mg at 10/09/18 0827   • sodium chloride 0.9 % flush 3 mL  3 mL Intravenous Q12H Max Jones MD   3 mL at 10/08/18 0843   • sodium chloride 0.9 % flush 3-10 mL  3-10 mL Intravenous PRN Robert Max Riego, MD           Past Medical History:  Past Medical History:   Diagnosis Date   • CHF (congestive heart failure) (CMS/HCC)    • Coronary stent occlusion    • Diabetes mellitus (CMS/HCC)    • Hyperlipidemia    • Hypertension    • Stroke (CMS/HCC)   "      Past Surgical History:  Past Surgical History:   Procedure Laterality Date   • BLADDER SURGERY     • ESOPHAGECTOMY         Family History  Family History   Problem Relation Age of Onset   • No Known Problems Father    • No Known Problems Mother        Social History  Social History     Social History   • Marital status:      Spouse name: N/A   • Number of children: N/A   • Years of education: N/A     Occupational History   • Not on file.     Social History Main Topics   • Smoking status: Current Every Day Smoker   • Smokeless tobacco: Never Used   • Alcohol use No   • Drug use: No   • Sexual activity: Not on file     Other Topics Concern   • Not on file     Social History Narrative   • No narrative on file         Review of Systems:  History obtained from chart review and the patient  General ROS: Patient complains of chills and fever  Respiratory ROS: positive for - cough and shortness of breath  negative for - hemoptysis  Cardiovascular ROS: positive for - chest pain and dyspnea on exertion  negative for - palpitations  Gastrointestinal ROS: No abdominal pain or melena. nausea  Genito-Urinary ROS: Urostomy   Objective:  /64 (BP Location: Right arm, Patient Position: Lying)   Pulse 100   Temp 96.2 °F (35.7 °C) (Oral)   Resp 16   Ht 170.2 cm (67\")   Wt 51.7 kg (114 lb)   SpO2 93%   BMI 17.85 kg/m²     Intake/Output Summary (Last 24 hours) at 10/09/18 1407  Last data filed at 10/09/18 0840   Gross per 24 hour   Intake              580 ml   Output             1005 ml   Net             -425 ml     General: awake/alert   Chest:  Diminished breath sounds, scattered rhonchi  CVS: regular rate and rhythm 2/6 systolic murmur  Abdominal: soft, nontender, normal bowel sounds  Extremities: no cyanosis or edema  Skin: warm and dry without rash      Labs:    Results from last 7 days  Lab Units 10/09/18  0306 10/08/18  0435 10/07/18  0243   WBC 10*3/mm3 5.17 4.73* 5.03   HEMOGLOBIN g/dL 9.8* 8.5* 7.6* "   HEMATOCRIT % 30.4* 26.9* 23.9*   PLATELETS 10*3/mm3 180 139 110*           Results from last 7 days  Lab Units 10/09/18  0306 10/08/18  0435 10/07/18  0243 10/06/18  0545   SODIUM mmol/L 142 141 142 141   POTASSIUM mmol/L 4.6 4.5 3.7 3.6   CHLORIDE mmol/L 108 110 112* 113*   CO2 mmol/L 20.0* 16.0* 17.0* 17.0*   BUN mg/dL 54* 50* 50* 46*   CREATININE mg/dL 3.70* 3.78* 3.62* 3.57*   CALCIUM mg/dL 8.7 8.5 7.9* 8.1*   BILIRUBIN mg/dL  --  0.4  --  0.5   ALK PHOS U/L  --  83  --  66   ALT (SGPT) U/L  --  53  --  30   AST (SGOT) U/L  --  27  --  20   GLUCOSE mg/dL 218* 131* 118* 170*       Radiology:   Imaging Results (last 72 hours)     Procedure Component Value Units Date/Time    US Renal Bilateral [[id number]] Collected:  10/08/18 0936     Updated:  10/08/18 0958    Narrative:       EXAMINATION: US RENAL BILATERAL- 10/8/2018 9:36 AM CDT     HISTORY: Acute renal insufficiency; R13.10-Dysphagia, unspecified.     REPORT: Sonographic images of the kidneys were obtained bilaterally.  There are no comparison studies.     The proximal abdominal aorta is visualized and measures 2.7 cm in  diameter, there is moderate atherosclerosis of the aorta. The IVC  measures 2.2 cm in diameter, normal. Small bilateral pleural effusions  are present. The right kidney measures 7.6 x 2.9 x 3.5 cm and has  increased cortical echogenicity diffusely. No mass or hydronephrosis is  identified. At the inferior pole of the right kidney there is a tiny  cyst that measures 5 x 6 mm. This appears benign. Tiny scattered  nephrolithiasis are present on the right. Color flow imaging  demonstrates vascular flow within the right kidney. Left kidney measures  7.4 x 3.3 x 3.4 cm and has diffusely increased cortical echogenicity. No  mass or hydronephrosis is identified. Several tiny scattered  nephrolithiasis are present in the left kidney. Color flow imaging  demonstrates vascular flow within the left kidney. There is a small  amount of free fluid lateral  to the liver.       Impression:       1. Bilateral renal atrophy with increased renal cortical echogenicity,  most likely related to chronic renal disease. No renal masses seen and  there is no hydronephrosis.  2. Small benign-appearing 5 x 6 mm cyst at the inferior pole the right  kidney.  3. There are small nonobstructing nephrolithiasis bilaterally.  4. Small bilateral pleural effusions.  5. Small amount of perihepatic ascites.     This report was finalized on 10/08/2018 09:55 by Dr. Lucian Jones MD.    XR Chest 1 View [[id number]] Collected:  10/07/18 1512     Updated:  10/07/18 1518    Narrative:       Exam:   XR CHEST 1 VW-       Date:  10/7/2018      History:  Male, age  73 years;questionable small pneumo status post  thora; R13.10-Dysphagia, unspecified     COMPARISON:  Chest x-ray dated earlier today     Findings :     Status post left thoracentesis, without measurable pneumothorax on this  examination. Right pleural effusion persists.       Impression:       Impression:     No measurable pneumothorax.     This report was finalized on 10/07/2018 15:15 by Dr. Petra Mckinley MD.    US Thoracentesis [[id number]] Collected:  10/07/18 1323    Specimen:  Body Fluid Updated:  10/07/18 1329    Narrative:       DATE OF PROCEDURE:  10/7/2018.     PREPROCEDURE DIAGNOSIS:  Left pleural effusion.  POSTPROCEDURE DIAGNOSIS:  Left pleural effusion.          INDICATIONS FOR PROCEDURE: Shortness of breath.     PROCEDURE:   1. Thoracentesis, diagnostic and therapeutic.  2. Ultrasound guidance for thoracentesis, imaging supervision and  interpretation.     ANESTHESIA: 1% lidocaine, injected locally.          COMPLICATIONS: None.        EXAMINATIONS FOR COMPARISON:  CT chest dated 10/6/2018.     DESCRIPTION OF PROCEDURE:   The risk and benefits of the procedure were explained to the patient.   Specifically, the risks of bleeding, infection, pneumothorax (possible  thoracostomy tube), and damage to surrounding structures were  discussed  extensively. The patient's questions were answered. The patient opted to  proceed. Written and verbal consent were obtained from the patient.     TIME OUT was taken and the patient's name, medical record number, date  of birth, procedure, entry site, and listen allergies were confirmed.  The site of the procedure was confirmed and marked.      The leftposterior thorax was prepped and draped in sterile fashion. The  area was anesthetized with buffered lidocaine 2%.      A 5 Albanian 10 cm  catheter was inserted into the pleural effusion  using  ultrasound guidance. Approximately 400 mL of clear fluid was recovered  and sent to the pathology lab for analysis.      The patient tolerated the procedure well.   No immediate complications  were noted.       Impression:       Successful ultrasound guided leftthoracentesis. Approximately 400 mL of  clear fluid was recovered and sent to the pathology lab for analysis.   No immediate complications were noted.              This report was finalized on 10/07/2018 13:26 by Dr. Petra Mckinley MD.    XR Chest 1 View [[id number]] Collected:  10/07/18 1241     Updated:  10/07/18 1249    Narrative:       Exam:   XR CHEST 1 VW-       Date:  10/7/2018      History:  Male, age  73 years;POST THORACENTESIS; R13.10-Dysphagia,  unspecified     COMPARISON:  CT chest dated 10/60/18     Findings :  Interval left-sided thoracentesis. There is a questionable trace left  pneumothorax versus artifact on the left. The right lung is unchanged,  with moderate pleural effusion and associated opacities. The heart is  unchanged in size.       Impression:       Impression:     Interval left thoracentesis questionable trace left pneumothorax versus  averaging. Recommend repeat upright chest x-ray AP in one hour.     This report was finalized on 10/07/2018 12:46 by Dr. Petra Mckinley MD.    US Chest [[id number]] Updated:  10/07/18 1215    CT Chest Without Contrast [[id number]] Collected:  10/06/18  1752     Updated:  10/06/18 1759    Narrative:       EXAMINATION:   CT CHEST WO CONTRAST-  10/6/2018 5:52 PM CDT     CT CHEST WO CONTRAST- 10/6/2018 5:33 PM CDT     HISTORY: Shortness of breath     COMPARISON: August 12, 2018 DOSE LENGTH PRODUCT: 185 mGy cm. Automated  exposure control was also utilized to decrease patient radiation dose.     TECHNIQUE: Serial helical tomographic images of the chest were acquired.  Multiplanar reformatted images were provided for review.     FINDINGS:  The imaged portion of the neck and thyroid gland is unremarkable.      The upper lungs are clear.. The lower lobes are obscured by large  bilateral pleural effusions.. The trachea and bronchial tree are patent.     Cardiac silhouette may be mildly enlarged. Extensive vascular  calcifications noted in the aortic arch origin of the great vessels and  coronary arteries.. Endovascular aortic stent graft is noted below the  diaphragm.    .      No acute findings are seen in the bones or surrounding soft tissues.        .       Impression:       1. Large bilateral pleural effusions which essentially obscures the  lower lobes.  2. Extensive vascular calcifications present  3. Endovascular stent graft is present in the abdomen.        This report was finalized on 10/06/2018 17:55 by Dr. Duc Garibay MD.          Culture:  Blood Culture   Date Value Ref Range Status   10/06/2018 No growth at 2 days  Preliminary   10/06/2018 No growth at 2 days  Preliminary     Urine Culture   Date Value Ref Range Status   10/07/2018 >100,000 CFU/mL Yeast isolated (A)  Preliminary     MRSA SCREEN CX   Date Value Ref Range Status   10/06/2018 Staphylococcus aureus, MRSA (A)  Final     Comment:       Methicillin resistant Staphylococcus aureus, Patient may be an isolation risk.         Assessment   CKD IV secondary to diabetic nephropathy with worsening renal function  DM II  Bilateral pneumonia  Iron deficient anemia  Bilateral pleural effusion  Diastolic CHF    Metabolic Acidosis  Secondary Hyperparathyroidism    Plan:  Patient is aware of the possibility of natural progression of the underlying CKD and potential need for HD.  GFR stable today. He does have a mature LUE AVF with excellent thrill.  Will continue to monitor closely.  Calcitriol started on 10/6/18  Iron supplement started on 10/6/18  Continue Sodium Bicarb  Am labs       Delmy Mullins, MACO  10/9/2018  2:07 PM

## 2018-10-10 ENCOUNTER — APPOINTMENT (OUTPATIENT)
Dept: ULTRASOUND IMAGING | Facility: HOSPITAL | Age: 73
End: 2018-10-10

## 2018-10-10 LAB
ANION GAP SERPL CALCULATED.3IONS-SCNC: 17 MMOL/L (ref 4–13)
BASOPHILS # BLD AUTO: 0.04 10*3/MM3 (ref 0–0.2)
BASOPHILS NFR BLD AUTO: 0.7 % (ref 0–2)
BUN BLD-MCNC: 59 MG/DL (ref 5–21)
BUN/CREAT SERPL: 14.3 (ref 7–25)
CALCIUM SPEC-SCNC: 9.1 MG/DL (ref 8.4–10.4)
CHLORIDE SERPL-SCNC: 107 MMOL/L (ref 98–110)
CO2 SERPL-SCNC: 20 MMOL/L (ref 24–31)
CREAT BLD-MCNC: 4.13 MG/DL (ref 0.5–1.4)
DEPRECATED RDW RBC AUTO: 61.1 FL (ref 40–54)
EOSINOPHIL # BLD AUTO: 0.08 10*3/MM3 (ref 0–0.7)
EOSINOPHIL NFR BLD AUTO: 1.5 % (ref 0–4)
ERYTHROCYTE [DISTWIDTH] IN BLOOD BY AUTOMATED COUNT: 18.5 % (ref 12–15)
GFR SERPL CREATININE-BSD FRML MDRD: 14 ML/MIN/1.73
GFR SERPL CREATININE-BSD FRML MDRD: ABNORMAL ML/MIN/1.73
GLUCOSE BLD-MCNC: 139 MG/DL (ref 70–100)
GLUCOSE BLDC GLUCOMTR-MCNC: 133 MG/DL (ref 70–130)
GLUCOSE BLDC GLUCOMTR-MCNC: 150 MG/DL (ref 70–130)
GLUCOSE BLDC GLUCOMTR-MCNC: 195 MG/DL (ref 70–130)
GLUCOSE BLDC GLUCOMTR-MCNC: 195 MG/DL (ref 70–130)
GLUCOSE BLDC GLUCOMTR-MCNC: 215 MG/DL (ref 70–130)
HAV IGM SERPL QL IA: NEGATIVE
HBV CORE IGM SERPL QL IA: NEGATIVE
HBV SURFACE AB SER QL: <5
HBV SURFACE AB SER RIA-ACNC: ABNORMAL
HBV SURFACE AG SERPL QL IA: NEGATIVE
HCT VFR BLD AUTO: 32.6 % (ref 40–52)
HCV AB SER DONR QL: NEGATIVE
HCV S/C RATIO: 0.03 (ref 0–0.99)
HGB BLD-MCNC: 10.2 G/DL (ref 14–18)
IMM GRANULOCYTES # BLD: 0.02 10*3/MM3 (ref 0–0.03)
IMM GRANULOCYTES NFR BLD: 0.4 % (ref 0–5)
LYMPHOCYTES # BLD AUTO: 0.78 10*3/MM3 (ref 0.72–4.86)
LYMPHOCYTES NFR BLD AUTO: 14.6 % (ref 15–45)
MCH RBC QN AUTO: 28.4 PG (ref 28–32)
MCHC RBC AUTO-ENTMCNC: 31.3 G/DL (ref 33–36)
MCV RBC AUTO: 90.8 FL (ref 82–95)
MONOCYTES # BLD AUTO: 0.43 10*3/MM3 (ref 0.19–1.3)
MONOCYTES NFR BLD AUTO: 8 % (ref 4–12)
NEUTROPHILS # BLD AUTO: 4 10*3/MM3 (ref 1.87–8.4)
NEUTROPHILS NFR BLD AUTO: 74.8 % (ref 39–78)
NRBC BLD MANUAL-RTO: 0 /100 WBC (ref 0–0)
PLATELET # BLD AUTO: 173 10*3/MM3 (ref 130–400)
PMV BLD AUTO: 10.6 FL (ref 6–12)
POTASSIUM BLD-SCNC: 5.2 MMOL/L (ref 3.5–5.3)
RBC # BLD AUTO: 3.59 10*6/MM3 (ref 4.8–5.9)
SODIUM BLD-SCNC: 144 MMOL/L (ref 135–145)
WBC NRBC COR # BLD: 5.35 10*3/MM3 (ref 4.8–10.8)

## 2018-10-10 PROCEDURE — 94799 UNLISTED PULMONARY SVC/PX: CPT

## 2018-10-10 PROCEDURE — 80048 BASIC METABOLIC PNL TOTAL CA: CPT | Performed by: NURSE PRACTITIONER

## 2018-10-10 PROCEDURE — 99231 SBSQ HOSP IP/OBS SF/LOW 25: CPT | Performed by: SURGERY

## 2018-10-10 PROCEDURE — 93931 UPPER EXTREMITY STUDY: CPT | Performed by: SURGERY

## 2018-10-10 PROCEDURE — 85025 COMPLETE CBC W/AUTO DIFF WBC: CPT | Performed by: NURSE PRACTITIONER

## 2018-10-10 PROCEDURE — 99233 SBSQ HOSP IP/OBS HIGH 50: CPT | Performed by: INTERNAL MEDICINE

## 2018-10-10 PROCEDURE — 93931 UPPER EXTREMITY STUDY: CPT

## 2018-10-10 PROCEDURE — 94760 N-INVAS EAR/PLS OXIMETRY 1: CPT

## 2018-10-10 PROCEDURE — 97110 THERAPEUTIC EXERCISES: CPT

## 2018-10-10 PROCEDURE — 25010000002 ONDANSETRON PER 1 MG: Performed by: INTERNAL MEDICINE

## 2018-10-10 PROCEDURE — 80074 ACUTE HEPATITIS PANEL: CPT | Performed by: INTERNAL MEDICINE

## 2018-10-10 PROCEDURE — 97116 GAIT TRAINING THERAPY: CPT

## 2018-10-10 PROCEDURE — 25010000002 HEPARIN (PORCINE) PER 1000 UNITS: Performed by: INTERNAL MEDICINE

## 2018-10-10 PROCEDURE — 82962 GLUCOSE BLOOD TEST: CPT

## 2018-10-10 PROCEDURE — 25010000002 CEFEPIME PER 500 MG: Performed by: INTERNAL MEDICINE

## 2018-10-10 PROCEDURE — 99222 1ST HOSP IP/OBS MODERATE 55: CPT | Performed by: SURGERY

## 2018-10-10 PROCEDURE — 86706 HEP B SURFACE ANTIBODY: CPT | Performed by: INTERNAL MEDICINE

## 2018-10-10 PROCEDURE — 63710000001 INSULIN LISPRO (HUMAN) PER 5 UNITS: Performed by: INTERNAL MEDICINE

## 2018-10-10 RX ORDER — BUPIVACAINE HCL/0.9 % NACL/PF 0.1 %
2 PLASTIC BAG, INJECTION (ML) EPIDURAL ONCE
Status: CANCELLED | OUTPATIENT
Start: 2018-10-10 | End: 2018-10-10

## 2018-10-10 RX ORDER — BISACODYL 10 MG
10 SUPPOSITORY, RECTAL RECTAL DAILY
Status: DISCONTINUED | OUTPATIENT
Start: 2018-10-10 | End: 2018-10-10

## 2018-10-10 RX ORDER — TETRAHYDROZOLINE HCL 0.05 %
1 DROPS OPHTHALMIC (EYE) 4 TIMES DAILY PRN
Status: DISCONTINUED | OUTPATIENT
Start: 2018-10-10 | End: 2018-10-25 | Stop reason: HOSPADM

## 2018-10-10 RX ORDER — ISOSORBIDE DINITRATE 10 MG/1
10 TABLET ORAL
Status: DISCONTINUED | OUTPATIENT
Start: 2018-10-10 | End: 2018-10-25 | Stop reason: HOSPADM

## 2018-10-10 RX ORDER — HYDRALAZINE HYDROCHLORIDE 10 MG/1
10 TABLET, FILM COATED ORAL EVERY 8 HOURS SCHEDULED
Status: DISCONTINUED | OUTPATIENT
Start: 2018-10-10 | End: 2018-10-12

## 2018-10-10 RX ORDER — BISACODYL 10 MG
10 SUPPOSITORY, RECTAL RECTAL ONCE
Status: COMPLETED | OUTPATIENT
Start: 2018-10-10 | End: 2018-10-10

## 2018-10-10 RX ADMIN — FERRIC CITRATE 210 MG: 210 TABLET, COATED ORAL at 10:11

## 2018-10-10 RX ADMIN — METOPROLOL TARTRATE 25 MG: 25 TABLET, FILM COATED ORAL at 21:12

## 2018-10-10 RX ADMIN — Medication 3 ML: at 10:12

## 2018-10-10 RX ADMIN — FERRIC CITRATE 210 MG: 210 TABLET, COATED ORAL at 13:17

## 2018-10-10 RX ADMIN — POTASSIUM CHLORIDE 20 MEQ: 750 CAPSULE, EXTENDED RELEASE ORAL at 10:10

## 2018-10-10 RX ADMIN — SODIUM BICARBONATE 650 MG: 650 TABLET ORAL at 21:12

## 2018-10-10 RX ADMIN — HEPARIN SODIUM 5000 UNITS: 5000 INJECTION, SOLUTION INTRAVENOUS; SUBCUTANEOUS at 21:12

## 2018-10-10 RX ADMIN — INSULIN LISPRO 4 UNITS: 100 INJECTION, SOLUTION INTRAVENOUS; SUBCUTANEOUS at 21:12

## 2018-10-10 RX ADMIN — HYDROCODONE BITARTRATE AND ACETAMINOPHEN 1 TABLET: 10; 325 TABLET ORAL at 18:16

## 2018-10-10 RX ADMIN — ISOSORBIDE DINITRATE 10 MG: 10 TABLET ORAL at 17:23

## 2018-10-10 RX ADMIN — IPRATROPIUM BROMIDE AND ALBUTEROL SULFATE 3 ML: 2.5; .5 SOLUTION RESPIRATORY (INHALATION) at 11:33

## 2018-10-10 RX ADMIN — IPRATROPIUM BROMIDE AND ALBUTEROL SULFATE 3 ML: 2.5; .5 SOLUTION RESPIRATORY (INHALATION) at 20:57

## 2018-10-10 RX ADMIN — CEFEPIME HYDROCHLORIDE 2 G: 2 INJECTION, POWDER, FOR SOLUTION INTRAVENOUS at 10:09

## 2018-10-10 RX ADMIN — HYDROCODONE BITARTRATE AND ACETAMINOPHEN 1 TABLET: 10; 325 TABLET ORAL at 03:38

## 2018-10-10 RX ADMIN — HEPARIN SODIUM 5000 UNITS: 5000 INJECTION, SOLUTION INTRAVENOUS; SUBCUTANEOUS at 10:09

## 2018-10-10 RX ADMIN — SODIUM BICARBONATE 650 MG: 650 TABLET ORAL at 10:10

## 2018-10-10 RX ADMIN — ISOSORBIDE DINITRATE 10 MG: 10 TABLET ORAL at 10:10

## 2018-10-10 RX ADMIN — HYDRALAZINE HYDROCHLORIDE 10 MG: 10 TABLET ORAL at 13:17

## 2018-10-10 RX ADMIN — BISACODYL 10 MG: 10 SUPPOSITORY RECTAL at 13:17

## 2018-10-10 RX ADMIN — CALCITRIOL CAPSULES 0.25 MCG 0.5 MCG: 0.25 CAPSULE ORAL at 10:10

## 2018-10-10 RX ADMIN — ONDANSETRON 4 MG: 2 INJECTION INTRAMUSCULAR; INTRAVENOUS at 00:54

## 2018-10-10 RX ADMIN — DOCUSATE SODIUM -SENNOSIDES 1 TABLET: 50; 8.6 TABLET, COATED ORAL at 21:12

## 2018-10-10 RX ADMIN — FERRIC CITRATE 210 MG: 210 TABLET, COATED ORAL at 17:23

## 2018-10-10 RX ADMIN — INSULIN LISPRO 6 UNITS: 100 INJECTION, SOLUTION INTRAVENOUS; SUBCUTANEOUS at 17:23

## 2018-10-10 RX ADMIN — BUDESONIDE AND FORMOTEROL FUMARATE DIHYDRATE 2 PUFF: 160; 4.5 AEROSOL RESPIRATORY (INHALATION) at 08:02

## 2018-10-10 RX ADMIN — Medication 3 ML: at 21:11

## 2018-10-10 RX ADMIN — ASPIRIN 81 MG CHEWABLE TABLET 81 MG: 81 TABLET CHEWABLE at 10:12

## 2018-10-10 RX ADMIN — HYDROCODONE BITARTRATE AND ACETAMINOPHEN 1 TABLET: 10; 325 TABLET ORAL at 10:09

## 2018-10-10 RX ADMIN — BUMETANIDE 1 MG: 1 TABLET ORAL at 10:11

## 2018-10-10 RX ADMIN — BUMETANIDE 1 MG: 1 TABLET ORAL at 17:23

## 2018-10-10 RX ADMIN — ISOSORBIDE DINITRATE 10 MG: 10 TABLET ORAL at 13:17

## 2018-10-10 RX ADMIN — HYDRALAZINE HYDROCHLORIDE 10 MG: 10 TABLET ORAL at 21:12

## 2018-10-10 RX ADMIN — Medication 1 TABLET: at 10:10

## 2018-10-10 RX ADMIN — DOCUSATE SODIUM -SENNOSIDES 1 TABLET: 50; 8.6 TABLET, COATED ORAL at 10:10

## 2018-10-10 RX ADMIN — BUDESONIDE AND FORMOTEROL FUMARATE DIHYDRATE 2 PUFF: 160; 4.5 AEROSOL RESPIRATORY (INHALATION) at 21:05

## 2018-10-10 RX ADMIN — IPRATROPIUM BROMIDE AND ALBUTEROL SULFATE 3 ML: 2.5; .5 SOLUTION RESPIRATORY (INHALATION) at 08:02

## 2018-10-10 RX ADMIN — NICOTINE 1 PATCH: 21 PATCH, EXTENDED RELEASE TRANSDERMAL at 21:11

## 2018-10-10 RX ADMIN — METOPROLOL TARTRATE 25 MG: 25 TABLET, FILM COATED ORAL at 10:10

## 2018-10-10 RX ADMIN — ONDANSETRON 4 MG: 2 INJECTION INTRAMUSCULAR; INTRAVENOUS at 10:09

## 2018-10-10 RX ADMIN — TETRAHYDROZOLINE HYDROCHLORIDE 1 DROP: 0.5 SOLUTION/ DROPS OPHTHALMIC at 03:39

## 2018-10-10 RX ADMIN — ATORVASTATIN CALCIUM 80 MG: 40 TABLET, FILM COATED ORAL at 10:11

## 2018-10-10 NOTE — CONSULTS
Gavin Wilson  [id number]  [id number]  444/1  Vu Michael MD  10/6/2018      HPI: Gavin Wilson is a 73 y.o. male who presented with shortness of breath and was transferred from an outside hospital to [facility] for further evaluation.  He was admitted to the medical service for further care.  He has a known history of chronic kidney disease and [facility] [place] [facility].  He was getting close to need for dialysis and so a left upper extremity arter[facility]ovenous [facility].  The patient reports it was created approximately 3 months ago.  He does report having a postop visit with the surgeon who created the fistula and was told at that time that by the time he would need dialysis of the fistula should be ready to use.  Now on this admission it appears the patient's renal function is declining and he was seen by the renal service.  Vascular surgery was consulted to evaluate the fistula for use.      Past Medical History:   Diagnosis Date   • CHF (congestive heart failure) (CMS/Formerly [facility])    • Coronary stent occlusion    • Diabetes mellitus (CMS/Formerly [facility])    • Hyperlipidemia    • Hypertension    • Stroke (CMS/HCC)        Past Surgical History:   Procedure Laterality Date   • BLADDER SURGERY     • ESOPHAGECTOMY         Family History   Problem Relation Age of Onset   • No Known Problems Father    • No Known Problems Mother        Social History     Social History   • Marital status:      Spouse name: N/A   • Number of children: N/A   • Years of education: N/A     Occupational History   • Not on file.     Social History Main Topics   • Smoking status: Current Every Day Smoker   • Smokeless tobacco: Never Used   • Alcohol use No   • Drug use: No   • Sexual activity: Not on file     Other Topics Concern   • Not on file     Social History Narrative   • No narrative on file       Allergies   Allergen Reactions   • Sulfa Antibiotics        Hospital Medications (active)       Dose  Frequency Start End    acetaminophen (TYLENOL) tablet 650 mg 650 mg Every 4 Hours PRN 10/6/2018     Sig - Route: Take 2 tablets by mouth Every 4 (Four) Hours As Needed for Mild Pain . - Oral    aspirin chewable tablet 81 mg 81 mg Daily 10/8/2018     Sig - Route: Chew 1 tablet Daily. - Oral    atorvastatin (LIPITOR) tablet 80 mg 80 mg Daily 10/6/2018     Sig - Route: Take 2 tablets by mouth Daily. - Oral    budesonide-formoterol (SYMBICORT) 160-4.5 MCG/ACT inhaler 2 puff 2 puff 2 Times Daily - RT 10/6/2018     Sig - Route: Inhale 2 puffs 2 (Two) Times a Day. - Inhalation    bumetanide (BUMEX) tablet 1 mg 1 mg 2 Times Daily (Diuretics) 10/9/2018     Sig - Route: Take 1 tablet by mouth 2 (Two) Times a Day. - Oral    calcitriol (ROCALTROL) capsule 0.5 mcg 0.5 mcg Daily 10/6/2018     Sig - Route: Take 2 capsules by mouth Daily. - Oral    calcium carb-cholecalciferol 600-800 MG-UNIT tablet 1 tablet 1 tablet Daily 10/6/2018     Sig - Route: Take 1 tablet by mouth Daily. - Oral    cefepime (MAXIPIME) 2 g/100 mL 0.9% NS (mbp) 2 g Every 24 Hours 10/7/2018 10/13/2018    Sig - Route: Infuse 100 mL into a venous catheter Daily. - Intravenous    dextrose (D50W) 25 g/ 50mL Intravenous Solution 25 g 25 g Every 15 Minutes PRN 10/6/2018     Sig - Route: Infuse 50 mL into a venous catheter Every 15 (Fifteen) Minutes As Needed for Low Blood Sugar (Blood Sugar Less Than 70). - Intravenous    dextrose (GLUTOSE) oral gel 15 g 15 g Every 15 Minutes PRN 10/6/2018     Sig - Route: Take 15 g by mouth Every 15 (Fifteen) Minutes As Needed for Low Blood Sugar (Blood sugar less than 70). - Oral    Ferric Citrate tablet 210 mg 210 mg 3 Times Daily With Meals 10/6/2018     Sig - Route: Take 1 tablet by mouth 3 (Three) Times a Day With Meals. - Oral    glucagon (human recombinant) (GLUCAGEN DIAGNOSTIC) injection 1 mg 1 mg As Needed 10/6/2018     Sig - Route: Inject 1 mg under the skin into the appropriate area as directed As Needed (Blood Glucose  Less Than 70). - Subcutaneous    heparin (porcine) 5000 UNIT/ML injection 5,000 Units 5,000 Units Every 12 Hours Scheduled 10/6/2018     Sig - Route: Inject 1 mL under the skin into the appropriate area as directed Every 12 (Twelve) Hours. - Subcutaneous    Hold medication 1 each Continuous PRN 10/7/2018     Sig - Route: 1 each Continuous As Needed (For 24 Hours Prior to Thoracentesis). - Does not apply    hydrALAZINE (APRESOLINE) tablet 10 mg 10 mg Every 8 Hours Scheduled 10/10/2018     Sig - Route: Take 1 tablet by mouth Every 8 (Eight) Hours. - Oral    HYDROcodone-acetaminophen (NORCO)  MG per tablet 1 tablet 1 tablet Every 6 Hours PRN 10/6/2018     Sig - Route: Take 1 tablet by mouth Every 6 (Six) Hours As Needed (back pain). - Oral    hydroxypropyl methylcellulose (ISOPTO TEARS) 2.5 % ophthalmic solution 1 drop 1 drop 3 Times Daily PRN 10/6/2018     Sig - Route: Administer 1 drop to both eyes 3 (Three) Times a Day As Needed for Dry Eyes. - Both Eyes    Influenza Vac Subunit Quad (FLUCELVAX) injection 0.5 mL 0.5 mL During Hospitalization 10/6/2018     Sig - Route: Inject 0.5 mL into the appropriate muscle as directed by prescriber During Hospitalization for Immunization. - Intramuscular    Cosign for Ordering: Accepted by Max Jones MD on 10/7/2018  4:06 PM    insulin lispro (humaLOG) injection 0-9 Units 0-9 Units 4 Times Daily With Meals & Nightly 10/6/2018     Sig - Route: Inject 0-9 Units under the skin into the appropriate area as directed 4 (Four) Times a Day With Meals & at Bedtime. - Subcutaneous    insulin lispro (humaLOG) injection 6 Units 6 Units 3 Times Daily Before Meals 10/6/2018     Sig - Route: Inject 6 Units under the skin into the appropriate area as directed 3 (Three) Times a Day Before Meals. - Subcutaneous    ipratropium-albuterol (DUO-NEB) nebulizer solution 3 mL 3 mL 4 Times Daily - RT 10/9/2018     Sig - Route: Take 3 mL by nebulization 4 (Four) Times a Day. -  Nebulization    isosorbide dinitrate (ISORDIL) tablet 10 mg 10 mg 3 Times Daily (Nitrates) 10/10/2018     Sig - Route: Take 1 tablet by mouth 3 (Three) Times a Day. - Oral    magnesium hydroxide (MILK OF MAGNESIA) suspension 2400 mg/10mL 10 mL 10 mL Daily PRN 10/6/2018     Sig - Route: Take 10 mL by mouth Daily As Needed for Constipation. - Oral    metoprolol tartrate (LOPRESSOR) tablet 25 mg 25 mg Every 12 Hours Scheduled 10/6/2018     Sig - Route: Take 1 tablet by mouth Every 12 (Twelve) Hours. - Oral    nicotine (NICODERM CQ) 21 MG/24HR patch 1 patch 1 patch Every 24 Hours 10/6/2018     Sig - Route: Place 1 patch on the skin as directed by provider Daily. - Transdermal    ondansetron (ZOFRAN) injection 4 mg 4 mg Every 6 Hours PRN 10/6/2018     Sig - Route: Infuse 2 mL into a venous catheter Every 6 (Six) Hours As Needed for Nausea or Vomiting. - Intravenous    Pharmacy Consult - Pharmacy to dose  Continuous PRN 10/6/2018     Sig - Route: Continuous As Needed for Consult. - Does not apply    potassium chloride (MICRO-K) CR capsule 20 mEq 20 mEq Daily 10/7/2018     Sig - Route: Take 2 capsules by mouth Daily. - Oral    sennosides-docusate sodium (SENOKOT-S) 8.6-50 MG tablet 1 tablet 1 tablet 2 Times Daily 10/9/2018     Sig - Route: Take 1 tablet by mouth 2 (Two) Times a Day. - Oral    sodium bicarbonate tablet 650 mg 650 mg 2 Times Daily 10/8/2018     Sig - Route: Take 1 tablet by mouth 2 (Two) Times a Day. - Oral    sodium chloride 0.9 % flush 3 mL 3 mL Every 12 Hours Scheduled 10/6/2018     Sig - Route: Infuse 3 mL into a venous catheter Every 12 (Twelve) Hours. - Intravenous    sodium chloride 0.9 % flush 3-10 mL 3-10 mL As Needed 10/6/2018     Sig - Route: Infuse 3-10 mL into a venous catheter As Needed for Line Care. - Intravenous    tetrahydrozoline 0.05 % ophthalmic solution 1 drop 1 drop 4 Times Daily PRN 10/10/2018     Sig - Route: Administer 1 drop to both eyes 4 (Four) Times a Day As Needed for  Irritation. - Both Eyes          Review of Systems   Constitutional: Positive for fatigue.   HENT: Negative.    Eyes: Negative.    Respiratory: Positive for shortness of breath (Shortness of breath with exertion).    Cardiovascular: Positive for leg swelling (Mild bilateral lower extremity swelling). Negative for chest pain.   Gastrointestinal: Negative.    Endocrine: Negative.    Genitourinary: Negative.    Musculoskeletal: Negative.    Skin: Negative.    Allergic/Immunologic: Negative.    Neurological: Negative.    Hematological: Negative.    Psychiatric/Behavioral: Negative.        Physical Exam   Constitutional: He is oriented to person, place, and time. He appears well-developed. No distress.   HENT:   Head: Normocephalic and atraumatic.   Eyes: Pupils are equal, round, and reactive to light. EOM are normal.   Neck: Normal range of motion. Neck supple. No JVD present.   Cardiovascular: Normal rate, regular rhythm, intact distal pulses and normal pulses.    Pulses:       Carotid pulses are 2+ on the right side, and 2+ on the left side.       Radial pulses are 2+ on the right side, and 2+ on the left side.        Femoral pulses are 2+ on the right side, and 2+ on the left side.       Popliteal pulses are 2+ on the right side, and 2+ on the left side.        Dorsalis pedis pulses are 2+ on the right side, and 2+ on the left side.        Posterior tibial pulses are 2+ on the right side, and 2+ on the left side.   Left upper extremity arteriovenous fistula place with excellent thrill.   Pulmonary/Chest: Effort normal. No respiratory distress.   Abdominal: Soft. He exhibits no distension and no mass. There is no guarding.   Musculoskeletal: Normal range of motion.   Neurological: He is alert and oriented to person, place, and time.   Skin: Skin is warm and dry. Capillary refill takes less than 2 seconds.   Psychiatric: He has a normal mood and affect. His behavior is normal. Judgment and thought content normal.    Vitals reviewed.      Laboratory Data:    Results from last 7 days  Lab Units 10/10/18  0422 10/09/18  0306 10/08/18  0435   WBC 10*3/mm3 5.35 5.17 4.73*   HEMOGLOBIN g/dL 10.2* 9.8* 8.5*   HEMATOCRIT % 32.6* 30.4* 26.9*   PLATELETS 10*3/mm3 173 180 139         Results from last 7 days  Lab Units 10/10/18  0422 10/09/18  0306 10/08/18  0435  10/06/18  0545   SODIUM mmol/L 144 142 141  < > 141   POTASSIUM mmol/L 5.2 4.6 4.5  < > 3.6   CHLORIDE mmol/L 107 108 110  < > 113*   CO2 mmol/L 20.0* 20.0* 16.0*  < > 17.0*   BUN mg/dL 59* 54* 50*  < > 46*   CREATININE mg/dL 4.13* 3.70* 3.78*  < > 3.57*   CALCIUM mg/dL 9.1 8.7 8.5  < > 8.1*   BILIRUBIN mg/dL  --   --  0.4  --  0.5   ALK PHOS U/L  --   --  83  --  66   ALT (SGPT) U/L  --   --  53  --  30   AST (SGOT) U/L  --   --  27  --  20   GLUCOSE mg/dL 139* 218* 131*  < > 170*   < > = values in this interval not displayed.    Results from last 7 days  Lab Units 10/07/18  0849   INR  1.16*         Diagnostic Data:  Imaging Results (last 24 hours)     ** No results found for the last 24 hours. **          Impression: 73-year-old male admitted with shortness of breath with a known history of chronic kidney disease.  He is now progressing to a point where he needs dialysis as per nephrology.  He has a previously placed left upper extremity AV fistula that has never been used.      Sepsis (CMS/HCC)      Plan: After thoroughly evaluating Gavin Wilson, I believe the best course of action is to proceed with duplex ultrasound of the left arm fistula.  The fistula has an excellent thrill and appears mature.  If this is confirmed by duplex and there should be no issue with accessing the fistula for hemodialysis during this admission.  As such I have ordered fistula duplex and we will follow the results.  This was discussed with the patient as well as with the nephrology NP and everyone is aware and in agreement.      Hugo Bonilla MD  Vascular Surgery  [phone]

## 2018-10-10 NOTE — PROGRESS NOTES
Vascular Surgery Progress Note    Spoke with nurse on 4B. HD via AVF was unsuccessful today. Will plan for permacath placement in OR in AM. Discussed with patient and he is aware. The risks and benefits were explained at great length to the patient which include but are not limited to bleeding, infection, vessel damage, nerve damage and pneumothorax. The patient understands the risks and wishes for me to proceed. NPO after midnight. Labs reviewed.     Hugo Bonilla MD  Vascular Surgery  [phone]

## 2018-10-10 NOTE — PROGRESS NOTES
Continued Stay Note   [place]     Patient Name: Gavin Wilson  MRN: [id number]  Today's Date: 10/10/2018    Admit Date: 10/6/2018          Discharge Plan     Row Name 10/10/18 1220       Plan    Plan Comments LEFT MSG FOR DONNA ANDINO AT [facility] FOR A RETURNED CALL TO DISCUSS PT'S D/C PLANNING NEEDS AND WHAT HIS SN BENEFITS ARE.  EXPRESSED IN MSG FOR NEED OF URGENT RETURNED CALL AS NOTED HERNÁN BURNHAM LEFT A MSG FOR RETURN CALL YESTERDAY AS WELL.                Discharge Codes    No documentation.           LINDSEY Larose

## 2018-10-10 NOTE — PLAN OF CARE
Problem: Pneumonia (Adult)  Goal: Signs and Symptoms of Listed Potential Problems Will be Absent, Minimized or Managed (Pneumonia)  Outcome: Ongoing (interventions implemented as appropriate)      Problem: Fall Risk (Adult)  Goal: Absence of Fall  Outcome: Ongoing (interventions implemented as appropriate)      Problem: Skin Injury Risk (Adult)  Goal: Skin Health and Integrity  Outcome: Ongoing (interventions implemented as appropriate)      Problem: Nutrition, Imbalanced: Inadequate Oral Intake (Adult)  Goal: Identify Related Risk Factors and Signs and Symptoms  Outcome: Outcome(s) achieved Date Met: 10/10/18    Goal: Improved Oral Intake  Outcome: Ongoing (interventions implemented as appropriate)    Goal: Prevent Further Weight Loss  Outcome: Ongoing (interventions implemented as appropriate)      Problem: Patient Care Overview  Goal: Plan of Care Review  Outcome: Ongoing (interventions implemented as appropriate)   10/10/18 1804   Plan of Care Review   Progress no change   OTHER   Outcome Summary Pt c/o RLQ stomach pain as well as back, hip, and shoulder pain. He had mild relief with prn Norco. Prn Zofran gave relief as well. VSS. S 87-99 on tele. Eyedrops given for dry/itchy eyes. Urostomy bag changed d/t leaking. Pt turning Q2H with help of pillow. Safety maintained. Vascular surgery consulted to approve pt's fistula for dialysis. Will continue to monitor.    Coping/Psychosocial   Plan of Care Reviewed With patient     Goal: Individualization and Mutuality  Outcome: Outcome(s) achieved Date Met: 10/10/18    Goal: Discharge Needs Assessment  Outcome: Ongoing (interventions implemented as appropriate)

## 2018-10-10 NOTE — PROGRESS NOTES
LOS: 4 days   Patient Care Team:  Provider, No Known as PCP - General    Chief Complaint:   Sepsis (CMS/HCC)    Shortness of breath    Subjective   feeling better today  Currently on telemetry floor.  Less shortness of breath  Is much more awake  Still weak and tired  No other new complaints  Anxious  Generalized weakness  Easy fatigability  Poor oral intake    Interval History: Improved overall    Telemetry: no malignant arrhythmia. No significant pauses.    Review of Systems     Constitutional: No chills   Has fatigue   No fever.   HENT: Negative.    Eyes: Negative.      Respiratory: Positive for cough,   No chest wall soreness,   Shortness of breath,   no wheezing, no stridor.      Cardiovascular: Atypical chest pain,   No palpitations   No significant  leg swelling.     Gastrointestinal: Negative for abdominal distention,  No abdominal pain,   No blood in stool,   No constipation,   No diarrhea,   No nausea   No vomiting.     Endocrine: Negative.    Genitourinary: Negative for difficulty urinating, dysuria, flank pain and hematuria.     Musculoskeletal: Negative.    Skin: Negative for rash and wound.   Allergic/Immunologic: Negative.      Neurological: Negative for dizziness, syncope, weakness,   No light-headedness  No  headaches.     Hematological: Does not bruise/bleed easily.     Psychiatric/Behavioral: Negative for agitation or behavioral problems,   No confusion,   the patient is  nervous/anxious.       History:   Past Medical History:   Diagnosis Date   • CHF (congestive heart failure) (CMS/HCC)    • Coronary stent occlusion    • Diabetes mellitus (CMS/HCC)    • Hyperlipidemia    • Hypertension    • Stroke (CMS/HCC)      Past Surgical History:   Procedure Laterality Date   • BLADDER SURGERY     • ESOPHAGECTOMY       Social History     Social History   • Marital status:      Spouse name: N/A   • Number of children: N/A   • Years of education: N/A     Occupational History   • Not on file.      Social History Main Topics   • Smoking status: Current Every Day Smoker   • Smokeless tobacco: Never Used   • Alcohol use No   • Drug use: No   • Sexual activity: Not on file     Other Topics Concern   • Not on file     Social History Narrative   • No narrative on file     Family History   Problem Relation Age of Onset   • No Known Problems Father    • No Known Problems Mother        Labs:  WBC WBC   Date Value Ref Range Status   10/10/2018 5.35 4.80 - 10.80 10*3/mm3 Final   10/09/2018 5.17 4.80 - 10.80 10*3/mm3 Final   10/08/2018 4.73 (L) 4.80 - 10.80 10*3/mm3 Final      HGB Hemoglobin   Date Value Ref Range Status   10/10/2018 10.2 (L) 14.0 - 18.0 g/dL Final   10/09/2018 9.8 (L) 14.0 - 18.0 g/dL Final   10/08/2018 8.5 (L) 14.0 - 18.0 g/dL Final      HCT Hematocrit   Date Value Ref Range Status   10/10/2018 32.6 (L) 40.0 - 52.0 % Final   10/09/2018 30.4 (L) 40.0 - 52.0 % Final   10/08/2018 26.9 (L) 40.0 - 52.0 % Final      Platelets Platelets   Date Value Ref Range Status   10/10/2018 173 130 - 400 10*3/mm3 Final   10/09/2018 180 130 - 400 10*3/mm3 Final   10/08/2018 139 130 - 400 10*3/mm3 Final      MCV MCV   Date Value Ref Range Status   10/10/2018 90.8 82.0 - 95.0 fL Final   10/09/2018 88.4 82.0 - 95.0 fL Final   10/08/2018 90.3 82.0 - 95.0 fL Final          Results from last 7 days  Lab Units 10/10/18  0422 10/09/18  0306 10/08/18  0435  10/06/18  0545   SODIUM mmol/L 144 142 141  < > 141   POTASSIUM mmol/L 5.2 4.6 4.5  < > 3.6   CHLORIDE mmol/L 107 108 110  < > 113*   CO2 mmol/L 20.0* 20.0* 16.0*  < > 17.0*   BUN mg/dL 59* 54* 50*  < > 46*   CREATININE mg/dL 4.13* 3.70* 3.78*  < > 3.57*   CALCIUM mg/dL 9.1 8.7 8.5  < > 8.1*   BILIRUBIN mg/dL  --   --  0.4  --  0.5   ALK PHOS U/L  --   --  83  --  66   ALT (SGPT) U/L  --   --  53  --  30   AST (SGOT) U/L  --   --  27  --  20   GLUCOSE mg/dL 139* 218* 131*  < > 170*   < > = values in this interval not displayed.  Lab Results   Component Value Date     TROPONINI 4.090 (C) 10/06/2018     PT/INR:    No results found for: PROTIME/  No results found for: INR    Imaging Results (last 72 hours)     Procedure Component Value Units Date/Time    CT Chest Without Contrast [[id number]] Collected:  10/06/18 1752     Updated:  10/06/18 1759    Narrative:       EXAMINATION:   CT CHEST WO CONTRAST-  10/6/2018 5:52 PM CDT     CT CHEST WO CONTRAST- 10/6/2018 5:33 PM CDT     HISTORY: Shortness of breath     COMPARISON: August 12, 2018 DOSE LENGTH PRODUCT: 185 mGy cm. Automated  exposure control was also utilized to decrease patient radiation dose.     TECHNIQUE: Serial helical tomographic images of the chest were acquired.  Multiplanar reformatted images were provided for review.     FINDINGS:  The imaged portion of the neck and thyroid gland is unremarkable.      The upper lungs are clear.. The lower lobes are obscured by large  bilateral pleural effusions.. The trachea and bronchial tree are patent.     Cardiac silhouette may be mildly enlarged. Extensive vascular  calcifications noted in the aortic arch origin of the great vessels and  coronary arteries.. Endovascular aortic stent graft is noted below the  diaphragm.    .      No acute findings are seen in the bones or surrounding soft tissues.        .       Impression:       1. Large bilateral pleural effusions which essentially obscures the  lower lobes.  2. Extensive vascular calcifications present  3. Endovascular stent graft is present in the abdomen.        This report was finalized on 10/06/2018 17:55 by Dr. Duc Garibay MD.          Objective     Allergies   Allergen Reactions   • Sulfa Antibiotics        Medication Review: Performed  Current Facility-Administered Medications   Medication Dose Route Frequency Provider Last Rate Last Dose   • acetaminophen (TYLENOL) tablet 650 mg  650 mg Oral Q4H PRN Max Jones MD       • aspirin chewable tablet 81 mg  81 mg Oral Daily Vu Michael MD   81 mg at  10/10/18 1012   • atorvastatin (LIPITOR) tablet 80 mg  80 mg Oral Daily Max Jones MD   80 mg at 10/10/18 1011   • budesonide-formoterol (SYMBICORT) 160-4.5 MCG/ACT inhaler 2 puff  2 puff Inhalation BID - RT Max Jones MD   2 puff at 10/10/18 0802   • bumetanide (BUMEX) tablet 1 mg  1 mg Oral BID Vu Michael MD   1 mg at 10/10/18 1011   • calcitriol (ROCALTROL) capsule 0.5 mcg  0.5 mcg Oral Daily Max Jones MD   0.5 mcg at 10/10/18 1010   • calcium carb-cholecalciferol 600-800 MG-UNIT tablet 1 tablet  1 tablet Oral Daily Max Jones MD   1 tablet at 10/10/18 1010   • cefepime (MAXIPIME) 2 g/100 mL 0.9% NS (mbp)  2 g Intravenous Q24H Vu Michael MD   2 g at 10/10/18 1009   • dextrose (D50W) 25 g/ 50mL Intravenous Solution 25 g  25 g Intravenous Q15 Min PRN Max Jones MD       • dextrose (GLUTOSE) oral gel 15 g  15 g Oral Q15 Min PRN Max Jones MD       • Ferric Citrate tablet 210 mg  210 mg Oral TID With Meals Max Jones MD   210 mg at 10/10/18 1317   • glucagon (human recombinant) (GLUCAGEN DIAGNOSTIC) injection 1 mg  1 mg Subcutaneous PRN Max Jones MD       • heparin (porcine) 5000 UNIT/ML injection 5,000 Units  5,000 Units Subcutaneous Q12H Max Jones MD   5,000 Units at 10/10/18 1009   • Hold medication  1 each Does not apply Continuous PRN Vu Michael MD       • hydrALAZINE (APRESOLINE) tablet 10 mg  10 mg Oral Q8H Abdiel Stout MD   10 mg at 10/10/18 1317   • HYDROcodone-acetaminophen (NORCO)  MG per tablet 1 tablet  1 tablet Oral Q6H PRN Max Jones MD   1 tablet at 10/10/18 1009   • hydroxypropyl methylcellulose (ISOPTO TEARS) 2.5 % ophthalmic solution 1 drop  1 drop Both Eyes TID PRN Vu Michael MD   1 drop at 10/06/18 2123   • Influenza Vac Subunit Quad (FLUCELVAX) injection 0.5 mL  0.5 mL Intramuscular During  Hospitalization Max Jones MD       • insulin lispro (humaLOG) injection 0-9 Units  0-9 Units Subcutaneous 4x Daily With Meals & Nightly Max Jones MD   2 Units at 10/09/18 0837   • insulin lispro (humaLOG) injection 6 Units  6 Units Subcutaneous TID AC Max Jones MD   6 Units at 10/09/18 1259   • ipratropium-albuterol (DUO-NEB) nebulizer solution 3 mL  3 mL Nebulization 4x Daily - RT Vu Michael MD   3 mL at 10/10/18 1133   • isosorbide dinitrate (ISORDIL) tablet 10 mg  10 mg Oral TID - Nitrates Abdiel Stout MD   10 mg at 10/10/18 1317   • magnesium hydroxide (MILK OF MAGNESIA) suspension 2400 mg/10mL 10 mL  10 mL Oral Daily PRN Max Jones MD       • metoprolol tartrate (LOPRESSOR) tablet 25 mg  25 mg Oral Q12H Max Jones MD   25 mg at 10/10/18 1010   • nicotine (NICODERM CQ) 21 MG/24HR patch 1 patch  1 patch Transdermal Q24H Ashutosh Palafox MD   1 patch at 10/09/18 2022   • ondansetron (ZOFRAN) injection 4 mg  4 mg Intravenous Q6H PRN Max Jones MD   4 mg at 10/10/18 1009   • Pharmacy Consult - Pharmacy to dose   Does not apply Continuous PRN Vu Michael MD       • potassium chloride (MICRO-K) CR capsule 20 mEq  20 mEq Oral Daily Vu Michael MD   20 mEq at 10/10/18 1010   • sennosides-docusate sodium (SENOKOT-S) 8.6-50 MG tablet 1 tablet  1 tablet Oral BID Vu Michael MD   1 tablet at 10/10/18 1010   • sodium bicarbonate tablet 650 mg  650 mg Oral BID Delmy Mullins APRN   650 mg at 10/10/18 1010   • sodium chloride 0.9 % flush 3 mL  3 mL Intravenous Q12H Max Jones MD   3 mL at 10/10/18 1012   • sodium chloride 0.9 % flush 3-10 mL  3-10 mL Intravenous PRN Max Jones MD       • tetrahydrozoline 0.05 % ophthalmic solution 1 drop  1 drop Both Eyes 4x Daily PRN Ney De MD   1 drop at 10/10/18 0339       Vital Sign Min/Max for last 24 hours  Temp   "Min: 96.2 °F (35.7 °C)  Max: 97.8 °F (36.6 °C)   BP  Min: 102/56  Max: 128/70   Pulse  Min: 79  Max: 96   Resp  Min: 15  Max: 16   SpO2  Min: 94 %  Max: 100 %   No Data Recorded   Weight  Min: 48.2 kg (106 lb 3.2 oz)  Max: 48.2 kg (106 lb 3.2 oz)     Flowsheet Rows      First Filed Value   Admission Height  162.6 cm (64\") Documented at 10/06/2018 0400   Admission Weight  48.9 kg (107 lb 12.8 oz) Documented at 10/06/2018 0400          Results for orders placed during the hospital encounter of 10/06/18   Adult Transthoracic Echo Limited W/ Cont if Necessary Per Protocol    Addendum · Estimated EF = 37%. · Left ventricular diastolic dysfunction. · Mild aortic valve stenosis is present. · No evidence of pulmonary hypertension is present. · There is a trivial pericardial effusion. There is no evidence of cardiac  tamponade. · There is a moderate size left pleural effusion.        Abdiel Stout MD 10/6/2018  7:24 PM                  Physical Exam:    General Appearance: Awake, alert, in no acute distress  Eyes: Pupils equal and reactive    Ears: Appear intact with no abnormalities noted  Nose: Nares normal, no drainage  Neck: supple, trachea midline, no carotid bruit and has JVD  Back: no kyphosis present,    Lungs: respirations regular, respirations even and respirations unlabored  Basilar crackles  Decrease air entry bilateral bases    Heart: normal S1, S2,  2/6 pansystolic murmur in the left sternal border,  no rub and no click    Abdomen: normal bowel sounds, no tenderness   Skin: no bleeding, bruising or rash  Extremities: no cyanosis  Psychiatric/Behavioral: Negative for agitation, behavioral problems, confusion, the patient does  appear to be nervous/anxious.          Results Review:   I reviewed the patient's new clinical results.  I reviewed the patient's new imaging results and agree with the interpretation.  I reviewed the patient's other test results and agree with the interpretation  I personally viewed and " interpreted the patient's EKG/Telemetry data  Discussed with patient    Reviewed available prior notes, consults, prior visits, laboratory findings, radiology and cardiology relevant reports. Updated chart as applicable. I have reviewed the patient's medical history in detail and updated the computerized patient record as relevant.    Updated patient regarding any new or relevant abnormalities on review of records or any new findings on physical exam. Mentioned to patient about purpose of visit and desirable health short and long term goals and objectives.     Assessment/Plan     Active Problems:    Sepsis (CMS/HCC)  Non-STEMI  Known coronary artery disease next the prior stent placement more than 10 years ago  Prior severe left ventricular systolic dysfunction, currently left ventricle ejection fraction moderately depressed to 37%  Moderate aortic regurgitation  Mild aortic stenosis  Mild mitral regurgitation  Pneumonia  Sepsis  Hypoxia  Kidney failure              Plan      Continue current medications  Symptomatic treatment  patient is DO NOT RESUSCITATE  Consider dialysis  Add hydralazine 10 mg 3 times a day  Add Isorbid 10 mg 3 times a day  Dr. Bateman his primary cardiologist will resume care in future currently Dr. Bateman is not available  Supportive care  Telemetry  Optimal medical therapy  Deep vein thrombosis prophylaxis/precautions  Appropriate diet, fluid, sodium, caffeine, stimulants intake   Compliance to diet and medications   Avoid NSAIDS  Abdiel Stout MD  10/10/18  1:52 PM    EMR Dragon/Transcription was used to dictate part of this note

## 2018-10-10 NOTE — PROGRESS NOTES
[facility] Medicine Services  INPATIENT PROGRESS NOTE    Patient Name: Gavin Wilson  Date of Admission: 10/6/2018  Today's Date: 10/10/18  Length of Stay: 4  Primary Care Physician: Provider, No Known    Subjective   Chief Complaint: shortness of breath  HPI     Patient seen and examined at bedside.  Patient feels about the same as yesterday.  Scheduled for dialysis today if fistula is viable.  Patient very weak and lethargic.  Has not had a BM since 10/4, suppository today.         Review of Systems   Constitutional: Positive for activity change and appetite change. Negative for chills, fatigue and fever.   Gastrointestinal: Positive for constipation. Negative for abdominal distention, abdominal pain, diarrhea, nausea and vomiting.   Neurological: Positive for weakness.        All pertinent negatives and positives are as above. All other systems have been reviewed and are negative unless otherwise stated.     Objective    Temp:  [96.2 °F (35.7 °C)-97.1 °F (36.2 °C)] 97 °F (36.1 °C)  Heart Rate:  [87-96] 87  Resp:  [15-16] 16  BP: (102-128)/(50-70) 120/56  Physical Exam  Constitutional: He is oriented to person, place, and time. No distress.   HENT:   Head: Atraumatic.   Temporal muscle wasting; prominent wasting around the neck and supraclavicular    Eyes: Conjunctivae are normal. No scleral icterus.   Cardiovascular: Normal rate, regular rhythm and intact distal pulses.    Murmur heard.  Pulmonary/Chest: Effort normal. No respiratory distress. He has no wheezes. He has no rales.  Improved air movement   Abdominal: Soft. Bowel sounds are normal. He exhibits no distension. There is no tenderness.   RLQ ostomy    Neurological: He is alert and oriented to person, place, and time. Lethargic.  Skin: Skin is warm and dry. He is not diaphoretic.   Psychiatric: He has a normal mood and affect. His behavior is normal.       Results Review:  I have reviewed the labs, radiology results,  and diagnostic studies.    Laboratory Data:     Results from last 7 days  Lab Units 10/10/18  0422 10/09/18  0306 10/08/18  0435   WBC 10*3/mm3 5.35 5.17 4.73*   HEMOGLOBIN g/dL 10.2* 9.8* 8.5*   HEMATOCRIT % 32.6* 30.4* 26.9*   PLATELETS 10*3/mm3 173 180 139          Results from last 7 days  Lab Units 10/10/18  0422 10/09/18  0306 10/08/18  0435  10/06/18  0545   SODIUM mmol/L 144 142 141  < > 141   POTASSIUM mmol/L 5.2 4.6 4.5  < > 3.6   CHLORIDE mmol/L 107 108 110  < > 113*   CO2 mmol/L 20.0* 20.0* 16.0*  < > 17.0*   BUN mg/dL 59* 54* 50*  < > 46*   CREATININE mg/dL 4.13* 3.70* 3.78*  < > 3.57*   CALCIUM mg/dL 9.1 8.7 8.5  < > 8.1*   BILIRUBIN mg/dL  --   --  0.4  --  0.5   ALK PHOS U/L  --   --  83  --  66   ALT (SGPT) U/L  --   --  53  --  30   AST (SGOT) U/L  --   --  27  --  20   GLUCOSE mg/dL 139* 218* 131*  < > 170*   < > = values in this interval not displayed.    Culture Data:   [unfilled]    Radiology Data:   Imaging Results (last 24 hours)     Procedure Component Value Units Date/Time    US Arterial Doppler Upper Extremity Left [[id number]] Updated:  10/10/18 1018          I have reviewed the patient's current medications.     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%)  7) Acute kidney injury on chronic kidney disease stage 4 - Suspected cardiorenal   8) Chronic normocytic anemia due to CKD  9) Thrombocytopenia, improved  10) Mild hypokalemia, resolved  11) Hyperphosphotemia  12) Metabolic acidosis, anion gap due to uremia     Plan:  1) Cardiology note  reviewed  2) Nephrology consult, may require dialysis - Note reviewed   3) D/C vanc on 10/8  4) Continue cefepime - 7 days total   5) Procal 8.86, continue antibiotics as above   6) Monitor H&H, stable  7) Thoracentesis 10/7 - 400 cc removed   8) Urine culture yeast - Likely colonization  9) Blood cultures and pleural flood cultures pending  10) Continue PO bicarb  11) Switch IV bumex to PO bumex   12) PT/OT  13) Consider referral to rehab pending clinical improvement  14) Vascular consult to evaluate fistula; Ultrasound of fistula pending read  15) Likely to need dialysis today  16) Bisacodyl suppository today               Discharge Planning: I expect the patient to be discharged to nursing home.    Vu Michael MD   10/10/18   11:52 AM

## 2018-10-10 NOTE — PROGRESS NOTES
Nephrology ([facility]) Progress Note      Patient:  Gavin Wilson  YOB: 1945  Date of Service: 10/10/2018  MRN: [id number]   Acct: [id number]   Primary Care Physician: Provider, No Known  Advance Directive:   Code Status and Medical Interventions:   Ordered at: 10/07/18 1309     Limited Support to NOT Include:    Intubation     Level Of Support Discussed With:    Patient     Code Status:    No CPR     Medical Interventions (Level of Support Prior to Arrest):    Limited     Admit Date: 10/6/2018       Hospital Day: 4  Referring Provider: Max Jones*      Patient personally seen and examined.  Complete chart including Consults, Notes, Operative Reports, Labs, Cardiology, and Radiology studies reviewed as able.        Subjective:  Gavin Wilson is a 73 y.o. male  whom we were consulted for management of chronic kidney disease stage IV.  He has a history of bladder cancer s/p cystectomy and ureterostomy formation. He also has a history of esophogeal and prostate cancer.   Patient goes to [facility] in [place] and sees nephrology.  He already has a left upper arm primary fistula in preparation for upcoming dialysis.  Presented with increasing shortness of breath, dyspnea on exertion.  He was diagnosed with bilateral pneumonia/pleural effusion.  He is currently being treated for both, getting IV antibiotics and intravenous diuretics. His serum creatinine is 3.2 mg.  He states when he was seen by Nephrology last his GFR was 20 and it has been for two years.  Nephrology was consulted for evaluation and treatment of chronic kidney disease.     GFR down to 13 today.        Allergies:  Sulfa antibiotics    Home Meds:  Prescriptions Prior to Admission   Medication Sig Dispense Refill Last Dose   • acetaminophen (TYLENOL) 325 MG tablet Take 650 mg by mouth Every 6 (Six) Hours As Needed for Mild Pain .   Past Month at Unknown time   • albuterol  (PROVENTIL HFA;VENTOLIN HFA) 108 (90 Base) MCG/ACT inhaler Inhale 2 puffs Every 6 (Six) Hours As Needed for Wheezing.   Past Week at Unknown time   • aspirin 81 MG chewable tablet Chew 81 mg Daily.   10/5/2018 at Unknown time   • atorvastatin (LIPITOR) 80 MG tablet Take 40 mg by mouth Daily.   10/5/2018 at Unknown time   • budesonide-formoterol (SYMBICORT) 160-4.5 MCG/ACT inhaler Inhale 2 puffs 2 (Two) Times a Day. 1 inhaler 2 10/5/2018 at Unknown time   • bumetanide (BUMEX) 2 MG tablet Take 1 tablet by mouth Daily. 30 tablet 0 10/5/2018 at Unknown time   • calcium carbonate (OS-TASHI) 600 MG tablet Take 600 mg by mouth Daily.   10/5/2018 at Unknown time   • cholecalciferol (VITAMIN D3) 1000 units tablet Take 3,000 Units by mouth Daily.   10/5/2018 at Unknown time   • insulin aspart (novoLOG FLEXPEN) 100 UNIT/ML solution pen-injector sc pen Inject 6 Units under the skin into the appropriate area as directed 3 (Three) Times a Day With Meals.   10/5/2018 at Unknown time   • insulin detemir (LEVEMIR) 100 UNIT/ML injection Inject 6 Units under the skin into the appropriate area as directed 2 (Two) Times a Day.   10/5/2018 at Unknown time   • loperamide (IMODIUM) 2 MG capsule Take 2 mg by mouth Daily As Needed for Diarrhea.   Past Month at Unknown time   • metoprolol tartrate (LOPRESSOR) 25 MG tablet Take 12.5 mg by mouth 2 (Two) Times a Day.   10/5/2018 at Unknown time   • omeprazole (priLOSEC) 20 MG capsule Take 20 mg by mouth Daily.   10/5/2018 at Unknown time   • oxyCODONE (ROXICODONE) 5 MG immediate release tablet Take 5 mg by mouth Every 6 (Six) Hours As Needed for Moderate Pain .   Past Week at Unknown time   • sildenafil (VIAGRA) 100 MG tablet Take 100 mg by mouth Daily As Needed for erectile dysfunction.   More than a month at Unknown time       Medicines:  Current Facility-Administered Medications   Medication Dose Route Frequency Provider Last Rate Last Dose   • acetaminophen (TYLENOL) tablet 650 mg  650 mg Oral  Q4H PRN Max Jones MD       • aspirin chewable tablet 81 mg  81 mg Oral Daily Vu Michael MD   81 mg at 10/10/18 1012   • atorvastatin (LIPITOR) tablet 80 mg  80 mg Oral Daily Max Jones MD   80 mg at 10/10/18 1011   • budesonide-formoterol (SYMBICORT) 160-4.5 MCG/ACT inhaler 2 puff  2 puff Inhalation BID - RT Max Jones MD   2 puff at 10/10/18 0802   • bumetanide (BUMEX) tablet 1 mg  1 mg Oral BID Vu Michael MD   1 mg at 10/10/18 1011   • calcitriol (ROCALTROL) capsule 0.5 mcg  0.5 mcg Oral Daily Max Jones MD   0.5 mcg at 10/10/18 1010   • calcium carb-cholecalciferol 600-800 MG-UNIT tablet 1 tablet  1 tablet Oral Daily Max Jones MD   1 tablet at 10/10/18 1010   • cefepime (MAXIPIME) 2 g/100 mL 0.9% NS (mbp)  2 g Intravenous Q24H Vu Michael MD   Stopped at 10/10/18 1426   • dextrose (D50W) 25 g/ 50mL Intravenous Solution 25 g  25 g Intravenous Q15 Min PRN Max Jones MD       • dextrose (GLUTOSE) oral gel 15 g  15 g Oral Q15 Min PRN Max Jones MD       • Ferric Citrate tablet 210 mg  210 mg Oral TID With Meals Max Jones MD   210 mg at 10/10/18 1317   • glucagon (human recombinant) (GLUCAGEN DIAGNOSTIC) injection 1 mg  1 mg Subcutaneous PRN Max Jones MD       • heparin (porcine) 5000 UNIT/ML injection 5,000 Units  5,000 Units Subcutaneous Q12H Max Jones MD   5,000 Units at 10/10/18 1009   • Hold medication  1 each Does not apply Continuous PRN Vu Michael MD       • hydrALAZINE (APRESOLINE) tablet 10 mg  10 mg Oral Q8H Abdiel Stout MD   10 mg at 10/10/18 1317   • HYDROcodone-acetaminophen (NORCO)  MG per tablet 1 tablet  1 tablet Oral Q6H PRN Max Jones MD   1 tablet at 10/10/18 1009   • hydroxypropyl methylcellulose (ISOPTO TEARS) 2.5 % ophthalmic solution 1 drop  1 drop Both Eyes TID PRN Vu Michael,  MD   1 drop at 10/06/18 2151   • Influenza Vac Subunit Quad (FLUCELVAX) injection 0.5 mL  0.5 mL Intramuscular During Hospitalization Max Jones MD       • insulin lispro (humaLOG) injection 0-9 Units  0-9 Units Subcutaneous 4x Daily With Meals & Nightly Max Jones MD   2 Units at 10/09/18 0837   • insulin lispro (humaLOG) injection 6 Units  6 Units Subcutaneous TID AC Max Jones MD   6 Units at 10/09/18 1259   • ipratropium-albuterol (DUO-NEB) nebulizer solution 3 mL  3 mL Nebulization 4x Daily - RT Vu Michael MD   3 mL at 10/10/18 1133   • isosorbide dinitrate (ISORDIL) tablet 10 mg  10 mg Oral TID - Nitrates Abdiel Stout MD   10 mg at 10/10/18 1317   • magnesium hydroxide (MILK OF MAGNESIA) suspension 2400 mg/10mL 10 mL  10 mL Oral Daily PRN Max Jones MD       • metoprolol tartrate (LOPRESSOR) tablet 25 mg  25 mg Oral Q12H Max Jones MD   25 mg at 10/10/18 1010   • nicotine (NICODERM CQ) 21 MG/24HR patch 1 patch  1 patch Transdermal Q24H Ashutosh Palafox MD   1 patch at 10/09/18 2022   • ondansetron (ZOFRAN) injection 4 mg  4 mg Intravenous Q6H PRN Max Jones MD   4 mg at 10/10/18 1009   • Pharmacy Consult - Pharmacy to dose   Does not apply Continuous PRN Vu Michael MD       • potassium chloride (MICRO-K) CR capsule 20 mEq  20 mEq Oral Daily Vu Michael MD   20 mEq at 10/10/18 1010   • sennosides-docusate sodium (SENOKOT-S) 8.6-50 MG tablet 1 tablet  1 tablet Oral BID Vu Michael MD   1 tablet at 10/10/18 1010   • sodium bicarbonate tablet 650 mg  650 mg Oral BID Delmy Mullins APRN   650 mg at 10/10/18 1010   • sodium chloride 0.9 % flush 3 mL  3 mL Intravenous Q12H Max Jones MD   3 mL at 10/10/18 1012   • sodium chloride 0.9 % flush 3-10 mL  3-10 mL Intravenous PRN Max Jones MD       • tetrahydrozoline 0.05 % ophthalmic solution 1 drop  1 drop  "Both Eyes 4x Daily PRN Ney De MD   1 drop at 10/10/18 3679       Past Medical History:  Past Medical History:   Diagnosis Date   • CHF (congestive heart failure) (CMS/HCC)    • Coronary stent occlusion    • Diabetes mellitus (CMS/HCC)    • Hyperlipidemia    • Hypertension    • Stroke (CMS/HCC)        Past Surgical History:  Past Surgical History:   Procedure Laterality Date   • BLADDER SURGERY     • ESOPHAGECTOMY         Family History  Family History   Problem Relation Age of Onset   • No Known Problems Father    • No Known Problems Mother        Social History  Social History     Social History   • Marital status:      Spouse name: N/A   • Number of children: N/A   • Years of education: N/A     Occupational History   • Not on file.     Social History Main Topics   • Smoking status: Current Every Day Smoker   • Smokeless tobacco: Never Used   • Alcohol use No   • Drug use: No   • Sexual activity: Not on file     Other Topics Concern   • Not on file     Social History Narrative   • No narrative on file         Review of Systems:  History obtained from chart review and the patient  General ROS: Patient complains of chills and fever  Respiratory ROS: positive for - cough and shortness of breath  negative for - hemoptysis  Cardiovascular ROS: positive for - chest pain and dyspnea on exertion  negative for - palpitations  Gastrointestinal ROS: No abdominal pain or melena. nausea  Genito-Urinary ROS: Urostomy   Objective:  /59 (BP Location: Right arm, Patient Position: Sitting)   Pulse 79   Temp 97.8 °F (36.6 °C) (Oral)   Resp 16   Ht 170.2 cm (67\")   Wt 48.2 kg (106 lb 3.2 oz)   SpO2 100%   BMI 16.63 kg/m²     Intake/Output Summary (Last 24 hours) at 10/10/18 1533  Last data filed at 10/10/18 1300   Gross per 24 hour   Intake                0 ml   Output             1550 ml   Net            -1550 ml     General: awake/alert   Chest:  Diminished breath sounds, scattered rhonchi  CVS: " regular rate and rhythm 2/6 systolic murmur  Abdominal: soft, nontender, normal bowel sounds  Extremities: no cyanosis or edema  Skin: warm and dry without rash      Labs:    Results from last 7 days  Lab Units 10/10/18  0422 10/09/18  0306 10/08/18  0435   WBC 10*3/mm3 5.35 5.17 4.73*   HEMOGLOBIN g/dL 10.2* 9.8* 8.5*   HEMATOCRIT % 32.6* 30.4* 26.9*   PLATELETS 10*3/mm3 173 180 139           Results from last 7 days  Lab Units 10/10/18  0422 10/09/18  0306 10/08/18  0435  10/06/18  0545   SODIUM mmol/L 144 142 141  < > 141   POTASSIUM mmol/L 5.2 4.6 4.5  < > 3.6   CHLORIDE mmol/L 107 108 110  < > 113*   CO2 mmol/L 20.0* 20.0* 16.0*  < > 17.0*   BUN mg/dL 59* 54* 50*  < > 46*   CREATININE mg/dL 4.13* 3.70* 3.78*  < > 3.57*   CALCIUM mg/dL 9.1 8.7 8.5  < > 8.1*   BILIRUBIN mg/dL  --   --  0.4  --  0.5   ALK PHOS U/L  --   --  83  --  66   ALT (SGPT) U/L  --   --  53  --  30   AST (SGOT) U/L  --   --  27  --  20   GLUCOSE mg/dL 139* 218* 131*  < > 170*   < > = values in this interval not displayed.    Radiology:   Imaging Results (last 72 hours)     Procedure Component Value Units Date/Time    US Renal Bilateral [[id number]] Collected:  10/08/18 0936     Updated:  10/08/18 0958    Narrative:       EXAMINATION: US RENAL BILATERAL- 10/8/2018 9:36 AM CDT     HISTORY: Acute renal insufficiency; R13.10-Dysphagia, unspecified.     REPORT: Sonographic images of the kidneys were obtained bilaterally.  There are no comparison studies.     The proximal abdominal aorta is visualized and measures 2.7 cm in  diameter, there is moderate atherosclerosis of the aorta. The IVC  measures 2.2 cm in diameter, normal. Small bilateral pleural effusions  are present. The right kidney measures 7.6 x 2.9 x 3.5 cm and has  increased cortical echogenicity diffusely. No mass or hydronephrosis is  identified. At the inferior pole of the right kidney there is a tiny  cyst that measures 5 x 6 mm. This appears benign. Tiny scattered  nephrolithiasis  are present on the right. Color flow imaging  demonstrates vascular flow within the right kidney. Left kidney measures  7.4 x 3.3 x 3.4 cm and has diffusely increased cortical echogenicity. No  mass or hydronephrosis is identified. Several tiny scattered  nephrolithiasis are present in the left kidney. Color flow imaging  demonstrates vascular flow within the left kidney. There is a small  amount of free fluid lateral to the liver.       Impression:       1. Bilateral renal atrophy with increased renal cortical echogenicity,  most likely related to chronic renal disease. No renal masses seen and  there is no hydronephrosis.  2. Small benign-appearing 5 x 6 mm cyst at the inferior pole the right  kidney.  3. There are small nonobstructing nephrolithiasis bilaterally.  4. Small bilateral pleural effusions.  5. Small amount of perihepatic ascites.     This report was finalized on 10/08/2018 09:55 by Dr. Lucian Jones MD.    XR Chest 1 View [[id number]] Collected:  10/07/18 1512     Updated:  10/07/18 1518    Narrative:       Exam:   XR CHEST 1 VW-       Date:  10/7/2018      History:  Male, age  73 years;questionable small pneumo status post  thora; R13.10-Dysphagia, unspecified     COMPARISON:  Chest x-ray dated earlier today     Findings :     Status post left thoracentesis, without measurable pneumothorax on this  examination. Right pleural effusion persists.       Impression:       Impression:     No measurable pneumothorax.     This report was finalized on 10/07/2018 15:15 by Dr. Petra Mckinley MD.    US Thoracentesis [[id number]] Collected:  10/07/18 1323    Specimen:  Body Fluid Updated:  10/07/18 1329    Narrative:       DATE OF PROCEDURE:  10/7/2018.     PREPROCEDURE DIAGNOSIS:  Left pleural effusion.  POSTPROCEDURE DIAGNOSIS:  Left pleural effusion.          INDICATIONS FOR PROCEDURE: Shortness of breath.     PROCEDURE:   1. Thoracentesis, diagnostic and therapeutic.  2. Ultrasound guidance for  thoracentesis, imaging supervision and  interpretation.     ANESTHESIA: 1% lidocaine, injected locally.          COMPLICATIONS: None.        EXAMINATIONS FOR COMPARISON:  CT chest dated 10/6/2018.     DESCRIPTION OF PROCEDURE:   The risk and benefits of the procedure were explained to the patient.   Specifically, the risks of bleeding, infection, pneumothorax (possible  thoracostomy tube), and damage to surrounding structures were discussed  extensively. The patient's questions were answered. The patient opted to  proceed. Written and verbal consent were obtained from the patient.     TIME OUT was taken and the patient's name, medical record number, date  of birth, procedure, entry site, and listen allergies were confirmed.  The site of the procedure was confirmed and marked.      The leftposterior thorax was prepped and draped in sterile fashion. The  area was anesthetized with buffered lidocaine 2%.      A 5 Filipino 10 cm  catheter was inserted into the pleural effusion  using  ultrasound guidance. Approximately 400 mL of clear fluid was recovered  and sent to the pathology lab for analysis.      The patient tolerated the procedure well.   No immediate complications  were noted.       Impression:       Successful ultrasound guided leftthoracentesis. Approximately 400 mL of  clear fluid was recovered and sent to the pathology lab for analysis.   No immediate complications were noted.              This report was finalized on 10/07/2018 13:26 by Dr. Petra Mckinley MD.    XR Chest 1 View [[id number]] Collected:  10/07/18 1241     Updated:  10/07/18 1249    Narrative:       Exam:   XR CHEST 1 VW-       Date:  10/7/2018      History:  Male, age  73 years;POST THORACENTESIS; R13.10-Dysphagia,  unspecified     COMPARISON:  CT chest dated 10/60/18     Findings :  Interval left-sided thoracentesis. There is a questionable trace left  pneumothorax versus artifact on the left. The right lung is unchanged,  with moderate  pleural effusion and associated opacities. The heart is  unchanged in size.       Impression:       Impression:     Interval left thoracentesis questionable trace left pneumothorax versus  averaging. Recommend repeat upright chest x-ray AP in one hour.     This report was finalized on 10/07/2018 12:46 by Dr. Petra Mckinley MD.     Chest [[id number]] Updated:  10/07/18 1215    CT Chest Without Contrast [[id number]] Collected:  10/06/18 1752     Updated:  10/06/18 1759    Narrative:       EXAMINATION:   CT CHEST WO CONTRAST-  10/6/2018 5:52 PM CDT     CT CHEST WO CONTRAST- 10/6/2018 5:33 PM CDT     HISTORY: Shortness of breath     COMPARISON: August 12, 2018 DOSE LENGTH PRODUCT: 185 mGy cm. Automated  exposure control was also utilized to decrease patient radiation dose.     TECHNIQUE: Serial helical tomographic images of the chest were acquired.  Multiplanar reformatted images were provided for review.     FINDINGS:  The imaged portion of the neck and thyroid gland is unremarkable.      The upper lungs are clear.. The lower lobes are obscured by large  bilateral pleural effusions.. The trachea and bronchial tree are patent.     Cardiac silhouette may be mildly enlarged. Extensive vascular  calcifications noted in the aortic arch origin of the great vessels and  coronary arteries.. Endovascular aortic stent graft is noted below the  diaphragm.    .      No acute findings are seen in the bones or surrounding soft tissues.        .       Impression:       1. Large bilateral pleural effusions which essentially obscures the  lower lobes.  2. Extensive vascular calcifications present  3. Endovascular stent graft is present in the abdomen.        This report was finalized on 10/06/2018 17:55 by Dr. Duc Garibay MD.          Culture:  Blood Culture   Date Value Ref Range Status   10/06/2018 No growth at 2 days  Preliminary   10/06/2018 No growth at 2 days  Preliminary     Urine Culture   Date Value Ref Range Status    10/07/2018 >100,000 CFU/mL Yeast isolated (A)  Preliminary     MRSA SCREEN CX   Date Value Ref Range Status   10/06/2018 Staphylococcus aureus, MRSA (A)  Final     Comment:       Methicillin resistant Staphylococcus aureus, Patient may be an isolation risk.         Assessment   CKD IV secondary to diabetic nephropathy with worsening renal function  DM II  Bilateral pneumonia  Iron deficient anemia  Bilateral pleural effusion  Diastolic CHF   Metabolic Acidosis  Secondary Hyperparathyroidism    Plan:  Patient was seen by Dr. Bonilla today who ordered a duplex US of fistula and cleared him to start dialysis today  Will start HD today  Am labs       Delmy Mullins, MACO  10/10/2018  2:00pm

## 2018-10-10 NOTE — PLAN OF CARE
Problem: Nutrition, Imbalanced: Inadequate Oral Intake (Adult)  Goal: Improved Oral Intake  Outcome: Ongoing (interventions implemented as appropriate)   10/10/18 1746   Nutrition, Imbalanced: Inadequate Oral Intake (Adult)   Improved Oral Intake making progress toward outcome       Problem: Patient Care Overview  Goal: Plan of Care Review  Outcome: Ongoing (interventions implemented as appropriate)   10/10/18 1746   Plan of Care Review   Progress no change   OTHER   Outcome Summary RD nutrition follow-up completed. Pt with very poor intake at this time. Will continue to follow for d/c needs, however no needs are noted at this time. Will continue to monitor po intake and need for AMONS

## 2018-10-10 NOTE — PLAN OF CARE
Problem: Fall Risk (Adult)  Goal: Absence of Fall  Outcome: Ongoing (interventions implemented as appropriate)   10/10/18 1635   Fall Risk (Adult)   Absence of Fall achieves outcome       Problem: Skin Injury Risk (Adult)  Goal: Skin Health and Integrity  Outcome: Ongoing (interventions implemented as appropriate)   10/10/18 1635   Skin Injury Risk (Adult)   Skin Health and Integrity achieves outcome       Problem: Nutrition, Imbalanced: Inadequate Oral Intake (Adult)  Goal: Improved Oral Intake  Outcome: Ongoing (interventions implemented as appropriate)   10/10/18 1635   Nutrition, Imbalanced: Inadequate Oral Intake (Adult)   Improved Oral Intake making progress toward outcome     Goal: Prevent Further Weight Loss  Outcome: Ongoing (interventions implemented as appropriate)   10/10/18 1635   Nutrition, Imbalanced: Inadequate Oral Intake (Adult)   Prevent Further Weight Loss making progress toward outcome       Problem: Patient Care Overview  Goal: Plan of Care Review  Outcome: Ongoing (interventions implemented as appropriate)   10/10/18 1635   Plan of Care Review   Progress no change   OTHER   Outcome Summary VSS. C/O GENERALIZED PAIN, MEDICATED WITH PRN NORCO WITH SOME RELIEF. COCCYX DRESSING CHANGED, STILL RED/BLANCHABLE/ NOT OPEN. ATTEMPTED DIALYSIS WITH LEFT FISTULA TODAY, WAS UNABLE TO ACCESS. WILL GO FOR PERMACATH PLACEMENT TOMORROW WITH DR VEGA. S/ST  ON TELE. SUPPOSITORY GIVEN TODAY, NO BM. HYDRALAZINE/ISORDIL ADDED PER DR WOLFE. CONTINUE TO MONITOR.   Coping/Psychosocial   Plan of Care Reviewed With patient     Goal: Individualization and Mutuality  Outcome: Ongoing (interventions implemented as appropriate)   10/10/18 1635   Individualization   Patient Specific Goals (Include Timeframe) DIALYSIS   Patient Specific Interventions PERMACATH PLACEMENT 10/11   Mutuality/Individual Preferences   What Anxieties, Fears, Concerns, or Questions Do You Have About Your Care? NONE

## 2018-10-11 ENCOUNTER — APPOINTMENT (OUTPATIENT)
Dept: GENERAL RADIOLOGY | Facility: HOSPITAL | Age: 73
End: 2018-10-11

## 2018-10-11 ENCOUNTER — APPOINTMENT (OUTPATIENT)
Dept: INTERVENTIONAL RADIOLOGY/VASCULAR | Facility: HOSPITAL | Age: 73
End: 2018-10-11

## 2018-10-11 ENCOUNTER — ANESTHESIA EVENT (OUTPATIENT)
Dept: PERIOP | Facility: HOSPITAL | Age: 73
End: 2018-10-11

## 2018-10-11 ENCOUNTER — ANESTHESIA (OUTPATIENT)
Dept: PERIOP | Facility: HOSPITAL | Age: 73
End: 2018-10-11

## 2018-10-11 LAB
ANION GAP SERPL CALCULATED.3IONS-SCNC: 12 MMOL/L (ref 4–13)
BACTERIA SPEC AEROBE CULT: NORMAL
BACTERIA SPEC AEROBE CULT: NORMAL
BASOPHILS # BLD AUTO: 0.03 10*3/MM3 (ref 0–0.2)
BASOPHILS NFR BLD AUTO: 0.7 % (ref 0–2)
BUN BLD-MCNC: 60 MG/DL (ref 5–21)
BUN/CREAT SERPL: 14.8 (ref 7–25)
CALCIUM SPEC-SCNC: 9.7 MG/DL (ref 8.4–10.4)
CHLORIDE SERPL-SCNC: 106 MMOL/L (ref 98–110)
CO2 SERPL-SCNC: 26 MMOL/L (ref 24–31)
CREAT BLD-MCNC: 4.06 MG/DL (ref 0.5–1.4)
DEPRECATED RDW RBC AUTO: 60.5 FL (ref 40–54)
EOSINOPHIL # BLD AUTO: 0.13 10*3/MM3 (ref 0–0.7)
EOSINOPHIL NFR BLD AUTO: 2.9 % (ref 0–4)
ERYTHROCYTE [DISTWIDTH] IN BLOOD BY AUTOMATED COUNT: 18.6 % (ref 12–15)
GFR SERPL CREATININE-BSD FRML MDRD: 14 ML/MIN/1.73
GFR SERPL CREATININE-BSD FRML MDRD: ABNORMAL ML/MIN/1.73
GLUCOSE BLD-MCNC: 136 MG/DL (ref 70–100)
GLUCOSE BLDC GLUCOMTR-MCNC: 122 MG/DL (ref 70–130)
GLUCOSE BLDC GLUCOMTR-MCNC: 133 MG/DL (ref 70–130)
GLUCOSE BLDC GLUCOMTR-MCNC: 159 MG/DL (ref 70–130)
GLUCOSE BLDC GLUCOMTR-MCNC: 160 MG/DL (ref 70–130)
HCT VFR BLD AUTO: 28.7 % (ref 40–52)
HGB BLD-MCNC: 9.1 G/DL (ref 14–18)
IMM GRANULOCYTES # BLD: 0.02 10*3/MM3 (ref 0–0.03)
IMM GRANULOCYTES NFR BLD: 0.4 % (ref 0–5)
LYMPHOCYTES # BLD AUTO: 0.8 10*3/MM3 (ref 0.72–4.86)
LYMPHOCYTES NFR BLD AUTO: 17.5 % (ref 15–45)
MAGNESIUM SERPL-MCNC: 2.3 MG/DL (ref 1.4–2.2)
MCH RBC QN AUTO: 28.3 PG (ref 28–32)
MCHC RBC AUTO-ENTMCNC: 31.7 G/DL (ref 33–36)
MCV RBC AUTO: 89.4 FL (ref 82–95)
MONOCYTES # BLD AUTO: 0.38 10*3/MM3 (ref 0.19–1.3)
MONOCYTES NFR BLD AUTO: 8.3 % (ref 4–12)
NEUTROPHILS # BLD AUTO: 3.2 10*3/MM3 (ref 1.87–8.4)
NEUTROPHILS NFR BLD AUTO: 70.2 % (ref 39–78)
NRBC BLD MANUAL-RTO: 0 /100 WBC (ref 0–0)
PHOSPHATE SERPL-MCNC: 4.3 MG/DL (ref 2.5–4.5)
PLATELET # BLD AUTO: 190 10*3/MM3 (ref 130–400)
PMV BLD AUTO: 10.2 FL (ref 6–12)
POTASSIUM BLD-SCNC: 4.7 MMOL/L (ref 3.5–5.3)
RBC # BLD AUTO: 3.21 10*6/MM3 (ref 4.8–5.9)
SODIUM BLD-SCNC: 144 MMOL/L (ref 135–145)
WBC NRBC COR # BLD: 4.56 10*3/MM3 (ref 4.8–10.8)

## 2018-10-11 PROCEDURE — 76937 US GUIDE VASCULAR ACCESS: CPT | Performed by: SURGERY

## 2018-10-11 PROCEDURE — 71045 X-RAY EXAM CHEST 1 VIEW: CPT

## 2018-10-11 PROCEDURE — 0JH63XZ INSERTION OF TUNNELED VASCULAR ACCESS DEVICE INTO CHEST SUBCUTANEOUS TISSUE AND FASCIA, PERCUTANEOUS APPROACH: ICD-10-PCS | Performed by: SURGERY

## 2018-10-11 PROCEDURE — 94799 UNLISTED PULMONARY SVC/PX: CPT

## 2018-10-11 PROCEDURE — 5A1D70Z PERFORMANCE OF URINARY FILTRATION, INTERMITTENT, LESS THAN 6 HOURS PER DAY: ICD-10-PCS | Performed by: INTERNAL MEDICINE

## 2018-10-11 PROCEDURE — 36558 INSERT TUNNELED CV CATH: CPT

## 2018-10-11 PROCEDURE — 25010000002 HEPARIN (PORCINE) PER 1000 UNITS: Performed by: INTERNAL MEDICINE

## 2018-10-11 PROCEDURE — 76000 FLUOROSCOPY <1 HR PHYS/QHP: CPT

## 2018-10-11 PROCEDURE — 25010000002 PROPOFOL 10 MG/ML EMULSION: Performed by: NURSE ANESTHETIST, CERTIFIED REGISTERED

## 2018-10-11 PROCEDURE — 02HV33Z INSERTION OF INFUSION DEVICE INTO SUPERIOR VENA CAVA, PERCUTANEOUS APPROACH: ICD-10-PCS | Performed by: SURGERY

## 2018-10-11 PROCEDURE — 84100 ASSAY OF PHOSPHORUS: CPT | Performed by: NURSE PRACTITIONER

## 2018-10-11 PROCEDURE — 94760 N-INVAS EAR/PLS OXIMETRY 1: CPT

## 2018-10-11 PROCEDURE — 25010000002 ONDANSETRON PER 1 MG: Performed by: ANESTHESIOLOGY

## 2018-10-11 PROCEDURE — 85025 COMPLETE CBC W/AUTO DIFF WBC: CPT | Performed by: NURSE PRACTITIONER

## 2018-10-11 PROCEDURE — C1750 CATH, HEMODIALYSIS,LONG-TERM: HCPCS | Performed by: SURGERY

## 2018-10-11 PROCEDURE — 80048 BASIC METABOLIC PNL TOTAL CA: CPT | Performed by: NURSE PRACTITIONER

## 2018-10-11 PROCEDURE — 83735 ASSAY OF MAGNESIUM: CPT | Performed by: NURSE PRACTITIONER

## 2018-10-11 PROCEDURE — 25010000002 ONDANSETRON PER 1 MG: Performed by: INTERNAL MEDICINE

## 2018-10-11 PROCEDURE — 25010000002 HEPARIN (PORCINE) PER 1000 UNITS: Performed by: SURGERY

## 2018-10-11 PROCEDURE — 36558 INSERT TUNNELED CV CATH: CPT | Performed by: SURGERY

## 2018-10-11 PROCEDURE — 82962 GLUCOSE BLOOD TEST: CPT

## 2018-10-11 RX ORDER — SODIUM CHLORIDE 9 MG/ML
50 INJECTION, SOLUTION INTRAVENOUS CONTINUOUS
Status: DISCONTINUED | OUTPATIENT
Start: 2018-10-11 | End: 2018-10-11

## 2018-10-11 RX ORDER — HEPARIN SODIUM 1000 [USP'U]/ML
1700 INJECTION, SOLUTION INTRAVENOUS; SUBCUTANEOUS AS NEEDED
Status: DISCONTINUED | OUTPATIENT
Start: 2018-10-11 | End: 2018-10-25 | Stop reason: HOSPADM

## 2018-10-11 RX ORDER — ONDANSETRON 2 MG/ML
4 INJECTION INTRAMUSCULAR; INTRAVENOUS ONCE AS NEEDED
Status: COMPLETED | OUTPATIENT
Start: 2018-10-11 | End: 2018-10-11

## 2018-10-11 RX ORDER — NALOXONE HCL 0.4 MG/ML
0.4 VIAL (ML) INJECTION AS NEEDED
Status: DISCONTINUED | OUTPATIENT
Start: 2018-10-11 | End: 2018-10-11 | Stop reason: HOSPADM

## 2018-10-11 RX ORDER — IPRATROPIUM BROMIDE AND ALBUTEROL SULFATE 2.5; .5 MG/3ML; MG/3ML
3 SOLUTION RESPIRATORY (INHALATION) ONCE AS NEEDED
Status: DISCONTINUED | OUTPATIENT
Start: 2018-10-11 | End: 2018-10-11 | Stop reason: HOSPADM

## 2018-10-11 RX ORDER — PROPOFOL 10 MG/ML
VIAL (ML) INTRAVENOUS AS NEEDED
Status: DISCONTINUED | OUTPATIENT
Start: 2018-10-11 | End: 2018-10-11 | Stop reason: SURG

## 2018-10-11 RX ORDER — ALBUMIN (HUMAN) 12.5 G/50ML
12.5 SOLUTION INTRAVENOUS AS NEEDED
Status: CANCELLED | OUTPATIENT
Start: 2018-10-11 | End: 2018-10-12

## 2018-10-11 RX ORDER — LABETALOL HYDROCHLORIDE 5 MG/ML
5 INJECTION, SOLUTION INTRAVENOUS
Status: DISCONTINUED | OUTPATIENT
Start: 2018-10-11 | End: 2018-10-11 | Stop reason: HOSPADM

## 2018-10-11 RX ORDER — FENTANYL CITRATE 50 UG/ML
25 INJECTION, SOLUTION INTRAMUSCULAR; INTRAVENOUS
Status: DISCONTINUED | OUTPATIENT
Start: 2018-10-11 | End: 2018-10-11 | Stop reason: HOSPADM

## 2018-10-11 RX ORDER — HEPARIN SODIUM 1000 [USP'U]/ML
INJECTION, SOLUTION INTRAVENOUS; SUBCUTANEOUS AS NEEDED
Status: DISCONTINUED | OUTPATIENT
Start: 2018-10-11 | End: 2018-10-11 | Stop reason: HOSPADM

## 2018-10-11 RX ORDER — OXYCODONE AND ACETAMINOPHEN 7.5; 325 MG/1; MG/1
2 TABLET ORAL ONCE AS NEEDED
Status: DISCONTINUED | OUTPATIENT
Start: 2018-10-11 | End: 2018-10-11 | Stop reason: HOSPADM

## 2018-10-11 RX ORDER — OXYCODONE AND ACETAMINOPHEN 10; 325 MG/1; MG/1
1 TABLET ORAL ONCE AS NEEDED
Status: DISCONTINUED | OUTPATIENT
Start: 2018-10-11 | End: 2018-10-11 | Stop reason: HOSPADM

## 2018-10-11 RX ORDER — SODIUM CHLORIDE 0.9 % (FLUSH) 0.9 %
1-10 SYRINGE (ML) INJECTION AS NEEDED
Status: DISCONTINUED | OUTPATIENT
Start: 2018-10-11 | End: 2018-10-11 | Stop reason: HOSPADM

## 2018-10-11 RX ORDER — LIDOCAINE HYDROCHLORIDE 10 MG/ML
INJECTION, SOLUTION EPIDURAL; INFILTRATION; INTRACAUDAL; PERINEURAL AS NEEDED
Status: DISCONTINUED | OUTPATIENT
Start: 2018-10-11 | End: 2018-10-11 | Stop reason: HOSPADM

## 2018-10-11 RX ORDER — DEXTROSE MONOHYDRATE 25 G/50ML
12.5 INJECTION, SOLUTION INTRAVENOUS AS NEEDED
Status: DISCONTINUED | OUTPATIENT
Start: 2018-10-11 | End: 2018-10-11 | Stop reason: HOSPADM

## 2018-10-11 RX ORDER — FENTANYL CITRATE 50 UG/ML
25 INJECTION, SOLUTION INTRAMUSCULAR; INTRAVENOUS AS NEEDED
Status: DISCONTINUED | OUTPATIENT
Start: 2018-10-11 | End: 2018-10-11 | Stop reason: HOSPADM

## 2018-10-11 RX ORDER — IBUPROFEN 600 MG/1
600 TABLET ORAL ONCE AS NEEDED
Status: DISCONTINUED | OUTPATIENT
Start: 2018-10-11 | End: 2018-10-11 | Stop reason: HOSPADM

## 2018-10-11 RX ORDER — MEPERIDINE HYDROCHLORIDE 25 MG/ML
12.5 INJECTION INTRAMUSCULAR; INTRAVENOUS; SUBCUTANEOUS
Status: DISCONTINUED | OUTPATIENT
Start: 2018-10-11 | End: 2018-10-11 | Stop reason: HOSPADM

## 2018-10-11 RX ORDER — METOCLOPRAMIDE HYDROCHLORIDE 5 MG/ML
5 INJECTION INTRAMUSCULAR; INTRAVENOUS
Status: DISCONTINUED | OUTPATIENT
Start: 2018-10-11 | End: 2018-10-11 | Stop reason: HOSPADM

## 2018-10-11 RX ORDER — SODIUM CHLORIDE, SODIUM LACTATE, POTASSIUM CHLORIDE, CALCIUM CHLORIDE 600; 310; 30; 20 MG/100ML; MG/100ML; MG/100ML; MG/100ML
9 INJECTION, SOLUTION INTRAVENOUS CONTINUOUS
Status: DISCONTINUED | OUTPATIENT
Start: 2018-10-11 | End: 2018-10-11

## 2018-10-11 RX ADMIN — PROPOFOL 30 MG: 10 INJECTION, EMULSION INTRAVENOUS at 08:22

## 2018-10-11 RX ADMIN — HYDRALAZINE HYDROCHLORIDE 10 MG: 10 TABLET ORAL at 21:36

## 2018-10-11 RX ADMIN — SODIUM CHLORIDE 50 ML/HR: 9 INJECTION, SOLUTION INTRAVENOUS at 07:45

## 2018-10-11 RX ADMIN — DOCUSATE SODIUM -SENNOSIDES 1 TABLET: 50; 8.6 TABLET, COATED ORAL at 21:36

## 2018-10-11 RX ADMIN — ONDANSETRON 4 MG: 2 INJECTION INTRAMUSCULAR; INTRAVENOUS at 09:57

## 2018-10-11 RX ADMIN — Medication 3 ML: at 21:43

## 2018-10-11 RX ADMIN — HEPARIN SODIUM 5000 UNITS: 5000 INJECTION, SOLUTION INTRAVENOUS; SUBCUTANEOUS at 21:36

## 2018-10-11 RX ADMIN — HEPARIN SODIUM 1700 UNITS: 1000 INJECTION INTRAVENOUS; SUBCUTANEOUS at 16:40

## 2018-10-11 RX ADMIN — IPRATROPIUM BROMIDE AND ALBUTEROL SULFATE 3 ML: 2.5; .5 SOLUTION RESPIRATORY (INHALATION) at 11:19

## 2018-10-11 RX ADMIN — PROPOFOL 20 MG: 10 INJECTION, EMULSION INTRAVENOUS at 08:24

## 2018-10-11 RX ADMIN — ONDANSETRON 4 MG: 2 INJECTION INTRAMUSCULAR; INTRAVENOUS at 06:35

## 2018-10-11 RX ADMIN — SODIUM BICARBONATE 650 MG: 650 TABLET ORAL at 21:36

## 2018-10-11 RX ADMIN — HEPARIN SODIUM 1700 UNITS: 1000 INJECTION, SOLUTION INTRAVENOUS; SUBCUTANEOUS at 16:39

## 2018-10-11 RX ADMIN — PROPOFOL 20 MG: 10 INJECTION, EMULSION INTRAVENOUS at 08:35

## 2018-10-11 RX ADMIN — IPRATROPIUM BROMIDE AND ALBUTEROL SULFATE 3 ML: 2.5; .5 SOLUTION RESPIRATORY (INHALATION) at 20:37

## 2018-10-11 RX ADMIN — METOPROLOL TARTRATE 25 MG: 25 TABLET, FILM COATED ORAL at 21:36

## 2018-10-11 RX ADMIN — HYPROMELLOSE 2906 (4000 MPA.S) 1 DROP: 25 SOLUTION OPHTHALMIC at 06:36

## 2018-10-11 RX ADMIN — HYDRALAZINE HYDROCHLORIDE 10 MG: 10 TABLET ORAL at 05:29

## 2018-10-11 RX ADMIN — NICOTINE 1 PATCH: 21 PATCH, EXTENDED RELEASE TRANSDERMAL at 21:35

## 2018-10-11 RX ADMIN — HYDROCODONE BITARTRATE AND ACETAMINOPHEN 1 TABLET: 10; 325 TABLET ORAL at 18:46

## 2018-10-11 NOTE — ANESTHESIA POSTPROCEDURE EVALUATION
"Patient: Gavin Wilson    Procedure Summary     Date:  10/11/18 Room / Location:  [facility] OR  /  PAD HYBRID OR 12    Anesthesia Start:  0817 Anesthesia Stop:  0850    Procedure:  HEMODIALYSIS CATHETER INSERTION (N/A Neck) Diagnosis:       Chronic kidney disease, stage 4 (severe) (CMS/HCC)      (Chronic kidney disease, stage 4 (severe) (CMS/HCC) [N18.4])    Surgeon:  Hugo Bonilla MD Provider:  Vu Torres CRNA    Anesthesia Type:  MAC ASA Status:  4          Anesthesia Type: MAC  Last vitals  BP   149/67 (10/11/18 1012)   Temp   98.8 °F (37.1 °C) (10/11/18 1012)   Pulse   94 (post tx) (10/11/18 1124)   Resp   18 (10/11/18 1124)     SpO2   100 % (post tx) (10/11/18 1124)     Post Anesthesia Care and Evaluation    Patient location during evaluation: PACU  Patient participation: complete - patient participated  Level of consciousness: awake and alert  Pain management: adequate  Airway patency: patent  Anesthetic complications: No anesthetic complications  PONV Status: none  Cardiovascular status: acceptable and hemodynamically stable  Respiratory status: acceptable  Hydration status: acceptable    Comments: Blood pressure 149/67, pulse 94, temperature 98.8 °F (37.1 °C), temperature source Temporal Artery , resp. rate 18, height 170.2 cm (67\"), weight 48.2 kg (106 lb 3.2 oz), SpO2 100 %.    Patient discharged from PACU based upon Cecy score. Please see RN notes for further details      "

## 2018-10-11 NOTE — ANESTHESIA PREPROCEDURE EVALUATION
Anesthesia Evaluation     Patient summary reviewed   no history of anesthetic complications:  NPO Solid Status: > 8 hours             Airway   Mallampati: II  TM distance: >3 FB  Neck ROM: full  Dental    (+) edentulous    Pulmonary    (+) pneumonia , pleural effusion, COPD,   Cardiovascular     ECG reviewed    (+) hypertension, valvular problems/murmurs (mild) AS, CAD, cardiac stents more than 12 months ago CHF, pericardial effusion, hyperlipidemia,       Neuro/Psych  (+) CVA,     GI/Hepatic/Renal/Endo    (+)  GERD,  renal disease CRI and ARF, diabetes mellitus using insulin,     Musculoskeletal     Abdominal    Substance History      OB/GYN          Other                        Anesthesia Plan    ASA 4     MAC     intravenous induction   Anesthetic plan, all risks, benefits, and alternatives have been provided, discussed and informed consent has been obtained with: patient.

## 2018-10-11 NOTE — OP NOTE
VASCULAR SURGERY OPERATIVE REPORT    DATE OF PROCEDURE: 10/11/18    ATTENDING SURGEON: Hugo Bonilla MD    OR STAFF: Circulator: Jeremy Liu, RN  Scrub Person: Yoana Jiang; Rosemary Tinsley    ANESTHESIA: Monitor Anesthesia Care    PRE-OPERATIVE DIAGNOSIS: Chronic kidney disease, stage 4 (severe) (CMS/HCC) [N18.4]    POST-OPERATIVE DIAGNOSIS: Post-Op Diagnosis Codes:     * Chronic kidney disease, stage 4 (severe) (CMS/HCC) [N18.4]    PROCEDURE(S):   1.) Percutaneous access of right internal jugular vein under ultrasound guidance  2.) Insertion of right internal jugular cuffed tunneled hemodialysis catheter (Permacath)  3.) Radiographic supervision and interpretation    INTRAOPERATIVE FINDINGS: Patent right internal jugular vein, SVC and   IVC, and well-secured cuffed tunneled hemodialysis catheter at completion of procedure    ESTIMATED BLOOD LOSS: Minimal (<20 mL)    SPECIMENS: None    IMPLANTS: 19 cm cuffed tunneled hemodialysis catheter (Permacath)    COMPLICATIONS: None apparent    CONDITION AT COMPLETION: Hemodynamically stable, awake    DISPOSITION: PACU    INDICATION(S) FOR PROCEDURE:   Patient is a 73 y.o. male with chronic kidney disease who is status post creation of left arm AV fistula an outside hospital.  He presented to [facility] with shortness of breath and was found to have progression of his renal disease now requiring dialysis.  Initial attempt was made to access his fistula for the first time in the left arm however this was unsuccessful.  He now requires hemodialysis access. All risks, benefits, and alternatives to above elective procedures were discussed with the patient, who elected to proceed, and informed consent was accordingly obtained at that time.    DETAILS OF PROCEDURE:   Patient was brought to the operating room and appropriately identified. In supine position, right IJ venous access site was prepped and draped in the usual sterile fashion. Following a brief timeout,  percutaneous right internal jugular venous access was obtained under ultrasound guidance using modified Seldinger technique after infiltration of local anesthetic to the area. Soft guidewire was advanced through the SVC into the proximal IVC, over which sequential dilators were advanced and withdrawn, followed by insertion of sheath for tunneled hemodialysis catheter. Local anesthetic was then injected over the right chest catheter tunnel site, through which catheter was tunneled retrograde to the sheath insertion site, and catheter was advanced through the sheath into right internal jugular vein and under direct fluoroscopic visualization to the cavo-atrial junction. Cuffed tunneled hemodialysis catheter was then secured to skin with 3-0 Nylon suture, and skin at insertion site was reapproximated with 4-0 Monocryl suture. Skin was cleaned, dried, and sterile occlusive dressing was applied, after which patient was safely able to be transferred to recovery.    I was present for all aspects of the procedures, and there were no intraprocedural complications apparent.

## 2018-10-11 NOTE — PROGRESS NOTES
[facility] Medicine Services  INPATIENT PROGRESS NOTE    Patient Name: Gavin Wilson  Date of Admission: 10/6/2018  Today's Date: 10/11/18  Length of Stay: 5  Primary Care Physician: Provider, No Known    Subjective   Chief Complaint: weakness  HPI     Patient seen and examined.  Permacath placed today.  Patient very weak and lethargic.  Minimally interactive.        Review of Systems   Constitutional: Positive for activity change and appetite change. Negative for chills and fever.   Respiratory: Negative for shortness of breath and wheezing.    Cardiovascular: Negative for chest pain and palpitations.   Neurological: Positive for weakness.        All pertinent negatives and positives are as above. All other systems have been reviewed and are negative unless otherwise stated.     Objective    Temp:  [97.3 °F (36.3 °C)-98.7 °F (37.1 °C)] 97.7 °F (36.5 °C)  Heart Rate:  [] 99  Resp:  [14-18] 15  BP: (100-142)/(52-67) 142/67  Physical Exam  Constitutional: He is oriented to person, place, and time. No distress.   HENT:   Head: Atraumatic.   Temporal muscle wasting; prominent wasting around the neck and supraclavicular    Eyes: Conjunctivae are normal. No scleral icterus.   Cardiovascular: Normal rate, regular rhythm and intact distal pulses.    Murmur heard. Right upper chest permcath placed  Pulmonary/Chest: Effort normal. No respiratory distress. He has no wheezes. He has no rales.  Improved air movement   Abdominal: Soft. Bowel sounds are normal. He exhibits no distension. There is no tenderness.   RLQ ostomy    Neurological: He is alert and oriented to person, place, and time. Lethargic.  Skin: Skin is warm and dry. He is not diaphoretic.   Psychiatric: He has a normal mood and affect. His behavior is normal.          Results Review:  I have reviewed the labs, radiology results, and diagnostic studies.    Laboratory Data:     Results from last 7 days  Lab Units  10/11/18  0355 10/10/18  0422 10/09/18  0306   WBC 10*3/mm3 4.56* 5.35 5.17   HEMOGLOBIN g/dL 9.1* 10.2* 9.8*   HEMATOCRIT % 28.7* 32.6* 30.4*   PLATELETS 10*3/mm3 190 173 180          Results from last 7 days  Lab Units 10/11/18  0355 10/10/18  0422 10/09/18  0306 10/08/18  0435  10/06/18  0545   SODIUM mmol/L 144 144 142 141  < > 141   POTASSIUM mmol/L 4.7 5.2 4.6 4.5  < > 3.6   CHLORIDE mmol/L 106 107 108 110  < > 113*   CO2 mmol/L 26.0 20.0* 20.0* 16.0*  < > 17.0*   BUN mg/dL 60* 59* 54* 50*  < > 46*   CREATININE mg/dL 4.06* 4.13* 3.70* 3.78*  < > 3.57*   CALCIUM mg/dL 9.7 9.1 8.7 8.5  < > 8.1*   BILIRUBIN mg/dL  --   --   --  0.4  --  0.5   ALK PHOS U/L  --   --   --  83  --  66   ALT (SGPT) U/L  --   --   --  53  --  30   AST (SGOT) U/L  --   --   --  27  --  20   GLUCOSE mg/dL 136* 139* 218* 131*  < > 170*   < > = values in this interval not displayed.    Culture Data:   [unfilled]    Radiology Data:   Imaging Results (last 24 hours)     Procedure Component Value Units Date/Time    XR Chest 1 View [[id number]] Collected:  10/11/18 0940     Updated:  10/11/18 0945    Narrative:       EXAMINATION: XR CHEST 1 VW- 10/11/2018 9:40 AM CDT     HISTORY: s/p R IJ permacath insertion; R13.10-Dysphagia, unspecified;  Z74.09-Other reduced mobility; Z74.09-Other reduced mobility;  N18.4-Chronic kidney disease, stage 4 (severe).     REPORT: Comparison is made with the study from 10/7/2018.     There is a new right internal jugular permacath, in good position  without evidence of a pneumothorax. Haziness over the lung bases is  probably related to atelectasis and effusions, the right effusion is  larger than the left and has increased in size. Heart size is normal.  There is moderate ectasia of the thoracic aorta. A portion of an aortic  endograft is seen in the upper abdomen. The osseous structures are  unremarkable.       Impression:       1. Interval insertion of a right internal jugular permacath in good  position  without pneumothorax.  2. Bilateral small pleural effusions greater on the right and increased  compared with the previous chest x-ray. Bibasilar atelectasis.  This report was finalized on 10/11/2018 09:41 by Dr. Lucian Jones MD.    IR Insert Tunneled CV Catheter Without Port 5 Plus [[id number]] Updated:  10/11/18 0843    FL C Arm During Surgery [[id number]] Updated:  10/11/18 0843    US Arterial Doppler Upper Extremity Left [[id number]] Collected:  10/10/18 1749     Updated:  10/10/18 1759    Narrative:       History: Left upper extremity arteriovenous fistula with no prior  attempt at access.     Comment: Left upper chest from a arteriovenous fistula duplex was  performed using gray scale imaging as well as color flow Doppler.     FINDINGS: The brachial, radial, and ulnar arteries are all widely patent  with triphasic waveforms present. The anastomosis appears widely patent  with a peak systolic velocity of 251 cm/s. The proximal fistula has a  diameter of 4.8 mm, and the depth from the surface of 1.7 mm, with a  volume of flow of 642 mL/m. At a point 5 cm from the anastomosis there  is a vein diameter of 2.2 mm, with a depth of 2.2 mm from the skin, with  a flow volume of 331 mL/m. In the mid upper arm fistula has a diameter  of 3.2 mm, with a depth from the skin of 1.4 mm, and a flow volume of  651 mL/m. There is a valve noted at this point. There is also noted to  be branch laterally coming off in the mid upper arm. In the mid/proximal  upper arm the diameter is 4.1 mm with a flow rate of 1090 mL/m. In the  proximal upper arm the fistula has a diameter of 4.0 mm with a depth  from the surface of 3.3 mm. There is no evidence of significant stenosis  at any point.       Impression:       Widely patent left arm arteriovenous fistula with parameters  as above..  This report was finalized on 10/10/2018 17:56 by Dr. Hugo Bonilla MD.          I have reviewed the patient's current medications.     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%)  7) Acute kidney injury on chronic kidney disease stage 4 - Suspected cardiorenal   8) Chronic normocytic anemia due to CKD  9) Thrombocytopenia, improved  10) Mild hypokalemia, resolved  11) Hyperphosphotemia  12) Metabolic acidosis, anion gap due to uremia     Plan:  1) Cardiology note reviewed  2) Nephrology consult, may require dialysis - Note reviewed   3) D/C vanc on 10/8  4) Continue cefepime - 7 days total   5) Procal 8.86, continue antibiotics as above   6) Monitor H&H, stable  7) Thoracentesis 10/7 - 400 cc removed   8) Urine culture yeast - Likely colonization  9) Blood cultures and pleural flood cultures pending  10) Continue PO bicarb  11) Continue PO bumex  12) Dialysis per nephrology  13) Awaiting placement          Discharge Planning: I expect the patient to be discharged to pending placement    Vu Michael MD   10/11/18   10:18 AM

## 2018-10-11 NOTE — PROGRESS NOTES
Nephrology ([facility]) Progress Note      Patient:  Gavin Wilson  YOB: 1945  Date of Service: 10/11/2018  MRN: [id number]   Acct: [id number]   Primary Care Physician: Provider, No Known  Advance Directive:   Code Status and Medical Interventions:   Ordered at: 10/07/18 1309     Limited Support to NOT Include:    Intubation     Level Of Support Discussed With:    Patient     Code Status:    No CPR     Medical Interventions (Level of Support Prior to Arrest):    Limited     Admit Date: 10/6/2018       Hospital Day: 5  Referring Provider: Max Jones*      Patient personally seen and examined.  Complete chart including Consults, Notes, Operative Reports, Labs, Cardiology, and Radiology studies reviewed as able.        Subjective:  Gavin Wilson is a 73 y.o. male  whom we were consulted for management of chronic kidney disease stage IV.  He has a history of bladder cancer s/p cystectomy and ureterostomy formation. He also has a history of esophogeal and prostate cancer.   Patient goes to [facility] in [place] and sees nephrology.  He already has a left upper arm primary fistula in preparation for upcoming dialysis.  Presented with increasing shortness of breath, dyspnea on exertion.  He was diagnosed with bilateral pneumonia/pleural effusion.  He is currently being treated for both, getting IV antibiotics and intravenous diuretics. His serum creatinine is 3.2 mg.  He states when he was seen by Nephrology last his GFR was 20 and it has been for two years.  Nephrology was consulted for evaluation and treatment of chronic kidney disease.     Patient went down to HD on 10/10/18 and unfortunately when the nurses attempted to access his fistula it infiltrated and they were unable to dialyze. He went this am for permacath placement.  Patient just returned from OR and is still lethargic.   Plan to dialyze today.         Allergies:  Sulfa  antibiotics    Home Meds:  Prescriptions Prior to Admission   Medication Sig Dispense Refill Last Dose   • acetaminophen (TYLENOL) 325 MG tablet Take 650 mg by mouth Every 6 (Six) Hours As Needed for Mild Pain .   Past Month at Unknown time   • albuterol (PROVENTIL HFA;VENTOLIN HFA) 108 (90 Base) MCG/ACT inhaler Inhale 2 puffs Every 6 (Six) Hours As Needed for Wheezing.   Past Week at Unknown time   • aspirin 81 MG chewable tablet Chew 81 mg Daily.   10/5/2018 at Unknown time   • atorvastatin (LIPITOR) 80 MG tablet Take 40 mg by mouth Daily.   10/5/2018 at Unknown time   • budesonide-formoterol (SYMBICORT) 160-4.5 MCG/ACT inhaler Inhale 2 puffs 2 (Two) Times a Day. 1 inhaler 2 10/5/2018 at Unknown time   • bumetanide (BUMEX) 2 MG tablet Take 1 tablet by mouth Daily. 30 tablet 0 10/5/2018 at Unknown time   • calcium carbonate (OS-TASHI) 600 MG tablet Take 600 mg by mouth Daily.   10/5/2018 at Unknown time   • cholecalciferol (VITAMIN D3) 1000 units tablet Take 3,000 Units by mouth Daily.   10/5/2018 at Unknown time   • insulin aspart (novoLOG FLEXPEN) 100 UNIT/ML solution pen-injector sc pen Inject 6 Units under the skin into the appropriate area as directed 3 (Three) Times a Day With Meals.   10/5/2018 at Unknown time   • insulin detemir (LEVEMIR) 100 UNIT/ML injection Inject 6 Units under the skin into the appropriate area as directed 2 (Two) Times a Day.   10/5/2018 at Unknown time   • loperamide (IMODIUM) 2 MG capsule Take 2 mg by mouth Daily As Needed for Diarrhea.   Past Month at Unknown time   • metoprolol tartrate (LOPRESSOR) 25 MG tablet Take 12.5 mg by mouth 2 (Two) Times a Day.   10/5/2018 at Unknown time   • omeprazole (priLOSEC) 20 MG capsule Take 20 mg by mouth Daily.   10/5/2018 at Unknown time   • oxyCODONE (ROXICODONE) 5 MG immediate release tablet Take 5 mg by mouth Every 6 (Six) Hours As Needed for Moderate Pain .   Past Week at Unknown time   • sildenafil (VIAGRA) 100 MG tablet Take 100 mg by mouth  Daily As Needed for erectile dysfunction.   More than a month at Unknown time       Medicines:  Current Facility-Administered Medications   Medication Dose Route Frequency Provider Last Rate Last Dose   • acetaminophen (TYLENOL) tablet 650 mg  650 mg Oral Q4H PRN Max Jones MD       • aspirin chewable tablet 81 mg  81 mg Oral Daily Vu Michael MD   81 mg at 10/10/18 1012   • atorvastatin (LIPITOR) tablet 80 mg  80 mg Oral Daily Max Jones MD   80 mg at 10/10/18 1011   • budesonide-formoterol (SYMBICORT) 160-4.5 MCG/ACT inhaler 2 puff  2 puff Inhalation BID - RT Max Jones MD   2 puff at 10/10/18 2105   • bumetanide (BUMEX) tablet 1 mg  1 mg Oral BID Vu Michael MD   1 mg at 10/10/18 1723   • calcitriol (ROCALTROL) capsule 0.5 mcg  0.5 mcg Oral Daily Max Jones MD   0.5 mcg at 10/10/18 1010   • calcium carb-cholecalciferol 600-800 MG-UNIT tablet 1 tablet  1 tablet Oral Daily Max Jones MD   1 tablet at 10/10/18 1010   • cefepime (MAXIPIME) 2 g/100 mL 0.9% NS (mbp)  2 g Intravenous Q24H Vu Michael MD   Stopped at 10/10/18 1426   • dextrose (D50W) 25 g/ 50mL Intravenous Solution 25 g  25 g Intravenous Q15 Min PRN Max Jones MD       • dextrose (GLUTOSE) oral gel 15 g  15 g Oral Q15 Min PRN Max Jones MD       • Ferric Citrate tablet 210 mg  210 mg Oral TID With Meals Max Jones MD   210 mg at 10/10/18 1723   • glucagon (human recombinant) (GLUCAGEN DIAGNOSTIC) injection 1 mg  1 mg Subcutaneous PRN Max Jones MD       • heparin (porcine) 5000 UNIT/ML injection 5,000 Units  5,000 Units Subcutaneous Q12H Max Jones MD   5,000 Units at 10/10/18 2112   • heparin (porcine) injection 1,700 Units  1,700 Units Intracatheter aSmson Singh MD       • heparin (porcine) injection 1,700 Units  1,700 Units Intracatheter Samson Singh  MD Oscar       • hydrALAZINE (APRESOLINE) tablet 10 mg  10 mg Oral Q8H Abdiel Stout MD   10 mg at 10/11/18 0529   • HYDROcodone-acetaminophen (NORCO)  MG per tablet 1 tablet  1 tablet Oral Q6H PRN Max Jones MD   1 tablet at 10/10/18 1816   • hydroxypropyl methylcellulose (ISOPTO TEARS) 2.5 % ophthalmic solution 1 drop  1 drop Both Eyes TID PRN Vu Michael MD   1 drop at 10/11/18 0636   • Influenza Vac Subunit Quad (FLUCELVAX) injection 0.5 mL  0.5 mL Intramuscular During Hospitalization Max Jones MD       • insulin lispro (humaLOG) injection 0-9 Units  0-9 Units Subcutaneous 4x Daily With Meals & Nightly Max Jones MD   4 Units at 10/10/18 2112   • insulin lispro (humaLOG) injection 6 Units  6 Units Subcutaneous TID AC Max Jones MD   6 Units at 10/10/18 1723   • ipratropium-albuterol (DUO-NEB) nebulizer solution 3 mL  3 mL Nebulization 4x Daily - RT Vu Michael MD   3 mL at 10/11/18 1119   • isosorbide dinitrate (ISORDIL) tablet 10 mg  10 mg Oral TID - Nitrates Abdiel Stout MD   10 mg at 10/10/18 1723   • magnesium hydroxide (MILK OF MAGNESIA) suspension 2400 mg/10mL 10 mL  10 mL Oral Daily PRN Max Jones MD       • metoprolol tartrate (LOPRESSOR) tablet 25 mg  25 mg Oral Q12H Max Jones MD   25 mg at 10/10/18 2112   • nicotine (NICODERM CQ) 21 MG/24HR patch 1 patch  1 patch Transdermal Q24H Ashutosh Palafox MD   1 patch at 10/10/18 2111   • ondansetron (ZOFRAN) injection 4 mg  4 mg Intravenous Q6H PRN Max Jones MD   4 mg at 10/11/18 0635   • Pharmacy Consult - Pharmacy to dose   Does not apply Continuous PRN Vu Michael MD       • potassium chloride (MICRO-K) CR capsule 20 mEq  20 mEq Oral Daily Vu Michael MD   20 mEq at 10/10/18 1010   • sennosides-docusate sodium (SENOKOT-S) 8.6-50 MG tablet 1 tablet  1 tablet Oral BID Vu Michael MD   1 tablet at 10/10/18  2112   • sodium bicarbonate tablet 650 mg  650 mg Oral BID Delmy MullinsMACO   650 mg at 10/10/18 2112   • sodium chloride 0.9 % flush 3 mL  3 mL Intravenous Q12H Max Jones MD   3 mL at 10/10/18 2111   • sodium chloride 0.9 % flush 3-10 mL  3-10 mL Intravenous PRN Max Jones MD       • tetrahydrozoline 0.05 % ophthalmic solution 1 drop  1 drop Both Eyes 4x Daily PRN Ney De MD   1 drop at 10/10/18 0339       Past Medical History:  Past Medical History:   Diagnosis Date   • CHF (congestive heart failure) (CMS/HCC)    • Coronary stent occlusion    • Diabetes mellitus (CMS/HCC)    • Hyperlipidemia    • Hypertension    • Stroke (CMS/HCC)        Past Surgical History:  Past Surgical History:   Procedure Laterality Date   • BLADDER SURGERY     • ESOPHAGECTOMY         Family History  Family History   Problem Relation Age of Onset   • No Known Problems Father    • No Known Problems Mother        Social History  Social History     Social History   • Marital status:      Spouse name: N/A   • Number of children: N/A   • Years of education: N/A     Occupational History   • Not on file.     Social History Main Topics   • Smoking status: Current Every Day Smoker   • Smokeless tobacco: Never Used   • Alcohol use No   • Drug use: No   • Sexual activity: Not on file     Other Topics Concern   • Not on file     Social History Narrative   • No narrative on file         Review of Systems:  History obtained from chart review and the patient  General ROS: Patient complains of chills and fever  Respiratory ROS: positive for - cough and shortness of breath  negative for - hemoptysis  Cardiovascular ROS: positive for - chest pain and dyspnea on exertion  negative for - palpitations  Gastrointestinal ROS: No abdominal pain or melena. nausea  Genito-Urinary ROS: Urostomy   Objective:  /67 (BP Location: Right arm, Patient Position: Lying)   Pulse 94 Comment: post tx  Temp  "98.8 °F (37.1 °C) (Temporal Artery )   Resp 18   Ht 170.2 cm (67\")   Wt 48.2 kg (106 lb 3.2 oz)   SpO2 100% Comment: post tx  BMI 16.63 kg/m²     Intake/Output Summary (Last 24 hours) at 10/11/18 1625  Last data filed at 10/11/18 1442   Gross per 24 hour   Intake                0 ml   Output             1150 ml   Net            -1150 ml     General: awake/alert lethargic  Chest:  Diminished breath sounds, scattered rhonchi  CVS: regular rate and rhythm 2/6 systolic murmur  Abdominal: soft, nontender, normal bowel sounds  Extremities: no cyanosis or edema  Skin: warm and dry without rash      Labs:    Results from last 7 days  Lab Units 10/11/18  0355 10/10/18  0422 10/09/18  0306   WBC 10*3/mm3 4.56* 5.35 5.17   HEMOGLOBIN g/dL 9.1* 10.2* 9.8*   HEMATOCRIT % 28.7* 32.6* 30.4*   PLATELETS 10*3/mm3 190 173 180           Results from last 7 days  Lab Units 10/11/18  0355 10/10/18  0422 10/09/18  0306 10/08/18  0435  10/06/18  0545   SODIUM mmol/L 144 144 142 141  < > 141   POTASSIUM mmol/L 4.7 5.2 4.6 4.5  < > 3.6   CHLORIDE mmol/L 106 107 108 110  < > 113*   CO2 mmol/L 26.0 20.0* 20.0* 16.0*  < > 17.0*   BUN mg/dL 60* 59* 54* 50*  < > 46*   CREATININE mg/dL 4.06* 4.13* 3.70* 3.78*  < > 3.57*   CALCIUM mg/dL 9.7 9.1 8.7 8.5  < > 8.1*   BILIRUBIN mg/dL  --   --   --  0.4  --  0.5   ALK PHOS U/L  --   --   --  83  --  66   ALT (SGPT) U/L  --   --   --  53  --  30   AST (SGOT) U/L  --   --   --  27  --  20   GLUCOSE mg/dL 136* 139* 218* 131*  < > 170*   < > = values in this interval not displayed.    Radiology:   Imaging Results (last 72 hours)     Procedure Component Value Units Date/Time    US Renal Bilateral [[id number]] Collected:  10/08/18 0936     Updated:  10/08/18 0958    Narrative:       EXAMINATION: US RENAL BILATERAL- 10/8/2018 9:36 AM CDT     HISTORY: Acute renal insufficiency; R13.10-Dysphagia, unspecified.     REPORT: Sonographic images of the kidneys were obtained bilaterally.  There are no comparison " studies.     The proximal abdominal aorta is visualized and measures 2.7 cm in  diameter, there is moderate atherosclerosis of the aorta. The IVC  measures 2.2 cm in diameter, normal. Small bilateral pleural effusions  are present. The right kidney measures 7.6 x 2.9 x 3.5 cm and has  increased cortical echogenicity diffusely. No mass or hydronephrosis is  identified. At the inferior pole of the right kidney there is a tiny  cyst that measures 5 x 6 mm. This appears benign. Tiny scattered  nephrolithiasis are present on the right. Color flow imaging  demonstrates vascular flow within the right kidney. Left kidney measures  7.4 x 3.3 x 3.4 cm and has diffusely increased cortical echogenicity. No  mass or hydronephrosis is identified. Several tiny scattered  nephrolithiasis are present in the left kidney. Color flow imaging  demonstrates vascular flow within the left kidney. There is a small  amount of free fluid lateral to the liver.       Impression:       1. Bilateral renal atrophy with increased renal cortical echogenicity,  most likely related to chronic renal disease. No renal masses seen and  there is no hydronephrosis.  2. Small benign-appearing 5 x 6 mm cyst at the inferior pole the right  kidney.  3. There are small nonobstructing nephrolithiasis bilaterally.  4. Small bilateral pleural effusions.  5. Small amount of perihepatic ascites.     This report was finalized on 10/08/2018 09:55 by Dr. Lucian Jones MD.    XR Chest 1 View [[id number]] Collected:  10/07/18 1512     Updated:  10/07/18 1518    Narrative:       Exam:   XR CHEST 1 VW-       Date:  10/7/2018      History:  Male, age  73 years;questionable small pneumo status post  thora; R13.10-Dysphagia, unspecified     COMPARISON:  Chest x-ray dated earlier today     Findings :     Status post left thoracentesis, without measurable pneumothorax on this  examination. Right pleural effusion persists.       Impression:       Impression:     No measurable  pneumothorax.     This report was finalized on 10/07/2018 15:15 by Dr. Petra Mckinley MD.     Thoracentesis [[id number]] Collected:  10/07/18 1323    Specimen:  Body Fluid Updated:  10/07/18 1329    Narrative:       DATE OF PROCEDURE:  10/7/2018.     PREPROCEDURE DIAGNOSIS:  Left pleural effusion.  POSTPROCEDURE DIAGNOSIS:  Left pleural effusion.          INDICATIONS FOR PROCEDURE: Shortness of breath.     PROCEDURE:   1. Thoracentesis, diagnostic and therapeutic.  2. Ultrasound guidance for thoracentesis, imaging supervision and  interpretation.     ANESTHESIA: 1% lidocaine, injected locally.          COMPLICATIONS: None.        EXAMINATIONS FOR COMPARISON:  CT chest dated 10/6/2018.     DESCRIPTION OF PROCEDURE:   The risk and benefits of the procedure were explained to the patient.   Specifically, the risks of bleeding, infection, pneumothorax (possible  thoracostomy tube), and damage to surrounding structures were discussed  extensively. The patient's questions were answered. The patient opted to  proceed. Written and verbal consent were obtained from the patient.     TIME OUT was taken and the patient's name, medical record number, date  of birth, procedure, entry site, and listen allergies were confirmed.  The site of the procedure was confirmed and marked.      The leftposterior thorax was prepped and draped in sterile fashion. The  area was anesthetized with buffered lidocaine 2%.      A 5 Slovak 10 cm  catheter was inserted into the pleural effusion  using  ultrasound guidance. Approximately 400 mL of clear fluid was recovered  and sent to the pathology lab for analysis.      The patient tolerated the procedure well.   No immediate complications  were noted.       Impression:       Successful ultrasound guided leftthoracentesis. Approximately 400 mL of  clear fluid was recovered and sent to the pathology lab for analysis.   No immediate complications were noted.              This report was finalized on  10/07/2018 13:26 by Dr. Petra Mckinley MD.    XR Chest 1 View [[id number]] Collected:  10/07/18 1241     Updated:  10/07/18 1249    Narrative:       Exam:   XR CHEST 1 VW-       Date:  10/7/2018      History:  Male, age  73 years;POST THORACENTESIS; R13.10-Dysphagia,  unspecified     COMPARISON:  CT chest dated 10/60/18     Findings :  Interval left-sided thoracentesis. There is a questionable trace left  pneumothorax versus artifact on the left. The right lung is unchanged,  with moderate pleural effusion and associated opacities. The heart is  unchanged in size.       Impression:       Impression:     Interval left thoracentesis questionable trace left pneumothorax versus  averaging. Recommend repeat upright chest x-ray AP in one hour.     This report was finalized on 10/07/2018 12:46 by Dr. Petar Mckinley MD.    US Chest [[id number]] Updated:  10/07/18 1215    CT Chest Without Contrast [[id number]] Collected:  10/06/18 1752     Updated:  10/06/18 1759    Narrative:       EXAMINATION:   CT CHEST WO CONTRAST-  10/6/2018 5:52 PM CDT     CT CHEST WO CONTRAST- 10/6/2018 5:33 PM CDT     HISTORY: Shortness of breath     COMPARISON: August 12, 2018 DOSE LENGTH PRODUCT: 185 mGy cm. Automated  exposure control was also utilized to decrease patient radiation dose.     TECHNIQUE: Serial helical tomographic images of the chest were acquired.  Multiplanar reformatted images were provided for review.     FINDINGS:  The imaged portion of the neck and thyroid gland is unremarkable.      The upper lungs are clear.. The lower lobes are obscured by large  bilateral pleural effusions.. The trachea and bronchial tree are patent.     Cardiac silhouette may be mildly enlarged. Extensive vascular  calcifications noted in the aortic arch origin of the great vessels and  coronary arteries.. Endovascular aortic stent graft is noted below the  diaphragm.    .      No acute findings are seen in the bones or surrounding soft tissues.        .        Impression:       1. Large bilateral pleural effusions which essentially obscures the  lower lobes.  2. Extensive vascular calcifications present  3. Endovascular stent graft is present in the abdomen.        This report was finalized on 10/06/2018 17:55 by Dr. Duc Garibay MD.          Culture:  Blood Culture   Date Value Ref Range Status   10/06/2018 No growth at 2 days  Preliminary   10/06/2018 No growth at 2 days  Preliminary     Urine Culture   Date Value Ref Range Status   10/07/2018 >100,000 CFU/mL Yeast isolated (A)  Preliminary     MRSA SCREEN CX   Date Value Ref Range Status   10/06/2018 Staphylococcus aureus, MRSA (A)  Final     Comment:       Methicillin resistant Staphylococcus aureus, Patient may be an isolation risk.         Assessment   CKD IV secondary to diabetic nephropathy with worsening renal function  DM II  Bilateral pneumonia  Iron deficient anemia  Bilateral pleural effusion  Diastolic CHF   Metabolic Acidosis  Secondary Hyperparathyroidism    Plan:  Patient now has perm cath placed. LUE AVF not ready  Will start HD today  Am labs       Delmy Mullins, MACO  10/11/2018  2:00pm

## 2018-10-11 NOTE — PLAN OF CARE
Problem: Pneumonia (Adult)  Goal: Signs and Symptoms of Listed Potential Problems Will be Absent, Minimized or Managed (Pneumonia)  Outcome: Ongoing (interventions implemented as appropriate)      Problem: Fall Risk (Adult)  Goal: Absence of Fall  Outcome: Ongoing (interventions implemented as appropriate)      Problem: Skin Injury Risk (Adult)  Goal: Skin Health and Integrity  Outcome: Ongoing (interventions implemented as appropriate)      Problem: Nutrition, Imbalanced: Inadequate Oral Intake (Adult)  Goal: Improved Oral Intake  Outcome: Ongoing (interventions implemented as appropriate)    Goal: Prevent Further Weight Loss  Outcome: Ongoing (interventions implemented as appropriate)      Problem: Patient Care Overview  Goal: Plan of Care Review  Outcome: Ongoing (interventions implemented as appropriate)   10/11/18 0335   Plan of Care Review   Progress no change   OTHER   Outcome Summary VSS. S/ST  on tele. Contact precautions maintained. Pt NPO for Permacath placement today. IV ABX cont. No falls. Safety maintained. Will cont to monitor.    Coping/Psychosocial   Plan of Care Reviewed With patient

## 2018-10-11 NOTE — NURSING NOTE
"Dr. Bonilla at bedside talking to patient, Dr. Bonilla reviewed cxr, he stated \" x-ray looks good\"  "

## 2018-10-11 NOTE — PROGRESS NOTES
Continued Stay Note   [facility]     Patient Name: Gavin Wilson  MRN: [id number]  Today's Date: 10/11/2018    Admit Date: 10/6/2018          Discharge Plan     Row Name 10/11/18 1507       Plan    Plan Comments LEFT ANOTHER [facility]ENT [facility] FOR [facility] TO RETURN MY CALL TO SET UP D/C PLANNING NEEDS.                Discharge Codes    No documentation.           LINDSEY Larose

## 2018-10-12 LAB
ALBUMIN SERPL-MCNC: 3.4 G/DL (ref 3.5–5)
ANION GAP SERPL CALCULATED.3IONS-SCNC: 13 MMOL/L (ref 4–13)
BACTERIA FLD CULT: NORMAL
BACTERIA SPEC ANAEROBE CULT: NORMAL
BUN BLD-MCNC: 36 MG/DL (ref 5–21)
BUN/CREAT SERPL: 12.4 (ref 7–25)
CALCIUM SPEC-SCNC: 8.8 MG/DL (ref 8.4–10.4)
CHLORIDE SERPL-SCNC: 103 MMOL/L (ref 98–110)
CO2 SERPL-SCNC: 26 MMOL/L (ref 24–31)
CREAT BLD-MCNC: 2.9 MG/DL (ref 0.5–1.4)
CYTO UR: NORMAL
GFR SERPL CREATININE-BSD FRML MDRD: 21 ML/MIN/1.73
GLUCOSE BLD-MCNC: 114 MG/DL (ref 70–100)
GLUCOSE BLDC GLUCOMTR-MCNC: 107 MG/DL (ref 70–130)
GRAM STN SPEC: NORMAL
GRAM STN SPEC: NORMAL
LAB AP CASE REPORT: NORMAL
PATH REPORT.FINAL DX SPEC: NORMAL
PATH REPORT.GROSS SPEC: NORMAL
PHOSPHATE SERPL-MCNC: 3.6 MG/DL (ref 2.5–4.5)
POTASSIUM BLD-SCNC: 4.3 MMOL/L (ref 3.5–5.3)
SODIUM BLD-SCNC: 142 MMOL/L (ref 135–145)

## 2018-10-12 PROCEDURE — 25010000002 HEPARIN (PORCINE) PER 1000 UNITS: Performed by: INTERNAL MEDICINE

## 2018-10-12 PROCEDURE — 97110 THERAPEUTIC EXERCISES: CPT

## 2018-10-12 PROCEDURE — 94760 N-INVAS EAR/PLS OXIMETRY 1: CPT

## 2018-10-12 PROCEDURE — 97116 GAIT TRAINING THERAPY: CPT

## 2018-10-12 PROCEDURE — 99233 SBSQ HOSP IP/OBS HIGH 50: CPT | Performed by: INTERNAL MEDICINE

## 2018-10-12 PROCEDURE — 82962 GLUCOSE BLOOD TEST: CPT

## 2018-10-12 PROCEDURE — 25010000002 CEFEPIME PER 500 MG: Performed by: INTERNAL MEDICINE

## 2018-10-12 PROCEDURE — 5A1D70Z PERFORMANCE OF URINARY FILTRATION, INTERMITTENT, LESS THAN 6 HOURS PER DAY: ICD-10-PCS | Performed by: INTERNAL MEDICINE

## 2018-10-12 PROCEDURE — 80069 RENAL FUNCTION PANEL: CPT | Performed by: INTERNAL MEDICINE

## 2018-10-12 PROCEDURE — 94799 UNLISTED PULMONARY SVC/PX: CPT

## 2018-10-12 RX ORDER — HYDRALAZINE HYDROCHLORIDE 25 MG/1
25 TABLET, FILM COATED ORAL EVERY 8 HOURS SCHEDULED
Status: DISCONTINUED | OUTPATIENT
Start: 2018-10-12 | End: 2018-10-15

## 2018-10-12 RX ORDER — LISINOPRIL 2.5 MG/1
2.5 TABLET ORAL
Status: DISCONTINUED | OUTPATIENT
Start: 2018-10-12 | End: 2018-10-15

## 2018-10-12 RX ORDER — AMIODARONE HYDROCHLORIDE 200 MG/1
200 TABLET ORAL 3 TIMES DAILY
Status: DISCONTINUED | OUTPATIENT
Start: 2018-10-12 | End: 2018-10-15

## 2018-10-12 RX ADMIN — POTASSIUM CHLORIDE 20 MEQ: 750 CAPSULE, EXTENDED RELEASE ORAL at 10:42

## 2018-10-12 RX ADMIN — AMIODARONE HYDROCHLORIDE 200 MG: 200 TABLET ORAL at 19:45

## 2018-10-12 RX ADMIN — HYDROCODONE BITARTRATE AND ACETAMINOPHEN 1 TABLET: 10; 325 TABLET ORAL at 00:41

## 2018-10-12 RX ADMIN — DOCUSATE SODIUM -SENNOSIDES 1 TABLET: 50; 8.6 TABLET, COATED ORAL at 10:42

## 2018-10-12 RX ADMIN — LISINOPRIL 2.5 MG: 2.5 TABLET ORAL at 10:40

## 2018-10-12 RX ADMIN — CALCITRIOL CAPSULES 0.25 MCG 0.5 MCG: 0.25 CAPSULE ORAL at 10:41

## 2018-10-12 RX ADMIN — AMIODARONE HYDROCHLORIDE 200 MG: 200 TABLET ORAL at 10:41

## 2018-10-12 RX ADMIN — NICOTINE 1 PATCH: 21 PATCH, EXTENDED RELEASE TRANSDERMAL at 19:43

## 2018-10-12 RX ADMIN — METOPROLOL TARTRATE 25 MG: 25 TABLET, FILM COATED ORAL at 10:41

## 2018-10-12 RX ADMIN — BUDESONIDE AND FORMOTEROL FUMARATE DIHYDRATE 2 PUFF: 160; 4.5 AEROSOL RESPIRATORY (INHALATION) at 21:43

## 2018-10-12 RX ADMIN — BUMETANIDE 1 MG: 1 TABLET ORAL at 19:45

## 2018-10-12 RX ADMIN — METOPROLOL TARTRATE 25 MG: 25 TABLET, FILM COATED ORAL at 19:44

## 2018-10-12 RX ADMIN — CEFEPIME HYDROCHLORIDE 2 G: 2 INJECTION, POWDER, FOR SOLUTION INTRAVENOUS at 19:44

## 2018-10-12 RX ADMIN — FERRIC CITRATE 210 MG: 210 TABLET, COATED ORAL at 10:40

## 2018-10-12 RX ADMIN — HEPARIN SODIUM 1700 UNITS: 1000 INJECTION, SOLUTION INTRAVENOUS; SUBCUTANEOUS at 18:00

## 2018-10-12 RX ADMIN — HEPARIN SODIUM 5000 UNITS: 5000 INJECTION, SOLUTION INTRAVENOUS; SUBCUTANEOUS at 10:40

## 2018-10-12 RX ADMIN — ATORVASTATIN CALCIUM 80 MG: 40 TABLET, FILM COATED ORAL at 10:41

## 2018-10-12 RX ADMIN — Medication 1 TABLET: at 10:41

## 2018-10-12 RX ADMIN — ASPIRIN 81 MG CHEWABLE TABLET 81 MG: 81 TABLET CHEWABLE at 10:42

## 2018-10-12 RX ADMIN — SODIUM BICARBONATE 650 MG: 650 TABLET ORAL at 19:43

## 2018-10-12 RX ADMIN — HYDROCODONE BITARTRATE AND ACETAMINOPHEN 1 TABLET: 10; 325 TABLET ORAL at 19:31

## 2018-10-12 RX ADMIN — BUMETANIDE 1 MG: 1 TABLET ORAL at 10:42

## 2018-10-12 RX ADMIN — HYDRALAZINE HYDROCHLORIDE 10 MG: 10 TABLET ORAL at 06:20

## 2018-10-12 RX ADMIN — ISOSORBIDE DINITRATE 10 MG: 10 TABLET ORAL at 10:41

## 2018-10-12 RX ADMIN — Medication 3 ML: at 10:43

## 2018-10-12 RX ADMIN — HYDROCODONE BITARTRATE AND ACETAMINOPHEN 1 TABLET: 10; 325 TABLET ORAL at 06:30

## 2018-10-12 RX ADMIN — Medication 3 ML: at 19:54

## 2018-10-12 RX ADMIN — ISOSORBIDE DINITRATE 10 MG: 10 TABLET ORAL at 19:44

## 2018-10-12 RX ADMIN — SODIUM BICARBONATE 650 MG: 650 TABLET ORAL at 10:42

## 2018-10-12 RX ADMIN — FERRIC CITRATE 210 MG: 210 TABLET, COATED ORAL at 20:05

## 2018-10-12 RX ADMIN — HEPARIN SODIUM 1700 UNITS: 1000 INJECTION INTRAVENOUS; SUBCUTANEOUS at 18:02

## 2018-10-12 RX ADMIN — HEPARIN SODIUM 5000 UNITS: 5000 INJECTION, SOLUTION INTRAVENOUS; SUBCUTANEOUS at 19:44

## 2018-10-12 RX ADMIN — BUDESONIDE AND FORMOTEROL FUMARATE DIHYDRATE 2 PUFF: 160; 4.5 AEROSOL RESPIRATORY (INHALATION) at 07:59

## 2018-10-12 NOTE — PLAN OF CARE
Problem: Patient Care Overview  Goal: Plan of Care Review  Outcome: Ongoing (interventions implemented as appropriate)   10/12/18 1319   Plan of Care Review   Progress no change   OTHER   Outcome Summary Pt. agreeable to therapy. Pt. was CGA-MIN x 1 for bed moblity. Pt. stood with CGA x 1 and walked 10' in room with RWX. Pt. declined to do anything more. Will continue to work with pt. and progress as able.    Coping/Psychosocial   Plan of Care Reviewed With patient

## 2018-10-12 NOTE — PROGRESS NOTES
Nephrology ([facility]) Progress Note      Patient:  Gavin Wilson  YOB: 1945  Date of Service: 10/12/2018  MRN: [id number]   Acct: [id number]   Primary Care Physician: Provider, No Known  Advance Directive:   Code Status and Medical Interventions:   Ordered at: 10/07/18 1309     Limited Support to NOT Include:    Intubation     Level Of Support Discussed With:    Patient     Code Status:    No CPR     Medical Interventions (Level of Support Prior to Arrest):    Limited     Admit Date: 10/6/2018       Hospital Day: 6  Referring Provider: Max Jones*      Patient personally seen and examined.  Complete chart including Consults, Notes, Operative Reports, Labs, Cardiology, and Radiology studies reviewed as able.        Subjective:  Gavin Wilson is a 73 y.o. male  whom we were consulted for management of chronic kidney disease stage IV.  He has a history of bladder cancer s/p cystectomy and ureterostomy formation. He also has a history of esophogeal and prostate cancer.   Patient goes to [facility] in [place] and sees nephrology.  He already has a left upper arm primary fistula in preparation for upcoming dialysis.  Presented with increasing shortness of breath, dyspnea on exertion.  He was diagnosed with bilateral pneumonia/pleural effusion.  He is currently being treated for both, getting IV antibiotics and intravenous diuretics. His serum creatinine is 3.2 mg.  He states when he was seen by Nephrology last his GFR was 20 and it has been for two years.  Nephrology was consulted for evaluation and treatment of chronic kidney disease.     Patient went down to HD on 10/10/18 and unfortunately when the nurses attempted to access his fistula it infiltrated and they were unable to dialyze. He had a permacath placed 10/11/18 and did receive his first dialysis treatment.   He tolerated it well. He did have a run of Vtach during his treatment and  Cardiology was notified.   He states he feels slight better today.        Allergies:  Sulfa antibiotics    Home Meds:  Prescriptions Prior to Admission   Medication Sig Dispense Refill Last Dose   • acetaminophen (TYLENOL) 325 MG tablet Take 650 mg by mouth Every 6 (Six) Hours As Needed for Mild Pain .   Past Month at Unknown time   • albuterol (PROVENTIL HFA;VENTOLIN HFA) 108 (90 Base) MCG/ACT inhaler Inhale 2 puffs Every 6 (Six) Hours As Needed for Wheezing.   Past Week at Unknown time   • aspirin 81 MG chewable tablet Chew 81 mg Daily.   10/5/2018 at Unknown time   • atorvastatin (LIPITOR) 80 MG tablet Take 40 mg by mouth Daily.   10/5/2018 at Unknown time   • budesonide-formoterol (SYMBICORT) 160-4.5 MCG/ACT inhaler Inhale 2 puffs 2 (Two) Times a Day. 1 inhaler 2 10/5/2018 at Unknown time   • bumetanide (BUMEX) 2 MG tablet Take 1 tablet by mouth Daily. 30 tablet 0 10/5/2018 at Unknown time   • calcium carbonate (OS-TASHI) 600 MG tablet Take 600 mg by mouth Daily.   10/5/2018 at Unknown time   • cholecalciferol (VITAMIN D3) 1000 units tablet Take 3,000 Units by mouth Daily.   10/5/2018 at Unknown time   • insulin aspart (novoLOG FLEXPEN) 100 UNIT/ML solution pen-injector sc pen Inject 6 Units under the skin into the appropriate area as directed 3 (Three) Times a Day With Meals.   10/5/2018 at Unknown time   • insulin detemir (LEVEMIR) 100 UNIT/ML injection Inject 6 Units under the skin into the appropriate area as directed 2 (Two) Times a Day.   10/5/2018 at Unknown time   • loperamide (IMODIUM) 2 MG capsule Take 2 mg by mouth Daily As Needed for Diarrhea.   Past Month at Unknown time   • metoprolol tartrate (LOPRESSOR) 25 MG tablet Take 12.5 mg by mouth 2 (Two) Times a Day.   10/5/2018 at Unknown time   • omeprazole (priLOSEC) 20 MG capsule Take 20 mg by mouth Daily.   10/5/2018 at Unknown time   • oxyCODONE (ROXICODONE) 5 MG immediate release tablet Take 5 mg by mouth Every 6 (Six) Hours As Needed for Moderate  Pain .   Past Week at Unknown time   • sildenafil (VIAGRA) 100 MG tablet Take 100 mg by mouth Daily As Needed for erectile dysfunction.   More than a month at Unknown time       Medicines:  Current Facility-Administered Medications   Medication Dose Route Frequency Provider Last Rate Last Dose   • acetaminophen (TYLENOL) tablet 650 mg  650 mg Oral Q4H PRN Max Jones MD       • amiodarone (PACERONE) tablet 200 mg  200 mg Oral TID Abdiel Stout MD   200 mg at 10/12/18 1041   • aspirin chewable tablet 81 mg  81 mg Oral Daily Vu Michael MD   81 mg at 10/12/18 1042   • atorvastatin (LIPITOR) tablet 80 mg  80 mg Oral Daily Max Jones MD   80 mg at 10/12/18 1041   • budesonide-formoterol (SYMBICORT) 160-4.5 MCG/ACT inhaler 2 puff  2 puff Inhalation BID - RT Max Jones MD   2 puff at 10/12/18 0759   • bumetanide (BUMEX) tablet 1 mg  1 mg Oral BID Vu Michael MD   1 mg at 10/12/18 1042   • calcitriol (ROCALTROL) capsule 0.5 mcg  0.5 mcg Oral Daily Max Jones MD   0.5 mcg at 10/12/18 1041   • calcium carb-cholecalciferol 600-800 MG-UNIT tablet 1 tablet  1 tablet Oral Daily Max Jones MD   1 tablet at 10/12/18 1041   • cefepime (MAXIPIME) 2 g/100 mL 0.9% NS (mbp)  2 g Intravenous Q24H Vu Michael MD   Stopped at 10/10/18 1426   • dextrose (D50W) 25 g/ 50mL Intravenous Solution 25 g  25 g Intravenous Q15 Min PRN Max Jones MD       • dextrose (GLUTOSE) oral gel 15 g  15 g Oral Q15 Min PRN Max Jones MD       • Ferric Citrate tablet 210 mg  210 mg Oral TID With Meals Max Jones MD   210 mg at 10/12/18 1040   • glucagon (human recombinant) (GLUCAGEN DIAGNOSTIC) injection 1 mg  1 mg Subcutaneous PRN Max Jones MD       • heparin (porcine) 5000 UNIT/ML injection 5,000 Units  5,000 Units Subcutaneous Q12H Max Jones MD   5,000 Units at 10/12/18 1040   • heparin  (porcine) injection 1,700 Units  1,700 Units Intracatheter PRN Samson Aragon MD   1,700 Units at 10/11/18 1640   • heparin (porcine) injection 1,700 Units  1,700 Units Intracatheter PRN Samson Aragon MD   1,700 Units at 10/11/18 1639   • hydrALAZINE (APRESOLINE) tablet 25 mg  25 mg Oral Q8H Abdiel Stout MD       • HYDROcodone-acetaminophen (NORCO)  MG per tablet 1 tablet  1 tablet Oral Q6H PRN Max Jones MD   1 tablet at 10/12/18 0630   • hydroxypropyl methylcellulose (ISOPTO TEARS) 2.5 % ophthalmic solution 1 drop  1 drop Both Eyes TID PRN Vu Michael MD   1 drop at 10/11/18 0636   • Influenza Vac Subunit Quad (FLUCELVAX) injection 0.5 mL  0.5 mL Intramuscular During Hospitalization Max Jones MD       • insulin lispro (humaLOG) injection 0-9 Units  0-9 Units Subcutaneous 4x Daily With Meals & Nightly Max Jones MD   4 Units at 10/10/18 2112   • insulin lispro (humaLOG) injection 6 Units  6 Units Subcutaneous TID AC Max Jones MD   6 Units at 10/10/18 1723   • ipratropium (ATROVENT) nebulizer solution 0.5 mg  0.5 mg Nebulization Q6H PRN Abdiel Stout MD       • isosorbide dinitrate (ISORDIL) tablet 10 mg  10 mg Oral TID - Nitrates Abdiel Stout MD   10 mg at 10/12/18 1041   • lisinopril (PRINIVIL,ZESTRIL) tablet 2.5 mg  2.5 mg Oral Q24H Abdiel Stout MD   2.5 mg at 10/12/18 1040   • magnesium hydroxide (MILK OF MAGNESIA) suspension 2400 mg/10mL 10 mL  10 mL Oral Daily PRN Max Jones MD       • metoprolol tartrate (LOPRESSOR) tablet 25 mg  25 mg Oral Q12H Max Jones MD   25 mg at 10/12/18 1041   • nicotine (NICODERM CQ) 21 MG/24HR patch 1 patch  1 patch Transdermal Q24H Ashutosh Palafox MD   1 patch at 10/11/18 2135   • Pharmacy Consult - Pharmacy to dose   Does not apply Continuous PRN Vu Michael MD       • potassium chloride (MICRO-K) CR capsule 20 mEq  20 mEq Oral Daily  Vu Michael MD   20 mEq at 10/12/18 1042   • sennosides-docusate sodium (SENOKOT-S) 8.6-50 MG tablet 1 tablet  1 tablet Oral BID Vu Michael MD   1 tablet at 10/12/18 1042   • sodium bicarbonate tablet 650 mg  650 mg Oral BID Delmy Mullins APRN   650 mg at 10/12/18 1042   • sodium chloride 0.9 % flush 3 mL  3 mL Intravenous Q12H Max Jones MD   3 mL at 10/12/18 1043   • sodium chloride 0.9 % flush 3-10 mL  3-10 mL Intravenous PRN Max Jones MD       • tetrahydrozoline 0.05 % ophthalmic solution 1 drop  1 drop Both Eyes 4x Daily PRN Ney De MD   1 drop at 10/10/18 0339       Past Medical History:  Past Medical History:   Diagnosis Date   • CHF (congestive heart failure) (CMS/HCC)    • Coronary stent occlusion    • Diabetes mellitus (CMS/[facility])    • Hyperlipidemia    • Hypertension    • Stroke (CMS/[facility])        Past Surgical History:  Past Surgical History:   Procedure Laterality Date   • BLADDER SURGERY     • ESOPHAGECTOMY     • INSERTION HEMODIALYSIS CATHETER N/A 10/11/2018    Procedure: HEMODIALYSIS CATHETER INSERTION;  Surgeon: Hugo Bonilla MD;  Location: [place] [address];  Service: Vascular       Family History  Family History   Problem Relation Age of Onset   • No Known Problems Father    • No Known Problems Mother        Social History  Social History     Social History   • Marital status:      Spouse name: N/A   • Number of children: N/A   • Years of education: N/A     Occupational History   • Not on file.     Social History Main Topics   • Smoking status: Current Every Day Smoker   • Smokeless tobacco: Never Used   • Alcohol use No   • Drug use: No   • Sexual activity: Not on file     Other Topics Concern   • Not on file     Social History Narrative   • No narrative on file         Review of Systems:  History obtained from chart review and the patient  General ROS: Patient complains of chills   Respiratory ROS: positive for -  "cough and shortness of breath  negative for - hemoptysis  Cardiovascular ROS: positive for - chest pain and dyspnea on exertion  negative for - palpitations  Gastrointestinal ROS: No abdominal pain or melena. nausea  Genito-Urinary ROS: Urostomy   Objective:  /54 (BP Location: Right arm, Patient Position: Lying)   Pulse 92   Temp 97.7 °F (36.5 °C) (Temporal Artery )   Resp 18   Ht 170.2 cm (67\")   Wt 48.1 kg (106 lb)   SpO2 94%   BMI 16.60 kg/m²     Intake/Output Summary (Last 24 hours) at 10/12/18 1235  Last data filed at 10/12/18 0600   Gross per 24 hour   Intake                0 ml   Output              275 ml   Net             -275 ml     General: awake/alert lethargic  Chest:  Diminished breath sounds, scattered rhonchi  CVS: regular rate and rhythm 2/6 systolic murmur  Abdominal: soft, nontender, normal bowel sounds  Extremities: no cyanosis or edema  Skin: warm and dry without rash      Labs:    Results from last 7 days  Lab Units 10/11/18  0355 10/10/18  0422 10/09/18  0306   WBC 10*3/mm3 4.56* 5.35 5.17   HEMOGLOBIN g/dL 9.1* 10.2* 9.8*   HEMATOCRIT % 28.7* 32.6* 30.4*   PLATELETS 10*3/mm3 190 173 180           Results from last 7 days  Lab Units 10/12/18  0437 10/11/18  0355 10/10/18  0422  10/08/18  0435  10/06/18  0545   SODIUM mmol/L 142 144 144  < > 141  < > 141   POTASSIUM mmol/L 4.3 4.7 5.2  < > 4.5  < > 3.6   CHLORIDE mmol/L 103 106 107  < > 110  < > 113*   CO2 mmol/L 26.0 26.0 20.0*  < > 16.0*  < > 17.0*   BUN mg/dL 36* 60* 59*  < > 50*  < > 46*   CREATININE mg/dL 2.90* 4.06* 4.13*  < > 3.78*  < > 3.57*   CALCIUM mg/dL 8.8 9.7 9.1  < > 8.5  < > 8.1*   BILIRUBIN mg/dL  --   --   --   --  0.4  --  0.5   ALK PHOS U/L  --   --   --   --  83  --  66   ALT (SGPT) U/L  --   --   --   --  53  --  30   AST (SGOT) U/L  --   --   --   --  27  --  20   GLUCOSE mg/dL 114* 136* 139*  < > 131*  < > 170*   < > = values in this interval not displayed.    Radiology:   Imaging Results (last 72 hours)     " Procedure Component Value Units Date/Time    US Renal Bilateral [[id number]] Collected:  10/08/18 0936     Updated:  10/08/18 0958    Narrative:       EXAMINATION: US RENAL BILATERAL- 10/8/2018 9:36 AM CDT     HISTORY: Acute renal insufficiency; R13.10-Dysphagia, unspecified.     REPORT: Sonographic images of the kidneys were obtained bilaterally.  There are no comparison studies.     The proximal abdominal aorta is visualized and measures 2.7 cm in  diameter, there is moderate atherosclerosis of the aorta. The IVC  measures 2.2 cm in diameter, normal. Small bilateral pleural effusions  are present. The right kidney measures 7.6 x 2.9 x 3.5 cm and has  increased cortical echogenicity diffusely. No mass or hydronephrosis is  identified. At the inferior pole of the right kidney there is a tiny  cyst that measures 5 x 6 mm. This appears benign. Tiny scattered  nephrolithiasis are present on the right. Color flow imaging  demonstrates vascular flow within the right kidney. Left kidney measures  7.4 x 3.3 x 3.4 cm and has diffusely increased cortical echogenicity. No  mass or hydronephrosis is identified. Several tiny scattered  nephrolithiasis are present in the left kidney. Color flow imaging  demonstrates vascular flow within the left kidney. There is a small  amount of free fluid lateral to the liver.       Impression:       1. Bilateral renal atrophy with increased renal cortical echogenicity,  most likely related to chronic renal disease. No renal masses seen and  there is no hydronephrosis.  2. Small benign-appearing 5 x 6 mm cyst at the inferior pole the right  kidney.  3. There are small nonobstructing nephrolithiasis bilaterally.  4. Small bilateral pleural effusions.  5. Small amount of perihepatic ascites.     This report was finalized on 10/08/2018 09:55 by Dr. Lucian Jones MD.    XR Chest 1 View [[id number]] Collected:  10/07/18 1512     Updated:  10/07/18 1518    Narrative:       Exam:   XR CHEST 1 VW-        Date:  10/7/2018      History:  Male, age  73 years;questionable small pneumo status post  thora; R13.10-Dysphagia, unspecified     COMPARISON:  Chest x-ray dated earlier today     Findings :     Status post left thoracentesis, without measurable pneumothorax on this  examination. Right pleural effusion persists.       Impression:       Impression:     No measurable pneumothorax.     This report was finalized on 10/07/2018 15:15 by Dr. Petra Mckinley MD.    US Thoracentesis [[id number]] Collected:  10/07/18 1323    Specimen:  Body Fluid Updated:  10/07/18 1329    Narrative:       DATE OF PROCEDURE:  10/7/2018.     PREPROCEDURE DIAGNOSIS:  Left pleural effusion.  POSTPROCEDURE DIAGNOSIS:  Left pleural effusion.          INDICATIONS FOR PROCEDURE: Shortness of breath.     PROCEDURE:   1. Thoracentesis, diagnostic and therapeutic.  2. Ultrasound guidance for thoracentesis, imaging supervision and  interpretation.     ANESTHESIA: 1% lidocaine, injected locally.          COMPLICATIONS: None.        EXAMINATIONS FOR COMPARISON:  CT chest dated 10/6/2018.     DESCRIPTION OF PROCEDURE:   The risk and benefits of the procedure were explained to the patient.   Specifically, the risks of bleeding, infection, pneumothorax (possible  thoracostomy tube), and damage to surrounding structures were discussed  extensively. The patient's questions were answered. The patient opted to  proceed. Written and verbal consent were obtained from the patient.     TIME OUT was taken and the patient's name, medical record number, date  of birth, procedure, entry site, and listen allergies were confirmed.  The site of the procedure was confirmed and marked.      The leftposterior thorax was prepped and draped in sterile fashion. The  area was anesthetized with buffered lidocaine 2%.      A 5 Filipino 10 cm  catheter was inserted into the pleural effusion  using  ultrasound guidance. Approximately 400 mL of clear fluid was recovered  and sent  to the pathology lab for analysis.      The patient tolerated the procedure well.   No immediate complications  were noted.       Impression:       Successful ultrasound guided leftthoracentesis. Approximately 400 mL of  clear fluid was recovered and sent to the pathology lab for analysis.   No immediate complications were noted.              This report was finalized on 10/07/2018 13:26 by Dr. Petra Mckinley MD.    XR Chest 1 View [[id number]] Collected:  10/07/18 1241     Updated:  10/07/18 1249    Narrative:       Exam:   XR CHEST 1 VW-       Date:  10/7/2018      History:  Male, age  73 years;POST THORACENTESIS; R13.10-Dysphagia,  unspecified     COMPARISON:  CT chest dated 10/60/18     Findings :  Interval left-sided thoracentesis. There is a questionable trace left  pneumothorax versus artifact on the left. The right lung is unchanged,  with moderate pleural effusion and associated opacities. The heart is  unchanged in size.       Impression:       Impression:     Interval left thoracentesis questionable trace left pneumothorax versus  averaging. Recommend repeat upright chest x-ray AP in one hour.     This report was finalized on 10/07/2018 12:46 by Dr. Petra Mckinley MD.    US Chest [[id number]] Updated:  10/07/18 1215    CT Chest Without Contrast [[id number]] Collected:  10/06/18 1752     Updated:  10/06/18 1759    Narrative:       EXAMINATION:   CT CHEST WO CONTRAST-  10/6/2018 5:52 PM CDT     CT CHEST WO CONTRAST- 10/6/2018 5:33 PM CDT     HISTORY: Shortness of breath     COMPARISON: August 12, 2018 DOSE LENGTH PRODUCT: 185 mGy cm. Automated  exposure control was also utilized to decrease patient radiation dose.     TECHNIQUE: Serial helical tomographic images of the chest were acquired.  Multiplanar reformatted images were provided for review.     FINDINGS:  The imaged portion of the neck and thyroid gland is unremarkable.      The upper lungs are clear.. The lower lobes are obscured by large  bilateral  pleural effusions.. The trachea and bronchial tree are patent.     Cardiac silhouette may be mildly enlarged. Extensive vascular  calcifications noted in the aortic arch origin of the great vessels and  coronary arteries.. Endovascular aortic stent graft is noted below the  diaphragm.    .      No acute findings are seen in the bones or surrounding soft tissues.        .       Impression:       1. Large bilateral pleural effusions which essentially obscures the  lower lobes.  2. Extensive vascular calcifications present  3. Endovascular stent graft is present in the abdomen.        This report was finalized on 10/06/2018 17:55 by Dr. Duc Garibay MD.          Culture:  Blood Culture   Date Value Ref Range Status   10/06/2018 No growth at 2 days  Preliminary   10/06/2018 No growth at 2 days  Preliminary     Urine Culture   Date Value Ref Range Status   10/07/2018 >100,000 CFU/mL Yeast isolated (A)  Preliminary     MRSA SCREEN CX   Date Value Ref Range Status   10/06/2018 Staphylococcus aureus, MRSA (A)  Final     Comment:       Methicillin resistant Staphylococcus aureus, Patient may be an isolation risk.         Assessment   CKD IV secondary to diabetic nephropathy with worsening renal function  DM II  Bilateral pneumonia  Iron deficient anemia  Bilateral pleural effusion  Diastolic CHF   Metabolic Acidosis  Secondary Hyperparathyroidism  Vtach on 10/11/18    Plan:  HD today   Am labs       Delmy Mullins, MACO  10/12/2018  2:00pm

## 2018-10-12 NOTE — PROGRESS NOTES
Continued Stay Note   Jie     Patient Name: Gavin Wilson  MRN: [id number]  Today's Date: 10/12/2018    Admit Date: 10/6/2018          Discharge Plan     Row Name 10/12/18 1322       Plan    Plan SKILLED [facility]G FACILITY; PENDING INS APPROVAL    Patient/Family in Agreement with Plan yes    Plan Comments SPOKE TO [facility] MANAGER, FIORELLA WHO ADVISED THAT PT. % SERVICE CONNECTED AND WILL BE APPROVED FOR ALL SERVICES HE NEEDS HOWEVER, THERE HAS BEEN MISCOMMUNICATION ON WHERE PT'S STATUS INFO HAS BEEN SENT, THEREFORE LEFT A MSG FOR , ESTEVAN [phone] TO RETURN MY CALL TO GIVE ME THE CARE COORDINATOR CONTACT INFO TO SET UP HD AND NHP.  WILL FOLLOW.               Discharge Codes    No documentation.           LINDSEY Larose

## 2018-10-12 NOTE — PROGRESS NOTES
LOS: 6 days   Patient Care Team:  Provider, No Known as PCP - General    Chief Complaint:   Chronic kidney disease, stage 4 (severe) (CMS/[facility])    Sepsis (CMS/[facility])    Shortness of breath    Subjective    Continues to state that he does not feel well  Poor appetite  Permacath was placed  Started on dialysis  Had nonsustained ventricular tachycardia brief currently in sinus rhythm  Last left ventricular ejection fraction was 37%  Still weak and tired  No other new complaints  Anxious  Generalized weakness  Easy fatigability  Poor oral intake    Interval History: Improved overall    Telemetry: Transient nonsustained ventricular tachycardia    Review of Systems     Constitutional: No chills   Has fatigue   No fever.   HENT: Negative.    Eyes: Negative.      Respiratory: Positive for cough,   No chest wall soreness,   Shortness of breath,   no wheezing, no stridor.      Cardiovascular: Atypical chest pain,   No palpitations   No significant  leg swelling.     Gastrointestinal: Negative for abdominal distention,  No abdominal pain,   No blood in stool,   No constipation,   No diarrhea,   No nausea   No vomiting.     Endocrine: Negative.    Genitourinary: Negative for difficulty urinating, dysuria, flank pain and hematuria.     Musculoskeletal: Negative.    Skin: Negative for rash and wound.   Allergic/Immunologic: Negative.      Neurological: Negative for dizziness, syncope, weakness,   No light-headedness  No  headaches.     Hematological: Does not bruise/bleed easily.     Psychiatric/Behavioral: Negative for agitation or behavioral problems,   No confusion,   the patient is  nervous/anxious.       History:   Past Medical History:   Diagnosis Date   • CHF (congestive heart failure) (CMS/HCC)    • Coronary stent occlusion    • Diabetes mellitus (CMS/HCC)    • Hyperlipidemia    • Hypertension    • Stroke (CMS/[facility])      Past Surgical History:   Procedure Laterality Date   • BLADDER SURGERY     • ESOPHAGECTOMY       Social  History     Social History   • Marital status:      Spouse name: N/A   • Number of children: N/A   • Years of education: N/A     Occupational History   • Not on file.     Social History Main Topics   • Smoking status: Current Every Day Smoker   • Smokeless tobacco: Never Used   • Alcohol use No   • Drug use: No   • Sexual activity: Not on file     Other Topics Concern   • Not on file     Social History Narrative   • No narrative on file     Family History   Problem Relation Age of Onset   • No Known Problems Father    • No Known Problems Mother        Labs:  WBC WBC   Date Value Ref Range Status   10/11/2018 4.56 (L) 4.80 - 10.80 10*3/mm3 Final   10/10/2018 5.35 4.80 - 10.80 10*3/mm3 Final      HGB Hemoglobin   Date Value Ref Range Status   10/11/2018 9.1 (L) 14.0 - 18.0 g/dL Final   10/10/2018 10.2 (L) 14.0 - 18.0 g/dL Final      HCT Hematocrit   Date Value Ref Range Status   10/11/2018 28.7 (L) 40.0 - 52.0 % Final   10/10/2018 32.6 (L) 40.0 - 52.0 % Final      Platelets Platelets   Date Value Ref Range Status   10/11/2018 190 130 - 400 10*3/mm3 Final   10/10/2018 173 130 - 400 10*3/mm3 Final      MCV MCV   Date Value Ref Range Status   10/11/2018 89.4 82.0 - 95.0 fL Final   10/10/2018 90.8 82.0 - 95.0 fL Final          Results from last 7 days  Lab Units 10/12/18  0437 10/11/18  0355 10/10/18  0422  10/08/18  0435  10/06/18  0545   SODIUM mmol/L 142 144 144  < > 141  < > 141   POTASSIUM mmol/L 4.3 4.7 5.2  < > 4.5  < > 3.6   CHLORIDE mmol/L 103 106 107  < > 110  < > 113*   CO2 mmol/L 26.0 26.0 20.0*  < > 16.0*  < > 17.0*   BUN mg/dL 36* 60* 59*  < > 50*  < > 46*   CREATININE mg/dL 2.90* 4.06* 4.13*  < > 3.78*  < > 3.57*   CALCIUM mg/dL 8.8 9.7 9.1  < > 8.5  < > 8.1*   BILIRUBIN mg/dL  --   --   --   --  0.4  --  0.5   ALK PHOS U/L  --   --   --   --  83  --  66   ALT (SGPT) U/L  --   --   --   --  53  --  30   AST (SGOT) U/L  --   --   --   --  27  --  20   GLUCOSE mg/dL 114* 136* 139*  < > 131*  < > 170*    < > = values in this interval not displayed.  Lab Results   Component Value Date    TROPONINI 4.090 (C) 10/06/2018     PT/INR:    No results found for: PROTIME/  No results found for: INR    Imaging Results (last 72 hours)     Procedure Component Value Units Date/Time    CT Chest Without Contrast [[id number]] Collected:  10/06/18 1752     Updated:  10/06/18 1759    Narrative:       EXAMINATION:   CT CHEST WO CONTRAST-  10/6/2018 5:52 PM CDT     CT CHEST WO CONTRAST- 10/6/2018 5:33 PM CDT     HISTORY: Shortness of breath     COMPARISON: August 12, 2018 DOSE LENGTH PRODUCT: 185 mGy cm. Automated  exposure control was also utilized to decrease patient radiation dose.     TECHNIQUE: Serial helical tomographic images of the chest were acquired.  Multiplanar reformatted images were provided for review.     FINDINGS:  The imaged portion of the neck and thyroid gland is unremarkable.      The upper lungs are clear.. The lower lobes are obscured by large  bilateral pleural effusions.. The trachea and bronchial tree are patent.     Cardiac silhouette may be mildly enlarged. Extensive vascular  calcifications noted in the aortic arch origin of the great vessels and  coronary arteries.. Endovascular aortic stent graft is noted below the  diaphragm.    .      No acute findings are seen in the bones or surrounding soft tissues.        .       Impression:       1. Large bilateral pleural effusions which essentially obscures the  lower lobes.  2. Extensive vascular calcifications present  3. Endovascular stent graft is present in the abdomen.        This report was finalized on 10/06/2018 17:55 by Dr. Duc Garibay MD.          Objective     Allergies   Allergen Reactions   • Sulfa Antibiotics        Medication Review: Performed  Current Facility-Administered Medications   Medication Dose Route Frequency Provider Last Rate Last Dose   • acetaminophen (TYLENOL) tablet 650 mg  650 mg Oral Q4H PRN Max Jones MD        • aspirin chewable tablet 81 mg  81 mg Oral Daily Vu Michael MD   81 mg at 10/10/18 1012   • atorvastatin (LIPITOR) tablet 80 mg  80 mg Oral Daily Max Jones MD   80 mg at 10/10/18 1011   • budesonide-formoterol (SYMBICORT) 160-4.5 MCG/ACT inhaler 2 puff  2 puff Inhalation BID - RT Max Jones MD   2 puff at 10/10/18 2105   • bumetanide (BUMEX) tablet 1 mg  1 mg Oral BID Vu Michael MD   1 mg at 10/10/18 1723   • calcitriol (ROCALTROL) capsule 0.5 mcg  0.5 mcg Oral Daily Max Jones MD   0.5 mcg at 10/10/18 1010   • calcium carb-cholecalciferol 600-800 MG-UNIT tablet 1 tablet  1 tablet Oral Daily Max Jones MD   1 tablet at 10/10/18 1010   • cefepime (MAXIPIME) 2 g/100 mL 0.9% NS (mbp)  2 g Intravenous Q24H Vu Michael MD   Stopped at 10/10/18 1426   • dextrose (D50W) 25 g/ 50mL Intravenous Solution 25 g  25 g Intravenous Q15 Min PRN Max Jones MD       • dextrose (GLUTOSE) oral gel 15 g  15 g Oral Q15 Min PRN Max Jones MD       • Ferric Citrate tablet 210 mg  210 mg Oral TID With Meals Max Jones MD   210 mg at 10/10/18 1723   • glucagon (human recombinant) (GLUCAGEN DIAGNOSTIC) injection 1 mg  1 mg Subcutaneous PRN Max Jones MD       • heparin (porcine) 5000 UNIT/ML injection 5,000 Units  5,000 Units Subcutaneous Q12H Max Jones MD   5,000 Units at 10/11/18 2136   • heparin (porcine) injection 1,700 Units  1,700 Units Intracatheter PRN Samson Aragon MD   1,700 Units at 10/11/18 1640   • heparin (porcine) injection 1,700 Units  1,700 Units Intracatheter PRN Samson Aragon MD   1,700 Units at 10/11/18 1639   • hydrALAZINE (APRESOLINE) tablet 10 mg  10 mg Oral Q8H Abdiel Stout MD   10 mg at 10/12/18 0620   • HYDROcodone-acetaminophen (NORCO)  MG per tablet 1 tablet  1 tablet Oral Q6H PRN Max Jones MD   1 tablet  at 10/12/18 0630   • hydroxypropyl methylcellulose (ISOPTO TEARS) 2.5 % ophthalmic solution 1 drop  1 drop Both Eyes TID PRN Vu Michael MD   1 drop at 10/11/18 0636   • Influenza Vac Subunit Quad (FLUCELVAX) injection 0.5 mL  0.5 mL Intramuscular During Hospitalization Max Jones MD       • insulin lispro (humaLOG) injection 0-9 Units  0-9 Units Subcutaneous 4x Daily With Meals & Nightly Max Jones MD   4 Units at 10/10/18 2112   • insulin lispro (humaLOG) injection 6 Units  6 Units Subcutaneous TID AC Max Jones MD   6 Units at 10/10/18 1723   • ipratropium-albuterol (DUO-NEB) nebulizer solution 3 mL  3 mL Nebulization 4x Daily - RT Vu Michael MD   3 mL at 10/11/18 2037   • isosorbide dinitrate (ISORDIL) tablet 10 mg  10 mg Oral TID - Nitrates Abdiel Stout MD   10 mg at 10/10/18 1723   • magnesium hydroxide (MILK OF MAGNESIA) suspension 2400 mg/10mL 10 mL  10 mL Oral Daily PRN Max Jones MD       • metoprolol tartrate (LOPRESSOR) tablet 25 mg  25 mg Oral Q12H Max Jones MD   25 mg at 10/11/18 2136   • nicotine (NICODERM CQ) 21 MG/24HR patch 1 patch  1 patch Transdermal Q24H Ashutosh Palafox MD   1 patch at 10/11/18 2135   • ondansetron (ZOFRAN) injection 4 mg  4 mg Intravenous Q6H PRN Max Jones MD   4 mg at 10/11/18 0635   • Pharmacy Consult - Pharmacy to dose   Does not apply Continuous PRN Vu Michael MD       • potassium chloride (MICRO-K) CR capsule 20 mEq  20 mEq Oral Daily Vu Michael MD   20 mEq at 10/10/18 1010   • sennosides-docusate sodium (SENOKOT-S) 8.6-50 MG tablet 1 tablet  1 tablet Oral BID Vu Michael MD   1 tablet at 10/11/18 2136   • sodium bicarbonate tablet 650 mg  650 mg Oral BID Delmy Mullins APRN   650 mg at 10/11/18 2136   • sodium chloride 0.9 % flush 3 mL  3 mL Intravenous Q12H Max Jones MD   3 mL at 10/11/18 2143   • sodium chloride  "0.9 % flush 3-10 mL  3-10 mL Intravenous PRN Max Jones MD       • tetrahydrozoline 0.05 % ophthalmic solution 1 drop  1 drop Both Eyes 4x Daily PRN Ney De MD   1 drop at 10/10/18 0339       Vital Sign Min/Max for last 24 hours  Temp  Min: 97.3 °F (36.3 °C)  Max: 98.8 °F (37.1 °C)   BP  Min: 110/58  Max: 149/67   Pulse  Min: 87  Max: 100   Resp  Min: 14  Max: 20   SpO2  Min: 95 %  Max: 100 %   No Data Recorded   Weight  Min: 48.1 kg (106 lb)  Max: 48.1 kg (106 lb)     Flowsheet Rows      First Filed Value   Admission Height  162.6 cm (64\") Documented at 10/06/2018 0400   Admission Weight  48.9 kg (107 lb 12.8 oz) Documented at 10/06/2018 0400          Results for orders placed during the hospital encounter of 10/06/18   Adult Transthoracic Echo Limited W/ Cont if Necessary Per Protocol    Addendum · Estimated EF = 37%. · Left ventricular diastolic dysfunction. · Mild aortic valve stenosis is present. · No evidence of pulmonary hypertension is present. · There is a trivial pericardial effusion. There is no evidence of cardiac  tamponade. · There is a moderate size left pleural effusion.        Abdiel Stout MD 10/6/2018  7:24 PM                  Physical Exam:    General Appearance: Awake, alert, in no acute distress  Eyes: Pupils equal and reactive    Ears: Appear intact with no abnormalities noted  Nose: Nares normal, no drainage  Neck: supple, trachea midline, no carotid bruit and has JVD  Back: no kyphosis present,    Lungs: respirations regular, respirations even and respirations unlabored  Basilar crackles  Decrease air entry bilateral bases    Heart: normal S1, S2,  2/6 pansystolic murmur in the left sternal border,  no rub and no click    Abdomen: normal bowel sounds, no tenderness   Skin: no bleeding, bruising or rash  Extremities: no cyanosis  Psychiatric/Behavioral: Negative for agitation, behavioral problems, confusion, the patient does  appear to be nervous/anxious.   "        Results Review:   I reviewed the patient's new clinical results.  I reviewed the patient's new imaging results and agree with the interpretation.  I reviewed the patient's other test results and agree with the interpretation  I personally viewed and interpreted the patient's EKG/Telemetry data  Discussed with patient    Reviewed available prior notes, consults, prior visits, laboratory findings, radiology and cardiology relevant reports. Updated chart as applicable. I have reviewed the patient's medical history in detail and updated the computerized patient record as relevant.    Updated patient regarding any new or relevant abnormalities on review of records or any new findings on physical exam. Mentioned to patient about purpose of visit and desirable health short and long term goals and objectives.     Assessment/Plan     Active Problems:    Sepsis (CMS/[facility])  Non-STEMI  Known coronary artery disease next the prior stent placement more than 10 years ago  Prior severe left ventricular systolic dysfunction, currently left ventricle ejection fraction moderately depressed to 37%   start amiodarone therapy orally  Discontinued albuterol   Moderate aortic regurgitation  Mild aortic stenosis  Mild mitral regurgitation  Pneumonia  Sepsis  Hypoxia  Kidney failure              Plan      Discontinued albuterol  Continue Atrovent  Continue dialysis  Titrate hydralazine and Isordil as tolerated  Start low-dose ACE inhibitor   Monitor CBC and BMP   Dr. Bateman his primary cardiologist will resume care in future currently Dr. Bateman is not available  Supportive care  Telemetry  Optimal medical therapy  Deep vein thrombosis prophylaxis/precautions  Appropriate diet, fluid, sodium, caffeine, stimulants intake   Compliance to diet and medications   Avoid NSAIDS  Abdiel Stout MD  10/12/18  7:46 AM    EMR Dragon/Transcription was used to dictate part of this note

## 2018-10-12 NOTE — PLAN OF CARE
Problem: Pneumonia (Adult)  Goal: Signs and Symptoms of Listed Potential Problems Will be Absent, Minimized or Managed (Pneumonia)  Outcome: Ongoing (interventions implemented as appropriate)      Problem: Fall Risk (Adult)  Goal: Absence of Fall  Outcome: Ongoing (interventions implemented as appropriate)      Problem: Skin Injury Risk (Adult)  Goal: Skin Health and Integrity  Outcome: Ongoing (interventions implemented as appropriate)      Problem: Nutrition, Imbalanced: Inadequate Oral Intake (Adult)  Goal: Improved Oral Intake  Outcome: Ongoing (interventions implemented as appropriate)    Goal: Prevent Further Weight Loss  Outcome: Ongoing (interventions implemented as appropriate)      Problem: Patient Care Overview  Goal: Plan of Care Review  Outcome: Ongoing (interventions implemented as appropriate)   10/12/18 0421   Plan of Care Review   Progress no change   OTHER   Outcome Summary VSS. NSR 88-97 on tele. Permacath CDI. Pt c/o generalized pain. PRN Norco given with relief. Mepilex on coccyx CDI. No falls. Safety maintained. Will cont to monitor.    Coping/Psychosocial   Plan of Care Reviewed With patient

## 2018-10-12 NOTE — PLAN OF CARE
Problem: Patient Care Overview  Goal: Plan of Care Review  Outcome: Ongoing (interventions implemented as appropriate)   10/12/18 8231   OTHER   Outcome Summary Pt. progressing fairly at this time, no issues or problems noted other than sob!, and he states that he has no appetitie!   Coping/Psychosocial   Plan of Care Reviewed With patient

## 2018-10-12 NOTE — THERAPY TREATMENT NOTE
Acute Care - Physical Therapy Treatment Note  [facility]     Patient Name: Gavin Wilson  : 1945  MRN: [id number]  Today's Date: 10/12/2018  Onset of Illness/Injury or Date of Surgery: 10/06/18  Date of Referral to PT: 10/08/18  Referring Physician: Dr. Michael    Admit Date: 10/6/2018    Visit Dx:    ICD-10-CM ICD-9-CM   1. Esophageal dysphagia R13.10 787.20   2. Impaired mobility and ADLs Z74.09 799.89   3. Impaired functional mobility, balance, gait, and endurance Z74.09 V49.89   4. Chronic kidney disease, stage 4 (severe) (CMS/HCC) N18.4 585.4     Patient Active Problem List   Diagnosis   • Volume overload   • Chronic kidney disease, stage 4 (severe) (CMS/HCC)   • Chronic systolic congestive heart failure (CMS/HCC)   • Pleural effusion   • Sepsis (CMS/HCC)       Therapy Treatment          Rehabilitation Treatment Summary     Row Name 10/12/18 1149 10/12/18 0838          Treatment Time/Intention    Discipline physical therapy assistant  -MF --  -AE     Document Type therapy note (daily note)  - --  -AE     Subjective Information complains of;pain  - --  -AE     Mode of Treatment physical therapy  -  --     Existing Precautions/Restrictions fall   urostomy   -  --     Recorded by [MF] Liliane Collado, PTA 10/12/18 1206 [AE] Dalia Santos, PTA 10/12/18 0909     Row Name 10/12/18 1149             Bed Mobility Assessment/Treatment    Rolling Right Nance (Bed Mobility) supervision  -      Supine-Sit Nance (Bed Mobility) verbal cues;contact guard;minimum assist (75% patient effort)  -      Sit-Supine Nance (Bed Mobility) contact guard  -      Bed Mobility, Safety Issues decreased use of arms for pushing/pulling  -      Assistive Device (Bed Mobility) head of bed elevated;bed rails  -      Recorded by [MF] Liliane Collado, PTA 10/12/18 1206      Row Name 10/12/18 1149             Transfer Assessment/Treatment    Comment (Transfers) stood first while pt emptied  urostomy.   -MF      Recorded by [] Liliane Collado, PTA 10/12/18 1206      Row Name 10/12/18 1149             Sit-Stand Transfer    Sit-Stand Lamont (Transfers) verbal cues;contact guard  -MF      Recorded by [MF] Liliane Collado, PTA 10/12/18 1206      Row Name 10/12/18 1149             Stand-Sit Transfer    Stand-Sit Lamont (Transfers) verbal cues;contact guard  -MF      Recorded by [] Liliane Collado, PTA 10/12/18 1206      Row Name 10/12/18 1149             Gait/Stairs Assessment/Training    Lamont Level (Gait) verbal cues;contact guard;minimum assist (75% patient effort)  -      Assistive Device (Gait) walker, front-wheeled  -      Distance in Feet (Gait) 10  -MF      Deviations/Abnormal Patterns (Gait) stride length decreased;kathy decreased  -      Comment (Gait/Stairs) pt. declined to wallk any further  -MF      Recorded by [] Liliane Collado, PTA 10/12/18 1206      Row Name 10/12/18 1149             Positioning and Restraints    Pre-Treatment Position in bed  -      Post Treatment Position bed  -MF      In Bed side lying right;call light within reach;encouraged to call for assist;side rails up x2;pillow between legs  -MF      Recorded by [] Liliane Collado, PTA 10/12/18 1206      Row Name 10/12/18 1149             Pain Scale: Numbers Pre/Post-Treatment    Pain Scale: Numbers, Pretreatment 5/10  -      Pain Scale: Numbers, Post-Treatment 5/10  -      Pain Location --   generalized  -      Pain Intervention(s) Ambulation/increased activity;Repositioned  -MF      Recorded by [] Liliane Collado, PTA 10/12/18 1206      Row Name                Wound 10/07/18 1255 Bilateral posterior coccyx    Wound - Properties Group Date first assessed: 10/07/18 [SH] Time first assessed: 1255 [SH] Side: Bilateral [SH] Orientation: posterior [SH] Location: coccyx [SH] Recorded by:  [SH] Virginia Ahn RN 10/07/18 1256    Row Name                Wound  10/11/18 0831 Other (See comments) chest incision    Wound - Properties Group Date first assessed: 10/11/18 [DS] Time first assessed: 0831 [DS] Side: Other (See comments) [DS] Location: chest [DS] Type: incision [DS] Recorded by:  [DS] Jeremy Liu RN 10/11/18 0831      User Key  (r) = Recorded By, (t) = Taken By, (c) = Cosigned By    Initials Name Effective Dates Discipline    AE Dalia Santos, PTA 06/22/15 -  PT    SH Virginia Ahn RN 08/28/17 -  Nurse    Jeremy Almaraz RN 08/02/16 -  Nurse    Liliane Bassett PTA 08/02/16 -  PT          Wound 10/07/18 1255 Bilateral posterior coccyx (Active)   Dressing Appearance dry;intact 10/12/2018  8:15 AM   Closure None 10/12/2018  8:15 AM   Base dressing in place, unable to visualize 10/12/2018  8:15 AM   Periwound intact;dry;redness 10/12/2018  8:15 AM   Periwound Temperature warm 10/12/2018  8:15 AM   Periwound Skin Turgor soft 10/12/2018  8:15 AM   Drainage Amount none 10/12/2018  8:15 AM       Wound 10/11/18 0831 Other (See comments) chest incision (Active)   Dressing Appearance dry;intact;no drainage 10/12/2018  8:15 AM   Drainage Amount none 10/12/2018  8:15 AM   Dressing Care, Wound gauze;transparent film 10/11/2018  7:36 PM             Physical Therapy Education     Title: PT OT SLP Therapies (Done)     Topic: Physical Therapy (Done)     Point: Mobility training (Done)    Learning Progress Summary     Learner Status Readiness Method Response Comment Documented by    Patient Done Acceptance E VU,NR benefits of activity MF 10/12/18 1206     Active Acceptance E,D RT Effecient supine to sit EARLENE 10/09/18 1154     Done Acceptance E VU,NR Pt was educated on the importance and benefits of getting out of bed and how it affects his overall health. Pt was educated on why PT is working with him. Pt was encouraged to do more in treatment session. TD 10/08/18 1430     Done Acceptance E VU,NR Pt  was educated on benefits ot PT and PT POC. Pt was edcuated  on body mechanics with transfers (pushing with one arm on bed to stand up/having legs touch bed before sitting down) and safety precautions (socks on, gait belt on) with transfers and gait. TD 10/08/18 1149          Point: Body mechanics (Done)    Learning Progress Summary     Learner Status Readiness Method Response Comment Documented by    Patient Done Acceptance E VU,NR Pt was educated on the importance and benefits of getting out of bed and how it affects his overall health. Pt was educated on why PT is working with him. Pt was encouraged to do more in treatment session. TD 10/08/18 1430     Done Acceptance E VU,NR Pt  was educated on benefits ot PT and PT POC. Pt was edcuated on body mechanics with transfers (pushing with one arm on bed to stand up/having legs touch bed before sitting down) and safety precautions (socks on, gait belt on) with transfers and gait. TD 10/08/18 1149          Point: Precautions (Done)    Learning Progress Summary     Learner Status Readiness Method Response Comment Documented by    Patient Done Acceptance E VU,NR Pt was educated on the importance and benefits of getting out of bed and how it affects his overall health. Pt was educated on why PT is working with him. Pt was encouraged to do more in treatment session. TD 10/08/18 1430     Done Acceptance E VU,NR Pt  was educated on benefits ot PT and PT POC. Pt was edcuated on body mechanics with transfers (pushing with one arm on bed to stand up/having legs touch bed before sitting down) and safety precautions (socks on, gait belt on) with transfers and gait. TD 10/08/18 1149                      User Key     Initials Effective Dates Name Provider Type Discipline     08/02/16 -  Kaylynn Mcgarry PTA Physical Therapy Assistant PT     08/02/16 -  Liliane Collado PTA Physical Therapy Assistant PT    TD 09/07/18 -  Rita Grullon, JUVENTINO Student PT Student PT                    PT Recommendation and Plan     Plan of Care Reviewed  With: patient  Progress: no change  Outcome Summary: Pt. agreeable to therapy. Pt. was CGA-MIN x 1 for bed moblity. Pt. stood with CGA x 1 and walked 10' in room with RWX. Pt. declined to do anything more. Will continue to work with pt. and progress as able.           Outcome Measures     Row Name 10/12/18 1149 10/09/18 1300          How much help from another person do you currently need...    Turning from your back to your side while in flat bed without using bedrails? 4  -MF  --     Moving from lying on back to sitting on the side of a flat bed without bedrails? 3  -MF  --     Moving to and from a bed to a chair (including a wheelchair)? 3  -MF  --     Standing up from a chair using your arms (e.g., wheelchair, bedside chair)? 3  -MF  --     Climbing 3-5 steps with a railing? 2  -MF  --     To walk in hospital room? 3  -MF  --     AM-PAC 6 Clicks Score 18  -MF  --        How much help from another is currently needed...    Putting on and taking off regular lower body clothing?  -- 2  -CJ     Bathing (including washing, rinsing, and drying)  -- 2  -CJ     Toileting (which includes using toilet bed pan or urinal)  -- 2  -CJ     Putting on and taking off regular upper body clothing  -- 3  -CJ     Taking care of personal grooming (such as brushing teeth)  -- 3  -CJ     Eating meals  -- 4  -CJ     Score  -- 16  -CJ        Functional Assessment    Outcome Measure Options AM-PAC 6 Clicks Basic Mobility (PT)  - AM-PAC 6 Clicks Daily Activity (OT)  -       User Key  (r) = Recorded By, (t) = Taken By, (c) = Cosigned By    Initials Name Provider Type    Paulino Lopez COTA/L Occupational Therapy Assistant    Liliane Bassett, URMILA Physical Therapy Assistant           Time Calculation:         PT Charges     Row Name 10/12/18 1208             Time Calculation    Start Time 1149  -MF      Stop Time 1201  -      Time Calculation (min) 12 min  -MF      PT Received On 10/12/18  -      PT Goal Re-Cert Due  Date 10/18/18  -MF         Time Calculation- PT    Total Timed Code Minutes- PT 12 minute(s)  -MF         Timed Charges    29276 - Gait Training Minutes  12  -MF        User Key  (r) = Recorded By, (t) = Taken By, (c) = Cosigned By    Initials Name Provider Type    Liliane Bassett PTA Physical Therapy Assistant        Therapy Suggested Charges     Code   Minutes Charges    28757 (CPT®) Hc Pt Neuromusc Re Education Ea 15 Min      94431 (CPT®) Hc Pt Ther Proc Ea 15 Min      24269 (CPT®) Hc Gait Training Ea 15 Min 12 1    93020 (CPT®) Hc Pt Therapeutic Act Ea 15 Min      18708 (CPT®) Hc Pt Manual Therapy Ea 15 Min      70847 (CPT®) Hc Pt Iontophoresis Ea 15 Min      37961 (CPT®) Hc Pt Elec Stim Ea-Per 15 Min      41707 (CPT®) Hc Pt Ultrasound Ea 15 Min      14885 (CPT®) Hc Pt Self Care/Mgmt/Train Ea 15 Min      03780 (CPT®) Hc Pt Prosthetic (S) Train Initial Encounter, Each 15 Min      84304 (CPT®) Hc Pt Orthotic(S)/Prosthetic(S) Encounter, Each 15 Min      28738 (CPT®) Hc Orthotic(S) Mgmt/Train Initial Encounter, Each 15min      Total  12 1        Therapy Charges for Today     Code Description Service Date Service Provider Modifiers Qty    37820323481 HC GAIT TRAINING EA 15 MIN 10/12/2018 Liliane Collado PTA GP, KX 1          PT G-Codes  PT Professional Judgement Used?: Yes  Outcome Measure Options: AM-PAC 6 Clicks Basic Mobility (PT)  AM-PAC 6 Clicks Score: 18  Score: 16  Functional Limitation: Mobility: Walking and moving around  Mobility: Walking and Moving Around Current Status (): At least 20 percent but less than 40 percent impaired, limited or restricted  Mobility: Walking and Moving Around Goal Status (): At least 1 percent but less than 20 percent impaired, limited or restricted    Liliane Collado PTA  10/12/2018

## 2018-10-13 PROBLEM — J96.21 ACUTE ON CHRONIC RESPIRATORY FAILURE WITH HYPOXIA (HCC): Status: ACTIVE | Noted: 2018-10-13

## 2018-10-13 PROBLEM — I35.1 MODERATE AORTIC VALVE INSUFFICIENCY: Status: ACTIVE | Noted: 2018-10-13

## 2018-10-13 PROBLEM — J18.9 PNEUMONIA: Status: ACTIVE | Noted: 2018-10-13

## 2018-10-13 PROBLEM — I25.10 CORONARY ARTERY DISEASE INVOLVING NATIVE CORONARY ARTERY OF NATIVE HEART WITHOUT ANGINA PECTORIS: Status: ACTIVE | Noted: 2018-10-13

## 2018-10-13 PROBLEM — I35.0 MILD AORTIC VALVE STENOSIS: Status: ACTIVE | Noted: 2018-10-13

## 2018-10-13 PROBLEM — I50.42 CHRONIC COMBINED SYSTOLIC AND DIASTOLIC CONGESTIVE HEART FAILURE (HCC): Status: ACTIVE | Noted: 2018-08-18

## 2018-10-13 PROBLEM — J44.9 COPD (CHRONIC OBSTRUCTIVE PULMONARY DISEASE) (HCC): Status: ACTIVE | Noted: 2018-10-13

## 2018-10-13 PROBLEM — I50.43 ACUTE ON CHRONIC COMBINED SYSTOLIC AND DIASTOLIC CONGESTIVE HEART FAILURE (HCC): Status: ACTIVE | Noted: 2018-08-18

## 2018-10-13 PROBLEM — I34.0 MILD MITRAL VALVE REGURGITATION: Status: ACTIVE | Noted: 2018-10-13

## 2018-10-13 PROBLEM — Z95.5 HISTORY OF CORONARY ARTERY STENT PLACEMENT: Status: ACTIVE | Noted: 2018-10-13

## 2018-10-13 PROBLEM — N17.9 ACUTE KIDNEY INJURY (HCC): Status: ACTIVE | Noted: 2018-08-18

## 2018-10-13 LAB
ALBUMIN SERPL-MCNC: 3.5 G/DL (ref 3.5–5)
ANION GAP SERPL CALCULATED.3IONS-SCNC: 19 MMOL/L (ref 4–13)
BUN BLD-MCNC: 23 MG/DL (ref 5–21)
BUN/CREAT SERPL: 11.4 (ref 7–25)
CALCIUM SPEC-SCNC: 9.1 MG/DL (ref 8.4–10.4)
CHLORIDE SERPL-SCNC: 99 MMOL/L (ref 98–110)
CO2 SERPL-SCNC: 21 MMOL/L (ref 24–31)
CREAT BLD-MCNC: 2.02 MG/DL (ref 0.5–1.4)
GFR SERPL CREATININE-BSD FRML MDRD: 32 ML/MIN/1.73
GLUCOSE BLD-MCNC: 114 MG/DL (ref 70–100)
GLUCOSE BLDC GLUCOMTR-MCNC: 113 MG/DL (ref 70–130)
GLUCOSE BLDC GLUCOMTR-MCNC: 183 MG/DL (ref 70–130)
GLUCOSE BLDC GLUCOMTR-MCNC: 90 MG/DL (ref 70–130)
GLUCOSE BLDC GLUCOMTR-MCNC: 97 MG/DL (ref 70–130)
PHOSPHATE SERPL-MCNC: 4.5 MG/DL (ref 2.5–4.5)
POTASSIUM BLD-SCNC: 4.4 MMOL/L (ref 3.5–5.3)
SODIUM BLD-SCNC: 139 MMOL/L (ref 135–145)

## 2018-10-13 PROCEDURE — 97110 THERAPEUTIC EXERCISES: CPT

## 2018-10-13 PROCEDURE — 82962 GLUCOSE BLOOD TEST: CPT

## 2018-10-13 PROCEDURE — 97530 THERAPEUTIC ACTIVITIES: CPT

## 2018-10-13 PROCEDURE — 5A1D70Z PERFORMANCE OF URINARY FILTRATION, INTERMITTENT, LESS THAN 6 HOURS PER DAY: ICD-10-PCS | Performed by: INTERNAL MEDICINE

## 2018-10-13 PROCEDURE — 25010000002 HEPARIN (PORCINE) PER 1000 UNITS: Performed by: INTERNAL MEDICINE

## 2018-10-13 PROCEDURE — 25010000002 ONDANSETRON PER 1 MG: Performed by: INTERNAL MEDICINE

## 2018-10-13 PROCEDURE — 94799 UNLISTED PULMONARY SVC/PX: CPT

## 2018-10-13 PROCEDURE — 99232 SBSQ HOSP IP/OBS MODERATE 35: CPT | Performed by: NURSE PRACTITIONER

## 2018-10-13 PROCEDURE — 63710000001 INSULIN LISPRO (HUMAN) PER 5 UNITS: Performed by: INTERNAL MEDICINE

## 2018-10-13 PROCEDURE — 80069 RENAL FUNCTION PANEL: CPT | Performed by: INTERNAL MEDICINE

## 2018-10-13 RX ORDER — ONDANSETRON 2 MG/ML
4 INJECTION INTRAMUSCULAR; INTRAVENOUS EVERY 6 HOURS PRN
Status: DISCONTINUED | OUTPATIENT
Start: 2018-10-13 | End: 2018-10-15

## 2018-10-13 RX ADMIN — HEPARIN SODIUM 1700 UNITS: 1000 INJECTION, SOLUTION INTRAVENOUS; SUBCUTANEOUS at 09:05

## 2018-10-13 RX ADMIN — FERRIC CITRATE 210 MG: 210 TABLET, COATED ORAL at 11:24

## 2018-10-13 RX ADMIN — NICOTINE 1 PATCH: 21 PATCH, EXTENDED RELEASE TRANSDERMAL at 21:24

## 2018-10-13 RX ADMIN — FERRIC CITRATE 210 MG: 210 TABLET, COATED ORAL at 17:36

## 2018-10-13 RX ADMIN — INSULIN LISPRO 2 UNITS: 100 INJECTION, SOLUTION INTRAVENOUS; SUBCUTANEOUS at 21:26

## 2018-10-13 RX ADMIN — HEPARIN SODIUM 5000 UNITS: 5000 INJECTION, SOLUTION INTRAVENOUS; SUBCUTANEOUS at 21:24

## 2018-10-13 RX ADMIN — ISOSORBIDE DINITRATE 10 MG: 10 TABLET ORAL at 11:24

## 2018-10-13 RX ADMIN — BUMETANIDE 1 MG: 1 TABLET ORAL at 17:36

## 2018-10-13 RX ADMIN — SODIUM BICARBONATE 650 MG: 650 TABLET ORAL at 11:26

## 2018-10-13 RX ADMIN — HYDROCODONE BITARTRATE AND ACETAMINOPHEN 1 TABLET: 10; 325 TABLET ORAL at 01:10

## 2018-10-13 RX ADMIN — LISINOPRIL 2.5 MG: 2.5 TABLET ORAL at 11:24

## 2018-10-13 RX ADMIN — BUDESONIDE AND FORMOTEROL FUMARATE DIHYDRATE 2 PUFF: 160; 4.5 AEROSOL RESPIRATORY (INHALATION) at 19:55

## 2018-10-13 RX ADMIN — ISOSORBIDE DINITRATE 10 MG: 10 TABLET ORAL at 17:36

## 2018-10-13 RX ADMIN — HEPARIN SODIUM 1700 UNITS: 1000 INJECTION, SOLUTION INTRAVENOUS; SUBCUTANEOUS at 09:06

## 2018-10-13 RX ADMIN — SODIUM BICARBONATE 650 MG: 650 TABLET ORAL at 21:25

## 2018-10-13 RX ADMIN — HEPARIN SODIUM 5000 UNITS: 5000 INJECTION, SOLUTION INTRAVENOUS; SUBCUTANEOUS at 11:26

## 2018-10-13 RX ADMIN — Medication 3 ML: at 21:27

## 2018-10-13 RX ADMIN — METOPROLOL TARTRATE 25 MG: 25 TABLET, FILM COATED ORAL at 21:25

## 2018-10-13 RX ADMIN — HYDROCODONE BITARTRATE AND ACETAMINOPHEN 1 TABLET: 10; 325 TABLET ORAL at 19:23

## 2018-10-13 RX ADMIN — AMIODARONE HYDROCHLORIDE 200 MG: 200 TABLET ORAL at 17:36

## 2018-10-13 RX ADMIN — IPRATROPIUM BROMIDE 0.5 MG: 0.5 SOLUTION RESPIRATORY (INHALATION) at 16:19

## 2018-10-13 RX ADMIN — HYDRALAZINE HYDROCHLORIDE 25 MG: 25 TABLET, FILM COATED ORAL at 21:26

## 2018-10-13 RX ADMIN — AMIODARONE HYDROCHLORIDE 200 MG: 200 TABLET ORAL at 21:25

## 2018-10-13 RX ADMIN — DOCUSATE SODIUM -SENNOSIDES 1 TABLET: 50; 8.6 TABLET, COATED ORAL at 21:25

## 2018-10-13 RX ADMIN — ASPIRIN 81 MG CHEWABLE TABLET 81 MG: 81 TABLET CHEWABLE at 11:25

## 2018-10-13 RX ADMIN — POTASSIUM CHLORIDE 20 MEQ: 750 CAPSULE, EXTENDED RELEASE ORAL at 11:25

## 2018-10-13 RX ADMIN — DOCUSATE SODIUM -SENNOSIDES 1 TABLET: 50; 8.6 TABLET, COATED ORAL at 11:25

## 2018-10-13 RX ADMIN — ONDANSETRON 4 MG: 2 INJECTION INTRAMUSCULAR; INTRAVENOUS at 15:57

## 2018-10-13 RX ADMIN — ATORVASTATIN CALCIUM 80 MG: 40 TABLET, FILM COATED ORAL at 11:24

## 2018-10-13 RX ADMIN — ONDANSETRON 4 MG: 2 INJECTION INTRAMUSCULAR; INTRAVENOUS at 03:51

## 2018-10-13 RX ADMIN — AMIODARONE HYDROCHLORIDE 200 MG: 200 TABLET ORAL at 11:25

## 2018-10-13 RX ADMIN — CALCITRIOL CAPSULES 0.25 MCG 0.5 MCG: 0.25 CAPSULE ORAL at 11:26

## 2018-10-13 RX ADMIN — BUMETANIDE 1 MG: 1 TABLET ORAL at 11:25

## 2018-10-13 RX ADMIN — METOPROLOL TARTRATE 25 MG: 25 TABLET, FILM COATED ORAL at 11:24

## 2018-10-13 RX ADMIN — Medication 1 TABLET: at 11:27

## 2018-10-13 NOTE — PLAN OF CARE
Problem: Pneumonia (Adult)  Goal: Signs and Symptoms of Listed Potential Problems Will be Absent, Minimized or Managed (Pneumonia)  Outcome: Ongoing (interventions implemented as appropriate)      Problem: Fall Risk (Adult)  Goal: Absence of Fall  Outcome: Ongoing (interventions implemented as appropriate)      Problem: Skin Injury Risk (Adult)  Goal: Skin Health and Integrity  Outcome: Ongoing (interventions implemented as appropriate)      Problem: Nutrition, Imbalanced: Inadequate Oral Intake (Adult)  Goal: Improved Oral Intake  Outcome: Ongoing (interventions implemented as appropriate)    Goal: Prevent Further Weight Loss  Outcome: Ongoing (interventions implemented as appropriate)      Problem: Patient Care Overview  Goal: Plan of Care Review  Outcome: Ongoing (interventions implemented as appropriate)   10/13/18 1505   Plan of Care Review   Progress declining   OTHER   Outcome Summary vss a/o pt c/o gen et chronic neck pain to hd today et no fluid removed pt very weak pt with no appetite and not eating ordered supplements et rd consultcont puja et skin care monitor on   Coping/Psychosocial   Plan of Care Reviewed With patient     Goal: Individualization and Mutuality  Outcome: Ongoing (interventions implemented as appropriate)    Goal: Interprofessional Rounds/Family Conf  Outcome: Ongoing (interventions implemented as appropriate)

## 2018-10-13 NOTE — PLAN OF CARE
Problem: Pneumonia (Adult)  Goal: Signs and Symptoms of Listed Potential Problems Will be Absent, Minimized or Managed (Pneumonia)  Outcome: Ongoing (interventions implemented as appropriate)   10/12/18 0421   Goal/Outcome Evaluation   Problems Assessed (Pneumonia) all   Problems Present (Pneumonia) none       Problem: Fall Risk (Adult)  Goal: Absence of Fall  Outcome: Ongoing (interventions implemented as appropriate)   10/13/18 0306   Fall Risk (Adult)   Absence of Fall achieves outcome       Problem: Skin Injury Risk (Adult)  Goal: Skin Health and Integrity  Outcome: Ongoing (interventions implemented as appropriate)   10/13/18 0306   Skin Injury Risk (Adult)   Skin Health and Integrity making progress toward outcome       Problem: Nutrition, Imbalanced: Inadequate Oral Intake (Adult)  Goal: Improved Oral Intake  Outcome: Ongoing (interventions implemented as appropriate)   10/13/18 0306   Nutrition, Imbalanced: Inadequate Oral Intake (Adult)   Improved Oral Intake making progress toward outcome     Goal: Prevent Further Weight Loss  Outcome: Ongoing (interventions implemented as appropriate)      Problem: Patient Care Overview  Goal: Plan of Care Review  Outcome: Ongoing (interventions implemented as appropriate)   10/13/18 0306   Plan of Care Review   Progress no change   OTHER   Outcome Summary VSS, c/o pain x 2 with relief from PRN pain med. Pt had dialysis yesterday and removed 700 cc off. Right urostomy c/d/i with adequate UOP. Pt bottom is red and non blanchable but covered with mepilex and barrier cream. Pt is very weak and fatigues easily. Cont to monitor   Coping/Psychosocial   Plan of Care Reviewed With patient     Goal: Individualization and Mutuality  Outcome: Ongoing (interventions implemented as appropriate)    Goal: Discharge Needs Assessment  Outcome: Ongoing (interventions implemented as appropriate)

## 2018-10-13 NOTE — PROGRESS NOTES
[facility]  INPATIENT PROGRESS NOTE    Patient Name: Gavin Wilson  Date of Admission: 10/6/2018  Today's Date: 10/12/18  Length of Stay: 6  Primary Care Physician: Provider, No Known    Subjective   Chief Complaint: weakness  HPI     Patient seen and examined at bedside.  Patient feeling weak still.  Patient getting dialyzed today.  Denies any specific complaint, just generalized weakness and fatigue.  Tolerating PO.        Review of Systems   Constitutional: Positive for activity change and appetite change. Negative for chills and fever.   Respiratory: Negative for cough and shortness of breath.    Cardiovascular: Negative for chest pain and palpitations.   Gastrointestinal: Negative for abdominal pain, constipation, diarrhea, nausea and vomiting.   Neurological: Positive for weakness.        All pertinent negatives and positives are as above. All other systems have been reviewed and are negative unless otherwise stated.     Objective    Temp:  [97.6 °F (36.4 °C)-98.4 °F (36.9 °C)] 98.4 °F (36.9 °C)  Heart Rate:  [90-97] 93  Resp:  [16-20] 18  BP: ()/(49-62) 98/58  Physical Exam  Constitutional: He is oriented to person, place, and time. No distress. Chronically ill appearing  HENT:   Head: Atraumatic.   Temporal muscle wasting; prominent wasting around the neck and supraclavicular    Eyes: Conjunctivae are normal. No scleral icterus.   Cardiovascular: Normal rate, regular rhythm and intact distal pulses.    Murmur heard. Right upper chest permcath placed  Pulmonary/Chest: Effort normal. No respiratory distress. He has no wheezing. He has no rales.  Improved air movement   Abdominal: Soft. Bowel sounds are normal. He exhibits no distension. There is no tenderness.   RLQ ostomy    Neurological: He is alert and oriented to person, place, and time. Lethargic.  Skin: Skin is warm and dry. He is not diaphoretic.   Psychiatric: He has a normal mood and  affect. His behavior is normal.       Results Review:  I have reviewed the labs, radiology results, and diagnostic studies.    Laboratory Data:     Results from last 7 days  Lab Units 10/11/18  0355 10/10/18  0422 10/09/18  0306   WBC 10*3/mm3 4.56* 5.35 5.17   HEMOGLOBIN g/dL 9.1* 10.2* 9.8*   HEMATOCRIT % 28.7* 32.6* 30.4*   PLATELETS 10*3/mm3 190 173 180          Results from last 7 days  Lab Units 10/12/18  0437 10/11/18  0355 10/10/18  0422  10/08/18  0435  10/06/18  0545   SODIUM mmol/L 142 144 144  < > 141  < > 141   POTASSIUM mmol/L 4.3 4.7 5.2  < > 4.5  < > 3.6   CHLORIDE mmol/L 103 106 107  < > 110  < > 113*   CO2 mmol/L 26.0 26.0 20.0*  < > 16.0*  < > 17.0*   BUN mg/dL 36* 60* 59*  < > 50*  < > 46*   CREATININE mg/dL 2.90* 4.06* 4.13*  < > 3.78*  < > 3.57*   CALCIUM mg/dL 8.8 9.7 9.1  < > 8.5  < > 8.1*   BILIRUBIN mg/dL  --   --   --   --  0.4  --  0.5   ALK PHOS U/L  --   --   --   --  83  --  66   ALT (SGPT) U/L  --   --   --   --  53  --  30   AST (SGOT) U/L  --   --   --   --  27  --  20   GLUCOSE mg/dL 114* 136* 139*  < > 131*  < > 170*   < > = values in this interval not displayed.    Culture Data:   @[facility]@    Radiology Data:   Imaging Results (last 24 hours)     ** No results found for the last 24 hours. **          I have reviewed the patient's current medications.     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%)  7) Acute kidney injury on chronic kidney disease stage 4 - Suspected cardiorenal    8) Chronic normocytic anemia due to CKD  9) Thrombocytopenia, improved  10) Mild hypokalemia, resolved  11) Hyperphosphotemia  12) Metabolic acidosis, anion gap due to uremia     Plan:  1) Cardiology note reviewed  2) Nephrology consult, may require dialysis - Note reviewed   3) D/C vanc on 10/8  4) Cefepime 7 day course  5) Procal 8.86, continue antibiotics as above   6) Monitor H&H, stable  7) Thoracentesis 10/7 - 400 cc removed   8) Urine culture yeast - Likely colonization  9) Blood cultures and pleural flood cultures pending  10) Continue PO bicarb  11) Continue PO bumex  12) Dialysis per nephrology  13) Awaiting placement  14) Dialysis today               Discharge Planning: I expect the patient to be discharged nursing home once accepted.  Medically ready    Vu Michael MD   10/12/18   9:52 PM

## 2018-10-13 NOTE — PLAN OF CARE
Problem: Patient Care Overview  Goal: Plan of Care Review  Outcome: Ongoing (interventions implemented as appropriate)   10/13/18 7190   Plan of Care Review   Progress no change   OTHER   Outcome Summary Pt refused to get up to side of bed, would only agree to AMINATA VELA in Supine    Coping/Psychosocial   Plan of Care Reviewed With patient

## 2018-10-13 NOTE — PROGRESS NOTES
[facility] -  Progress Note     LOS: 7 days   Patient Care Team:  Provider, No Known as PCP - General    Chief Complaint: Shortness of breath    Subjective     Interval History: No additional runs of vtach. Heart rate and blood pressure stable. Creatinine improved to 2.02. Complains of generalized pain, malaise, nausea and decreased appetite. Denies chest pain, shortness of breath or palpitations.     Patient Complaints: Generalized pain        Review of Systems:     Review of Systems   Constitutional: Positive for activity change and appetite change. Negative for chills, fatigue and fever.   HENT: Negative.    Eyes: Negative.    Respiratory: Negative for cough, chest tightness, shortness of breath, wheezing and stridor.    Cardiovascular: Negative for chest pain, palpitations and leg swelling.   Gastrointestinal: Positive for nausea. Negative for abdominal distention, abdominal pain, blood in stool, constipation, diarrhea and vomiting.   Endocrine: Negative.    Genitourinary: Negative for difficulty urinating, dysuria, flank pain and hematuria.   Musculoskeletal: Negative.    Skin: Negative for rash and wound.   Allergic/Immunologic: Negative.    Neurological: Negative for dizziness, syncope, weakness, light-headedness and headaches.   Hematological: Does not bruise/bleed easily.   Psychiatric/Behavioral: Negative for agitation, behavioral problems, confusion, hallucinations, sleep disturbance and suicidal ideas. The patient is not nervous/anxious.      Objective     Vital Sign Min/Max for last 24 hours  Temp  Min: 97.6 °F (36.4 °C)  Max: 98.4 °F (36.9 °C)   BP  Min: 98/58  Max: 110/52   Pulse  Min: 86  Max: 97   Resp  Min: 16  Max: 20   SpO2  Min: 95 %  Max: 97 %   No Data Recorded   Weight  Min: 48.5 kg (107 lb)  Max: 48.5 kg (107 lb)     Telemetry: SR 84-89                                                                                                  1    10/12/18  2053   Weight: 48.5  kg (107 lb)     1    10/10/18  0447 10/11/18  2031 10/12/18  2053   Weight: 48.2 kg (106 lb 3.2 oz) 48.1 kg (106 lb) 48.5 kg (107 lb)       Intake/Output Summary (Last 24 hours) at 10/13/18 1338  Last data filed at 10/13/18 1100   Gross per 24 hour   Intake              100 ml   Output              300 ml   Net             -200 ml       Physical Exam:    Physical Exam   Constitutional: He is oriented to person, place, and time. Vital signs are normal. He appears well-developed. He appears lethargic. He appears cachectic. He has a sickly appearance. No distress.   HENT:   Head: Normocephalic and atraumatic.   Right Ear: External ear normal.   Left Ear: External ear normal.   Eyes: Pupils are equal, round, and reactive to light. Conjunctivae are normal. Right eye exhibits no discharge. Left eye exhibits no discharge.   Neck: Normal range of motion. Neck supple. No JVD present. Carotid bruit is not present. No thyromegaly present.   Cardiovascular: Normal rate, regular rhythm and intact distal pulses.  PMI is not displaced.  Exam reveals no gallop, no friction rub and no decreased pulses.    Murmur heard.   Harsh midsystolic murmur is present with a grade of 2/6  at the upper right sternal border radiating to the neck  High-pitched blowing decrescendo early diastolic murmur is present with a grade of 2/6  at the upper right sternal border radiating to the apex  Pulses:       Radial pulses are 2+ on the right side, and 2+ on the left side.        Dorsalis pedis pulses are 2+ on the right side, and 2+ on the left side.        Posterior tibial pulses are 2+ on the right side, and 2+ on the left side.   Pulmonary/Chest: Effort normal. No respiratory distress. He has decreased breath sounds in the right lower field and the left lower field. He has no wheezes. He has no rhonchi. He has no rales. He exhibits no tenderness.   Abdominal: Soft. Bowel sounds are normal. He exhibits no distension. There is no tenderness.    Musculoskeletal: Normal range of motion. He exhibits no edema.   Neurological: He is oriented to person, place, and time. He appears lethargic.   Skin: Skin is warm and dry. No rash noted. He is not diaphoretic. No erythema. No pallor.   Psychiatric: He has a normal mood and affect. His behavior is normal. Judgment and thought content normal.   Vitals reviewed.    Results Review:   Lab Results (last 72 hours)     Procedure Component Value Units Date/Time    POC Glucose Once [[id number]]  (Normal) Collected:  10/13/18 1207    Specimen:  Blood Updated:  10/13/18 1314     Glucose 90 mg/dL      Comment: : [id number] Cristopher MejianMeter ID: [id number]       Renal Function Panel [[id number]]  (Abnormal) Collected:  10/13/18 0436    Specimen:  Blood Updated:  10/13/18 0500     Glucose 114 (H) mg/dL      BUN 23 (H) mg/dL      Creatinine 2.02 (H) mg/dL      Sodium 139 mmol/L      Potassium 4.4 mmol/L      Chloride 99 mmol/L      CO2 21.0 (L) mmol/L      Calcium 9.1 mg/dL      Albumin 3.50 g/dL      Phosphorus 4.5 mg/dL      Anion Gap 19.0 (H) mmol/L      BUN/Creatinine Ratio 11.4     eGFR Non African Amer 32 (L) mL/min/1.73     Narrative:       The MDRD GFR formula is only valid for adults with stable renal function between ages 18 and 70.    POC Glucose Once [[id number]]  (Normal) Collected:  10/13/18 0429    Specimen:  Blood Updated:  10/13/18 0441     Glucose 113 mg/dL      Comment: : [id number] Dustin Langendeter ID: [id number]       POC Glucose Once [[id number]]  (Normal) Collected:  10/12/18 1948    Specimen:  Blood Updated:  10/12/18 2018     Glucose 107 mg/dL      Comment: : [id number] Albert Ember  eter ID: [id number]       Non-gynecologic Cytology [[id number]] Collected:  10/07/18 1119    Specimen:  Body Fluid from Pleural Cavity Updated:  10/12/18 1602     Case Report --     Non-gynecologic Cytology                          Case: [id number]                                  Authorizing Provider:  Alec  Vu Linares MD     Collected:           10/07/2018 11:19 AM          Ordering Location:     [facility]     Received:            10/08/2018 10:22 AM                                 [facility]                                                               Pathologist:           Adry Alonzo MD                                                        Specimen:    Pleural Cavity, left pleural fluid                                                          Final Diagnosis --     Left pleural fluid, ThinPrep preparation (1) and cell block: Negative for malignant cells.    Comment: Flow cytometric analysis performed by [facility] reveals no evidence of a B-cell or T-cell lymphoma in the sample analyzed.  Please refer to the complete flow cytometry report from [facility] which is appended.       Gross Description --     Received in cytolyt in the laboratory in a container labeled with patient name and  designated as left pleural fluid.  50 mls with yellow clear fluid and 300 mls of yellow clear fluid.  1 thin prep slide and 1 cell block made.  Flow Cytometry sent.         Microscopic Description --     ThinPrep preparation and sections of the cell block of the left pleural fluid reveal a specimen of low cellularity.  Proteinaceous material is seen.  There are scattered small, mature lymphocytes, scattered PMN leukocytes and a few mesothelial cells.  Malignant cells are not identified.      Anaerobic Culture - Pleural Fluid, Pleural Cavity [[id number]]  (Normal) Collected:  10/07/18 1118    Specimen:  Pleural Fluid from Pleural Cavity Updated:  10/12/18 1059     Culture No anaerobes isolated    Body Fluid Culture - Body Fluid, Pleural Cavity [[id number]]  (Normal) Collected:  10/07/18 1119    Specimen:  Body Fluid from Pleural Cavity Updated:  10/12/18 0630     BF Culture No growth at 5 days     Gram Stain Result Many (4+) WBCs seen      No organisms seen    Renal Function Panel  [[id number]]  (Abnormal) Collected:  10/12/18 0437    Specimen:  Blood Updated:  10/12/18 0520     Glucose 114 (H) mg/dL      BUN 36 (H) mg/dL      Creatinine 2.90 (H) mg/dL      Sodium 142 mmol/L      Potassium 4.3 mmol/L      Chloride 103 mmol/L      CO2 26.0 mmol/L      Calcium 8.8 mg/dL      Albumin 3.40 (L) g/dL      Phosphorus 3.6 mg/dL      Anion Gap 13.0 mmol/L      BUN/Creatinine Ratio 12.4     eGFR Non African Amer 21 (L) mL/min/1.73     Narrative:       The MDRD GFR formula is only valid for adults with stable renal function between ages 18 and 70.    POC Glucose Once [[id number]]  (Abnormal) Collected:  10/11/18 2033    Specimen:  Blood Updated:  10/11/18 2045     Glucose 133 (H) mg/dL      Comment: : [id number] Jefe ([facility]) KaylieMeter ID: [id number]       POC Glucose Once [[id number]]  (Normal) Collected:  10/11/18 1653    Specimen:  Blood Updated:  10/11/18 1704     Glucose 122 mg/dL      Comment: : [id number] DealCurious KaleeMeter ID: [id number]       POC Glucose Once [[id number]]  (Abnormal) Collected:  10/11/18 1014    Specimen:  Blood Updated:  10/11/18 1026     Glucose 159 (H) mg/dL      Comment: : [id number] DealCurious KaleeMeter ID: [id number]       POC Glucose Once [[id number]]  (Abnormal) Collected:  10/11/18 0940    Specimen:  Blood Updated:  10/11/18 1003     Glucose 160 (H) mg/dL      Comment: : [id number] Osmany Rogers ID: [id number]       Blood Culture - Blood, [[id number]]  (Normal) Collected:  10/06/18 0635    Specimen:  Blood from Arm, Right Updated:  10/11/18 0716     Blood Culture No growth at 5 days    Blood Culture - Blood, [[id number]]  (Normal) Collected:  10/06/18 0545    Specimen:  Blood from Arm, Right Updated:  10/11/18 0616     Blood Culture No growth at 5 days    Basic Metabolic Panel [[id number]]  (Abnormal) Collected:  10/11/18 0355    Specimen:  Blood Updated:  10/11/18 0434     Glucose 136 (H) mg/dL      BUN 60 (H) mg/dL      Creatinine 4.06 (H) mg/dL       Sodium 144 mmol/L      Potassium 4.7 mmol/L      Chloride 106 mmol/L      CO2 26.0 mmol/L      Calcium 9.7 mg/dL      eGFR  African Amer -- mL/min/1.73      Comment: <15 Indicative of kidney failure.        eGFR Non African Amer 14 (L) mL/min/1.73      Comment: <15 Indicative of kidney failure.        BUN/Creatinine Ratio 14.8     Anion Gap 12.0 mmol/L     Narrative:       The MDRD GFR formula is only valid for adults with stable renal function between ages 18 and 70.    Phosphorus [[id number]]  (Normal) Collected:  10/11/18 0355    Specimen:  Blood Updated:  10/11/18 0434     Phosphorus 4.3 mg/dL     Magnesium [[id number]]  (Abnormal) Collected:  10/11/18 0355    Specimen:  Blood Updated:  10/11/18 0434     Magnesium 2.3 (H) mg/dL     CBC & Differential [[id number]] Collected:  10/11/18 0355    Specimen:  Blood Updated:  10/11/18 0424    Narrative:       The following orders were created for panel order CBC & Differential.  Procedure                               Abnormality         Status                     ---------                               -----------         ------                     CBC Auto Differential[[id number]]        Abnormal            Final result                 Please view results for these tests on the individual orders.    CBC Auto Differential [[id number]]  (Abnormal) Collected:  10/11/18 0355    Specimen:  Blood Updated:  10/11/18 0424     WBC 4.56 (L) 10*3/mm3      RBC 3.21 (L) 10*6/mm3      Hemoglobin 9.1 (L) g/dL      Hematocrit 28.7 (L) %      MCV 89.4 fL      MCH 28.3 pg      MCHC 31.7 (L) g/dL      RDW 18.6 (H) %      RDW-SD 60.5 (H) fl      MPV 10.2 fL      Platelets 190 10*3/mm3      Neutrophil % 70.2 %      Lymphocyte % 17.5 %      Monocyte % 8.3 %      Eosinophil % 2.9 %      Basophil % 0.7 %      Immature Grans % 0.4 %      Neutrophils, Absolute 3.20 10*3/mm3      Lymphocytes, Absolute 0.80 10*3/mm3      Monocytes, Absolute 0.38 10*3/mm3      Eosinophils, Absolute 0.13 10*3/mm3       Basophils, Absolute 0.03 10*3/mm3      Immature Grans, Absolute 0.02 10*3/mm3      nRBC 0.0 /100 WBC     POC Glucose Once [[id number]]  (Abnormal) Collected:  10/10/18 2004    Specimen:  Blood Updated:  10/10/18 2017     Glucose 215 (H) mg/dL      Comment: : [id number] Zeny HayesleyMeter ID: [id number]       POC Glucose Once [[id number]]  (Abnormal) Collected:  10/10/18 1656    Specimen:  Blood Updated:  10/10/18 1707     Glucose 195 (H) mg/dL      Comment: : [id number] Tesfaye EuraisaMeter ID: [id number]       Hepatitis Panel, Acute [[id number]]  (Normal) Collected:  10/10/18 1255    Specimen:  Blood Updated:  10/10/18 1433     HCV S/C Ratio 0.03     Hepatitis C Ab Negative     Hep A IgM Negative     Hep B C IgM Negative     Hepatitis B Surface Ag Negative    Hepatitis B Surface Antibody [[id number]]  (Abnormal) Collected:  10/10/18 1255    Specimen:  Blood Updated:  10/10/18 1429     Hepatitis Bs Ab Index <5.00     Hep B S Ab Non-Immune (A)              Echo EF Estimated  Lab Results   Component Value Date    ECHOEFEST 37 10/06/2018       Medication Review: yes  Current Facility-Administered Medications   Medication Dose Route Frequency Provider Last Rate Last Dose   • acetaminophen (TYLENOL) tablet 650 mg  650 mg Oral Q4H PRN Max Jones MD       • amiodarone (PACERONE) tablet 200 mg  200 mg Oral TID Abdiel Stout MD   200 mg at 10/13/18 1125   • aspirin chewable tablet 81 mg  81 mg Oral Daily Vu Michael MD   81 mg at 10/13/18 1125   • atorvastatin (LIPITOR) tablet 80 mg  80 mg Oral Daily Max Jones MD   80 mg at 10/13/18 1124   • budesonide-formoterol (SYMBICORT) 160-4.5 MCG/ACT inhaler 2 puff  2 puff Inhalation BID - RT Max Jones MD   2 puff at 10/12/18 2143   • bumetanide (BUMEX) tablet 1 mg  1 mg Oral BID Vu Michael MD   1 mg at 10/13/18 1125   • calcitriol (ROCALTROL) capsule 0.5 mcg  0.5 mcg Oral Daily Max Jones MD   0.5  mcg at 10/13/18 1126   • calcium carb-cholecalciferol 600-800 MG-UNIT tablet 1 tablet  1 tablet Oral Daily Max Jones MD   1 tablet at 10/13/18 1127   • dextrose (D50W) 25 g/ 50mL Intravenous Solution 25 g  25 g Intravenous Q15 Min PRN Max Jones MD       • dextrose (GLUTOSE) oral gel 15 g  15 g Oral Q15 Min PRN Max Jones MD       • Ferric Citrate tablet 210 mg  210 mg Oral TID With Meals Max Jones MD   210 mg at 10/13/18 1124   • glucagon (human recombinant) (GLUCAGEN DIAGNOSTIC) injection 1 mg  1 mg Subcutaneous PRN Max Jones MD       • heparin (porcine) 5000 UNIT/ML injection 5,000 Units  5,000 Units Subcutaneous Q12H Max Jones MD   5,000 Units at 10/13/18 1126   • heparin (porcine) injection 1,700 Units  1,700 Units Intracatheter PRN Samson Aragon MD   1,700 Units at 10/12/18 1802   • heparin (porcine) injection 1,700 Units  1,700 Units Intracatheter PRN Samson Aragon MD   1,700 Units at 10/13/18 0906   • hydrALAZINE (APRESOLINE) tablet 25 mg  25 mg Oral Q8H Abdiel Stout MD       • HYDROcodone-acetaminophen (NORCO)  MG per tablet 1 tablet  1 tablet Oral Q6H PRN Max Jones MD   1 tablet at 10/13/18 0110   • hydroxypropyl methylcellulose (ISOPTO TEARS) 2.5 % ophthalmic solution 1 drop  1 drop Both Eyes TID PRN Vu Michael MD   1 drop at 10/11/18 0636   • Influenza Vac Subunit Quad (FLUCELVAX) injection 0.5 mL  0.5 mL Intramuscular During Hospitalization Max Jones MD       • insulin lispro (humaLOG) injection 0-9 Units  0-9 Units Subcutaneous 4x Daily With Meals & Nightly Max Jones MD   4 Units at 10/10/18 2112   • insulin lispro (humaLOG) injection 6 Units  6 Units Subcutaneous TID Max Louie MD   6 Units at 10/10/18 1723   • ipratropium (ATROVENT) nebulizer solution 0.5 mg  0.5 mg Nebulization Q6H PRN Abdiel Stout,  MD       • isosorbide dinitrate (ISORDIL) tablet 10 mg  10 mg Oral TID - Nitrates Abdiel Stout MD   10 mg at 10/13/18 1124   • lisinopril (PRINIVIL,ZESTRIL) tablet 2.5 mg  2.5 mg Oral Q24H Abdiel Stout MD   2.5 mg at 10/13/18 1124   • magnesium hydroxide (MILK OF MAGNESIA) suspension 2400 mg/10mL 10 mL  10 mL Oral Daily PRN Max Jones MD       • metoprolol tartrate (LOPRESSOR) tablet 25 mg  25 mg Oral Q12H Max Jones MD   25 mg at 10/13/18 1124   • nicotine (NICODERM CQ) 21 MG/24HR patch 1 patch  1 patch Transdermal Q24H Ashutosh Palafox MD   1 patch at 10/12/18 1943   • ondansetron (ZOFRAN) injection 4 mg  4 mg Intravenous Q6H PRN Vu Michael MD   4 mg at 10/13/18 0351   • potassium chloride (MICRO-K) CR capsule 20 mEq  20 mEq Oral Daily Vu Michael MD   20 mEq at 10/13/18 1125   • sennosides-docusate sodium (SENOKOT-S) 8.6-50 MG tablet 1 tablet  1 tablet Oral BID Vu Michael MD   1 tablet at 10/13/18 1125   • sodium bicarbonate tablet 650 mg  650 mg Oral BID Delmy Mullins APRN   650 mg at 10/13/18 1126   • sodium chloride 0.9 % flush 3 mL  3 mL Intravenous Q12H Max Jones MD   3 mL at 10/12/18 1954   • sodium chloride 0.9 % flush 3-10 mL  3-10 mL Intravenous PRN Max Jones MD       • tetrahydrozoline 0.05 % ophthalmic solution 1 drop  1 drop Both Eyes 4x Daily PRN Ney De MD   1 drop at 10/10/18 0339         Assessment/Plan       Acute on chronic respiratory failure with hypoxia (CMS/HCC)    Acute renal failure superimposed on stage 4 chronic kidney disease (CMS/HCC)    Acute on chronic combined systolic and diastolic congestive heart failure (CMS/HCC)    Sepsis (CMS/HCC)    Pneumonia    Coronary artery disease involving native coronary artery of native heart without angina pectoris    History of coronary artery stent placement    Pleural effusion, bilateral    Mild aortic valve stenosis    Moderate aortic  valve insufficiency    Mild mitral valve regurgitation    COPD (chronic obstructive pulmonary disease) (CMS/[facility])      Plan    1. Continue current cardiac meds.   2. Continue telemetry.   3. Daily weights.  4. Cardiac/renal/low sodium diet.   5. 1500 ml daily fluid restriction.  6. Continue to monitor intake and output.     Monik Vann, APRN  10/13/18  1:37 PM

## 2018-10-13 NOTE — PROGRESS NOTES
Nephrology ([facility]) Progress Note      Patient:  Gavin Wilson  YOB: 1945  Date of Service: 10/13/2018  MRN: [id number]   Acct: [id number]   Primary Care Physician: Provider, No Known  Advance Directive:   Code Status and Medical Interventions:   Ordered at: 10/07/18 1309     Limited Support to NOT Include:    Intubation     Level Of Support Discussed With:    Patient     Code Status:    No CPR     Medical Interventions (Level of Support Prior to Arrest):    Limited     Admit Date: 10/6/2018       Hospital Day: 7  Referring Provider: Max Jones*      Patient personally seen and examined.  Complete chart including Consults, Notes, Operative Reports, Labs, Cardiology, and Radiology studies reviewed as able.        Subjective:  Gavin Wilson is a 73 y.o. male  whom we were consulted for management of chronic kidney disease stage IV.  He has a history of bladder cancer s/p cystectomy and ureterostomy formation. He also has a history of esophogeal and prostate cancer.   Patient goes to [facility] in [place] and sees nephrology there.  He already has a left upper arm primary fistula in preparation for upcoming dialysis.  Presented with increasing shortness of breath, dyspnea on exertion.  He was diagnosed with bilateral pneumonia/pleural effusion.  He was being treated for both, getting IV antibiotics and intravenous diuretics. His serum creatinine was 3.2 mg.  He states when he was seen by Nephrology last his GFR was 20 and it has been for two years.  Nephrology was consulted for evaluation and treatment of chronic kidney disease.  AVF cleared for use, but infiltrated x3 so had permcath placed the following day     Today, no new events.  No issues today. Still weak.  No other reported issues per pt/RN.      Allergies:  Sulfa antibiotics    Home Meds:  Prescriptions Prior to Admission   Medication Sig Dispense Refill Last Dose   • acetaminophen (TYLENOL)  325 MG tablet Take 650 mg by mouth Every 6 (Six) Hours As Needed for Mild Pain .   Past Month at Unknown time   • albuterol (PROVENTIL HFA;VENTOLIN HFA) 108 (90 Base) MCG/ACT inhaler Inhale 2 puffs Every 6 (Six) Hours As Needed for Wheezing.   Past Week at Unknown time   • aspirin 81 MG chewable tablet Chew 81 mg Daily.   10/5/2018 at Unknown time   • atorvastatin (LIPITOR) 80 MG tablet Take 40 mg by mouth Daily.   10/5/2018 at Unknown time   • budesonide-formoterol (SYMBICORT) 160-4.5 MCG/ACT inhaler Inhale 2 puffs 2 (Two) Times a Day. 1 inhaler 2 10/5/2018 at Unknown time   • bumetanide (BUMEX) 2 MG tablet Take 1 tablet by mouth Daily. 30 tablet 0 10/5/2018 at Unknown time   • calcium carbonate (OS-TASHI) 600 MG tablet Take 600 mg by mouth Daily.   10/5/2018 at Unknown time   • cholecalciferol (VITAMIN D3) 1000 units tablet Take 3,000 Units by mouth Daily.   10/5/2018 at Unknown time   • insulin aspart (novoLOG FLEXPEN) 100 UNIT/ML solution pen-injector sc pen Inject 6 Units under the skin into the appropriate area as directed 3 (Three) Times a Day With Meals.   10/5/2018 at Unknown time   • insulin detemir (LEVEMIR) 100 UNIT/ML injection Inject 6 Units under the skin into the appropriate area as directed 2 (Two) Times a Day.   10/5/2018 at Unknown time   • loperamide (IMODIUM) 2 MG capsule Take 2 mg by mouth Daily As Needed for Diarrhea.   Past Month at Unknown time   • metoprolol tartrate (LOPRESSOR) 25 MG tablet Take 12.5 mg by mouth 2 (Two) Times a Day.   10/5/2018 at Unknown time   • omeprazole (priLOSEC) 20 MG capsule Take 20 mg by mouth Daily.   10/5/2018 at Unknown time   • oxyCODONE (ROXICODONE) 5 MG immediate release tablet Take 5 mg by mouth Every 6 (Six) Hours As Needed for Moderate Pain .   Past Week at Unknown time   • sildenafil (VIAGRA) 100 MG tablet Take 100 mg by mouth Daily As Needed for erectile dysfunction.   More than a month at Unknown time       Medicines:  Current Facility-Administered  Medications   Medication Dose Route Frequency Provider Last Rate Last Dose   • acetaminophen (TYLENOL) tablet 650 mg  650 mg Oral Q4H PRN Max Jones MD       • amiodarone (PACERONE) tablet 200 mg  200 mg Oral TID Abdiel Stout MD   200 mg at 10/13/18 1125   • aspirin chewable tablet 81 mg  81 mg Oral Daily Vu Michael MD   81 mg at 10/13/18 1125   • atorvastatin (LIPITOR) tablet 80 mg  80 mg Oral Daily Max Jones MD   80 mg at 10/13/18 1124   • budesonide-formoterol (SYMBICORT) 160-4.5 MCG/ACT inhaler 2 puff  2 puff Inhalation BID - RT Max Jones MD   2 puff at 10/12/18 2143   • bumetanide (BUMEX) tablet 1 mg  1 mg Oral BID Vu Michael MD   1 mg at 10/13/18 1125   • calcitriol (ROCALTROL) capsule 0.5 mcg  0.5 mcg Oral Daily Max Jones MD   0.5 mcg at 10/13/18 1126   • calcium carb-cholecalciferol 600-800 MG-UNIT tablet 1 tablet  1 tablet Oral Daily Max Jones MD   1 tablet at 10/13/18 1127   • dextrose (D50W) 25 g/ 50mL Intravenous Solution 25 g  25 g Intravenous Q15 Min PRN Max Jones MD       • dextrose (GLUTOSE) oral gel 15 g  15 g Oral Q15 Min PRN Max Jones MD       • Ferric Citrate tablet 210 mg  210 mg Oral TID With Meals Max Jones MD   210 mg at 10/13/18 1124   • glucagon (human recombinant) (GLUCAGEN DIAGNOSTIC) injection 1 mg  1 mg Subcutaneous PRN Max Jones MD       • heparin (porcine) 5000 UNIT/ML injection 5,000 Units  5,000 Units Subcutaneous Q12H Max Jones MD   5,000 Units at 10/13/18 1126   • heparin (porcine) injection 1,700 Units  1,700 Units Intracatheter PRN Samson Aragon, MD   1,700 Units at 10/12/18 1802   • heparin (porcine) injection 1,700 Units  1,700 Units Intracatheter Samson Singh MD   1,700 Units at 10/13/18 0906   • hydrALAZINE (APRESOLINE) tablet 25 mg  25 mg Oral Q8H Abdiel Stout MD        • HYDROcodone-acetaminophen (NORCO)  MG per tablet 1 tablet  1 tablet Oral Q6H PRN Max Jones MD   1 tablet at 10/13/18 0110   • hydroxypropyl methylcellulose (ISOPTO TEARS) 2.5 % ophthalmic solution 1 drop  1 drop Both Eyes TID PRN Vu Michael MD   1 drop at 10/11/18 0636   • Influenza Vac Subunit Quad (FLUCELVAX) injection 0.5 mL  0.5 mL Intramuscular During Hospitalization Max Jones MD       • insulin lispro (humaLOG) injection 0-9 Units  0-9 Units Subcutaneous 4x Daily With Meals & Nightly Max Jones MD   4 Units at 10/10/18 2112   • insulin lispro (humaLOG) injection 6 Units  6 Units Subcutaneous TID AC Max Jones MD   6 Units at 10/10/18 1723   • ipratropium (ATROVENT) nebulizer solution 0.5 mg  0.5 mg Nebulization Q6H PRN Abdiel Stout MD   0.5 mg at 10/13/18 1619   • isosorbide dinitrate (ISORDIL) tablet 10 mg  10 mg Oral TID - Nitrates Abdiel Stout MD   10 mg at 10/13/18 1124   • lisinopril (PRINIVIL,ZESTRIL) tablet 2.5 mg  2.5 mg Oral Q24H Abdiel Stout MD   2.5 mg at 10/13/18 1124   • magnesium hydroxide (MILK OF MAGNESIA) suspension 2400 mg/10mL 10 mL  10 mL Oral Daily PRN Max Jones MD       • metoprolol tartrate (LOPRESSOR) tablet 25 mg  25 mg Oral Q12H Max Jones MD   25 mg at 10/13/18 1124   • nicotine (NICODERM CQ) 21 MG/24HR patch 1 patch  1 patch Transdermal Q24H Ashutosh Palafox MD   1 patch at 10/12/18 1943   • ondansetron (ZOFRAN) injection 4 mg  4 mg Intravenous Q6H PRN Vu Michael MD   4 mg at 10/13/18 1557   • potassium chloride (MICRO-K) CR capsule 20 mEq  20 mEq Oral Daily Vu Michael MD   20 mEq at 10/13/18 1125   • sennosides-docusate sodium (SENOKOT-S) 8.6-50 MG tablet 1 tablet  1 tablet Oral BID Vu Michael MD   1 tablet at 10/13/18 1125   • sodium bicarbonate tablet 650 mg  650 mg Oral BID Delmy Mullins APRN   650 mg at 10/13/18 1126   • sodium  chloride 0.9 % flush 3 mL  3 mL Intravenous Q12H Max Jones MD   3 mL at 10/12/18 1954   • sodium chloride 0.9 % flush 3-10 mL  3-10 mL Intravenous PRN Max Jones MD       • tetrahydrozoline 0.05 % ophthalmic solution 1 drop  1 drop Both Eyes 4x Daily PRN Ney De MD   1 drop at 10/10/18 0339       Past Medical History:  Past Medical History:   Diagnosis Date   • CHF (congestive heart failure) (CMS/HCC)    • Coronary stent occlusion    • Diabetes mellitus (CMS/HCC)    • Hyperlipidemia    • Hypertension    • Stroke (CMS/[facility])        Past Surgical History:  Past Surgical History:   Procedure Laterality Date   • BLADDER SURGERY     • ESOPHAGECTOMY     • INSERTION HEMODIALYSIS CATHETER N/A 10/11/2018    Procedure: HEMODIALYSIS CATHETER INSERTION;  Surgeon: Hugo Bonilla MD;  Location: [place] [address];  Service: Vascular       Family History  Family History   Problem Relation Age of Onset   • No Known Problems Father    • No Known Problems Mother        Social History  Social History     Social History   • Marital status:      Spouse name: N/A   • Number of children: N/A   • Years of education: N/A     Occupational History   • Not on file.     Social History Main Topics   • Smoking status: Current Every Day Smoker   • Smokeless tobacco: Never Used   • Alcohol use No   • Drug use: No   • Sexual activity: Not on file     Other Topics Concern   • Not on file     Social History Narrative   • No narrative on file         Review of Systems:  History obtained from chart review and the patient  General ROS: No fever or chills  Respiratory ROS: No cough, +shortness of breath  Cardiovascular ROS: No chest pain or palpitations  Gastrointestinal ROS: No abdominal pain or melena  Genito-Urinary ROS: No dysuria or hematuria    Objective:  /44 (BP Location: Right arm, Patient Position: Lying)   Pulse 68 Comment: post tx  Temp 97.4 °F (36.3 °C) (Temporal Artery )    "Resp 18   Ht 170.2 cm (67\")   Wt 48.5 kg (107 lb)   SpO2 98% Comment: post tx  BMI 16.76 kg/m²     Intake/Output Summary (Last 24 hours) at 10/13/18 1635  Last data filed at 10/13/18 1300   Gross per 24 hour   Intake              220 ml   Output              300 ml   Net              -80 ml     General: awake/alert   Chest:  clear to auscultation bilaterally without respiratory distress  CVS: regular rate and rhythm  Abdominal: soft, nontender, normal bowel sounds  Extremities: no cyanosis or edema  Skin: warm and dry without rash      Labs:    Results from last 7 days  Lab Units 10/11/18  0355 10/10/18  0422 10/09/18  0306   WBC 10*3/mm3 4.56* 5.35 5.17   HEMOGLOBIN g/dL 9.1* 10.2* 9.8*   HEMATOCRIT % 28.7* 32.6* 30.4*   PLATELETS 10*3/mm3 190 173 180           Results from last 7 days  Lab Units 10/13/18  0436 10/12/18  0437 10/11/18  0355  10/08/18  0435   SODIUM mmol/L 139 142 144  < > 141   POTASSIUM mmol/L 4.4 4.3 4.7  < > 4.5   CHLORIDE mmol/L 99 103 106  < > 110   CO2 mmol/L 21.0* 26.0 26.0  < > 16.0*   BUN mg/dL 23* 36* 60*  < > 50*   CREATININE mg/dL 2.02* 2.90* 4.06*  < > 3.78*   CALCIUM mg/dL 9.1 8.8 9.7  < > 8.5   BILIRUBIN mg/dL  --   --   --   --  0.4   ALK PHOS U/L  --   --   --   --  83   ALT (SGPT) U/L  --   --   --   --  53   AST (SGOT) U/L  --   --   --   --  27   GLUCOSE mg/dL 114* 114* 136*  < > 131*   < > = values in this interval not displayed.    Radiology:   Imaging Results (last 72 hours)     Procedure Component Value Units Date/Time    US Thoracentesis [[id number]] Collected:  10/07/18 1323    Specimen:  Body Fluid Updated:  10/13/18 0934    Narrative:       DATE OF PROCEDURE:  10/7/2018.     PREPROCEDURE DIAGNOSIS:  Left pleural effusion.  POSTPROCEDURE DIAGNOSIS:  Left pleural effusion.          INDICATIONS FOR PROCEDURE: Shortness of breath.     PROCEDURE:   1. Thoracentesis, diagnostic and therapeutic.  2. Ultrasound guidance for thoracentesis, imaging supervision " and  interpretation.     ANESTHESIA: 1% lidocaine, injected locally.          COMPLICATIONS: None.        EXAMINATIONS FOR COMPARISON:  CT chest dated 10/6/2018.     DESCRIPTION OF PROCEDURE:   The risk and benefits of the procedure were explained to the patient.   Specifically, the risks of bleeding, infection, pneumothorax (possible  thoracostomy tube), and damage to surrounding structures were discussed  extensively. The patient's questions were answered. The patient opted to  proceed. Written and verbal consent were obtained from the patient.     TIME OUT was taken and the patient's name, medical record number, date  of birth, procedure, entry site, and listen allergies were confirmed.  The site of the procedure was confirmed and marked.      The leftposterior thorax was prepped and draped in sterile fashion. The  area was anesthetized with buffered lidocaine 2%.      A 5 Sinhal[place] 10 cm  catheter was inserted into the pleural effusion  using  ultrasound guidance. Approximately 400 mL of clear fluid was recovered  and sent to the pathology lab for analysis.      The patient tolerated the procedure well.   No immediate complications  were noted.       Impression:       Successful ultrasound guided leftthoracentesis. Approximately 400 mL of  clear fluid was recovered and sent to the pathology lab for analysis.   No immediate complications were noted.              This report was finalized on 10/07/2018 13:26 by Dr. Petra Mckinley MD.     Chest [[id number]] Collected:  10/07/18 1323     Updated:  10/13/18 0934    Narrative:       DATE OF PROCEDURE:  10/7/2018.     PREPROCEDURE DIAGNOSIS:  Left pleural effusion.  POSTPROCEDURE DIAGNOSIS:  Left pleural effusion.          INDICATIONS FOR PROCEDURE: Shortness of breath.     PROCEDURE:   1. Thoracentesis, diagnostic and therapeutic.  2. Ultrasound guidance for thoracentesis, imaging supervision and  interpretation.     ANESTHESIA: 1% lidocaine, injected locally.           COMPLICATIONS: None.        EXAMINATIONS FOR COMPARISON:  CT chest dated 10/6/2018.     DESCRIPTION OF PROCEDURE:   The risk and benefits of the procedure were explained to the patient.   Specifically, the risks of bleeding, infection, pneumothorax (possible  thoracostomy tube), and damage to surrounding structures were discussed  extensively. The patient's questions were answered. The patient opted to  proceed. Written and verbal consent were obtained from the patient.     TIME OUT was taken and the patient's name, medical record number, date  of birth, procedure, entry site, and listen allergies were confirmed.  The site of the procedure was confirmed and marked.      The leftposterior thorax was prepped and draped in sterile fashion. The  area was anesthetized with buffered lidocaine 2%.      A 5 Taj[place] 10 cm  catheter was inserted into the pleural effusion  using  ultrasound guidance. Approximately 400 mL of clear fluid was recovered  and sent to the pathology lab for analysis.      The patient tolerated the procedure well.   No immediate complications  were noted.       Impression:       Successful ultrasound guided leftthoracentesis. Approximately 400 mL of  clear fluid was recovered and sent to the pathology lab for analysis.   No immediate complications were noted.              This report was finalized on 10/07/2018 13:26 by Dr. Petra Mckinley MD.    XR Chest 1 View [[id number]] Collected:  10/11/18 0940     Updated:  10/11/18 0945    Narrative:       EXAMINATION: XR CHEST 1 VW- 10/11/2018 9:40 AM CDT     HISTORY: s/p R IJ permacath insertion; R13.10-Dysphagia, unspecified;  Z74.09-Other reduced mobility; Z74.09-Other reduced mobility;  N18.4-Chronic kidney disease, stage 4 (severe).     REPORT: Comparison is made with the study from 10/7/2018.     There is a new right internal jugular permacath, in good position  without evidence of a pneumothorax. Haziness over the lung bases is  probably related to  atelectasis and effusions, the right effusion is  larger than the left and has increased in size. Heart size is normal.  There is moderate ectasia of the thoracic aorta. A portion of an aortic  endograft is seen in the upper abdomen. The osseous structures are  unremarkable.       Impression:       1. Interval insertion of a right internal jugular permacath in good  position without pneumothorax.  2. Bilateral small pleural effusions greater on the right and increased  compared with the previous chest x-ray. Bibasilar atelectasis.  This report was finalized on 10/11/2018 09:41 by Dr. Lucian Jones MD.    IR Insert Tunneled CV Catheter Without Port 5 Plus [[id number]] Updated:  10/11/18 0843    FL C Arm During Surgery [[id number]] Updated:  10/11/18 0843    US Arterial Doppler Upper Extremity Left [[id number]] Collected:  10/10/18 1749     Updated:  10/10/18 1759    Narrative:       History: Left upper extremity arteriovenous fistula with no prior  attempt at access.     Comment: Left upper chest from a arteriovenous fistula duplex was  performed using gray scale imaging as well as color flow Doppler.     FINDINGS: The brachial, radial, and ulnar arteries are all widely patent  with triphasic waveforms present. The anastomosis appears widely patent  with a peak systolic velocity of 251 cm/s. The proximal fistula has a  diameter of 4.8 mm, and the depth from the surface of 1.7 mm, with a  volume of flow of 642 mL/m. At a point 5 cm from the anastomosis there  is a vein diameter of 2.2 mm, with a depth of 2.2 mm from the skin, with  a flow volume of 331 mL/m. In the mid upper arm fistula has a diameter  of 3.2 mm, with a depth from the skin of 1.4 mm, and a flow volume of  651 mL/m. There is a valve noted at this point. There is also noted to  be branch laterally coming off in the mid upper arm. In the mid/proximal  upper arm the diameter is 4.1 mm with a flow rate of 1090 mL/m. In the  proximal upper arm the fistula  has a diameter of 4.0 mm with a depth  from the surface of 3.3 mm. There is no evidence of significant stenosis  at any point.       Impression:       Widely patent left arm arteriovenous fistula with parameters  as above..  This report was finalized on 10/10/2018 17:56 by Dr. Hugo Bonilla MD.          Culture:  Urine Culture   Date Value Ref Range Status   10/07/2018 >100,000 CFU/mL Yeast isolated (A)  Final         Assessment   CKD 4  DM2 with nephropathy  PNA  Chronic diastolic CHF  Secondary Hyperparathyroidism  metabolic acidosis  VTach     Plan:  Bicarb  calcitriol   HD MWF prn  D/w cardiology as well    Samson Aragon MD  10/13/2018  4:35 PM

## 2018-10-13 NOTE — THERAPY TREATMENT NOTE
Acute Care - Physical Therapy Treatment Note  [facility]     Patient Name: Gavin Wilson  : 1945  MRN: [id number]  Today's Date: 10/13/2018  Onset of Illness/Injury or Date of Surgery: 10/06/18  Date of Referral to PT: 10/08/18  Referring Physician: Dr. Michael    Admit Date: 10/6/2018    Visit Dx:    ICD-10-CM ICD-9-CM   1. Esophageal dysphagia R13.10 787.20   2. Impaired mobility and ADLs Z74.09 799.89   3. Impaired functional mobility, balance, gait, and endurance Z74.09 V49.89   4. Chronic kidney disease, stage 4 (severe) (CMS/HCC) N18.4 585.4     Patient Active Problem List   Diagnosis   • Volume overload   • Acute renal failure superimposed on stage 4 chronic kidney disease (CMS/HCC)   • Acute on chronic combined systolic and diastolic congestive heart failure (CMS/HCC)   • Pleural effusion, bilateral   • Sepsis (CMS/[facility])   • Coronary artery disease involving native coronary artery of native heart without angina pectoris   • History of coronary artery stent placement   • Mild aortic valve stenosis   • Moderate aortic valve insufficiency   • Mild mitral valve regurgitation   • Acute on chronic respiratory failure with hypoxia (CMS/[facility])   • Pneumonia   • COPD (chronic obstructive pulmonary disease) (CMS/[facility])       Therapy Treatment          Rehabilitation Treatment Summary     Row Name 10/13/18 1453 10/13/18 [facility]          Treatment Time/Intention    Discipline physical therapy assistant  -LG physical therapy assistant  -LG     Document Type therapy note (daily note)  -LG2  --     Subjective Information complains of;weakness;fatigue;pain  -LG2  --     Mode of Treatment individual therapy;physical therapy  -LG2  --     Comment Pt would only agree to LE Ther Ex in bed  -LG2 Pt out to dialysis  -LG     Reason Treatment Not Performed  -- unavailable for treatment  -LG     Existing Precautions/Restrictions fall  -LG2  --     Recorded by [LG] Roberto Pereira, PTA 10/13/18 1455  [LG2] Roberto Pereira, PTA 10/13/18  1512 [LG] Roberto Pereira, PTA 10/13/18 1037     Row Name 10/13/18 1453             Bed Mobility Assessment/Treatment    Rolling Left [place] (Bed Mobility) supervision  -LG      Rolling Right [place] (Bed Mobility) supervision  -LG      Comment (Bed Mobility) Pt rolled over without assist so his cell phone could be found  -LG      Recorded by [LG] Roberto Pereira, PTA 10/13/18 1512      Row Name 10/13/18 1453             Therapeutic Exercise    Comment (Therapeutic Exercise) KEN CARDOSOOM x 20 Reps. AP's, Heel Slides,, Hip Abd/Add  -LG      Recorded by [LG] Roberto Pereira, PTA 10/13/18 1512      Row Name                Wound 10/07/18 1255 Bilateral posterior coccyx    Wound - Properties Group Date first assessed: 10/07/18 [SH] Time first assessed: 1255 [SH] Side: Bilateral [SH] Orientation: posterior [SH] Location: coccyx [SH] Recorded by:  [SH] Virginia Ahn RN 10/07/18 1256    Row Name                Wound 10/11/18 0831 Other (See comments) chest incision    Wound - Properties Group Date first assessed: 10/11/18 [DS] Time first assessed: 0831 [DS] Side: Other (See comments) [DS] Location: chest [DS] Type: incision [DS] Recorded by:  [DS] Jeremy Liu RN 10/11/18 0831      User Key  (r) = Recorded By, (t) = Taken By, (c) = Cosigned By    Initials Name Effective Dates Discipline    LG Roberto Pereira, PTA 08/02/16 -  PT    SH Virginia Ahn RN 08/28/17 -  Nurse    DS Jeremy Liu RN 08/02/16 -  Nurse          Wound 10/07/18 1255 Bilateral posterior coccyx (Active)   Dressing Appearance dry;intact 10/13/2018  7:10 AM   Closure None 10/12/2018  8:00 PM   Base dressing in place, unable to visualize 10/13/2018  7:10 AM   Periwound intact;dry;redness 10/12/2018  8:00 PM   Periwound Temperature warm 10/12/2018  8:00 PM   Periwound Skin Turgor soft 10/12/2018  8:00 PM   Drainage Amount none 10/13/2018  7:10 AM   Dressing Care, Wound low-adherent;dressing changed;dressing applied;skin barrier agent  applied 10/12/2018  8:00 PM       Wound 10/11/18 0831 Other (See comments) chest incision (Active)   Dressing Appearance dry;intact 10/13/2018  7:10 AM   Base dressing in place, unable to visualize 10/13/2018  7:10 AM   Drainage Amount none 10/13/2018  7:10 AM   Dressing Care, Wound border dressing;transparent film 10/12/2018  8:00 PM             Physical Therapy Education     Title: PT OT SLP Therapies (Done)     Topic: Physical Therapy (Done)     Point: Mobility training (Done)    Learning Progress Summary     Learner Status Readiness Method Response Comment Documented by    Patient Done Acceptance E RUBA,NR benefits of activity MF 10/12/18 1206     Active Acceptance E,D RT Effecient supine to sit EARLENE 10/09/18 1154     Done Acceptance E VU,NR Pt was educated on the importance and benefits of getting out of bed and how it affects his overall health. Pt was educated on why PT is working with him. Pt was encouraged to do more in treatment session. TD 10/08/18 1430     Done Acceptance E RUBA,NR Pt  was educated on benefits ot PT and PT POC. Pt was edcuated on body mechanics with transfers (pushing with one arm on bed to stand up/having legs touch bed before sitting down) and safety precautions (socks on, gait belt on) with transfers and gait. TD 10/08/18 1149          Point: Body mechanics (Done)    Learning Progress Summary     Learner Status Readiness Method Response Comment Documented by    Patient Done Acceptance E VU,NR Pt was educated on the importance and benefits of getting out of bed and how it affects his overall health. Pt was educated on why PT is working with him. Pt was encouraged to do more in treatment session. TD 10/08/18 1430     Done Acceptance E VU,NR Pt  was educated on benefits ot PT and PT POC. Pt was edcuated on body mechanics with transfers (pushing with one arm on bed to stand up/having legs touch bed before sitting down) and safety precautions (socks on, gait belt on) with transfers and gait. TD  10/08/18 1149          Point: Precautions (Done)    Learning Progress Summary     Learner Status Readiness Method Response Comment Documented by    Patient Done Acceptance E VU,NR Pt was educated on the importance and benefits of getting out of bed and how it affects his overall health. Pt was educated on why PT is working with him. Pt was encouraged to do more in treatment session. TD 10/08/18 1430     Done Acceptance E VU,NR Pt  was educated on benefits ot PT and PT POC. Pt was edcuated on body mechanics with transfers (pushing with one arm on bed to stand up/having legs touch bed before sitting down) and safety precautions (socks on, gait belt on) with transfers and gait. TD 10/08/18 1149                      User Key     Initials Effective Dates Name Provider Type Discipline    EARLENE 08/02/16 -  Kaylynn Mcgarry PTA Physical Therapy Assistant PT    MF 08/02/16 -  Liliane Collado PTA Physical Therapy Assistant PT    TD 09/07/18 -  Rita Grullon PT Student PT Student PT                    PT Recommendation and Plan     Plan of Care Reviewed With: patient  Progress: no change  Outcome Summary: Pt refused to get up to side of bed, would only agree to AAROM B LE in Supine           Outcome Measures     Row Name 10/13/18 1513 10/12/18 1334 10/12/18 1149       How much help from another person do you currently need...    Turning from your back to your side while in flat bed without using bedrails? 4  -LG  -- 4  -MF    Moving from lying on back to sitting on the side of a flat bed without bedrails? 3  -LG  -- 3  -MF    Moving to and from a bed to a chair (including a wheelchair)? 3  -LG  -- 3  -MF    Standing up from a chair using your arms (e.g., wheelchair, bedside chair)? 3  -LG  -- 3  -MF    Climbing 3-5 steps with a railing? 2  -LG  -- 2  -MF    To walk in hospital room? 3  -LG  -- 3  -MF    AM-PAC 6 Clicks Score 18  -LG  -- 18  -MF       How much help from another is currently needed...    Putting on and  taking off regular lower body clothing?  -- 2  -CJ  --    Bathing (including washing, rinsing, and drying)  -- 2  -CJ  --    Toileting (which includes using toilet bed pan or urinal)  -- 2  -CJ  --    Putting on and taking off regular upper body clothing  -- 3  -CJ  --    Taking care of personal grooming (such as brushing teeth)  -- 3  -CJ  --    Eating meals  -- 4  -CJ  --    Score  -- 16  -CJ  --       Functional Assessment    Outcome Measure Options AM-PAC 6 Clicks Basic Mobility (PT)  - AM-PAC 6 Clicks Daily Activity (OT)  - AM-PAC 6 Clicks Basic Mobility (PT)  -      User Key  (r) = Recorded By, (t) = Taken By, (c) = Cosigned By    Initials Name Provider Type    Roberto Gamez, URMILA Physical Therapy Assistant     Paulino Fuchs, ROMÁN/L Occupational Therapy Assistant     Liliane Collado PTA Physical Therapy Assistant           Time Calculation:         PT Charges     Row Name 10/13/18 1453             Time Calculation    Start Time 1453  -      Stop Time 1516  -      Time Calculation (min) 23 min  -      PT Received On 10/13/18  -      PT Goal Re-Cert Due Date 10/18/18  -         Time Calculation- PT    Total Timed Code Minutes- PT 23 minute(s)  -        User Key  (r) = Recorded By, (t) = Taken By, (c) = Cosigned By    Initials Name Provider Type    Roberto Gamez, URMILA Physical Therapy Assistant        Therapy Suggested Charges     Code   Minutes Charges    78516 (CPT®) Hc Pt Neuromusc Re Education Ea 15 Min      83664 (CPT®) Hc Pt Ther Proc Ea 15 Min      35442 (CPT®) Hc Gait Training Ea 15 Min 12 1    88915 (CPT®) Hc Pt Therapeutic Act Ea 15 Min      64086 (CPT®) Hc Pt Manual Therapy Ea 15 Min      59043 (CPT®) Hc Pt Iontophoresis Ea 15 Min      95582 (CPT®) Hc Pt Elec Stim Ea-Per 15 Min      23412 (CPT®) Hc Pt Ultrasound Ea 15 Min      51345 (CPT®) Hc Pt Self Care/Mgmt/Train Ea 15 Min      14082 (CPT®) Hc Pt Prosthetic (S) Train Initial Encounter, Each 15 Min      54948  (CPT®) Hc Pt Orthotic(S)/Prosthetic(S) Encounter, Each 15 Min      46993 (CPT®) Hc Orthotic(S) Mgmt/Train Initial Encounter, Each 15min      Total  12 1        Therapy Charges for Today     Code Description Service Date Service Provider Modifiers Qty    11388763946 HC PT THERAPEUTIC ACT EA 15 MIN 10/13/2018 Roberto Pereira, PTA GP, KX 1    84135050894 HC PT THER PROC EA 15 MIN 10/13/2018 Roberto Pereira, URMILA GP, KX 1          PT G-Codes  PT Professional Judgement Used?: Yes  Outcome Measure Options: AM-PAC 6 Clicks Basic Mobility (PT)  AM-PAC 6 Clicks Score: 18  Score: 16  Functional Limitation: Mobility: Walking and moving around  Mobility: Walking and Moving Around Current Status (): At least 20 percent but less than 40 percent impaired, limited or restricted  Mobility: Walking and Moving Around Goal Status (): At least 1 percent but less than 20 percent impaired, limited or restricted    Roberto Pereira PTA  10/13/2018

## 2018-10-14 LAB
ALBUMIN SERPL-MCNC: 2.9 G/DL (ref 3.5–5)
ANION GAP SERPL CALCULATED.3IONS-SCNC: 12 MMOL/L (ref 4–13)
BUN BLD-MCNC: 23 MG/DL (ref 5–21)
BUN/CREAT SERPL: 10.3 (ref 7–25)
CALCIUM SPEC-SCNC: 8.7 MG/DL (ref 8.4–10.4)
CHLORIDE SERPL-SCNC: 101 MMOL/L (ref 98–110)
CO2 SERPL-SCNC: 28 MMOL/L (ref 24–31)
CREAT BLD-MCNC: 2.23 MG/DL (ref 0.5–1.4)
GFR SERPL CREATININE-BSD FRML MDRD: 29 ML/MIN/1.73
GLUCOSE BLD-MCNC: 96 MG/DL (ref 70–100)
GLUCOSE BLDC GLUCOMTR-MCNC: 101 MG/DL (ref 70–130)
GLUCOSE BLDC GLUCOMTR-MCNC: 152 MG/DL (ref 70–130)
GLUCOSE BLDC GLUCOMTR-MCNC: 172 MG/DL (ref 70–130)
GLUCOSE BLDC GLUCOMTR-MCNC: 187 MG/DL (ref 70–130)
PHOSPHATE SERPL-MCNC: 4.2 MG/DL (ref 2.5–4.5)
POTASSIUM BLD-SCNC: 3.9 MMOL/L (ref 3.5–5.3)
SODIUM BLD-SCNC: 141 MMOL/L (ref 135–145)

## 2018-10-14 PROCEDURE — 97110 THERAPEUTIC EXERCISES: CPT

## 2018-10-14 PROCEDURE — 94799 UNLISTED PULMONARY SVC/PX: CPT

## 2018-10-14 PROCEDURE — 63710000001 INSULIN LISPRO (HUMAN) PER 5 UNITS: Performed by: INTERNAL MEDICINE

## 2018-10-14 PROCEDURE — 97530 THERAPEUTIC ACTIVITIES: CPT

## 2018-10-14 PROCEDURE — 25010000002 ONDANSETRON PER 1 MG: Performed by: INTERNAL MEDICINE

## 2018-10-14 PROCEDURE — 94760 N-INVAS EAR/PLS OXIMETRY 1: CPT

## 2018-10-14 PROCEDURE — 82962 GLUCOSE BLOOD TEST: CPT

## 2018-10-14 PROCEDURE — 25010000002 HEPARIN (PORCINE) PER 1000 UNITS: Performed by: INTERNAL MEDICINE

## 2018-10-14 PROCEDURE — 80069 RENAL FUNCTION PANEL: CPT | Performed by: INTERNAL MEDICINE

## 2018-10-14 RX ADMIN — ISOSORBIDE DINITRATE 10 MG: 10 TABLET ORAL at 12:48

## 2018-10-14 RX ADMIN — INSULIN LISPRO 2 UNITS: 100 INJECTION, SOLUTION INTRAVENOUS; SUBCUTANEOUS at 17:04

## 2018-10-14 RX ADMIN — Medication 3 ML: at 20:45

## 2018-10-14 RX ADMIN — ATORVASTATIN CALCIUM 80 MG: 40 TABLET, FILM COATED ORAL at 09:46

## 2018-10-14 RX ADMIN — HYDROCODONE BITARTRATE AND ACETAMINOPHEN 1 TABLET: 10; 325 TABLET ORAL at 09:47

## 2018-10-14 RX ADMIN — AMIODARONE HYDROCHLORIDE 200 MG: 200 TABLET ORAL at 17:04

## 2018-10-14 RX ADMIN — INSULIN LISPRO 6 UNITS: 100 INJECTION, SOLUTION INTRAVENOUS; SUBCUTANEOUS at 12:48

## 2018-10-14 RX ADMIN — BUMETANIDE 1 MG: 1 TABLET ORAL at 09:45

## 2018-10-14 RX ADMIN — AMIODARONE HYDROCHLORIDE 200 MG: 200 TABLET ORAL at 09:46

## 2018-10-14 RX ADMIN — SODIUM BICARBONATE 650 MG: 650 TABLET ORAL at 09:46

## 2018-10-14 RX ADMIN — NICOTINE 1 PATCH: 21 PATCH, EXTENDED RELEASE TRANSDERMAL at 20:45

## 2018-10-14 RX ADMIN — HEPARIN SODIUM 5000 UNITS: 5000 INJECTION, SOLUTION INTRAVENOUS; SUBCUTANEOUS at 09:46

## 2018-10-14 RX ADMIN — FERRIC CITRATE 210 MG: 210 TABLET, COATED ORAL at 09:45

## 2018-10-14 RX ADMIN — HYDROCODONE BITARTRATE AND ACETAMINOPHEN 1 TABLET: 10; 325 TABLET ORAL at 01:07

## 2018-10-14 RX ADMIN — ONDANSETRON 4 MG: 2 INJECTION INTRAMUSCULAR; INTRAVENOUS at 01:07

## 2018-10-14 RX ADMIN — POTASSIUM CHLORIDE 20 MEQ: 750 CAPSULE, EXTENDED RELEASE ORAL at 09:46

## 2018-10-14 RX ADMIN — INSULIN LISPRO 2 UNITS: 100 INJECTION, SOLUTION INTRAVENOUS; SUBCUTANEOUS at 12:49

## 2018-10-14 RX ADMIN — AMIODARONE HYDROCHLORIDE 200 MG: 200 TABLET ORAL at 20:42

## 2018-10-14 RX ADMIN — BUMETANIDE 1 MG: 1 TABLET ORAL at 17:05

## 2018-10-14 RX ADMIN — ASPIRIN 81 MG CHEWABLE TABLET 81 MG: 81 TABLET CHEWABLE at 09:46

## 2018-10-14 RX ADMIN — HYDROCODONE BITARTRATE AND ACETAMINOPHEN 1 TABLET: 10; 325 TABLET ORAL at 17:05

## 2018-10-14 RX ADMIN — LISINOPRIL 2.5 MG: 2.5 TABLET ORAL at 09:45

## 2018-10-14 RX ADMIN — HEPARIN SODIUM 5000 UNITS: 5000 INJECTION, SOLUTION INTRAVENOUS; SUBCUTANEOUS at 20:42

## 2018-10-14 RX ADMIN — DOCUSATE SODIUM -SENNOSIDES 1 TABLET: 50; 8.6 TABLET, COATED ORAL at 09:46

## 2018-10-14 RX ADMIN — ISOSORBIDE DINITRATE 10 MG: 10 TABLET ORAL at 17:04

## 2018-10-14 RX ADMIN — FERRIC CITRATE 210 MG: 210 TABLET, COATED ORAL at 12:49

## 2018-10-14 RX ADMIN — CALCITRIOL CAPSULES 0.25 MCG 0.5 MCG: 0.25 CAPSULE ORAL at 09:46

## 2018-10-14 RX ADMIN — INSULIN LISPRO 2 UNITS: 100 INJECTION, SOLUTION INTRAVENOUS; SUBCUTANEOUS at 20:44

## 2018-10-14 RX ADMIN — ISOSORBIDE DINITRATE 10 MG: 10 TABLET ORAL at 09:46

## 2018-10-14 RX ADMIN — HYDRALAZINE HYDROCHLORIDE 25 MG: 25 TABLET, FILM COATED ORAL at 05:52

## 2018-10-14 RX ADMIN — HYDRALAZINE HYDROCHLORIDE 25 MG: 25 TABLET, FILM COATED ORAL at 12:48

## 2018-10-14 RX ADMIN — METOPROLOL TARTRATE 25 MG: 25 TABLET, FILM COATED ORAL at 09:46

## 2018-10-14 RX ADMIN — FERRIC CITRATE 210 MG: 210 TABLET, COATED ORAL at 17:04

## 2018-10-14 RX ADMIN — Medication 1 TABLET: at 09:45

## 2018-10-14 RX ADMIN — DOCUSATE SODIUM -SENNOSIDES 1 TABLET: 50; 8.6 TABLET, COATED ORAL at 20:42

## 2018-10-14 RX ADMIN — INSULIN LISPRO 6 UNITS: 100 INJECTION, SOLUTION INTRAVENOUS; SUBCUTANEOUS at 17:05

## 2018-10-14 NOTE — PROGRESS NOTES
[facility] Medicine Services  INPATIENT PROGRESS NOTE    Patient Name: Gavin Wilson  Date of Admission: 10/6/2018  Today's Date: 10/14/18  Length of Stay: 8  Primary Care Physician: Provider, No Known    Subjective   Chief Complaint: shortness of breath   HPI     Patient seen and examined at bedside.  Indicates is feeling better today, shortness of breath has greatly improved.  Patient, had a bowel movement.  Indicates that since starting dialysis, he has actually been feeling better. Appetite improved    Review of Systems   Constitutional: Negative for activity change (improved), appetite change (improved), chills and fever.   Respiratory: Negative for cough and shortness of breath.    Cardiovascular: Negative for chest pain and palpitations.   Gastrointestinal: Negative for abdominal distention, constipation, diarrhea, nausea and vomiting.      All pertinent negatives and positives are as above. All other systems have been reviewed and are negative unless otherwise stated.     Objective    Temp:  [96.9 °F (36.1 °C)-98.3 °F (36.8 °C)] 98.3 °F (36.8 °C)  Heart Rate:  [68-86] 68  Resp:  [16-20] 16  BP: ()/(43-60) 95/45  Physical Exam  Constitutional: He is oriented to person, place, and time. No distress. Chronically ill appearing.  Appears improved today   HENT:   Head: Atraumatic.   Temporal muscle wasting; prominent wasting around the neck and supraclavicular    Eyes: Conjunctivae are normal. No scleral icterus.   Cardiovascular: Normal rate, regular rhythm and intact distal pulses.    Murmur heard. Right upper chest permcath placed  Pulmonary/Chest: Effort normal. No respiratory distress. He has no wheezing. He has no rales.  Improved air movement   Abdominal: Soft. Bowel sounds are normal. He exhibits no distension. There is no tenderness.   RLQ ostomy  Neurological: He is alert and oriented to person, place, and time. Lethargic.  Skin: Skin is warm and dry. He is not  diaphoretic.   Psychiatric: He has a normal mood and affect. His behavior is normal      Results Review:  I have reviewed the labs, radiology results, and diagnostic studies.    Laboratory Data:     Results from last 7 days  Lab Units 10/11/18  0355 10/10/18  0422 10/09/18  0306   WBC 10*3/mm3 4.56* 5.35 5.17   HEMOGLOBIN g/dL 9.1* 10.2* 9.8*   HEMATOCRIT % 28.7* 32.6* 30.4*   PLATELETS 10*3/mm3 190 173 180          Results from last 7 days  Lab Units 10/14/18  0417 10/13/18  0436 10/12/18  0437  10/08/18  0435   SODIUM mmol/L 141 139 142  < > 141   POTASSIUM mmol/L 3.9 4.4 4.3  < > 4.5   CHLORIDE mmol/L 101 99 103  < > 110   CO2 mmol/L 28.0 21.0* 26.0  < > 16.0*   BUN mg/dL 23* 23* 36*  < > 50*   CREATININE mg/dL 2.23* 2.02* 2.90*  < > 3.78*   CALCIUM mg/dL 8.7 9.1 8.8  < > 8.5   BILIRUBIN mg/dL  --   --   --   --  0.4   ALK PHOS U/L  --   --   --   --  83   ALT (SGPT) U/L  --   --   --   --  53   AST (SGOT) U/L  --   --   --   --  27   GLUCOSE mg/dL 96 114* 114*  < > 131*   < > = values in this interval not displayed.    Culture Data:   @[facility]@    Radiology Data:   Imaging Results (last 24 hours)     ** No results found for the last 24 hours. **          I have reviewed the patient's current medications.     Assessment/Plan     Active Hospital Problems    Diagnosis   • **Acute on chronic respiratory failure with hypoxia (CMS/HCC)   • Coronary artery disease involving native coronary artery of native heart without angina pectoris   • History of coronary artery stent placement   • Mild aortic valve stenosis   • Moderate aortic valve insufficiency   • Mild mitral valve regurgitation   • Pneumonia   • COPD (chronic obstructive pulmonary disease) (CMS/HCC)   • Sepsis (CMS/HCC)   • Acute renal failure superimposed on stage 4 chronic kidney disease (CMS/HCC)   • Acute on chronic combined systolic and diastolic congestive heart failure (CMS/HCC)   • Pleural effusion, bilateral       Assessment:  1) Acute hypoxic  respiratory failure, resolved  2) Sepsis due to suspected health care associated pneumonia  3) Bilateral pleural effusions - Right is recurrent, left is worsening - Suspected due to heart failure, but cannot rule out infection or malignancy   4) Severe protein-calorie malnutrition  5) COPD without exacerbation  6) Combined systolic and diastolic heart failure, chronic (EF <40%)  7) Acute kidney injury on chronic kidney disease stage 4 - Suspected cardiorenal   8) Chronic normocytic anemia due to CKD  9) Thrombocytopenia, improved  10) Mild hypokalemia, resolved  11) Hyperphosphotemia  12) Metabolic acidosis, anion gap due to uremia  13) Constipation, resolved     Plan:  1) Cardiology note reviewed  2) Nephrology consult, may require dialysis - Note reviewed   3) D/C vanc on 10/8  4) Cefepime 7 day course  5) Procal 8.86, continue antibiotics as above   6) Monitor H&H, stable  7) Thoracentesis 10/7 - 400 cc removed   8) Urine culture yeast - Likely colonization  9) Blood cultures and pleural flood cultures NGTD  10) Continue PO bicarb  11) Continue PO bumex  13) Awaiting placement  14) Dialysis MWF PRN per renal  15) Patient finally had a BM              Discharge Planning: I expect the patient to be discharged to SNF.    Vu Michael MD   10/14/18   5:49 PM

## 2018-10-14 NOTE — THERAPY TREATMENT NOTE
Acute Care - Physical Therapy Treatment Note  [facility]     Patient Name: Gavin Wilson  : 1945  MRN: [id number]  Today's Date: 10/14/2018  Onset of Illness/Injury or Date of Surgery: 10/06/18  Date of Referral to PT: 10/08/18  Referring Physician: Dr. Michael    Admit Date: 10/6/2018    Visit Dx:    ICD-10-CM ICD-9-CM   1. Esophageal dysphagia R13.10 787.20   2. Impaired mobility and ADLs Z74.09 799.89   3. Impaired functional mobility, balance, gait, and endurance Z74.09 V49.89   4. Chronic kidney disease, stage 4 (severe) (CMS/HCC) N18.4 585.4     Patient Active Problem List   Diagnosis   • Volume overload   • Acute renal failure superimposed on stage 4 chronic kidney disease (CMS/HCC)   • Acute on chronic combined systolic and diastolic congestive heart failure (CMS/HCC)   • Pleural effusion, bilateral   • Sepsis (CMS/[facility])   • Coronary artery disease involving native coronary artery of native heart without angina pectoris   • History of coronary artery stent placement   • Mild aortic valve stenosis   • Moderate aortic valve insufficiency   • Mild mitral valve regurgitation   • Acute on chronic respiratory failure with hypoxia (CMS/[facility])   • Pneumonia   • COPD (chronic obstructive pulmonary disease) (CMS/[facility])       Therapy Treatment          Rehabilitation Treatment Summary     Row Name 10/14/18 0918             Treatment Time/Intention    Discipline physical therapy assistant  -LG      Document Type therapy note (daily note)  -LG      Subjective Information complains of;weakness;fatigue;pain  -LG      Mode of Treatment individual therapy;physical therapy  -LG      Patient/Family Observations no family present  -LG      Patient Effort adequate  -LG      Comment Pt refused to walk unless he could walk to shower.   -LG      Existing Precautions/Restrictions fall  -LG      Recorded by [LG] Roberto Pereira PTA 10/14/18 1039      Row Name 10/14/18 0918             Bed Mobility Assessment/Treatment     "Scooting/Bridging [place] (Bed Mobility) verbal cues;moderate assist (50% patient effort)   pt bridged LE's and pushed up in bed  -LG      Supine-Sit [place] (Bed Mobility) contact guard;minimum assist (75% patient effort)  -LG      Sit-Supine [place] (Bed Mobility) supervision  -LG      Comment (Bed Mobility) Sat EOB to do B LE Ther Ex  -LG      Recorded by [LG] Roberto Pereira, PTA 10/14/18 1039      Row Name 10/14/18 0918             Transfer Assessment/Treatment    Comment (Transfers) pt refused  -LG      Recorded by [LG] Roberto Pereira, PTA 10/14/18 1039      Row Name 10/14/18 0918             Gait/Stairs Assessment/Training    Comment (Gait/Stairs) pt refused  -LG      Recorded by [LG] Roberto Pereira, PTA 10/14/18 1039      Row Name 10/14/18 0918             Therapeutic Exercise    Comment (Therapeutic Exercise) B LE AROM x 20 reps. AP's, LAQ's, Hip Abd/Add, Marching. All done sitting eob with rest breaks between.  -LG      Recorded by [LG] Roberto Pereira, PTA 10/14/18 1039      Row Name 10/14/18 0918             Positioning and Restraints    Pre-Treatment Position in bed  -LG      Post Treatment Position bed  -LG      In Bed fowlers;call light within reach;encouraged to call for assist;legs elevated;heels elevated;notified nsg  -LG      Recorded by [LG] Roberto Pereira, PTA 10/14/18 1039      Row Name 10/14/18 0918             Pain Scale: Numbers Pre/Post-Treatment    Pain Scale: Numbers, Pretreatment 4/10  -LG      Pain Scale: Numbers, Post-Treatment 4/10  -LG      Pain Location --   \"all over\"  -LG      Recorded by [LG] Roberto Pereira, PTA 10/14/18 1039      Row Name                Wound 10/07/18 1255 Bilateral posterior coccyx    Wound - Properties Group Date first assessed: 10/07/18 [SH] Time first assessed: 1255 [SH] Side: Bilateral [SH] Orientation: posterior [SH] Location: coccyx [SH] Recorded by:  [SH] Virginia Ahn RN 10/07/18 1256    Row Name                Wound 10/11/18 0831 Other " (See comments) chest incision    Wound - Properties Group Date first assessed: 10/11/18 [DS] Time first assessed: 0831 [DS] Side: Other (See comments) [DS] Location: chest [DS] Type: incision [DS] Recorded by:  [DS] Jeremy Liu RN 10/11/18 0831      User Key  (r) = Recorded By, (t) = Taken By, (c) = Cosigned By    Initials Name Effective Dates Discipline     Roberto Pereira, URMILA 08/02/16 -  PT    SH Virginia Ahn RN 08/28/17 -  Nurse    DS Jeremy Liu RN 08/02/16 -  Nurse          Wound 10/07/18 1255 Bilateral posterior coccyx (Active)   Dressing Appearance dry;intact 10/14/2018  7:50 AM   Closure None 10/13/2018  7:33 PM   Base dressing in place, unable to visualize 10/14/2018  7:50 AM   Drainage Amount none 10/14/2018  7:50 AM       Wound 10/11/18 0831 Other (See comments) chest incision (Active)   Dressing Appearance dry;intact 10/14/2018  7:50 AM   Base dressing in place, unable to visualize 10/14/2018  7:50 AM   Drainage Amount none 10/14/2018  7:50 AM             Physical Therapy Education     Title: PT OT SLP Therapies (Active)     Topic: Physical Therapy (Active)     Point: Mobility training (Active)    Learning Progress Summary     Learner Status Readiness Method Response Comment Documented by    Patient Active Acceptance E,D NR Educated on benefits of activity. Intructed in safety with bed mobility, transfers,and LE Strengthening Exercises. LG 10/14/18 0918     Done Acceptance E VU,NR benefits of activity MF 10/12/18 1206     Active Acceptance E,D RT Effecient supine to sit EARLENE 10/09/18 1154     Done Acceptance E VU,NR Pt was educated on the importance and benefits of getting out of bed and how it affects his overall health. Pt was educated on why PT is working with him. Pt was encouraged to do more in treatment session. TD 10/08/18 1430     Done Acceptance E VU,NR Pt  was educated on benefits ot PT and PT POC. Pt was edcuated on body mechanics with transfers (pushing with one arm on bed  to stand up/having legs touch bed before sitting down) and safety precautions (socks on, gait belt on) with transfers and gait. TD 10/08/18 1149          Point: Body mechanics (Active)    Learning Progress Summary     Learner Status Readiness Method Response Comment Documented by    Patient Active Acceptance EMARVA NR Educated on benefits of activity. Intructed in safety with bed mobility, transfers,and LE Strengthening Exercises. LG 10/14/18 0918     Done Acceptance E VU,NR Pt was educated on the importance and benefits of getting out of bed and how it affects his overall health. Pt was educated on why PT is working with him. Pt was encouraged to do more in treatment session. TD 10/08/18 1430     Done Acceptance E VU,NR Pt  was educated on benefits ot PT and PT POC. Pt was edcuated on body mechanics with transfers (pushing with one arm on bed to stand up/having legs touch bed before sitting down) and safety precautions (socks on, gait belt on) with transfers and gait. TD 10/08/18 1149          Point: Precautions (Active)    Learning Progress Summary     Learner Status Readiness Method Response Comment Documented by    Patient Active Acceptance ED NR Educated on benefits of activity. Intructed in safety with bed mobility, transfers,and LE Strengthening Exercises. LG 10/14/18 0918     Done Acceptance E VU,NR Pt was educated on the importance and benefits of getting out of bed and how it affects his overall health. Pt was educated on why PT is working with him. Pt was encouraged to do more in treatment session. TD 10/08/18 1430     Done Acceptance E VU,NR Pt  was educated on benefits ot PT and PT POC. Pt was edcuated on body mechanics with transfers (pushing with one arm on bed to stand up/having legs touch bed before sitting down) and safety precautions (socks on, gait belt on) with transfers and gait. TD 10/08/18 1143                      User Key     Initials Effective Dates Name Provider Type Discipline    LG  08/02/16 -  Roberto Pereira, PTA Physical Therapy Assistant PT    EARLENE 08/02/16 -  Kaylynn Mcgarry PTA Physical Therapy Assistant PT    MF 08/02/16 -  Liliane Collado PTA Physical Therapy Assistant PT    TD 09/07/18 -  Rita Grullon, PT Student PT Student PT                    PT Recommendation and Plan     Plan of Care Reviewed With: patient  Progress: no change  Outcome Summary: Pt reports he is feeling better. Pt refused to transfer to bedside chair or to get up and walk. States he would walk to the shower only.  Pt did get supine to eob with Min A, Sat EOB and did  B LE AROM strengthening exercises.  Pt report he plans to go to SNF/Rehab on discharge.           Outcome Measures     Row Name 10/14/18 0941 10/13/18 1513 10/12/18 1334       How much help from another person do you currently need...    Turning from your back to your side while in flat bed without using bedrails? 4  -LG 4  -LG  --    Moving from lying on back to sitting on the side of a flat bed without bedrails? 3  -LG 3  -LG  --    Moving to and from a bed to a chair (including a wheelchair)? 3  -LG 3  -LG  --    Standing up from a chair using your arms (e.g., wheelchair, bedside chair)? 3  -LG 3  -LG  --    Climbing 3-5 steps with a railing? 2  -LG 2  -LG  --    To walk in hospital room? 3  -LG 3  -LG  --    AM-PAC 6 Clicks Score 18  -LG 18  -LG  --       How much help from another is currently needed...    Putting on and taking off regular lower body clothing?  --  -- 2  -CJ    Bathing (including washing, rinsing, and drying)  --  -- 2  -CJ    Toileting (which includes using toilet bed pan or urinal)  --  -- 2  -CJ    Putting on and taking off regular upper body clothing  --  -- 3  -CJ    Taking care of personal grooming (such as brushing teeth)  --  -- 3  -CJ    Eating meals  --  -- 4  -CJ    Score  --  -- 16  -CJ       Functional Assessment    Outcome Measure Options AM-PAC 6 Clicks Basic Mobility (PT)  -LG AM-PAC 6 Clicks Basic Mobility  (PT)  - AM-PAC 6 Clicks Daily Activity (OT)  -    Row Name 10/12/18 1149             How much help from another person do you currently need...    Turning from your back to your side while in flat bed without using bedrails? 4  -MF      Moving from lying on back to sitting on the side of a flat bed without bedrails? 3  -MF      Moving to and from a bed to a chair (including a wheelchair)? 3  -MF      Standing up from a chair using your arms (e.g., wheelchair, bedside chair)? 3  -MF      Climbing 3-5 steps with a railing? 2  -MF      To walk in hospital room? 3  -MF      AM-PAC 6 Clicks Score 18  -MF         Functional Assessment    Outcome Measure Options AM-PAC 6 Clicks Basic Mobility (PT)  -        User Key  (r) = Recorded By, (t) = Taken By, (c) = Cosigned By    Initials Name Provider Type    Roberto Gamez, URMILA Physical Therapy Assistant     Paulino Fuchs, ROMÁN/L Occupational Therapy Assistant     Liliane Collado PTA Physical Therapy Assistant           Time Calculation:         PT Charges     Row Name 10/14/18 0918             Time Calculation    Start Time 0918  -      Stop Time 0941  -      Time Calculation (min) 23 min  -      PT Received On 10/14/18  -      PT Goal Re-Cert Due Date 10/18/18  -         Time Calculation- PT    Total Timed Code Minutes- PT 23 minute(s)  -        User Key  (r) = Recorded By, (t) = Taken By, (c) = Cosigned By    Initials Name Provider Type    Roberto Gamez PTA Physical Therapy Assistant        Therapy Suggested Charges     Code   Minutes Charges    61057 (CPT®) Hc Pt Neuromusc Re Education Ea 15 Min      11709 (CPT®) Hc Pt Ther Proc Ea 15 Min      75146 (CPT®) Hc Gait Training Ea 15 Min 12 1    57681 (CPT®) Hc Pt Therapeutic Act Ea 15 Min      62350 (CPT®) Hc Pt Manual Therapy Ea 15 Min      31547 (CPT®) Hc Pt Iontophoresis Ea 15 Min      14359 (CPT®) Hc Pt Elec Stim Ea-Per 15 Min      77330 (CPT®) Hc Pt Ultrasound Ea 15 Min      54429  (CPT®) Hc Pt Self Care/Mgmt/Train Ea 15 Min      22143 (CPT®) Hc Pt Prosthetic (S) Train Initial Encounter, Each 15 Min      04041 (CPT®) Hc Pt Orthotic(S)/Prosthetic(S) Encounter, Each 15 Min      17516 (CPT®) Hc Orthotic(S) Mgmt/Train Initial Encounter, Each 15min      Total  12 1        Therapy Charges for Today     Code Description Service Date Service Provider Modifiers Qty    46355732543 HC PT THERAPEUTIC ACT EA 15 MIN 10/13/2018 Roberto Pereira, PTA GP, KX 1    76547027581 HC PT THER PROC EA 15 MIN 10/13/2018 Roberto Pereira, PTA GP, KX 1    51102238641 HC PT THERAPEUTIC ACT EA 15 MIN 10/14/2018 Roberto Pereira, PTA GP, KX 1    29784204489 HC PT THER PROC EA 15 MIN 10/14/2018 Roberto Pereira, URMILA GP, KX 1          PT G-Codes  PT Professional Judgement Used?: Yes  Outcome Measure Options: AM-PAC 6 Clicks Basic Mobility (PT)  AM-PAC 6 Clicks Score: 18  Score: 16  Functional Limitation: Mobility: Walking and moving around  Mobility: Walking and Moving Around Current Status (): At least 20 percent but less than 40 percent impaired, limited or restricted  Mobility: Walking and Moving Around Goal Status (): At least 1 percent but less than 20 percent impaired, limited or restricted    Roberto Pereira, PTA  10/14/2018

## 2018-10-14 NOTE — PROGRESS NOTES
"Nephrology ([facility]) Progress Note      Patient:  Gavin Wilson  YOB: 1945  Date of Service: 10/14/2018  MRN: [id number]   Acct: [id number]   Primary Care Physician: Provider, No Known  Advance Directive:   Code Status and Medical Interventions:   Ordered at: 10/07/18 1309     Limited Support to NOT Include:    Intubation     Level Of Support Discussed With:    Patient     Code Status:    No CPR     Medical Interventions (Level of Support Prior to Arrest):    Limited     Admit Date: 10/6/2018       Hospital Day: 8  Referring Provider: Max Jones*      Patient personally seen and examined.  Complete chart including Consults, Notes, Operative Reports, Labs, Cardiology, and Radiology studies reviewed as able.        Subjective:  Gavin Wilson is a 73 y.o. male  whom we were consulted for management of chronic kidney disease stage IV.  He has a history of bladder cancer s/p cystectomy and ureterostomy formation. He also has a history of esophogeal and prostate cancer.   Patient goes to [facility] in [place] and sees nephrology there.  He already has a left upper arm primary fistula in preparation for upcoming dialysis.  Presented with increasing shortness of breath, dyspnea on exertion.  He was diagnosed with bilateral pneumonia/pleural effusion.  He was being treated for both, getting IV antibiotics and intravenous diuretics. His serum creatinine was 3.2 mg.  He states when he was seen by Nephrology last his GFR was 20 and it has been for two years.  Nephrology was consulted for evaluation and treatment of chronic kidney disease.  AVF cleared for use, but infiltrated x3 so had permcath placed the following day     Today, no new events.  No issues today. Still weak but feeling better.  \"all I need is a straw.\"  No other reported issues per pt/RN.      Allergies:  Sulfa antibiotics    Home Meds:  Prescriptions Prior to Admission   Medication Sig Dispense " Refill Last Dose   • acetaminophen (TYLENOL) 325 MG tablet Take 650 mg by mouth Every 6 (Six) Hours As Needed for Mild Pain .   Past Month at Unknown time   • albuterol (PROVENTIL HFA;VENTOLIN HFA) 108 (90 Base) MCG/ACT inhaler Inhale 2 puffs Every 6 (Six) Hours As Needed for Wheezing.   Past Week at Unknown time   • aspirin 81 MG chewable tablet Chew 81 mg Daily.   10/5/2018 at Unknown time   • atorvastatin (LIPITOR) 80 MG tablet Take 40 mg by mouth Daily.   10/5/2018 at Unknown time   • budesonide-formoterol (SYMBICORT) 160-4.5 MCG/ACT inhaler Inhale 2 puffs 2 (Two) Times a Day. 1 inhaler 2 10/5/2018 at Unknown time   • bumetanide (BUMEX) 2 MG tablet Take 1 tablet by mouth Daily. 30 tablet 0 10/5/2018 at Unknown time   • calcium carbonate (OS-TASHI) 600 MG tablet Take 600 mg by mouth Daily.   10/5/2018 at Unknown time   • cholecalciferol (VITAMIN D3) 1000 units tablet Take 3,000 Units by mouth Daily.   10/5/2018 at Unknown time   • insulin aspart (novoLOG FLEXPEN) 100 UNIT/ML solution pen-injector sc pen Inject 6 Units under the skin into the appropriate area as directed 3 (Three) Times a Day With Meals.   10/5/2018 at Unknown time   • insulin detemir (LEVEMIR) 100 UNIT/ML injection Inject 6 Units under the skin into the appropriate area as directed 2 (Two) Times a Day.   10/5/2018 at Unknown time   • loperamide (IMODIUM) 2 MG capsule Take 2 mg by mouth Daily As Needed for Diarrhea.   Past Month at Unknown time   • metoprolol tartrate (LOPRESSOR) 25 MG tablet Take 12.5 mg by mouth 2 (Two) Times a Day.   10/5/2018 at Unknown time   • omeprazole (priLOSEC) 20 MG capsule Take 20 mg by mouth Daily.   10/5/2018 at Unknown time   • oxyCODONE (ROXICODONE) 5 MG immediate release tablet Take 5 mg by mouth Every 6 (Six) Hours As Needed for Moderate Pain .   Past Week at Unknown time   • sildenafil (VIAGRA) 100 MG tablet Take 100 mg by mouth Daily As Needed for erectile dysfunction.   More than a month at Unknown time        Medicines:  Current Facility-Administered Medications   Medication Dose Route Frequency Provider Last Rate Last Dose   • acetaminophen (TYLENOL) tablet 650 mg  650 mg Oral Q4H PRN Max Jones MD       • amiodarone (PACERONE) tablet 200 mg  200 mg Oral TID Abdiel Stout MD   200 mg at 10/14/18 1704   • aspirin chewable tablet 81 mg  81 mg Oral Daily Vu Michael MD   81 mg at 10/14/18 0946   • atorvastatin (LIPITOR) tablet 80 mg  80 mg Oral Daily Max Jones MD   80 mg at 10/14/18 0946   • budesonide-formoterol (SYMBICORT) 160-4.5 MCG/ACT inhaler 2 puff  2 puff Inhalation BID - RT Max Jones MD   2 puff at 10/13/18 1955   • bumetanide (BUMEX) tablet 1 mg  1 mg Oral BID Vu Michael MD   1 mg at 10/14/18 1705   • calcitriol (ROCALTROL) capsule 0.5 mcg  0.5 mcg Oral Daily Max Jones MD   0.5 mcg at 10/14/18 0946   • calcium carb-cholecalciferol 600-800 MG-UNIT tablet 1 tablet  1 tablet Oral Daily Max Jones MD   1 tablet at 10/14/18 0945   • dextrose (D50W) 25 g/ 50mL Intravenous Solution 25 g  25 g Intravenous Q15 Min PRN Max Jones MD       • dextrose (GLUTOSE) oral gel 15 g  15 g Oral Q15 Min PRN Max Jones MD       • Ferric Citrate tablet 210 mg  210 mg Oral TID With Meals Max Jones MD   210 mg at 10/14/18 1704   • glucagon (human recombinant) (GLUCAGEN DIAGNOSTIC) injection 1 mg  1 mg Subcutaneous PRN Max Jones MD       • heparin (porcine) 5000 UNIT/ML injection 5,000 Units  5,000 Units Subcutaneous Q12H Max Jones MD   5,000 Units at 10/14/18 0946   • heparin (porcine) injection 1,700 Units  1,700 Units Intracatheter PRN Aragon, Samson Oscar, MD   1,700 Units at 10/12/18 1802   • heparin (porcine) injection 1,700 Units  1,700 Units Intracatheter PRSamson Perdomo MD   1,700 Units at 10/13/18 0906   • hydrALAZINE (APRESOLINE)  tablet 25 mg  25 mg Oral Q8H Abdiel Stout MD   25 mg at 10/14/18 1248   • HYDROcodone-acetaminophen (NORCO)  MG per tablet 1 tablet  1 tablet Oral Q6H PRN Max Jones MD   1 tablet at 10/14/18 1705   • hydroxypropyl methylcellulose (ISOPTO TEARS) 2.5 % ophthalmic solution 1 drop  1 drop Both Eyes TID PRN Vu Michael MD   1 drop at 10/11/18 0636   • Influenza Vac Subunit Quad (FLUCELVAX) injection 0.5 mL  0.5 mL Intramuscular During Hospitalization Max Jones MD       • insulin lispro (humaLOG) injection 0-9 Units  0-9 Units Subcutaneous 4x Daily With Meals & Nightly Max Jones MD   2 Units at 10/14/18 1704   • insulin lispro (humaLOG) injection 6 Units  6 Units Subcutaneous TID AC Max Jones MD   6 Units at 10/14/18 1705   • ipratropium (ATROVENT) nebulizer solution 0.5 mg  0.5 mg Nebulization Q6H PRN Abdiel Stout MD   0.5 mg at 10/13/18 1619   • isosorbide dinitrate (ISORDIL) tablet 10 mg  10 mg Oral TID - Nitrates Abdiel Stout MD   10 mg at 10/14/18 1704   • lisinopril (PRINIVIL,ZESTRIL) tablet 2.5 mg  2.5 mg Oral Q24H Abdiel Stout MD   2.5 mg at 10/14/18 0945   • magnesium hydroxide (MILK OF MAGNESIA) suspension 2400 mg/10mL 10 mL  10 mL Oral Daily PRN Max Jones MD       • metoprolol tartrate (LOPRESSOR) tablet 25 mg  25 mg Oral Q12H Max Jones MD   25 mg at 10/14/18 0946   • nicotine (NICODERM CQ) 21 MG/24HR patch 1 patch  1 patch Transdermal Q24H Ashutosh Palafox MD   1 patch at 10/13/18 2124   • ondansetron (ZOFRAN) injection 4 mg  4 mg Intravenous Q6H PRN Vu Michael MD   4 mg at 10/14/18 0107   • potassium chloride (MICRO-K) CR capsule 20 mEq  20 mEq Oral Daily Vu Michael MD   20 mEq at 10/14/18 0946   • sennosides-docusate sodium (SENOKOT-S) 8.6-50 MG tablet 1 tablet  1 tablet Oral BID Vu Michael MD   1 tablet at 10/14/18 0946   • sodium bicarbonate tablet 650 mg  650 mg Oral  BID Delmy Mullins, APRN   650 mg at 10/14/18 0946   • sodium chloride 0.9 % flush 3 mL  3 mL Intravenous Q12H Max Jones MD   3 mL at 10/13/18 2127   • sodium chloride 0.9 % flush 3-10 mL  3-10 mL Intravenous PRN Max Jones MD       • tetrahydrozoline 0.05 % ophthalmic solution 1 drop  1 drop Both Eyes 4x Daily PRN Ney De MD   1 drop at 10/10/18 0339       Past Medical History:  Past Medical History:   Diagnosis Date   • CHF (congestive heart failure) (CMS/HCC)    • Coronary stent occlusion    • Diabetes mellitus (CMS/HCC)    • Hyperlipidemia    • Hypertension    • Stroke (CMS/HCC)        Past Surgical History:  Past Surgical History:   Procedure Laterality Date   • BLADDER SURGERY     • ESOPHAGECTOMY     • INSERTION HEMODIALYSIS CATHETER N/A 10/11/2018    Procedure: HEMODIALYSIS CATHETER INSERTION;  Surgeon: Hugo Bonilla MD;  Location: [place] [address];  Service: Vascular       Family History  Family History   Problem Relation Age of Onset   • No Known Problems Father    • No Known Problems Mother        Social History  Social History     Social History   • Marital status:      Spouse name: N/A   • Number of children: N/A   • Years of education: N/A     Occupational History   • Not on file.     Social History Main Topics   • Smoking status: Current Every Day Smoker   • Smokeless tobacco: Never Used   • Alcohol use No   • Drug use: No   • Sexual activity: Not on file     Other Topics Concern   • Not on file     Social History Narrative   • No narrative on file         Review of Systems:  History obtained from chart review and the patient  General ROS: No fever or chills  Respiratory ROS: No cough, +shortness of breath  Cardiovascular ROS: No chest pain or palpitations  Gastrointestinal ROS: No abdominal pain or melena  Genito-Urinary ROS: No dysuria or hematuria    Objective:  BP 95/45 (BP Location: Right arm, Patient Position: Lying)   Pulse  "68   Temp 98.3 °F (36.8 °C) (Temporal Artery )   Resp 16   Ht 170.2 cm (67\")   Wt 49.2 kg (108 lb 7 oz)   SpO2 99%   BMI 16.98 kg/m²     Intake/Output Summary (Last 24 hours) at 10/14/18 1808  Last data filed at 10/14/18 1500   Gross per 24 hour   Intake              360 ml   Output              100 ml   Net              260 ml     General: awake/alert   Chest:  clear to auscultation bilaterally without respiratory distress  CVS: regular rate and rhythm  Abdominal: soft, nontender, normal bowel sounds  Extremities: no cyanosis or edema  Skin: warm and dry without rash      Labs:    Results from last 7 days  Lab Units 10/11/18  0355 10/10/18  0422 10/09/18  0306   WBC 10*3/mm3 4.56* 5.35 5.17   HEMOGLOBIN g/dL 9.1* 10.2* 9.8*   HEMATOCRIT % 28.7* 32.6* 30.4*   PLATELETS 10*3/mm3 190 173 180           Results from last 7 days  Lab Units 10/14/18  0417 10/13/18  0436 10/12/18  0437  10/08/18  0435   SODIUM mmol/L 141 139 142  < > 141   POTASSIUM mmol/L 3.9 4.4 4.3  < > 4.5   CHLORIDE mmol/L 101 99 103  < > 110   CO2 mmol/L 28.0 21.0* 26.0  < > 16.0*   BUN mg/dL 23* 23* 36*  < > 50*   CREATININE mg/dL 2.23* 2.02* 2.90*  < > 3.78*   CALCIUM mg/dL 8.7 9.1 8.8  < > 8.5   BILIRUBIN mg/dL  --   --   --   --  0.4   ALK PHOS U/L  --   --   --   --  83   ALT (SGPT) U/L  --   --   --   --  53   AST (SGOT) U/L  --   --   --   --  27   GLUCOSE mg/dL 96 114* 114*  < > 131*   < > = values in this interval not displayed.    Radiology:   Imaging Results (last 72 hours)     Procedure Component Value Units Date/Time    US Thoracentesis [[id number]] Collected:  10/07/18 1323    Specimen:  Body Fluid Updated:  10/13/18 0934    Narrative:       DATE OF PROCEDURE:  10/7/2018.     PREPROCEDURE DIAGNOSIS:  Left pleural effusion.  POSTPROCEDURE DIAGNOSIS:  Left pleural effusion.          INDICATIONS FOR PROCEDURE: Shortness of breath.     PROCEDURE:   1. Thoracentesis, diagnostic and therapeutic.  2. Ultrasound guidance for thoracentesis, " imaging supervision and  interpretation.     ANESTHESIA: 1% lidocaine, injected locally.          COMPLICATIONS: None.        EXAMINATIONS FOR COMPARISON:  CT chest dated 10/6/2018.     DESCRIPTION OF PROCEDURE:   The risk and benefits of the procedure were explained to the patient.   Specifically, the risks of bleeding, infection, pneumothorax (possible  thoracostomy tube), and damage to surrounding structures were discussed  extensively. The patient's questions were answered. The patient opted to  proceed. Written and verbal consent were obtained from the patient.     TIME OUT was taken and the patient's name, medical record number, date  of birth, procedure, entry site, and listen allergies were confirmed.  The site of the procedure was confirmed and marked.      The leftposterior thorax was prepped and draped in sterile fashion. The  area was anesthetized with buffered lidocaine 2%.      A 5 Cymraes 10 cm  catheter was inserted into the pleural effusion  using  ultrasound guidance. Approximately 400 mL of clear fluid was recovered  and sent to the pathology lab for analysis.      The patient tolerated the procedure well.   No immediate complications  were noted.       Impression:       Successful ultrasound guided leftthoracentesis. Approximately 400 mL of  clear fluid was recovered and sent to the pathology lab for analysis.   No immediate complications were noted.              This report was finalized on 10/07/2018 13:26 by Dr. Petra Mckinley MD.     Chest [[id number]] Collected:  10/07/18 1323     Updated:  10/13/18 0934    Narrative:       DATE OF PROCEDURE:  10/7/2018.     PREPROCEDURE DIAGNOSIS:  Left pleural effusion.  POSTPROCEDURE DIAGNOSIS:  Left pleural effusion.          INDICATIONS FOR PROCEDURE: Shortness of breath.     PROCEDURE:   1. Thoracentesis, diagnostic and therapeutic.  2. Ultrasound guidance for thoracentesis, imaging supervision and  interpretation.     ANESTHESIA: 1% lidocaine,  injected locally.          COMPLICATIONS: None.        EXAMINATIONS FOR COMPARISON:  CT chest dated 10/6/2018.     DESCRIPTION OF PROCEDURE:   The risk and benefits of the procedure were explained to the patient.   Specifically, the risks of bleeding, infection, pneumothorax (possible  thoracostomy tube), and damage to surrounding structures were discussed  extensively. The patient's questions were answered. The patient opted to  proceed. Written and verbal consent were obtained from the patient.     TIME OUT was taken and the patient's name, medical record number, date  of birth, procedure, entry site, and listen allergies were confirmed.  The site of the procedure was confirmed and marked.      The leftposterior thorax was prepped and draped in sterile fashion. The  area was anesthetized with buffered lidocaine 2%.      A 5 Greek 10 cm  catheter was inserted into the pleural effusion  using  ultrasound guidance. Approximately 400 mL of clear fluid was recovered  and sent to the pathology lab for analysis.      The patient tolerated the procedure well.   No immediate complications  were noted.       Impression:       Successful ultrasound guided leftthoracentesis. Approximately 400 mL of  clear fluid was recovered and sent to the pathology lab for analysis.   No immediate complications were noted.              This report was finalized on 10/07/2018 13:26 by Dr. Petra Mckinley MD.    XR Chest 1 View [[id number]] Collected:  10/11/18 0940     Updated:  10/11/18 0945    Narrative:       EXAMINATION: XR CHEST 1 VW- 10/11/2018 9:40 AM CDT     HISTORY: s/p R IJ permacath insertion; R13.10-Dysphagia, unspecified;  Z74.09-Other reduced mobility; Z74.09-Other reduced mobility;  N18.4-Chronic kidney disease, stage 4 (severe).     REPORT: Comparison is made with the study from 10/7/2018.     There is a new right internal jugular permacath, in good position  without evidence of a pneumothorax. Haziness over the lung bases  is  probably related to atelectasis and effusions, the right effusion is  larger than the left and has increased in size. Heart size is normal.  There is moderate ectasia of the thoracic aorta. A portion of an aortic  endograft is seen in the upper abdomen. The osseous structures are  unremarkable.       Impression:       1. Interval insertion of a right internal jugular permacath in good  position without pneumothorax.  2. Bilateral small pleural effusions greater on the right and increased  compared with the previous chest x-ray. Bibasilar atelectasis.  This report was finalized on 10/11/2018 09:41 by Dr. Lucian Jones MD.    IR Insert Tunneled CV Catheter Without Port 5 Plus [[id number]] Updated:  10/11/18 0843    FL C Arm During Surgery [[id number]] Updated:  10/11/18 0843    US Arterial Doppler Upper Extremity Left [[id number]] Collected:  10/10/18 1749     Updated:  10/10/18 1759    Narrative:       History: Left upper extremity arteriovenous fistula with no prior  attempt at access.     Comment: Left upper chest from a arteriovenous fistula duplex was  performed using gray scale imaging as well as color flow Doppler.     FINDINGS: The brachial, radial, and ulnar arteries are all widely patent  with triphasic waveforms present. The anastomosis appears widely patent  with a peak systolic velocity of 251 cm/s. The proximal fistula has a  diameter of 4.8 mm, and the depth from the surface of 1.7 mm, with a  volume of flow of 642 mL/m. At a point 5 cm from the anastomosis there  is a vein diameter of 2.2 mm, with a depth of 2.2 mm from the skin, with  a flow volume of 331 mL/m. In the mid upper arm fistula has a diameter  of 3.2 mm, with a depth from the skin of 1.4 mm, and a flow volume of  651 mL/m. There is a valve noted at this point. There is also noted to  be branch laterally coming off in the mid upper arm. In the mid/proximal  upper arm the diameter is 4.1 mm with a flow rate of 1090 mL/m. In the  proximal  upper arm the fistula has a diameter of 4.0 mm with a depth  from the surface of 3.3 mm. There is no evidence of significant stenosis  at any point.       Impression:       Widely patent left arm arteriovenous fistula with parameters  as above..  This report was finalized on 10/10/2018 17:56 by Dr. Hugo Bonilla MD.          Culture:  Urine Culture   Date Value Ref Range Status   10/07/2018 >100,000 CFU/mL Yeast isolated (A)  Final         Assessment   CKD 4  DM2 with nephropathy  PNA  Chronic diastolic CHF  Secondary Hyperparathyroidism  metabolic acidosis  VTach     Plan:  Bicarb decreased  calcitriol   HD MWF prn      Samson Aragon MD  10/14/2018  6:08 PM

## 2018-10-14 NOTE — PLAN OF CARE
Problem: Pneumonia (Adult)  Goal: Signs and Symptoms of Listed Potential Problems Will be Absent, Minimized or Managed (Pneumonia)  Outcome: Ongoing (interventions implemented as appropriate)   10/14/18 0351   Goal/Outcome Evaluation   Problems Assessed (Pneumonia) all   Problems Present (Pneumonia) respiratory compromise       Problem: Patient Care Overview  Goal: Plan of Care Review  Outcome: Ongoing (interventions implemented as appropriate)    Goal: Discharge Needs Assessment  Outcome: Ongoing (interventions implemented as appropriate)   10/07/18 1025   Discharge Needs Assessment   Patient/Family Anticipates Transition to home with help/services;long term care facility   Patient/Family Anticipated Services at Transition home health care;skilled nursing   Transportation Anticipated family or friend will provide   Equipment Needed After Discharge none   Current Discharge Risk chronically ill;lives alone   Disability   Equipment Currently Used at Home cane, quad;walker, rolling;colostomy/ostomy supplies       Problem: Hemodialysis (Adult)  Goal: Signs and Symptoms of Listed Potential Problems Will be Absent, Minimized or Managed (Hemodialysis)  Outcome: Ongoing (interventions implemented as appropriate)   10/14/18 0351   Goal/Outcome Evaluation   Problems Assessed (Hemodialysis) electrolyte imbalance;situational response   Problems Present (Hemodialysis) electrolyte imbalance;situational response

## 2018-10-14 NOTE — PLAN OF CARE
Problem: Pneumonia (Adult)  Goal: Signs and Symptoms of Listed Potential Problems Will be Absent, Minimized or Managed (Pneumonia)  Outcome: Ongoing (interventions implemented as appropriate)      Problem: Fall Risk (Adult)  Goal: Absence of Fall  Outcome: Ongoing (interventions implemented as appropriate)      Problem: Skin Injury Risk (Adult)  Goal: Skin Health and Integrity  Outcome: Ongoing (interventions implemented as appropriate)      Problem: Nutrition, Imbalanced: Inadequate Oral Intake (Adult)  Goal: Improved Oral Intake  Outcome: Ongoing (interventions implemented as appropriate)    Goal: Prevent Further Weight Loss  Outcome: Ongoing (interventions implemented as appropriate)      Problem: Patient Care Overview  Goal: Plan of Care Review  Outcome: Ongoing (interventions implemented as appropriate)   10/14/18 1450   Plan of Care Review   Progress no change   OTHER   Outcome Summary vss a/o pt very weak refuses to do things for self discussed need to work with therapy and do things for himself bm today changed coccyx mepilex pt drinking some glucerna now and attempted to eat some lungs still very coarse diminished poor to fair coughing lauri meds await [facility] home placement with hd   Coping/Psychosocial   Plan of Care Reviewed With patient     Goal: Individualization and Mutuality  Outcome: Ongoing (interventions implemented as appropriate)    Goal: Discharge Needs Assessment  Outcome: Ongoing (interventions implemented as appropriate)    Goal: Interprofessional Rounds/Family Conf  Outcome: Ongoing (interventions implemented as appropriate)      Problem: Hemodialysis (Adult)  Goal: Signs and Symptoms of Listed Potential Problems Will be Absent, Minimized or Managed (Hemodialysis)  Outcome: Ongoing (interventions implemented as appropriate)

## 2018-10-14 NOTE — PLAN OF CARE
Problem: Patient Care Overview  Goal: Plan of Care Review  Outcome: Ongoing (interventions implemented as appropriate)   10/14/18 1611   Plan of Care Review   Progress no change   OTHER   Outcome Summary Pt reports he is feeling better. Pt refused to transfer to bedside chair or to get up and walk. States he would walk to the shower only. Pt did get supine to eob with Min A, Sat EOB and did B LE AROM strengthening exercises. Pt report he plans to go to SNF/Rehab on discharge.    Coping/Psychosocial   Plan of Care Reviewed With patient

## 2018-10-14 NOTE — PROGRESS NOTES
[facility]  INPATIENT PROGRESS NOTE    Patient Name: Gavin Wilson  Date of Admission: 10/6/2018  Today's Date: 10/13/18  Length of Stay: 7  Primary Care Physician: Provider, No Known    Subjective   Chief Complaint: weakness  HPI     Patient seen and examined at bedside.  Patient assisted placing his hearing aids this morning as he was struggling doing them himself.  Patient is feeling some better this morning.  Breathing easier today.  Tolerating PO intake more consistently.          Review of Systems   Constitutional: Positive for appetite change and unexpected weight change. Negative for chills and fever.   Respiratory: Negative for cough and shortness of breath.    Cardiovascular: Negative for chest pain and palpitations.   Neurological: Positive for weakness.        All pertinent negatives and positives are as above. All other systems have been reviewed and are negative unless otherwise stated.     Objective    Temp:  [97.4 °F (36.3 °C)-98.4 °F (36.9 °C)] 97.4 °F (36.3 °C)  Heart Rate:  [68-97] 68  Resp:  [18-20] 18  BP: ()/(44-58) 106/44  Physical Exam  Constitutional: He is oriented to person, place, and time. No distress. Chronically ill appearing  HENT:   Head: Atraumatic.   Temporal muscle wasting; prominent wasting around the neck and supraclavicular    Eyes: Conjunctivae are normal. No scleral icterus.   Cardiovascular: Normal rate, regular rhythm and intact distal pulses.    Murmur heard. Right upper chest permcath placed  Pulmonary/Chest: Effort normal. No respiratory distress. He has no wheezing. He has no rales.  Improved air movement   Abdominal: Soft. Bowel sounds are normal. He exhibits no distension. There is no tenderness.   RLQ ostomy  Neurological: He is alert and oriented to person, place, and time. Lethargic.  Skin: Skin is warm and dry. He is not diaphoretic.   Psychiatric: He has a normal mood and affect. His behavior is  normal      Results Review:  I have reviewed the labs, radiology results, and diagnostic studies.    Laboratory Data:     Results from last 7 days  Lab Units 10/11/18  0355 10/10/18  0422 10/09/18  0306   WBC 10*3/mm3 4.56* 5.35 5.17   HEMOGLOBIN g/dL 9.1* 10.2* 9.8*   HEMATOCRIT % 28.7* 32.6* 30.4*   PLATELETS 10*3/mm3 190 173 180          Results from last 7 days  Lab Units 10/13/18  0436 10/12/18  0437 10/11/18  0355  10/08/18  0435   SODIUM mmol/L 139 142 144  < > 141   POTASSIUM mmol/L 4.4 4.3 4.7  < > 4.5   CHLORIDE mmol/L 99 103 106  < > 110   CO2 mmol/L 21.0* 26.0 26.0  < > 16.0*   BUN mg/dL 23* 36* 60*  < > 50*   CREATININE mg/dL 2.02* 2.90* 4.06*  < > 3.78*   CALCIUM mg/dL 9.1 8.8 9.7  < > 8.5   BILIRUBIN mg/dL  --   --   --   --  0.4   ALK PHOS U/L  --   --   --   --  83   ALT (SGPT) U/L  --   --   --   --  53   AST (SGOT) U/L  --   --   --   --  27   GLUCOSE mg/dL 114* 114* 136*  < > 131*   < > = values in this interval not displayed.    Culture Data:   @[facility]    Radiology Data:   Imaging Results (last 24 hours)     Procedure Component Value Units Date/Time     Thoracentesis [[id number]] Collected:  10/07/18 1323    Specimen:  Body Fluid Updated:  10/13/18 0934    Narrative:       DATE OF PROCEDURE:  10/7/2018.     PREPROCEDURE DIAGNOSIS:  Left pleural effusion.  POSTPROCEDURE DIAGNOSIS:  Left pleural effusion.          INDICATIONS FOR PROCEDURE: Shortness of breath.     PROCEDURE:   1. Thoracentesis, diagnostic and therapeutic.  2. Ultrasound guidance for thoracentesis, imaging supervision and  interpretation.     ANESTHESIA: 1% lidocaine, injected locally.          COMPLICATIONS: None.        EXAMINATIONS FOR COMPARISON:  CT chest dated 10/6/2018.     DESCRIPTION OF PROCEDURE:   The risk and benefits of the procedure were explained to the patient.   Specifically, the risks of bleeding, infection, pneumothorax (possible  thoracostomy tube), and damage to surrounding structures were  discussed  extensively. The patient's questions were answered. The patient opted to  proceed. Written and verbal consent were obtained from the patient.     TIME OUT was taken and the patient's name, medical record number, date  of birth, procedure, entry site, and listen allergies were confirmed.  The site of the procedure was confirmed and marked.      The leftposterior thorax was prepped and draped in sterile fashion. The  area was anesthetized with buffered lidocaine 2%.      A 5 Trinidadian 10 cm  catheter was inserted into the pleural effusion  using  ultrasound guidance. Approximately 400 mL of clear fluid was recovered  and sent to the pathology lab for analysis.      The patient tolerated the procedure well.   No immediate complications  were noted.       Impression:       Successful ultrasound guided leftthoracentesis. Approximately 400 mL of  clear fluid was recovered and sent to the pathology lab for analysis.   No immediate complications were noted.              This report was finalized on 10/07/2018 13:26 by Dr. Petra Mckinley MD.     Chest [[id number]] Collected:  10/07/18 1323     Updated:  10/13/18 0934    Narrative:       DATE OF PROCEDURE:  10/7/2018.     PREPROCEDURE DIAGNOSIS:  Left pleural effusion.  POSTPROCEDURE DIAGNOSIS:  Left pleural effusion.          INDICATIONS FOR PROCEDURE: Shortness of breath.     PROCEDURE:   1. Thoracentesis, diagnostic and therapeutic.  2. Ultrasound guidance for thoracentesis, imaging supervision and  interpretation.     ANESTHESIA: 1% lidocaine, injected locally.          COMPLICATIONS: None.        EXAMINATIONS FOR COMPARISON:  CT chest dated 10/6/2018.     DESCRIPTION OF PROCEDURE:   The risk and benefits of the procedure were explained to the patient.   Specifically, the risks of bleeding, infection, pneumothorax (possible  thoracostomy tube), and damage to surrounding structures were discussed  extensively. The patient's questions were answered. The patient  opted to  proceed. Written and verbal consent were obtained from the patient.     TIME OUT was taken and the patient's name, medical record number, date  of birth, procedure, entry site, and listen allergies were confirmed.  The site of the procedure was confirmed and marked.      The leftposterior thorax was prepped and draped in sterile fashion. The  area was anesthetized with buffered lidocaine 2%.      A 5 Slovenian 10 cm  catheter was inserted into the pleural effusion  using  ultrasound guidance. Approximately 400 mL of clear fluid was recovered  and sent to the pathology lab for analysis.      The patient tolerated the procedure well.   No immediate complications  were noted.       Impression:       Successful ultrasound guided leftthoracentesis. Approximately 400 mL of  clear fluid was recovered and sent to the pathology lab for analysis.   No immediate complications were noted.              This report was finalized on 10/07/2018 13:26 by Dr. Petra Mckinley MD.          I have reviewed the patient's current medications.     Assessment/Plan     Active Hospital Problems    Diagnosis   • **Acute on chronic respiratory failure with hypoxia (CMS/HCC)   • Coronary artery disease involving native coronary artery of native heart without angina pectoris   • History of coronary artery stent placement   • Mild aortic valve stenosis   • Moderate aortic valve insufficiency   • Mild mitral valve regurgitation   • Pneumonia   • COPD (chronic obstructive pulmonary disease) (CMS/HCC)   • Sepsis (CMS/HCC)   • Acute renal failure superimposed on stage 4 chronic kidney disease (CMS/HCC)   • Acute on chronic combined systolic and diastolic congestive heart failure (CMS/HCC)   • Pleural effusion, bilateral       Assessment:  1) Acute hypoxic respiratory failure, resolved  2) Sepsis due to suspected health care associated pneumonia  3) Bilateral pleural effusions - Right is recurrent, left is worsening - Suspected due to heart  failure, but cannot rule out infection or malignancy   4) Severe protein-calorie malnutrition  5) COPD without exacerbation  6) Combined systolic and diastolic heart failure, chronic (EF <40%)  7) Acute kidney injury on chronic kidney disease stage 4 - Suspected cardiorenal   8) Chronic normocytic anemia due to CKD  9) Thrombocytopenia, improved  10) Mild hypokalemia, resolved  11) Hyperphosphotemia  12) Metabolic acidosis, anion gap due to uremia     Plan:  1) Cardiology note reviewed  2) Nephrology consult, may require dialysis - Note reviewed   3) D/C vanc on 10/8  4) Cefepime 7 day course  5) Procal 8.86, continue antibiotics as above   6) Monitor H&H, stable  7) Thoracentesis 10/7 - 400 cc removed   8) Urine culture yeast - Likely colonization  9) Blood cultures and pleural flood cultures NGTD  10) Continue PO bicarb  11) Continue PO bumex  13) Awaiting placement  14) Dialysis MWF PRN per renal              Discharge Planning: I expect the patient to be discharged to SNF pending acceptance.    Vu Michael MD   10/13/18   8:14 PM

## 2018-10-15 LAB
ALBUMIN SERPL-MCNC: 3 G/DL (ref 3.5–5)
ANION GAP SERPL CALCULATED.3IONS-SCNC: 9 MMOL/L (ref 4–13)
BUN BLD-MCNC: 36 MG/DL (ref 5–21)
BUN/CREAT SERPL: 11 (ref 7–25)
CALCIUM SPEC-SCNC: 9 MG/DL (ref 8.4–10.4)
CHLORIDE SERPL-SCNC: 102 MMOL/L (ref 98–110)
CO2 SERPL-SCNC: 28 MMOL/L (ref 24–31)
CREAT BLD-MCNC: 3.27 MG/DL (ref 0.5–1.4)
GFR SERPL CREATININE-BSD FRML MDRD: 19 ML/MIN/1.73
GLUCOSE BLD-MCNC: 133 MG/DL (ref 70–100)
GLUCOSE BLDC GLUCOMTR-MCNC: 114 MG/DL (ref 70–130)
GLUCOSE BLDC GLUCOMTR-MCNC: 130 MG/DL (ref 70–130)
GLUCOSE BLDC GLUCOMTR-MCNC: 132 MG/DL (ref 70–130)
PHOSPHATE SERPL-MCNC: 3.6 MG/DL (ref 2.5–4.5)
POTASSIUM BLD-SCNC: 4.5 MMOL/L (ref 3.5–5.3)
SODIUM BLD-SCNC: 139 MMOL/L (ref 135–145)

## 2018-10-15 PROCEDURE — 94760 N-INVAS EAR/PLS OXIMETRY 1: CPT

## 2018-10-15 PROCEDURE — 94799 UNLISTED PULMONARY SVC/PX: CPT

## 2018-10-15 PROCEDURE — 80069 RENAL FUNCTION PANEL: CPT | Performed by: INTERNAL MEDICINE

## 2018-10-15 PROCEDURE — 97535 SELF CARE MNGMENT TRAINING: CPT

## 2018-10-15 PROCEDURE — 99233 SBSQ HOSP IP/OBS HIGH 50: CPT | Performed by: INTERNAL MEDICINE

## 2018-10-15 PROCEDURE — 63710000001 INSULIN LISPRO (HUMAN) PER 5 UNITS: Performed by: INTERNAL MEDICINE

## 2018-10-15 PROCEDURE — 5A1D70Z PERFORMANCE OF URINARY FILTRATION, INTERMITTENT, LESS THAN 6 HOURS PER DAY: ICD-10-PCS | Performed by: INTERNAL MEDICINE

## 2018-10-15 PROCEDURE — P9047 ALBUMIN (HUMAN), 25%, 50ML: HCPCS | Performed by: INTERNAL MEDICINE

## 2018-10-15 PROCEDURE — 25010000002 HEPARIN (PORCINE) PER 1000 UNITS: Performed by: INTERNAL MEDICINE

## 2018-10-15 PROCEDURE — 25010000002 ALBUMIN HUMAN 25% PER 50 ML: Performed by: INTERNAL MEDICINE

## 2018-10-15 PROCEDURE — 25010000002 ONDANSETRON PER 1 MG: Performed by: INTERNAL MEDICINE

## 2018-10-15 PROCEDURE — 82962 GLUCOSE BLOOD TEST: CPT

## 2018-10-15 RX ORDER — LISINOPRIL 2.5 MG/1
2.5 TABLET ORAL
Status: DISCONTINUED | OUTPATIENT
Start: 2018-10-16 | End: 2018-10-21

## 2018-10-15 RX ORDER — ALBUMIN (HUMAN) 12.5 G/50ML
50 SOLUTION INTRAVENOUS AS NEEDED
Status: DISPENSED | OUTPATIENT
Start: 2018-10-15 | End: 2018-10-16

## 2018-10-15 RX ORDER — LISINOPRIL 5 MG/1
5 TABLET ORAL
Status: DISCONTINUED | OUTPATIENT
Start: 2018-10-16 | End: 2018-10-15

## 2018-10-15 RX ORDER — BUMETANIDE 1 MG/1
1 TABLET ORAL DAILY
Status: DISCONTINUED | OUTPATIENT
Start: 2018-10-16 | End: 2018-10-21

## 2018-10-15 RX ORDER — AMIODARONE HYDROCHLORIDE 200 MG/1
200 TABLET ORAL
Status: DISCONTINUED | OUTPATIENT
Start: 2018-10-16 | End: 2018-10-25 | Stop reason: HOSPADM

## 2018-10-15 RX ADMIN — CALCITRIOL CAPSULES 0.25 MCG 0.5 MCG: 0.25 CAPSULE ORAL at 08:33

## 2018-10-15 RX ADMIN — METOPROLOL TARTRATE 25 MG: 25 TABLET, FILM COATED ORAL at 22:56

## 2018-10-15 RX ADMIN — BUDESONIDE AND FORMOTEROL FUMARATE DIHYDRATE 2 PUFF: 160; 4.5 AEROSOL RESPIRATORY (INHALATION) at 21:30

## 2018-10-15 RX ADMIN — BUDESONIDE AND FORMOTEROL FUMARATE DIHYDRATE 2 PUFF: 160; 4.5 AEROSOL RESPIRATORY (INHALATION) at 07:37

## 2018-10-15 RX ADMIN — AMIODARONE HYDROCHLORIDE 200 MG: 200 TABLET ORAL at 08:32

## 2018-10-15 RX ADMIN — HYDROCODONE BITARTRATE AND ACETAMINOPHEN 1 TABLET: 10; 325 TABLET ORAL at 08:31

## 2018-10-15 RX ADMIN — ALBUMIN (HUMAN) 50 G: 12.5 INJECTION, SOLUTION INTRAVENOUS at 15:05

## 2018-10-15 RX ADMIN — ASPIRIN 81 MG CHEWABLE TABLET 81 MG: 81 TABLET CHEWABLE at 08:31

## 2018-10-15 RX ADMIN — FERRIC CITRATE 210 MG: 210 TABLET, COATED ORAL at 12:20

## 2018-10-15 RX ADMIN — NICOTINE 1 PATCH: 21 PATCH, EXTENDED RELEASE TRANSDERMAL at 20:26

## 2018-10-15 RX ADMIN — HEPARIN SODIUM 5000 UNITS: 5000 INJECTION, SOLUTION INTRAVENOUS; SUBCUTANEOUS at 08:31

## 2018-10-15 RX ADMIN — POTASSIUM CHLORIDE 20 MEQ: 750 CAPSULE, EXTENDED RELEASE ORAL at 08:33

## 2018-10-15 RX ADMIN — FERRIC CITRATE 210 MG: 210 TABLET, COATED ORAL at 20:28

## 2018-10-15 RX ADMIN — METOPROLOL TARTRATE 25 MG: 25 TABLET, FILM COATED ORAL at 08:32

## 2018-10-15 RX ADMIN — Medication 3 ML: at 08:36

## 2018-10-15 RX ADMIN — ISOSORBIDE DINITRATE 10 MG: 10 TABLET ORAL at 08:32

## 2018-10-15 RX ADMIN — BUMETANIDE 1 MG: 1 TABLET ORAL at 08:33

## 2018-10-15 RX ADMIN — INSULIN LISPRO 6 UNITS: 100 INJECTION, SOLUTION INTRAVENOUS; SUBCUTANEOUS at 08:43

## 2018-10-15 RX ADMIN — HEPARIN SODIUM 1700 UNITS: 1000 INJECTION, SOLUTION INTRAVENOUS; SUBCUTANEOUS at 18:00

## 2018-10-15 RX ADMIN — HYDROCODONE BITARTRATE AND ACETAMINOPHEN 1 TABLET: 10; 325 TABLET ORAL at 01:22

## 2018-10-15 RX ADMIN — HYDROCODONE BITARTRATE AND ACETAMINOPHEN 1 TABLET: 10; 325 TABLET ORAL at 19:33

## 2018-10-15 RX ADMIN — ONDANSETRON 4 MG: 2 INJECTION INTRAMUSCULAR; INTRAVENOUS at 08:31

## 2018-10-15 RX ADMIN — ISOSORBIDE DINITRATE 10 MG: 10 TABLET ORAL at 20:26

## 2018-10-15 RX ADMIN — HEPARIN SODIUM 5000 UNITS: 5000 INJECTION, SOLUTION INTRAVENOUS; SUBCUTANEOUS at 20:26

## 2018-10-15 RX ADMIN — INSULIN LISPRO 6 UNITS: 100 INJECTION, SOLUTION INTRAVENOUS; SUBCUTANEOUS at 12:20

## 2018-10-15 RX ADMIN — FERRIC CITRATE 210 MG: 210 TABLET, COATED ORAL at 08:32

## 2018-10-15 RX ADMIN — ATORVASTATIN CALCIUM 80 MG: 40 TABLET, FILM COATED ORAL at 08:32

## 2018-10-15 RX ADMIN — Medication 1 TABLET: at 08:32

## 2018-10-15 RX ADMIN — DOCUSATE SODIUM -SENNOSIDES 1 TABLET: 50; 8.6 TABLET, COATED ORAL at 20:27

## 2018-10-15 RX ADMIN — ONDANSETRON 4 MG: 2 INJECTION INTRAMUSCULAR; INTRAVENOUS at 01:17

## 2018-10-15 RX ADMIN — HEPARIN SODIUM 1700 UNITS: 1000 INJECTION INTRAVENOUS; SUBCUTANEOUS at 17:58

## 2018-10-15 RX ADMIN — ISOSORBIDE DINITRATE 10 MG: 10 TABLET ORAL at 12:20

## 2018-10-15 NOTE — PROGRESS NOTES
[facility]  INPATIENT PROGRESS NOTE    Patient Name: Gavin Wilson  Date of Admission: 10/6/2018  Today's Date: 10/15/18  Length of Stay: 9  Primary Care Physician: Provider, No Known    Subjective   Chief Complaint: f/u  HPI   Patient is known to me from an admission early this month (October 6).  I admitted him with acute respiratory failure, sepsis from pneumonia.  The interested reader is referred to my admitting H&P for details surrounding admission.  Since then he had seen Oleg Crawley and Chad.  Medications have been modified which includes hydralazine, Isordil, Ace inhibitor.  Amiodarone was decreased.  Is getting his hemodialysis Monday Wednesdays Fridays (when necessary)    We are awaiting for outpatient hemodialysis and skilled nursing facility placement    Review of Systems     All pertinent negatives and positives are as above. All other systems have been reviewed and are negative unless otherwise stated.     Objective    Temp:  [98 °F (36.7 °C)-98.8 °F (37.1 °C)] 98.8 °F (37.1 °C)  Heart Rate:  [68-83] 75  Resp:  [15-18] 18  BP: ()/(44-52) 106/52  Physical Exam  Constitutional: He is oriented to person, place, and time. No distress. Chronically ill appearing.  Appears improved today   HENT:   Head: Atraumatic.   Temporal muscle wasting; prominent wasting around the neck and supraclavicular    Eyes: Conjunctivae are normal. No scleral icterus.   Cardiovascular: Normal rate, regular rhythm and intact distal pulses.    Murmur heard. Right upper chest permcath placed  Pulmonary/Chest: Effort normal. No respiratory distress. He has no wheezing. He has no rales.  Improved air movement   Abdominal: Soft. Bowel sounds are normal. He exhibits no distension. There is no tenderness.   RLQ ostomy  Neurological: He is alert and oriented to person, place, and time. Lethargic.  Skin: Skin is warm and dry. He is not diaphoretic.   Psychiatric: He has a  normal mood and affect. His behavior is normal            Results Review:  I have reviewed the labs, radiology results, and diagnostic studies.    Laboratory Data:     Results from last 7 days  Lab Units 10/11/18  0355 10/10/18  0422 10/09/18  0306   WBC 10*3/mm3 4.56* 5.35 5.17   HEMOGLOBIN g/dL 9.1* 10.2* 9.8*   HEMATOCRIT % 28.7* 32.6* 30.4*   PLATELETS 10*3/mm3 190 173 180          Results from last 7 days  Lab Units 10/15/18  0542 10/14/18  0417 10/13/18  0436   SODIUM mmol/L 139 141 139   POTASSIUM mmol/L 4.5 3.9 4.4   CHLORIDE mmol/L 102 101 99   CO2 mmol/L 28.0 28.0 21.0*   BUN mg/dL 36* 23* 23*   CREATININE mg/dL 3.27* 2.23* 2.02*   CALCIUM mg/dL 9.0 8.7 9.1   GLUCOSE mg/dL 133* 96 114*       Culture Data:        Radiology Data:   Imaging Results (last 24 hours)     Procedure Component Value Units Date/Time    IR Insert Tunneled CV Catheter Without Port 5 Plus [[id number]] Collected:  10/15/18 1257     Updated:  10/15/18 1258    Narrative:       Performed by Dr. Bonilla. Please see procedure note.  This report was finalized on  by Dr. Hugo Bonilla MD.    FL C Arm During Surgery [[id number]] Collected:  10/15/18 1257     Updated:  10/15/18 1258    Narrative:       Performed by Dr. Bonilla. Please see procedure note.  This report was finalized on  by Dr. Hugo Bonilla MD.          I have reviewed the patient's current medications.     Assessment/Plan     Active Hospital Problems    Diagnosis   • **Acute on chronic respiratory failure with hypoxia (CMS/HCC)   • Coronary artery disease involving native coronary artery of native heart without angina pectoris   • History of coronary artery stent placement   • Mild aortic valve stenosis   • Moderate aortic valve insufficiency   • Mild mitral valve regurgitation   • Pneumonia   • COPD (chronic obstructive pulmonary disease) (CMS/HCC)   • Sepsis (CMS/HCC)   • Acute renal failure superimposed on stage 4 chronic kidney disease (CMS/HCC)   • Acute on chronic combined  systolic and diastolic congestive heart failure (CMS/HCC)   • Pleural effusion, bilateral       Assessment:  1) Acute hypoxic respiratory failure, resolved  2) Sepsis due to suspected health care associated pneumonia - blood culture and pleural fluid culture showed no growth to date  3) Bilateral pleural effusions - Right is recurrent, left is worsening -status this post thoracentesis on October 7 Suspected due to heart failure, but cannot rule out infection or malignancy   4) Severe protein-calorie malnutrition  5) COPD without exacerbation  6) Combined systolic and diastolic heart failure, chronic (EF <40%)  7) Acute kidney injury on chronic kidney disease stage 4 - Suspected cardiorenal   8) Chronic normocytic anemia due to CKD  9) Thrombocytopenia, improved  10) Mild hypokalemia, resolved  11) Hyperphosphotemia  12) Metabolic acidosis, anion gap due to uremia  13) Constipation, resolved       Continue present management.  Awaiting nursing home placement since outpatient dialysis chair time    [START ON 10/16/2018] amiodarone 200 mg Oral Q24H   aspirin 81 mg Oral Daily   atorvastatin 80 mg Oral Daily   budesonide-formoterol 2 puff Inhalation BID - RT   bumetanide 1 mg Oral BID   calcitriol 0.5 mcg Oral Daily   calcium carb-cholecalciferol 1 tablet Oral Daily   Ferric Citrate 210 mg Oral TID With Meals   heparin (porcine) 5,000 Units Subcutaneous Q12H   hydrALAZINE 25 mg Oral Q8H   insulin lispro 0-9 Units Subcutaneous 4x Daily With Meals & Nightly   insulin lispro 6 Units Subcutaneous TID AC   isosorbide dinitrate 10 mg Oral TID - Nitrates   [START ON 10/16/2018] lisinopril 5 mg Oral Q24H   metoprolol tartrate 25 mg Oral Q12H   nicotine 1 patch Transdermal Q24H   potassium chloride 20 mEq Oral Daily   sennosides-docusate sodium 1 tablet Oral BID   sodium chloride 3 mL Intravenous Q12H                 Discharge Planning: To be determined       Max Jones MD   10/15/18   2:54 PM

## 2018-10-15 NOTE — PLAN OF CARE
Problem: Patient Care Overview  Goal: Plan of Care Review  Outcome: Ongoing (interventions implemented as appropriate)   10/15/18 0649   Plan of Care Review   Progress no change   OTHER   Outcome Summary Pt to have HD today; scheduled MWF. No changes overnight. [facility]. Poor PO intake. 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 SR on tele.   Coping/Psychosocial   Plan of Care Reviewed With patient

## 2018-10-15 NOTE — DISCHARGE PLACEMENT REQUEST
"ATTN: CHARBEL MACDONALD  Patient needs outpt dialysis and nursing home placement arranged. Patient is ready for discharge pending these arrangements. Please call Korina Kelley at [phone] after referral received.   Thanks!    Shiela Martinez (73 y.o. Male)     Date of Birth Social Security Number Address Home Phone MRN    1945  [id number] [phone] [id number]    Druze Marital Status          Taoism        Admission Date Admission Type Admitting Provider Attending Provider Department, Room/Bed    10/6/18 Urgent Max Jones MD [place], Glenn Riego, MD [facility] 4B, 444/1    Discharge Date Discharge Disposition Discharge Destination                       Attending Provider:  Max Jones MD    Allergies:  Sulfa Antibiotics    Isolation:  Contact   Infection:  MRSA (10/07/18)   Code Status:  No CPR    Ht:  170.2 cm (67\")   Wt:  46.9 kg (103 lb 4.8 oz)    Admission Cmt:  None   Principal Problem:  Acute on chronic respiratory failure with hypoxia (CMS/HCC) [J96.21]                 Active Insurance as of 10/6/2018     Primary Coverage     Payor Plan Insurance Group Employer/Plan Group    [employer] [place] [employer] [id number]      Payor Plan Address Payor Plan Phone Number Effective From Effective To    [employer] [phone] 8/6/2018     [address] [place] [address]       Subscriber Name Subscriber Birth Date Member ID       SHIELA MARTINEZ 1945 [id number]                 Emergency Contacts      (Rel.) Home Phone Work Phone Mobile Phone    Tracey Prajapati (Daughter) [phone] -- --    Familia Martinez (Son) [phone] -- --            Insurance Information                [employer] Phone: [phone]    Subscriber: Shiela Martinez Subscriber#: [id number]    Group#:  Precert#:              History & Physical      Max Jones MD at 10/6/2018  4:21 AM      " "        [facility] Medicine Services  HISTORY AND PHYSICAL    Date of Admission: 10/6/2018  Primary Care Physician: Provider, No Known    Subjective     Chief Complaint: \" my lung\"    History of Present Illness  73-year-old  man transferred from [facility] for further evaluation of shortness of breath.  He came to their emergency room with shortness of breath and chest pain.  ER physician requested transfer for further evaluation management of bilateral pneumonia, sepsis, hypoxia.  Patient received 4.5 g of Zosyn and was initially placed on BiPAP as he was described to be short of breath and had coarse noise on examination.  Lactic acid was 2.6, blood gas showed pH of 7.27, PCO2 of 25.  White count reportedly at 9000 with predominance of neutrophils at 89%.  Creatinine is 3.9 with BUN of 47, potassium 4.4.  Chest x-ray described to me as bilateral infiltrate.  Patient received 500 mL bolus of IV fluid and subsequently placed on 125 ML per hour.  Clinically the ER physician did not feel necessity for more fluid as his blood pressure was normal and lactic acid is no more than 4.  He was concerned that patient may be overloaded if given a 30 mL per kilogram fluid.    He is very poor historian. He said he came to the hospital because of his \"lungs\", back pain and knee pain.  He claims he has arthritis.  He did not endorse any cough or fever although record from transferring hospital indicates  symptoms of fever, cough, shortness of breath, vomiting, diarrhea, blood in urine.        Comparing above x-ray to x-ray below, obviously, the bilateral costophrenic and cardiophrenic angles are blurred.  Also difference in level aeration  of bilateral lung fields      08/17/2018 chest x-ray    Review of record indicates that patient was recently hospitalized on August 8 to 18, 2018.  At time he had volume overload with pulmonary vascular congestion with suspicion for acute on " chronic systolic heart failure.    Was the description of the dense retrocardiac consulate patient likely compressive atelectasis but cannot rule out pneumonia.  He had a CT scan of the chest were he was described to have moderate to large bilateral pleural effusion and underwent thoracentesis on August 17 with 450 mL removed.    Review of Systems   Patient is poor historian  Otherwise complete ROS reviewed and negative except as mentioned in the HPI.    Past Medical History:   Past Medical History:   Diagnosis Date   • CHF (congestive heart failure) (CMS/HCC)    • Coronary stent occlusion    • Diabetes mellitus (CMS/HCC)    • Hyperlipidemia    • Hypertension    • Stroke (CMS/HCC)      Past Surgical History:  Past Surgical History:   Procedure Laterality Date   • BLADDER SURGERY     • ESOPHAGECTOMY       Social History:  reports that he has been smoking.  He has never used smokeless tobacco. He reports that he does not drink alcohol or use drugs.    Family History: diabetes    Allergies:  Allergies   Allergen Reactions   • Sulfa Antibiotics      Medications:  Prior to Admission medications    Medication Sig Start Date End Date Taking? Authorizing Provider   aspirin 81 MG chewable tablet Chew 81 mg Daily.    ProviderDom MD   atorvastatin (LIPITOR) 80 MG tablet Take 80 mg by mouth Daily.    Provider, MD Dom   budesonide-formoterol (SYMBICORT) 160-4.5 MCG/ACT inhaler Inhale 2 puffs 2 (Two) Times a Day. 8/18/18   Farooq Edwards MD   bumetanide (BUMEX) 2 MG tablet Take 1 tablet by mouth Daily. 8/18/18   Farooq Edwards MD   calcitriol (ROCALTROL) 0.5 MCG capsule Take 1 capsule by mouth Daily. 8/19/18   Farooq Edwards MD   calcium carbonate (OS-TASHI) 600 MG tablet Take 600 mg by mouth Daily.    ProviderDom MD   Ferric Citrate (AURYXIA) 1  MG(Fe) tablet Take 1 tablet by mouth 3 (Three) Times a Day With Meals. 8/18/18   Farooq Edwards MD   HYDROcodone-acetaminophen  "(NORCO)  MG per tablet Take 1 tablet by mouth Every 6 (Six) Hours As Needed (back pain).    ProviderDom MD   insulin lispro (humaLOG) 100 UNIT/ML injection Inject 6 Units under the skin into the appropriate area as directed 3 (Three) Times a Day Before Meals.    Provider, MD Dom   metoprolol tartrate (LOPRESSOR) 25 MG tablet Take 25 mg by mouth 2 (Two) Times a Day.    ProviderDom MD   sodium bicarbonate 325 MG tablet Take 1 tablet by mouth 2 (Two) Times a Day. 8/18/18   Farooq Edwards MD     Objective     Vital Signs: BP 98/80   Pulse 96   Temp 97.1 °F (36.2 °C) (Oral)   Resp 17   Ht 162.6 cm (64\")   Wt 48.9 kg (107 lb 12.8 oz)   SpO2 94%   BMI 18.50 kg/m²    Physical Exam   Constitutional: He is oriented to person, place, and time. No distress.   Very thin man. BMI 18.5. no distress, pleasant, speaks in full sentences, no accessory muscle use, 21 mg nicotine patch on his right shoulder   HENT:   Head: Normocephalic and atraumatic.   Right Ear: External ear normal.   Left Ear: External ear normal.   Nose: Nose normal.   + hearing aid, dry oral mucosa   Eyes: Pupils are equal, round, and reactive to light. Conjunctivae and EOM are normal. Right eye exhibits no discharge. Left eye exhibits no discharge. No scleral icterus.   Neck: Normal range of motion. Neck supple. No JVD present. No tracheal deviation present. No thyromegaly present.   Cardiovascular: Normal rate, regular rhythm, normal heart sounds and intact distal pulses.    No murmur heard.  Pulmonary/Chest:   diminished breath, no wheezes or crackles, no accessory muscle use    Abdominal: Soft. Bowel sounds are normal. He exhibits no distension. There is no tenderness. There is no guarding.   Scaphoid abdomen; + urostomy with bag containing clear yellow urine   Musculoskeletal: He exhibits no edema or tenderness.   + thrill from the fistula on his left arm   Lymphadenopathy:     He has no cervical adenopathy. "   Neurological: He is alert and oriented to person, place, and time. He displays normal reflexes. No cranial nerve deficit. He exhibits normal muscle tone.   Skin: Skin is warm and dry. Capillary refill takes less than 2 seconds. He is not diaphoretic. No erythema. No pallor.   Psychiatric: He has a normal mood and affect. His behavior is normal. Judgment and thought content normal.   Vitals reviewed.          Results Reviewed:  Blood gas showed pH of 7.27, PCO2 of 25, PaO2 of 187, bicarbonate of 11.5 on 100% FiO2 arterial lactic acid is 2.6  White count is 9.1, hemoglobin 10.1, hematocrit 33, platelet of 165 MCV of 93, predominance of neutrophils at 89%  Troponin is 0.16; EKG showed sinus tachycardia without acute ST-T wave changes  Creatinine 3.9, BUN 47, carbon dioxide 19, potassium 4.4, sodium 140, total bilirubin 0.4, calcium 8.1, ALT 27, AST 16 glucose of 254  Lab Results (last 24 hours)     ** No results found for the last 24 hours. **        Imaging Results (last 24 hours)     ** No results found for the last 24 hours. **        I have personally reviewed and interpreted the radiology studies and ECG obtained at time of admission.       Echo in August 2018   · Left ventricular systolic function is moderately decreased. Estimated EF appears to be in the range of 31 - 35%.  · Left ventricular diastolic dysfunction (grade I) consistent with impaired relaxation.  · Left ventricular wall thickness is consistent with moderate posterior asymmetric hypertrophy.  · Mild mitral valve regurgitation is present  · Mild to moderate aortic valve regurgitation is present.  · Mild aortic valve stenosis is present.       Assessment / Plan     Assessment:     Hypoxia, acute respiratory failure - resolved  Sepsis from PNA (SIRS Hr 150, rr 30  T 100.3 from transferring facility   Abnormal CXR; suspected PNA with pleural effusion bilateral vs fluid overload from heart failure or combination of both   CKD Stave IV; AV fistula left   Arm  DM type 2 insulin treated; last known aic level 8%  Hx of PVD  Hx of AAA with endovascular stent graft  Protein calorie malnutrition prob moderate  Lactic acidosis  Chronic Systolic heart failure probably with acute component (EF 31-35%  Urostomy status from hx of bladder cancer    Plan:   Cont empiric abxs  Diurese, follow volume status and electrolytes  ssi  Oxygen supplement to keep spo2 > 92%  dvt proph  Nutritionist consult  Other plan per orders    Code Status: dnr     I discussed the patient's findings and my recommendations with the patient and nurse    Estimated length of stay to be determine    Max Jones MD   10/06/18   4:21 AM              Electronically signed by Max Jones MD at 10/6/2018  5:30 AM       Hospital Medications (active)       Dose Frequency Start End    acetaminophen (TYLENOL) tablet 650 mg 650 mg Every 4 Hours PRN 10/6/2018     Sig - Route: Take 2 tablets by mouth Every 4 (Four) Hours As Needed for Mild Pain . - Oral    albumin human 25 % IV SOLN 50 g 50 g As Needed 10/15/2018 10/16/2018    Sig - Route: Infuse 200 mL into a venous catheter As Needed (Hypotension During Dialysis). - Intravenous    amiodarone (PACERONE) tablet 200 mg 200 mg Every 24 Hours Scheduled 10/16/2018     Sig - Route: Take 1 tablet by mouth Daily. - Oral    aspirin chewable tablet 81 mg 81 mg Daily 10/8/2018     Sig - Route: Chew 1 tablet Daily. - Oral    atorvastatin (LIPITOR) tablet 80 mg 80 mg Daily 10/6/2018     Sig - Route: Take 2 tablets by mouth Daily. - Oral    budesonide-formoterol (SYMBICORT) 160-4.5 MCG/ACT inhaler 2 puff 2 puff 2 Times Daily - RT 10/6/2018     Sig - Route: Inhale 2 puffs 2 (Two) Times a Day. - Inhalation    bumetanide (BUMEX) tablet 1 mg 1 mg 2 Times Daily (Diuretics) 10/9/2018     Sig - Route: Take 1 tablet by mouth 2 (Two) Times a Day. - Oral    calcitriol (ROCALTROL) capsule 0.5 mcg 0.5 mcg Daily 10/6/2018     Sig - Route: Take 2 capsules by mouth  Daily. - Oral    calcium carb-cholecalciferol 600-800 MG-UNIT tablet 1 tablet 1 tablet Daily 10/6/2018     Sig - Route: Take 1 tablet by mouth Daily. - Oral    dextrose (D50W) 25 g/ 50mL Intravenous Solution 25 g 25 g Every 15 Minutes PRN 10/6/2018     Sig - Route: Infuse 50 mL into a venous catheter Every 15 (Fifteen) Minutes As Needed for Low Blood Sugar (Blood Sugar Less Than 70). - Intravenous    dextrose (GLUTOSE) oral gel 15 g 15 g Every 15 Minutes PRN 10/6/2018     Sig - Route: Take 15 g by mouth Every 15 (Fifteen) Minutes As Needed for Low Blood Sugar (Blood sugar less than 70). - Oral    Ferric Citrate tablet 210 mg 210 mg 3 Times Daily With Meals 10/6/2018     Sig - Route: Take 1 tablet by mouth 3 (Three) Times a Day With Meals. - Oral    glucagon (human recombinant) (GLUCAGEN DIAGNOSTIC) injection 1 mg 1 mg As Needed 10/6/2018     Sig - Route: Inject 1 mg under the skin into the appropriate area as directed As Needed (Blood Glucose Less Than 70). - Subcutaneous    heparin (porcine) 5000 UNIT/ML injection 5,000 Units 5,000 Units Every 12 Hours Scheduled 10/6/2018     Sig - Route: Inject 1 mL under the skin into the appropriate area as directed Every 12 (Twelve) Hours. - Subcutaneous    heparin (porcine) injection 1,700 Units 1,700 Units As Needed 10/11/2018     Sig - Route: 1.7 mL by Intracatheter route As Needed (permcath packing use after every hd tx). - Intracatheter    heparin (porcine) injection 1,700 Units 1,700 Units As Needed 10/11/2018     Sig - Route: 1.7 mL by Intracatheter route As Needed (permcath packing use after every hd tx). - Intracatheter    hydrALAZINE (APRESOLINE) tablet 25 mg 25 mg Every 8 Hours Scheduled 10/12/2018     Sig - Route: Take 1 tablet by mouth Every 8 (Eight) Hours. - Oral    HYDROcodone-acetaminophen (NORCO)  MG per tablet 1 tablet 1 tablet Every 6 Hours PRN 10/6/2018     Sig - Route: Take 1 tablet by mouth Every 6 (Six) Hours As Needed (back pain). - Oral     hydroxypropyl methylcellulose (ISOPTO TEARS) 2.5 % ophthalmic solution 1 drop 1 drop 3 Times Daily PRN 10/6/2018     Sig - Route: Administer 1 drop to both eyes 3 (Three) Times a Day As Needed for Dry Eyes. - Both Eyes    Influenza Vac Subunit Quad (FLUCELVAX) injection 0.5 mL 0.5 mL During Hospitalization 10/6/2018     Sig - Route: Inject 0.5 mL into the appropriate muscle as directed by prescriber During Hospitalization for Immunization. - Intramuscular    Cosign for Ordering: Accepted by Max Jones MD on 10/7/2018  4:06 PM    insulin lispro (humaLOG) injection 0-9 Units 0-9 Units 4 Times Daily With Meals & Nightly 10/6/2018     Sig - Route: Inject 0-9 Units under the skin into the appropriate area as directed 4 (Four) Times a Day With Meals & at Bedtime. - Subcutaneous    insulin lispro (humaLOG) injection 6 Units 6 Units 3 Times Daily Before Meals 10/6/2018     Sig - Route: Inject 6 Units under the skin into the appropriate area as directed 3 (Three) Times a Day Before Meals. - Subcutaneous    ipratropium (ATROVENT) nebulizer solution 0.5 mg 0.5 mg Every 6 Hours PRN 10/12/2018     Sig - Route: Take 2.5 mL by nebulization Every 6 (Six) Hours As Needed for Wheezing or Shortness of Air. - Nebulization    isosorbide dinitrate (ISORDIL) tablet 10 mg 10 mg 3 Times Daily (Nitrates) 10/10/2018     Sig - Route: Take 1 tablet by mouth 3 (Three) Times a Day. - Oral    lisinopril (PRINIVIL,ZESTRIL) tablet 5 mg 5 mg Every 24 Hours Scheduled 10/16/2018     Sig - Route: Take 1 tablet by mouth Daily. - Oral    magnesium hydroxide (MILK OF MAGNESIA) suspension 2400 mg/10mL 10 mL 10 mL Daily PRN 10/6/2018     Sig - Route: Take 10 mL by mouth Daily As Needed for Constipation. - Oral    metoprolol tartrate (LOPRESSOR) tablet 25 mg 25 mg Every 12 Hours Scheduled 10/6/2018     Sig - Route: Take 1 tablet by mouth Every 12 (Twelve) Hours. - Oral    nicotine (NICODERM CQ) 21 MG/24HR patch 1 patch 1 patch Every 24 Hours  10/6/2018     Sig - Route: Place 1 patch on the skin as directed by provider Daily. - Transdermal    potassium chloride (MICRO-K) CR capsule 20 mEq 20 mEq Daily 10/7/2018     Sig - Route: Take 2 capsules by mouth Daily. - Oral    sennosides-docusate sodium (SENOKOT-S) 8.6-50 MG tablet 1 tablet 1 tablet 2 Times Daily 10/9/2018     Sig - Route: Take 1 tablet by mouth 2 (Two) Times a Day. - Oral    sodium chloride 0.9 % flush 3 mL 3 mL Every 12 Hours Scheduled 10/6/2018     Sig - Route: Infuse 3 mL into a venous catheter Every 12 (Twelve) Hours. - Intravenous    sodium chloride 0.9 % flush 3-10 mL 3-10 mL As Needed 10/6/2018     Sig - Route: Infuse 3-10 mL into a venous catheter As Needed for Line Care. - Intravenous    tetrahydrozoline 0.05 % ophthalmic solution 1 drop 1 drop 4 Times Daily PRN 10/10/2018     Sig - Route: Administer 1 drop to both eyes 4 (Four) Times a Day As Needed for Irritation. - Both Eyes    amiodarone (PACERONE) tablet 200 mg (Discontinued) 200 mg 3 Times Daily 10/12/2018 10/15/2018    Sig - Route: Take 1 tablet by mouth 3 (Three) Times a Day. - Oral    lisinopril (PRINIVIL,ZESTRIL) tablet 2.5 mg (Discontinued) 2.5 mg Every 24 Hours Scheduled 10/12/2018 10/15/2018    Sig - Route: Take 1 tablet by mouth Daily. - Oral    ondansetron (ZOFRAN) injection 4 mg (Discontinued) 4 mg Every 6 Hours PRN 10/13/2018 10/15/2018    Sig - Route: Infuse 2 mL into a venous catheter Every 6 (Six) Hours As Needed for Nausea or Vomiting. - Intravenous    sodium bicarbonate tablet 650 mg (Discontinued) 650 mg 2 Times Daily 10/8/2018 10/14/2018    Sig - Route: Take 1 tablet by mouth 2 (Two) Times a Day. - Oral             Operative/Procedure Notes (last 7 days) (Notes from 10/8/2018  3:13 PM through 10/15/2018  3:13 PM)      Hugo Bonilla MD at 10/11/2018  7:31 AM        VASCULAR SURGERY OPERATIVE REPORT    DATE OF PROCEDURE: 10/11/18    ATTENDING SURGEON: Hugo Bonilla MD    OR STAFF: Circulator: Noe  Jeremy GILLESPIE RN  Scrub Person: Yoana Jiang; Rosemary Tinsley    ANESTHESIA: Monitor Anesthesia Care    PRE-OPERATIVE DIAGNOSIS: Chronic kidney disease, stage 4 (severe) (CMS/HCC) [N18.4]    POST-OPERATIVE DIAGNOSIS: Post-Op Diagnosis Codes:     * Chronic kidney disease, stage 4 (severe) (CMS/HCC) [N18.4]    PROCEDURE(S):   1.) Percutaneous access of right internal jugular vein under ultrasound guidance  2.) Insertion of right internal jugular cuffed tunneled hemodialysis catheter (Permacath)  3.) Radiographic supervision and interpretation    INTRAOPERATIVE FINDINGS: Patent right internal jugular vein, SVC and   IVC, and well-secured cuffed tunneled hemodialysis catheter at completion of procedure    ESTIMATED BLOOD LOSS: Minimal (<20 mL)    SPECIMENS: None    IMPLANTS: 19 cm cuffed tunneled hemodialysis catheter (Permacath)    COMPLICATIONS: None apparent    CONDITION AT COMPLETION: Hemodynamically stable, awake    DISPOSITION: PACU    INDICATION(S) FOR PROCEDURE:   Patient is a 73 y.o. male with chronic kidney disease who is status post creation of left arm AV fistula an outside hospital.  He presented to [facility] with shortness of breath and was found to have progression of his renal disease now requiring dialysis.  Initial attempt was made to access his fistula for the first time in the left arm however this was unsuccessful.  He now requires hemodialysis access. All risks, benefits, and alternatives to above elective procedures were discussed with the patient, who elected to proceed, and informed consent was accordingly obtained at that time.    DETAILS OF PROCEDURE:   Patient was brought to the operating room and appropriately identified. In supine position, right IJ venous access site was prepped and draped in the usual sterile fashion. Following a brief timeout, percutaneous right internal jugular venous access was obtained under ultrasound guidance using modified Seldinger technique after infiltration  of local anesthetic to the area. Soft guidewire was advanced through the SVC into the proximal IVC, over which sequential dilators were advanced and withdrawn, followed by insertion of sheath for tunneled hemodialysis catheter. Local anesthetic was then injected over the right chest catheter tunnel site, through which catheter was tunneled retrograde to the sheath insertion site, and catheter was advanced through the sheath into right internal jugular vein and under direct fluoroscopic visualization to the cavo-atrial junction. Cuffed tunneled hemodialysis catheter was then secured to skin with 3-0 Nylon suture, and skin at insertion site was reapproximated with 4-0 Monocryl suture. Skin was cleaned, dried, and sterile occlusive dressing was applied, after which patient was safely able to be transferred to recovery.    I was present for all aspects of the procedures, and there were no intraprocedural complications apparent.    Electronically signed by Hugo Bonilla MD at 10/11/2018  8:54 AM          Physician Progress Notes (last 72 hours) (Notes from 10/12/2018  3:13 PM through 10/15/2018  3:13 PM)      Roberto Carlos Myrick APRN at 10/15/2018 11:15 AM          Nephrology ([facility]) Progress Note      Patient:  Gavin Wilson  YOB: 1945  Date of Service: 10/15/2018  MRN: [id number]   Acct: [id number]   Primary Care Physician: Provider, No Known  Advance Directive:   Code Status and Medical Interventions:   Ordered at: 10/07/18 1309     Limited Support to NOT Include:    Intubation     Level Of Support Discussed With:    Patient     Code Status:    No CPR     Medical Interventions (Level of Support Prior to Arrest):    Limited     Admit Date: 10/6/2018       Hospital Day: 9  Referring Provider: Max Jones*      Patient personally seen and examined.  Complete chart including Consults, Notes, Operative Reports, Labs, Cardiology, and Radiology studies  reviewed as able.        Subjective:  Gavin Wilson is a 73 y.o. male  whom we were consulted for CKD stage 4.  History of bladder cancer with prior cystectomy and ureterostomy formation.  Also history of esophogeal and prostate cancer.  Follows with nephrology at [facility]. Has left upper arm fistula in place.  Presented with dyspnea; admitted with bilateral pneumonia/pleural effusion.  Hospital course remarkable for attempt to use fistula on 10/10; infiltrated and was unable to dialyze.  On 10/11 had Permcath placed and first dialysis treatment; he had a run of V tach during first HD.      Today is feeling about the same; no new overnight issues.     Allergies:  Sulfa antibiotics    Home Meds:  Prescriptions Prior to Admission   Medication Sig Dispense Refill Last Dose   • acetaminophen (TYLENOL) 325 MG tablet Take 650 mg by mouth Every 6 (Six) Hours As Needed for Mild Pain .   Past Month at Unknown time   • albuterol (PROVENTIL HFA;VENTOLIN HFA) 108 (90 Base) MCG/ACT inhaler Inhale 2 puffs Every 6 (Six) Hours As Needed for Wheezing.   Past Week at Unknown time   • aspirin 81 MG chewable tablet Chew 81 mg Daily.   10/5/2018 at Unknown time   • atorvastatin (LIPITOR) 80 MG tablet Take 40 mg by mouth Daily.   10/5/2018 at Unknown time   • budesonide-formoterol (SYMBICORT) 160-4.5 MCG/ACT inhaler Inhale 2 puffs 2 (Two) Times a Day. 1 inhaler 2 10/5/2018 at Unknown time   • bumetanide (BUMEX) 2 MG tablet Take 1 tablet by mouth Daily. 30 tablet 0 10/5/2018 at Unknown time   • calcium carbonate (OS-TASHI) 600 MG tablet Take 600 mg by mouth Daily.   10/5/2018 at Unknown time   • cholecalciferol (VITAMIN D3) 1000 units tablet Take 3,000 Units by mouth Daily.   10/5/2018 at Unknown time   • insulin aspart (novoLOG FLEXPEN) 100 UNIT/ML solution pen-injector sc pen Inject 6 Units under the skin into the appropriate area as directed 3 (Three) Times a Day With Meals.   10/5/2018 at Unknown time   • insulin detemir (LEVEMIR)  100 UNIT/ML injection Inject 6 Units under the skin into the appropriate area as directed 2 (Two) Times a Day.   10/5/2018 at Unknown time   • loperamide (IMODIUM) 2 MG capsule Take 2 mg by mouth Daily As Needed for Diarrhea.   Past Month at Unknown time   • metoprolol tartrate (LOPRESSOR) 25 MG tablet Take 12.5 mg by mouth 2 (Two) Times a Day.   10/5/2018 at Unknown time   • omeprazole (priLOSEC) 20 MG capsule Take 20 mg by mouth Daily.   10/5/2018 at Unknown time   • oxyCODONE (ROXICODONE) 5 MG immediate release tablet Take 5 mg by mouth Every 6 (Six) Hours As Needed for Moderate Pain .   Past Week at Unknown time   • sildenafil (VIAGRA) 100 MG tablet Take 100 mg by mouth Daily As Needed for erectile dysfunction.   More than a month at Unknown time       Medicines:  Current Facility-Administered Medications   Medication Dose Route Frequency Provider Last Rate Last Dose   • acetaminophen (TYLENOL) tablet 650 mg  650 mg Oral Q4H PRN Max Jones MD       • amiodarone (PACERONE) tablet 200 mg  200 mg Oral TID Abdiel Stout MD   200 mg at 10/15/18 0832   • aspirin chewable tablet 81 mg  81 mg Oral Daily Vu Michael MD   81 mg at 10/15/18 0831   • atorvastatin (LIPITOR) tablet 80 mg  80 mg Oral Daily Max Jones MD   80 mg at 10/15/18 0832   • budesonide-formoterol (SYMBICORT) 160-4.5 MCG/ACT inhaler 2 puff  2 puff Inhalation BID - RT Max Jones MD   2 puff at 10/15/18 0737   • bumetanide (BUMEX) tablet 1 mg  1 mg Oral BID Vu Michael MD   1 mg at 10/15/18 0833   • calcitriol (ROCALTROL) capsule 0.5 mcg  0.5 mcg Oral Daily Max Jones MD   0.5 mcg at 10/15/18 0833   • calcium carb-cholecalciferol 600-800 MG-UNIT tablet 1 tablet  1 tablet Oral Daily Max Jones MD   1 tablet at 10/15/18 0832   • dextrose (D50W) 25 g/ 50mL Intravenous Solution 25 g  25 g Intravenous Q15 Min PRMax Dexter MD       • dextrose (GLUTOSE)  oral gel 15 g  15 g Oral Q15 Min PRN Max Jones MD       • Ferric Citrate tablet 210 mg  210 mg Oral TID With Meals Max Jones MD   210 mg at 10/15/18 0832   • glucagon (human recombinant) (GLUCAGEN DIAGNOSTIC) injection 1 mg  1 mg Subcutaneous PRN Max Jones MD       • heparin (porcine) 5000 UNIT/ML injection 5,000 Units  5,000 Units Subcutaneous Q12H Max Jones MD   5,000 Units at 10/15/18 0831   • heparin (porcine) injection 1,700 Units  1,700 Units Intracatheter PRN Samson Aragon MD   1,700 Units at 10/12/18 1802   • heparin (porcine) injection 1,700 Units  1,700 Units Intracatheter PRN Samson Aragon MD   1,700 Units at 10/13/18 0906   • hydrALAZINE (APRESOLINE) tablet 25 mg  25 mg Oral Q8H Abdiel Stout MD   25 mg at 10/14/18 1248   • HYDROcodone-acetaminophen (NORCO)  MG per tablet 1 tablet  1 tablet Oral Q6H PRN Max Jones MD   1 tablet at 10/15/18 0831   • hydroxypropyl methylcellulose (ISOPTO TEARS) 2.5 % ophthalmic solution 1 drop  1 drop Both Eyes TID PRN Vu Michael MD   1 drop at 10/11/18 0636   • Influenza Vac Subunit Quad (FLUCELVAX) injection 0.5 mL  0.5 mL Intramuscular During Hospitalization Max Jones MD       • insulin lispro (humaLOG) injection 0-9 Units  0-9 Units Subcutaneous 4x Daily With Meals & Nightly Max Jones MD   2 Units at 10/14/18 2044   • insulin lispro (humaLOG) injection 6 Units  6 Units Subcutaneous TID AC Max Jones MD   6 Units at 10/15/18 0843   • ipratropium (ATROVENT) nebulizer solution 0.5 mg  0.5 mg Nebulization Q6H PRN Abdiel Stout MD   0.5 mg at 10/13/18 1619   • isosorbide dinitrate (ISORDIL) tablet 10 mg  10 mg Oral TID - Nitrates Abdiel Stout MD   10 mg at 10/15/18 0832   • lisinopril (PRINIVIL,ZESTRIL) tablet 2.5 mg  2.5 mg Oral Q24H Abdiel Stout MD   2.5 mg at 10/14/18 0945   • magnesium hydroxide (MILK OF  MAGNESIA) suspension 2400 mg/10mL 10 mL  10 mL Oral Daily PRN Max Jones MD       • metoprolol tartrate (LOPRESSOR) tablet 25 mg  25 mg Oral Q12H Max Jones MD   25 mg at 10/15/18 0832   • nicotine (NICODERM CQ) 21 MG/24HR patch 1 patch  1 patch Transdermal Q24H Ashutosh Palafox MD   1 patch at 10/14/18 2045   • ondansetron (ZOFRAN) injection 4 mg  4 mg Intravenous Q6H PRN Vu Michael MD   4 mg at 10/15/18 0831   • potassium chloride (MICRO-K) CR capsule 20 mEq  20 mEq Oral Daily Vu Michael MD   20 mEq at 10/15/18 0833   • sennosides-docusate sodium (SENOKOT-S) 8.6-50 MG tablet 1 tablet  1 tablet Oral BID Vu Michael MD   1 tablet at 10/14/18 2042   • sodium chloride 0.9 % flush 3 mL  3 mL Intravenous Q12H Max Jones MD   3 mL at 10/15/18 0836   • sodium chloride 0.9 % flush 3-10 mL  3-10 mL Intravenous PRN Max Jones MD       • tetrahydrozoline 0.05 % ophthalmic solution 1 drop  1 drop Both Eyes 4x Daily PRN Ney De MD   1 drop at 10/10/18 0339       Past Medical History:  Past Medical History:   Diagnosis Date   • CHF (congestive heart failure) (CMS/HCC)    • Coronary stent occlusion    • Diabetes mellitus (CMS/HCC)    • Hyperlipidemia    • Hypertension    • Stroke (CMS/HCC)        Past Surgical History:  Past Surgical History:   Procedure Laterality Date   • BLADDER SURGERY     • ESOPHAGECTOMY     • INSERTION HEMODIALYSIS CATHETER N/A 10/11/2018    Procedure: HEMODIALYSIS CATHETER INSERTION;  Surgeon: Hugo Bonilla MD;  Location: [place] [address];  Service: Vascular       Family History  Family History   Problem Relation Age of Onset   • No Known Problems Father    • No Known Problems Mother        Social History  Social History     Social History   • Marital status:      Spouse name: N/A   • Number of children: N/A   • Years of education: N/A     Occupational History   • Not on file.     Social  "History Main Topics   • Smoking status: Current Every Day Smoker   • Smokeless tobacco: Never Used   • Alcohol use No   • Drug use: No   • Sexual activity: Not on file     Other Topics Concern   • Not on file     Social History Narrative   • No narrative on file         Review of Systems:  History obtained from chart review and the patient  General ROS: No fever or chills  Respiratory ROS: No cough, shortness of breath, wheezing  Cardiovascular ROS: No chest pain or palpitations  Gastrointestinal ROS: No abdominal pain or melena  Genito-Urinary ROS: No dysuria or hematuria    Objective:  /50 (BP Location: Right arm, Patient Position: Lying)   Pulse 80   Temp 98.8 °F (37.1 °C) (Temporal Artery )   Resp 18   Ht 170.2 cm (67\")   Wt 46.9 kg (103 lb 4.8 oz)   SpO2 94%   BMI 16.18 kg/m²      Intake/Output Summary (Last 24 hours) at 10/15/18 1115  Last data filed at 10/15/18 0642   Gross per 24 hour   Intake              240 ml   Output              350 ml   Net             -110 ml     General: awake/alert   Chest:  clear to auscultation bilaterally without respiratory distress  CVS: regular rate and rhythm  Abdominal: soft, nontender, normal bowel sounds  Extremities: no cyanosis or edema  Skin: warm and dry without rash      Labs:    Results from last 7 days  Lab Units 10/11/18  0355 10/10/18  0422 10/09/18  0306   WBC 10*3/mm3 4.56* 5.35 5.17   HEMOGLOBIN g/dL 9.1* 10.2* 9.8*   HEMATOCRIT % 28.7* 32.6* 30.4*   PLATELETS 10*3/mm3 190 173 180           Results from last 7 days  Lab Units 10/15/18  0542 10/14/18  0417 10/13/18  0436   SODIUM mmol/L 139 141 139   POTASSIUM mmol/L 4.5 3.9 4.4   CHLORIDE mmol/L 102 101 99   CO2 mmol/L 28.0 28.0 21.0*   BUN mg/dL 36* 23* 23*   CREATININE mg/dL 3.27* 2.23* 2.02*   CALCIUM mg/dL 9.0 8.7 9.1   GLUCOSE mg/dL 133* 96 114*       Radiology:   Imaging Results (last 72 hours)     Procedure Component Value Units Date/Time    US Thoracentesis [[id number]] Collected:  10/07/18 " 1323    Specimen:  Body Fluid Updated:  10/13/18 0934    Narrative:       DATE OF PROCEDURE:  10/7/2018.     PREPROCEDURE DIAGNOSIS:  Left pleural effusion.  POSTPROCEDURE DIAGNOSIS:  Left pleural effusion.          INDICATIONS FOR PROCEDURE: Shortness of breath.     PROCEDURE:   1. Thoracentesis, diagnostic and therapeutic.  2. Ultrasound guidance for thoracentesis, imaging supervision and  interpretation.     ANESTHESIA: 1% lidocaine, injected locally.          COMPLICATIONS: None.        EXAMINATIONS FOR COMPARISON:  CT chest dated 10/6/2018.     DESCRIPTION OF PROCEDURE:   The risk and benefits of the procedure were explained to the patient.   Specifically, the risks of bleeding, infection, pneumothorax (possible  thoracostomy tube), and damage to surrounding structures were discussed  extensively. The patient's questions were answered. The patient opted to  proceed. Written and verbal consent were obtained from the patient.     TIME OUT was taken and the patient's name, medical record number, date  of birth, procedure, entry site, and listen allergies were confirmed.  The site of the procedure was confirmed and marked.      The leftposterior thorax was prepped and draped in sterile fashion. The  area was anesthetized with buffered lidocaine 2%.      A 5 Persian 10 cm  catheter was inserted into the pleural effusion  using  ultrasound guidance. Approximately 400 mL of clear fluid was recovered  and sent to the pathology lab for analysis.      The patient tolerated the procedure well.   No immediate complications  were noted.       Impression:       Successful ultrasound guided leftthoracentesis. Approximately 400 mL of  clear fluid was recovered and sent to the pathology lab for analysis.   No immediate complications were noted.              This report was finalized on 10/07/2018 13:26 by Dr. Petra Mckinley MD.     Chest [[id number]] Collected:  10/07/18 1323     Updated:  10/13/18 0934    Narrative:        DATE OF PROCEDURE:  10/7/2018.     PREPROCEDURE DIAGNOSIS:  Left pleural effusion.  POSTPROCEDURE DIAGNOSIS:  Left pleural effusion.          INDICATIONS FOR PROCEDURE: Shortness of breath.     PROCEDURE:   1. Thoracentesis, diagnostic and therapeutic.  2. Ultrasound guidance for thoracentesis, imaging supervision and  interpretation.     ANESTHESIA: 1% lidocaine, injected locally.          COMPLICATIONS: None.        EXAMINATIONS FOR COMPARISON:  CT chest dated 10/6/2018.     DESCRIPTION OF PROCEDURE:   The risk and benefits of the procedure were explained to the patient.   Specifically, the risks of bleeding, infection, pneumothorax (possible  thoracostomy tube), and damage to surrounding structures were discussed  extensively. The patient's questions were answered. The patient opted to  proceed. Written and verbal consent were obtained from the patient.     TIME OUT was taken and the patient's name, medical record number, date  of birth, procedure, entry site, and listen allergies were confirmed.  The site of the procedure was confirmed and marked.      The leftposterior thorax was prepped and draped in sterile fashion. The  area was anesthetized with buffered lidocaine 2%.      A 5 [place] 10 cm  catheter was inserted into the pleural effusion  using  ultrasound guidance. Approximately 400 mL of clear fluid was recovered  and sent to the pathology lab for analysis.      The patient tolerated the procedure well.   No immediate complications  were noted.       Impression:       Successful ultrasound guided leftthoracentesis. Approximately 400 mL of  clear fluid was recovered and sent to the pathology lab for analysis.   No immediate complications were noted.              This report was finalized on 10/07/2018 13:26 by Dr. Petra Mckinley MD.          Culture:         Assessment   1.  CKD stage 4--now with progression to ESRD  2.  Type 2 diabetes with nephropathy  3.  Bilateral pneumonia  4.  Chronic diastolic  congestive heart failure  5.  S/p ventricular tachycardia on 10/10  6.  Metabolic acidosis  7.  Secondary hyperparathyroidism    Plan:  1.  Dialysis today  2.  Monitor labs      MACO Cole  10/15/2018  11:15 AM      Electronically signed by Roberto Carlos Myrick APRN at 10/15/2018 11:22 AM     Abdiel Stout MD at 10/15/2018  6:10 AM             LOS: 9 days   Patient Care Team:  Provider, No Known as PCP - General    Chief Complaint:   Acute on chronic respiratory failure with hypoxia (CMS/HCC)    Acute renal failure superimposed on stage 4 chronic kidney disease (CMS/HCC)    Acute on chronic combined systolic and diastolic congestive heart failure (CMS/HCC)    Pleural effusion, bilateral    Sepsis (CMS/HCC)    Coronary artery disease involving native coronary artery of native heart without angina pectoris    History of coronary artery stent placement    Mild aortic valve stenosis    Moderate aortic valve insufficiency    Mild mitral valve regurgitation    Pneumonia    COPD (chronic obstructive pulmonary disease) (CMS/HCC)    Shortness of breath    Subjective    Feels better  In much better spirits  Less shortness of breath  Less tired  Much less fatigued with exertion  No significant further arrhythmia  Maintaining sinus rhythm  No orthopnea paroxysmal nocturnal dyspnea  Denies chest pain or significant palpitations    Interval History: Improved overall    Telemetry: Maintaining sinus rhythm    Review of Systems     Constitutional: No chills   Has fatigue   No fever.   HENT: Negative.    Eyes: Negative.      Respiratory: Positive for cough,   No chest wall soreness,   Shortness of breath,   no wheezing, no stridor.      Cardiovascular: Atypical chest pain,   No palpitations   No significant  leg swelling.     Gastrointestinal: Negative for abdominal distention,  No abdominal pain,   No blood in stool,   No constipation,   No diarrhea,   No nausea   No vomiting.     Endocrine: Negative.     Genitourinary: Negative for difficulty urinating, dysuria, flank pain and hematuria.     Musculoskeletal: Negative.    Skin: Negative for rash and wound.   Allergic/Immunologic: Negative.      Neurological: Negative for dizziness, syncope, weakness,   No light-headedness  No  headaches.     Hematological: Does not bruise/bleed easily.     Psychiatric/Behavioral: Negative for agitation or behavioral problems,   No confusion,   the patient is  nervous/anxious.       History:   Past Medical History:   Diagnosis Date   • CHF (congestive heart failure) (CMS/HCC)    • Coronary stent occlusion    • Diabetes mellitus (CMS/HCC)    • Hyperlipidemia    • Hypertension    • Stroke (CMS/HCC)      Past Surgical History:   Procedure Laterality Date   • BLADDER SURGERY     • ESOPHAGECTOMY     • INSERTION HEMODIALYSIS CATHETER N/A 10/11/2018    Procedure: HEMODIALYSIS CATHETER INSERTION;  Surgeon: Hugo Bonilla MD;  Location: [place] [address];  Service: Vascular     Social History     Social History   • Marital status:      Spouse name: N/A   • Number of children: N/A   • Years of education: N/A     Occupational History   • Not on file.     Social History Main Topics   • Smoking status: Current Every Day Smoker   • Smokeless tobacco: Never Used   • Alcohol use No   • Drug use: No   • Sexual activity: Not on file     Other Topics Concern   • Not on file     Social History Narrative   • No narrative on file     Family History   Problem Relation Age of Onset   • No Known Problems Father    • No Known Problems Mother        Labs:  WBC No results found for: WBC   HGB No results found for: HGB   HCT No results found for: HCT   Platelets No results found for: PLT   MCV No results found for: MCV       Results from last 7 days  Lab Units 10/15/18  0542 10/14/18  0417 10/13/18  0436   SODIUM mmol/L 139 141 139   POTASSIUM mmol/L 4.5 3.9 4.4   CHLORIDE mmol/L 102 101 99   CO2 mmol/L 28.0 28.0 21.0*   BUN mg/dL 36* 23* 23*    CREATININE mg/dL 3.27* 2.23* 2.02*   CALCIUM mg/dL 9.0 8.7 9.1   GLUCOSE mg/dL 133* 96 114*     Lab Results   Component Value Date    TROPONINI 4.090 (C) 10/06/2018     PT/INR:    No results found for: PROTIME/  No results found for: INR    Imaging Results (last 72 hours)     Procedure Component Value Units Date/Time    CT Chest Without Contrast [[id number]] Collected:  10/06/18 1752     Updated:  10/06/18 1759    Narrative:       EXAMINATION:   CT CHEST WO CONTRAST-  10/6/2018 5:52 PM CDT     CT CHEST WO CONTRAST- 10/6/2018 5:33 PM CDT     HISTORY: Shortness of breath     COMPARISON: August 12, 2018 DOSE LENGTH PRODUCT: 185 mGy cm. Automated  exposure control was also utilized to decrease patient radiation dose.     TECHNIQUE: Serial helical tomographic images of the chest were acquired.  Multiplanar reformatted images were provided for review.     FINDINGS:  The imaged portion of the neck and thyroid gland is unremarkable.      The upper lungs are clear.. The lower lobes are obscured by large  bilateral pleural effusions.. The trachea and bronchial tree are patent.     Cardiac silhouette may be mildly enlarged. Extensive vascular  calcifications noted in the aortic arch origin of the great vessels and  coronary arteries.. Endovascular aortic stent graft is noted below the  diaphragm.    .      No acute findings are seen in the bones or surrounding soft tissues.        .       Impression:       1. Large bilateral pleural effusions which essentially obscures the  lower lobes.  2. Extensive vascular calcifications present  3. Endovascular stent graft is present in the abdomen.        This report was finalized on 10/06/2018 17:55 by Dr. Duc Garibay MD.          Objective     Allergies   Allergen Reactions   • Sulfa Antibiotics        Medication Review: Performed  Current Facility-Administered Medications   Medication Dose Route Frequency Provider Last Rate Last Dose   • acetaminophen (TYLENOL) tablet 650 mg  650  mg Oral Q4H PRN Max Jones MD       • amiodarone (PACERONE) tablet 200 mg  200 mg Oral TID Abdiel Stout MD   200 mg at 10/15/18 0832   • aspirin chewable tablet 81 mg  81 mg Oral Daily Vu Michael MD   81 mg at 10/15/18 0831   • atorvastatin (LIPITOR) tablet 80 mg  80 mg Oral Daily Max Jones MD   80 mg at 10/15/18 0832   • budesonide-formoterol (SYMBICORT) 160-4.5 MCG/ACT inhaler 2 puff  2 puff Inhalation BID - RT Max Jones MD   2 puff at 10/15/18 0737   • bumetanide (BUMEX) tablet 1 mg  1 mg Oral BID Vu Michael MD   1 mg at 10/15/18 0833   • calcitriol (ROCALTROL) capsule 0.5 mcg  0.5 mcg Oral Daily Max Jones MD   0.5 mcg at 10/15/18 0833   • calcium carb-cholecalciferol 600-800 MG-UNIT tablet 1 tablet  1 tablet Oral Daily Max Jones MD   1 tablet at 10/15/18 0832   • dextrose (D50W) 25 g/ 50mL Intravenous Solution 25 g  25 g Intravenous Q15 Min PRN Max Jones MD       • dextrose (GLUTOSE) oral gel 15 g  15 g Oral Q15 Min PRN Max Jones MD       • Ferric Citrate tablet 210 mg  210 mg Oral TID With Meals Max Jones MD   210 mg at 10/15/18 1220   • glucagon (human recombinant) (GLUCAGEN DIAGNOSTIC) injection 1 mg  1 mg Subcutaneous PRN Max Jones MD       • heparin (porcine) 5000 UNIT/ML injection 5,000 Units  5,000 Units Subcutaneous Q12H Max Jones MD   5,000 Units at 10/15/18 0831   • heparin (porcine) injection 1,700 Units  1,700 Units Intracatheter PRN Samson Aragon MD   1,700 Units at 10/12/18 1802   • heparin (porcine) injection 1,700 Units  1,700 Units Intracatheter PRN Samson Aragon MD   1,700 Units at 10/13/18 0906   • hydrALAZINE (APRESOLINE) tablet 25 mg  25 mg Oral Q8H Abdiel Stout MD   25 mg at 10/14/18 1248   • HYDROcodone-acetaminophen (NORCO)  MG per tablet 1 tablet  1 tablet Oral Q6H PRN  Max Jones MD   1 tablet at 10/15/18 0831   • hydroxypropyl methylcellulose (ISOPTO TEARS) 2.5 % ophthalmic solution 1 drop  1 drop Both Eyes TID PRN Vu Michael MD   1 drop at 10/11/18 0636   • Influenza Vac Subunit Quad (FLUCELVAX) injection 0.5 mL  0.5 mL Intramuscular During Hospitalization Max Jones MD       • insulin lispro (humaLOG) injection 0-9 Units  0-9 Units Subcutaneous 4x Daily With Meals & Nightly Max Jones MD   2 Units at 10/14/18 2044   • insulin lispro (humaLOG) injection 6 Units  6 Units Subcutaneous TID AC Max Jones MD   6 Units at 10/15/18 1220   • ipratropium (ATROVENT) nebulizer solution 0.5 mg  0.5 mg Nebulization Q6H PRN Abdiel Stout MD   0.5 mg at 10/13/18 1619   • isosorbide dinitrate (ISORDIL) tablet 10 mg  10 mg Oral TID - Nitrates Abdiel Stout MD   10 mg at 10/15/18 1220   • lisinopril (PRINIVIL,ZESTRIL) tablet 2.5 mg  2.5 mg Oral Q24H Abdiel Stout MD   2.5 mg at 10/14/18 0945   • magnesium hydroxide (MILK OF MAGNESIA) suspension 2400 mg/10mL 10 mL  10 mL Oral Daily PRN Max Jones MD       • metoprolol tartrate (LOPRESSOR) tablet 25 mg  25 mg Oral Q12H Max Jones MD   25 mg at 10/15/18 0832   • nicotine (NICODERM CQ) 21 MG/24HR patch 1 patch  1 patch Transdermal Q24H Ashutosh Palafox MD   1 patch at 10/14/18 2045   • ondansetron (ZOFRAN) injection 4 mg  4 mg Intravenous Q6H PRN Vu Michael MD   4 mg at 10/15/18 0831   • potassium chloride (MICRO-K) CR capsule 20 mEq  20 mEq Oral Daily Vu Michael MD   20 mEq at 10/15/18 0833   • sennosides-docusate sodium (SENOKOT-S) 8.6-50 MG tablet 1 tablet  1 tablet Oral BID Vu Michael MD   1 tablet at 10/14/18 2042   • sodium chloride 0.9 % flush 3 mL  3 mL Intravenous Q12H Max Jones MD   3 mL at 10/15/18 0836   • sodium chloride 0.9 % flush 3-10 mL  3-10 mL Intravenous PRN Max Jones MD      "  • tetrahydrozoline 0.05 % ophthalmic solution 1 drop  1 drop Both Eyes 4x Daily PRN Ney De MD   1 drop at 10/10/18 0339       Vital Sign Min/Max for last 24 hours  Temp  Min: 98 °F (36.7 °C)  Max: 98.8 °F (37.1 °C)   BP  Min: 90/48  Max: 110/50   Pulse  Min: 68  Max: 83   Resp  Min: 15  Max: 18   SpO2  Min: 93 %  Max: 99 %   No Data Recorded   Weight  Min: 46.9 kg (103 lb 4.8 oz)  Max: 47.1 kg (103 lb 12.8 oz)     Flowsheet Rows      First Filed Value   Admission Height  162.6 cm (64\") Documented at 10/06/2018 0400   Admission Weight  48.9 kg (107 lb 12.8 oz) Documented at 10/06/2018 0400          Results for orders placed during the hospital encounter of 10/06/18   Adult Transthoracic Echo Limited W/ Cont if Necessary Per Protocol    Addendum · Estimated EF = 37%. · Left ventricular diastolic dysfunction. · Mild aortic valve stenosis is present. · No evidence of pulmonary hypertension is present. · There is a trivial pericardial effusion. There is no evidence of cardiac  tamponade. · There is a moderate size left pleural effusion.        Abdiel Stout MD 10/6/2018  7:24 PM                  Physical Exam:    General Appearance: Awake, alert, in no acute distress  Eyes: Pupils equal and reactive    Ears: Appear intact with no abnormalities noted  Nose: Nares normal, no drainage  Neck: supple, trachea midline, no carotid bruit and has JVD  Back: no kyphosis present,    Lungs: respirations regular, respirations even and respirations unlabored  Basilar crackles  Decrease air entry bilateral bases    Heart: normal S1, S2,  2/6 pansystolic murmur in the left sternal border,  no rub and no click    Abdomen: normal bowel sounds, no tenderness   Skin: no bleeding, bruising or rash  Extremities: no cyanosis  Psychiatric/Behavioral: Negative for agitation, behavioral problems, confusion, the patient does  appear to be nervous/anxious.          Results Review:   I reviewed the patient's new clinical results.  I " reviewed the patient's new imaging results and agree with the interpretation.  I reviewed the patient's other test results and agree with the interpretation  I personally viewed and interpreted the patient's EKG/Telemetry data  Discussed with patient    Reviewed available prior notes, consults, prior visits, laboratory findings, radiology and cardiology relevant reports. Updated chart as applicable. I have reviewed the patient's medical history in detail and updated the computerized patient record as relevant.    Updated patient regarding any new or relevant abnormalities on review of records or any new findings on physical exam. Mentioned to patient about purpose of visit and desirable health short and long term goals and objectives.     Assessment/Plan     Active Problems:    Sepsis (CMS/[facility])  Non-STEMI  Known coronary artery disease next the prior stent placement more than 10 years ago  Prior severe left ventricular systolic dysfunction, currently left ventricle ejection fraction moderately depressed to 37%   start amiodarone therapy orally  Discontinued albuterol   Moderate aortic regurgitation  Mild aortic stenosis  Mild mitral regurgitation  Pneumonia  Sepsis  Hypoxia  Kidney failure              Plan      Continue dialysis  Continue hydralazine and Isordil     Continue Ace inhibitors, increase lisinopril from 2.5 to 5 mg daily  Decrease amiodarone to 200 mg daily  Avoid beta agonists  Monitor CBC and BMP   Follow-up with Dr. Bateman or NP-PA 4 weeks after discharge  Supportive care  Telemetry  Optimal medical therapy  Deep vein thrombosis prophylaxis/precautions  Appropriate diet, fluid, sodium, caffeine, stimulants intake   Compliance to diet and medications   Avoid NSAIDS  Abdiel Stout MD  10/15/18  2:10 PM    EMR Dragon/Transcription was used to dictate part of this note     Electronically signed by Abdiel Stout MD at 10/15/2018  2:14 PM     Samson Aragon MD at 10/14/2018  6:08 PM       "    Nephrology ([facility]) Progress Note      Patient:  Gavin Wilson  YOB: 1945  Date of Service: 10/14/2018  MRN: [id number]   Acct: [id number]   Primary Care Physician: Provider, No Known  Advance Directive:   Code Status and Medical Interventions:   Ordered at: 10/07/18 1309     Limited Support to NOT Include:    Intubation     Level Of Support Discussed With:    Patient     Code Status:    No CPR     Medical Interventions (Level of Support Prior to Arrest):    Limited     Admit Date: 10/6/2018       Hospital Day: 8  Referring Provider: Max Jones*      Patient personally seen and examined.  Complete chart including Consults, Notes, Operative Reports, Labs, Cardiology, and Radiology studies reviewed as able.        Subjective:  Gavin Wilson is a 73 y.o. male  whom we were consulted for management of chronic kidney disease stage IV.  He has a history of bladder cancer s/p cystectomy and ureterostomy formation. He also has a history of esophogeal and prostate cancer.   Patient goes to [facility] in [place] and sees nephrology there.  He already has a left upper arm primary fistula in preparation for upcoming dialysis.  Presented with increasing shortness of breath, dyspnea on exertion.  He was diagnosed with bilateral pneumonia/pleural effusion.  He was being treated for both, getting IV antibiotics and intravenous diuretics. His serum creatinine was 3.2 mg.  He states when he was seen by Nephrology last his GFR was 20 and it has been for two years.  Nephrology was consulted for evaluation and treatment of chronic kidney disease.  AVF cleared for use, but infiltrated x3 so had permcath placed the following day     Today, no new events.  No issues today. Still weak but feeling better.  \"all I need is a straw.\"  No other reported issues per pt/RN.      Allergies:  Sulfa antibiotics    Home Meds:  Prescriptions Prior to Admission   Medication Sig " Dispense Refill Last Dose   • acetaminophen (TYLENOL) 325 MG tablet Take 650 mg by mouth Every 6 (Six) Hours As Needed for Mild Pain .   Past Month at Unknown time   • albuterol (PROVENTIL HFA;VENTOLIN HFA) 108 (90 Base) MCG/ACT inhaler Inhale 2 puffs Every 6 (Six) Hours As Needed for Wheezing.   Past Week at Unknown time   • aspirin 81 MG chewable tablet Chew 81 mg Daily.   10/5/2018 at Unknown time   • atorvastatin (LIPITOR) 80 MG tablet Take 40 mg by mouth Daily.   10/5/2018 at Unknown time   • budesonide-formoterol (SYMBICORT) 160-4.5 MCG/ACT inhaler Inhale 2 puffs 2 (Two) Times a Day. 1 inhaler 2 10/5/2018 at Unknown time   • bumetanide (BUMEX) 2 MG tablet Take 1 tablet by mouth Daily. 30 tablet 0 10/5/2018 at Unknown time   • calcium carbonate (OS-TASHI) 600 MG tablet Take 600 mg by mouth Daily.   10/5/2018 at Unknown time   • cholecalciferol (VITAMIN D3) 1000 units tablet Take 3,000 Units by mouth Daily.   10/5/2018 at Unknown time   • insulin aspart (novoLOG FLEXPEN) 100 UNIT/ML solution pen-injector sc pen Inject 6 Units under the skin into the appropriate area as directed 3 (Three) Times a Day With Meals.   10/5/2018 at Unknown time   • insulin detemir (LEVEMIR) 100 UNIT/ML injection Inject 6 Units under the skin into the appropriate area as directed 2 (Two) Times a Day.   10/5/2018 at Unknown time   • loperamide (IMODIUM) 2 MG capsule Take 2 mg by mouth Daily As Needed for Diarrhea.   Past Month at Unknown time   • metoprolol tartrate (LOPRESSOR) 25 MG tablet Take 12.5 mg by mouth 2 (Two) Times a Day.   10/5/2018 at Unknown time   • omeprazole (priLOSEC) 20 MG capsule Take 20 mg by mouth Daily.   10/5/2018 at Unknown time   • oxyCODONE (ROXICODONE) 5 MG immediate release tablet Take 5 mg by mouth Every 6 (Six) Hours As Needed for Moderate Pain .   Past Week at Unknown time   • sildenafil (VIAGRA) 100 MG tablet Take 100 mg by mouth Daily As Needed for erectile dysfunction.   More than a month at Unknown time        Medicines:  Current Facility-Administered Medications   Medication Dose Route Frequency Provider Last Rate Last Dose   • acetaminophen (TYLENOL) tablet 650 mg  650 mg Oral Q4H PRN Max Jones MD       • amiodarone (PACERONE) tablet 200 mg  200 mg Oral TID Abdiel Stout MD   200 mg at 10/14/18 1704   • aspirin chewable tablet 81 mg  81 mg Oral Daily Vu Michael MD   81 mg at 10/14/18 0946   • atorvastatin (LIPITOR) tablet 80 mg  80 mg Oral Daily Max Jones MD   80 mg at 10/14/18 0946   • budesonide-formoterol (SYMBICORT) 160-4.5 MCG/ACT inhaler 2 puff  2 puff Inhalation BID - RT Max Jones MD   2 puff at 10/13/18 1955   • bumetanide (BUMEX) tablet 1 mg  1 mg Oral BID Vu Michael MD   1 mg at 10/14/18 1705   • calcitriol (ROCALTROL) capsule 0.5 mcg  0.5 mcg Oral Daily Max Jones MD   0.5 mcg at 10/14/18 0946   • calcium carb-cholecalciferol 600-800 MG-UNIT tablet 1 tablet  1 tablet Oral Daily Max Jones MD   1 tablet at 10/14/18 0945   • dextrose (D50W) 25 g/ 50mL Intravenous Solution 25 g  25 g Intravenous Q15 Min PRN Max Jones MD       • dextrose (GLUTOSE) oral gel 15 g  15 g Oral Q15 Min PRN Max Jones MD       • Ferric Citrate tablet 210 mg  210 mg Oral TID With Meals Max Jones MD   210 mg at 10/14/18 1704   • glucagon (human recombinant) (GLUCAGEN DIAGNOSTIC) injection 1 mg  1 mg Subcutaneous PRN Max Jones MD       • heparin (porcine) 5000 UNIT/ML injection 5,000 Units  5,000 Units Subcutaneous Q12H Max Jones MD   5,000 Units at 10/14/18 0946   • heparin (porcine) injection 1,700 Units  1,700 Units Intracatheter PRN Aragon, Samson Oscar, MD   1,700 Units at 10/12/18 1802   • heparin (porcine) injection 1,700 Units  1,700 Units Intracatheter PRSamson Perdomo MD   1,700 Units at 10/13/18 0906   • hydrALAZINE (APRESOLINE)  tablet 25 mg  25 mg Oral Q8H Abdiel Stout MD   25 mg at 10/14/18 1248   • HYDROcodone-acetaminophen (NORCO)  MG per tablet 1 tablet  1 tablet Oral Q6H PRN Max Jones MD   1 tablet at 10/14/18 1705   • hydroxypropyl methylcellulose (ISOPTO TEARS) 2.5 % ophthalmic solution 1 drop  1 drop Both Eyes TID PRN Vu Michael MD   1 drop at 10/11/18 0636   • Influenza Vac Subunit Quad (FLUCELVAX) injection 0.5 mL  0.5 mL Intramuscular During Hospitalization Max Jones MD       • insulin lispro (humaLOG) injection 0-9 Units  0-9 Units Subcutaneous 4x Daily With Meals & Nightly Max Jones MD   2 Units at 10/14/18 1704   • insulin lispro (humaLOG) injection 6 Units  6 Units Subcutaneous TID AC Max Jones MD   6 Units at 10/14/18 1705   • ipratropium (ATROVENT) nebulizer solution 0.5 mg  0.5 mg Nebulization Q6H PRN Abdiel Stout MD   0.5 mg at 10/13/18 1619   • isosorbide dinitrate (ISORDIL) tablet 10 mg  10 mg Oral TID - Nitrates Abdiel Stout MD   10 mg at 10/14/18 1704   • lisinopril (PRINIVIL,ZESTRIL) tablet 2.5 mg  2.5 mg Oral Q24H Abdiel Stout MD   2.5 mg at 10/14/18 0945   • magnesium hydroxide (MILK OF MAGNESIA) suspension 2400 mg/10mL 10 mL  10 mL Oral Daily PRN Max Jones MD       • metoprolol tartrate (LOPRESSOR) tablet 25 mg  25 mg Oral Q12H Max Jones MD   25 mg at 10/14/18 0946   • nicotine (NICODERM CQ) 21 MG/24HR patch 1 patch  1 patch Transdermal Q24H Ashutosh Palafox MD   1 patch at 10/13/18 2124   • ondansetron (ZOFRAN) injection 4 mg  4 mg Intravenous Q6H PRN Vu Michael MD   4 mg at 10/14/18 0107   • potassium chloride (MICRO-K) CR capsule 20 mEq  20 mEq Oral Daily Vu Michael MD   20 mEq at 10/14/18 0946   • sennosides-docusate sodium (SENOKOT-S) 8.6-50 MG tablet 1 tablet  1 tablet Oral BID Vu Michael MD   1 tablet at 10/14/18 0946   • sodium bicarbonate tablet 650 mg  650 mg Oral  BID Delmy Mullins, APRN   650 mg at 10/14/18 0946   • sodium chloride 0.9 % flush 3 mL  3 mL Intravenous Q12H Max Jones MD   3 mL at 10/13/18 2127   • sodium chloride 0.9 % flush 3-10 mL  3-10 mL Intravenous PRN Max Jones MD       • tetrahydrozoline 0.05 % ophthalmic solution 1 drop  1 drop Both Eyes 4x Daily PRN Ney De MD   1 drop at 10/10/18 0339       Past Medical History:  Past Medical History:   Diagnosis Date   • CHF (congestive heart failure) (CMS/HCC)    • Coronary stent occlusion    • Diabetes mellitus (CMS/HCC)    • Hyperlipidemia    • Hypertension    • Stroke (CMS/HCC)        Past Surgical History:  Past Surgical History:   Procedure Laterality Date   • BLADDER SURGERY     • ESOPHAGECTOMY     • INSERTION HEMODIALYSIS CATHETER N/A 10/11/2018    Procedure: HEMODIALYSIS CATHETER INSERTION;  Surgeon: Hugo Bonilla MD;  Location: [place] [address];  Service: Vascular       Family History  Family History   Problem Relation Age of Onset   • No Known Problems Father    • No Known Problems Mother        Social History  Social History     Social History   • Marital status:      Spouse name: N/A   • Number of children: N/A   • Years of education: N/A     Occupational History   • Not on file.     Social History Main Topics   • Smoking status: Current Every Day Smoker   • Smokeless tobacco: Never Used   • Alcohol use No   • Drug use: No   • Sexual activity: Not on file     Other Topics Concern   • Not on file     Social History Narrative   • No narrative on file         Review of Systems:  History obtained from chart review and the patient  General ROS: No fever or chills  Respiratory ROS: No cough, +shortness of breath  Cardiovascular ROS: No chest pain or palpitations  Gastrointestinal ROS: No abdominal pain or melena  Genito-Urinary ROS: No dysuria or hematuria    Objective:  BP 95/45 (BP Location: Right arm, Patient Position: Lying)   Pulse  "68   Temp 98.3 °F (36.8 °C) (Temporal Artery )   Resp 16   Ht 170.2 cm (67\")   Wt 49.2 kg (108 lb 7 oz)   SpO2 99%   BMI 16.98 kg/m²      Intake/Output Summary (Last 24 hours) at 10/14/18 1808  Last data filed at 10/14/18 1500   Gross per 24 hour   Intake              360 ml   Output              100 ml   Net              260 ml     General: awake/alert   Chest:  clear to auscultation bilaterally without respiratory distress  CVS: regular rate and rhythm  Abdominal: soft, nontender, normal bowel sounds  Extremities: no cyanosis or edema  Skin: warm and dry without rash      Labs:    Results from last 7 days  Lab Units 10/11/18  0355 10/10/18  0422 10/09/18  0306   WBC 10*3/mm3 4.56* 5.35 5.17   HEMOGLOBIN g/dL 9.1* 10.2* 9.8*   HEMATOCRIT % 28.7* 32.6* 30.4*   PLATELETS 10*3/mm3 190 173 180           Results from last 7 days  Lab Units 10/14/18  0417 10/13/18  0436 10/12/18  0437  10/08/18  0435   SODIUM mmol/L 141 139 142  < > 141   POTASSIUM mmol/L 3.9 4.4 4.3  < > 4.5   CHLORIDE mmol/L 101 99 103  < > 110   CO2 mmol/L 28.0 21.0* 26.0  < > 16.0*   BUN mg/dL 23* 23* 36*  < > 50*   CREATININE mg/dL 2.23* 2.02* 2.90*  < > 3.78*   CALCIUM mg/dL 8.7 9.1 8.8  < > 8.5   BILIRUBIN mg/dL  --   --   --   --  0.4   ALK PHOS U/L  --   --   --   --  83   ALT (SGPT) U/L  --   --   --   --  53   AST (SGOT) U/L  --   --   --   --  27   GLUCOSE mg/dL 96 114* 114*  < > 131*   < > = values in this interval not displayed.    Radiology:   Imaging Results (last 72 hours)     Procedure Component Value Units Date/Time    US Thoracentesis [[id number]] Collected:  10/07/18 1323    Specimen:  Body Fluid Updated:  10/13/18 0934    Narrative:       DATE OF PROCEDURE:  10/7/2018.     PREPROCEDURE DIAGNOSIS:  Left pleural effusion.  POSTPROCEDURE DIAGNOSIS:  Left pleural effusion.          INDICATIONS FOR PROCEDURE: Shortness of breath.     PROCEDURE:   1. Thoracentesis, diagnostic and therapeutic.  2. Ultrasound guidance for " thoracentesis, imaging supervision and  interpretation.     ANESTHESIA: 1% lidocaine, injected locally.          COMPLICATIONS: None.        EXAMINATIONS FOR COMPARISON:  CT chest dated 10/6/2018.     DESCRIPTION OF PROCEDURE:   The risk and benefits of the procedure were explained to the patient.   Specifically, the risks of bleeding, infection, pneumothorax (possible  thoracostomy tube), and damage to surrounding structures were discussed  extensively. The patient's questions were answered. The patient opted to  proceed. Written and verbal consent were obtained from the patient.     TIME OUT was taken and the patient's name, medical record number, date  of birth, procedure, entry site, and listen allergies were confirmed.  The site of the procedure was confirmed and marked.      The leftposterior thorax was prepped and draped in sterile fashion. The  area was anesthetized with buffered lidocaine 2%.      A 5 Brazilian 10 cm  catheter was inserted into the pleural effusion  using  ultrasound guidance. Approximately 400 mL of clear fluid was recovered  and sent to the pathology lab for analysis.      The patient tolerated the procedure well.   No immediate complications  were noted.       Impression:       Successful ultrasound guided leftthoracentesis. Approximately 400 mL of  clear fluid was recovered and sent to the pathology lab for analysis.   No immediate complications were noted.              This report was finalized on 10/07/2018 13:26 by Dr. Petra Mckinley MD.    US Chest [[id number]] Collected:  10/07/18 1323     Updated:  10/13/18 0934    Narrative:       DATE OF PROCEDURE:  10/7/2018.     PREPROCEDURE DIAGNOSIS:  Left pleural effusion.  POSTPROCEDURE DIAGNOSIS:  Left pleural effusion.          INDICATIONS FOR PROCEDURE: Shortness of breath.     PROCEDURE:   1. Thoracentesis, diagnostic and therapeutic.  2. Ultrasound guidance for thoracentesis, imaging supervision and  interpretation.     ANESTHESIA: 1%  lidocaine, injected locally.          COMPLICATIONS: None.        EXAMINATIONS FOR COMPARISON:  CT chest dated 10/6/2018.     DESCRIPTION OF PROCEDURE:   The risk and benefits of the procedure were explained to the patient.   Specifically, the risks of bleeding, infection, pneumothorax (possible  thoracostomy tube), and damage to surrounding structures were discussed  extensively. The patient's questions were answered. The patient opted to  proceed. Written and verbal consent were obtained from the patient.     TIME OUT was taken and the patient's name, medical record number, date  of birth, procedure, entry site, and listen allergies were confirmed.  The site of the procedure was confirmed and marked.      The leftposterior thorax was prepped and draped in sterile fashion. The  area was anesthetized with buffered lidocaine 2%.      A 5 Slovenian 10 cm  catheter was inserted into the pleural effusion  using  ultrasound guidance. Approximately 400 mL of clear fluid was recovered  and sent to the pathology lab for analysis.      The patient tolerated the procedure well.   No immediate complications  were noted.       Impression:       Successful ultrasound guided leftthoracentesis. Approximately 400 mL of  clear fluid was recovered and sent to the pathology lab for analysis.   No immediate complications were noted.              This report was finalized on 10/07/2018 13:26 by Dr. Petra Mckinley MD.    XR Chest 1 View [[id number]] Collected:  10/11/18 0940     Updated:  10/11/18 0945    Narrative:       EXAMINATION: XR CHEST 1 VW- 10/11/2018 9:40 AM CDT     HISTORY: s/p R IJ permacath insertion; R13.10-Dysphagia, unspecified;  Z74.09-Other reduced mobility; Z74.09-Other reduced mobility;  N18.4-Chronic kidney disease, stage 4 (severe).     REPORT: Comparison is made with the study from 10/7/2018.     There is a new right internal jugular permacath, in good position  without evidence of a pneumothorax. Haziness over the  lung bases is  probably related to atelectasis and effusions, the right effusion is  larger than the left and has increased in size. Heart size is normal.  There is moderate ectasia of the thoracic aorta. A portion of an aortic  endograft is seen in the upper abdomen. The osseous structures are  unremarkable.       Impression:       1. Interval insertion of a right internal jugular permacath in good  position without pneumothorax.  2. Bilateral small pleural effusions greater on the right and increased  compared with the previous chest x-ray. Bibasilar atelectasis.  This report was finalized on 10/11/2018 09:41 by Dr. Lucian Jones MD.    IR Insert Tunneled CV Catheter Without Port 5 Plus [[id number]] Updated:  10/11/18 0843    FL C Arm During Surgery [[id number]] Updated:  10/11/18 0843    US Arterial Doppler Upper Extremity Left [[id number]] Collected:  10/10/18 1749     Updated:  10/10/18 1759    Narrative:       History: Left upper extremity arteriovenous fistula with no prior  attempt at access.     Comment: Left upper chest from a arteriovenous fistula duplex was  performed using gray scale imaging as well as color flow Doppler.     FINDINGS: The brachial, radial, and ulnar arteries are all widely patent  with triphasic waveforms present. The anastomosis appears widely patent  with a peak systolic velocity of 251 cm/s. The proximal fistula has a  diameter of 4.8 mm, and the depth from the surface of 1.7 mm, with a  volume of flow of 642 mL/m. At a point 5 cm from the anastomosis there  is a vein diameter of 2.2 mm, with a depth of 2.2 mm from the skin, with  a flow volume of 331 mL/m. In the mid upper arm fistula has a diameter  of 3.2 mm, with a depth from the skin of 1.4 mm, and a flow volume of  651 mL/m. There is a valve noted at this point. There is also noted to  be branch laterally coming off in the mid upper arm. In the mid/proximal  upper arm the diameter is 4.1 mm with a flow rate of 1090 mL/m. In  the  proximal upper arm the fistula has a diameter of 4.0 mm with a depth  from the surface of 3.3 mm. There is no evidence of significant stenosis  at any point.       Impression:       Widely patent left arm arteriovenous fistula with parameters  as above..  This report was finalized on 10/10/2018 17:56 by Dr. Hugo Bonilla MD.          Culture:  Urine Culture   Date Value Ref Range Status   10/07/2018 >100,000 CFU/mL Yeast isolated (A)  Final         Assessment   CKD 4  DM2 with nephropathy  PNA  Chronic diastolic CHF  Secondary Hyperparathyroidism  metabolic acidosis  VTach     Plan:  Bicarb decreased  calcitriol   HD MWF prn      Samson Aragon MD  10/14/2018  6:08 PM      Electronically signed by Samson Aragon MD at 10/14/2018  6:09 PM     Vu Michael MD at 10/14/2018  5:49 PM              [facility] Medicine Services  INPATIENT PROGRESS NOTE    Patient Name: Gavin Wilson  Date of Admission: 10/6/2018  Today's Date: 10/14/18  Length of Stay: 8  Primary Care Physician: Provider, No Known    Subjective   Chief Complaint: shortness of breath   HPI     Patient seen and examined at bedside.  Indicates is feeling better today, shortness of breath has greatly improved.  Patient, had a bowel movement.  Indicates that since starting dialysis, he has actually been feeling better. Appetite improved    Review of Systems   Constitutional: Negative for activity change (improved), appetite change (improved), chills and fever.   Respiratory: Negative for cough and shortness of breath.    Cardiovascular: Negative for chest pain and palpitations.   Gastrointestinal: Negative for abdominal distention, constipation, diarrhea, nausea and vomiting.      All pertinent negatives and positives are as above. All other systems have been reviewed and are negative unless otherwise stated.     Objective    Temp:  [96.9 °F (36.1 °C)-98.3 °F (36.8 °C)] 98.3 °F (36.8  °C)  Heart Rate:  [68-86] 68  Resp:  [16-20] 16  BP: ()/(43-60) 95/45  Physical Exam  Constitutional: He is oriented to person, place, and time. No distress. Chronically ill appearing.  Appears improved today   HENT:   Head: Atraumatic.   Temporal muscle wasting; prominent wasting around the neck and supraclavicular    Eyes: Conjunctivae are normal. No scleral icterus.   Cardiovascular: Normal rate, regular rhythm and intact distal pulses.    Murmur heard. Right upper chest permcath placed  Pulmonary/Chest: Effort normal. No respiratory distress. He has no wheezing. He has no rales.  Improved air movement   Abdominal: Soft. Bowel sounds are normal. He exhibits no distension. There is no tenderness.   RLQ ostomy  Neurological: He is alert and oriented to person, place, and time. Lethargic.  Skin: Skin is warm and dry. He is not diaphoretic.   Psychiatric: He has a normal mood and affect. His behavior is normal      Results Review:  I have reviewed the labs, radiology results, and diagnostic studies.    Laboratory Data:     Results from last 7 days  Lab Units 10/11/18  0355 10/10/18  0422 10/09/18  0306   WBC 10*3/mm3 4.56* 5.35 5.17   HEMOGLOBIN g/dL 9.1* 10.2* 9.8*   HEMATOCRIT % 28.7* 32.6* 30.4*   PLATELETS 10*3/mm3 190 173 180          Results from last 7 days  Lab Units 10/14/18  0417 10/13/18  0436 10/12/18  0437  10/08/18  0435   SODIUM mmol/L 141 139 142  < > 141   POTASSIUM mmol/L 3.9 4.4 4.3  < > 4.5   CHLORIDE mmol/L 101 99 103  < > 110   CO2 mmol/L 28.0 21.0* 26.0  < > 16.0*   BUN mg/dL 23* 23* 36*  < > 50*   CREATININE mg/dL 2.23* 2.02* 2.90*  < > 3.78*   CALCIUM mg/dL 8.7 9.1 8.8  < > 8.5   BILIRUBIN mg/dL  --   --   --   --  0.4   ALK PHOS U/L  --   --   --   --  83   ALT (SGPT) U/L  --   --   --   --  53   AST (SGOT) U/L  --   --   --   --  27   GLUCOSE mg/dL 96 114* 114*  < > 131*   < > = values in this interval not displayed.    Culture Data:   [unfilled]    Radiology Data:   Imaging Results  (last 24 hours)     ** No results found for the last 24 hours. **          I have reviewed the patient's current medications.     Assessment/Plan     Active Hospital Problems    Diagnosis   • **Acute on chronic respiratory failure with hypoxia (CMS/HCC)   • Coronary artery disease involving native coronary artery of native heart without angina pectoris   • History of coronary artery stent placement   • Mild aortic valve stenosis   • Moderate aortic valve insufficiency   • Mild mitral valve regurgitation   • Pneumonia   • COPD (chronic obstructive pulmonary disease) (CMS/[facility])   • Sepsis (CMS/[facility])   • Acute renal failure superimposed on stage 4 chronic kidney disease (CMS/[facility])   • Acute on chronic combined systolic and diastolic congestive heart failure (CMS/[facility])   • Pleural effusion, bilateral       Assessment:  1) Acute hypoxic respiratory failure, resolved  2) Sepsis due to suspected health care associated pneumonia  3) Bilateral pleural effusions - Right is recurrent, left is worsening - Suspected due to heart failure, but cannot rule out infection or malignancy   4) Severe protein-calorie malnutrition  5) COPD without exacerbation  6) Combined systolic and diastolic heart failure, chronic (EF <40%)  7) Acute kidney injury on chronic kidney disease stage 4 - Suspected cardiorenal   8) Chronic normocytic anemia due to CKD  9) Thrombocytopenia, improved  10) Mild hypokalemia, resolved  11) Hyperphosphotemia  12) Metabolic acidosis, anion gap due to uremia  13) Constipation, resolved     Plan:  1) Cardiology note reviewed  2) Nephrology consult, may require dialysis - Note reviewed   3) D/C vanc on 10/8  4) Cefepime 7 day course  5) Procal 8.86, continue antibiotics as above   6) Monitor H&H, stable  7) Thoracentesis 10/7 - 400 cc removed   8) Urine culture yeast - Likely colonization  9) Blood cultures and pleural flood cultures NGTD  10) Continue PO bicarb  11) Continue PO bumex  13) Awaiting placement  14)  Dialysis MWF PRN per renal  15) Patient finally had a BM              Discharge Planning: I expect the patient to be discharged to SNF.    Vu Michael MD   10/14/18   5:49 PM    Electronically signed by Vu Michael MD at 10/14/2018  5:54 PM     Vu Michael MD at 10/13/2018  8:14 PM              [facility] Medicine Services  INPATIENT PROGRESS NOTE    Patient Name: Gavin Wilson  Date of Admission: 10/6/2018  Today's Date: 10/13/18  Length of Stay: 7  Primary Care Physician: Provider, No Known    Subjective   Chief Complaint: weakness  HPI     Patient seen and examined at bedside.  Patient assisted placing his hearing aids this morning as he was struggling doing them himself.  Patient is feeling some better this morning.  Breathing easier today.  Tolerating PO intake more consistently.          Review of Systems   Constitutional: Positive for appetite change and unexpected weight change. Negative for chills and fever.   Respiratory: Negative for cough and shortness of breath.    Cardiovascular: Negative for chest pain and palpitations.   Neurological: Positive for weakness.        All pertinent negatives and positives are as above. All other systems have been reviewed and are negative unless otherwise stated.     Objective    Temp:  [97.4 °F (36.3 °C)-98.4 °F (36.9 °C)] 97.4 °F (36.3 °C)  Heart Rate:  [68-97] 68  Resp:  [18-20] 18  BP: ()/(44-58) 106/44  Physical Exam  Constitutional: He is oriented to person, place, and time. No distress. Chronically ill appearing  HENT:   Head: Atraumatic.   Temporal muscle wasting; prominent wasting around the neck and supraclavicular    Eyes: Conjunctivae are normal. No scleral icterus.   Cardiovascular: Normal rate, regular rhythm and intact distal pulses.    Murmur heard. Right upper chest permcath placed  Pulmonary/Chest: Effort normal. No respiratory distress. He has no wheezing. He has no rales.  Improved air  movement   Abdominal: Soft. Bowel sounds are normal. He exhibits no distension. There is no tenderness.   RLQ ostomy  Neurological: He is alert and oriented to person, place, and time. Lethargic.  Skin: Skin is warm and dry. He is not diaphoretic.   Psychiatric: He has a normal mood and affect. His behavior is normal      Results Review:  I have reviewed the labs, radiology results, and diagnostic studies.    Laboratory Data:     Results from last 7 days  Lab Units 10/11/18  0355 10/10/18  0422 10/09/18  0306   WBC 10*3/mm3 4.56* 5.35 5.17   HEMOGLOBIN g/dL 9.1* 10.2* 9.8*   HEMATOCRIT % 28.7* 32.6* 30.4*   PLATELETS 10*3/mm3 190 173 180          Results from last 7 days  Lab Units 10/13/18  0436 10/12/18  0437 10/11/18  0355  10/08/18  0435   SODIUM mmol/L 139 142 144  < > 141   POTASSIUM mmol/L 4.4 4.3 4.7  < > 4.5   CHLORIDE mmol/L 99 103 106  < > 110   CO2 mmol/L 21.0* 26.0 26.0  < > 16.0*   BUN mg/dL 23* 36* 60*  < > 50*   CREATININE mg/dL 2.02* 2.90* 4.06*  < > 3.78*   CALCIUM mg/dL 9.1 8.8 9.7  < > 8.5   BILIRUBIN mg/dL  --   --   --   --  0.4   ALK PHOS U/L  --   --   --   --  83   ALT (SGPT) U/L  --   --   --   --  53   AST (SGOT) U/L  --   --   --   --  27   GLUCOSE mg/dL 114* 114* 136*  < > 131*   < > = values in this interval not displayed.    Culture Data:   [unfilled]    Radiology Data:   Imaging Results (last 24 hours)     Procedure Component Value Units Date/Time    US Thoracentesis [[id number]] Collected:  10/07/18 1323    Specimen:  Body Fluid Updated:  10/13/18 0934    Narrative:       DATE OF PROCEDURE:  10/7/2018.     PREPROCEDURE DIAGNOSIS:  Left pleural effusion.  POSTPROCEDURE DIAGNOSIS:  Left pleural effusion.          INDICATIONS FOR PROCEDURE: Shortness of breath.     PROCEDURE:   1. Thoracentesis, diagnostic and therapeutic.  2. Ultrasound guidance for thoracentesis, imaging supervision and  interpretation.     ANESTHESIA: 1% lidocaine, injected locally.          COMPLICATIONS: None.         EXAMINATIONS FOR COMPARISON:  CT chest dated 10/6/2018.     DESCRIPTION OF PROCEDURE:   The risk and benefits of the procedure were explained to the patient.   Specifically, the risks of bleeding, infection, pneumothorax (possible  thoracostomy tube), and damage to surrounding structures were discussed  extensively. The patient's questions were answered. The patient opted to  proceed. Written and verbal consent were obtained from the patient.     TIME OUT was taken and the patient's name, medical record number, date  of birth, procedure, entry site, and listen allergies were confirmed.  The site of the procedure was confirmed and marked.      The leftposterior thorax was prepped and draped in sterile fashion. The  area was anesthetized with buffered lidocaine 2%.      A 5 Kuwaiti 10 cm  catheter was inserted into the pleural effusion  using  ultrasound guidance. Approximately 400 mL of clear fluid was recovered  and sent to the pathology lab for analysis.      The patient tolerated the procedure well.   No immediate complications  were noted.       Impression:       Successful ultrasound guided leftthoracentesis. Approximately 400 mL of  clear fluid was recovered and sent to the pathology lab for analysis.   No immediate complications were noted.              This report was finalized on 10/07/2018 13:26 by Dr. Petra Mckinley MD.     Chest [[id number]] Collected:  10/07/18 1323     Updated:  10/13/18 0934    Narrative:       DATE OF PROCEDURE:  10/7/2018.     PREPROCEDURE DIAGNOSIS:  Left pleural effusion.  POSTPROCEDURE DIAGNOSIS:  Left pleural effusion.          INDICATIONS FOR PROCEDURE: Shortness of breath.     PROCEDURE:   1. Thoracentesis, diagnostic and therapeutic.  2. Ultrasound guidance for thoracentesis, imaging supervision and  interpretation.     ANESTHESIA: 1% lidocaine, injected locally.          COMPLICATIONS: None.        EXAMINATIONS FOR COMPARISON:  CT chest dated 10/6/2018.     DESCRIPTION  OF PROCEDURE:   The risk and benefits of the procedure were explained to the patient.   Specifically, the risks of bleeding, infection, pneumothorax (possible  thoracostomy tube), and damage to surrounding structures were discussed  extensively. The patient's questions were answered. The patient opted to  proceed. Written and verbal consent were obtained from the patient.     TIME OUT was taken and the patient's name, medical record number, date  of birth, procedure, entry site, and listen allergies were confirmed.  The site of the procedure was confirmed and marked.      The leftposterior thorax was prepped and draped in sterile fashion. The  area was anesthetized with buffered lidocaine 2%.      A 5 Mongolian 10 cm  catheter was inserted into the pleural effusion  using  ultrasound guidance. Approximately 400 mL of clear fluid was recovered  and sent to the pathology lab for analysis.      The patient tolerated the procedure well.   No immediate complications  were noted.       Impression:       Successful ultrasound guided leftthoracentesis. Approximately 400 mL of  clear fluid was recovered and sent to the pathology lab for analysis.   No immediate complications were noted.              This report was finalized on 10/07/2018 13:26 by Dr. Petra Mckinley MD.          I have reviewed the patient's current medications.     Assessment/Plan     Active Hospital Problems    Diagnosis   • **Acute on chronic respiratory failure with hypoxia (CMS/HCC)   • Coronary artery disease involving native coronary artery of native heart without angina pectoris   • History of coronary artery stent placement   • Mild aortic valve stenosis   • Moderate aortic valve insufficiency   • Mild mitral valve regurgitation   • Pneumonia   • COPD (chronic obstructive pulmonary disease) (CMS/HCC)   • Sepsis (CMS/HCC)   • Acute renal failure superimposed on stage 4 chronic kidney disease (CMS/HCC)   • Acute on chronic combined systolic and  diastolic congestive heart failure (CMS/HCC)   • Pleural effusion, bilateral       Assessment:  1) Acute hypoxic respiratory failure, resolved  2) Sepsis due to suspected health care associated pneumonia  3) Bilateral pleural effusions - Right is recurrent, left is worsening - Suspected due to heart failure, but cannot rule out infection or malignancy   4) Severe protein-calorie malnutrition  5) COPD without exacerbation  6) Combined systolic and diastolic heart failure, chronic (EF <40%)  7) Acute kidney injury on chronic kidney disease stage 4 - Suspected cardiorenal   8) Chronic normocytic anemia due to CKD  9) Thrombocytopenia, improved  10) Mild hypokalemia, resolved  11) Hyperphosphotemia  12) Metabolic acidosis, anion gap due to uremia     Plan:  1) Cardiology note reviewed  2) Nephrology consult, may require dialysis - Note reviewed   3) D/C vanc on 10/8  4) Cefepime 7 day course  5) Procal 8.86, continue antibiotics as above   6) Monitor H&H, stable  7) Thoracentesis 10/7 - 400 cc removed   8) Urine culture yeast - Likely colonization  9) Blood cultures and pleural flood cultures NGTD  10) Continue PO bicarb  11) Continue PO bumex  13) Awaiting placement  14) Dialysis MWF PRN per renal              Discharge Planning: I expect the patient to be discharged to SNF pending acceptance.    Vu Michael MD   10/13/18   8:14 PM    Electronically signed by Vu Michael MD at 10/14/2018  1:43 PM     Samson Aragon MD at 10/13/2018  4:35 PM          Nephrology ([facility]) Progress Note      Patient:  Gavin Wilson  YOB: 1945  Date of Service: 10/13/2018  MRN: [id number]   Acct: [id number]   Primary Care Physician: Provider, No Known  Advance Directive:   Code Status and Medical Interventions:   Ordered at: 10/07/18 1309     Limited Support to NOT Include:    Intubation     Level Of Support Discussed With:    Patient     Code Status:    No CPR      Medical Interventions (Level of Support Prior to Arrest):    Limited     Admit Date: 10/6/2018       Hospital Day: 7  Referring Provider: Max Jones*      Patient personally seen and examined.  Complete chart including Consults, Notes, Operative Reports, Labs, Cardiology, and Radiology studies reviewed as able.        Subjective:  Gavin Wilson is a 73 y.o. male  whom we were consulted for management of chronic kidney disease stage IV.  He has a history of bladder cancer s/p cystectomy and ureterostomy formation. He also has a history of esophogeal and prostate cancer.   Patient goes to [facility] in [place] and sees nephrology there.  He already has a left upper arm primary fistula in preparation for upcoming dialysis.  Presented with increasing shortness of breath, dyspnea on exertion.  He was diagnosed with bilateral pneumonia/pleural effusion.  He was being treated for both, getting IV antibiotics and intravenous diuretics. His serum creatinine was 3.2 mg.  He states when he was seen by Nephrology last his GFR was 20 and it has been for two years.  Nephrology was consulted for evaluation and treatment of chronic kidney disease.  AVF cleared for use, but infiltrated x3 so had permcath placed the following day     Today, no new events.  No issues today. Still weak.  No other reported issues per pt/RN.      Allergies:  Sulfa antibiotics    Home Meds:  Prescriptions Prior to Admission   Medication Sig Dispense Refill Last Dose   • acetaminophen (TYLENOL) 325 MG tablet Take 650 mg by mouth Every 6 (Six) Hours As Needed for Mild Pain .   Past Month at Unknown time   • albuterol (PROVENTIL HFA;VENTOLIN HFA) 108 (90 Base) MCG/ACT inhaler Inhale 2 puffs Every 6 (Six) Hours As Needed for Wheezing.   Past Week at Unknown time   • aspirin 81 MG chewable tablet Chew 81 mg Daily.   10/5/2018 at Unknown time   • atorvastatin (LIPITOR) 80 MG tablet Take 40 mg by mouth Daily.   10/5/2018 at Unknown time    • budesonide-formoterol (SYMBICORT) 160-4.5 MCG/ACT inhaler Inhale 2 puffs 2 (Two) Times a Day. 1 inhaler 2 10/5/2018 at Unknown time   • bumetanide (BUMEX) 2 MG tablet Take 1 tablet by mouth Daily. 30 tablet 0 10/5/2018 at Unknown time   • calcium carbonate (OS-TASHI) 600 MG tablet Take 600 mg by mouth Daily.   10/5/2018 at Unknown time   • cholecalciferol (VITAMIN D3) 1000 units tablet Take 3,000 Units by mouth Daily.   10/5/2018 at Unknown time   • insulin aspart (novoLOG FLEXPEN) 100 UNIT/ML solution pen-injector sc pen Inject 6 Units under the skin into the appropriate area as directed 3 (Three) Times a Day With Meals.   10/5/2018 at Unknown time   • insulin detemir (LEVEMIR) 100 UNIT/ML injection Inject 6 Units under the skin into the appropriate area as directed 2 (Two) Times a Day.   10/5/2018 at Unknown time   • loperamide (IMODIUM) 2 MG capsule Take 2 mg by mouth Daily As Needed for Diarrhea.   Past Month at Unknown time   • metoprolol tartrate (LOPRESSOR) 25 MG tablet Take 12.5 mg by mouth 2 (Two) Times a Day.   10/5/2018 at Unknown time   • omeprazole (priLOSEC) 20 MG capsule Take 20 mg by mouth Daily.   10/5/2018 at Unknown time   • oxyCODONE (ROXICODONE) 5 MG immediate release tablet Take 5 mg by mouth Every 6 (Six) Hours As Needed for Moderate Pain .   Past Week at Unknown time   • sildenafil (VIAGRA) 100 MG tablet Take 100 mg by mouth Daily As Needed for erectile dysfunction.   More than a month at Unknown time       Medicines:  Current Facility-Administered Medications   Medication Dose Route Frequency Provider Last Rate Last Dose   • acetaminophen (TYLENOL) tablet 650 mg  650 mg Oral Q4H PRN Max Jones MD       • amiodarone (PACERONE) tablet 200 mg  200 mg Oral TID Abdiel Stout MD   200 mg at 10/13/18 1125   • aspirin chewable tablet 81 mg  81 mg Oral Daily Vu Michael MD   81 mg at 10/13/18 1125   • atorvastatin (LIPITOR) tablet 80 mg  80 mg Oral Daily Max Jones  MD Trevon   80 mg at 10/13/18 1124   • budesonide-formoterol (SYMBICORT) 160-4.5 MCG/ACT inhaler 2 puff  2 puff Inhalation BID - RT Max Jones MD   2 puff at 10/12/18 2143   • bumetanide (BUMEX) tablet 1 mg  1 mg Oral BID Vu Michael MD   1 mg at 10/13/18 1125   • calcitriol (ROCALTROL) capsule 0.5 mcg  0.5 mcg Oral Daily Max Jones MD   0.5 mcg at 10/13/18 1126   • calcium carb-cholecalciferol 600-800 MG-UNIT tablet 1 tablet  1 tablet Oral Daily Max Jones MD   1 tablet at 10/13/18 1127   • dextrose (D50W) 25 g/ 50mL Intravenous Solution 25 g  25 g Intravenous Q15 Min PRN Max Jones MD       • dextrose (GLUTOSE) oral gel 15 g  15 g Oral Q15 Min PRN Max Jones MD       • Ferric Citrate tablet 210 mg  210 mg Oral TID With Meals Max Jones MD   210 mg at 10/13/18 1124   • glucagon (human recombinant) (GLUCAGEN DIAGNOSTIC) injection 1 mg  1 mg Subcutaneous PRN Max Jones MD       • heparin (porcine) 5000 UNIT/ML injection 5,000 Units  5,000 Units Subcutaneous Q12H Max Jones MD   5,000 Units at 10/13/18 1126   • heparin (porcine) injection 1,700 Units  1,700 Units Intracatheter PRN Samson Aragon MD   1,700 Units at 10/12/18 1802   • heparin (porcine) injection 1,700 Units  1,700 Units Intracatheter PRN Samson Aragon MD   1,700 Units at 10/13/18 0906   • hydrALAZINE (APRESOLINE) tablet 25 mg  25 mg Oral Q8H Abdiel Stout MD       • HYDROcodone-acetaminophen (NORCO)  MG per tablet 1 tablet  1 tablet Oral Q6H PRN Max Jones MD   1 tablet at 10/13/18 0110   • hydroxypropyl methylcellulose (ISOPTO TEARS) 2.5 % ophthalmic solution 1 drop  1 drop Both Eyes TID PRN Vu Michael MD   1 drop at 10/11/18 0636   • Influenza Vac Subunit Quad (FLUCELVAX) injection 0.5 mL  0.5 mL Intramuscular During Hospitalization Max Jones MD       •  insulin lispro (humaLOG) injection 0-9 Units  0-9 Units Subcutaneous 4x Daily With Meals & Nightly Max Jones MD   4 Units at 10/10/18 2112   • insulin lispro (humaLOG) injection 6 Units  6 Units Subcutaneous TID AC Max Jones MD   6 Units at 10/10/18 1723   • ipratropium (ATROVENT) nebulizer solution 0.5 mg  0.5 mg Nebulization Q6H PRN Abdiel Stout MD   0.5 mg at 10/13/18 1619   • isosorbide dinitrate (ISORDIL) tablet 10 mg  10 mg Oral TID - Nitrates Abdiel Stout MD   10 mg at 10/13/18 1124   • lisinopril (PRINIVIL,ZESTRIL) tablet 2.5 mg  2.5 mg Oral Q24H Abdiel Stout MD   2.5 mg at 10/13/18 1124   • magnesium hydroxide (MILK OF MAGNESIA) suspension 2400 mg/10mL 10 mL  10 mL Oral Daily PRN Max Jones MD       • metoprolol tartrate (LOPRESSOR) tablet 25 mg  25 mg Oral Q12H Max Jones MD   25 mg at 10/13/18 1124   • nicotine (NICODERM CQ) 21 MG/24HR patch 1 patch  1 patch Transdermal Q24H Ashutosh Palafox MD   1 patch at 10/12/18 1943   • ondansetron (ZOFRAN) injection 4 mg  4 mg Intravenous Q6H PRN Vu Michael MD   4 mg at 10/13/18 1557   • potassium chloride (MICRO-K) CR capsule 20 mEq  20 mEq Oral Daily Vu Michael MD   20 mEq at 10/13/18 1125   • sennosides-docusate sodium (SENOKOT-S) 8.6-50 MG tablet 1 tablet  1 tablet Oral BID Vu Michael MD   1 tablet at 10/13/18 1125   • sodium bicarbonate tablet 650 mg  650 mg Oral BID Delmy Mullins APRN   650 mg at 10/13/18 1126   • sodium chloride 0.9 % flush 3 mL  3 mL Intravenous Q12H Max Jones MD   3 mL at 10/12/18 1954   • sodium chloride 0.9 % flush 3-10 mL  3-10 mL Intravenous PRN Max Jones MD       • tetrahydrozoline 0.05 % ophthalmic solution 1 drop  1 drop Both Eyes 4x Daily PRN Ney De MD   1 drop at 10/10/18 0339       Past Medical History:  Past Medical History:   Diagnosis Date   • CHF (congestive heart failure) (CMS/[facility])   "  • Coronary stent occlusion    • Diabetes mellitus (CMS/HCC)    • Hyperlipidemia    • Hypertension    • Stroke (CMS/HCC)        Past Surgical History:  Past Surgical History:   Procedure Laterality Date   • BLADDER SURGERY     • ESOPHAGECTOMY     • INSERTION HEMODIALYSIS CATHETER N/A 10/11/2018    Procedure: HEMODIALYSIS CATHETER INSERTION;  Surgeon: Hugo Bonilla MD;  Location: [facility] OR ;  Service: Vascular       Family History  Family History   Problem Relation Age of Onset   • No Known Problems Father    • No Known Problems Mother        Social History  Social History     Social History   • Marital status:      Spouse name: N/A   • Number of children: N/A   • Years of education: N/A     Occupational History   • Not on file.     Social History Main Topics   • Smoking status: Current Every Day Smoker   • Smokeless tobacco: Never Used   • Alcohol use No   • Drug use: No   • Sexual activity: Not on file     Other Topics Concern   • Not on file     Social History Narrative   • No narrative on file         Review of Systems:  History obtained from chart review and the patient  General ROS: No fever or chills  Respiratory ROS: No cough, +shortness of breath  Cardiovascular ROS: No chest pain or palpitations  Gastrointestinal ROS: No abdominal pain or melena  Genito-Urinary ROS: No dysuria or hematuria    Objective:  /44 (BP Location: Right arm, Patient Position: Lying)   Pulse 68 Comment: post tx  Temp 97.4 °F (36.3 °C) (Temporal Artery )   Resp 18   Ht 170.2 cm (67\")   Wt 48.5 kg (107 lb)   SpO2 98% Comment: post tx  BMI 16.76 kg/m²      Intake/Output Summary (Last 24 hours) at 10/13/18 1635  Last data filed at 10/13/18 1300   Gross per 24 hour   Intake              220 ml   Output              300 ml   Net              -80 ml     General: awake/alert   Chest:  clear to auscultation bilaterally without respiratory distress  CVS: regular rate and rhythm  Abdominal: soft, " nontender, normal bowel sounds  Extremities: no cyanosis or edema  Skin: warm and dry without rash      Labs:    Results from last 7 days  Lab Units 10/11/18  0355 10/10/18  0422 10/09/18  0306   WBC 10*3/mm3 4.56* 5.35 5.17   HEMOGLOBIN g/dL 9.1* 10.2* 9.8*   HEMATOCRIT % 28.7* 32.6* 30.4*   PLATELETS 10*3/mm3 190 173 180           Results from last 7 days  Lab Units 10/13/18  0436 10/12/18  0437 10/11/18  0355  10/08/18  0435   SODIUM mmol/L 139 142 144  < > 141   POTASSIUM mmol/L 4.4 4.3 4.7  < > 4.5   CHLORIDE mmol/L 99 103 106  < > 110   CO2 mmol/L 21.0* 26.0 26.0  < > 16.0*   BUN mg/dL 23* 36* 60*  < > 50*   CREATININE mg/dL 2.02* 2.90* 4.06*  < > 3.78*   CALCIUM mg/dL 9.1 8.8 9.7  < > 8.5   BILIRUBIN mg/dL  --   --   --   --  0.4   ALK PHOS U/L  --   --   --   --  83   ALT (SGPT) U/L  --   --   --   --  53   AST (SGOT) U/L  --   --   --   --  27   GLUCOSE mg/dL 114* 114* 136*  < > 131*   < > = values in this interval not displayed.    Radiology:   Imaging Results (last 72 hours)     Procedure Component Value Units Date/Time    US Thoracentesis [[id number]] Collected:  10/07/18 1323    Specimen:  Body Fluid Updated:  10/13/18 0934    Narrative:       DATE OF PROCEDURE:  10/7/2018.     PREPROCEDURE DIAGNOSIS:  Left pleural effusion.  POSTPROCEDURE DIAGNOSIS:  Left pleural effusion.          INDICATIONS FOR PROCEDURE: Shortness of breath.     PROCEDURE:   1. Thoracentesis, diagnostic and therapeutic.  2. Ultrasound guidance for thoracentesis, imaging supervision and  interpretation.     ANESTHESIA: 1% lidocaine, injected locally.          COMPLICATIONS: None.        EXAMINATIONS FOR COMPARISON:  CT chest dated 10/6/2018.     DESCRIPTION OF PROCEDURE:   The risk and benefits of the procedure were explained to the patient.   Specifically, the risks of bleeding, infection, pneumothorax (possible  thoracostomy tube), and damage to surrounding structures were discussed  extensively. The patient's questions were  answered. The patient opted to  proceed. Written and verbal consent were obtained from the patient.     TIME OUT was taken and the patient's name, medical record number, date  of birth, procedure, entry site, and listen allergies were confirmed.  The site of the procedure was confirmed and marked.      The leftposterior thorax was prepped and draped in sterile fashion. The  area was anesthetized with buffered lidocaine 2%.      A 5 Occitan 10 cm  catheter was inserted into the pleural effusion  using  ultrasound guidance. Approximately 400 mL of clear fluid was recovered  and sent to the pathology lab for analysis.      The patient tolerated the procedure well.   No immediate complications  were noted.       Impression:       Successful ultrasound guided leftthoracentesis. Approximately 400 mL of  clear fluid was recovered and sent to the pathology lab for analysis.   No immediate complications were noted.              This report was finalized on 10/07/2018 13:26 by Dr. Petra Mckinley MD.     Chest [[id number]] Collected:  10/07/18 1323     Updated:  10/13/18 0934    Narrative:       DATE OF PROCEDURE:  10/7/2018.     PREPROCEDURE DIAGNOSIS:  Left pleural effusion.  POSTPROCEDURE DIAGNOSIS:  Left pleural effusion.          INDICATIONS FOR PROCEDURE: Shortness of breath.     PROCEDURE:   1. Thoracentesis, diagnostic and therapeutic.  2. Ultrasound guidance for thoracentesis, imaging supervision and  interpretation.     ANESTHESIA: 1% lidocaine, injected locally.          COMPLICATIONS: None.        EXAMINATIONS FOR COMPARISON:  CT chest dated 10/6/2018.     DESCRIPTION OF PROCEDURE:   The risk and benefits of the procedure were explained to the patient.   Specifically, the risks of bleeding, infection, pneumothorax (possible  thoracostomy tube), and damage to surrounding structures were discussed  extensively. The patient's questions were answered. The patient opted to  proceed. Written and verbal consent were  obtained from the patient.     TIME OUT was taken and the patient's name, medical record number, date  of birth, procedure, entry site, and listen allergies were confirmed.  The site of the procedure was confirmed and marked.      The leftposterior thorax was prepped and draped in sterile fashion. The  area was anesthetized with buffered lidocaine 2%.      A 5 Yi 10 cm  catheter was inserted into the pleural effusion  using  ultrasound guidance. Approximately 400 mL of clear fluid was recovered  and sent to the pathology lab for analysis.      The patient tolerated the procedure well.   No immediate complications  were noted.       Impression:       Successful ultrasound guided leftthoracentesis. Approximately 400 mL of  clear fluid was recovered and sent to the pathology lab for analysis.   No immediate complications were noted.              This report was finalized on 10/07/2018 13:26 by Dr. Petra Mckinley MD.    XR Chest 1 View [[id number]] Collected:  10/11/18 0940     Updated:  10/11/18 0945    Narrative:       EXAMINATION: XR CHEST 1 VW- 10/11/2018 9:40 AM CDT     HISTORY: s/p R IJ permacath insertion; R13.10-Dysphagia, unspecified;  Z74.09-Other reduced mobility; Z74.09-Other reduced mobility;  N18.4-Chronic kidney disease, stage 4 (severe).     REPORT: Comparison is made with the study from 10/7/2018.     There is a new right internal jugular permacath, in good position  without evidence of a pneumothorax. Haziness over the lung bases is  probably related to atelectasis and effusions, the right effusion is  larger than the left and has increased in size. Heart size is normal.  There is moderate ectasia of the thoracic aorta. A portion of an aortic  endograft is seen in the upper abdomen. The osseous structures are  unremarkable.       Impression:       1. Interval insertion of a right internal jugular permacath in good  position without pneumothorax.  2. Bilateral small pleural effusions greater on the  right and increased  compared with the previous chest x-ray. Bibasilar atelectasis.  This report was finalized on 10/11/2018 09:41 by Dr. Lucian Jones MD.    IR Insert Tunneled CV Catheter Without Port 5 Plus [[id number]] Updated:  10/11/18 0843    FL C Arm During Surgery [[id number]] Updated:  10/11/18 0843    US Arterial Doppler Upper Extremity Left [[id number]] Collected:  10/10/18 1749     Updated:  10/10/18 1759    Narrative:       History: Left upper extremity arteriovenous fistula with no prior  attempt at access.     Comment: Left upper chest from a arteriovenous fistula duplex was  performed using gray scale imaging as well as color flow Doppler.     FINDINGS: The brachial, radial, and ulnar arteries are all widely patent  with triphasic waveforms present. The anastomosis appears widely patent  with a peak systolic velocity of 251 cm/s. The proximal fistula has a  diameter of 4.8 mm, and the depth from the surface of 1.7 mm, with a  volume of flow of 642 mL/m. At a point 5 cm from the anastomosis there  is a vein diameter of 2.2 mm, with a depth of 2.2 mm from the skin, with  a flow volume of 331 mL/m. In the mid upper arm fistula has a diameter  of 3.2 mm, with a depth from the skin of 1.4 mm, and a flow volume of  651 mL/m. There is a valve noted at this point. There is also noted to  be branch laterally coming off in the mid upper arm. In the mid/proximal  upper arm the diameter is 4.1 mm with a flow rate of 1090 mL/m. In the  proximal upper arm the fistula has a diameter of 4.0 mm with a depth  from the surface of 3.3 mm. There is no evidence of significant stenosis  at any point.       Impression:       Widely patent left arm arteriovenous fistula with parameters  as above..  This report was finalized on 10/10/2018 17:56 by Dr. Hugo Bonilla MD.          Culture:  Urine Culture   Date Value Ref Range Status   10/07/2018 >100,000 CFU/mL Yeast isolated (A)  Final         Assessment   CKD 4  DM2 with  nephropathy  PNA  Chronic diastolic CHF  Secondary Hyperparathyroidism  metabolic acidosis  VTach     Plan:  Bicarb  calcitriol   HD MWF prn  D/w cardiology as well    Samson Aragon MD  10/13/2018  4:35 PM        Electronically signed by Samson Aragon MD at 10/13/2018  4:58 PM     EdwarMonik APRN at 10/13/2018  1:24 PM     Attestation signed by Lucian Monroe MD at 10/13/2018  8:28 PM    I have reviewed the documentation above and agree.                    [facility] -  Progress Note     LOS: 7 days   Patient Care Team:  Provider, No Known as PCP - General    Chief Complaint: Shortness of breath    Subjective     Interval History: No additional runs of vtach. Heart rate and blood pressure stable. Creatinine improved to 2.02. Complains of generalized pain, malaise, nausea and decreased appetite. Denies chest pain, shortness of breath or palpitations.     Patient Complaints: Generalized pain        Review of Systems:     Review of Systems   Constitutional: Positive for activity change and appetite change. Negative for chills, fatigue and fever.   HENT: Negative.    Eyes: Negative.    Respiratory: Negative for cough, chest tightness, shortness of breath, wheezing and stridor.    Cardiovascular: Negative for chest pain, palpitations and leg swelling.   Gastrointestinal: Positive for nausea. Negative for abdominal distention, abdominal pain, blood in stool, constipation, diarrhea and vomiting.   Endocrine: Negative.    Genitourinary: Negative for difficulty urinating, dysuria, flank pain and hematuria.   Musculoskeletal: Negative.    Skin: Negative for rash and wound.   Allergic/Immunologic: Negative.    Neurological: Negative for dizziness, syncope, weakness, light-headedness and headaches.   Hematological: Does not bruise/bleed easily.   Psychiatric/Behavioral: Negative for agitation, behavioral problems, confusion, hallucinations, sleep disturbance and suicidal  ideas. The patient is not nervous/anxious.      Objective     Vital Sign Min/Max for last 24 hours  Temp  Min: 97.6 °F (36.4 °C)  Max: 98.4 °F (36.9 °C)   BP  Min: 98/58  Max: 110/52   Pulse  Min: 86  Max: 97   Resp  Min: 16  Max: 20   SpO2  Min: 95 %  Max: 97 %   No Data Recorded   Weight  Min: 48.5 kg (107 lb)  Max: 48.5 kg (107 lb)     Telemetry:  84-89                                                                                                  1    10/12/18  2053   Weight: 48.5 kg (107 lb)     1    10/10/18  0447 10/11/18  2031 10/12/18  2053   Weight: 48.2 kg (106 lb 3.2 oz) 48.1 kg (106 lb) 48.5 kg (107 lb)       Intake/Output Summary (Last 24 hours) at 10/13/18 1338  Last data filed at 10/13/18 1100   Gross per 24 hour   Intake              100 ml   Output              300 ml   Net             -200 ml       Physical Exam:    Physical Exam   Constitutional: He is oriented to person, place, and time. Vital signs are normal. He appears well-developed. He appears lethargic. He appears cachectic. He has a sickly appearance. No distress.   HENT:   Head: Normocephalic and atraumatic.   Right Ear: External ear normal.   Left Ear: External ear normal.   Eyes: Pupils are equal, round, and reactive to light. Conjunctivae are normal. Right eye exhibits no discharge. Left eye exhibits no discharge.   Neck: Normal range of motion. Neck supple. No JVD present. Carotid bruit is not present. No thyromegaly present.   Cardiovascular: Normal rate, regular rhythm and intact distal pulses.  PMI is not displaced.  Exam reveals no gallop, no friction rub and no decreased pulses.    Murmur heard.   Harsh midsystolic murmur is present with a grade of 2/6  at the upper right sternal border radiating to the neck  High-pitched blowing decrescendo early diastolic murmur is present with a grade of 2/6  at the upper right sternal border radiating to the apex  Pulses:       Radial pulses are 2+ on the right side, and 2+ on the left  side.        Dorsalis pedis pulses are 2+ on the right side, and 2+ on the left side.        Posterior tibial pulses are 2+ on the right side, and 2+ on the left side.   Pulmonary/Chest: Effort normal. No respiratory distress. He has decreased breath sounds in the right lower field and the left lower field. He has no wheezes. He has no rhonchi. He has no rales. He exhibits no tenderness.   Abdominal: Soft. Bowel sounds are normal. He exhibits no distension. There is no tenderness.   Musculoskeletal: Normal range of motion. He exhibits no edema.   Neurological: He is oriented to person, place, and time. He appears lethargic.   Skin: Skin is warm and dry. No rash noted. He is not diaphoretic. No erythema. No pallor.   Psychiatric: He has a normal mood and affect. His behavior is normal. Judgment and thought content normal.   Vitals reviewed.    Results Review:   Lab Results (last 72 hours)     Procedure Component Value Units Date/Time    POC Glucose Once [[id number]]  (Normal) Collected:  10/13/18 1207    Specimen:  Blood Updated:  10/13/18 1314     Glucose 90 mg/dL      Comment: : [id number] [facility] ID: [id number]       Renal Function Panel [[id number]]  (Abnormal) Collected:  10/13/18 0436    Specimen:  Blood Updated:  10/13/18 0500     Glucose 114 (H) mg/dL      BUN 23 (H) mg/dL      Creatinine 2.02 (H) mg/dL      Sodium 139 mmol/L      Potassium 4.4 mmol/L      Chloride 99 mmol/L      CO2 21.0 (L) mmol/L      Calcium 9.1 mg/dL      Albumin 3.50 g/dL      Phosphorus 4.5 mg/dL      Anion Gap 19.0 (H) mmol/L      BUN/Creatinine Ratio 11.4     eGFR Non African Amer 32 (L) mL/min/1.73     Narrative:       The MDRD GFR formula is only valid for adults with stable renal function between ages 18 and 70.    POC Glucose Once [[id number]]  (Normal) Collected:  10/13/18 0429    Specimen:  Blood Updated:  10/13/18 0441     Glucose 113 mg/dL      Comment: : [id number] [employer]eter ID: [id number]       POC  Glucose Once [[id number]]  (Normal) Collected:  10/12/18 1948    Specimen:  Blood Updated:  10/12/18 2018     Glucose 107 mg/dL      Comment: : [id number] Albert Souza ID: [id number]       Non-gynecologic Cytology [[id number]] Collected:  10/07/18 1119    Specimen:  Body Fluid from Pleural Cavity Updated:  10/12/18 1602     Case Report --     Non-gynecologic Cytology                          Case: [id number]                                  Authorizing Provider:  Vu Michael MD     Collected:           10/07/2018 11:19 AM          Ordering Location:     [facility]     Received:            10/08/2018 10:22 AM                                 [facility]                                                               Pathologist:           Adry Alonzo MD                                                        Specimen:    Pleural Cavity, left pleural fluid                                                          Final Diagnosis --     Left pleural fluid, ThinPrep preparation (1) and cell block: Negative for malignant cells.    Comment: Flow cytometric analysis performed by [facility] reveals no evidence of a B-cell or T-cell lymphoma in the sample analyzed.  Please refer to the complete flow cytometry report from [facility] which is appended.       Gross Description --     Received in cytolyt in the laboratory in a container labeled with patient name and  designated as left pleural fluid.  50 mls with yellow clear fluid and 300 mls of yellow clear fluid.  1 thin prep slide and 1 cell block made.  Flow Cytometry sent.         Microscopic Description --     ThinPrep preparation and sections of the cell block of the left pleural fluid reveal a specimen of low cellularity.  Proteinaceous material is seen.  There are scattered small, mature lymphocytes, scattered PMN leukocytes and a few mesothelial cells.  Malignant cells are not identified.      Anaerobic Culture -  Pleural Fluid, Pleural Cavity [[id number]]  (Normal) Collected:  10/07/18 1118    Specimen:  Pleural Fluid from Pleural Cavity Updated:  10/12/18 1059     Culture No anaerobes isolated    Body Fluid Culture - Body Fluid, Pleural Cavity [[id number]]  (Normal) Collected:  10/07/18 1119    Specimen:  Body Fluid from Pleural Cavity Updated:  10/12/18 0630     BF Culture No growth at 5 days     Gram Stain Result Many (4+) WBCs seen      No organisms seen    Renal Function Panel [[id number]]  (Abnormal) Collected:  10/12/18 0437    Specimen:  Blood Updated:  10/12/18 0520     Glucose 114 (H) mg/dL      BUN 36 (H) mg/dL      Creatinine 2.90 (H) mg/dL      Sodium 142 mmol/L      Potassium 4.3 mmol/L      Chloride 103 mmol/L      CO2 26.0 mmol/L      Calcium 8.8 mg/dL      Albumin 3.40 (L) g/dL      Phosphorus 3.6 mg/dL      Anion Gap 13.0 mmol/L      BUN/Creatinine Ratio 12.4     eGFR Non African Amer 21 (L) mL/min/1.73     Narrative:       The MDRD GFR formula is only valid for adults with stable renal function between ages 18 and 70.    POC Glucose Once [[id number]]  (Abnormal) Collected:  10/11/18 2033    Specimen:  Blood Updated:  10/11/18 2045     Glucose 133 (H) mg/dL      Comment: : [id number] Jefe MachadoRenteria) Young[employer]ie[employer]eter ID: [id number]       POC Glucose Once [[id number]]  (Normal) Collected:  10/11/18 1653    Specimen:  Blood Updated:  10/11/18 1704     Glucose 122 mg/dL      Comment: : [id number] [employer] ID: [id number]       POC Glucose Once [[id number]]  (Abnormal) Collected:  10/11/18 1014    Specimen:  Blood Updated:  10/11/18 1026     Glucose 159 (H) mg/dL      Comment: : [id number] [employer] ID: [id number]       POC Glucose Once [[id number]]  (Abnormal) Collected:  10/11/18 0940    Specimen:  Blood Updated:  10/11/18 1003     Glucose 160 (H) mg/dL      Comment: : [id number] Osmany Rogers ID: [id number]       Blood Culture - Blood, [[id number]]  (Normal) Collected:   10/06/18 0635    Specimen:  Blood from Arm, Right Updated:  10/11/18 0716     Blood Culture No growth at 5 days    Blood Culture - Blood, [[id number]]  (Normal) Collected:  10/06/18 0545    Specimen:  Blood from Arm, Right Updated:  10/11/18 0616     Blood Culture No growth at 5 days    Basic Metabolic Panel [[id number]]  (Abnormal) Collected:  10/11/18 0355    Specimen:  Blood Updated:  10/11/18 0434     Glucose 136 (H) mg/dL      BUN 60 (H) mg/dL      Creatinine 4.06 (H) mg/dL      Sodium 144 mmol/L      Potassium 4.7 mmol/L      Chloride 106 mmol/L      CO2 26.0 mmol/L      Calcium 9.7 mg/dL      eGFR  African Amer -- mL/min/1.73      Comment: <15 Indicative of kidney failure.        eGFR Non African Amer 14 (L) mL/min/1.73      Comment: <15 Indicative of kidney failure.        BUN/Creatinine Ratio 14.8     Anion Gap 12.0 mmol/L     Narrative:       The MDRD GFR formula is only valid for adults with stable renal function between ages 18 and 70.    Phosphorus [[id number]]  (Normal) Collected:  10/11/18 0355    Specimen:  Blood Updated:  10/11/18 0434     Phosphorus 4.3 mg/dL     Magnesium [[id number]]  (Abnormal) Collected:  10/11/18 0355    Specimen:  Blood Updated:  10/11/18 0434     Magnesium 2.3 (H) mg/dL     CBC & Differential [[id number]] Collected:  10/11/18 0355    Specimen:  Blood Updated:  10/11/18 0424    Narrative:       The following orders were created for panel order CBC & Differential.  Procedure                               Abnormality         Status                     ---------                               -----------         ------                     CBC Auto Differential[[id number]]        Abnormal            Final result                 Please view results for these tests on the individual orders.    CBC Auto Differential [[id number]]  (Abnormal) Collected:  10/11/18 0355    Specimen:  Blood Updated:  10/11/18 0424     WBC 4.56 (L) 10*3/mm3      RBC 3.21 (L) 10*6/mm3      Hemoglobin 9.1 (L) g/dL       Hematocrit 28.7 (L) %      MCV 89.4 fL      MCH 28.3 pg      MCHC 31.7 (L) g/dL      RDW 18.6 (H) %      RDW-SD 60.5 (H) fl      MPV 10.2 fL      Platelets 190 10*3/mm3      Neutrophil % 70.2 %      Lymphocyte % 17.5 %      Monocyte % 8.3 %      Eosinophil % 2.9 %      Basophil % 0.7 %      Immature Grans % 0.4 %      Neutrophils, Absolute 3.20 10*3/mm3      Lymphocytes, Absolute 0.80 10*3/mm3      Monocytes, Absolute 0.38 10*3/mm3      Eosinophils, Absolute 0.13 10*3/mm3      Basophils, Absolute 0.03 10*3/mm3      Immature Grans, Absolute 0.02 10*3/mm3      nRBC 0.0 /100 WBC     POC Glucose Once [[id number]]  (Abnormal) Collected:  10/10/18 2004    Specimen:  Blood Updated:  10/10/18 2017     Glucose 215 (H) mg/dL      Comment: : [id number] Jessicawood AshleyMeter ID: [id number]       POC Glucose Once [[id number]]  (Abnormal) Collected:  10/10/18 1656    Specimen:  Blood Updated:  10/10/18 1707     Glucose 195 (H) mg/dL      Comment: : [id number] Tesfaye EuraisaMeter ID: [id number]       Hepatitis Panel, Acute [[id number]]  (Normal) Collected:  10/10/18 1255    Specimen:  Blood Updated:  10/10/18 1433     HCV S/C Ratio 0.03     Hepatitis C Ab Negative     Hep A IgM Negative     Hep B C IgM Negative     Hepatitis B Surface Ag Negative    Hepatitis B Surface Antibody [[id number]]  (Abnormal) Collected:  10/10/18 1255    Specimen:  Blood Updated:  10/10/18 1429     Hepatitis Bs Ab Index <5.00     Hep B S Ab Non-Immune (A)              Echo EF Estimated  Lab Results   Component Value Date    ECHOEFEST 37 10/06/2018       Medication Review: yes  Current Facility-Administered Medications   Medication Dose Route Frequency Provider Last Rate Last Dose   • acetaminophen (TYLENOL) tablet 650 mg  650 mg Oral Q4H PRN Max Jones MD       • amiodarone (PACERONE) tablet 200 mg  200 mg Oral TID Abdiel Stout MD   200 mg at 10/13/18 1125   • aspirin chewable tablet 81 mg  81 mg Oral Daily Vu Michael MD    81 mg at 10/13/18 1125   • atorvastatin (LIPITOR) tablet 80 mg  80 mg Oral Daily Max Jones MD   80 mg at 10/13/18 1124   • budesonide-formoterol (SYMBICORT) 160-4.5 MCG/ACT inhaler 2 puff  2 puff Inhalation BID - RT Max Jones MD   2 puff at 10/12/18 2143   • bumetanide (BUMEX) tablet 1 mg  1 mg Oral BID Vu Michael MD   1 mg at 10/13/18 1125   • calcitriol (ROCALTROL) capsule 0.5 mcg  0.5 mcg Oral Daily Max Jones MD   0.5 mcg at 10/13/18 1126   • calcium carb-cholecalciferol 600-800 MG-UNIT tablet 1 tablet  1 tablet Oral Daily Max Jones MD   1 tablet at 10/13/18 1127   • dextrose (D50W) 25 g/ 50mL Intravenous Solution 25 g  25 g Intravenous Q15 Min PRN Max Jones MD       • dextrose (GLUTOSE) oral gel 15 g  15 g Oral Q15 Min PRN Max Jones MD       • Ferric Citrate tablet 210 mg  210 mg Oral TID With Meals Max Jones MD   210 mg at 10/13/18 1124   • glucagon (human recombinant) (GLUCAGEN DIAGNOSTIC) injection 1 mg  1 mg Subcutaneous PRN Max Jones MD       • heparin (porcine) 5000 UNIT/ML injection 5,000 Units  5,000 Units Subcutaneous Q12H Max Jones MD   5,000 Units at 10/13/18 1126   • heparin (porcine) injection 1,700 Units  1,700 Units Intracatheter PRN Samson Aragon MD   1,700 Units at 10/12/18 1802   • heparin (porcine) injection 1,700 Units  1,700 Units Intracatheter PRN Samson Aragon MD   1,700 Units at 10/13/18 0906   • hydrALAZINE (APRESOLINE) tablet 25 mg  25 mg Oral Q8H Abdiel Stout MD       • HYDROcodone-acetaminophen (NORCO)  MG per tablet 1 tablet  1 tablet Oral Q6H PRN Max Jones MD   1 tablet at 10/13/18 0110   • hydroxypropyl methylcellulose (ISOPTO TEARS) 2.5 % ophthalmic solution 1 drop  1 drop Both Eyes TID PRN Vu Michael MD   1 drop at 10/11/18 0636   • Influenza Vac Subunit Quad (FLUCELVAX)  injection 0.5 mL  0.5 mL Intramuscular During Hospitalization Max Jones MD       • insulin lispro (humaLOG) injection 0-9 Units  0-9 Units Subcutaneous 4x Daily With Meals & Nightly Max Jones MD   4 Units at 10/10/18 2112   • insulin lispro (humaLOG) injection 6 Units  6 Units Subcutaneous TID AC Max Jones MD   6 Units at 10/10/18 1723   • ipratropium (ATROVENT) nebulizer solution 0.5 mg  0.5 mg Nebulization Q6H PRN Abdiel Stout MD       • isosorbide dinitrate (ISORDIL) tablet 10 mg  10 mg Oral TID - Nitrates Abdiel Stout MD   10 mg at 10/13/18 1124   • lisinopril (PRINIVIL,ZESTRIL) tablet 2.5 mg  2.5 mg Oral Q24H Abdiel Stout MD   2.5 mg at 10/13/18 1124   • magnesium hydroxide (MILK OF MAGNESIA) suspension 2400 mg/10mL 10 mL  10 mL Oral Daily PRN Max Jones MD       • metoprolol tartrate (LOPRESSOR) tablet 25 mg  25 mg Oral Q12H Max Jones MD   25 mg at 10/13/18 1124   • nicotine (NICODERM CQ) 21 MG/24HR patch 1 patch  1 patch Transdermal Q24H Ashutosh Palafox MD   1 patch at 10/12/18 1943   • ondansetron (ZOFRAN) injection 4 mg  4 mg Intravenous Q6H PRN Vu Michael MD   4 mg at 10/13/18 0351   • potassium chloride (MICRO-K) CR capsule 20 mEq  20 mEq Oral Daily Vu Michael MD   20 mEq at 10/13/18 1125   • sennosides-docusate sodium (SENOKOT-S) 8.6-50 MG tablet 1 tablet  1 tablet Oral BID Vu Michael MD   1 tablet at 10/13/18 1125   • sodium bicarbonate tablet 650 mg  650 mg Oral BID Delmy Mullins APRN   650 mg at 10/13/18 1126   • sodium chloride 0.9 % flush 3 mL  3 mL Intravenous Q12H Max Jones MD   3 mL at 10/12/18 1954   • sodium chloride 0.9 % flush 3-10 mL  3-10 mL Intravenous PRN Max Jones MD       • tetrahydrozoline 0.05 % ophthalmic solution 1 drop  1 drop Both Eyes 4x Daily PRN Ney De MD   1 drop at 10/10/18 5989         Assessment/Plan       Acute  on chronic respiratory failure with hypoxia (CMS/HCC)    Acute renal failure superimposed on stage 4 chronic kidney disease (CMS/HCC)    Acute on chronic combined systolic and diastolic congestive heart failure (CMS/HCC)    Sepsis (CMS/HCC)    Pneumonia    Coronary artery disease involving native coronary artery of native heart without angina pectoris    History of coronary artery stent placement    Pleural effusion, bilateral    Mild aortic valve stenosis    Moderate aortic valve insufficiency    Mild mitral valve regurgitation    COPD (chronic obstructive pulmonary disease) (CMS/HCC)      Plan    1. Continue current cardiac meds.   2. Continue telemetry.   3. Daily weights.  4. Cardiac/renal/low sodium diet.   5. 1500 ml daily fluid restriction.  6. Continue to monitor intake and output.     Monik Vann, APRN  10/13/18  1:37 PM                Electronically signed by Lucian Monroe MD at 10/13/2018  8:28 PM     Vu Michael MD at 10/12/2018  6:52 PM              [facility] Medicine Services  INPATIENT PROGRESS NOTE    Patient Name: Gavin Wilson  Date of Admission: 10/6/2018  Today's Date: 10/12/18  Length of Stay: 6  Primary Care Physician: Provider, No Known    Subjective   Chief Complaint: weakness  HPI     Patient seen and examined at bedside.  Patient feeling weak still.  Patient getting dialyzed today.  Denies any specific complaint, just generalized weakness and fatigue.  Tolerating PO.        Review of Systems   Constitutional: Positive for activity change and appetite change. Negative for chills and fever.   Respiratory: Negative for cough and shortness of breath.    Cardiovascular: Negative for chest pain and palpitations.   Gastrointestinal: Negative for abdominal pain, constipation, diarrhea, nausea and vomiting.   Neurological: Positive for weakness.        All pertinent negatives and positives are as above. All other systems have been reviewed and are  negative unless otherwise stated.     Objective    Temp:  [97.6 °F (36.4 °C)-98.4 °F (36.9 °C)] 98.4 °F (36.9 °C)  Heart Rate:  [90-97] 93  Resp:  [16-20] 18  BP: ()/(49-62) 98/58  Physical Exam  Constitutional: He is oriented to person, place, and time. No distress. Chronically ill appearing  HENT:   Head: Atraumatic.   Temporal muscle wasting; prominent wasting around the neck and supraclavicular    Eyes: Conjunctivae are normal. No scleral icterus.   Cardiovascular: Normal rate, regular rhythm and intact distal pulses.    Murmur heard. Right upper chest permcath placed  Pulmonary/Chest: Effort normal. No respiratory distress. He has no wheezing. He has no rales.  Improved air movement   Abdominal: Soft. Bowel sounds are normal. He exhibits no distension. There is no tenderness.   RLQ ostomy    Neurological: He is alert and oriented to person, place, and time. Lethargic.  Skin: Skin is warm and dry. He is not diaphoretic.   Psychiatric: He has a normal mood and affect. His behavior is normal.       Results Review:  I have reviewed the labs, radiology results, and diagnostic studies.    Laboratory Data:     Results from last 7 days  Lab Units 10/11/18  0355 10/10/18  0422 10/09/18  0306   WBC 10*3/mm3 4.56* 5.35 5.17   HEMOGLOBIN g/dL 9.1* 10.2* 9.8*   HEMATOCRIT % 28.7* 32.6* 30.4*   PLATELETS 10*3/mm3 190 173 180          Results from last 7 days  Lab Units 10/12/18  0437 10/11/18  0355 10/10/18  0422  10/08/18  0435  10/06/18  0545   SODIUM mmol/L 142 144 144  < > 141  < > 141   POTASSIUM mmol/L 4.3 4.7 5.2  < > 4.5  < > 3.6   CHLORIDE mmol/L 103 106 107  < > 110  < > 113*   CO2 mmol/L 26.0 26.0 20.0*  < > 16.0*  < > 17.0*   BUN mg/dL 36* 60* 59*  < > 50*  < > 46*   CREATININE mg/dL 2.90* 4.06* 4.13*  < > 3.78*  < > 3.57*   CALCIUM mg/dL 8.8 9.7 9.1  < > 8.5  < > 8.1*   BILIRUBIN mg/dL  --   --   --   --  0.4  --  0.5   ALK PHOS U/L  --   --   --   --  83  --  66   ALT (SGPT) U/L  --   --   --   --  53  --   30   AST (SGOT) U/L  --   --   --   --  27  --  20   GLUCOSE mg/dL 114* 136* 139*  < > 131*  < > 170*   < > = values in this interval not displayed.    Culture Data:   [unfilled]    Radiology Data:   Imaging Results (last 24 hours)     ** No results found for the last 24 hours. **          I have reviewed the patient's current medications.     Assessment/Plan     Active Hospital Problems    Diagnosis   • **Chronic kidney disease, stage 4 (severe) (CMS/[facility])   • Sepsis (CMS/[facility])       Assessment:  1) Acute hypoxic respiratory failure, resolved  2) Sepsis due to suspected health care associated pneumonia  3) Bilateral pleural effusions - Right is recurrent, left is worsening - Suspected due to heart failure, but cannot rule out infection or malignancy   4) Severe protein-calorie malnutrition  5) COPD without exacerbation  6) Combined systolic and diastolic heart failure, chronic (EF <40%)  7) Acute kidney injury on chronic kidney disease stage 4 - Suspected cardiorenal   8) Chronic normocytic anemia due to CKD  9) Thrombocytopenia, improved  10) Mild hypokalemia, resolved  11) Hyperphosphotemia  12) Metabolic acidosis, anion gap due to uremia     Plan:  1) Cardiology note reviewed  2) Nephrology consult, may require dialysis - Note reviewed   3) D/C vanc on 10/8  4) Cefepime 7 day course  5) Procal 8.86, continue antibiotics as above   6) Monitor H&H, stable  7) Thoracentesis 10/7 - 400 cc removed   8) Urine culture yeast - Likely colonization  9) Blood cultures and pleural flood cultures pending  10) Continue PO bicarb  11) Continue PO bumex  12) Dialysis per nephrology  13) Awaiting placement  14) Dialysis today               Discharge Planning: I expect the patient to be discharged nursing home once accepted.  Medically ready    Vu Michael MD   10/12/18   9:52 PM    Electronically signed by Vu Michael MD at 10/14/2018  1:43 PM       Consult Notes (last 72 hours) (Notes from 10/12/2018  3:13 PM  through 10/15/2018  3:13 PM)     No notes of this type exist for this encounter.        Physical Therapy Notes (last 24 hours) (Notes from 10/14/2018  3:13 PM through 10/15/2018  3:13 PM)     No notes of this type exist for this encounter.           Occupational Therapy Notes (last 72 hours) (Notes from 10/12/2018  3:13 PM through 10/15/2018  3:13 PM)      Paulino Fuchs COTA/L at 10/15/2018 11:38 AM          Problem: Patient Care Overview  Goal: Plan of Care Review  Outcome: Ongoing (interventions implemented as appropriate)   10/15/18 1138   OTHER   Outcome Summary Pt. progressing fairly with his adl bathing/dressing, mod. assist required from this keenan/l!   Coping/Psychosocial   Plan of Care Reviewed With patient           Electronically signed by Paulino Fuchs COTA/L at 10/15/2018 11:38 AM     Jef, Paulino R, KEENAN/L at 10/12/2018  3:16 PM          Problem: Patient Care Overview  Goal: Plan of Care Review  Outcome: Ongoing (interventions implemented as appropriate)   10/12/18 1515   OTHER   Outcome Summary Pt. progressing fairly at this time, no issues or problems noted other than sob!, and he states that he has no appetitie!   Coping/Psychosocial   Plan of Care Reviewed With patient           Electronically signed by Paulino Fuchs COTA/L at 10/12/2018  3:16 PM       Speech Language Pathology Notes (last 24 hours) (Notes from 10/14/2018  3:13 PM through 10/15/2018  3:13 PM)     No notes of this type exist for this encounter.        Respiratory Therapy Notes (last 24 hours) (Notes from 10/14/2018  3:13 PM through 10/15/2018  3:13 PM)     No notes of this type exist for this encounter.

## 2018-10-15 NOTE — PROGRESS NOTES
Nephrology ([facility]) Progress Note      Patient:  Gavin Wilson  YOB: 1945  Date of Service: 10/15/2018  MRN: [id number]   Acct: [id number]   Primary Care Physician: Provider, No Known  Advance Directive:   Code Status and Medical Interventions:   Ordered at: 10/07/18 1309     Limited Support to NOT Include:    Intubation     Level Of Support Discussed With:    Patient     Code Status:    No CPR     Medical Interventions (Level of Support Prior to Arrest):    Limited     Admit Date: 10/6/2018       Hospital Day: 9  Referring Provider: Max Jones*      Patient personally seen and examined.  Complete chart including Consults, Notes, Operative Reports, Labs, Cardiology, and Radiology studies reviewed as able.        Subjective:  Gavin Wilson is a 73 y.o. male  whom we were consulted for CKD stage 4.  History of bladder cancer with prior cystectomy and ureterostomy formation.  Also history of esophogeal and prostate cancer.  Follows with nephrology at [facility]. Has left upper arm fistula in place.  Presented with dyspnea; admitted with bilateral pneumonia/pleural effusion.  Hospital course remarkable for attempt to use fistula on 10/10; infiltrated and was unable to dialyze.  On 10/11 had Permcath placed and first dialysis treatment; he had a run of V tach during first HD.      Today is feeling about the same; no new overnight issues.     Allergies:  Sulfa antibiotics    Home Meds:  Prescriptions Prior to Admission   Medication Sig Dispense Refill Last Dose   • acetaminophen (TYLENOL) 325 MG tablet Take 650 mg by mouth Every 6 (Six) Hours As Needed for Mild Pain .   Past Month at Unknown time   • albuterol (PROVENTIL HFA;VENTOLIN HFA) 108 (90 Base) MCG/ACT inhaler Inhale 2 puffs Every 6 (Six) Hours As Needed for Wheezing.   Past Week at Unknown time   • aspirin 81 MG chewable tablet Chew 81 mg Daily.   10/5/2018 at Unknown time   • atorvastatin (LIPITOR) 80  MG tablet Take 40 mg by mouth Daily.   10/5/2018 at Unknown time   • budesonide-formoterol (SYMBICORT) 160-4.5 MCG/ACT inhaler Inhale 2 puffs 2 (Two) Times a Day. 1 inhaler 2 10/5/2018 at Unknown time   • bumetanide (BUMEX) 2 MG tablet Take 1 tablet by mouth Daily. 30 tablet 0 10/5/2018 at Unknown time   • calcium carbonate (OS-TASHI) 600 MG tablet Take 600 mg by mouth Daily.   10/5/2018 at Unknown time   • cholecalciferol (VITAMIN D3) 1000 units tablet Take 3,000 Units by mouth Daily.   10/5/2018 at Unknown time   • insulin aspart (novoLOG FLEXPEN) 100 UNIT/ML solution pen-injector sc pen Inject 6 Units under the skin into the appropriate area as directed 3 (Three) Times a Day With Meals.   10/5/2018 at Unknown time   • insulin detemir (LEVEMIR) 100 UNIT/ML injection Inject 6 Units under the skin into the appropriate area as directed 2 (Two) Times a Day.   10/5/2018 at Unknown time   • loperamide (IMODIUM) 2 MG capsule Take 2 mg by mouth Daily As Needed for Diarrhea.   Past Month at Unknown time   • metoprolol tartrate (LOPRESSOR) 25 MG tablet Take 12.5 mg by mouth 2 (Two) Times a Day.   10/5/2018 at Unknown time   • omeprazole (priLOSEC) 20 MG capsule Take 20 mg by mouth Daily.   10/5/2018 at Unknown time   • oxyCODONE (ROXICODONE) 5 MG immediate release tablet Take 5 mg by mouth Every 6 (Six) Hours As Needed for Moderate Pain .   Past Week at Unknown time   • sildenafil (VIAGRA) 100 MG tablet Take 100 mg by mouth Daily As Needed for erectile dysfunction.   More than a month at Unknown time       Medicines:  Current Facility-Administered Medications   Medication Dose Route Frequency Provider Last Rate Last Dose   • acetaminophen (TYLENOL) tablet 650 mg  650 mg Oral Q4H PRN Max Jones MD       • amiodarone (PACERONE) tablet 200 mg  200 mg Oral TID Abdiel Stout MD   200 mg at 10/15/18 0832   • aspirin chewable tablet 81 mg  81 mg Oral Daily Vu Michael MD   81 mg at 10/15/18 0831   •  atorvastatin (LIPITOR) tablet 80 mg  80 mg Oral Daily Max Jones MD   80 mg at 10/15/18 0832   • budesonide-formoterol (SYMBICORT) 160-4.5 MCG/ACT inhaler 2 puff  2 puff Inhalation BID - RT Max Jones MD   2 puff at 10/15/18 0737   • bumetanide (BUMEX) tablet 1 mg  1 mg Oral BID Vu Michael MD   1 mg at 10/15/18 0833   • calcitriol (ROCALTROL) capsule 0.5 mcg  0.5 mcg Oral Daily Max Jones MD   0.5 mcg at 10/15/18 0833   • calcium carb-cholecalciferol 600-800 MG-UNIT tablet 1 tablet  1 tablet Oral Daily Max Jones MD   1 tablet at 10/15/18 0832   • dextrose (D50W) 25 g/ 50mL Intravenous Solution 25 g  25 g Intravenous Q15 Min PRN Max Jones MD       • dextrose (GLUTOSE) oral gel 15 g  15 g Oral Q15 Min PRN Max Jones MD       • Ferric Citrate tablet 210 mg  210 mg Oral TID With Meals Max Jones MD   210 mg at 10/15/18 0832   • glucagon (human recombinant) (GLUCAGEN DIAGNOSTIC) injection 1 mg  1 mg Subcutaneous PRN Max Jones MD       • heparin (porcine) 5000 UNIT/ML injection 5,000 Units  5,000 Units Subcutaneous Q12H Max Jones MD   5,000 Units at 10/15/18 0831   • heparin (porcine) injection 1,700 Units  1,700 Units Intracatheter PRN Samson Aragon MD   1,700 Units at 10/12/18 1802   • heparin (porcine) injection 1,700 Units  1,700 Units Intracatheter PRN Samson Aragon MD   1,700 Units at 10/13/18 0906   • hydrALAZINE (APRESOLINE) tablet 25 mg  25 mg Oral Q8H Abdiel Stout MD   25 mg at 10/14/18 1248   • HYDROcodone-acetaminophen (NORCO)  MG per tablet 1 tablet  1 tablet Oral Q6H PRN Max Jones MD   1 tablet at 10/15/18 0831   • hydroxypropyl methylcellulose (ISOPTO TEARS) 2.5 % ophthalmic solution 1 drop  1 drop Both Eyes TID PRN Vu Michael MD   1 drop at 10/11/18 0636   • Influenza Vac Subunit Quad (FLUCELVAX)  injection 0.5 mL  0.5 mL Intramuscular During Hospitalization Max Jones MD       • insulin lispro (humaLOG) injection 0-9 Units  0-9 Units Subcutaneous 4x Daily With Meals & Nightly Max Jones MD   2 Units at 10/14/18 2044   • insulin lispro (humaLOG) injection 6 Units  6 Units Subcutaneous TID AC Max Jones MD   6 Units at 10/15/18 0843   • ipratropium (ATROVENT) nebulizer solution 0.5 mg  0.5 mg Nebulization Q6H PRN Abdiel Stout MD   0.5 mg at 10/13/18 1619   • isosorbide dinitrate (ISORDIL) tablet 10 mg  10 mg Oral TID - Nitrates Abdiel Stout MD   10 mg at 10/15/18 0832   • lisinopril (PRINIVIL,ZESTRIL) tablet 2.5 mg  2.5 mg Oral Q24H Abdiel Stout MD   2.5 mg at 10/14/18 0945   • magnesium hydroxide (MILK OF MAGNESIA) suspension 2400 mg/10mL 10 mL  10 mL Oral Daily PRN Max Jones MD       • metoprolol tartrate (LOPRESSOR) tablet 25 mg  25 mg Oral Q12H Max Jones MD   25 mg at 10/15/18 0832   • nicotine (NICODERM CQ) 21 MG/24HR patch 1 patch  1 patch Transdermal Q24H Ashutosh Palafox MD   1 patch at 10/14/18 2045   • ondansetron (ZOFRAN) injection 4 mg  4 mg Intravenous Q6H PRN Vu Michael MD   4 mg at 10/15/18 0831   • potassium chloride (MICRO-K) CR capsule 20 mEq  20 mEq Oral Daily Vu Michael MD   20 mEq at 10/15/18 0833   • sennosides-docusate sodium (SENOKOT-S) 8.6-50 MG tablet 1 tablet  1 tablet Oral BID Vu Michael MD   1 tablet at 10/14/18 2042   • sodium chloride 0.9 % flush 3 mL  3 mL Intravenous Q12H Max Jones MD   3 mL at 10/15/18 0836   • sodium chloride 0.9 % flush 3-10 mL  3-10 mL Intravenous PRN Max Jones MD       • tetrahydrozoline 0.05 % ophthalmic solution 1 drop  1 drop Both Eyes 4x Daily PRN Ney De MD   1 drop at 10/10/18 0339       Past Medical History:  Past Medical History:   Diagnosis Date   • CHF (congestive heart failure) (CMS/HCC)    •  "Coronary stent occlusion    • Diabetes mellitus (CMS/HCC)    • Hyperlipidemia    • Hypertension    • Stroke (CMS/HCC)        Past Surgical History:  Past Surgical History:   Procedure Laterality Date   • BLADDER SURGERY     • ESOPHAGECTOMY     • INSERTION HEMODIALYSIS CATHETER N/A 10/11/2018    Procedure: HEMODIALYSIS CATHETER INSERTION;  Surgeon: Hugo Bonilla MD;  [facility] OR ;  Service: Vascular       Family History  Family History   Problem Relation Age of Onset   • No Known Problems Father    • No Known Problems Mother        Social History  Social History     Social History   • Marital status:      Spouse name: N/A   • Number of children: N/A   • Years of education: N/A     Occupational History   • Not on file.     Social History Main Topics   • Smoking status: Current Every Day Smoker   • Smokeless tobacco: Never Used   • Alcohol use No   • Drug use: No   • Sexual activity: Not on file     Other Topics Concern   • Not on file     Social History Narrative   • No narrative on file         Review of Systems:  History obtained from chart review and the patient  General ROS: No fever or chills  Respiratory ROS: No cough, shortness of breath, wheezing  Cardiovascular ROS: No chest pain or palpitations  Gastrointestinal ROS: No abdominal pain or melena  Genito-Urinary ROS: No dysuria or hematuria    Objective:  /50 (BP Location: Right arm, Patient Position: Lying)   Pulse 80   Temp 98.8 °F (37.1 °C) (Temporal Artery )   Resp 18   Ht 170.2 cm (67\")   Wt 46.9 kg (103 lb 4.8 oz)   SpO2 94%   BMI 16.18 kg/m²     Intake/Output Summary (Last 24 hours) at 10/15/18 1115  Last data filed at 10/15/18 0642   Gross per 24 hour   Intake              240 ml   Output              350 ml   Net             -110 ml     General: awake/alert   Chest:  clear to auscultation bilaterally without respiratory distress  CVS: regular rate and rhythm  Abdominal: soft, nontender, normal bowel " sounds  Extremities: no cyanosis or edema  Skin: warm and dry without rash      Labs:    Results from last 7 days  Lab Units 10/11/18  0355 10/10/18  0422 10/09/18  0306   WBC 10*3/mm3 4.56* 5.35 5.17   HEMOGLOBIN g/dL 9.1* 10.2* 9.8*   HEMATOCRIT % 28.7* 32.6* 30.4*   PLATELETS 10*3/mm3 190 173 180           Results from last 7 days  Lab Units 10/15/18  0542 10/14/18  0417 10/13/18  0436   SODIUM mmol/L 139 141 139   POTASSIUM mmol/L 4.5 3.9 4.4   CHLORIDE mmol/L 102 101 99   CO2 mmol/L 28.0 28.0 21.0*   BUN mg/dL 36* 23* 23*   CREATININE mg/dL 3.27* 2.23* 2.02*   CALCIUM mg/dL 9.0 8.7 9.1   GLUCOSE mg/dL 133* 96 114*       Radiology:   Imaging Results (last 72 hours)     Procedure Component Value Units Date/Time    US Thoracentesis [[id number]] Collected:  10/07/18 1323    Specimen:  Body Fluid Updated:  10/13/18 0934    Narrative:       DATE OF PROCEDURE:  10/7/2018.     PREPROCEDURE DIAGNOSIS:  Left pleural effusion.  POSTPROCEDURE DIAGNOSIS:  Left pleural effusion.          INDICATIONS FOR PROCEDURE: Shortness of breath.     PROCEDURE:   1. Thoracentesis, diagnostic and therapeutic.  2. Ultrasound guidance for thoracentesis, imaging supervision and  interpretation.     ANESTHESIA: 1% lidocaine, injected locally.          COMPLICATIONS: None.        EXAMINATIONS FOR COMPARISON:  CT chest dated 10/6/2018.     DESCRIPTION OF PROCEDURE:   The risk and benefits of the procedure were explained to the patient.   Specifically, the risks of bleeding, infection, pneumothorax (possible  thoracostomy tube), and damage to surrounding structures were discussed  extensively. The patient's questions were answered. The patient opted to  proceed. Written and verbal consent were obtained from the patient.     TIME OUT was taken and the patient's name, medical record number, date  of birth, procedure, entry site, and listen allergies were confirmed.  The site of the procedure was confirmed and marked.      The leftposterior thorax  was prepped and draped in sterile fashion. The  area was anesthetized with buffered lidocaine 2%.      A 5 Turkmen 10 cm  catheter was inserted into the pleural effusion  using  ultrasound guidance. Approximately 400 mL of clear fluid was recovered  and sent to the pathology lab for analysis.      The patient tolerated the procedure well.   No immediate complications  were noted.       Impression:       Successful ultrasound guided leftthoracentesis. Approximately 400 mL of  clear fluid was recovered and sent to the pathology lab for analysis.   No immediate complications were noted.              This report was finalized on 10/07/2018 13:26 by Dr. Petra Mckinley MD.     Chest [[id number]] Collected:  10/07/18 1323     Updated:  10/13/18 0934    Narrative:       DATE OF PROCEDURE:  10/7/2018.     PREPROCEDURE DIAGNOSIS:  Left pleural effusion.  POSTPROCEDURE DIAGNOSIS:  Left pleural effusion.          INDICATIONS FOR PROCEDURE: Shortness of breath.     PROCEDURE:   1. Thoracentesis, diagnostic and therapeutic.  2. Ultrasound guidance for thoracentesis, imaging supervision and  interpretation.     ANESTHESIA: 1% lidocaine, injected locally.          COMPLICATIONS: None.        EXAMINATIONS FOR COMPARISON:  CT chest dated 10/6/2018.     DESCRIPTION OF PROCEDURE:   The risk and benefits of the procedure were explained to the patient.   Specifically, the risks of bleeding, infection, pneumothorax (possible  thoracostomy tube), and damage to surrounding structures were discussed  extensively. The patient's questions were answered. The patient opted to  proceed. Written and verbal consent were obtained from the patient.     TIME OUT was taken and the patient's name, medical record number, date  of birth, procedure, entry site, and listen allergies were confirmed.  The site of the procedure was confirmed and marked.      The leftposterior thorax was prepped and draped in sterile fashion. The  area was anesthetized with  buffered lidocaine 2%.      A 5 Welsh 10 cm  catheter was inserted into the pleural effusion  using  ultrasound guidance. Approximately 400 mL of clear fluid was recovered  and sent to the pathology lab for analysis.      The patient tolerated the procedure well.   No immediate complications  were noted.       Impression:       Successful ultrasound guided leftthoracentesis. Approximately 400 mL of  clear fluid was recovered and sent to the pathology lab for analysis.   No immediate complications were noted.              This report was finalized on 10/07/2018 13:26 by Dr. Petra Mckinley MD.          Culture:         Assessment   1.  CKD stage 4--now with progression to ESRD  2.  Type 2 diabetes with nephropathy  3.  Bilateral pneumonia  4.  Chronic diastolic congestive heart failure  5.  S/p ventricular tachycardia on 10/10  6.  Metabolic acidosis  7.  Secondary hyperparathyroidism    Plan:  1.  Dialysis today  2.  Monitor labs      Roberto Carlos Myrick, MACO  10/15/2018  11:15 AM

## 2018-10-15 NOTE — PLAN OF CARE
Problem: Patient Care Overview  Goal: Plan of Care Review  Outcome: Ongoing (interventions implemented as appropriate)   10/15/18 1641   Plan of Care Review   Progress improving   OTHER   Outcome Summary PT RECIEVED DIALYSIS TODAY. [facility]. NO C/O PAIN. WILL CONTINUE TO MONITOR AND NOTIFY MD OF ANY CHANGES.    Coping/Psychosocial   Plan of Care Reviewed With patient     Goal: Interprofessional Rounds/Family Conf  Outcome: Ongoing (interventions implemented as appropriate)

## 2018-10-15 NOTE — PROGRESS NOTES
LOS: 9 days   Patient Care Team:  Provider, No Known as PCP - General    Chief Complaint:   Acute on chronic respiratory failure with hypoxia (CMS/HCC)    Acute renal failure superimposed on stage 4 chronic kidney disease (CMS/HCC)    Acute on chronic combined systolic and diastolic congestive heart failure (CMS/HCC)    Pleural effusion, bilateral    Sepsis (CMS/HCC)    Coronary artery disease involving native coronary artery of native heart without angina pectoris    History of coronary artery stent placement    Mild aortic valve stenosis    Moderate aortic valve insufficiency    Mild mitral valve regurgitation    Pneumonia    COPD (chronic obstructive pulmonary disease) (CMS/[facility])    Shortness of breath    Subjective    Feels better  In much better spirits  Less shortness of breath  Less tired  Much less fatigued with exertion  No significant further arrhythmia  Maintaining sinus rhythm  No orthopnea paroxysmal nocturnal dyspnea  Denies chest pain or significant palpitations    Interval History: Improved overall    Telemetry: Maintaining sinus rhythm    Review of Systems     Constitutional: No chills   Has fatigue   No fever.   HENT: Negative.    Eyes: Negative.      Respiratory: Positive for cough,   No chest wall soreness,   Shortness of breath,   no wheezing, no stridor.      Cardiovascular: Atypical chest pain,   No palpitations   No significant  leg swelling.     Gastrointestinal: Negative for abdominal distention,  No abdominal pain,   No blood in stool,   No constipation,   No diarrhea,   No nausea   No vomiting.     Endocrine: Negative.    Genitourinary: Negative for difficulty urinating, dysuria, flank pain and hematuria.     Musculoskeletal: Negative.    Skin: Negative for rash and wound.   Allergic/Immunologic: Negative.      Neurological: Negative for dizziness, syncope, weakness,   No light-headedness  No  headaches.     Hematological: Does not bruise/bleed easily.     Psychiatric/Behavioral:  Negative for agitation or behavioral problems,   No confusion,   the patient is  nervous/anxious.       History:   Past Medical History:   Diagnosis Date   • CHF (congestive heart failure) (CMS/HCC)    • Coronary stent occlusion    • Diabetes mellitus (CMS/HCC)    • Hyperlipidemia    • Hypertension    • Stroke (CMS/HCC)      Past Surgical History:   Procedure Laterality Date   • BLADDER SURGERY     • ESOPHAGECTOMY     • INSERTION HEMODIALYSIS CATHETER N/A 10/11/2018    Procedure: HEMODIALYSIS CATHETER INSERTION;  Surgeon: Hugo Bonilla MD;  Location: [place] [address];  Service: Vascular     Social History     Social History   • Marital status:      Spouse name: N/A   • Number of children: N/A   • Years of education: N/A     Occupational History   • Not on file.     Social History Main Topics   • Smoking status: Current Every Day Smoker   • Smokeless tobacco: Never Used   • Alcohol use No   • Drug use: No   • Sexual activity: Not on file     Other Topics Concern   • Not on file     Social History Narrative   • No narrative on file     Family History   Problem Relation Age of Onset   • No Known Problems Father    • No Known Problems Mother        Labs:  WBC No results found for: WBC   HGB No results found for: HGB   HCT No results found for: HCT   Platelets No results found for: PLT   MCV No results found for: MCV       Results from last 7 days  Lab Units 10/15/18  0542 10/14/18  0417 10/13/18  0436   SODIUM mmol/L 139 141 139   POTASSIUM mmol/L 4.5 3.9 4.4   CHLORIDE mmol/L 102 101 99   CO2 mmol/L 28.0 28.0 21.0*   BUN mg/dL 36* 23* 23*   CREATININE mg/dL 3.27* 2.23* 2.02*   CALCIUM mg/dL 9.0 8.7 9.1   GLUCOSE mg/dL 133* 96 114*     Lab Results   Component Value Date    TROPONINI 4.090 (C) 10/06/2018     PT/INR:    No results found for: PROTIME/  No results found for: INR    Imaging Results (last 72 hours)     Procedure Component Value Units Date/Time    CT Chest Without Contrast [[id number]]  Collected:  10/06/18 1752     Updated:  10/06/18 1759    Narrative:       EXAMINATION:   CT CHEST WO CONTRAST-  10/6/2018 5:52 PM CDT     CT CHEST WO CONTRAST- 10/6/2018 5:33 PM CDT     HISTORY: Shortness of breath     COMPARISON: August 12, 2018 DOSE LENGTH PRODUCT: 185 mGy cm. Automated  exposure control was also utilized to decrease patient radiation dose.     TECHNIQUE: Serial helical tomographic images of the chest were acquired.  Multiplanar reformatted images were provided for review.     FINDINGS:  The imaged portion of the neck and thyroid gland is unremarkable.      The upper lungs are clear.. The lower lobes are obscured by large  bilateral pleural effusions.. The trachea and bronchial tree are patent.     Cardiac silhouette may be mildly enlarged. Extensive vascular  calcifications noted in the aortic arch origin of the great vessels and  coronary arteries.. Endovascular aortic stent graft is noted below the  diaphragm.    .      No acute findings are seen in the bones or surrounding soft tissues.        .       Impression:       1. Large bilateral pleural effusions which essentially obscures the  lower lobes.  2. Extensive vascular calcifications present  3. Endovascular stent graft is present in the abdomen.        This report was finalized on 10/06/2018 17:55 by Dr. Duc Garibay MD.          Objective     Allergies   Allergen Reactions   • Sulfa Antibiotics        Medication Review: Performed  Current Facility-Administered Medications   Medication Dose Route Frequency Provider Last Rate Last Dose   • acetaminophen (TYLENOL) tablet 650 mg  650 mg Oral Q4H PRN Max Jones MD       • amiodarone (PACERONE) tablet 200 mg  200 mg Oral TID Abdiel Stout MD   200 mg at 10/15/18 0832   • aspirin chewable tablet 81 mg  81 mg Oral Daily Vu [place] MD   81 mg at 10/15/18 0831   • atorvastatin (LIPITOR) tablet 80 mg  80 mg Oral Daily Max Jones MD   80 mg at 10/15/18 0832    • budesonide-formoterol (SYMBICORT) 160-4.5 MCG/ACT inhaler 2 puff  2 puff Inhalation BID - RT Max Jones MD   2 puff at 10/15/18 0737   • bumetanide (BUMEX) tablet 1 mg  1 mg Oral BID Vu [place] MD   1 mg at 10/15/18 0833   • calcitriol (ROCALTROL) capsule 0.5 mcg  0.5 mcg Oral Daily Max Jones MD   0.5 mcg at 10/15/18 0833   • calcium carb-cholecalciferol 600-800 MG-UNIT tablet 1 tablet  1 tablet Oral Daily Max Jones MD   1 tablet at 10/15/18 0832   • dextrose (D50W) 25 g/ 50mL Intravenous Solution 25 g  25 g Intravenous Q15 Min PRN Max Jones MD       • dextrose (GLUTOSE) oral gel 15 g  15 g Oral Q15 Min PRN Max Jones MD       • Ferric Citrate tablet 210 mg  210 mg Oral TID With Meals Max Jones MD   210 mg at 10/15/18 1220   • glucagon (human recombinant) (GLUCAGEN DIAGNOSTIC) injection 1 mg  1 mg Subcutaneous PRN Max Jones MD       • heparin (porcine) 5000 UNIT/ML injection 5,000 Units  5,000 Units Subcutaneous Q12H Max Jones MD   5,000 Units at 10/15/18 0831   • heparin (porcine) injection 1,700 Units  1,700 Units Intracatheter PRN Samson Aragon MD   1,700 Units at 10/12/18 1802   • heparin (porcine) injection 1,700 Units  1,700 Units Intracatheter PRN Samson Aragon MD   1,700 Units at 10/13/18 0906   • hydrALAZINE (APRESOLINE) tablet 25 mg  25 mg Oral Q8H Abdiel Stout MD   25 mg at 10/14/18 1248   • HYDROcodone-acetaminophen (NORCO)  MG per tablet 1 tablet  1 tablet Oral Q6H PRN Max Jones MD   1 tablet at 10/15/18 0831   • hydroxypropyl methylcellulose (ISOPTO TEARS) 2.5 % ophthalmic solution 1 drop  1 drop Both Eyes TID PRN Vu [place] MD   1 drop at 10/11/18 0636   • Influenza Vac Subunit Quad (FLUCELVAX) injection 0.5 mL  0.5 mL Intramuscular During Hospitalization Max Jones MD       • insulin lispro  (humaLOG) injection 0-9 Units  0-9 Units Subcutaneous 4x Daily With Meals & Nightly Max Jones MD   2 Units at 10/14/18 2044   • insulin lispro (humaLOG) injection 6 Units  6 Units Subcutaneous TID AC Max Jones MD   6 Units at 10/15/18 1220   • ipratropium (ATROVENT) nebulizer solution 0.5 mg  0.5 mg Nebulization Q6H PRN Abdiel Stout MD   0.5 mg at 10/13/18 1619   • isosorbide dinitrate (ISORDIL) tablet 10 mg  10 mg Oral TID - Nitrates Abdiel Stout MD   10 mg at 10/15/18 1220   • lisinopril (PRINIVIL,ZESTRIL) tablet 2.5 mg  2.5 mg Oral Q24H Abdiel Stout MD   2.5 mg at 10/14/18 0945   • magnesium hydroxide (MILK OF MAGNESIA) suspension 2400 mg/10mL 10 mL  10 mL Oral Daily PRN Max Jones MD       • metoprolol tartrate (LOPRESSOR) tablet 25 mg  25 mg Oral Q12H Max Jones MD   25 mg at 10/15/18 0832   • nicotine (NICODERM CQ) 21 MG/24HR patch 1 patch  1 patch Transdermal Q24H Ashutosh Palafox MD   1 patch at 10/14/18 2045   • ondansetron (ZOFRAN) injection 4 mg  4 mg Intravenous Q6H PRN Vu [place] MD   4 mg at 10/15/18 0831   • potassium chloride (MICRO-K) CR capsule 20 mEq  20 mEq Oral Daily Vu [place] MD   20 mEq at 10/15/18 0833   • sennosides-docusate sodium (SENOKOT-S) 8.6-50 MG tablet 1 tablet  1 tablet Oral BID Vu [place] MD   1 tablet at 10/14/18 2042   • sodium chloride 0.9 % flush 3 mL  3 mL Intravenous Q12H Max Jones MD   3 mL at 10/15/18 0836   • sodium chloride 0.9 % flush 3-10 mL  3-10 mL Intravenous PRN Max Jones MD       • tetrahydrozoline 0.05 % ophthalmic solution 1 drop  1 drop Both Eyes 4x Daily PRN Ney De MD   1 drop at 10/10/18 0339       Vital Sign Min/Max for last 24 hours  Temp  Min: 98 °F (36.7 °C)  Max: 98.8 °F (37.1 °C)   BP  Min: 90/48  Max: 110/50   Pulse  Min: 68  Max: 83   Resp  Min: 15  Max: 18   SpO2  Min: 93 %  Max: 99 %   No Data Recorded   Weight  Min:  "46.9 kg (103 lb 4.8 oz)  Max: 47.1 kg (103 lb 12.8 oz)     Flowsheet Rows      First Filed Value   Admission Height  162.6 cm (64\") Documented at 10/06/2018 0400   Admission Weight  48.9 kg (107 lb 12.8 oz) Documented at 10/06/2018 0400          Results for orders placed during the hospital encounter of 10/06/18   Adult Transthoracic Echo Limited W/ Cont if Necessary Per Protocol    Addendum · Estimated EF = 37%. · Left ventricular diastolic dysfunction. · Mild aortic valve stenosis is present. · No evidence of pulmonary hypertension is present. · There is a trivial pericardial effusion. There is no evidence of cardiac  tamponade. · There is a moderate size left pleural effusion.        Abdiel Stout MD 10/6/2018  7:24 PM                  Physical Exam:    General Appearance: Awake, alert, in no acute distress  Eyes: Pupils equal and reactive    Ears: Appear intact with no abnormalities noted  Nose: Nares normal, no drainage  Neck: supple, trachea midline, no carotid bruit and has JVD  Back: no kyphosis present,    Lungs: respirations regular, respirations even and respirations unlabored  Basilar crackles  Decrease air entry bilateral bases    Heart: normal S1, S2,  2/6 pansystolic murmur in the left sternal border,  no rub and no click    Abdomen: normal bowel sounds, no tenderness   Skin: no bleeding, bruising or rash  Extremities: no cyanosis  Psychiatric/Behavioral: Negative for agitation, behavioral problems, confusion, the patient does  appear to be nervous/anxious.          Results Review:   I reviewed the patient's new clinical results.  I reviewed the patient's new imaging results and agree with the interpretation.  I reviewed the patient's other test results and agree with the interpretation  I personally viewed and interpreted the patient's EKG/Telemetry data  Discussed with patient    Reviewed available prior notes, consults, prior visits, laboratory findings, radiology and cardiology relevant reports. " Updated chart as applicable. I have reviewed the patient's medical history in detail and updated the computerized patient record as relevant.    Updated patient regarding any new or relevant abnormalities on review of records or any new findings on physical exam. Mentioned to patient about purpose of visit and desirable health short and long term goals and objectives.     Assessment/Plan     Active Problems:    Sepsis (CMS/HCC)  Non-STEMI  Known coronary artery disease next the prior stent placement more than 10 years ago  Prior severe left ventricular systolic dysfunction, currently left ventricle ejection fraction moderately depressed to 37%   start amiodarone therapy orally  Discontinued albuterol   Moderate aortic regurgitation  Mild aortic stenosis  Mild mitral regurgitation  Pneumonia  Sepsis  Hypoxia  Kidney failure              Plan      Continue dialysis  Continue hydralazine and Isordil     Continue Ace inhibitors, increase lisinopril from 2.5 to 5 mg daily  Decrease amiodarone to 200 mg daily  Avoid beta agonists  Monitor CBC and BMP   Follow-up with Dr. Bateman or NP-PA 4 weeks after discharge  Supportive care  Telemetry  Optimal medical therapy  Deep vein thrombosis prophylaxis/precautions  Appropriate diet, fluid, sodium, caffeine, stimulants intake   Compliance to diet and medications   Avoid NSAIDS  Abdiel Stout MD  10/15/18  2:10 PM    EMR Dragon/Transcription was used to dictate part of this note

## 2018-10-15 NOTE — PLAN OF CARE
Problem: Patient Care Overview  Goal: Plan of Care Review  Outcome: Ongoing (interventions implemented as appropriate)   10/15/18 7427   OTHER   Outcome Summary Pt. progressing fairly with his adl bathing/dressing, mod. assist required from this keenan/l!   Coping/Psychosocial   Plan of Care Reviewed With patient

## 2018-10-15 NOTE — PROGRESS NOTES
Continued Stay Note   [place]     Patient Name: Gavin Wilson  MRN: [id number]  Today's Date: 10/15/2018    Admit Date: 10/6/2018          Discharge Plan     Row Name 10/15/18 0900       Plan    Plan Comments LEFT MESSAGE WITH ESTEVAN [phone]. WILL NEED TO SPEAK TO ESTEVAN TO FIND OUT CARE COORDINATOR CONTACT INFO TO BEGIN ARRANGEMENTS FOR OUTPT HD AND SNF.               Discharge Codes    No documentation.           SILVESTRE Busby

## 2018-10-15 NOTE — PLAN OF CARE
Problem: Nutrition, Imbalanced: Inadequate Oral Intake (Adult)  Goal: Improved Oral Intake  Outcome: Ongoing (interventions implemented as appropriate)   10/15/18 1637   Nutrition, Imbalanced: Inadequate Oral Intake (Adult)   Improved Oral Intake other (see comments)  (Oral intake remains at an average of 25%. )       Problem: Patient Care Overview  Goal: Plan of Care Review  Outcome: Ongoing (interventions implemented as appropriate)   10/15/18 1637   Plan of Care Review   Progress no change   OTHER   Outcome Summary RD nutrition follow-up completed. Pt's po intake remains very poor at this time. May wish to consider [facility]. Will follow for possible d/c needs, pt may go to a SNF after hospital d/c

## 2018-10-15 NOTE — PROGRESS NOTES
Continued Stay Note   [place]     Patient Name: Gavin Wilson  MRN: [id number]  Today's Date: 10/15/2018    Admit Date: 10/6/2018          Discharge Plan     Row Name 10/15/18 4300       Plan    Plan Comments [facility]ING INFO TO [facility] [phone] FAX) FOR OUTHD AND SNF.               Discharge Codes    No documentation.           SILVESTRE Busby

## 2018-10-16 LAB
C DIFF TOX GENS STL QL NAA+PROBE: POSITIVE
GLUCOSE BLDC GLUCOMTR-MCNC: 123 MG/DL (ref 70–130)
GLUCOSE BLDC GLUCOMTR-MCNC: 173 MG/DL (ref 70–130)
GLUCOSE BLDC GLUCOMTR-MCNC: 192 MG/DL (ref 70–130)
GLUCOSE BLDC GLUCOMTR-MCNC: 40 MG/DL (ref 70–130)
GLUCOSE BLDC GLUCOMTR-MCNC: 50 MG/DL (ref 70–130)
GLUCOSE BLDC GLUCOMTR-MCNC: 86 MG/DL (ref 70–130)

## 2018-10-16 PROCEDURE — 63710000001 INSULIN LISPRO (HUMAN) PER 5 UNITS: Performed by: INTERNAL MEDICINE

## 2018-10-16 PROCEDURE — 94799 UNLISTED PULMONARY SVC/PX: CPT

## 2018-10-16 PROCEDURE — 97530 THERAPEUTIC ACTIVITIES: CPT

## 2018-10-16 PROCEDURE — 97116 GAIT TRAINING THERAPY: CPT

## 2018-10-16 PROCEDURE — 25010000002 HEPARIN (PORCINE) PER 1000 UNITS: Performed by: INTERNAL MEDICINE

## 2018-10-16 PROCEDURE — 82962 GLUCOSE BLOOD TEST: CPT

## 2018-10-16 PROCEDURE — 94760 N-INVAS EAR/PLS OXIMETRY 1: CPT

## 2018-10-16 PROCEDURE — 99232 SBSQ HOSP IP/OBS MODERATE 35: CPT | Performed by: INTERNAL MEDICINE

## 2018-10-16 PROCEDURE — 25010000002 ONDANSETRON PER 1 MG: Performed by: NURSE PRACTITIONER

## 2018-10-16 PROCEDURE — 87493 C DIFF AMPLIFIED PROBE: CPT | Performed by: INTERNAL MEDICINE

## 2018-10-16 PROCEDURE — 97110 THERAPEUTIC EXERCISES: CPT

## 2018-10-16 RX ORDER — ONDANSETRON 2 MG/ML
4 INJECTION INTRAMUSCULAR; INTRAVENOUS EVERY 6 HOURS PRN
Status: DISCONTINUED | OUTPATIENT
Start: 2018-10-16 | End: 2018-10-25 | Stop reason: HOSPADM

## 2018-10-16 RX ADMIN — METOPROLOL TARTRATE 25 MG: 25 TABLET, FILM COATED ORAL at 21:06

## 2018-10-16 RX ADMIN — HYDROCODONE BITARTRATE AND ACETAMINOPHEN 1 TABLET: 10; 325 TABLET ORAL at 17:20

## 2018-10-16 RX ADMIN — FERRIC CITRATE 210 MG: 210 TABLET, COATED ORAL at 17:15

## 2018-10-16 RX ADMIN — CALCITRIOL CAPSULES 0.25 MCG 0.5 MCG: 0.25 CAPSULE ORAL at 08:39

## 2018-10-16 RX ADMIN — INSULIN LISPRO 6 UNITS: 100 INJECTION, SOLUTION INTRAVENOUS; SUBCUTANEOUS at 12:08

## 2018-10-16 RX ADMIN — ISOSORBIDE DINITRATE 10 MG: 10 TABLET ORAL at 17:14

## 2018-10-16 RX ADMIN — FERRIC CITRATE 210 MG: 210 TABLET, COATED ORAL at 08:38

## 2018-10-16 RX ADMIN — ISOSORBIDE DINITRATE 10 MG: 10 TABLET ORAL at 12:08

## 2018-10-16 RX ADMIN — NICOTINE 1 PATCH: 21 PATCH, EXTENDED RELEASE TRANSDERMAL at 21:07

## 2018-10-16 RX ADMIN — LISINOPRIL 2.5 MG: 2.5 TABLET ORAL at 08:39

## 2018-10-16 RX ADMIN — FERRIC CITRATE 210 MG: 210 TABLET, COATED ORAL at 12:08

## 2018-10-16 RX ADMIN — Medication 3 ML: at 08:41

## 2018-10-16 RX ADMIN — BUMETANIDE 1 MG: 1 TABLET ORAL at 08:39

## 2018-10-16 RX ADMIN — Medication 1 TABLET: at 09:08

## 2018-10-16 RX ADMIN — HYDROCODONE BITARTRATE AND ACETAMINOPHEN 1 TABLET: 10; 325 TABLET ORAL at 02:06

## 2018-10-16 RX ADMIN — INSULIN LISPRO 6 UNITS: 100 INJECTION, SOLUTION INTRAVENOUS; SUBCUTANEOUS at 08:46

## 2018-10-16 RX ADMIN — BUDESONIDE AND FORMOTEROL FUMARATE DIHYDRATE 2 PUFF: 160; 4.5 AEROSOL RESPIRATORY (INHALATION) at 07:48

## 2018-10-16 RX ADMIN — ONDANSETRON HYDROCHLORIDE 4 MG: 2 INJECTION INTRAMUSCULAR; INTRAVENOUS at 12:12

## 2018-10-16 RX ADMIN — METOPROLOL TARTRATE 25 MG: 25 TABLET, FILM COATED ORAL at 08:39

## 2018-10-16 RX ADMIN — AMIODARONE HYDROCHLORIDE 200 MG: 200 TABLET ORAL at 08:39

## 2018-10-16 RX ADMIN — HEPARIN SODIUM 5000 UNITS: 5000 INJECTION, SOLUTION INTRAVENOUS; SUBCUTANEOUS at 08:40

## 2018-10-16 RX ADMIN — BUDESONIDE AND FORMOTEROL FUMARATE DIHYDRATE 2 PUFF: 160; 4.5 AEROSOL RESPIRATORY (INHALATION) at 19:50

## 2018-10-16 RX ADMIN — VANCOMYCIN HYDROCHLORIDE 125 MG: KIT at 21:17

## 2018-10-16 RX ADMIN — HYDROCODONE BITARTRATE AND ACETAMINOPHEN 1 TABLET: 10; 325 TABLET ORAL at 09:10

## 2018-10-16 RX ADMIN — ATORVASTATIN CALCIUM 80 MG: 40 TABLET, FILM COATED ORAL at 08:38

## 2018-10-16 RX ADMIN — HEPARIN SODIUM 5000 UNITS: 5000 INJECTION, SOLUTION INTRAVENOUS; SUBCUTANEOUS at 21:06

## 2018-10-16 RX ADMIN — INSULIN LISPRO 2 UNITS: 100 INJECTION, SOLUTION INTRAVENOUS; SUBCUTANEOUS at 12:08

## 2018-10-16 RX ADMIN — ASPIRIN 81 MG CHEWABLE TABLET 81 MG: 81 TABLET CHEWABLE at 08:38

## 2018-10-16 RX ADMIN — DOCUSATE SODIUM -SENNOSIDES 1 TABLET: 50; 8.6 TABLET, COATED ORAL at 08:40

## 2018-10-16 RX ADMIN — ISOSORBIDE DINITRATE 10 MG: 10 TABLET ORAL at 08:37

## 2018-10-16 RX ADMIN — Medication 3 ML: at 21:09

## 2018-10-16 NOTE — PLAN OF CARE
Problem: Pneumonia (Adult)  Goal: Signs and Symptoms of Listed Potential Problems Will be Absent, Minimized or Managed (Pneumonia)  Outcome: Ongoing (interventions implemented as appropriate)      Problem: Fall Risk (Adult)  Goal: Absence of Fall  Outcome: Ongoing (interventions implemented as appropriate)      Problem: Skin Injury Risk (Adult)  Goal: Skin Health and Integrity  Outcome: Ongoing (interventions implemented as appropriate)      Problem: Nutrition, Imbalanced: Inadequate Oral Intake (Adult)  Goal: Improved Oral Intake  Outcome: Ongoing (interventions implemented as appropriate)    Goal: Prevent Further Weight Loss  Outcome: Ongoing (interventions implemented as appropriate)      Problem: Patient Care Overview  Goal: Plan of Care Review   10/16/18 0217   Plan of Care Review   Progress no change   OTHER   Outcome Summary VSS. Pt c/o back and knee pain and given prn pain medication. Pt ate 100% of his snack at bedtime. Fall protocol in place. Albumin given in dialysis yesterday along with 1200ml of NS. Turn q2hr. Will continue to monitor and notify MD of changes.   Coping/Psychosocial   Plan of Care Reviewed With patient     Goal: Individualization and Mutuality  Outcome: Ongoing (interventions implemented as appropriate)      Problem: Hemodialysis (Adult)  Goal: Signs and Symptoms of Listed Potential Problems Will be Absent, Minimized or Managed (Hemodialysis)  Outcome: Ongoing (interventions implemented as appropriate)

## 2018-10-16 NOTE — THERAPY TREATMENT NOTE
Acute Care - Physical Therapy Treatment Note  [facility]     Patient Name: Gavin Wilson  : 1945  MRN: [id number]  Today's Date: 10/16/2018  Onset of Illness/Injury or Date of Surgery: 10/06/18  Date of Referral to PT: 10/08/18  Referring Physician: Dr. Michael    Admit Date: 10/6/2018    Visit Dx:    ICD-10-CM ICD-9-CM   1. Esophageal dysphagia R13.10 787.20   2. Impaired mobility and ADLs Z74.09 799.89   3. Impaired functional mobility, balance, gait, and endurance Z74.09 V49.89   4. Chronic kidney disease, stage 4 (severe) (CMS/HCC) N18.4 585.4     Patient Active Problem List   Diagnosis   • Volume overload   • Acute renal failure superimposed on stage 4 chronic kidney disease (CMS/HCC)   • Acute on chronic combined systolic and diastolic congestive heart failure (CMS/HCC)   • Pleural effusion, bilateral   • Sepsis (CMS/[facility])   • Coronary artery disease involving native coronary artery of native heart without angina pectoris   • History of coronary artery stent placement   • Mild aortic valve stenosis   • Moderate aortic valve insufficiency   • Mild mitral valve regurgitation   • Acute on chronic respiratory failure with hypoxia (CMS/[facility])   • Pneumonia   • COPD (chronic obstructive pulmonary disease) (CMS/[facility])       Therapy Treatment          Rehabilitation Treatment Summary     Row Name 10/16/18 1522 10/16/18 1045 10/16/18 1002       Treatment Time/Intention    Discipline physical therapy assistant  -MF occupational therapy assistant  -CJ physical therapy assistant  -    Document Type therapy note (daily note)  -MF therapy note (daily note)  -CJ therapy note (daily note)  -2    Subjective Information complains of;weakness;fatigue  - complains of;weakness;fatigue  - complains of;weakness;nausea/vomiting  -2    Mode of Treatment physical therapy  -MF individual therapy;occupational therapy  -CJ physical therapy  -2    Patient Effort  -- adequate  -CJ  --    Existing Precautions/Restrictions fall   -MF fall  -CJ fall  -MF2    Recorded by [MF] Liliane Collado, PTA 10/16/18 1534 [CJ] Paulino Fuchs COTA/L 10/16/18 1400 [MF] Liliane Collado, PTA 10/16/18 1002  [MF2] Liliane Collado, PTA 10/16/18 1035    Row Name 10/16/18 1522 10/16/18 1045 10/16/18 1002       Bed Mobility Assessment/Treatment    Rolling Left [place] (Bed Mobility)  --  -- verbal cues;contact guard  -    Rolling Right [place] (Bed Mobility)  -- verbal cues;minimum assist (75% patient effort)  - verbal cues;contact guard  -    Supine-Sit [place] (Bed Mobility) verbal cues;contact guard;supervision  -  -- verbal cues;contact guard;minimum assist (75% patient effort)  -    Sit-Supine [place] (Bed Mobility) contact guard  -  -- verbal cues;contact guard  -    Bed Mobility, Safety Issues  --  -- decreased use of arms for pushing/pulling;decreased use of legs for bridging/pushing  -    Assistive Device (Bed Mobility) bed rails;head of bed elevated  -  -- head of bed elevated;bed rails  -    Comment (Bed Mobility)  -- lying on R. side after tx!  - pt. requested to be put on bed pan prior to getting up. Did this and cleaned pt up.   -MF    Recorded by [MF] Liliane Collado, PTA 10/16/18 1534 [CJ] Paulino Fuchs COTA/L 10/16/18 1400 [MF] Liliane Collado, PTA 10/16/18 1035    Row Name 10/16/18 1002             Transfer Assessment/Treatment    Comment (Transfers) Pt. stood and began to have more BM. Pt. took steps from bed to BSC, where nursing assisted with cleaning up. Pt. stood 3 more times from BSC. Then pt. took steps from BSC over to bed and then side steps. Pt. refused to do anything else due to fatigue and nausea.   -MF      Recorded by [MF] Liliane Collado, PTA 10/16/18 1035      Row Name 10/16/18 1522 10/16/18 1002          Sit-Stand Transfer    Sit-Stand [place] (Transfers) verbal cues;minimum assist (75% patient effort)  - verbal cues;minimum assist (75% patient  effort)  -MF     Recorded by [MF] Liliane Collado, PTA 10/16/18 1534 [MF] Liliane Collado, PTA 10/16/18 1035     Row Name 10/16/18 1522 10/16/18 1002          Stand-Sit Transfer    Stand-Sit [place] (Transfers) verbal cues;contact guard  - verbal cues;minimum assist (75% patient effort)  -MF     Recorded by [MF] Liliane Collado, PTA 10/16/18 1534 [MF] Liliane Collado., PTA 10/16/18 1035     Row Name 10/16/18 1002             Stand Pivot/Stand Step Transfer    Stand Pivot/Stand Step [place] verbal cues;minimum assist (75% patient effort)  -      Recorded by [MF] Liliane Collado, PTA 10/16/18 1035      Row Name 10/16/18 1002             Toilet Transfer    Type (Toilet Transfer) sit-stand;stand-sit;stand pivot/stand step  -      [place] Level (Toilet Transfer) verbal cues;minimum assist (75% patient effort)  -      Recorded by [MF] Liliane Collado, PTA 10/16/18 1035      Row Name 10/16/18 1522 10/16/18 1002          Gait/Stairs Assessment/Training    [place] Level (Gait) verbal cues;minimum assist (75% patient effort)  -  --     Assistive Device (Gait) walker, front-wheeled  -MF  --     Distance in Feet (Gait) 10  -MF  --     Deviations/Abnormal Patterns (Gait) stride length decreased;kathy decreased;festinating/shuffling  -  --     Bilateral Gait Deviations forward flexed posture;heel strike decreased  -MF  --     Comment (Gait/Stairs) cues to stay with walker. Pt. refused to walk any more  - refused  -MF     Recorded by [MF] Liliane Collado, PTA 10/16/18 1534 [MF] Liliane Collado., PTA 10/16/18 1035     Row Name 10/16/18 1045             Motor Skills Assessment/Interventions    Additional Documentation Therapeutic Exercise (Group)  -      Recorded by [CJ] Paulino Fuchs COTA/L 10/16/18 1400      Row Name 10/16/18 1522 10/16/18 1045          Therapeutic Exercise    Upper Extremity (Therapeutic Exercise)  -- bicep curl, bilateral;chelsie paez   -     Upper Extremity Range of Motion (Therapeutic Exercise)  -- shoulder flexion/extension, bilateral;elbow flexion/extension, bilateral;wrist flexion/extension, bilateral  -CJ     Lower Extremity (Therapeutic Exercise) LAQ (long arc quad), bilateral  -MF  --     Lower Extremity Range of Motion (Therapeutic Exercise) hip flexion/extension, bilateral;ankle dorsiflexion/plantar flexion, bilateral  -MF  --     Weight/Resistance (Therapeutic Exercise)  -- 2 pounds;3 pounds;red  -     Exercise Type (Therapeutic Exercise) AROM (active range of motion)   minimal range  - AROM (active range of motion)  -CJ     Position (Therapeutic Exercise) seated   posterior lean with ex's, min assist to correct.   - seated  -CJ     Sets/Reps (Therapeutic Exercise) 10  -MF 2 sets x 15 reps!  -     Equipment (Therapeutic Exercise)  -- dowel indio/wand;free weight, barbell;resistive bands  -     Expected Outcome (Therapeutic Exercise)  -- facilitate normal movement patterns;improve functional stability;improve functional tolerance, self-care activity;improve motor control;improve neuromuscular control;improve performance, BADLs;improve performance, fine motor coordination skills;improve performance, gait skills;improve performance, transfer skills;improve postural control;increase active range of motion  -     Comment (Therapeutic Exercise) max encouragement for participation  -  --     Recorded by [MF] Liliane Collado, URMILA 10/16/18 1534 [CJ] Paulino Fuchs COTA/L 10/16/18 1400     Row Name 10/16/18 1522             Static Sitting Balance    Level of [place] (Unsupported Sitting, Static Balance) contact guard assist;minimal assist, 75% patient effort  -      Sitting Position (Unsupported Sitting, Static Balance) sitting on edge of bed   posterior lean with ex's  -MF      Recorded by [MF] Liliane Collado, URMILA 10/16/18 1534      Row Name 10/16/18 1522 10/16/18 1045 10/16/18 1002       Positioning and  Restraints    Pre-Treatment Position in bed  - in bed  - in bed  -    Post Treatment Position bed  - bed  - bed  -    In Bed side lying right;call light within reach;encouraged to call for assist;side rails up x2;R heel elevated;L heel elevated  - side lying right;call light within reach;encouraged to call for assist;side rails up x2;exit alarm on  - notified nsg;side lying left;call light within reach;encouraged to call for assist;side rails up x2;legs elevated  -    Recorded by [MF] Liliane Collado., PTA 10/16/18 1534 [CJ] Paulino Fuchs, [place]/L 10/16/18 1400 [MF] Liliane Collado., PTA 10/16/18 1035    Row Name 10/16/18 1045             Pain Assessment    Additional Documentation Pain Scale 2: Word Pre/Post-Treatment (Group)  -      Recorded by [CJ] Paulino Fuchs, [place]/L 10/16/18 1400      Row Name 10/16/18 1522 10/16/18 1045 10/16/18 1002       Pain Scale: Numbers Pre/Post-Treatment    Pain Scale: Numbers, Pretreatment 0/10 - no pain  - 0/10 - no pain  - 0/10 - no pain  -    Pain Scale: Numbers, Post-Treatment 0/10 - no pain  - 0/10 - no pain  - 0/10 - no pain  -    Recorded by [MF] Liliane Collado., PTA 10/16/18 1534 [CJ] Paulino Fuchs, [place]/L 10/16/18 1400 [MF] Liliane Collado., PTA 10/16/18 1035    Row Name                Wound 10/07/18 1255 Bilateral posterior coccyx    Wound - Properties Group Date first assessed: 10/07/18 [SH] Time first assessed: 1255 [SH] Side: Bilateral [SH] Orientation: posterior [SH] Location: coccyx [SH] Recorded by:  [SH] Virginia Ahn RN 10/07/18 1256    Row Name                Wound 10/11/18 0831 Other (See comments) chest incision    Wound - Properties Group Date first assessed: 10/11/18 [DS] Time first assessed: 0831 [DS] Side: Other (See comments) [DS] Location: chest [DS] Type: incision [DS] Recorded by:  [DS] Jeremy Liu RN 10/11/18 0831    Row Name 10/16/18 1045             Outcome Summary/Treatment Plan  (OT)    Daily Summary of Progress (OT) progress toward functional goals as expected  -      Barriers to Overall Progress (OT) weakness/dialysis!  -CJ      Plan for Continued Treatment (OT) continue with ot poc!  -CJ      Recorded by [CJ] Paulino Fuchs [place]/L 10/16/18 1400        User Key  (r) = Recorded By, (t) = Taken By, (c) = Cosigned By    Initials Name Effective Dates Discipline    CJ Paulino Fuchs, [place]/L 08/02/16 -  OT    SH Virginia Ahn RN 08/28/17 -  Nurse    Jeremy Almaraz RN 08/02/16 -  Nurse    Liliane Bassett, URMILA 08/02/16 -  PT          Wound 10/07/18 1255 Bilateral posterior coccyx (Active)   Dressing Appearance dry;intact 10/16/2018  7:30 AM   Closure DAVID 10/16/2018  7:30 AM   Base dressing in place, unable to visualize 10/16/2018  7:30 AM   Periwound intact;dry;redness 10/16/2018  7:30 AM   Periwound Temperature warm 10/16/2018  7:30 AM   Periwound Skin Turgor soft 10/16/2018  7:30 AM   Drainage Amount none 10/16/2018  7:30 AM   Dressing Care, Wound foam 10/15/2018  7:33 PM   Periwound Care, Wound absorptive dressing applied 10/15/2018  7:33 PM       Wound 10/11/18 0831 Other (See comments) chest incision (Active)   Base dressing in place, unable to visualize 10/15/2018  7:33 PM             Physical Therapy Education     Title: PT OT SLP Therapies (Active)     Topic: Physical Therapy (Active)     Point: Mobility training (Active)    Learning Progress Summary     Learner Status Readiness Method Response Comment Documented by    Patient Active Acceptance E,D NR transfers and benefits of activity  10/16/18 1035     Active Acceptance E,D NR Educated on benefits of activity. Intructed in safety with bed mobility, transfers,and LE Strengthening Exercises. LG 10/14/18 0918     Done Acceptance E VU,NR benefits of activity  10/12/18 1206     Active Acceptance E,D RT Effecient supine to sit EARLENE 10/09/18 1154     Done Acceptance E VU,NR Pt was educated on the importance and  benefits of getting out of bed and how it affects his overall health. Pt was educated on why PT is working with him. Pt was encouraged to do more in treatment session. TD 10/08/18 1430     Done Acceptance E VU,NR Pt  was educated on benefits ot PT and PT POC. Pt was edcuated on body mechanics with transfers (pushing with one arm on bed to stand up/having legs touch bed before sitting down) and safety precautions (socks on, gait belt on) with transfers and gait. TD 10/08/18 1149          Point: Body mechanics (Active)    Learning Progress Summary     Learner Status Readiness Method Response Comment Documented by    Patient Active Acceptance E,D NR Educated on benefits of activity. Intructed in safety with bed mobility, transfers,and LE Strengthening Exercises. LG 10/14/18 0918     Done Acceptance E VU,NR Pt was educated on the importance and benefits of getting out of bed and how it affects his overall health. Pt was educated on why PT is working with him. Pt was encouraged to do more in treatment session. TD 10/08/18 1430     Done Acceptance E VU,NR Pt  was educated on benefits ot PT and PT POC. Pt was edcuated on body mechanics with transfers (pushing with one arm on bed to stand up/having legs touch bed before sitting down) and safety precautions (socks on, gait belt on) with transfers and gait. TD 10/08/18 1149          Point: Precautions (Active)    Learning Progress Summary     Learner Status Readiness Method Response Comment Documented by    Patient Active Acceptance ED NR Educated on benefits of activity. Intructed in safety with bed mobility, transfers,and LE Strengthening Exercises. LG 10/14/18 0918     Done Acceptance E VU,NR Pt was educated on the importance and benefits of getting out of bed and how it affects his overall health. Pt was educated on why PT is working with him. Pt was encouraged to do more in treatment session. TD 10/08/18 1430     Done Acceptance E VU,NR Pt  was educated on benefits ot  PT and PT POC. Pt was edcuated on body mechanics with transfers (pushing with one arm on bed to stand up/having legs touch bed before sitting down) and safety precautions (socks on, gait belt on) with transfers and gait. TD 10/08/18 1149                      User Key     Initials Effective Dates Name Provider Type Discipline    LG 08/02/16 -  Roberto Pereira, PTA Physical Therapy Assistant PT    EARLENE 08/02/16 -  Kaylynn Mcgarry PTA Physical Therapy Assistant PT    MF 08/02/16 -  Liliane Collado PTA Physical Therapy Assistant PT    TD 09/07/18 -  Rita Grullon PT Student PT Student PT                    PT Recommendation and Plan     Plan of Care Reviewed With: patient  Progress: declining  Outcome Summary: Pt. agreeable to therapy, but only because he needed to have a BM. Pt. was CGA for rolling in bed. Pt.stood x 5 times over the course of tx and took steps to and from BSC due to having loose, constant stools. Pt declined to do any other activity due to fatigue and nausea. Pt. will need SNF placement due to lack of mobility.           Outcome Measures     Row Name 10/16/18 1045 10/16/18 1002 10/15/18 0938       How much help from another person do you currently need...    Turning from your back to your side while in flat bed without using bedrails?  -- 4  -MF  --    Moving from lying on back to sitting on the side of a flat bed without bedrails?  -- 3  -MF  --    Moving to and from a bed to a chair (including a wheelchair)?  -- 3  -MF  --    Standing up from a chair using your arms (e.g., wheelchair, bedside chair)?  -- 3  -MF  --    Climbing 3-5 steps with a railing?  -- 2  -MF  --    To walk in hospital room?  -- 3  -MF  --    AM-PAC 6 Clicks Score  -- 18  -MF  --       How much help from another is currently needed...    Putting on and taking off regular lower body clothing? 2  -CJ  -- 2  -CJ    Bathing (including washing, rinsing, and drying) 2  -CJ  -- 2  -CJ    Toileting (which includes using toilet  bed pan or urinal) 2  -CJ  -- 2  -CJ    Putting on and taking off regular upper body clothing 2  -CJ  -- 2  -CJ    Taking care of personal grooming (such as brushing teeth) 3  -CJ  -- 3  -CJ    Eating meals 4  -CJ  -- 4  -CJ    Score 15  -CJ  -- 15  -CJ       Functional Assessment    Outcome Measure Options  -- AM-PAC 6 Clicks Basic Mobility (PT)  - AM-[facility] 6 Clicks Daily Activity (OT)  -    Row Name 10/14/18 0941             How much help from another person do you currently need...    Turning from your back to your side while in flat bed without using bedrails? 4  -LG      Moving from lying on back to sitting on the side of a flat bed without bedrails? 3  -LG      Moving to and from a bed to a chair (including a wheelchair)? 3  -LG      Standing up from a chair using your arms (e.g., wheelchair, bedside chair)? 3  -LG      Climbing 3-5 steps with a railing? 2  -LG      To walk in hospital room? 3  -LG      AM-PAC 6 Clicks Score 18  -LG         Functional Assessment    Outcome Measure Options AM-[facility] 6 Clicks Basic Mobility (PT)  -        User Key  (r) = Recorded By, (t) = Taken By, (c) = Cosigned By    Initials Name Provider Type    LG Roberto Pereira, URMILA Physical Therapy Assistant     Paulino Fuchs, [place]N/L Occupational Therapy Assistant     Liliane Collado, URMILA Physical Therapy Assistant           Time Calculation:         PT Charges     Row Name 10/16/18 1534 10/16/18 1038          Time Calculation    Start Time 1522  -MF 1002  -MF     Stop Time 1534  -MF 1030  -MF     Time Calculation (min) 12 min  -MF 28 min  -MF     PT Received On 10/16/18  - 10/16/18  -     PT Goal Re-Cert Due Date 10/18/18  - 10/18/18  -        Time Calculation- PT    Total Timed Code Minutes- PT 12 minute(s)  -MF 28 minute(s)  -MF        Timed Charges    49179 - Gait Training Minutes  12  -MF  --     25837 - PT Therapeutic Activity Minutes  -- 28  -MF       User Key  (r) = Recorded By, (t) = Taken By, (c) = Cosigned  By    Initials Name Provider Type    Liliane Bassett PTA Physical Therapy Assistant        Therapy Suggested Charges     Code   Minutes Charges    61189 (CPT®) Hc Pt Neuromusc Re Education Ea 15 Min      52836 (CPT®) Hc Pt Ther Proc Ea 15 Min      60039 (CPT®) Hc Gait Training Ea 15 Min 12 1    41360 (CPT®) Hc Pt Therapeutic Act Ea 15 Min      25948 (CPT®) Hc Pt Manual Therapy Ea 15 Min      76062 (CPT®) Hc Pt Iontophoresis Ea 15 Min      44690 (CPT®) Hc Pt Elec Stim Ea-Per 15 Min      17850 (CPT®) Hc Pt Ultrasound Ea 15 Min      50647 (CPT®) Hc Pt Self Care/Mgmt/Train Ea 15 Min      27472 (CPT®) Hc Pt Prosthetic (S) Train Initial Encounter, Each 15 Min      60881 (CPT®) Hc Pt Orthotic(S)/Prosthetic(S) Encounter, Each 15 Min      53049 (CPT®) Hc Orthotic(S) Mgmt/Train Initial Encounter, Each 15min      Total  12 1        Therapy Charges for Today     Code Description Service Date Service Provider Modifiers Qty    31859130637 HC PT THERAPEUTIC ACT EA 15 MIN 10/16/2018 Liliane Collado PTA GP, KX 2    01810663699 HC GAIT TRAINING EA 15 MIN 10/16/2018 Liliane Collado PTA GP, KX 1          PT G-Codes  PT Professional Judgement Used?: Yes  Outcome Measure Options: AM-PAC 6 Clicks Basic Mobility (PT)  AM-PAC 6 Clicks Score: 18  Score: 15  Functional Limitation: Mobility: Walking and moving around  Mobility: Walking and Moving Around Current Status (): At least 20 percent but less than 40 percent impaired, limited or restricted  Mobility: Walking and Moving Around Goal Status (): At least 1 percent but less than 20 percent impaired, limited or restricted    Liliane Collado PTA  10/16/2018

## 2018-10-16 NOTE — PROGRESS NOTES
[facility] Medicine Services  INPATIENT PROGRESS NOTE    Patient Name: Gavin Wilson  Date of Admission: 10/6/2018  Today's Date: 10/16/18  Length of Stay: 10  Primary Care Physician: Provider, No Known    Subjective   Chief Complaint:  F/u   HPI   Patient blood sugar is 50.  He was asleep when I came in.  The nurse followed shortly and fed him with peanut butter.  Representative from [facility] came in too informing him that they can't take him back anytime soon.  Afebrile        Review of Systems     All pertinent negatives and positives are as above. All other systems have been reviewed and are negative unless otherwise stated.     Objective    Temp:  [97.8 °F (36.6 °C)-98 °F (36.7 °C)] 97.8 °F (36.6 °C)  Heart Rate:  [80-91] 81  Resp:  [16-18] 18  BP: ()/(40-58) 98/54  Physical Exam   He is readily arousable.  He ate peanut butter with assistance.  He has not complained of any symptoms  Constitutional: He is oriented to person, place, and time. No distress. Chronically ill appearing.  Appears improved today   HENT:   Head: Atraumatic.   Temporal muscle wasting; prominent wasting around the neck and supraclavicular    Eyes: Conjunctivae are normal. No scleral icterus.   Cardiovascular: Normal rate, regular rhythm and intact distal pulses.    Murmur heard. Right upper chest permcath placed  Pulmonary/Chest: Effort normal. No respiratory distress. He has no wheezing. He has no rales.  Improved air movement   Abdominal: Soft. Bowel sounds are normal. He exhibits no distension. There is no tenderness.   RLQ ostomy  Neurological: He is alert and oriented to person, place, and time. Lethargic.  Skin: Skin is warm and dry. He is not diaphoretic.   Psychiatric: He has a normal mood and affect. His behavior is normal          Results Review:  I have reviewed the labs, radiology results, and diagnostic studies.    Laboratory Data:     Results from last 7 days  Lab Units  10/11/18  0355 10/10/18  0422   WBC 10*3/mm3 4.56* 5.35   HEMOGLOBIN g/dL 9.1* 10.2*   HEMATOCRIT % 28.7* 32.6*   PLATELETS 10*3/mm3 190 173          Results from last 7 days  Lab Units 10/15/18  0542 10/14/18  0417 10/13/18  0436   SODIUM mmol/L 139 141 139   POTASSIUM mmol/L 4.5 3.9 4.4   CHLORIDE mmol/L 102 101 99   CO2 mmol/L 28.0 28.0 21.0*   BUN mg/dL 36* 23* 23*   CREATININE mg/dL 3.27* 2.23* 2.02*   CALCIUM mg/dL 9.0 8.7 9.1   GLUCOSE mg/dL 133* 96 114*       Culture Data:        Radiology Data:   Imaging Results (last 24 hours)     Procedure Component Value Units Date/Time    IR Insert Tunneled CV Catheter Without Port 5 Plus [[id number]] Collected:  10/15/18 1257     Updated:  10/16/18 1559    Narrative:       Performed by Dr. Bonilla. Please see procedure note.  This report was finalized on 10/16/2018 15:56 by Dr. Hugo Bonilla MD.    FL C Arm During Surgery [[id number]] Collected:  10/15/18 1257     Updated:  10/16/18 1559    Narrative:       Performed by Dr. Bonilla. Please see procedure note.  This report was finalized on 10/16/2018 15:56 by Dr. Hugo Bonilla MD.          I have reviewed the patient's current medications.     Assessment/Plan     Active Hospital Problems    Diagnosis   • **Acute on chronic respiratory failure with hypoxia (CMS/HCC)   • Coronary artery disease involving native coronary artery of native heart without angina pectoris   • History of coronary artery stent placement   • Mild aortic valve stenosis   • Moderate aortic valve insufficiency   • Mild mitral valve regurgitation   • Pneumonia   • COPD (chronic obstructive pulmonary disease) (CMS/HCC)   • Sepsis (CMS/HCC)   • Acute renal failure superimposed on stage 4 chronic kidney disease (CMS/HCC)   • Acute on chronic combined systolic and diastolic congestive heart failure (CMS/HCC)   • Pleural effusion, bilateral       1) Acute hypoxic respiratory failure, resolved  2) Sepsis due to suspected health care associated pneumonia -  blood culture and pleural fluid culture showed no growth to date  3) Bilateral pleural effusions - Right is recurrent, left is worsening -status this post thoracentesis on October 7 Suspected due to heart failure, but cannot rule out infection or malignancy   4) Severe protein-calorie malnutrition  5) COPD without exacerbation  6) Combined systolic and diastolic heart failure, chronic (EF <40%)  7) Acute kidney injury on chronic kidney disease stage 5 ESRD -awaiting approval   8) Chronic normocytic anemia due to CKD  9) Thrombocytopenia, improved  10) Mild hypokalemia, resolved  11) Hyperphosphotemia  12) Metabolic acidosis, anion gap due to uremia  13) Constipation, resolved   140Hypoglycemia (50); hx of Diabetes -  132 130 114 123    Patient encourage to eat.  Will continue to monitor; prn d50; will consider /dc pre meal insulin and maintain only on ssi       Awaiting SNF and outpatient dialysis chair time  Discussed with rep from [facility].   Discussed with nurse          Discharge Planning: to be determined    Max Jones MD   10/16/18   4:28 PM

## 2018-10-16 NOTE — PLAN OF CARE
Problem: Patient Care Overview  Goal: Plan of Care Review  Outcome: Ongoing (interventions implemented as appropriate)   10/16/18 1401   OTHER   Outcome Summary Pt. progressing as expected, no issues other than fatigue and sob, refused to get oob! Ue exs for increasing act. lauri and adl tasks!   Coping/Psychosocial   Plan of Care Reviewed With patient

## 2018-10-16 NOTE — PROGRESS NOTES
Nephrology ([facility]) Progress Note      Patient:  Gavin Wilson  YOB: 1945  Date of Service: 10/16/2018  MRN: [id number]   Acct: [id number]   Primary Care Physician: Provider, No Known  Advance Directive:   Code Status and Medical Interventions:   Ordered at: 10/07/18 1309     Limited Support to NOT Include:    Intubation     Level Of Support Discussed With:    Patient     Code Status:    No CPR     Medical Interventions (Level of Support Prior to Arrest):    Limited     Admit Date: 10/6/2018       Hospital Day: 10  Referring Provider: Max Jones*      Patient personally seen and examined.  Complete chart including Consults, Notes, Operative Reports, Labs, Cardiology, and Radiology studies reviewed as able.        Subjective:  Gavin Wilson is a 73 y.o. male  whom we were consulted for CKD stage 4.  History of bladder cancer with prior cystectomy and ureterostomy formation.  Also history of esophogeal and prostate cancer.  Follows with nephrology at [facility]. Has left upper arm fistula in place.  Presented with dyspnea; admitted with bilateral pneumonia/pleural effusion.  Hospital course remarkable for attempt to use fistula on 10/10; infiltrated and was unable to dialyze.  On 10/11 had Permcath placed and first dialysis treatment; he had a run of V tach during first HD.  On 10/15 became hypotensive during dialysis.    Today is feeling fatigued; has nausea intermittently.  No new overnight issues.     Allergies:  Sulfa antibiotics    Home Meds:  Prescriptions Prior to Admission   Medication Sig Dispense Refill Last Dose   • acetaminophen (TYLENOL) 325 MG tablet Take 650 mg by mouth Every 6 (Six) Hours As Needed for Mild Pain .   Past Month at Unknown time   • albuterol (PROVENTIL HFA;VENTOLIN HFA) 108 (90 Base) MCG/ACT inhaler Inhale 2 puffs Every 6 (Six) Hours As Needed for Wheezing.   Past Week at Unknown time   • aspirin 81 MG chewable tablet Chew 81  mg Daily.   10/5/2018 at Unknown time   • atorvastatin (LIPITOR) 80 MG tablet Take 40 mg by mouth Daily.   10/5/2018 at Unknown time   • budesonide-formoterol (SYMBICORT) 160-4.5 MCG/ACT inhaler Inhale 2 puffs 2 (Two) Times a Day. 1 inhaler 2 10/5/2018 at Unknown time   • bumetanide (BUMEX) 2 MG tablet Take 1 tablet by mouth Daily. 30 tablet 0 10/5/2018 at Unknown time   • calcium carbonate (OS-TASHI) 600 MG tablet Take 600 mg by mouth Daily.   10/5/2018 at Unknown time   • cholecalciferol (VITAMIN D3) 1000 units tablet Take 3,000 Units by mouth Daily.   10/5/2018 at Unknown time   • insulin aspart (novoLOG FLEXPEN) 100 UNIT/ML solution pen-injector sc pen Inject 6 Units under the skin into the appropriate area as directed 3 (Three) Times a Day With Meals.   10/5/2018 at Unknown time   • insulin detemir (LEVEMIR) 100 UNIT/ML injection Inject 6 Units under the skin into the appropriate area as directed 2 (Two) Times a Day.   10/5/2018 at Unknown time   • loperamide (IMODIUM) 2 MG capsule Take 2 mg by mouth Daily As Needed for Diarrhea.   Past Month at Unknown time   • metoprolol tartrate (LOPRESSOR) 25 MG tablet Take 12.5 mg by mouth 2 (Two) Times a Day.   10/5/2018 at Unknown time   • omeprazole (priLOSEC) 20 MG capsule Take 20 mg by mouth Daily.   10/5/2018 at Unknown time   • oxyCODONE (ROXICODONE) 5 MG immediate release tablet Take 5 mg by mouth Every 6 (Six) Hours As Needed for Moderate Pain .   Past Week at Unknown time   • sildenafil (VIAGRA) 100 MG tablet Take 100 mg by mouth Daily As Needed for erectile dysfunction.   More than a month at Unknown time       Medicines:  Current Facility-Administered Medications   Medication Dose Route Frequency Provider Last Rate Last Dose   • acetaminophen (TYLENOL) tablet 650 mg  650 mg Oral Q4H PRN Max Jones MD       • albumin human 25 % IV SOLN 50 g  50 g Intravenous PRN Scott Soto MD   50 g at 10/15/18 1505   • amiodarone (PACERONE) tablet 200  mg  200 mg Oral Q24H Abdiel Stout MD   200 mg at 10/16/18 0839   • aspirin chewable tablet 81 mg  81 mg Oral Daily Vu Michael MD   81 mg at 10/16/18 0838   • atorvastatin (LIPITOR) tablet 80 mg  80 mg Oral Daily Max Jones MD   80 mg at 10/16/18 0838   • budesonide-formoterol (SYMBICORT) 160-4.5 MCG/ACT inhaler 2 puff  2 puff Inhalation BID - RT Max Jones MD   2 puff at 10/16/18 0748   • bumetanide (BUMEX) tablet 1 mg  1 mg Oral Daily Scott Soto MD   1 mg at 10/16/18 0839   • calcitriol (ROCALTROL) capsule 0.5 mcg  0.5 mcg Oral Daily Max Jones MD   0.5 mcg at 10/16/18 0839   • calcium carb-cholecalciferol 600-800 MG-UNIT tablet 1 tablet  1 tablet Oral Daily Max Jones MD   1 tablet at 10/16/18 0908   • dextrose (D50W) 25 g/ 50mL Intravenous Solution 25 g  25 g Intravenous Q15 Min PRN Max Jones MD       • dextrose (GLUTOSE) oral gel 15 g  15 g Oral Q15 Min PRN Max Jones MD       • Ferric Citrate tablet 210 mg  210 mg Oral TID With Meals Max Jones MD   210 mg at 10/16/18 0838   • glucagon (human recombinant) (GLUCAGEN DIAGNOSTIC) injection 1 mg  1 mg Subcutaneous PRN Max Jones MD       • heparin (porcine) 5000 UNIT/ML injection 5,000 Units  5,000 Units Subcutaneous Q12H Max Jones MD   5,000 Units at 10/16/18 0840   • heparin (porcine) injection 1,700 Units  1,700 Units Intracatheter PRN Samson Aragon MD   1,700 Units at 10/15/18 1758   • heparin (porcine) injection 1,700 Units  1,700 Units Intracatheter PRN aSmson Aragon MD   1,700 Units at 10/15/18 1800   • HYDROcodone-acetaminophen (NORCO)  MG per tablet 1 tablet  1 tablet Oral Q6H PRN Max Jones MD   1 tablet at 10/16/18 0910   • hydroxypropyl methylcellulose (ISOPTO TEARS) 2.5 % ophthalmic solution 1 drop  1 drop Both Eyes TID PRN Vu Michael MD    1 drop at 10/11/18 0636   • Influenza Vac Subunit Quad (FLUCELVAX) injection 0.5 mL  0.5 mL Intramuscular During Hospitalization Max Jones MD       • insulin lispro (humaLOG) injection 0-9 Units  0-9 Units Subcutaneous 4x Daily With Meals & Nightly Max Jones MD   2 Units at 10/14/18 2044   • insulin lispro (humaLOG) injection 6 Units  6 Units Subcutaneous TID AC Max Jones MD   6 Units at 10/16/18 0846   • ipratropium (ATROVENT) nebulizer solution 0.5 mg  0.5 mg Nebulization Q6H PRN Abdiel Stout MD   0.5 mg at 10/13/18 1619   • isosorbide dinitrate (ISORDIL) tablet 10 mg  10 mg Oral TID - Nitrates Abdiel Stout MD   10 mg at 10/16/18 0837   • lisinopril (PRINIVIL,ZESTRIL) tablet 2.5 mg  2.5 mg Oral Q24H Scott Soto MD   2.5 mg at 10/16/18 0839   • magnesium hydroxide (MILK OF MAGNESIA) suspension 2400 mg/10mL 10 mL  10 mL Oral Daily PRN Max Jones MD       • metoprolol tartrate (LOPRESSOR) tablet 25 mg  25 mg Oral Q12H Max Jones MD   25 mg at 10/16/18 0839   • nicotine (NICODERM CQ) 21 MG/24HR patch 1 patch  1 patch Transdermal Q24H Ashutosh Palafox MD   1 patch at 10/15/18 2026   • ondansetron (ZOFRAN) injection 4 mg  4 mg Intravenous Q6H PRN Chaya Duff APRN       • sennosides-docusate sodium (SENOKOT-S) 8.6-50 MG tablet 1 tablet  1 tablet Oral BID Vu Michael MD   1 tablet at 10/16/18 0840   • sodium chloride 0.9 % flush 3 mL  3 mL Intravenous Q12H Max Jones MD   3 mL at 10/16/18 0841   • sodium chloride 0.9 % flush 3-10 mL  3-10 mL Intravenous PRN Max Jones MD       • tetrahydrozoline 0.05 % ophthalmic solution 1 drop  1 drop Both Eyes 4x Daily PRN Ney De MD   1 drop at 10/10/18 0339       Past Medical History:  Past Medical History:   Diagnosis Date   • CHF (congestive heart failure) (CMS/[facility])    • Coronary stent occlusion    • Diabetes mellitus (CMS/[facility])    •  "Hyperlipidemia    • Hypertension    • Stroke (CMS/HCC)        Past Surgical History:  Past Surgical History:   Procedure Laterality Date   • BLADDER SURGERY     • ESOPHAGECTOMY     • INSERTION HEMODIALYSIS CATHETER N/A 10/11/2018    Procedure: HEMODIALYSIS CATHETER INSERTION;  Surgeon: Hugo Bonilla MD;  Location: [place] [address];  Service: Vascular       Family History  Family History   Problem Relation Age of Onset   • No Known Problems Father    • No Known Problems Mother        Social History  Social History     Social History   • Marital status:      Spouse name: N/A   • Number of children: N/A   • Years of education: N/A     Occupational History   • Not on file.     Social History Main Topics   • Smoking status: Current Every Day Smoker   • Smokeless tobacco: Never Used   • Alcohol use No   • Drug use: No   • Sexual activity: Not on file     Other Topics Concern   • Not on file     Social History Narrative   • No narrative on file         Review of Systems:  History obtained from chart review and the patient  General ROS: No fever or chills  Respiratory ROS: No cough, shortness of breath, wheezing  Cardiovascular ROS: No chest pain or palpitations  Gastrointestinal ROS: No abdominal pain or melena  Genito-Urinary ROS: No dysuria or hematuria  Neurological ROS: no headache or dizziness    Objective:  /58 (BP Location: Right arm, Patient Position: Sitting)   Pulse 85   Temp 97.8 °F (36.6 °C) (Temporal Artery )   Resp 18   Ht 170.2 cm (67\")   Wt 47.9 kg (105 lb 11.2 oz)   SpO2 95%   BMI 16.55 kg/m²     Intake/Output Summary (Last 24 hours) at 10/16/18 1101  Last data filed at 10/16/18 1000   Gross per 24 hour   Intake              240 ml   Output              525 ml   Net             -285 ml     General: awake/alert    Neck: supple, no JVD  Chest:  clear to auscultation bilaterally without respiratory distress  CVS: regular rate and rhythm  Abdominal: soft, nontender, normal " bowel sounds  Extremities: no cyanosis or edema  Skin: warm and dry without rash  Neuro: no focal motor deficits    Labs:    Results from last 7 days  Lab Units 10/11/18  0355 10/10/18  0422   WBC 10*3/mm3 4.56* 5.35   HEMOGLOBIN g/dL 9.1* 10.2*   HEMATOCRIT % 28.7* 32.6*   PLATELETS 10*3/mm3 190 173           Results from last 7 days  Lab Units 10/15/18  0542 10/14/18  0417 10/13/18  0436   SODIUM mmol/L 139 141 139   POTASSIUM mmol/L 4.5 3.9 4.4   CHLORIDE mmol/L 102 101 99   CO2 mmol/L 28.0 28.0 21.0*   BUN mg/dL 36* 23* 23*   CREATININE mg/dL 3.27* 2.23* 2.02*   CALCIUM mg/dL 9.0 8.7 9.1   GLUCOSE mg/dL 133* 96 114*       Radiology:   Imaging Results (last 72 hours)     Procedure Component Value Units Date/Time    US Thoracentesis [[id number]] Collected:  10/07/18 1323    Specimen:  Body Fluid Updated:  10/13/18 0934    Narrative:       DATE OF PROCEDURE:  10/7/2018.     PREPROCEDURE DIAGNOSIS:  Left pleural effusion.  POSTPROCEDURE DIAGNOSIS:  Left pleural effusion.          INDICATIONS FOR PROCEDURE: Shortness of breath.     PROCEDURE:   1. Thoracentesis, diagnostic and therapeutic.  2. Ultrasound guidance for thoracentesis, imaging supervision and  interpretation.     ANESTHESIA: 1% lidocaine, injected locally.          COMPLICATIONS: None.        EXAMINATIONS FOR COMPARISON:  CT chest dated 10/6/2018.     DESCRIPTION OF PROCEDURE:   The risk and benefits of the procedure were explained to the patient.   Specifically, the risks of bleeding, infection, pneumothorax (possible  thoracostomy tube), and damage to surrounding structures were discussed  extensively. The patient's questions were answered. The patient opted to  proceed. Written and verbal consent were obtained from the patient.     TIME OUT was taken and the patient's name, medical record number, date  of birth, procedure, entry site, and listen allergies were confirmed.  The site of the procedure was confirmed and marked.      The leftposterior thorax  was prepped and draped in sterile fashion. The  area was anesthetized with buffered lidocaine 2%.      A 5 Upper sorbian 10 cm  catheter was inserted into the pleural effusion  using  ultrasound guidance. Approximately 400 mL of clear fluid was recovered  and sent to the pathology lab for analysis.      The patient tolerated the procedure well.   No immediate complications  were noted.       Impression:       Successful ultrasound guided leftthoracentesis. Approximately 400 mL of  clear fluid was recovered and sent to the pathology lab for analysis.   No immediate complications were noted.              This report was finalized on 10/07/2018 13:26 by Dr. Petra Mckinley MD.     Chest [[id number]] Collected:  10/07/18 1323     Updated:  10/13/18 0934    Narrative:       DATE OF PROCEDURE:  10/7/2018.     PREPROCEDURE DIAGNOSIS:  Left pleural effusion.  POSTPROCEDURE DIAGNOSIS:  Left pleural effusion.          INDICATIONS FOR PROCEDURE: Shortness of breath.     PROCEDURE:   1. Thoracentesis, diagnostic and therapeutic.  2. Ultrasound guidance for thoracentesis, imaging supervision and  interpretation.     ANESTHESIA: 1% lidocaine, injected locally.          COMPLICATIONS: None.        EXAMINATIONS FOR COMPARISON:  CT chest dated 10/6/2018.     DESCRIPTION OF PROCEDURE:   The risk and benefits of the procedure were explained to the patient.   Specifically, the risks of bleeding, infection, pneumothorax (possible  thoracostomy tube), and damage to surrounding structures were discussed  extensively. The patient's questions were answered. The patient opted to  proceed. Written and verbal consent were obtained from the patient.     TIME OUT was taken and the patient's name, medical record number, date  of birth, procedure, entry site, and listen allergies were confirmed.  The site of the procedure was confirmed and marked.      The leftposterior thorax was prepped and draped in sterile fashion. The  area was anesthetized with  buffered lidocaine 2%.      A 5 English 10 cm  catheter was inserted into the pleural effusion  using  ultrasound guidance. Approximately 400 mL of clear fluid was recovered  and sent to the pathology lab for analysis.      The patient tolerated the procedure well.   No immediate complications  were noted.       Impression:       Successful ultrasound guided leftthoracentesis. Approximately 400 mL of  clear fluid was recovered and sent to the pathology lab for analysis.   No immediate complications were noted.              This report was finalized on 10/07/2018 13:26 by Dr. Petra Mckinley MD.          Culture:         Assessment   1.  CKD stage 4--now with progression to ESRD  2.  Type 2 diabetes with nephropathy  3.  Bilateral pneumonia  4.  Chronic diastolic congestive heart failure  5.  S/p ventricular tachycardia on 10/10  6.  Metabolic acidosis  7.  Secondary hyperparathyroidism    Plan:  1.  Dialysis next due 10/17  2.  Monitor labs  3.  Working on SNF and outpatient dialysis placement      Roberto Carlos Myrick, MACO  10/16/2018  11:01 AM

## 2018-10-16 NOTE — PROGRESS NOTES
Continued Stay Note   Jie     Patient Name: Gavin Wilson  MRN: [id number]  Today's Date: 10/16/2018    Admit Date: 10/6/2018          Discharge Plan     Row Name 10/16/18 1647       Plan    Plan Comments CORD WITH TRISTON CALLED AND STATES HE WILL CONTACT [facility] TO VERIFY. INFORMED CORD THAT PT IS READY FOR DC PENDING ARRANGEMENTS. CALLED MICHAELA ([phone]) AND PT HAS BEEN ASSIGNED TO [facility] WHO HAS LEFT FOR THE DAY. WILL CONTACT [facility] IN THE MORNING REGARDING OUTPT HD              Discharge Codes    No documentation.           SILVESTRE Busby

## 2018-10-16 NOTE — PLAN OF CARE
Problem: Patient Care Overview  Goal: Plan of Care Review  Outcome: Ongoing (interventions implemented as appropriate)   10/16/18 1002   Plan of Care Review   Progress declining   OTHER   Outcome Summary Pt. agreeable to therapy, but only because he needed to have a BM. Pt. was CGA for rolling in bed. Pt.stood x 5 times over the course of tx and took steps to and from BSC due to having loose, constant stools. Pt declined to do any other activity due to fatigue and nausea. Pt. will need SNF placement due to lack of mobility.    Coping/Psychosocial   Plan of Care Reviewed With patient

## 2018-10-16 NOTE — PROGRESS NOTES
LOS: 10 days   Patient Care Team:  Provider, No Known as PCP - General    Chief Complaint:   Acute on chronic respiratory failure with hypoxia (CMS/HCC)    Acute renal failure superimposed on stage 4 chronic kidney disease (CMS/HCC)    Acute on chronic combined systolic and diastolic congestive heart failure (CMS/HCC)    Pleural effusion, bilateral    Sepsis (CMS/HCC)    Coronary artery disease involving native coronary artery of native heart without angina pectoris    History of coronary artery stent placement    Mild aortic valve stenosis    Moderate aortic valve insufficiency    Mild mitral valve regurgitation    Pneumonia    COPD (chronic obstructive pulmonary disease) (CMS/HCC)    Shortness of breath    Subjective    Feels better  Much more talkative  Much less shortness of breath  Still gets tired  No significant arrhythmias  Tolerating by mouth medications well  Oral intake has improved some  Still feels weak and tired and exhausted  Mild cough  No orthopnea paroxysmal nocturnal dyspnea  Denies chest pain or significant palpitations    Interval History: Improved overall    Telemetry: Maintaining sinus rhythm    Review of Systems     Constitutional: No chills   Has fatigue   No fever.   HENT: Negative.    Eyes: Negative.      Respiratory: Positive for cough,   No chest wall soreness,   Shortness of breath,   no wheezing, no stridor.      Cardiovascular: Atypical chest pain,   No palpitations   No significant  leg swelling.     Gastrointestinal: Negative for abdominal distention,  No abdominal pain,   No blood in stool,   No constipation,   No diarrhea,   No nausea   No vomiting.     Endocrine: Negative.    Genitourinary: Negative for difficulty urinating, dysuria, flank pain and hematuria.     Musculoskeletal: Negative.    Skin: Negative for rash and wound.   Allergic/Immunologic: Negative.      Neurological: Negative for dizziness, syncope, weakness,   No light-headedness  No  headaches.     Hematological:  Does not bruise/bleed easily.     Psychiatric/Behavioral: Negative for agitation or behavioral problems,   No confusion,   the patient is  nervous/anxious.       History:   Past Medical History:   Diagnosis Date   • CHF (congestive heart failure) (CMS/HCC)    • Coronary stent occlusion    • Diabetes mellitus (CMS/HCC)    • Hyperlipidemia    • Hypertension    • Stroke (CMS/HCC)      Past Surgical History:   Procedure Laterality Date   • BLADDER SURGERY     • ESOPHAGECTOMY     • INSERTION HEMODIALYSIS CATHETER N/A 10/11/2018    Procedure: HEMODIALYSIS CATHETER INSERTION;  Surgeon: Hugo Bonilla MD;  Location: [place] [address];  Service: Vascular     Social History     Social History   • Marital status:      Spouse name: N/A   • Number of children: N/A   • Years of education: N/A     Occupational History   • Not on file.     Social History Main Topics   • Smoking status: Current Every Day Smoker   • Smokeless tobacco: Never Used   • Alcohol use No   • Drug use: No   • Sexual activity: Not on file     Other Topics Concern   • Not on file     Social History Narrative   • No narrative on file     Family History   Problem Relation Age of Onset   • No Known Problems Father    • No Known Problems Mother        Labs:  WBC No results found for: WBC   HGB No results found for: HGB   HCT No results found for: HCT   Platelets No results found for: PLT   MCV No results found for: MCV       Results from last 7 days  Lab Units 10/15/18  0542 10/14/18  0417 10/13/18  0436   SODIUM mmol/L 139 141 139   POTASSIUM mmol/L 4.5 3.9 4.4   CHLORIDE mmol/L 102 101 99   CO2 mmol/L 28.0 28.0 21.0*   BUN mg/dL 36* 23* 23*   CREATININE mg/dL 3.27* 2.23* 2.02*   CALCIUM mg/dL 9.0 8.7 9.1   GLUCOSE mg/dL 133* 96 114*     Lab Results   Component Value Date    TROPONINI 4.090 (C) 10/06/2018     PT/INR:    No results found for: PROTIME/  No results found for: INR    Imaging Results (last 72 hours)     Procedure Component Value  Units Date/Time    CT Chest Without Contrast [[id number]] Collected:  10/06/18 1752     Updated:  10/06/18 1759    Narrative:       EXAMINATION:   CT CHEST WO CONTRAST-  10/6/2018 5:52 PM CDT     CT CHEST WO CONTRAST- 10/6/2018 5:33 PM CDT     HISTORY: Shortness of breath     COMPARISON: August 12, 2018 DOSE LENGTH PRODUCT: 185 mGy cm. Automated  exposure control was also utilized to decrease patient radiation dose.     TECHNIQUE: Serial helical tomographic images of the chest were acquired.  Multiplanar reformatted images were provided for review.     FINDINGS:  The imaged portion of the neck and thyroid gland is unremarkable.      The upper lungs are clear.. The lower lobes are obscured by large  bilateral pleural effusions.. The trachea and bronchial tree are patent.     Cardiac silhouette may be mildly enlarged. Extensive vascular  calcifications noted in the aortic arch origin of the great vessels and  coronary arteries.. Endovascular aortic stent graft is noted below the  diaphragm.    .      No acute findings are seen in the bones or surrounding soft tissues.        .       Impression:       1. Large bilateral pleural effusions which essentially obscures the  lower lobes.  2. Extensive vascular calcifications present  3. Endovascular stent graft is present in the abdomen.        This report was finalized on 10/06/2018 17:55 by Dr. Duc Garibay MD.          Objective     Allergies   Allergen Reactions   • Sulfa Antibiotics        Medication Review: Performed  Current Facility-Administered Medications   Medication Dose Route Frequency Provider Last Rate Last Dose   • acetaminophen (TYLENOL) tablet 650 mg  650 mg Oral Q4H PRN Max Jones MD       • albumin human 25 % IV SOLN 50 g  50 g Intravenous PRN [place] Soto MD   50 g at 10/15/18 1505   • amiodarone (PACERONE) tablet 200 mg  200 mg Oral Q24H Abdiel Stout MD   200 mg at 10/16/18 0839   • aspirin chewable tablet 81 mg  81 mg Oral  Daily Vu Michael MD   81 mg at 10/16/18 0838   • atorvastatin (LIPITOR) tablet 80 mg  80 mg Oral Daily Max Jones MD   80 mg at 10/16/18 0838   • budesonide-formoterol (SYMBICORT) 160-4.5 MCG/ACT inhaler 2 puff  2 puff Inhalation BID - RT Max Jones MD   2 puff at 10/16/18 0748   • bumetanide (BUMEX) tablet 1 mg  1 mg Oral Daily [place] Soto MD   1 mg at 10/16/18 0839   • calcitriol (ROCALTROL) capsule 0.5 mcg  0.5 mcg Oral Daily aMx Jones MD   0.5 mcg at 10/16/18 0839   • calcium carb-cholecalciferol 600-800 MG-UNIT tablet 1 tablet  1 tablet Oral Daily Max Jones MD   1 tablet at 10/16/18 0908   • dextrose (D50W) 25 g/ 50mL Intravenous Solution 25 g  25 g Intravenous Q15 Min PRN Max Jones MD       • dextrose (GLUTOSE) oral gel 15 g  15 g Oral Q15 Min PRN Max Jones MD       • Ferric Citrate tablet 210 mg  210 mg Oral TID With Meals Max Jones MD   210 mg at 10/16/18 1715   • glucagon (human recombinant) (GLUCAGEN DIAGNOSTIC) injection 1 mg  1 mg Subcutaneous PRN Max Jones MD       • heparin (porcine) 5000 UNIT/ML injection 5,000 Units  5,000 Units Subcutaneous Q12H Max Jones MD   5,000 Units at 10/16/18 0840   • heparin (porcine) injection 1,700 Units  1,700 Units Intracatheter PRN Samson Aragon MD   1,700 Units at 10/15/18 1758   • heparin (porcine) injection 1,700 Units  1,700 Units Intracatheter PRN Samson Aragon MD   1,700 Units at 10/15/18 1800   • HYDROcodone-acetaminophen (NORCO)  MG per tablet 1 tablet  1 tablet Oral Q6H PRN Max Jones Riego, MD   1 tablet at 10/16/18 1720   • hydroxypropyl methylcellulose (ISOPTO TEARS) 2.5 % ophthalmic solution 1 drop  1 drop Both Eyes TID PRN Vu Michael MD   1 drop at 10/11/18 0636   • Influenza Vac Subunit Quad (FLUCELVAX) injection 0.5 mL  0.5 mL Intramuscular  "During Hospitalization Max Jones MD       • insulin lispro (humaLOG) injection 0-9 Units  0-9 Units Subcutaneous 4x Daily With Meals & Nightly Max Jones MD   2 Units at 10/16/18 1208   • insulin lispro (humaLOG) injection 6 Units  6 Units Subcutaneous TID AC Max Jones MD   6 Units at 10/16/18 1208   • ipratropium (ATROVENT) nebulizer solution 0.5 mg  0.5 mg Nebulization Q6H PRN Abdiel Stout MD   0.5 mg at 10/13/18 1619   • isosorbide dinitrate (ISORDIL) tablet 10 mg  10 mg Oral TID - Nitrates Abdiel Stout MD   10 mg at 10/16/18 1714   • lisinopril (PRINIVIL,ZESTRIL) tablet 2.5 mg  2.5 mg Oral Q24H Scott Soto MD   2.5 mg at 10/16/18 0839   • magnesium hydroxide (MILK OF MAGNESIA) suspension 2400 mg/10mL 10 mL  10 mL Oral Daily PRN Max Jones MD       • metoprolol tartrate (LOPRESSOR) tablet 25 mg  25 mg Oral Q12H Max Jones MD   25 mg at 10/16/18 0839   • nicotine (NICODERM CQ) 21 MG/24HR patch 1 patch  1 patch Transdermal Q24H Ashutosh Palafox MD   1 patch at 10/15/18 2026   • ondansetron (ZOFRAN) injection 4 mg  4 mg Intravenous Q6H PRN Chaya Duff APRN   4 mg at 10/16/18 1212   • sodium chloride 0.9 % flush 3 mL  3 mL Intravenous Q12H Max Jones MD   3 mL at 10/16/18 0841   • sodium chloride 0.9 % flush 3-10 mL  3-10 mL Intravenous PRN Max Jones MD       • tetrahydrozoline 0.05 % ophthalmic solution 1 drop  1 drop Both Eyes 4x Daily PRN Ney De MD   1 drop at 10/10/18 0339       Vital Sign Min/Max for last 24 hours  Temp  Min: 97.1 °F (36.2 °C)  Max: 98 °F (36.7 °C)   BP  Min: 98/54  Max: 111/58   Pulse  Min: 80  Max: 91   Resp  Min: 16  Max: 18   SpO2  Min: 94 %  Max: 96 %   No Data Recorded   Weight  Min: 47.9 kg (105 lb 11.2 oz)  Max: 47.9 kg (105 lb 11.2 oz)     Flowsheet Rows      First Filed Value   Admission Height  162.6 cm (64\") Documented at 10/06/2018 0400 "   Admission Weight  48.9 kg (107 lb 12.8 oz) Documented at 10/06/2018 0400          Results for orders placed during the hospital encounter of 10/06/18   Adult Transthoracic Echo Limited W/ Cont if Necessary Per Protocol    Addendum · Estimated EF = 37%. · Left ventricular diastolic dysfunction. · Mild aortic valve stenosis is present. · No evidence of pulmonary hypertension is present. · There is a trivial pericardial effusion. There is no evidence of cardiac  tamponade. · There is a moderate size left pleural effusion.        Abdiel Stout MD 10/6/2018  7:24 PM                  Physical Exam:    General Appearance: Awake, alert, in no acute distress  Eyes: Pupils equal and reactive    Ears: Appear intact with no abnormalities noted  Nose: Nares normal, no drainage  Neck: supple, trachea midline, no carotid bruit and has JVD  Back: no kyphosis present,    Lungs: respirations regular, respirations even and respirations unlabored  Basilar crackles  Decrease air entry bilateral bases    Heart: normal S1, S2,  2/6 pansystolic murmur in the left sternal border,  no rub and no click    Abdomen: normal bowel sounds, no tenderness   Skin: no bleeding, bruising or rash  Extremities: no cyanosis  Psychiatric/Behavioral: Negative for agitation, behavioral problems, confusion, the patient does  appear to be nervous/anxious.          Results Review:   I reviewed the patient's new clinical results.  I reviewed the patient's new imaging results and agree with the interpretation.  I reviewed the patient's other test results and agree with the interpretation  I personally viewed and interpreted the patient's EKG/Telemetry data  Discussed with patient    Reviewed available prior notes, consults, prior visits, laboratory findings, radiology and cardiology relevant reports. Updated chart as applicable. I have reviewed the patient's medical history in detail and updated the computerized patient record as relevant.    Updated patient  regarding any new or relevant abnormalities on review of records or any new findings on physical exam. Mentioned to patient about purpose of visit and desirable health short and long term goals and objectives.     Assessment/Plan     Active Problems:    Sepsis (CMS/HCC)  Non-STEMI  Known coronary artery disease next the prior stent placement more than 10 years ago  Prior severe left ventricular systolic dysfunction, currently left ventricle ejection fraction moderately depressed to 37%   start amiodarone therapy orally  Discontinued albuterol   Moderate aortic regurgitation  Mild aortic stenosis  Mild mitral regurgitation  Pneumonia  Sepsis  Hypoxia  Kidney failure              Plan    Similar recommendations as yesterday  Continue dialysis  Continue hydralazine and Isordil       Continue Ace inhibitors, increase lisinopril from 2.5 to 5 mg daily  Decrease amiodarone to 200 mg daily  Avoid beta agonists  Monitor CBC and BMP   Follow-up with Dr. Bateman or NP-PA 4 weeks after discharge    Supportive care  Telemetry  Optimal medical therapy  Deep vein thrombosis prophylaxis/precautions  Appropriate diet, fluid, sodium, caffeine, stimulants intake     Compliance to diet and medications   Avoid NSAIDS  No additional cardiac testing currently required  Recall when necessary  Will sign off  Okay to discharge to nursing home  Abdiel Stout MD  10/16/18  6:18 PM    EMR Dragon/Transcription was used to dictate part of this note

## 2018-10-16 NOTE — THERAPY TREATMENT NOTE
Acute Care - Physical Therapy Treatment Note  [facility]     Patient Name: Gavin Wilson  : 1945  MRN: [id number]  Today's Date: 10/16/2018  Onset of Illness/Injury or Date of Surgery: 10/06/18  Date of Referral to PT: 10/08/18  Referring Physician: Dr. Michael    Admit Date: 10/6/2018    Visit Dx:    ICD-10-CM ICD-9-CM   1. Esophageal dysphagia R13.10 787.20   2. Impaired mobility and ADLs Z74.09 799.89   3. Impaired functional mobility, balance, gait, and endurance Z74.09 V49.89   4. Chronic kidney disease, stage 4 (severe) (CMS/HCC) N18.4 585.4     Patient Active Problem List   Diagnosis   • Volume overload   • Acute renal failure superimposed on stage 4 chronic kidney disease (CMS/HCC)   • Acute on chronic combined systolic and diastolic congestive heart failure (CMS/HCC)   • Pleural effusion, bilateral   • Sepsis (CMS/HCC)   • Coronary artery disease involving native coronary artery of native heart without angina pectoris   • History of coronary artery stent placement   • Mild aortic valve stenosis   • Moderate aortic valve insufficiency   • Mild mitral valve regurgitation   • Acute on chronic respiratory failure with hypoxia (CMS/HCC)   • Pneumonia   • COPD (chronic obstructive pulmonary disease) (CMS/[facility])       Therapy Treatment          Rehabilitation Treatment Summary     Row Name 10/16/18 1002             Treatment Time/Intention    Discipline physical therapy assistant  -      Document Type therapy note (daily note)  -MF2      Subjective Information complains of;weakness;nausea/vomiting  -MF2      Mode of Treatment physical therapy  -MF2      Existing Precautions/Restrictions fall  -MF2      Recorded by [MF] Liliane Collado PTA 10/16/18 1002  [MF2] Liliane Collado, URMILA 10/16/18 1035      Row Name 10/16/18 1002             Bed Mobility Assessment/Treatment    Rolling Left [place] (Bed Mobility) verbal cues;contact guard  -      Rolling Right [place] (Bed Mobility) verbal  cues;contact guard  -      Supine-Sit Stokes (Bed Mobility) verbal cues;contact guard;minimum assist (75% patient effort)  -      Sit-Supine Stokes (Bed Mobility) verbal cues;contact guard  -      Bed Mobility, Safety Issues decreased use of arms for pushing/pulling;decreased use of legs for bridging/pushing  -      Assistive Device (Bed Mobility) head of bed elevated;bed rails  -      Comment (Bed Mobility) pt. requested to be put on bed pan prior to getting up. Did this and cleaned pt up.   -      Recorded by [] Liliane Collado, PTA 10/16/18 1035      Row Name 10/16/18 1002             Transfer Assessment/Treatment    Comment (Transfers) Pt. stood and began to have more BM. Pt. took steps from bed to BSC, where nursing assisted with cleaning up. Pt. stood 3 more times from BSC. Then pt. took steps from BSC over to bed and then side steps. Pt. refused to do anything else due to fatigue and nausea.   -      Recorded by [] Liliane Collado, PTA 10/16/18 1035      Row Name 10/16/18 1002             Sit-Stand Transfer    Sit-Stand Stokes (Transfers) verbal cues;minimum assist (75% patient effort)  -      Recorded by [] Liliane Collado, PTA 10/16/18 1035      Row Name 10/16/18 1002             Stand-Sit Transfer    Stand-Sit Stokes (Transfers) verbal cues;minimum assist (75% patient effort)  -      Recorded by [] Liliane Collado, PTA 10/16/18 1035      Row Name 10/16/18 1002             Stand Pivot/Stand Step Transfer    Stand Pivot/Stand Step Stokes verbal cues;minimum assist (75% patient effort)  -      Recorded by [] Liliane Collado, PTA 10/16/18 1035      Row Name 10/16/18 1002             Toilet Transfer    Type (Toilet Transfer) sit-stand;stand-sit;stand pivot/stand step  -      Stokes Level (Toilet Transfer) verbal cues;minimum assist (75% patient effort)  -      Recorded by [] Liliane Collado, PTA 10/16/18 1035       Row Name 10/16/18 1002             Gait/Stairs Assessment/Training    Comment (Gait/Stairs) refused  -MF      Recorded by [] Liliane Collado, PTA 10/16/18 1035      Row Name 10/16/18 1002             Positioning and Restraints    Pre-Treatment Position in bed  -      Post Treatment Position bed  -MF      In Bed notified nsg;side lying left;call light within reach;encouraged to call for assist;side rails up x2;legs elevated  -MF      Recorded by [] Liliane Collado, PTA 10/16/18 1035      Row Name 10/16/18 1002             Pain Scale: Numbers Pre/Post-Treatment    Pain Scale: Numbers, Pretreatment 0/10 - no pain  -      Pain Scale: Numbers, Post-Treatment 0/10 - no pain  -MF      Recorded by [] Liliane Collado, PTA 10/16/18 1035      Row Name                Wound 10/07/18 1255 Bilateral posterior coccyx    Wound - Properties Group Date first assessed: 10/07/18 [SH] Time first assessed: 1255 [SH] Side: Bilateral [SH] Orientation: posterior [SH] Location: coccyx [SH] Recorded by:  [SH] Virginia Ahn RN 10/07/18 1256    Row Name                Wound 10/11/18 0831 Other (See comments) chest incision    Wound - Properties Group Date first assessed: 10/11/18 [DS] Time first assessed: 0831 [DS] Side: Other (See comments) [DS] Location: chest [DS] Type: incision [DS] Recorded by:  [DS] Jeremy Liu RN 10/11/18 0831      User Key  (r) = Recorded By, (t) = Taken By, (c) = Cosigned By    Initials Name Effective Dates Discipline    SH Virginia Ahn RN 08/28/17 -  Nurse    DS Jeremy Liu RN 08/02/16 -  Nurse     Liliane Collado, PTA 08/02/16 -  PT          Wound 10/07/18 1255 Bilateral posterior coccyx (Active)   Dressing Appearance dry;intact 10/16/2018  7:30 AM   Closure DAVID 10/16/2018  7:30 AM   Base dressing in place, unable to visualize 10/16/2018  7:30 AM   Periwound intact;dry;redness 10/16/2018  7:30 AM   Periwound Temperature warm 10/16/2018  7:30 AM   Periwound Skin  Turgor soft 10/16/2018  7:30 AM   Drainage Amount none 10/16/2018  7:30 AM   Dressing Care, Wound foam 10/15/2018  7:33 PM   Periwound Care, Wound absorptive dressing applied 10/15/2018  7:33 PM       Wound 10/11/18 0831 Other (See comments) chest incision (Active)   Base dressing in place, unable to visualize 10/15/2018  7:33 PM             Physical Therapy Education     Title: PT OT SLP Therapies (Active)     Topic: Physical Therapy (Active)     Point: Mobility training (Active)    Learning Progress Summary     Learner Status Readiness Method Response Comment Documented by    Patient Active Acceptance E,D NR transfers and benefits of activity MF 10/16/18 1035     Active Acceptance E,D NR Educated on benefits of activity. Intructed in safety with bed mobility, transfers,and LE Strengthening Exercises. LG 10/14/18 0918     Done Acceptance E VU,NR benefits of activity  10/12/18 1206     Active Acceptance E,D RT Effecient supine to sit EARLENE 10/09/18 1154     Done Acceptance E VU,NR Pt was educated on the importance and benefits of getting out of bed and how it affects his overall health. Pt was educated on why PT is working with him. Pt was encouraged to do more in treatment session. TD 10/08/18 1430     Done Acceptance E VU,NR Pt  was educated on benefits ot PT and PT POC. Pt was edcuated on body mechanics with transfers (pushing with one arm on bed to stand up/having legs touch bed before sitting down) and safety precautions (socks on, gait belt on) with transfers and gait. TD 10/08/18 1149          Point: Body mechanics (Active)    Learning Progress Summary     Learner Status Readiness Method Response Comment Documented by    Patient Active Acceptance E,D NR Educated on benefits of activity. Intructed in safety with bed mobility, transfers,and LE Strengthening Exercises. LG 10/14/18 0918     Done Acceptance E VU,NR Pt was educated on the importance and benefits of getting out of bed and how it affects his overall  health. Pt was educated on why PT is working with him. Pt was encouraged to do more in treatment session. TD 10/08/18 1430     Done Acceptance E VU,NR Pt  was educated on benefits ot PT and PT POC. Pt was edcuated on body mechanics with transfers (pushing with one arm on bed to stand up/having legs touch bed before sitting down) and safety precautions (socks on, gait belt on) with transfers and gait. TD 10/08/18 1149          Point: Precautions (Active)    Learning Progress Summary     Learner Status Readiness Method Response Comment Documented by    Patient Active Acceptance E,D NR Educated on benefits of activity. Intructed in safety with bed mobility, transfers,and LE Strengthening Exercises. LG 10/14/18 0918     Done Acceptance E VU,NR Pt was educated on the importance and benefits of getting out of bed and how it affects his overall health. Pt was educated on why PT is working with him. Pt was encouraged to do more in treatment session. TD 10/08/18 1430     Done Acceptance E VU,NR Pt  was educated on benefits ot PT and PT POC. Pt was edcuated on body mechanics with transfers (pushing with one arm on bed to stand up/having legs touch bed before sitting down) and safety precautions (socks on, gait belt on) with transfers and gait. TD 10/08/18 1149                      User Key     Initials Effective Dates Name Provider Type Discipline    LG 08/02/16 -  Roberto Pereira PTA Physical Therapy Assistant PT    EARLENE 08/02/16 -  Kaylynn Mcgarry PTA Physical Therapy Assistant PT     08/02/16 -  Liliane Collado PTA Physical Therapy Assistant PT    TD 09/07/18 -  Rita Grullon PT Student PT Student PT                    PT Recommendation and Plan     Plan of Care Reviewed With: patient  Progress: declining  Outcome Summary: Pt. agreeable to therapy, but only because he needed to have a BM. Pt. was CGA for rolling in bed. Pt.stood x 5 times over the course of tx and took steps to and from BSC due to having loose,  constant stools. Pt declined to do any other activity due to fatigue and nausea. Pt. will need SNF placement due to lack of mobility.           Outcome Measures     Row Name 10/16/18 1002 10/15/18 0938 10/14/18 0941       How much help from another person do you currently need...    Turning from your back to your side while in flat bed without using bedrails? 4  -MF  -- 4  -LG    Moving from lying on back to sitting on the side of a flat bed without bedrails? 3  -MF  -- 3  -LG    Moving to and from a bed to a chair (including a wheelchair)? 3  -MF  -- 3  -LG    Standing up from a chair using your arms (e.g., wheelchair, bedside chair)? 3  -MF  -- 3  -LG    Climbing 3-5 steps with a railing? 2  -MF  -- 2  -LG    To walk in hospital room? 3  -MF  -- 3  -LG    AM-PAC 6 Clicks Score 18  -MF  -- 18  -LG       How much help from another is currently needed...    Putting on and taking off regular lower body clothing?  -- 2  -CJ  --    Bathing (including washing, rinsing, and drying)  -- 2  -CJ  --    Toileting (which includes using toilet bed pan or urinal)  -- 2  -CJ  --    Putting on and taking off regular upper body clothing  -- 2  -CJ  --    Taking care of personal grooming (such as brushing teeth)  -- 3  -CJ  --    Eating meals  -- 4  -CJ  --    Score  -- 15  -CJ  --       Functional Assessment    Outcome Measure Options AM-PAC 6 Clicks Basic Mobility (PT)  - AM-PAC 6 Clicks Daily Activity (OT)  -[facility]-PAC 6 Clicks Basic Mobility (PT)  -    Row Name 10/13/18 1513             How much help from another person do you currently need...    Turning from your back to your side while in flat bed without using bedrails? 4  -LG      Moving from lying on back to sitting on the side of a flat bed without bedrails? 3  -LG      Moving to and from a bed to a chair (including a wheelchair)? 3  -LG      Standing up from a chair using your arms (e.g., wheelchair, bedside chair)? 3  -LG      Climbing 3-5 steps with a railing? 2   -LG      To walk in hospital room? 3  -LG      AM-PAC 6 Clicks Score 18  -LG         Functional Assessment    Outcome Measure Options AM-PAC 6 Clicks Basic Mobility (PT)  -LG        User Key  (r) = Recorded By, (t) = Taken By, (c) = Cosigned By    Initials Name Provider Type     Roberto Pereira, PTA Physical Therapy Assistant    Paulino Lopez, [place]/L Occupational Therapy Assistant     Liliane Collado, URMILA Physical Therapy Assistant           Time Calculation:         PT Charges     Row Name 10/16/18 1038             Time Calculation    Start Time 1002  -MF      Stop Time 1030  -MF      Time Calculation (min) 28 min  -MF      PT Received On 10/16/18  -MF      PT Goal Re-Cert Due Date 10/18/18  -MF         Time Calculation- PT    Total Timed Code Minutes- PT 28 minute(s)  -         Timed Charges    07217 - PT Therapeutic Activity Minutes 28  -MF        User Key  (r) = Recorded By, (t) = Taken By, (c) = Cosigned By    Initials Name Provider Type     Liliane Collado, URMILA Physical Therapy Assistant        Therapy Suggested Charges     Code   Minutes Charges    45737 (CPT®) Hc Pt Neuromusc Re Education Ea 15 Min      40172 (CPT®) Hc Pt Ther Proc Ea 15 Min      15413 (CPT®) Hc Gait Training Ea 15 Min      63552 (CPT®) Hc Pt Therapeutic Act Ea 15 Min 28 2    13989 (CPT®) Hc Pt Manual Therapy Ea 15 Min      63812 (CPT®) Hc Pt Iontophoresis Ea 15 Min      35129 (CPT®) Hc Pt Elec Stim Ea-Per 15 Min      55333 (CPT®) Hc Pt Ultrasound Ea 15 Min      28308 (CPT®) Hc Pt Self Care/Mgmt/Train Ea 15 Min      63266 (CPT®) Hc Pt Prosthetic (S) Train Initial Encounter, Each 15 Min      52352 (CPT®) Hc Pt Orthotic(S)/Prosthetic(S) Encounter, Each 15 Min      20703 (CPT®) Hc Orthotic(S) Mgmt/Train Initial Encounter, Each 15min      Total  28 2        Therapy Charges for Today     Code Description Service Date Service Provider Modifiers Qty    85738185008 HC PT THERAPEUTIC ACT EA 15 MIN 10/16/2018 Liliane Collado,  PTA GP, KX 2          PT G-Codes  PT Professional Judgement Used?: Yes  Outcome Measure Options: AM-PAC 6 Clicks Basic Mobility (PT)  AM-PAC 6 Clicks Score: 18  Score: 15  Functional Limitation: Mobility: Walking and moving around  Mobility: Walking and Moving Around Current Status (): At least 20 percent but less than 40 percent impaired, limited or restricted  Mobility: Walking and Moving Around Goal Status (): At least 1 percent but less than 20 percent impaired, limited or restricted    Liliane Collado, PTA  10/16/2018

## 2018-10-16 NOTE — DISCHARGE PLACEMENT REQUEST
"[facility] percent [facility]. His  is Ricardo Bowles ([phone] fax)  I will be sending a referral to [facility] to arrange HD in [place]  Please call Korina at [phone] after referral reviewed.   Thanks!    Shiela Martinez (73 y.o. Male)     Date of Birth Social Security Number Address Home Phone MRN    1945  [id number] [facility] [address] [phone] [id number]    Confucianism Marital Status          Jehovah's witness        Admission Date Admission Type Admitting Provider Attending Provider Department, Room/Bed    10/6/18 Urgent Max Jones MD [place], Glenn Riego, MD [facility] 4B, 444/1    Discharge Date Discharge Disposition Discharge Destination                       Attending Provider:  Max Jones MD    Allergies:  Sulfa Antibiotics    Isolation:  Contact   Infection:  MRSA (10/07/18)   Code Status:  No CPR    Ht:  170.2 cm (67\")   Wt:  47.9 kg (105 lb 11.2 oz)    Admission Cmt:  None   Principal Problem:  Acute on chronic respiratory failure with hypoxia (CMS/HCC) [J96.21]                 Active Insurance as of 10/6/2018     Primary Coverage     Payor Plan Insurance Group Employer/Plan Group    [employer] [id number]      Payor Plan Address Payor Plan Phone Number Effective From Effective To    [employer] [phone] 8/6/2018     [address] [place] [id number]       Subscriber Name Subscriber Birth Date Member ID       SHIELA MARTINEZ 1945 [id number]                 Emergency Contacts      (Rel.) Home Phone Work Phone Mobile Phone    Tracey Prajapati (Daughter) [phone] -- --    Familia Martinez (Son) [phone] -- --            Insurance Information                [employer] Phone: [phone]    Subscriber: Shiela Martinez Subscriber#: [id number]    Group#:  Precert#:              History & Physical      Max Jones" "MD Trevon at 10/6/2018  4:21 AM              [facility] Services  HISTORY AND PHYSICAL    Date of Admission: 10/6/2018  Primary Care Physician: Provider, No Known    Subjective     Chief Complaint: \" my lung\"    History of Present Illness  73-year-old  man transferred from [facility] for further evaluation of shortness of breath.  He came to their emergency room with shortness of breath and chest pain.  ER physician requested transfer for further evaluation management of bilateral pneumonia, sepsis, hypoxia.  Patient received 4.5 g of Zosyn and was initially placed on BiPAP as he was described to be short of breath and had coarse noise on examination.  Lactic acid was 2.6, blood gas showed pH of 7.27, PCO2 of 25.  White count reportedly at 9000 with predominance of neutrophils at 89%.  Creatinine is 3.9 with BUN of 47, potassium 4.4.  Chest x-ray described to me as bilateral infiltrate.  Patient received 500 mL bolus of IV fluid and subsequently placed on 125 ML per hour.  Clinically the ER physician did not feel necessity for more fluid as his blood pressure was normal and lactic acid is no more than 4.  He was concerned that patient may be overloaded if given a 30 mL per kilogram fluid.    He is very poor historian. He said he came to the hospital because of his \"lungs\", back pain and knee pain.  He claims he has arthritis.  He did not endorse any cough or fever although record from transferring hospital indicates  symptoms of fever, cough, shortness of breath, vomiting, diarrhea, blood in urine.        Comparing above x-ray to x-ray below, obviously, the bilateral costophrenic and cardiophrenic angles are blurred.  Also difference in level aeration  of bilateral lung fields      08/17/2018 chest x-ray    Review of record indicates that patient was recently hospitalized on August 8 to 18, 2018.  At time he had volume overload with pulmonary vascular " congestion with suspicion for acute on chronic systolic heart failure.    Was the description of the dense retrocardiac consulate patient likely compressive atelectasis but cannot rule out pneumonia.  He had a CT scan of the chest were he was described to have moderate to large bilateral pleural effusion and underwent thoracentesis on August 17 with 450 mL removed.    Review of Systems   Patient is poor historian  Otherwise complete ROS reviewed and negative except as mentioned in the HPI.    Past Medical History:   Past Medical History:   Diagnosis Date   • CHF (congestive heart failure) (CMS/HCC)    • Coronary stent occlusion    • Diabetes mellitus (CMS/HCC)    • Hyperlipidemia    • Hypertension    • Stroke (CMS/HCC)      Past Surgical History:  Past Surgical History:   Procedure Laterality Date   • BLADDER SURGERY     • ESOPHAGECTOMY       Social History:  reports that he has been smoking.  He has never used smokeless tobacco. He reports that he does not drink alcohol or use drugs.    Family History: diabetes    Allergies:  Allergies   Allergen Reactions   • Sulfa Antibiotics      Medications:  Prior to Admission medications    Medication Sig Start Date End Date Taking? Authorizing Provider   aspirin 81 MG chewable tablet Chew 81 mg Daily.    ProviderDom MD   atorvastatin (LIPITOR) 80 MG tablet Take 80 mg by mouth Daily.    ProviderDom MD   budesonide-formoterol (SYMBICORT) 160-4.5 MCG/ACT inhaler Inhale 2 puffs 2 (Two) Times a Day. 8/18/18   Farooq Edwards MD   bumetanide (BUMEX) 2 MG tablet Take 1 tablet by mouth Daily. 8/18/18   Farooq Edwards MD   calcitriol (ROCALTROL) 0.5 MCG capsule Take 1 capsule by mouth Daily. 8/19/18   Farooq Edwards MD   calcium carbonate (OS-TASHI) 600 MG tablet Take 600 mg by mouth Daily.    ProviderDom MD   Ferric Citrate (AURYXIA) 1  MG(Fe) tablet Take 1 tablet by mouth 3 (Three) Times a Day With Meals. 8/18/18   Jerry  "Farooq MAN MD   HYDROcodone-acetaminophen (NORCO)  MG per tablet Take 1 tablet by mouth Every 6 (Six) Hours As Needed (back pain).    ProvideroDm MD   insulin lispro (humaLOG) 100 UNIT/ML injection Inject 6 Units under the skin into the appropriate area as directed 3 (Three) Times a Day Before Meals.    ProviderDom MD   metoprolol tartrate (LOPRESSOR) 25 MG tablet Take 25 mg by mouth 2 (Two) Times a Day.    ProviderDom MD   sodium bicarbonate 325 MG tablet Take 1 tablet by mouth 2 (Two) Times a Day. 8/18/18   Farooq Edwards MD     Objective     Vital Signs: BP 98/80   Pulse 96   Temp 97.1 °F (36.2 °C) (Oral)   Resp 17   Ht 162.6 cm (64\")   Wt 48.9 kg (107 lb 12.8 oz)   SpO2 94%   BMI 18.50 kg/m²    Physical Exam   Constitutional: He is oriented to person, place, and time. No distress.   Very thin man. BMI 18.5. no distress, pleasant, speaks in full sentences, no accessory muscle use, 21 mg nicotine patch on his right shoulder   HENT:   Head: Normocephalic and atraumatic.   Right Ear: External ear normal.   Left Ear: External ear normal.   Nose: Nose normal.   + hearing aid, dry oral mucosa   Eyes: Pupils are equal, round, and reactive to light. Conjunctivae and EOM are normal. Right eye exhibits no discharge. Left eye exhibits no discharge. No scleral icterus.   Neck: Normal range of motion. Neck supple. No JVD present. No tracheal deviation present. No thyromegaly present.   Cardiovascular: Normal rate, regular rhythm, normal heart sounds and intact distal pulses.    No murmur heard.  Pulmonary/Chest:   diminished breath, no wheezes or crackles, no accessory muscle use    Abdominal: Soft. Bowel sounds are normal. He exhibits no distension. There is no tenderness. There is no guarding.   Scaphoid abdomen; + urostomy with bag containing clear yellow urine   Musculoskeletal: He exhibits no edema or tenderness.   + thrill from the fistula on his left arm "   Lymphadenopathy:     He has no cervical adenopathy.   Neurological: He is alert and oriented to person, place, and time. He displays normal reflexes. No cranial nerve deficit. He exhibits normal muscle tone.   Skin: Skin is warm and dry. Capillary refill takes less than 2 seconds. He is not diaphoretic. No erythema. No pallor.   Psychiatric: He has a normal mood and affect. His behavior is normal. Judgment and thought content normal.   Vitals reviewed.          Results Reviewed:  Blood gas showed pH of 7.27, PCO2 of 25, PaO2 of 187, bicarbonate of 11.5 on 100% FiO2 arterial lactic acid is 2.6  White count is 9.1, hemoglobin 10.1, hematocrit 33, platelet of 165 MCV of 93, predominance of neutrophils at 89%  Troponin is 0.16; EKG showed sinus tachycardia without acute ST-T wave changes  Creatinine 3.9, BUN 47, carbon dioxide 19, potassium 4.4, sodium 140, total bilirubin 0.4, calcium 8.1, ALT 27, AST 16 glucose of 254  Lab Results (last 24 hours)     ** No results found for the last 24 hours. **        Imaging Results (last 24 hours)     ** No results found for the last 24 hours. **        I have personally reviewed and interpreted the radiology studies and ECG obtained at time of admission.       Echo in August 2018   · Left ventricular systolic function is moderately decreased. Estimated EF appears to be in the range of 31 - 35%.  · Left ventricular diastolic dysfunction (grade I) consistent with impaired relaxation.  · Left ventricular wall thickness is consistent with moderate posterior asymmetric hypertrophy.  · Mild mitral valve regurgitation is present  · Mild to moderate aortic valve regurgitation is present.  · Mild aortic valve stenosis is present.       Assessment / Plan     Assessment:     Hypoxia, acute respiratory failure - resolved  Sepsis from PNA (SIRS Hr 150, rr 30  T 100.3 from transferring facility   Abnormal CXR; suspected PNA with pleural effusion bilateral vs fluid overload from heart failure  or combination of both   CKD Stave IV; AV fistula left  Arm  DM type 2 insulin treated; last known aic level 8%  Hx of PVD  Hx of AAA with endovascular stent graft  Protein calorie malnutrition prob moderate  Lactic acidosis  Chronic Systolic heart failure probably with acute component (EF 31-35%  Urostomy status from hx of bladder cancer    Plan:   Cont empiric abxs  Diurese, follow volume status and electrolytes  ssi  Oxygen supplement to keep spo2 > 92%  dvt proph  Nutritionist consult  Other plan per orders    Code Status: dnr     I discussed the patient's findings and my recommendations with the patient and nurse    Estimated length of stay to be determine    Max Jones MD   10/06/18   4:21 AM              Electronically signed by Max Jones MD at 10/6/2018  5:30 AM       Hospital Medications (active)       Dose Frequency Start End    acetaminophen (TYLENOL) tablet 650 mg 650 mg Every 4 Hours PRN 10/6/2018     Sig - Route: Take 2 tablets by mouth Every 4 (Four) Hours As Needed for Mild Pain . - Oral    albumin human 25 % IV SOLN 50 g 50 g As Needed 10/15/2018 10/16/2018    Sig - Route: Infuse 200 mL into a venous catheter As Needed (Hypotension During Dialysis). - Intravenous    amiodarone (PACERONE) tablet 200 mg 200 mg Every 24 Hours Scheduled 10/16/2018     Sig - Route: Take 1 tablet by mouth Daily. - Oral    aspirin chewable tablet 81 mg 81 mg Daily 10/8/2018     Sig - Route: Chew 1 tablet Daily. - Oral    atorvastatin (LIPITOR) tablet 80 mg 80 mg Daily 10/6/2018     Sig - Route: Take 2 tablets by mouth Daily. - Oral    budesonide-formoterol (SYMBICORT) 160-4.5 MCG/ACT inhaler 2 puff 2 puff 2 Times Daily - RT 10/6/2018     Sig - Route: Inhale 2 puffs 2 (Two) Times a Day. - Inhalation    bumetanide (BUMEX) tablet 1 mg 1 mg Daily 10/16/2018     Sig - Route: Take 1 tablet by mouth Daily. - Oral    calcitriol (ROCALTROL) capsule 0.5 mcg 0.5 mcg Daily 10/6/2018     Sig - Route: Take  2 capsules by mouth Daily. - Oral    calcium carb-cholecalciferol 600-800 MG-UNIT tablet 1 tablet 1 tablet Daily 10/6/2018     Sig - Route: Take 1 tablet by mouth Daily. - Oral    dextrose (D50W) 25 g/ 50mL Intravenous Solution 25 g 25 g Every 15 Minutes PRN 10/6/2018     Sig - Route: Infuse 50 mL into a venous catheter Every 15 (Fifteen) Minutes As Needed for Low Blood Sugar (Blood Sugar Less Than 70). - Intravenous    dextrose (GLUTOSE) oral gel 15 g 15 g Every 15 Minutes PRN 10/6/2018     Sig - Route: Take 15 g by mouth Every 15 (Fifteen) Minutes As Needed for Low Blood Sugar (Blood sugar less than 70). - Oral    Ferric Citrate tablet 210 mg 210 mg 3 Times Daily With Meals 10/6/2018     Sig - Route: Take 1 tablet by mouth 3 (Three) Times a Day With Meals. - Oral    glucagon (human recombinant) (GLUCAGEN DIAGNOSTIC) injection 1 mg 1 mg As Needed 10/6/2018     Sig - Route: Inject 1 mg under the skin into the appropriate area as directed As Needed (Blood Glucose Less Than 70). - Subcutaneous    heparin (porcine) 5000 UNIT/ML injection 5,000 Units 5,000 Units Every 12 Hours Scheduled 10/6/2018     Sig - Route: Inject 1 mL under the skin into the appropriate area as directed Every 12 (Twelve) Hours. - Subcutaneous    heparin (porcine) injection 1,700 Units 1,700 Units As Needed 10/11/2018     Sig - Route: 1.7 mL by Intracatheter route As Needed (permcath packing use after every hd tx). - Intracatheter    heparin (porcine) injection 1,700 Units 1,700 Units As Needed 10/11/2018     Sig - Route: 1.7 mL by Intracatheter route As Needed (permcath packing use after every hd tx). - Intracatheter    HYDROcodone-acetaminophen (NORCO)  MG per tablet 1 tablet 1 tablet Every 6 Hours PRN 10/6/2018     Sig - Route: Take 1 tablet by mouth Every 6 (Six) Hours As Needed (back pain). - Oral    hydroxypropyl methylcellulose (ISOPTO TEARS) 2.5 % ophthalmic solution 1 drop 1 drop 3 Times Daily PRN 10/6/2018     Sig - Route:  Administer 1 drop to both eyes 3 (Three) Times a Day As Needed for Dry Eyes. - Both Eyes    Influenza Vac Subunit Quad (FLUCELVAX) injection 0.5 mL 0.5 mL During Hospitalization 10/6/2018     Sig - Route: Inject 0.5 mL into the appropriate muscle as directed by prescriber During Hospitalization for Immunization. - Intramuscular    Cosign for Ordering: Accepted by Max Jones MD on 10/7/2018  4:06 PM    insulin lispro (humaLOG) injection 0-9 Units 0-9 Units 4 Times Daily With Meals & Nightly 10/6/2018     Sig - Route: Inject 0-9 Units under the skin into the appropriate area as directed 4 (Four) Times a Day With Meals & at Bedtime. - Subcutaneous    insulin lispro (humaLOG) injection 6 Units 6 Units 3 Times Daily Before Meals 10/6/2018     Sig - Route: Inject 6 Units under the skin into the appropriate area as directed 3 (Three) Times a Day Before Meals. - Subcutaneous    ipratropium (ATROVENT) nebulizer solution 0.5 mg 0.5 mg Every 6 Hours PRN 10/12/2018     Sig - Route: Take 2.5 mL by nebulization Every 6 (Six) Hours As Needed for Wheezing or Shortness of Air. - Nebulization    isosorbide dinitrate (ISORDIL) tablet 10 mg 10 mg 3 Times Daily (Nitrates) 10/10/2018     Sig - Route: Take 1 tablet by mouth 3 (Three) Times a Day. - Oral    lisinopril (PRINIVIL,ZESTRIL) tablet 2.5 mg 2.5 mg Every 24 Hours Scheduled 10/16/2018     Sig - Route: Take 1 tablet by mouth Daily. - Oral    magnesium hydroxide (MILK OF MAGNESIA) suspension 2400 mg/10mL 10 mL 10 mL Daily PRN 10/6/2018     Sig - Route: Take 10 mL by mouth Daily As Needed for Constipation. - Oral    metoprolol tartrate (LOPRESSOR) tablet 25 mg 25 mg Every 12 Hours Scheduled 10/6/2018     Sig - Route: Take 1 tablet by mouth Every 12 (Twelve) Hours. - Oral    nicotine (NICODERM CQ) 21 MG/24HR patch 1 patch 1 patch Every 24 Hours 10/6/2018     Sig - Route: Place 1 patch on the skin as directed by provider Daily. - Transdermal    ondansetron (ZOFRAN)  injection 4 mg 4 mg Every 6 Hours PRN 10/16/2018     Sig - Route: Infuse 2 mL into a venous catheter Every 6 (Six) Hours As Needed for Nausea or Vomiting. - Intravenous    sennosides-docusate sodium (SENOKOT-S) 8.6-50 MG tablet 1 tablet 1 tablet 2 Times Daily 10/9/2018     Sig - Route: Take 1 tablet by mouth 2 (Two) Times a Day. - Oral    sodium chloride 0.9 % flush 3 mL 3 mL Every 12 Hours Scheduled 10/6/2018     Sig - Route: Infuse 3 mL into a venous catheter Every 12 (Twelve) Hours. - Intravenous    sodium chloride 0.9 % flush 3-10 mL 3-10 mL As Needed 10/6/2018     Sig - Route: Infuse 3-10 mL into a venous catheter As Needed for Line Care. - Intravenous    tetrahydrozoline 0.05 % ophthalmic solution 1 drop 1 drop 4 Times Daily PRN 10/10/2018     Sig - Route: Administer 1 drop to both eyes 4 (Four) Times a Day As Needed for Irritation. - Both Eyes    amiodarone (PACERONE) tablet 200 mg (Discontinued) 200 mg 3 Times Daily 10/12/2018 10/15/2018    Sig - Route: Take 1 tablet by mouth 3 (Three) Times a Day. - Oral    bumetanide (BUMEX) tablet 1 mg (Discontinued) 1 mg 2 Times Daily (Diuretics) 10/9/2018 10/15/2018    Sig - Route: Take 1 tablet by mouth 2 (Two) Times a Day. - Oral    hydrALAZINE (APRESOLINE) tablet 25 mg (Discontinued) 25 mg Every 8 Hours Scheduled 10/12/2018 10/15/2018    Sig - Route: Take 1 tablet by mouth Every 8 (Eight) Hours. - Oral    lisinopril (PRINIVIL,ZESTRIL) tablet 2.5 mg (Discontinued) 2.5 mg Every 24 Hours Scheduled 10/12/2018 10/15/2018    Sig - Route: Take 1 tablet by mouth Daily. - Oral    lisinopril (PRINIVIL,ZESTRIL) tablet 5 mg (Discontinued) 5 mg Every 24 Hours Scheduled 10/16/2018 10/15/2018    Sig - Route: Take 1 tablet by mouth Daily. - Oral    ondansetron (ZOFRAN) injection 4 mg (Discontinued) 4 mg Every 6 Hours PRN 10/13/2018 10/15/2018    Sig - Route: Infuse 2 mL into a venous catheter Every 6 (Six) Hours As Needed for Nausea or Vomiting. - Intravenous    potassium chloride  (MICRO-K) CR capsule 20 mEq (Discontinued) 20 mEq Daily 10/7/2018 10/15/2018    Sig - Route: Take 2 capsules by mouth Daily. - Oral             Operative/Procedure Notes (last 7 days) (Notes from 10/9/2018 10:52 AM through 10/16/2018 10:52 AM)      Hugo Bonilla MD at 10/11/2018  7:31 AM        VASCULAR SURGERY OPERATIVE REPORT    DATE OF PROCEDURE: 10/11/18    ATTENDING SURGEON: Hugo Bonilla MD    OR STAFF: Circulator: Jeremy Liu RN  Scrub Person: Yoana Jiang; Rosemary Tinsley    ANESTHESIA: Monitor Anesthesia Care    PRE-OPERATIVE DIAGNOSIS: Chronic kidney disease, stage 4 (severe) (CMS/HCC) [N18.4]    POST-OPERATIVE DIAGNOSIS: Post-Op Diagnosis Codes:     * Chronic kidney disease, stage 4 (severe) (CMS/HCC) [N18.4]    PROCEDURE(S):   1.) Percutaneous access of right internal jugular vein under ultrasound guidance  2.) Insertion of right internal jugular cuffed tunneled hemodialysis catheter (Permacath)  3.) Radiographic supervision and interpretation    INTRAOPERATIVE FINDINGS: Patent right internal jugular vein, SVC and   IVC, and well-secured cuffed tunneled hemodialysis catheter at completion of procedure    ESTIMATED BLOOD LOSS: Minimal (<20 mL)    SPECIMENS: None    IMPLANTS: 19 cm cuffed tunneled hemodialysis catheter (Permacath)    COMPLICATIONS: None apparent    CONDITION AT COMPLETION: Hemodynamically stable, awake    DISPOSITION: PACU    INDICATION(S) FOR PROCEDURE:   Patient is a 73 y.o. male with chronic kidney disease who is status post creation of left arm AV fistula an outside hospital.  He presented to [facility] with shortness of breath and was found to have progression of his renal disease now requiring dialysis.  Initial attempt was made to access his fistula for the first time in the left arm however this was unsuccessful.  He now requires hemodialysis access. All risks, benefits, and alternatives to above elective procedures were discussed with the patient, who elected  to proceed, and informed consent was accordingly obtained at that time.    DETAILS OF PROCEDURE:   Patient was brought to the operating room and appropriately identified. In supine position, right IJ venous access site was prepped and draped in the usual sterile fashion. Following a brief timeout, percutaneous right internal jugular venous access was obtained under ultrasound guidance using modified Seldinger technique after infiltration of local anesthetic to the area. Soft guidewire was advanced through the SVC into the proximal IVC, over which sequential dilators were advanced and withdrawn, followed by insertion of sheath for tunneled hemodialysis catheter. Local anesthetic was then injected over the right chest catheter tunnel site, through which catheter was tunneled retrograde to the sheath insertion site, and catheter was advanced through the sheath into right internal jugular vein and under direct fluoroscopic visualization to the cavo-atrial junction. Cuffed tunneled hemodialysis catheter was then secured to skin with 3-0 Nylon suture, and skin at insertion site was reapproximated with 4-0 Monocryl suture. Skin was cleaned, dried, and sterile occlusive dressing was applied, after which patient was safely able to be transferred to recovery.    I was present for all aspects of the procedures, and there were no intraprocedural complications apparent.    Electronically signed by Hugo Bonilla MD at 10/11/2018  8:54 AM          Physician Progress Notes (last 72 hours) (Notes from 10/13/2018 10:52 AM through 10/16/2018 10:52 AM)      Max Jones MD at 10/15/2018  2:54 PM              [facility] Medicine Services  INPATIENT PROGRESS NOTE    Patient Name: Gavin Wilson  Date of Admission: 10/6/2018  Today's Date: 10/15/18  Length of Stay: 9  Primary Care Physician: Provider, No Known    Subjective   Chief Complaint: f/u  HPI   Patient is known to me from an  admission early this month (October 6).  I admitted him with acute respiratory failure, sepsis from pneumonia.  The interested reader is referred to my admitting H&P for details surrounding admission.  Since then he had seen Oleg Crawley and Chad.  Medications have been modified which includes hydralazine, Isordil, Ace inhibitor.  Amiodarone was decreased.  Is getting his hemodialysis Monday Wednesdays Fridays (when necessary)    We are awaiting for outpatient hemodialysis and skilled nursing facility placement    Review of Systems     All pertinent negatives and positives are as above. All other systems have been reviewed and are negative unless otherwise stated.     Objective    Temp:  [98 °F (36.7 °C)-98.8 °F (37.1 °C)] 98.8 °F (37.1 °C)  Heart Rate:  [68-83] 75  Resp:  [15-18] 18  BP: ()/(44-52) 106/52  Physical Exam  Constitutional: He is oriented to person, place, and time. No distress. Chronically ill appearing.  Appears improved today   HENT:   Head: Atraumatic.   Temporal muscle wasting; prominent wasting around the neck and supraclavicular    Eyes: Conjunctivae are normal. No scleral icterus.   Cardiovascular: Normal rate, regular rhythm and intact distal pulses.    Murmur heard. Right upper chest permcath placed  Pulmonary/Chest: Effort normal. No respiratory distress. He has no wheezing. He has no rales.  Improved air movement   Abdominal: Soft. Bowel sounds are normal. He exhibits no distension. There is no tenderness.   RLQ ostomy  Neurological: He is alert and oriented to person, place, and time. Lethargic.  Skin: Skin is warm and dry. He is not diaphoretic.   Psychiatric: He has a normal mood and affect. His behavior is normal            Results Review:  I have reviewed the labs, radiology results, and diagnostic studies.    Laboratory Data:     Results from last 7 days  Lab Units 10/11/18  0355 10/10/18  0422 10/09/18  0306   WBC 10*3/mm3 4.56* 5.35 5.17   HEMOGLOBIN g/dL 9.1* 10.2* 9.8*    HEMATOCRIT % 28.7* 32.6* 30.4*   PLATELETS 10*3/mm3 190 173 180          Results from last 7 days  Lab Units 10/15/18  0542 10/14/18  0417 10/13/18  0436   SODIUM mmol/L 139 141 139   POTASSIUM mmol/L 4.5 3.9 4.4   CHLORIDE mmol/L 102 101 99   CO2 mmol/L 28.0 28.0 21.0*   BUN mg/dL 36* 23* 23*   CREATININE mg/dL 3.27* 2.23* 2.02*   CALCIUM mg/dL 9.0 8.7 9.1   GLUCOSE mg/dL 133* 96 114*       Culture Data:        Radiology Data:   Imaging Results (last 24 hours)     Procedure Component Value Units Date/Time    IR Insert Tunneled CV Catheter Without Port 5 Plus [[id number]] Collected:  10/15/18 1257     Updated:  10/15/18 1258    Narrative:       Performed by Dr. Bonilla. Please see procedure note.  This report was finalized on  by Dr. Hugo Bonilla MD.    FL C Arm During Surgery [[id number]] Collected:  10/15/18 1257     Updated:  10/15/18 1258    Narrative:       Performed by Dr. Bonilla. Please see procedure note.  This report was finalized on  by Dr. Hugo Bonilla MD.          I have reviewed the patient's current medications.     Assessment/Plan     Active Hospital Problems    Diagnosis   • **Acute on chronic respiratory failure with hypoxia (CMS/HCC)   • Coronary artery disease involving native coronary artery of native heart without angina pectoris   • History of coronary artery stent placement   • Mild aortic valve stenosis   • Moderate aortic valve insufficiency   • Mild mitral valve regurgitation   • Pneumonia   • COPD (chronic obstructive pulmonary disease) (CMS/HCC)   • Sepsis (CMS/HCC)   • Acute renal failure superimposed on stage 4 chronic kidney disease (CMS/HCC)   • Acute on chronic combined systolic and diastolic congestive heart failure (CMS/HCC)   • Pleural effusion, bilateral       Assessment:  1) Acute hypoxic respiratory failure, resolved  2) Sepsis due to suspected health care associated pneumonia - blood culture and pleural fluid culture showed no growth to date  3) Bilateral pleural  effusions - Right is recurrent, left is worsening -status this post thoracentesis on October 7 Suspected due to heart failure, but cannot rule out infection or malignancy   4) Severe protein-calorie malnutrition  5) COPD without exacerbation  6) Combined systolic and diastolic heart failure, chronic (EF <40%)  7) Acute kidney injury on chronic kidney disease stage 4 - Suspected cardiorenal   8) Chronic normocytic anemia due to CKD  9) Thrombocytopenia, improved  10) Mild hypokalemia, resolved  11) Hyperphosphotemia  12) Metabolic acidosis, anion gap due to uremia  13) Constipation, resolved       Continue present management.  Awaiting nursing home placement since outpatient dialysis chair time    [START ON 10/16/2018] amiodarone 200 mg Oral Q24H   aspirin 81 mg Oral Daily   atorvastatin 80 mg Oral Daily   budesonide-formoterol 2 puff Inhalation BID - RT   bumetanide 1 mg Oral BID   calcitriol 0.5 mcg Oral Daily   calcium carb-cholecalciferol 1 tablet Oral Daily   Ferric Citrate 210 mg Oral TID With Meals   heparin (porcine) 5,000 Units Subcutaneous Q12H   hydrALAZINE 25 mg Oral Q8H   insulin lispro 0-9 Units Subcutaneous 4x Daily With Meals & Nightly   insulin lispro 6 Units Subcutaneous TID AC   isosorbide dinitrate 10 mg Oral TID - Nitrates   [START ON 10/16/2018] lisinopril 5 mg Oral Q24H   metoprolol tartrate 25 mg Oral Q12H   nicotine 1 patch Transdermal Q24H   potassium chloride 20 mEq Oral Daily   sennosides-docusate sodium 1 tablet Oral BID   sodium chloride 3 mL Intravenous Q12H                 Discharge Planning: To be determined       Max Jones MD   10/15/18   2:54 PM      Electronically signed by Max Jones MD at 10/15/2018  6:13 PM     Roberto Carlos Myrick, MACO at 10/15/2018 11:15 AM     Attestation signed by Scott Soto MD at 10/15/2018  4:48 PM    I saw and examined patient independently while he was on dialysis. I have reviewed the documentation above and  agree. Patient is seen in consultation for CKD stage 4. History of bladder cancer with prior cystectomy and ureterostomy formation.  Also history of esophogeal and prostate cancer.  Follows with nephrology at [facility]. Has left upper arm fistula in place.  Presented with dyspnea; admitted with bilateral pneumonia/pleural effusion.  Hospital course remarkable for attempt to use fistula on 10/10; infiltrated and was unable to dialyze.  On 10/11 had Permcath placed and first dialysis treatment; he had a run of V tach during first HD.    Today, seen on dialysis. He was awake, but appeared weak and was nauseated some.  Vitals: BP 82/41 HR 76. On exam, heart sounds were regular, 2/6 CHRISTI, abdomen soft non-tender, legs with no edema. Labs: reviewed.    Assessment and plan:   1.  CKD stage 5- ESRD  2.  Type 2 diabetes with nephropathy  3.  Bilateral pneumonia  4.  Chronic diastolic congestive heart failure  5.  S/p ventricular tachycardia on 10/10  6.  Metabolic acidosis  7.  Secondary hyperparathyroidism  Dialysis administered via right IJ Permcath., , ,  ml.   Will lower UF to prevent further hypotension. Will stop hydralazine, decrease lisinopril and Bumex. Follow up labs. Will avoid Epogen because of history of cancer.                   Nephrology ([facility]) Progress Note      Patient:  Gavin Wilson  YOB: 1945  Date of Service: 10/15/2018  MRN: [id number]   Acct: [id number]   Primary Care Physician: Provider, No Known  Advance Directive:   Code Status and Medical Interventions:   Ordered at: 10/07/18 1309     Limited Support to NOT Include:    Intubation     Level Of Support Discussed With:    Patient     Code Status:    No CPR     Medical Interventions (Level of Support Prior to Arrest):    Limited     Admit Date: 10/6/2018       Hospital Day: 9  Referring Provider: Max Jones*      Patient personally seen and examined.  Complete chart  including Consults, Notes, Operative Reports, Labs, Cardiology, and Radiology studies reviewed as able.        Subjective:  Gavin Wilson is a 73 y.o. male  whom we were consulted for CKD stage 4.  History of bladder cancer with prior cystectomy and ureterostomy formation.  Also history of esophogeal and prostate cancer.  Follows with nephrology at [facility]. Has left upper arm fistula in place.  Presented with dyspnea; admitted with bilateral pneumonia/pleural effusion.  Hospital course remarkable for attempt to use fistula on 10/10; infiltrated and was unable to dialyze.  On 10/11 had Permcath placed and first dialysis treatment; he had a run of V tach during first HD.      Today is feeling about the same; no new overnight issues.     Allergies:  Sulfa antibiotics    Home Meds:  Prescriptions Prior to Admission   Medication Sig Dispense Refill Last Dose   • acetaminophen (TYLENOL) 325 MG tablet Take 650 mg by mouth Every 6 (Six) Hours As Needed for Mild Pain .   Past Month at Unknown time   • albuterol (PROVENTIL HFA;VENTOLIN HFA) 108 (90 Base) MCG/ACT inhaler Inhale 2 puffs Every 6 (Six) Hours As Needed for Wheezing.   Past Week at Unknown time   • aspirin 81 MG chewable tablet Chew 81 mg Daily.   10/5/2018 at Unknown time   • atorvastatin (LIPITOR) 80 MG tablet Take 40 mg by mouth Daily.   10/5/2018 at Unknown time   • budesonide-formoterol (SYMBICORT) 160-4.5 MCG/ACT inhaler Inhale 2 puffs 2 (Two) Times a Day. 1 inhaler 2 10/5/2018 at Unknown time   • bumetanide (BUMEX) 2 MG tablet Take 1 tablet by mouth Daily. 30 tablet 0 10/5/2018 at Unknown time   • calcium carbonate (OS-TASHI) 600 MG tablet Take 600 mg by mouth Daily.   10/5/2018 at Unknown time   • cholecalciferol (VITAMIN D3) 1000 units tablet Take 3,000 Units by mouth Daily.   10/5/2018 at Unknown time   • insulin aspart (novoLOG FLEXPEN) 100 UNIT/ML solution pen-injector sc pen Inject 6 Units under the skin into the appropriate area as directed 3  (Three) Times a Day With Meals.   10/5/2018 at Unknown time   • insulin detemir (LEVEMIR) 100 UNIT/ML injection Inject 6 Units under the skin into the appropriate area as directed 2 (Two) Times a Day.   10/5/2018 at Unknown time   • loperamide (IMODIUM) 2 MG capsule Take 2 mg by mouth Daily As Needed for Diarrhea.   Past Month at Unknown time   • metoprolol tartrate (LOPRESSOR) 25 MG tablet Take 12.5 mg by mouth 2 (Two) Times a Day.   10/5/2018 at Unknown time   • omeprazole (priLOSEC) 20 MG capsule Take 20 mg by mouth Daily.   10/5/2018 at Unknown time   • oxyCODONE (ROXICODONE) 5 MG immediate release tablet Take 5 mg by mouth Every 6 (Six) Hours As Needed for Moderate Pain .   Past Week at Unknown time   • sildenafil (VIAGRA) 100 MG tablet Take 100 mg by mouth Daily As Needed for erectile dysfunction.   More than a month at Unknown time       Medicines:  Current Facility-Administered Medications   Medication Dose Route Frequency Provider Last Rate Last Dose   • acetaminophen (TYLENOL) tablet 650 mg  650 mg Oral Q4H PRN Max Jones MD       • amiodarone (PACERONE) tablet 200 mg  200 mg Oral TID Abdiel Stout MD   200 mg at 10/15/18 0832   • aspirin chewable tablet 81 mg  81 mg Oral Daily Vu Michael MD   81 mg at 10/15/18 0831   • atorvastatin (LIPITOR) tablet 80 mg  80 mg Oral Daily Max Jones MD   80 mg at 10/15/18 0832   • budesonide-formoterol (SYMBICORT) 160-4.5 MCG/ACT inhaler 2 puff  2 puff Inhalation BID - RT Max Jones MD   2 puff at 10/15/18 0737   • bumetanide (BUMEX) tablet 1 mg  1 mg Oral BID Vu Michael MD   1 mg at 10/15/18 0833   • calcitriol (ROCALTROL) capsule 0.5 mcg  0.5 mcg Oral Daily Max Jones MD   0.5 mcg at 10/15/18 0833   • calcium carb-cholecalciferol 600-800 MG-UNIT tablet 1 tablet  1 tablet Oral Daily Max Jones MD   1 tablet at 10/15/18 0832   • dextrose (D50W) 25 g/ 50mL Intravenous Solution 25 g   25 g Intravenous Q15 Min PRN Max Jones MD       • dextrose (GLUTOSE) oral gel 15 g  15 g Oral Q15 Min PRN Max Jones MD       • Ferric Citrate tablet 210 mg  210 mg Oral TID With Meals Max Jones MD   210 mg at 10/15/18 0832   • glucagon (human recombinant) (GLUCAGEN DIAGNOSTIC) injection 1 mg  1 mg Subcutaneous PRN Max Jones MD       • heparin (porcine) 5000 UNIT/ML injection 5,000 Units  5,000 Units Subcutaneous Q12H Max Jones MD   5,000 Units at 10/15/18 0831   • heparin (porcine) injection 1,700 Units  1,700 Units Intracatheter PRN Samson Aragon MD   1,700 Units at 10/12/18 1802   • heparin (porcine) injection 1,700 Units  1,700 Units Intracatheter PRN Samson Aragon MD   1,700 Units at 10/13/18 0906   • hydrALAZINE (APRESOLINE) tablet 25 mg  25 mg Oral Q8H Abdiel Stout MD   25 mg at 10/14/18 1248   • HYDROcodone-acetaminophen (NORCO)  MG per tablet 1 tablet  1 tablet Oral Q6H PRN Max Jones MD   1 tablet at 10/15/18 0831   • hydroxypropyl methylcellulose (ISOPTO TEARS) 2.5 % ophthalmic solution 1 drop  1 drop Both Eyes TID PRN Vu Michael MD   1 drop at 10/11/18 0636   • Influenza Vac Subunit Quad (FLUCELVAX) injection 0.5 mL  0.5 mL Intramuscular During Hospitalization Max Jones MD       • insulin lispro (humaLOG) injection 0-9 Units  0-9 Units Subcutaneous 4x Daily With Meals & Nightly Max Jones MD   2 Units at 10/14/18 2044   • insulin lispro (humaLOG) injection 6 Units  6 Units Subcutaneous TID AC Max Jones MD   6 Units at 10/15/18 0843   • ipratropium (ATROVENT) nebulizer solution 0.5 mg  0.5 mg Nebulization Q6H PRN Abdiel Stout MD   0.5 mg at 10/13/18 1619   • isosorbide dinitrate (ISORDIL) tablet 10 mg  10 mg Oral TID - Nitrates Abdiel Stout MD   10 mg at 10/15/18 0832   • lisinopril (PRINIVIL,ZESTRIL) tablet 2.5 mg  2.5 mg  Oral Q24H Abdiel Stout MD   2.5 mg at 10/14/18 0945   • magnesium hydroxide (MILK OF MAGNESIA) suspension 2400 mg/10mL 10 mL  10 mL Oral Daily PRN Max Jones MD       • metoprolol tartrate (LOPRESSOR) tablet 25 mg  25 mg Oral Q12H Max Jones MD   25 mg at 10/15/18 0832   • nicotine (NICODERM CQ) 21 MG/24HR patch 1 patch  1 patch Transdermal Q24H Ashutosh Palafox MD   1 patch at 10/14/18 2045   • ondansetron (ZOFRAN) injection 4 mg  4 mg Intravenous Q6H PRN Vu Michael MD   4 mg at 10/15/18 0831   • potassium chloride (MICRO-K) CR capsule 20 mEq  20 mEq Oral Daily Vu Michael MD   20 mEq at 10/15/18 0833   • sennosides-docusate sodium (SENOKOT-S) 8.6-50 MG tablet 1 tablet  1 tablet Oral BID Vu Michael MD   1 tablet at 10/14/18 2042   • sodium chloride 0.9 % flush 3 mL  3 mL Intravenous Q12H Max Jones MD   3 mL at 10/15/18 0836   • sodium chloride 0.9 % flush 3-10 mL  3-10 mL Intravenous PRN Max Jones MD       • tetrahydrozoline 0.05 % ophthalmic solution 1 drop  1 drop Both Eyes 4x Daily PRN Ney De MD   1 drop at 10/10/18 0339       Past Medical History:  Past Medical History:   Diagnosis Date   • CHF (congestive heart failure) (CMS/HCC)    • Coronary stent occlusion    • Diabetes mellitus (CMS/HCC)    • Hyperlipidemia    • Hypertension    • Stroke (CMS/HCC)        Past Surgical History:  Past Surgical History:   Procedure Laterality Date   • BLADDER SURGERY     • ESOPHAGECTOMY     • INSERTION HEMODIALYSIS CATHETER N/A 10/11/2018    Procedure: HEMODIALYSIS CATHETER INSERTION;  Surgeon: Hugo Bonilla MD;  Location: [place] [address];  Service: Vascular       Family History  Family History   Problem Relation Age of Onset   • No Known Problems Father    • No Known Problems Mother        Social History  Social History     Social History   • Marital status:      Spouse name: N/A   • Number of children: N/A  "  • Years of education: N/A     Occupational History   • Not on file.     Social History Main Topics   • Smoking status: Current Every Day Smoker   • Smokeless tobacco: Never Used   • Alcohol use No   • Drug use: No   • Sexual activity: Not on file     Other Topics Concern   • Not on file     Social History Narrative   • No narrative on file         Review of Systems:  History obtained from chart review and the patient  General ROS: No fever or chills  Respiratory ROS: No cough, shortness of breath, wheezing  Cardiovascular ROS: No chest pain or palpitations  Gastrointestinal ROS: No abdominal pain or melena  Genito-Urinary ROS: No dysuria or hematuria    Objective:  /50 (BP Location: Right arm, Patient Position: Lying)   Pulse 80   Temp 98.8 °F (37.1 °C) (Temporal Artery )   Resp 18   Ht 170.2 cm (67\")   Wt 46.9 kg (103 lb 4.8 oz)   SpO2 94%   BMI 16.18 kg/m²      Intake/Output Summary (Last 24 hours) at 10/15/18 1115  Last data filed at 10/15/18 0642   Gross per 24 hour   Intake              240 ml   Output              350 ml   Net             -110 ml     General: awake/alert   Chest:  clear to auscultation bilaterally without respiratory distress  CVS: regular rate and rhythm  Abdominal: soft, nontender, normal bowel sounds  Extremities: no cyanosis or edema  Skin: warm and dry without rash      Labs:    Results from last 7 days  Lab Units 10/11/18  0355 10/10/18  0422 10/09/18  0306   WBC 10*3/mm3 4.56* 5.35 5.17   HEMOGLOBIN g/dL 9.1* 10.2* 9.8*   HEMATOCRIT % 28.7* 32.6* 30.4*   PLATELETS 10*3/mm3 190 173 180           Results from last 7 days  Lab Units 10/15/18  0542 10/14/18  0417 10/13/18  0436   SODIUM mmol/L 139 141 139   POTASSIUM mmol/L 4.5 3.9 4.4   CHLORIDE mmol/L 102 101 99   CO2 mmol/L 28.0 28.0 21.0*   BUN mg/dL 36* 23* 23*   CREATININE mg/dL 3.27* 2.23* 2.02*   CALCIUM mg/dL 9.0 8.7 9.1   GLUCOSE mg/dL 133* 96 114*       Radiology:   Imaging Results (last 72 hours)     Procedure " Component Value Units Date/Time    US Thoracentesis [[id number]] Collected:  10/07/18 1323    Specimen:  Body Fluid Updated:  10/13/18 0934    Narrative:       DATE OF PROCEDURE:  10/7/2018.     PREPROCEDURE DIAGNOSIS:  Left pleural effusion.  POSTPROCEDURE DIAGNOSIS:  Left pleural effusion.          INDICATIONS FOR PROCEDURE: Shortness of breath.     PROCEDURE:   1. Thoracentesis, diagnostic and therapeutic.  2. Ultrasound guidance for thoracentesis, imaging supervision and  interpretation.     ANESTHESIA: 1% lidocaine, injected locally.          COMPLICATIONS: None.        EXAMINATIONS FOR COMPARISON:  CT chest dated 10/6/2018.     DESCRIPTION OF PROCEDURE:   The risk and benefits of the procedure were explained to the patient.   Specifically, the risks of bleeding, infection, pneumothorax (possible  thoracostomy tube), and damage to surrounding structures were discussed  extensively. The patient's questions were answered. The patient opted to  proceed. Written and verbal consent were obtained from the patient.     TIME OUT was taken and the patient's name, medical record number, date  of birth, procedure, entry site, and listen allergies were confirmed.  The site of the procedure was confirmed and marked.      The leftposterior thorax was prepped and draped in sterile fashion. The  area was anesthetized with buffered lidocaine 2%.      A 5 Nepali 10 cm  catheter was inserted into the pleural effusion  using  ultrasound guidance. Approximately 400 mL of clear fluid was recovered  and sent to the pathology lab for analysis.      The patient tolerated the procedure well.   No immediate complications  were noted.       Impression:       Successful ultrasound guided leftthoracentesis. Approximately 400 mL of  clear fluid was recovered and sent to the pathology lab for analysis.   No immediate complications were noted.              This report was finalized on 10/07/2018 13:26 by Dr. Petra Mckinley MD.    US Chest  [[id number]] Collected:  10/07/18 1323     Updated:  10/13/18 0934    Narrative:       DATE OF PROCEDURE:  10/7/2018.     PREPROCEDURE DIAGNOSIS:  Left pleural effusion.  POSTPROCEDURE DIAGNOSIS:  Left pleural effusion.          INDICATIONS FOR PROCEDURE: Shortness of breath.     PROCEDURE:   1. Thoracentesis, diagnostic and therapeutic.  2. Ultrasound guidance for thoracentesis, imaging supervision and  interpretation.     ANESTHESIA: 1% lidocaine, injected locally.          COMPLICATIONS: None.        EXAMINATIONS FOR COMPARISON:  CT chest dated 10/6/2018.     DESCRIPTION OF PROCEDURE:   The risk and benefits of the procedure were explained to the patient.   Specifically, the risks of bleeding, infection, pneumothorax (possible  thoracostomy tube), and damage to surrounding structures were discussed  extensively. The patient's questions were answered. The patient opted to  proceed. Written and verbal consent were obtained from the patient.     TIME OUT was taken and the patient's name, medical record number, date  of birth, procedure, entry site, and listen allergies were confirmed.  The site of the procedure was confirmed and marked.      The leftposterior thorax was prepped and draped in sterile fashion. The  area was anesthetized with buffered lidocaine 2%.      A 5 Turkish 10 cm  catheter was inserted into the pleural effusion  using  ultrasound guidance. Approximately 400 mL of clear fluid was recovered  and sent to the pathology lab for analysis.      The patient tolerated the procedure well.   No immediate complications  were noted.       Impression:       Successful ultrasound guided leftthoracentesis. Approximately 400 mL of  clear fluid was recovered and sent to the pathology lab for analysis.   No immediate complications were noted.              This report was finalized on 10/07/2018 13:26 by Dr. Petra Mckinley MD.          Culture:         Assessment   1.  CKD stage 4--now with progression to ESRD  2.   Type 2 diabetes with nephropathy  3.  Bilateral pneumonia  4.  Chronic diastolic congestive heart failure  5.  S/p ventricular tachycardia on 10/10  6.  Metabolic acidosis  7.  Secondary hyperparathyroidism    Plan:  1.  Dialysis today  2.  Monitor labs      Roberto Carlos Myrick, APRN  10/15/2018  11:15 AM      Electronically signed by Scott Soto MD at 10/15/2018  4:48 PM     Abdiel Stout MD at 10/15/2018  6:10 AM             LOS: 9 days   Patient Care Team:  Provider, No Known as PCP - General    Chief Complaint:   Acute on chronic respiratory failure with hypoxia (CMS/HCC)    Acute renal failure superimposed on stage 4 chronic kidney disease (CMS/HCC)    Acute on chronic combined systolic and diastolic congestive heart failure (CMS/HCC)    Pleural effusion, bilateral    Sepsis (CMS/HCC)    Coronary artery disease involving native coronary artery of native heart without angina pectoris    History of coronary artery stent placement    Mild aortic valve stenosis    Moderate aortic valve insufficiency    Mild mitral valve regurgitation    Pneumonia    COPD (chronic obstructive pulmonary disease) (CMS/HCC)    Shortness of breath    Subjective    Feels better  In much better spirits  Less shortness of breath  Less tired  Much less fatigued with exertion  No significant further arrhythmia  Maintaining sinus rhythm  No orthopnea paroxysmal nocturnal dyspnea  Denies chest pain or significant palpitations    Interval History: Improved overall    Telemetry: Maintaining sinus rhythm    Review of Systems     Constitutional: No chills   Has fatigue   No fever.   HENT: Negative.    Eyes: Negative.      Respiratory: Positive for cough,   No chest wall soreness,   Shortness of breath,   no wheezing, no stridor.      Cardiovascular: Atypical chest pain,   No palpitations   No significant  leg swelling.     Gastrointestinal: Negative for abdominal distention,  No abdominal pain,   No blood in stool,   No constipation,    No diarrhea,   No nausea   No vomiting.     Endocrine: Negative.    Genitourinary: Negative for difficulty urinating, dysuria, flank pain and hematuria.     Musculoskeletal: Negative.    Skin: Negative for rash and wound.   Allergic/Immunologic: Negative.      Neurological: Negative for dizziness, syncope, weakness,   No light-headedness  No  headaches.     Hematological: Does not bruise/bleed easily.     Psychiatric/Behavioral: Negative for agitation or behavioral problems,   No confusion,   the patient is  nervous/anxious.       History:   Past Medical History:   Diagnosis Date   • CHF (congestive heart failure) (CMS/HCC)    • Coronary stent occlusion    • Diabetes mellitus (CMS/HCC)    • Hyperlipidemia    • Hypertension    • Stroke (CMS/HCC)      Past Surgical History:   Procedure Laterality Date   • BLADDER SURGERY     • ESOPHAGECTOMY     • INSERTION HEMODIALYSIS CATHETER N/A 10/11/2018    Procedure: HEMODIALYSIS CATHETER INSERTION;  Surgeon: Hugo Bonilla MD;  Location: [place] [id number];  Service: Vascular     Social History     Social History   • Marital status:      Spouse name: N/A   • Number of children: N/A   • Years of education: N/A     Occupational History   • Not on file.     Social History Main Topics   • Smoking status: Current Every Day Smoker   • Smokeless tobacco: Never Used   • Alcohol use No   • Drug use: No   • Sexual activity: Not on file     Other Topics Concern   • Not on file     Social History Narrative   • No narrative on file     Family History   Problem Relation Age of Onset   • No Known Problems Father    • No Known Problems Mother        Labs:  WBC No results found for: WBC   HGB No results found for: HGB   HCT No results found for: HCT   Platelets No results found for: PLT   MCV No results found for: MCV       Results from last 7 days  Lab Units 10/15/18  0542 10/14/18  0417 10/13/18  0436   SODIUM mmol/L 139 141 139   POTASSIUM mmol/L 4.5 3.9 4.4   CHLORIDE  mmol/L 102 101 99   CO2 mmol/L 28.0 28.0 21.0*   BUN mg/dL 36* 23* 23*   CREATININE mg/dL 3.27* 2.23* 2.02*   CALCIUM mg/dL 9.0 8.7 9.1   GLUCOSE mg/dL 133* 96 114*     Lab Results   Component Value Date    TROPONINI 4.090 (C) 10/06/2018     PT/INR:    No results found for: PROTIME/  No results found for: INR    Imaging Results (last 72 hours)     Procedure Component Value Units Date/Time    CT Chest Without Contrast [[id number]] Collected:  10/06/18 1752     Updated:  10/06/18 1759    Narrative:       EXAMINATION:   CT CHEST WO CONTRAST-  10/6/2018 5:52 PM CDT     CT CHEST WO CONTRAST- 10/6/2018 5:33 PM CDT     HISTORY: Shortness of breath     COMPARISON: August 12, 2018 DOSE LENGTH PRODUCT: 185 mGy cm. Automated  exposure control was also utilized to decrease patient radiation dose.     TECHNIQUE: Serial helical tomographic images of the chest were acquired.  Multiplanar reformatted images were provided for review.     FINDINGS:  The imaged portion of the neck and thyroid gland is unremarkable.      The upper lungs are clear.. The lower lobes are obscured by large  bilateral pleural effusions.. The trachea and bronchial tree are patent.     Cardiac silhouette may be mildly enlarged. Extensive vascular  calcifications noted in the aortic arch origin of the great vessels and  coronary arteries.. Endovascular aortic stent graft is noted below the  diaphragm.    .      No acute findings are seen in the bones or surrounding soft tissues.        .       Impression:       1. Large bilateral pleural effusions which essentially obscures the  lower lobes.  2. Extensive vascular calcifications present  3. Endovascular stent graft is present in the abdomen.        This report was finalized on 10/06/2018 17:55 by Dr. Duc Garibay MD.          Objective     Allergies   Allergen Reactions   • Sulfa Antibiotics        Medication Review: Performed  Current Facility-Administered Medications   Medication Dose Route Frequency  Provider Last Rate Last Dose   • acetaminophen (TYLENOL) tablet 650 mg  650 mg Oral Q4H PRN Max Jones MD       • amiodarone (PACERONE) tablet 200 mg  200 mg Oral TID Abdiel Stout MD   200 mg at 10/15/18 0832   • aspirin chewable tablet 81 mg  81 mg Oral Daily Vu Michael MD   81 mg at 10/15/18 0831   • atorvastatin (LIPITOR) tablet 80 mg  80 mg Oral Daily Max Jones MD   80 mg at 10/15/18 0832   • budesonide-formoterol (SYMBICORT) 160-4.5 MCG/ACT inhaler 2 puff  2 puff Inhalation BID - RT Max Jones MD   2 puff at 10/15/18 0737   • bumetanide (BUMEX) tablet 1 mg  1 mg Oral BID Vu Michael MD   1 mg at 10/15/18 0833   • calcitriol (ROCALTROL) capsule 0.5 mcg  0.5 mcg Oral Daily Max Jones MD   0.5 mcg at 10/15/18 0833   • calcium carb-cholecalciferol 600-800 MG-UNIT tablet 1 tablet  1 tablet Oral Daily Max Jones MD   1 tablet at 10/15/18 0832   • dextrose (D50W) 25 g/ 50mL Intravenous Solution 25 g  25 g Intravenous Q15 Min PRN Max Jones MD       • dextrose (GLUTOSE) oral gel 15 g  15 g Oral Q15 Min PRN Max Jones MD       • Ferric Citrate tablet 210 mg  210 mg Oral TID With Meals Max Jones MD   210 mg at 10/15/18 1220   • glucagon (human recombinant) (GLUCAGEN DIAGNOSTIC) injection 1 mg  1 mg Subcutaneous PRN Max Jones MD       • heparin (porcine) 5000 UNIT/ML injection 5,000 Units  5,000 Units Subcutaneous Q12H Max Jones MD   5,000 Units at 10/15/18 0831   • heparin (porcine) injection 1,700 Units  1,700 Units Intracatheter PRN Samson Aragon MD   1,700 Units at 10/12/18 1802   • heparin (porcine) injection 1,700 Units  1,700 Units Intracatheter Samson Singh MD   1,700 Units at 10/13/18 0906   • hydrALAZINE (APRESOLINE) tablet 25 mg  25 mg Oral Q8H Abdiel Stout MD   25 mg at 10/14/18 1248   •  HYDROcodone-acetaminophen (NORCO)  MG per tablet 1 tablet  1 tablet Oral Q6H PRN Max Jones MD   1 tablet at 10/15/18 0831   • hydroxypropyl methylcellulose (ISOPTO TEARS) 2.5 % ophthalmic solution 1 drop  1 drop Both Eyes TID PRN Vu Michael MD   1 drop at 10/11/18 0636   • Influenza Vac Subunit Quad (FLUCELVAX) injection 0.5 mL  0.5 mL Intramuscular During Hospitalization Max Jones MD       • insulin lispro (humaLOG) injection 0-9 Units  0-9 Units Subcutaneous 4x Daily With Meals & Nightly Max Jones MD   2 Units at 10/14/18 2044   • insulin lispro (humaLOG) injection 6 Units  6 Units Subcutaneous TID AC Max Jones MD   6 Units at 10/15/18 1220   • ipratropium (ATROVENT) nebulizer solution 0.5 mg  0.5 mg Nebulization Q6H PRN Abdiel Stout MD   0.5 mg at 10/13/18 1619   • isosorbide dinitrate (ISORDIL) tablet 10 mg  10 mg Oral TID - Nitrates Abdiel Stout MD   10 mg at 10/15/18 1220   • lisinopril (PRINIVIL,ZESTRIL) tablet 2.5 mg  2.5 mg Oral Q24H Abdiel Stout MD   2.5 mg at 10/14/18 0945   • magnesium hydroxide (MILK OF MAGNESIA) suspension 2400 mg/10mL 10 mL  10 mL Oral Daily PRN Max Jones MD       • metoprolol tartrate (LOPRESSOR) tablet 25 mg  25 mg Oral Q12H Max Jones MD   25 mg at 10/15/18 0832   • nicotine (NICODERM CQ) 21 MG/24HR patch 1 patch  1 patch Transdermal Q24H Ashutosh Palafox MD   1 patch at 10/14/18 2045   • ondansetron (ZOFRAN) injection 4 mg  4 mg Intravenous Q6H PRN Vu Michael MD   4 mg at 10/15/18 0831   • potassium chloride (MICRO-K) CR capsule 20 mEq  20 mEq Oral Daily Vu Michael MD   20 mEq at 10/15/18 0833   • sennosides-docusate sodium (SENOKOT-S) 8.6-50 MG tablet 1 tablet  1 tablet Oral BID Vu Michael MD   1 tablet at 10/14/18 2042   • sodium chloride 0.9 % flush 3 mL  3 mL Intravenous Q12H Max Jones MD   3 mL at 10/15/18 0836   • sodium  "chloride 0.9 % flush 3-10 mL  3-10 mL Intravenous PRN Max Jones MD       • tetrahydrozoline 0.05 % ophthalmic solution 1 drop  1 drop Both Eyes 4x Daily PRN Ney De MD   1 drop at 10/10/18 0339       Vital Sign Min/Max for last 24 hours  Temp  Min: 98 °F (36.7 °C)  Max: 98.8 °F (37.1 °C)   BP  Min: 90/48  Max: 110/50   Pulse  Min: 68  Max: 83   Resp  Min: 15  Max: 18   SpO2  Min: 93 %  Max: 99 %   No Data Recorded   Weight  Min: 46.9 kg (103 lb 4.8 oz)  Max: 47.1 kg (103 lb 12.8 oz)     Flowsheet Rows      First Filed Value   Admission Height  162.6 cm (64\") Documented at 10/06/2018 0400   Admission Weight  48.9 kg (107 lb 12.8 oz) Documented at 10/06/2018 0400          Results for orders placed during the hospital encounter of 10/06/18   Adult Transthoracic Echo Limited W/ Cont if Necessary Per Protocol    Addendum · Estimated EF = 37%. · Left ventricular diastolic dysfunction. · Mild aortic valve stenosis is present. · No evidence of pulmonary hypertension is present. · There is a trivial pericardial effusion. There is no evidence of cardiac  tamponade. · There is a moderate size left pleural effusion.        Abdiel Stout MD 10/6/2018  7:24 PM                  Physical Exam:    General Appearance: Awake, alert, in no acute distress  Eyes: Pupils equal and reactive    Ears: Appear intact with no abnormalities noted  Nose: Nares normal, no drainage  Neck: supple, trachea midline, no carotid bruit and has JVD  Back: no kyphosis present,    Lungs: respirations regular, respirations even and respirations unlabored  Basilar crackles  Decrease air entry bilateral bases    Heart: normal S1, S2,  2/6 pansystolic murmur in the left sternal border,  no rub and no click    Abdomen: normal bowel sounds, no tenderness   Skin: no bleeding, bruising or rash  Extremities: no cyanosis  Psychiatric/Behavioral: Negative for agitation, behavioral problems, confusion, the patient does  appear to be " nervous/anxious.          Results Review:   I reviewed the patient's new clinical results.  I reviewed the patient's new imaging results and agree with the interpretation.  I reviewed the patient's other test results and agree with the interpretation  I personally viewed and interpreted the patient's EKG/Telemetry data  Discussed with patient    Reviewed available prior notes, consults, prior visits, laboratory findings, radiology and cardiology relevant reports. Updated chart as applicable. I have reviewed the patient's medical history in detail and updated the computerized patient record as relevant.    Updated patient regarding any new or relevant abnormalities on review of records or any new findings on physical exam. Mentioned to patient about purpose of visit and desirable health short and long term goals and objectives.     Assessment/Plan     Active Problems:    Sepsis (CMS/HCC)  Non-STEMI  Known coronary artery disease next the prior stent placement more than 10 years ago  Prior severe left ventricular systolic dysfunction, currently left ventricle ejection fraction moderately depressed to 37%   start amiodarone therapy orally  Discontinued albuterol   Moderate aortic regurgitation  Mild aortic stenosis  Mild mitral regurgitation  Pneumonia  Sepsis  Hypoxia  Kidney failure              Plan      Continue dialysis  Continue hydralazine and Isordil     Continue Ace inhibitors, increase lisinopril from 2.5 to 5 mg daily  Decrease amiodarone to 200 mg daily  Avoid beta agonists  Monitor CBC and BMP   Follow-up with Dr. Bateman or NP-PA 4 weeks after discharge  Supportive care  Telemetry  Optimal medical therapy  Deep vein thrombosis prophylaxis/precautions  Appropriate diet, fluid, sodium, caffeine, stimulants intake   Compliance to diet and medications   Avoid NSAIDS  Abdiel Stout MD  10/15/18  2:10 PM    EMR Dragon/Transcription was used to dictate part of this note     Electronically signed by Abdiel Stout,  MD at 10/15/2018  2:14 PM     Samson Aragon MD at 10/14/2018  6:08 PM              Nephrology ([facility]) Progress Note      Patient:  Gavin Wilson  YOB: 1945  Date of Service: 10/13/2018  MRN: [id number]   Acct: [id number]   Primary Care Physician: Provider, No Known  Advance Directive:   Code Status and Medical Interventions:   Ordered at: 10/07/18 1309     Limited Support to NOT Include:    Intubation     Level Of Support Discussed With:    Patient     Code Status:    No CPR     Medical Interventions (Level of Support Prior to Arrest):    Limited     Admit Date: 10/6/2018       Hospital Day: 7  Referring Provider: Max Jones*      Patient personally seen and examined.  Complete chart including Consults, Notes, Operative Reports, Labs, Cardiology, and Radiology studies reviewed as able.        Subjective:  Gavin Wilson is a 73 y.o. male  whom we were consulted for management of chronic kidney disease stage IV.  He has a history of bladder cancer s/p cystectomy and ureterostomy formation. He also has a history of esophogeal and prostate cancer.   Patient goes to [facility] in [place] and sees nephrology there.  He already has a left upper arm primary fistula in preparation for upcoming dialysis.  Presented with increasing shortness of breath, dyspnea on exertion.  He was diagnosed with bilateral pneumonia/pleural effusion.  He was being treated for both, getting IV antibiotics and intravenous diuretics. His serum creatinine was 3.2 mg.  He states when he was seen by Nephrology last his GFR was 20 and it has been for two years.  Nephrology was consulted for evaluation and treatment of chronic kidney disease.  AVF cleared for use, but infiltrated x3 so had permcath placed the following day     Today, no new events.  No issues today. Still weak.  No other reported issues per pt/RN.      Allergies:  Sulfa antibiotics    Home  Meds:  Prescriptions Prior to Admission   Medication Sig Dispense Refill Last Dose   • acetaminophen (TYLENOL) 325 MG tablet Take 650 mg by mouth Every 6 (Six) Hours As Needed for Mild Pain .   Past Month at Unknown time   • albuterol (PROVENTIL HFA;VENTOLIN HFA) 108 (90 Base) MCG/ACT inhaler Inhale 2 puffs Every 6 (Six) Hours As Needed for Wheezing.   Past Week at Unknown time   • aspirin 81 MG chewable tablet Chew 81 mg Daily.   10/5/2018 at Unknown time   • atorvastatin (LIPITOR) 80 MG tablet Take 40 mg by mouth Daily.   10/5/2018 at Unknown time   • budesonide-formoterol (SYMBICORT) 160-4.5 MCG/ACT inhaler Inhale 2 puffs 2 (Two) Times a Day. 1 inhaler 2 10/5/2018 at Unknown time   • bumetanide (BUMEX) 2 MG tablet Take 1 tablet by mouth Daily. 30 tablet 0 10/5/2018 at Unknown time   • calcium carbonate (OS-TASHI) 600 MG tablet Take 600 mg by mouth Daily.   10/5/2018 at Unknown time   • cholecalciferol (VITAMIN D3) 1000 units tablet Take 3,000 Units by mouth Daily.   10/5/2018 at Unknown time   • insulin aspart (novoLOG FLEXPEN) 100 UNIT/ML solution pen-injector sc pen Inject 6 Units under the skin into the appropriate area as directed 3 (Three) Times a Day With Meals.   10/5/2018 at Unknown time   • insulin detemir (LEVEMIR) 100 UNIT/ML injection Inject 6 Units under the skin into the appropriate area as directed 2 (Two) Times a Day.   10/5/2018 at Unknown time   • loperamide (IMODIUM) 2 MG capsule Take 2 mg by mouth Daily As Needed for Diarrhea.   Past Month at Unknown time   • metoprolol tartrate (LOPRESSOR) 25 MG tablet Take 12.5 mg by mouth 2 (Two) Times a Day.   10/5/2018 at Unknown time   • omeprazole (priLOSEC) 20 MG capsule Take 20 mg by mouth Daily.   10/5/2018 at Unknown time   • oxyCODONE (ROXICODONE) 5 MG immediate release tablet Take 5 mg by mouth Every 6 (Six) Hours As Needed for Moderate Pain .   Past Week at Unknown time   • sildenafil (VIAGRA) 100 MG tablet Take 100 mg by mouth Daily As Needed for  erectile dysfunction.   More than a month at Unknown time       Medicines:  Current Facility-Administered Medications   Medication Dose Route Frequency Provider Last Rate Last Dose   • acetaminophen (TYLENOL) tablet 650 mg  650 mg Oral Q4H PRN Max Jones MD       • amiodarone (PACERONE) tablet 200 mg  200 mg Oral TID Abdiel Stout MD   200 mg at 10/13/18 1125   • aspirin chewable tablet 81 mg  81 mg Oral Daily Vu Michael MD   81 mg at 10/13/18 1125   • atorvastatin (LIPITOR) tablet 80 mg  80 mg Oral Daily Max Jones MD   80 mg at 10/13/18 1124   • budesonide-formoterol (SYMBICORT) 160-4.5 MCG/ACT inhaler 2 puff  2 puff Inhalation BID - RT Max Jones MD   2 puff at 10/12/18 2143   • bumetanide (BUMEX) tablet 1 mg  1 mg Oral BID Vu Michael MD   1 mg at 10/13/18 1125   • calcitriol (ROCALTROL) capsule 0.5 mcg  0.5 mcg Oral Daily Max Jones MD   0.5 mcg at 10/13/18 1126   • calcium carb-cholecalciferol 600-800 MG-UNIT tablet 1 tablet  1 tablet Oral Daily Max Jones MD   1 tablet at 10/13/18 1127   • dextrose (D50W) 25 g/ 50mL Intravenous Solution 25 g  25 g Intravenous Q15 Min PRN Max Jones MD       • dextrose (GLUTOSE) oral gel 15 g  15 g Oral Q15 Min PRN Max Jones MD       • Ferric Citrate tablet 210 mg  210 mg Oral TID With Meals Max Jones MD   210 mg at 10/13/18 1124   • glucagon (human recombinant) (GLUCAGEN DIAGNOSTIC) injection 1 mg  1 mg Subcutaneous PRN Max Jones MD       • heparin (porcine) 5000 UNIT/ML injection 5,000 Units  5,000 Units Subcutaneous Q12H Max Jones MD   5,000 Units at 10/13/18 1126   • heparin (porcine) injection 1,700 Units  1,700 Units Intracatheter PRSamson Perdomo MD   1,700 Units at 10/12/18 1802   • heparin (porcine) injection 1,700 Units  1,700 Units Intracatheter PRSamson Perdomo MD    1,700 Units at 10/13/18 0906   • hydrALAZINE (APRESOLINE) tablet 25 mg  25 mg Oral Q8H Abdiel Stout MD       • HYDROcodone-acetaminophen (NORCO)  MG per tablet 1 tablet  1 tablet Oral Q6H PRN Max Jones MD   1 tablet at 10/13/18 0110   • hydroxypropyl methylcellulose (ISOPTO TEARS) 2.5 % ophthalmic solution 1 drop  1 drop Both Eyes TID PRN Vu Michael MD   1 drop at 10/11/18 0636   • Influenza Vac Subunit Quad (FLUCELVAX) injection 0.5 mL  0.5 mL Intramuscular During Hospitalization Max Jones MD       • insulin lispro (humaLOG) injection 0-9 Units  0-9 Units Subcutaneous 4x Daily With Meals & Nightly Max Jones MD   4 Units at 10/10/18 2112   • insulin lispro (humaLOG) injection 6 Units  6 Units Subcutaneous TID AC Max Jones MD   6 Units at 10/10/18 1723   • ipratropium (ATROVENT) nebulizer solution 0.5 mg  0.5 mg Nebulization Q6H PRN Abdiel Stout MD   0.5 mg at 10/13/18 1619   • isosorbide dinitrate (ISORDIL) tablet 10 mg  10 mg Oral TID - Nitrates Abdiel Stout MD   10 mg at 10/13/18 1124   • lisinopril (PRINIVIL,ZESTRIL) tablet 2.5 mg  2.5 mg Oral Q24H Abdiel Stout MD   2.5 mg at 10/13/18 1124   • magnesium hydroxide (MILK OF MAGNESIA) suspension 2400 mg/10mL 10 mL  10 mL Oral Daily PRN Max Jones MD       • metoprolol tartrate (LOPRESSOR) tablet 25 mg  25 mg Oral Q12H Max Jones MD   25 mg at 10/13/18 1124   • nicotine (NICODERM CQ) 21 MG/24HR patch 1 patch  1 patch Transdermal Q24H Ashutosh Palafox MD   1 patch at 10/12/18 1943   • ondansetron (ZOFRAN) injection 4 mg  4 mg Intravenous Q6H PRN Vu Michael MD   4 mg at 10/13/18 1557   • potassium chloride (MICRO-K) CR capsule 20 mEq  20 mEq Oral Daily Vu Michael MD   20 mEq at 10/13/18 1125   • sennosides-docusate sodium (SENOKOT-S) 8.6-50 MG tablet 1 tablet  1 tablet Oral BID Vu Michael MD   1 tablet at 10/13/18 1125   • sodium  bicarbonate tablet 650 mg  650 mg Oral BID Delmy Mullins, APRN   650 mg at 10/13/18 1126   • sodium chloride 0.9 % flush 3 mL  3 mL Intravenous Q12H Max Jones MD   3 mL at 10/12/18 1954   • sodium chloride 0.9 % flush 3-10 mL  3-10 mL Intravenous PRN Max Jones MD       • tetrahydrozoline 0.05 % ophthalmic solution 1 drop  1 drop Both Eyes 4x Daily PRN Ney De MD   1 drop at 10/10/18 0339       Past Medical History:  Past Medical History:   Diagnosis Date   • CHF (congestive heart failure) (CMS/HCC)    • Coronary stent occlusion    • Diabetes mellitus (CMS/HCC)    • Hyperlipidemia    • Hypertension    • Stroke (CMS/HCC)        Past Surgical History:  Past Surgical History:   Procedure Laterality Date   • BLADDER SURGERY     • ESOPHAGECTOMY     • INSERTION HEMODIALYSIS CATHETER N/A 10/11/2018    Procedure: HEMODIALYSIS CATHETER INSERTION;  Surgeon: Hugo Bonilla MD;  Location: [place] [address];  Service: Vascular       Family History  Family History   Problem Relation Age of Onset   • No Known Problems Father    • No Known Problems Mother        Social History  Social History     Social History   • Marital status:      Spouse name: N/A   • Number of children: N/A   • Years of education: N/A     Occupational History   • Not on file.     Social History Main Topics   • Smoking status: Current Every Day Smoker   • Smokeless tobacco: Never Used   • Alcohol use No   • Drug use: No   • Sexual activity: Not on file     Other Topics Concern   • Not on file     Social History Narrative   • No narrative on file         Review of Systems:  History obtained from chart review and the patient  General ROS: No fever or chills  Respiratory ROS: No cough, +shortness of breath  Cardiovascular ROS: No chest pain or palpitations  Gastrointestinal ROS: No abdominal pain or melena  Genito-Urinary ROS: No dysuria or hematuria    Objective:  /44 (BP Location: Right  "arm, Patient Position: Lying)   Pulse 68 Comment: post tx  Temp 97.4 °F (36.3 °C) (Temporal Artery )   Resp 18   Ht 170.2 cm (67\")   Wt 48.5 kg (107 lb)   SpO2 98% Comment: post tx  BMI 16.76 kg/m²      Intake/Output Summary (Last 24 hours) at 10/13/18 1635  Last data filed at 10/13/18 1300   Gross per 24 hour   Intake              220 ml   Output              300 ml   Net              -80 ml     General: awake/alert   Chest:  clear to auscultation bilaterally without respiratory distress  CVS: regular rate and rhythm  Abdominal: soft, nontender, normal bowel sounds  Extremities: no cyanosis or edema  Skin: warm and dry without rash      Labs:    Results from last 7 days  Lab Units 10/11/18  0355 10/10/18  0422 10/09/18  0306   WBC 10*3/mm3 4.56* 5.35 5.17   HEMOGLOBIN g/dL 9.1* 10.2* 9.8*   HEMATOCRIT % 28.7* 32.6* 30.4*   PLATELETS 10*3/mm3 190 173 180           Results from last 7 days  Lab Units 10/13/18  0436 10/12/18  0437 10/11/18  0355  10/08/18  0435   SODIUM mmol/L 139 142 144  < > 141   POTASSIUM mmol/L 4.4 4.3 4.7  < > 4.5   CHLORIDE mmol/L 99 103 106  < > 110   CO2 mmol/L 21.0* 26.0 26.0  < > 16.0*   BUN mg/dL 23* 36* 60*  < > 50*   CREATININE mg/dL 2.02* 2.90* 4.06*  < > 3.78*   CALCIUM mg/dL 9.1 8.8 9.7  < > 8.5   BILIRUBIN mg/dL  --   --   --   --  0.4   ALK PHOS U/L  --   --   --   --  83   ALT (SGPT) U/L  --   --   --   --  53   AST (SGOT) U/L  --   --   --   --  27   GLUCOSE mg/dL 114* 114* 136*  < > 131*   < > = values in this interval not displayed.    Radiology:   Imaging Results (last 72 hours)     Procedure Component Value Units Date/Time    US Thoracentesis [[id number]] Collected:  10/07/18 1323    Specimen:  Body Fluid Updated:  10/13/18 0934    Narrative:       DATE OF PROCEDURE:  10/7/2018.     PREPROCEDURE DIAGNOSIS:  Left pleural effusion.  POSTPROCEDURE DIAGNOSIS:  Left pleural effusion.          INDICATIONS FOR PROCEDURE: Shortness of breath.     PROCEDURE:   1. Thoracentesis, " diagnostic and therapeutic.  2. Ultrasound guidance for thoracentesis, imaging supervision and  interpretation.     ANESTHESIA: 1% lidocaine, injected locally.          COMPLICATIONS: None.        EXAMINATIONS FOR COMPARISON:  CT chest dated 10/6/2018.     DESCRIPTION OF PROCEDURE:   The risk and benefits of the procedure were explained to the patient.   Specifically, the risks of bleeding, infection, pneumothorax (possible  thoracostomy tube), and damage to surrounding structures were discussed  extensively. The patient's questions were answered. The patient opted to  proceed. Written and verbal consent were obtained from the patient.     TIME OUT was taken and the patient's name, medical record number, date  of birth, procedure, entry site, and listen allergies were confirmed.  The site of the procedure was confirmed and marked.      The leftposterior thorax was prepped and draped in sterile fashion. The  area was anesthetized with buffered lidocaine 2%.      A 5 Welsh 10 cm  catheter was inserted into the pleural effusion  using  ultrasound guidance. Approximately 400 mL of clear fluid was recovered  and sent to the pathology lab for analysis.      The patient tolerated the procedure well.   No immediate complications  were noted.       Impression:       Successful ultrasound guided leftthoracentesis. Approximately 400 mL of  clear fluid was recovered and sent to the pathology lab for analysis.   No immediate complications were noted.              This report was finalized on 10/07/2018 13:26 by Dr. Petra Mckinley MD.     Chest [[id number]] Collected:  10/07/18 1323     Updated:  10/13/18 0934    Narrative:       DATE OF PROCEDURE:  10/7/2018.     PREPROCEDURE DIAGNOSIS:  Left pleural effusion.  POSTPROCEDURE DIAGNOSIS:  Left pleural effusion.          INDICATIONS FOR PROCEDURE: Shortness of breath.     PROCEDURE:   1. Thoracentesis, diagnostic and therapeutic.  2. Ultrasound guidance for thoracentesis,  imaging supervision and  interpretation.     ANESTHESIA: 1% lidocaine, injected locally.          COMPLICATIONS: None.        EXAMINATIONS FOR COMPARISON:  CT chest dated 10/6/2018.     DESCRIPTION OF PROCEDURE:   The risk and benefits of the procedure were explained to the patient.   Specifically, the risks of bleeding, infection, pneumothorax (possible  thoracostomy tube), and damage to surrounding structures were discussed  extensively. The patient's questions were answered. The patient opted to  proceed. Written and verbal consent were obtained from the patient.     TIME OUT was taken and the patient's name, medical record number, date  of birth, procedure, entry site, and listen allergies were confirmed.  The site of the procedure was confirmed and marked.      The leftposterior thorax was prepped and draped in sterile fashion. The  area was anesthetized with buffered lidocaine 2%.      A 5 [place] 10 cm  catheter was inserted into the pleural effusion  using  ultrasound guidance. Approximately 400 mL of clear fluid was recovered  and sent to the pathology lab for analysis.      The patient tolerated the procedure well.   No immediate complications  were noted.       Impression:       Successful ultrasound guided leftthoracentesis. Approximately 400 mL of  clear fluid was recovered and sent to the pathology lab for analysis.   No immediate complications were noted.              This report was finalized on 10/07/2018 13:26 by Dr. Petra Mckinley MD.    XR Chest 1 View [[id number]] Collected:  10/11/18 0940     Updated:  10/11/18 0945    Narrative:       EXAMINATION: XR CHEST 1 VW- 10/11/2018 9:40 AM CDT     HISTORY: s/p R IJ permacath insertion; R13.10-Dysphagia, unspecified;  Z74.09-Other reduced mobility; Z74.09-Other reduced mobility;  N18.4-Chronic kidney disease, stage 4 (severe).     REPORT: Comparison is made with the study from 10/7/2018.     There is a new right internal jugular permacath, in good  position  without evidence of a pneumothorax. Haziness over the lung bases is  probably related to atelectasis and effusions, the right effusion is  larger than the left and has increased in size. Heart size is normal.  There is moderate ectasia of the thoracic aorta. A portion of an aortic  endograft is seen in the upper abdomen. The osseous structures are  unremarkable.       Impression:       1. Interval insertion of a right internal jugular permacath in good  position without pneumothorax.  2. Bilateral small pleural effusions greater on the right and increased  compared with the previous chest x-ray. Bibasilar atelectasis.  This report was finalized on 10/11/2018 09:41 by Dr. Lucian Jones MD.    IR Insert Tunneled CV Catheter Without Port 5 Plus [[id number]] Updated:  10/11/18 0843    FL C Arm During Surgery [[id number]] Updated:  10/11/18 0843    US Arterial Doppler Upper Extremity Left [[id number]] Collected:  10/10/18 1749     Updated:  10/10/18 1759    Narrative:       History: Left upper extremity arteriovenous fistula with no prior  attempt at access.     Comment: Left upper chest from a arteriovenous fistula duplex was  performed using gray scale imaging as well as color flow Doppler.     FINDINGS: The brachial, radial, and ulnar arteries are all widely patent  with triphasic waveforms present. The anastomosis appears widely patent  with a peak systolic velocity of 251 cm/s. The proximal fistula has a  diameter of 4.8 mm, and the depth from the surface of 1.7 mm, with a  volume of flow of 642 mL/m. At a point 5 cm from the anastomosis there  is a vein diameter of 2.2 mm, with a depth of 2.2 mm from the skin, with  a flow volume of 331 mL/m. In the mid upper arm fistula has a diameter  of 3.2 mm, with a depth from the skin of 1.4 mm, and a flow volume of  651 mL/m. There is a valve noted at this point. There is also noted to  be branch laterally coming off in the mid upper arm. In the mid/proximal  upper  arm the diameter is 4.1 mm with a flow rate of 1090 mL/m. In the  proximal upper arm the fistula has a diameter of 4.0 mm with a depth  from the surface of 3.3 mm. There is no evidence of significant stenosis  at any point.       Impression:       Widely patent left arm arteriovenous fistula with parameters  as above..  This report was finalized on 10/10/2018 17:56 by Dr. Hugo Bonilla MD.          Culture:  Urine Culture   Date Value Ref Range Status   10/07/2018 >100,000 CFU/mL Yeast isolated (A)  Final         Assessment   CKD 4  DM2 with nephropathy  PNA  Chronic diastolic CHF  Secondary Hyperparathyroidism  metabolic acidosis  VTach     Plan:  Bicarb  calcitriol   HD MWF prn  D/w cardiology as well    Samson Aragon MD  10/13/2018  4:35 PM        Electronically signed by Samson Aragon MD at 10/13/2018  4:58 PM     Monik Vann APRN at 10/13/2018  1:24 PM     Attestation signed by Lucian Monroe MD at 10/13/2018  8:28 PM    I have reviewed the documentation above and agree.                    [facility] -  Progress Note     LOS: 7 days   Patient Care Team:  Provider, No Known as PCP - General    Chief Complaint: Shortness of breath    Subjective     Interval History: No additional runs of vtach. Heart rate and blood pressure stable. Creatinine improved to 2.02. Complains of generalized pain, malaise, nausea and decreased appetite. Denies chest pain, shortness of breath or palpitations.     Patient Complaints: Generalized pain        Review of Systems:     Review of Systems   Constitutional: Positive for activity change and appetite change. Negative for chills, fatigue and fever.   HENT: Negative.    Eyes: Negative.    Respiratory: Negative for cough, chest tightness, shortness of breath, wheezing and stridor.    Cardiovascular: Negative for chest pain, palpitations and leg swelling.   Gastrointestinal: Positive for nausea. Negative for abdominal distention,  abdominal pain, blood in stool, constipation, diarrhea and vomiting.   Endocrine: Negative.    Genitourinary: Negative for difficulty urinating, dysuria, flank pain and hematuria.   Musculoskeletal: Negative.    Skin: Negative for rash and wound.   Allergic/Immunologic: Negative.    Neurological: Negative for dizziness, syncope, weakness, light-headedness and headaches.   Hematological: Does not bruise/bleed easily.   Psychiatric/Behavioral: Negative for agitation, behavioral problems, confusion, hallucinations, sleep disturbance and suicidal ideas. The patient is not nervous/anxious.      Objective     Vital Sign Min/Max for last 24 hours  Temp  Min: 97.6 °F (36.4 °C)  Max: 98.4 °F (36.9 °C)   BP  Min: 98/58  Max: 110/52   Pulse  Min: 86  Max: 97   Resp  Min: 16  Max: 20   SpO2  Min: 95 %  Max: 97 %   No Data Recorded   Weight  Min: 48.5 kg (107 lb)  Max: 48.5 kg (107 lb)     Telemetry:  84-89                                                                                                  1    10/12/18  2053   Weight: 48.5 kg (107 lb)     1    10/10/18  0447 10/11/18  2031 10/12/18  2053   Weight: 48.2 kg (106 lb 3.2 oz) 48.1 kg (106 lb) 48.5 kg (107 lb)       Intake/Output Summary (Last 24 hours) at 10/13/18 1338  Last data filed at 10/13/18 1100   Gross per 24 hour   Intake              100 ml   Output              300 ml   Net             -200 ml       Physical Exam:    Physical Exam   Constitutional: He is oriented to person, place, and time. Vital signs are normal. He appears well-developed. He appears lethargic. He appears cachectic. He has a sickly appearance. No distress.   HENT:   Head: Normocephalic and atraumatic.   Right Ear: External ear normal.   Left Ear: External ear normal.   Eyes: Pupils are equal, round, and reactive to light. Conjunctivae are normal. Right eye exhibits no discharge. Left eye exhibits no discharge.   Neck: Normal range of motion. Neck supple. No JVD present. Carotid bruit is  not present. No thyromegaly present.   Cardiovascular: Normal rate, regular rhythm and intact distal pulses.  PMI is not displaced.  Exam reveals no gallop, no friction rub and no decreased pulses.    Murmur heard.   Harsh midsystolic murmur is present with a grade of 2/6  at the upper right sternal border radiating to the neck  High-pitched blowing decrescendo early diastolic murmur is present with a grade of 2/6  at the upper right sternal border radiating to the apex  Pulses:       Radial pulses are 2+ on the right side, and 2+ on the left side.        Dorsalis pedis pulses are 2+ on the right side, and 2+ on the left side.        Posterior tibial pulses are 2+ on the right side, and 2+ on the left side.   Pulmonary/Chest: Effort normal. No respiratory distress. He has decreased breath sounds in the right lower field and the left lower field. He has no wheezes. He has no rhonchi. He has no rales. He exhibits no tenderness.   Abdominal: Soft. Bowel sounds are normal. He exhibits no distension. There is no tenderness.   Musculoskeletal: Normal range of motion. He exhibits no edema.   Neurological: He is oriented to person, place, and time. He appears lethargic.   Skin: Skin is warm and dry. No rash noted. He is not diaphoretic. No erythema. No pallor.   Psychiatric: He has a normal mood and affect. His behavior is normal. Judgment and thought content normal.   Vitals reviewed.    Results Review:   Lab Results (last 72 hours)     Procedure Component Value Units Date/Time    POC Glucose Once [[id number]]  (Normal) Collected:  10/13/18 1207    Specimen:  Blood Updated:  10/13/18 1314     Glucose 90 mg/dL      Comment: : [id number] [facility] ID: [id number]       Renal Function Panel [[id number]]  (Abnormal) Collected:  10/13/18 0436    Specimen:  Blood Updated:  10/13/18 0500     Glucose 114 (H) mg/dL      BUN 23 (H) mg/dL      Creatinine 2.02 (H) mg/dL      Sodium 139 mmol/L      Potassium 4.4 mmol/L       Chloride 99 mmol/L      CO2 21.0 (L) mmol/L      Calcium 9.1 mg/dL      Albumin 3.50 g/dL      Phosphorus 4.5 mg/dL      Anion Gap 19.0 (H) mmol/L      BUN/Creatinine Ratio 11.4     eGFR Non African Amer 32 (L) mL/min/1.73     Narrative:       The MDRD GFR formula is only valid for adults with stable renal function between ages 18 and 70.    POC Glucose Once [[id number]]  (Normal) Collected:  10/13/18 0429    Specimen:  Blood Updated:  10/13/18 0441     Glucose 113 mg/dL      Comment: : [id number] Dustni WendyMeter ID: [id number]       POC Glucose Once [[id number]]  (Normal) Collected:  10/12/18 1948    Specimen:  Blood Updated:  10/12/18 2018     Glucose 107 mg/dL      Comment: : [id number] [place]  GMeter ID: [id number]       Non-gynecologic Cytology [[id number]] Collected:  10/07/18 1119    Specimen:  Body Fluid from Pleural Cavity Updated:  10/12/18 1602     Case Report --     Non-gynecologic Cytology                          Case: [id number]                                  Authorizing Provider:  Vu Michael MD     Collected:           10/07/2018 11:19 AM          Ordering Location:     [facility]     Received:            10/08/2018 10:22 AM                                 [facility]                                                               Pathologist:           Adry Alonzo MD                                                        Specimen:    Pleural Cavity, left pleural fluid                                                          Final Diagnosis --     Left pleural fluid, ThinPrep preparation (1) and cell block: Negative for malignant cells.    Comment: Flow cytometric analysis performed by [facility] reveals no evidence of a B-cell or T-cell lymphoma in the sample analyzed.  Please refer to the complete flow cytometry report from [facility] which is appended.       Gross Description --     Received in cytolyt in the laboratory in a container  labeled with patient name and  designated as left pleural fluid.  50 mls with yellow clear fluid and 300 mls of yellow clear fluid.  1 thin prep slide and 1 cell block made.  Flow Cytometry sent.         Microscopic Description --     ThinPrep preparation and sections of the cell block of the left pleural fluid reveal a specimen of low cellularity.  Proteinaceous material is seen.  There are scattered small, mature lymphocytes, scattered PMN leukocytes and a few mesothelial cells.  Malignant cells are not identified.      Anaerobic Culture - Pleural Fluid, Pleural Cavity [[id number]]  (Normal) Collected:  10/07/18 1118    Specimen:  Pleural Fluid from Pleural Cavity Updated:  10/12/18 1059     Culture No anaerobes isolated    Body Fluid Culture - Body Fluid, Pleural Cavity [[id number]]  (Normal) Collected:  10/07/18 111    Specimen:  Body Fluid from Pleural Cavity Updated:  10/12/18 0630     BF Culture No growth at 5 days     Gram Stain Result Many (4+) WBCs seen      No organisms seen    Renal Function Panel [[id number]]  (Abnormal) Collected:  10/12/18 0437    Specimen:  Blood Updated:  10/12/18 0520     Glucose 114 (H) mg/dL      BUN 36 (H) mg/dL      Creatinine 2.90 (H) mg/dL      Sodium 142 mmol/L      Potassium 4.3 mmol/L      Chloride 103 mmol/L      CO2 26.0 mmol/L      Calcium 8.8 mg/dL      Albumin 3.40 (L) g/dL      Phosphorus 3.6 mg/dL      Anion Gap 13.0 mmol/L      BUN/Creatinine Ratio 12.4     eGFR Non African Amer 21 (L) mL/min/1.73     Narrative:       The MDRD GFR formula is only valid for adults with stable renal function between ages 18 and 70.    POC Glucose Once [[id number]]  (Abnormal) Collected:  10/11/18 2033    Specimen:  Blood Updated:  10/11/18 2045     Glucose 133 (H) mg/dL      Comment: : [id number] Cotton) YoungylieMeter ID: [id number]       POC Glucose Once [[id number]]  (Normal) Collected:  10/11/18 1653    Specimen:  Blood Updated:  10/11/18 1704     Glucose 122 mg/dL       Comment: : [id number] Luisa KaleeMeter ID: [id number]       POC Glucose Once [[id number]]  (Abnormal) Collected:  10/11/18 1014    Specimen:  Blood Updated:  10/11/18 1026     Glucose 159 (H) mg/dL      Comment: : [id number] Luisa KaleeMeter ID: [id number]       POC Glucose Once [[id number]]  (Abnormal) Collected:  10/11/18 0940    Specimen:  Blood Updated:  10/11/18 1003     Glucose 160 (H) mg/dL      Comment: : [id number] Osmany Rogers ID: [id number]       Blood Culture - Blood, [[id number]]  (Normal) Collected:  10/06/18 0635    Specimen:  Blood from Arm, Right Updated:  10/11/18 0716     Blood Culture No growth at 5 days    Blood Culture - Blood, [[id number]]  (Normal) Collected:  10/06/18 0545    Specimen:  Blood from Arm, Right Updated:  10/11/18 0616     Blood Culture No growth at 5 days    Basic Metabolic Panel [[id number]]  (Abnormal) Collected:  10/11/18 0355    Specimen:  Blood Updated:  10/11/18 0434     Glucose 136 (H) mg/dL      BUN 60 (H) mg/dL      Creatinine 4.06 (H) mg/dL      Sodium 144 mmol/L      Potassium 4.7 mmol/L      Chloride 106 mmol/L      CO2 26.0 mmol/L      Calcium 9.7 mg/dL      eGFR  African Amer -- mL/min/1.73      Comment: <15 Indicative of kidney failure.        eGFR Non African Amer 14 (L) mL/min/1.73      Comment: <15 Indicative of kidney failure.        BUN/Creatinine Ratio 14.8     Anion Gap 12.0 mmol/L     Narrative:       The MDRD GFR formula is only valid for adults with stable renal function between ages 18 and 70.    Phosphorus [[id number]]  (Normal) Collected:  10/11/18 0355    Specimen:  Blood Updated:  10/11/18 0434     Phosphorus 4.3 mg/dL     Magnesium [[id number]]  (Abnormal) Collected:  10/11/18 0355    Specimen:  Blood Updated:  10/11/18 0434     Magnesium 2.3 (H) mg/dL     CBC & Differential [[id number]] Collected:  10/11/18 0355    Specimen:  Blood Updated:  10/11/18 0424    Narrative:       The following orders were created for panel order  CBC & Differential.  Procedure                               Abnormality         Status                     ---------                               -----------         ------                     CBC Auto Differential[[id number]]        Abnormal            Final result                 Please view results for these tests on the individual orders.    CBC Auto Differential [[id number]]  (Abnormal) Collected:  10/11/18 0355    Specimen:  Blood Updated:  10/11/18 0424     WBC 4.56 (L) 10*3/mm3      RBC 3.21 (L) 10*6/mm3      Hemoglobin 9.1 (L) g/dL      Hematocrit 28.7 (L) %      MCV 89.4 fL      MCH 28.3 pg      MCHC 31.7 (L) g/dL      RDW 18.6 (H) %      RDW-SD 60.5 (H) fl      MPV 10.2 fL      Platelets 190 10*3/mm3      Neutrophil % 70.2 %      Lymphocyte % 17.5 %      Monocyte % 8.3 %      Eosinophil % 2.9 %      Basophil % 0.7 %      Immature Grans % 0.4 %      Neutrophils, Absolute 3.20 10*3/mm3      Lymphocytes, Absolute 0.80 10*3/mm3      Monocytes, Absolute 0.38 10*3/mm3      Eosinophils, Absolute 0.13 10*3/mm3      Basophils, Absolute 0.03 10*3/mm3      Immature Grans, Absolute 0.02 10*3/mm3      nRBC 0.0 /100 WBC     POC Glucose Once [[id number]]  (Abnormal) Collected:  10/10/18 2004    Specimen:  Blood Updated:  10/10/18 2017     Glucose 215 (H) mg/dL      Comment: : [id number] GageFito HayesleyMeter ID: [id number]       POC Glucose Once [[id number]]  (Abnormal) Collected:  10/10/18 1656    Specimen:  Blood Updated:  10/10/18 1707     Glucose 195 (H) mg/dL      Comment: : [id number] Tesfaye EuraisaMeter ID: [id number]       Hepatitis Panel, Acute [[id number]]  (Normal) Collected:  10/10/18 1255    Specimen:  Blood Updated:  10/10/18 1433     HCV S/C Ratio 0.03     Hepatitis C Ab Negative     Hep A IgM Negative     Hep B C IgM Negative     Hepatitis B Surface Ag Negative    Hepatitis B Surface Antibody [[id number]]  (Abnormal) Collected:  10/10/18 1255    Specimen:  Blood Updated:  10/10/18 1429     Hepatitis Bs  Ab Index <5.00     Hep B S Ab Non-Immune (A)              Echo EF Estimated  Lab Results   Component Value Date    ECHOEFEST 37 10/06/2018       Medication Review: yes  Current Facility-Administered Medications   Medication Dose Route Frequency Provider Last Rate Last Dose   • acetaminophen (TYLENOL) tablet 650 mg  650 mg Oral Q4H PRN Max Jones MD       • amiodarone (PACERONE) tablet 200 mg  200 mg Oral TID Abdiel Stout MD   200 mg at 10/13/18 1125   • aspirin chewable tablet 81 mg  81 mg Oral Daily Vu Michael MD   81 mg at 10/13/18 1125   • atorvastatin (LIPITOR) tablet 80 mg  80 mg Oral Daily Max Jones MD   80 mg at 10/13/18 1124   • budesonide-formoterol (SYMBICORT) 160-4.5 MCG/ACT inhaler 2 puff  2 puff Inhalation BID - RT Max Jones MD   2 puff at 10/12/18 2143   • bumetanide (BUMEX) tablet 1 mg  1 mg Oral BID Vu Michael MD   1 mg at 10/13/18 1125   • calcitriol (ROCALTROL) capsule 0.5 mcg  0.5 mcg Oral Daily Max Jones MD   0.5 mcg at 10/13/18 1126   • calcium carb-cholecalciferol 600-800 MG-UNIT tablet 1 tablet  1 tablet Oral Daily Max Jones MD   1 tablet at 10/13/18 1127   • dextrose (D50W) 25 g/ 50mL Intravenous Solution 25 g  25 g Intravenous Q15 Min PRN Max Jones MD       • dextrose (GLUTOSE) oral gel 15 g  15 g Oral Q15 Min PRN Max Jones MD       • Ferric Citrate tablet 210 mg  210 mg Oral TID With Meals Max Jones MD   210 mg at 10/13/18 1124   • glucagon (human recombinant) (GLUCAGEN DIAGNOSTIC) injection 1 mg  1 mg Subcutaneous PRN Max Jones MD       • heparin (porcine) 5000 UNIT/ML injection 5,000 Units  5,000 Units Subcutaneous Q12H Max Jones MD   5,000 Units at 10/13/18 1126   • heparin (porcine) injection 1,700 Units  1,700 Units Intracatheter Samson Singh MD   1,700 Units at 10/12/18 1802   • heparin  (porcine) injection 1,700 Units  1,700 Units Intracatheter PRN Samson Aragon MD   1,700 Units at 10/13/18 0906   • hydrALAZINE (APRESOLINE) tablet 25 mg  25 mg Oral Q8H Abdiel Stout MD       • HYDROcodone-acetaminophen (NORCO)  MG per tablet 1 tablet  1 tablet Oral Q6H PRN Max Jones MD   1 tablet at 10/13/18 0110   • hydroxypropyl methylcellulose (ISOPTO TEARS) 2.5 % ophthalmic solution 1 drop  1 drop Both Eyes TID PRN Vu Michael MD   1 drop at 10/11/18 0636   • Influenza Vac Subunit Quad (FLUCELVAX) injection 0.5 mL  0.5 mL Intramuscular During Hospitalization Max Jones MD       • insulin lispro (humaLOG) injection 0-9 Units  0-9 Units Subcutaneous 4x Daily With Meals & Nightly Max Jones MD   4 Units at 10/10/18 2112   • insulin lispro (humaLOG) injection 6 Units  6 Units Subcutaneous TID AC Max Jones MD   6 Units at 10/10/18 1723   • ipratropium (ATROVENT) nebulizer solution 0.5 mg  0.5 mg Nebulization Q6H PRN Abdiel Stout MD       • isosorbide dinitrate (ISORDIL) tablet 10 mg  10 mg Oral TID - Nitrates Abdiel Stout MD   10 mg at 10/13/18 1124   • lisinopril (PRINIVIL,ZESTRIL) tablet 2.5 mg  2.5 mg Oral Q24H Abdiel Stout MD   2.5 mg at 10/13/18 1124   • magnesium hydroxide (MILK OF MAGNESIA) suspension 2400 mg/10mL 10 mL  10 mL Oral Daily PRN Max Jones MD       • metoprolol tartrate (LOPRESSOR) tablet 25 mg  25 mg Oral Q12H Max Jones MD   25 mg at 10/13/18 1124   • nicotine (NICODERM CQ) 21 MG/24HR patch 1 patch  1 patch Transdermal Q24H Ashutosh Palafox MD   1 patch at 10/12/18 1943   • ondansetron (ZOFRAN) injection 4 mg  4 mg Intravenous Q6H PRN Vu Michael MD   4 mg at 10/13/18 0351   • potassium chloride (MICRO-K) CR capsule 20 mEq  20 mEq Oral Daily Vu Michael MD   20 mEq at 10/13/18 1125   • sennosides-docusate sodium (SENOKOT-S) 8.6-50 MG tablet 1 tablet  1 tablet  Oral BID Vu Michael MD   1 tablet at 10/13/18 1125   • sodium bicarbonate tablet 650 mg  650 mg Oral BID Delmy Mullins, MACO   650 mg at 10/13/18 1126   • sodium chloride 0.9 % flush 3 mL  3 mL Intravenous Q12H Max Jones MD   3 mL at 10/12/18 1954   • sodium chloride 0.9 % flush 3-10 mL  3-10 mL Intravenous PRN Max Jones MD       • tetrahydrozoline 0.05 % ophthalmic solution 1 drop  1 drop Both Eyes 4x Daily PRN Ney De MD   1 drop at 10/10/18 0339         Assessment/Plan       Acute on chronic respiratory failure with hypoxia (CMS/HCC)    Acute renal failure superimposed on stage 4 chronic kidney disease (CMS/HCC)    Acute on chronic combined systolic and diastolic congestive heart failure (CMS/HCC)    Sepsis (CMS/[facility])    Pneumonia    Coronary artery disease involving native coronary artery of native heart without angina pectoris    History of coronary artery stent placement    Pleural effusion, bilateral    Mild aortic valve stenosis    Moderate aortic valve insufficiency    Mild mitral valve regurgitation    COPD (chronic obstructive pulmonary disease) (CMS/[facility])      Plan    1. Continue current cardiac meds.   2. Continue telemetry.   3. Daily weights.  4. Cardiac/renal/low sodium diet.   5. 1500 ml daily fluid restriction.  6. Continue to monitor intake and output.     Monik Vann, MACO  10/13/18  1:37 PM                Electronically signed by Lucian Monroe MD at 10/13/2018  8:28 PM       Consult Notes (last 72 hours) (Notes from 10/13/2018 10:52 AM through 10/16/2018 10:52 AM)     No notes of this type exist for this encounter.           Nutrition Notes (last 24 hours) (Notes from 10/15/2018 10:52 AM through 10/16/2018 10:52 AM)      Grief-Delmy Malagon at 10/15/2018  4:42 PM          Problem: Nutrition, Imbalanced: Inadequate Oral Intake (Adult)  Goal: Improved Oral Intake  Outcome: Ongoing (interventions implemented as appropriate)   10/15/18  1637   Nutrition, Imbalanced: Inadequate Oral Intake (Adult)   Improved Oral Intake other (see comments)  (Oral intake remains at an average of 25%. )       Problem: Patient Care Overview  Goal: Plan of Care Review  Outcome: Ongoing (interventions implemented as appropriate)   10/15/18 1637   Plan of Care Review   Progress no change   OTHER   Outcome Summary RD nutrition follow-up completed. Pt's po intake remains very poor at this time. May wish to consider AMONS. Will follow for possible d/c needs, pt may go to a SNF after hospital d/c           Electronically signed by Delmy Garcia at 10/15/2018  4:42 PM          Physical Therapy Notes (last 24 hours) (Notes from 10/15/2018 10:52 AM through 10/16/2018 10:52 AM)      Liliane Collado PTA at 10/16/2018 10:37 AM  Version 1 of 1         Problem: Patient Care Overview  Goal: Plan of Care Review  Outcome: Ongoing (interventions implemented as appropriate)   10/16/18 1002   Plan of Care Review   Progress declining   OTHER   Outcome Summary Pt. agreeable to therapy, but only because he needed to have a BM. Pt. was CGA for rolling in bed. Pt.stood x 5 times over the course of tx and took steps to and from BSC due to having loose, constant stools. Pt declined to do any other activity due to fatigue and nausea. Pt. will need SNF placement due to lack of mobility.    Coping/Psychosocial   Plan of Care Reviewed With patient           Electronically signed by Liliane Collado PTA at 10/16/2018 10:38 AM     Liliane Collado PTA at 10/16/2018 10:38 AM  Version 1 of 1         Acute Care - Physical Therapy Treatment Note  [facility]     Patient Name: Gavin Wilson  : 1945  MRN: [id number]  Today's Date: 10/16/2018  Onset of Illness/Injury or Date of Surgery: 10/06/18  Date of Referral to PT: 10/08/18  Referring Physician: Dr. Michael    Admit Date: 10/6/2018    Visit Dx:    ICD-10-CM ICD-9-CM   1. Esophageal dysphagia R13.10 787.20   2. Impaired mobility  and ADLs Z74.09 799.89   3. Impaired functional mobility, balance, gait, and endurance Z74.09 V49.89   4. Chronic kidney disease, stage 4 (severe) (CMS/[facility]) N18.4 585.4     Patient Active Problem List   Diagnosis   • Volume overload   • Acute renal failure superimposed on stage 4 chronic kidney disease (CMS/[facility])   • Acute on chronic combined systolic and diastolic congestive heart failure (CMS/[facility])   • Pleural effusion, bilateral   • Sepsis (CMS/[facility])   • Coronary artery disease involving native coronary artery of native heart without angina pectoris   • History of coronary artery stent placement   • Mild aortic valve stenosis   • Moderate aortic valve insufficiency   • Mild mitral valve regurgitation   • Acute on chronic respiratory failure with hypoxia (CMS/[facility])   • Pneumonia   • COPD (chronic obstructive pulmonary disease) (CMS/[facility])       Therapy Treatment          Rehabilitation Treatment Summary     Row Name 10/16/18 1002             Treatment Time/Intention    Discipline physical therapy assistant  -      Document Type therapy note (daily note)  -MF2      Subjective Information complains of;weakness;nausea/vomiting  -2      Mode of Treatment physical therapy  -2      Existing Precautions/Restrictions fall  -2      Recorded by [MF] Liliane Collado, [facility] 10/16/18 1002  [MF2] Liliane Collado, PTA 10/16/18 1035      Row Name 10/16/18 1002             Bed Mobility Assessment/Treatment    Rolling Left [place] (Bed Mobility) verbal cues;contact guard  -      Rolling Right [place] (Bed Mobility) verbal cues;contact guard  -      Supine-Sit [place] (Bed Mobility) verbal cues;contact guard;minimum assist (75% patient effort)  -      Sit-Supine [place] (Bed Mobility) verbal cues;contact guard  -      Bed Mobility, Safety Issues decreased use of arms for pushing/pulling;decreased use of legs for bridging/pushing  -      Assistive Device (Bed Mobility) head of bed elevated;bed  rails  -MF      Comment (Bed Mobility) pt. requested to be put on bed pan prior to getting up. Did this and cleaned pt up.   -MF      Recorded by [] Liliane Collado, PTA 10/16/18 1035      Row Name 10/16/18 1002             Transfer Assessment/Treatment    Comment (Transfers) Pt. stood and began to have more BM. Pt. took steps from bed to BSC, where nursing assisted with cleaning up. Pt. stood 3 more times from BSC. Then pt. took steps from BSC over to bed and then side steps. Pt. refused to do anything else due to fatigue and nausea.   -MF      Recorded by [MF] Liliane Collado, PTA 10/16/18 1035      Row Name 10/16/18 1002             Sit-Stand Transfer    Sit-Stand [place] (Transfers) verbal cues;minimum assist (75% patient effort)  -MF      Recorded by [MF] Liliane Collado, PTA 10/16/18 1035      Row Name 10/16/18 1002             Stand-Sit Transfer    Stand-Sit [place] (Transfers) verbal cues;minimum assist (75% patient effort)  -MF      Recorded by [MF] Liliane Collado, PTA 10/16/18 1035      Row Name 10/16/18 1002             Stand Pivot/Stand Step Transfer    Stand Pivot/Stand Step [place] verbal cues;minimum assist (75% patient effort)  -MF      Recorded by [MF] Liliane Collado, PTA 10/16/18 1035      Row Name 10/16/18 1002             Toilet Transfer    Type (Toilet Transfer) sit-stand;stand-sit;stand pivot/stand step  -      [place] Level (Toilet Transfer) verbal cues;minimum assist (75% patient effort)  -MF      Recorded by [MF] Liliane Collado, PTA 10/16/18 1035      Row Name 10/16/18 1002             Gait/Stairs Assessment/Training    Comment (Gait/Stairs) refused  -MF      Recorded by [] Liliane Collado, PTA 10/16/18 1035      Row Name 10/16/18 1002             Positioning and Restraints    Pre-Treatment Position in bed  -MF      Post Treatment Position bed  -MF      In Bed notified nsg;side lying left;call light within reach;encouraged to call for  assist;side rails up x2;legs elevated  -MF      Recorded by [MF] Liliane Collado, PTA 10/16/18 1035      Row Name 10/16/18 1002             Pain Scale: Numbers Pre/Post-Treatment    Pain Scale: Numbers, Pretreatment 0/10 - no pain  -MF      Pain Scale: Numbers, Post-Treatment 0/10 - no pain  -MF      Recorded by [MF] Liliane Collado, PTA 10/16/18 1035      Row Name                Wound 10/07/18 1255 Bilateral posterior coccyx    Wound - Properties Group Date first assessed: 10/07/18 [SH] Time first assessed: 1255 [SH] Side: Bilateral [SH] Orientation: posterior [SH] Location: coccyx [SH] Recorded by:  [SH] Virginia Ahn RN 10/07/18 1256    Row Name                Wound 10/11/18 0831 Other (See comments) chest incision    Wound - Properties Group Date first assessed: 10/11/18 [DS] Time first assessed: 0831 [DS] Side: Other (See comments) [DS] Location: chest [DS] Type: incision [DS] Recorded by:  [DS] Jeremy Liu RN 10/11/18 0831      User Key  (r) = Recorded By, (t) = Taken By, (c) = Cosigned By    Initials Name Effective Dates Discipline     Virginia Ahn RN 08/28/17 -  Nurse    Jeremy Almaraz RN 08/02/16 -  Nurse     Liliane Collado, PTA 08/02/16 -  PT          Wound 10/07/18 1255 Bilateral posterior coccyx (Active)   Dressing Appearance dry;intact 10/16/2018  7:30 AM   Closure DAVID 10/16/2018  7:30 AM   Base dressing in place, unable to visualize 10/16/2018  7:30 AM   Periwound intact;dry;redness 10/16/2018  7:30 AM   Periwound Temperature warm 10/16/2018  7:30 AM   Periwound Skin Turgor soft 10/16/2018  7:30 AM   Drainage Amount none 10/16/2018  7:30 AM   Dressing Care, Wound foam 10/15/2018  7:33 PM   Periwound Care, Wound absorptive dressing applied 10/15/2018  7:33 PM       Wound 10/11/18 0831 Other (See comments) chest incision (Active)   Base dressing in place, unable to visualize 10/15/2018  7:33 PM             Physical Therapy Education     Title: PT OT SLP  Therapies (Active)     Topic: Physical Therapy (Active)     Point: Mobility training (Active)    Learning Progress Summary     Learner Status Readiness Method Response Comment Documented by    Patient Active Acceptance E,D NR transfers and benefits of activity MF 10/16/18 1035     Active Acceptance E,D NR Educated on benefits of activity. Intructed in safety with bed mobility, transfers,and LE Strengthening Exercises. LG 10/14/18 0918     Done Acceptance E VU,NR benefits of activity MF 10/12/18 1206     Active Acceptance E,D RT Effecient supine to sit EARLENE 10/09/18 1154     Done Acceptance E VU,NR Pt was educated on the importance and benefits of getting out of bed and how it affects his overall health. Pt was educated on why PT is working with him. Pt was encouraged to do more in treatment session. TD 10/08/18 1430     Done Acceptance E VU,NR Pt  was educated on benefits ot PT and PT POC. Pt was edcuated on body mechanics with transfers (pushing with one arm on bed to stand up/having legs touch bed before sitting down) and safety precautions (socks on, gait belt on) with transfers and gait. TD 10/08/18 1149          Point: Body mechanics (Active)    Learning Progress Summary     Learner Status Readiness Method Response Comment Documented by    Patient Active Acceptance E,D NR Educated on benefits of activity. Intructed in safety with bed mobility, transfers,and LE Strengthening Exercises. LG 10/14/18 0918     Done Acceptance E VU,NR Pt was educated on the importance and benefits of getting out of bed and how it affects his overall health. Pt was educated on why PT is working with him. Pt was encouraged to do more in treatment session. TD 10/08/18 1430     Done Acceptance E VU,NR Pt  was educated on benefits ot PT and PT POC. Pt was edcuated on body mechanics with transfers (pushing with one arm on bed to stand up/having legs touch bed before sitting down) and safety precautions (socks on, gait belt on) with transfers  and gait. TD 10/08/18 1149          Point: Precautions (Active)    Learning Progress Summary     Learner Status Readiness Method Response Comment Documented by    Patient Active Acceptance E,D NR Educated on benefits of activity. Intructed in safety with bed mobility, transfers,and LE Strengthening Exercises. LG 10/14/18 0918     Done Acceptance E VU,NR Pt was educated on the importance and benefits of getting out of bed and how it affects his overall health. Pt was educated on why PT is working with him. Pt was encouraged to do more in treatment session. TD 10/08/18 1430     Done Acceptance E VU,NR Pt  was educated on benefits ot PT and PT POC. Pt was edcuated on body mechanics with transfers (pushing with one arm on bed to stand up/having legs touch bed before sitting down) and safety precautions (socks on, gait belt on) with transfers and gait. TD 10/08/18 1149                      User Key     Initials Effective Dates Name Provider Type Discipline     08/02/16 -  Roberto Pereira PTA Physical Therapy Assistant PT    EARLENE 08/02/16 -  Kaylynn Mcgarry PTA Physical Therapy Assistant PT     08/02/16 -  Liliane Collado PTA Physical Therapy Assistant PT    TD 09/07/18 -  Rita Grullon, PT Student PT Student PT                    PT Recommendation and Plan     Plan of Care Reviewed With: patient  Progress: declining  Outcome Summary: Pt. agreeable to therapy, but only because he needed to have a BM. Pt. was CGA for rolling in bed. Pt.stood x 5 times over the course of tx and took steps to and from BSC due to having loose, constant stools. Pt declined to do any other activity due to fatigue and nausea. Pt. will need SNF placement due to lack of mobility.           Outcome Measures     Row Name 10/16/18 1002 10/15/18 0938 10/14/18 0941       How much help from another person do you currently need...    Turning from your back to your side while in flat bed without using bedrails? 4  -MF  -- 4  -LG    Moving from  lying on back to sitting on the side of a flat bed without bedrails? 3  -MF  -- 3  -LG    Moving to and from a bed to a chair (including a wheelchair)? 3  -MF  -- 3  -LG    Standing up from a chair using your arms (e.g., wheelchair, bedside chair)? 3  -MF  -- 3  -LG    Climbing 3-5 steps with a railing? 2  -MF  -- 2  -LG    To walk in hospital room? 3  -MF  -- 3  -LG    AM-PAC 6 Clicks Score 18  -MF  -- 18  -LG       How much help from another is currently needed...    Putting on and taking off regular lower body clothing?  -- 2  -CJ  --    Bathing (including washing, rinsing, and drying)  -- 2  -CJ  --    Toileting (which includes using toilet bed pan or urinal)  -- 2  -CJ  --    Putting on and taking off regular upper body clothing  -- 2  -CJ  --    Taking care of personal grooming (such as brushing teeth)  -- 3  -CJ  --    Eating meals  -- 4  -CJ  --    Score  -- 15  -CJ  --       Functional Assessment    Outcome Measure Options AM-PAC 6 Clicks Basic Mobility (PT)  - AM-PAC 6 Clicks Daily Activity (OT)  -[facility]-PAC 6 Clicks Basic Mobility (PT)  -    Row Name 10/13/18 1513             How much help from another person do you currently need...    Turning from your back to your side while in flat bed without using bedrails? 4  -LG      Moving from lying on back to sitting on the side of a flat bed without bedrails? 3  -LG      Moving to and from a bed to a chair (including a wheelchair)? 3  -LG      Standing up from a chair using your arms (e.g., wheelchair, bedside chair)? 3  -LG      Climbing 3-5 steps with a railing? 2  -LG      To walk in hospital room? 3  -LG      AM-PAC 6 Clicks Score 18  -LG         Functional Assessment    Outcome Measure Options AM-PAC 6 Clicks Basic Mobility (PT)  -        User Key  (r) = Recorded By, (t) = Taken By, (c) = Cosigned By    Initials Name Provider Type    LG Roberto Pereira, URMILA Physical Therapy Assistant    Paulino Lopez COTA/L Occupational Therapy Assistant      Liliane Collado PTA Physical Therapy Assistant           Time Calculation:         PT Charges     Row Name 10/16/18 1038             Time Calculation    Start Time 1002  -MF      Stop Time 1030  -MF      Time Calculation (min) 28 min  -MF      PT Received On 10/16/18  -MF      PT Goal Re-Cert Due Date 10/18/18  -MF         Time Calculation- PT    Total Timed Code Minutes- PT 28 minute(s)  -MF         Timed Charges    76045 - PT Therapeutic Activity Minutes 28  -MF        User Key  (r) = Recorded By, (t) = Taken By, (c) = Cosigned By    Initials Name Provider Type    Liliane Bassett PTA Physical Therapy Assistant        Therapy Suggested Charges     Code   Minutes Charges    92890 (CPT®) Hc Pt Neuromusc Re Education Ea 15 Min      46497 (CPT®) Hc Pt Ther Proc Ea 15 Min      49926 (CPT®) Hc Gait Training Ea 15 Min      27495 (CPT®) Hc Pt Therapeutic Act Ea 15 Min 28 2    63311 (CPT®) Hc Pt Manual Therapy Ea 15 Min      10110 (CPT®) Hc Pt Iontophoresis Ea 15 Min      01893 (CPT®) Hc Pt Elec Stim Ea-Per 15 Min      21558 (CPT®) Hc Pt Ultrasound Ea 15 Min      62511 (CPT®) Hc Pt Self Care/Mgmt/Train Ea 15 Min      25873 (CPT®) Hc Pt Prosthetic (S) Train Initial Encounter, Each 15 Min      27562 (CPT®) Hc Pt Orthotic(S)/Prosthetic(S) Encounter, Each 15 Min      61298 (CPT®) Hc Orthotic(S) Mgmt/Train Initial Encounter, Each 15min      Total  28 2        Therapy Charges for Today     Code Description Service Date Service Provider Modifiers Qty    99479574482 HC PT THERAPEUTIC ACT EA 15 MIN 10/16/2018 Liliane Collado PTA GP, KX 2          PT G-Codes  PT Professional Judgement Used?: Yes  Outcome Measure Options: AM-PAC 6 Clicks Basic Mobility (PT)  AM-PAC 6 Clicks Score: 18  Score: 15  Functional Limitation: Mobility: Walking and moving around  Mobility: Walking and Moving Around Current Status (): At least 20 percent but less than 40 percent impaired, limited or restricted  Mobility: Walking and  Moving Around Goal Status (): At least 1 percent but less than 20 percent impaired, limited or restricted    Liliane Collado PTA  10/16/2018         Electronically signed by Liliane Collado PTA at 10/16/2018 10:39 AM          Occupational Therapy Notes (last 24 hours) (Notes from 10/15/2018 10:52 AM through 10/16/2018 10:52 AM)      Jef, Paulino R, KEENAN/L at 10/15/2018 11:38 AM          Problem: Patient Care Overview  Goal: Plan of Care Review  Outcome: Ongoing (interventions implemented as appropriate)   10/15/18 1138   OTHER   Outcome Summary Pt. progressing fairly with his adl bathing/dressing, mod. assist required from this keenan/l!   Coping/Psychosocial   Plan of Care Reviewed With patient           Electronically signed by Paulino Fuchs COTA/L at 10/15/2018 11:38 AM       Speech Language Pathology Notes (last 24 hours) (Notes from 10/15/2018 10:52 AM through 10/16/2018 10:52 AM)     No notes of this type exist for this encounter.        Respiratory Therapy Notes (last 24 hours) (Notes from 10/15/2018 10:52 AM through 10/16/2018 10:52 AM)     No notes of this type exist for this encounter.

## 2018-10-16 NOTE — PLAN OF CARE
Problem: Patient Care Overview  Goal: Plan of Care Review  Outcome: Ongoing (interventions implemented as appropriate)   10/16/18 1631   Plan of Care Review   Progress no change   OTHER   Outcome Summary VSS. PT C/O BACK PAIN THIS SHIFT, PRN NORCO GIVEN. PT C/O NAUSEA, PRN ZOFRAN GIVEN. PT HAD MULTIPLE LOOSE STOOLS THROUGHOUT THE SHIFT. [facility]. WILL CONTINUE TO MONITOR AND NOTIFY MD OF ANY CHANGES.    Coping/Psychosocial   Plan of Care Reviewed With patient     Goal: Interprofessional Rounds/Family Conf  Outcome: Ongoing (interventions implemented as appropriate)

## 2018-10-16 NOTE — PROGRESS NOTES
Continued Stay Note   Jie     Patient Name: Gavin Wilson  MRN: [id number]  Today's Date: 10/16/2018    Admit Date: 10/6/2018          Discharge Plan     Row Name 10/16/18 1132       Plan    Plan Comments SPOKE TO [facility] WITH [facility] ([phone]) AND HE SENT OVER A LIST OF CONTRACT NH'S IN [place] AREA AS WELL AS [facility] IN [place]. PROVIDED LIST TO PT AND PT PREFERS TO STAY LOCAL SO CHOSE [facility]. FAXED A REFERRAL TO [facility] AND PROVIDED CHARBEL'S NUMBER TO [facility]. ALSO SENT A REFERRAL TO [facility] ([place] LOCATION).               Discharge Codes    No documentation.           SILVESTRE Busby

## 2018-10-17 LAB
ANION GAP SERPL CALCULATED.3IONS-SCNC: 7 MMOL/L (ref 4–13)
BUN BLD-MCNC: 30 MG/DL (ref 5–21)
BUN/CREAT SERPL: 10 (ref 7–25)
CALCIUM SPEC-SCNC: 9.3 MG/DL (ref 8.4–10.4)
CHLORIDE SERPL-SCNC: 103 MMOL/L (ref 98–110)
CO2 SERPL-SCNC: 30 MMOL/L (ref 24–31)
CREAT BLD-MCNC: 2.99 MG/DL (ref 0.5–1.4)
GFR SERPL CREATININE-BSD FRML MDRD: 21 ML/MIN/1.73
GLUCOSE BLD-MCNC: 138 MG/DL (ref 70–100)
GLUCOSE BLDC GLUCOMTR-MCNC: 383 MG/DL (ref 70–130)
GLUCOSE BLDC GLUCOMTR-MCNC: 84 MG/DL (ref 70–130)
POTASSIUM BLD-SCNC: 5 MMOL/L (ref 3.5–5.3)
SODIUM BLD-SCNC: 140 MMOL/L (ref 135–145)

## 2018-10-17 PROCEDURE — 5A1D70Z PERFORMANCE OF URINARY FILTRATION, INTERMITTENT, LESS THAN 6 HOURS PER DAY: ICD-10-PCS | Performed by: INTERNAL MEDICINE

## 2018-10-17 PROCEDURE — 80048 BASIC METABOLIC PNL TOTAL CA: CPT | Performed by: NURSE PRACTITIONER

## 2018-10-17 PROCEDURE — 63710000001 INSULIN LISPRO (HUMAN) PER 5 UNITS: Performed by: INTERNAL MEDICINE

## 2018-10-17 PROCEDURE — 97116 GAIT TRAINING THERAPY: CPT

## 2018-10-17 PROCEDURE — 94799 UNLISTED PULMONARY SVC/PX: CPT

## 2018-10-17 PROCEDURE — 25010000002 ONDANSETRON PER 1 MG: Performed by: NURSE PRACTITIONER

## 2018-10-17 PROCEDURE — 25010000002 HEPARIN (PORCINE) PER 1000 UNITS: Performed by: INTERNAL MEDICINE

## 2018-10-17 PROCEDURE — 94760 N-INVAS EAR/PLS OXIMETRY 1: CPT

## 2018-10-17 PROCEDURE — 82962 GLUCOSE BLOOD TEST: CPT

## 2018-10-17 RX ADMIN — HYDROCODONE BITARTRATE AND ACETAMINOPHEN 1 TABLET: 10; 325 TABLET ORAL at 13:14

## 2018-10-17 RX ADMIN — ASPIRIN 81 MG CHEWABLE TABLET 81 MG: 81 TABLET CHEWABLE at 11:20

## 2018-10-17 RX ADMIN — Medication 10 ML: at 03:58

## 2018-10-17 RX ADMIN — ONDANSETRON HYDROCHLORIDE 4 MG: 2 INJECTION INTRAMUSCULAR; INTRAVENOUS at 03:55

## 2018-10-17 RX ADMIN — AMIODARONE HYDROCHLORIDE 200 MG: 200 TABLET ORAL at 11:20

## 2018-10-17 RX ADMIN — VANCOMYCIN HYDROCHLORIDE 125 MG: KIT at 00:18

## 2018-10-17 RX ADMIN — METOPROLOL TARTRATE 25 MG: 25 TABLET, FILM COATED ORAL at 11:21

## 2018-10-17 RX ADMIN — INSULIN LISPRO 6 UNITS: 100 INJECTION, SOLUTION INTRAVENOUS; SUBCUTANEOUS at 13:15

## 2018-10-17 RX ADMIN — HYDROCODONE BITARTRATE AND ACETAMINOPHEN 1 TABLET: 10; 325 TABLET ORAL at 19:40

## 2018-10-17 RX ADMIN — FERRIC CITRATE 210 MG: 210 TABLET, COATED ORAL at 11:20

## 2018-10-17 RX ADMIN — LISINOPRIL 2.5 MG: 2.5 TABLET ORAL at 11:20

## 2018-10-17 RX ADMIN — NICOTINE 1 PATCH: 21 PATCH, EXTENDED RELEASE TRANSDERMAL at 20:58

## 2018-10-17 RX ADMIN — HEPARIN SODIUM 5000 UNITS: 5000 INJECTION, SOLUTION INTRAVENOUS; SUBCUTANEOUS at 11:19

## 2018-10-17 RX ADMIN — ONDANSETRON HYDROCHLORIDE 4 MG: 2 INJECTION INTRAMUSCULAR; INTRAVENOUS at 13:14

## 2018-10-17 RX ADMIN — HYDROCODONE BITARTRATE AND ACETAMINOPHEN 1 TABLET: 10; 325 TABLET ORAL at 00:18

## 2018-10-17 RX ADMIN — HEPARIN SODIUM 5000 UNITS: 5000 INJECTION, SOLUTION INTRAVENOUS; SUBCUTANEOUS at 20:56

## 2018-10-17 RX ADMIN — FERRIC CITRATE 210 MG: 210 TABLET, COATED ORAL at 17:53

## 2018-10-17 RX ADMIN — ISOSORBIDE DINITRATE 10 MG: 10 TABLET ORAL at 17:53

## 2018-10-17 RX ADMIN — VANCOMYCIN HYDROCHLORIDE 125 MG: KIT at 06:00

## 2018-10-17 RX ADMIN — VANCOMYCIN HYDROCHLORIDE 125 MG: KIT at 17:53

## 2018-10-17 RX ADMIN — Medication 1 TABLET: at 11:21

## 2018-10-17 RX ADMIN — VANCOMYCIN HYDROCHLORIDE 125 MG: KIT at 13:15

## 2018-10-17 RX ADMIN — Medication 10 ML: at 19:41

## 2018-10-17 RX ADMIN — BUDESONIDE AND FORMOTEROL FUMARATE DIHYDRATE 2 PUFF: 160; 4.5 AEROSOL RESPIRATORY (INHALATION) at 06:51

## 2018-10-17 RX ADMIN — BUDESONIDE AND FORMOTEROL FUMARATE DIHYDRATE 2 PUFF: 160; 4.5 AEROSOL RESPIRATORY (INHALATION) at 19:00

## 2018-10-17 RX ADMIN — ISOSORBIDE DINITRATE 10 MG: 10 TABLET ORAL at 11:20

## 2018-10-17 RX ADMIN — METOPROLOL TARTRATE 25 MG: 25 TABLET, FILM COATED ORAL at 20:56

## 2018-10-17 RX ADMIN — BUMETANIDE 1 MG: 1 TABLET ORAL at 11:20

## 2018-10-17 RX ADMIN — CALCITRIOL CAPSULES 0.25 MCG 0.5 MCG: 0.25 CAPSULE ORAL at 11:21

## 2018-10-17 RX ADMIN — ONDANSETRON HYDROCHLORIDE 4 MG: 2 INJECTION INTRAMUSCULAR; INTRAVENOUS at 19:40

## 2018-10-17 RX ADMIN — HYDROCODONE BITARTRATE AND ACETAMINOPHEN 1 TABLET: 10; 325 TABLET ORAL at 06:00

## 2018-10-17 RX ADMIN — INSULIN LISPRO 8 UNITS: 100 INJECTION, SOLUTION INTRAVENOUS; SUBCUTANEOUS at 13:14

## 2018-10-17 RX ADMIN — ATORVASTATIN CALCIUM 80 MG: 40 TABLET, FILM COATED ORAL at 11:21

## 2018-10-17 NOTE — THERAPY TREATMENT NOTE
Acute Care - Physical Therapy Treatment Note  [facility]     Patient Name: Gavin Wilson  : 1945  MRN: [id number]  Today's Date: 10/17/2018  Onset of Illness/Injury or Date of Surgery: 10/06/18  Date of Referral to PT: 10/08/18  Referring Physician: Dr. Michael    Admit Date: 10/6/2018    Visit Dx:    ICD-10-CM ICD-9-CM   1. Esophageal dysphagia R13.10 787.20   2. Impaired mobility and ADLs Z74.09 799.89   3. Impaired functional mobility, balance, gait, and endurance Z74.09 V49.89   4. Chronic kidney disease, stage 4 (severe) (CMS/HCC) N18.4 585.4     Patient Active Problem List   Diagnosis   • Volume overload   • Acute renal failure superimposed on stage 4 chronic kidney disease (CMS/HCC)   • Acute on chronic combined systolic and diastolic congestive heart failure (CMS/HCC)   • Pleural effusion, bilateral   • Sepsis (CMS/[facility])   • Coronary artery disease involving native coronary artery of native heart without angina pectoris   • History of coronary artery stent placement   • Mild aortic valve stenosis   • Moderate aortic valve insufficiency   • Mild mitral valve regurgitation   • Acute on chronic respiratory failure with hypoxia (CMS/[facility])   • Pneumonia   • COPD (chronic obstructive pulmonary disease) (CMS/[facility])       Therapy Treatment          Rehabilitation Treatment Summary     Row Name 10/17/18 1523 10/17/18 0937          Treatment Time/Intention    Discipline physical therapy assistant  -WK physical therapy assistant  -WK     Document Type therapy note (daily note)  -WK therapy note (daily note)  -WK     Subjective Information complains of;weakness;pain  -WK --  -WK     Reason Treatment Not Performed  -- unavailable for treatment   Pt not in room   -WK     Existing Precautions/Restrictions fall   contact precautions   -WK  --     Recorded by [WK] Bernice Rico, URMILA 10/17/18 1538 [WK] Bernice Rico, URMILA 10/17/18 0942     Row Name 10/17/18 1523             Bed Mobility Assessment/Treatment     Supine-Sit [place] (Bed Mobility) supervision  -WK      Sit-Supine [place] (Bed Mobility) supervision  -WK      Recorded by [WK] Bernice Rico, PTA 10/17/18 1538      Row Name 10/17/18 1523             Sit-Stand Transfer    Sit-Stand [place] (Transfers) verbal cues;minimum assist (75% patient effort)  -WK      Recorded by [WK] Bernice Rico, PTA 10/17/18 1538      Row Name 10/17/18 1523             Stand-Sit Transfer    Stand-Sit [place] (Transfers) verbal cues;contact guard;minimum assist (75% patient effort)  -WK      Assistive Device (Stand-Sit Transfers) walker, front-wheeled  -WK      Recorded by [WK] Bernice Rico, PTA 10/17/18 1538      Row Name 10/17/18 1523             Gait/Stairs Assessment/Training    [place] Level (Gait) verbal cues;minimum assist (75% patient effort)  -WK      Assistive Device (Gait) walker, front-wheeled  -WK      Distance in Feet (Gait) 10   Pt was weak and stated he couldn't amb further, LOB 3x  -WK      Pattern (Gait) --   pt amb with short steps and required cues to try and increas  -WK      Deviations/Abnormal Patterns (Gait) stride length decreased  -WK      Bilateral Gait Deviations forward flexed posture  -WK      Recorded by [WK] Bernice Rico, PTA 10/17/18 1538      Row Name 10/17/18 1523             Positioning and Restraints    Pre-Treatment Position in bed  -WK      Post Treatment Position bed  -WK      In Bed side lying right;call light within reach;encouraged to call for assist  -WK      Recorded by [WK] Bernice Rico, PTA 10/17/18 1538      Row Name 10/17/18 1523             Pain Scale: Numbers Pre/Post-Treatment    Pain Scale: Numbers, Pretreatment 3/10  -WK      Pain Scale: Numbers, Post-Treatment 3/10  -WK      Pain Location --   all over   -WK      Recorded by [WK] Bernice Rico, PTA 10/17/18 1538      Row Name                Wound 10/07/18 1255 Bilateral posterior coccyx    Wound - Properties Group Date first assessed:  10/07/18 [SH] Time first assessed: 1255 [SH] Side: Bilateral [SH] Orientation: posterior [SH] Location: coccyx [SH] Recorded by:  [SH] Virginia Ahn RN 10/07/18 1256    Row Name                Wound 10/11/18 0831 Other (See comments) chest incision    Wound - Properties Group Date first assessed: 10/11/18 [DS] Time first assessed: 0831 [DS] Side: Other (See comments) [DS] Location: chest [DS] Type: incision [DS] Recorded by:  [DS] Jeremy Liu RN 10/11/18 0831      User Key  (r) = Recorded By, (t) = Taken By, (c) = Cosigned By    Initials Name Effective Dates Discipline     Virginia Ahn RN 08/28/17 -  Nurse    DS Jeremy Liu RN 08/02/16 -  Nurse    WK Bernice Rico PTA 08/02/16 -  PT          Wound 10/07/18 1255 Bilateral posterior coccyx (Active)   Dressing Appearance dry;intact 10/17/2018 11:00 AM   Closure DAVID 10/17/2018 11:00 AM   Base dressing in place, unable to visualize 10/17/2018 11:00 AM   Dressing Care, Wound foam 10/17/2018 11:00 AM       Wound 10/11/18 0831 Other (See comments) chest incision (Active)   Dressing Appearance dry;intact 10/16/2018  9:14 PM   Base dressing in place, unable to visualize 10/16/2018  9:14 PM             Physical Therapy Education     Title: PT OT SLP Therapies (Active)     Topic: Physical Therapy (Active)     Point: Mobility training (Done)    Learning Progress Summary     Learner Status Readiness Method Response Comment Documented by    Patient Done Acceptance E VU BOA WK 10/17/18 1539     Active Acceptance E,D NR transfers and benefits of activity  10/16/18 1035     Active Acceptance E,D NR Educated on benefits of activity. Intructed in safety with bed mobility, transfers,and LE Strengthening Exercises. LG 10/14/18 0918     Done Acceptance E VU,NR benefits of activity  10/12/18 1206     Active Acceptance E,D RT Effecient supine to sit EARLENE 10/09/18 1154     Done Acceptance E VU,NR Pt was educated on the importance and benefits of getting out  of bed and how it affects his overall health. Pt was educated on why PT is working with him. Pt was encouraged to do more in treatment session. TD 10/08/18 1430     Done Acceptance E VU,NR Pt  was educated on benefits ot PT and PT POC. Pt was edcuated on body mechanics with transfers (pushing with one arm on bed to stand up/having legs touch bed before sitting down) and safety precautions (socks on, gait belt on) with transfers and gait. TD 10/08/18 1149          Point: Body mechanics (Active)    Learning Progress Summary     Learner Status Readiness Method Response Comment Documented by    Patient Active Acceptance E,D NR Educated on benefits of activity. Intructed in safety with bed mobility, transfers,and LE Strengthening Exercises. LG 10/14/18 0918     Done Acceptance E VU,NR Pt was educated on the importance and benefits of getting out of bed and how it affects his overall health. Pt was educated on why PT is working with him. Pt was encouraged to do more in treatment session. TD 10/08/18 1430     Done Acceptance E VU,NR Pt  was educated on benefits ot PT and PT POC. Pt was edcuated on body mechanics with transfers (pushing with one arm on bed to stand up/having legs touch bed before sitting down) and safety precautions (socks on, gait belt on) with transfers and gait. TD 10/08/18 1149          Point: Precautions (Active)    Learning Progress Summary     Learner Status Readiness Method Response Comment Documented by    Patient Active Acceptance ED NR Educated on benefits of activity. Intructed in safety with bed mobility, transfers,and LE Strengthening Exercises. LG 10/14/18 0918     Done Acceptance E VU,NR Pt was educated on the importance and benefits of getting out of bed and how it affects his overall health. Pt was educated on why PT is working with him. Pt was encouraged to do more in treatment session. TD 10/08/18 1430     Done Acceptance E VU,NR Pt  was educated on benefits ot PT and PT POC. Pt was  edcuated on body mechanics with transfers (pushing with one arm on bed to stand up/having legs touch bed before sitting down) and safety precautions (socks on, gait belt on) with transfers and gait. TD 10/08/18 1149                      User Key     Initials Effective Dates Name Provider Type Discipline    LG 08/02/16 -  Roberto Pereira, PTA Physical Therapy Assistant PT    EARLENE 08/02/16 -  Kaylynn Mcgarry, PTA Physical Therapy Assistant PT    MF 08/02/16 -  Liliane Collado PTA Physical Therapy Assistant PT    WK 08/02/16 -  Bernice Rico [facility] Physical Therapy Assistant PT    TD 09/07/18 -  Rita Grullon, PT Student PT Student PT                    PT Recommendation and Plan     Plan of Care Reviewed With: patient  Progress: no change  Outcome Summary: Pt amb 10' withRW  Min A. Pt had 3 LOB that required Min A to correct. Pt refused further treatment due to fatigure from dialysis.           Outcome Measures     Row Name 10/16/18 1045 10/16/18 1002 10/15/18 0938       How much help from another person do you currently need...    Turning from your back to your side while in flat bed without using bedrails?  -- 4  -MF  --    Moving from lying on back to sitting on the side of a flat bed without bedrails?  -- 3  -MF  --    Moving to and from a bed to a chair (including a wheelchair)?  -- 3  -MF  --    Standing up from a chair using your arms (e.g., wheelchair, bedside chair)?  -- 3  -MF  --    Climbing 3-5 steps with a railing?  -- 2  -MF  --    To walk in hospital room?  -- 3  -MF  --    AM-PAC 6 Clicks Score  -- 18  -MF  --       How much help from another is currently needed...    Putting on and taking off regular lower body clothing? 2  -CJ  -- 2  -CJ    Bathing (including washing, rinsing, and drying) 2  -CJ  -- 2  -CJ    Toileting (which includes using toilet bed pan or urinal) 2  -CJ  -- 2  -CJ    Putting on and taking off regular upper body clothing 2  -CJ  -- 2  -CJ    Taking care of personal grooming  (such as brushing teeth) 3  -CJ  -- 3  -CJ    Eating meals 4  -CJ  -- 4  -CJ    Score 15  -CJ  -- 15  -CJ       Functional Assessment    Outcome Measure Options  -- AM-PAC 6 Clicks Basic Mobility (PT)  - AM-PAC 6 Clicks Daily Activity (OT)  -      User Key  (r) = Recorded By, (t) = Taken By, (c) = Cosigned By    Initials Name Provider Type     Paulino Fuchs, [place]/L Occupational Therapy Assistant    Liliane Bassett, URMILA Physical Therapy Assistant           Time Calculation:         PT Charges     Row Name 10/17/18 1540             Time Calculation    Start Time 1523  -WK      Stop Time 1534  -WK      Time Calculation (min) 11 min  -WK      PT Received On 10/17/18  -WK         Time Calculation- PT    Total Timed Code Minutes- PT 11 minute(s)  -WK        User Key  (r) = Recorded By, (t) = Taken By, (c) = Cosigned By    Initials Name Provider Type    WK Bernice Rico PTA Physical Therapy Assistant        Therapy Suggested Charges     Code   Minutes Charges    97477 (CPT®) Hc Pt Neuromusc Re Education Ea 15 Min      82884 (CPT®) Hc Pt Ther Proc Ea 15 Min      95471 (CPT®) Hc Gait Training Ea 15 Min 12 1    04235 (CPT®) Hc Pt Therapeutic Act Ea 15 Min      10467 (CPT®) Hc Pt Manual Therapy Ea 15 Min      65048 (CPT®) Hc Pt Iontophoresis Ea 15 Min      58729 (CPT®) Hc Pt Elec Stim Ea-Per 15 Min      48105 (CPT®) Hc Pt Ultrasound Ea 15 Min      04136 (CPT®) Hc Pt Self Care/Mgmt/Train Ea 15 Min      74947 (CPT®) Hc Pt Prosthetic (S) Train Initial Encounter, Each 15 Min      05759 (CPT®) Hc Pt Orthotic(S)/Prosthetic(S) Encounter, Each 15 Min      60342 (CPT®) Hc Orthotic(S) Mgmt/Train Initial Encounter, Each 15min      Total  12 1        Therapy Charges for Today     Code Description Service Date Service Provider Modifiers Qty    64079003217 HC GAIT TRAINING EA 15 MIN 10/17/2018 Bernice Rico PTA GP, KX 1          PT G-Codes  PT Professional Judgement Used?: Yes  Outcome Measure Options: AM-PAC 6  Clicks Basic Mobility (PT)  AM-PAC 6 Clicks Score: 18  Score: 15  Functional Limitation: Mobility: Walking and moving around  Mobility: Walking and Moving Around Current Status (): At least 20 percent but less than 40 percent impaired, limited or restricted  Mobility: Walking and Moving Around Goal Status (): At least 1 percent but less than 20 percent impaired, limited or restricted    Bernice Rico, PTA  10/17/2018

## 2018-10-17 NOTE — PROGRESS NOTES
[facility] Medicine Services  INPATIENT PROGRESS NOTE    Patient Name: Gavin Wilson  Date of Admission: 10/6/2018  Today's Date: 10/17/18  Length of Stay: 11  Primary Care Physician: Provider, No Known    Subjective   Chief Complaint: f/u  HPI had 5 x diarrhea today  Tested + for c. diff  Started on PO vanc  Blood sugar has been improved 138, 383 and 84  No new complaints  Family member is at bedside.  States its a chanllenge to come as she lives in [place]  Review of Systems     All pertinent negatives and positives are as above. All other systems have been reviewed and are negative unless otherwise stated.     Objective    Temp:  [96.9 °F (36.1 °C)-98.5 °F (36.9 °C)] 97.4 °F (36.3 °C)  Heart Rate:  [80-93] 86  Resp:  [16-18] 18  BP: (100-118)/(42-62) 104/48  Physical Exam  Awake and alert, eating dinner.  H  He has not complained of any symptoms  Constitutional: He is oriented to person, place, and time. No distress. Chronically ill appearing.  Appears improved today   HENT:   Head: Atraumatic.   Temporal muscle wasting; prominent wasting around the neck and supraclavicular    Eyes: Conjunctivae are normal. No scleral icterus.   Cardiovascular: Normal rate, regular rhythm and intact distal pulses.    Murmur heard. Right upper chest permcath placed  Pulmonary/Chest: Effort normal. No respiratory distress. He has no wheezing. He has no rales.  Improved air movement   Abdominal: Soft. Bowel sounds are normal. He exhibits no distension. There is no tenderness.   RLQ ostomy  Neurological: He is alert and oriented to person, place, and time.  Skin: Skin is warm and dry. He is not diaphoretic.   Psychiatric: He has a normal mood and affect. His behavior is normal       Results Review:  I have reviewed the labs, radiology results, and diagnostic studies.    Laboratory Data:     Results from last 7 days  Lab Units 10/11/18  0355   WBC 10*3/mm3 4.56*   HEMOGLOBIN g/dL 9.1*   HEMATOCRIT  % 28.7*   PLATELETS 10*3/mm3 190          Results from last 7 days  Lab Units 10/17/18  0556 10/15/18  0542 10/14/18  0417   SODIUM mmol/L 140 139 141   POTASSIUM mmol/L 5.0 4.5 3.9   CHLORIDE mmol/L 103 102 101   CO2 mmol/L 30.0 28.0 28.0   BUN mg/dL 30* 36* 23*   CREATININE mg/dL 2.99* 3.27* 2.23*   CALCIUM mg/dL 9.3 9.0 8.7   GLUCOSE mg/dL 138* 133* 96       Culture Data:        Radiology Data:   Imaging Results (last 24 hours)     ** No results found for the last 24 hours. **          I have reviewed the patient's current medications.     Assessment/Plan     Active Hospital Problems    Diagnosis   • **Acute on chronic respiratory failure with hypoxia (CMS/HCC)   • Coronary artery disease involving native coronary artery of native heart without angina pectoris   • History of coronary artery stent placement   • Mild aortic valve stenosis   • Moderate aortic valve insufficiency   • Mild mitral valve regurgitation   • Pneumonia   • COPD (chronic obstructive pulmonary disease) (CMS/HCC)   • Sepsis (CMS/HCC)   • Acute renal failure superimposed on stage 4 chronic kidney disease (CMS/HCC)   • Acute on chronic combined systolic and diastolic congestive heart failure (CMS/HCC)   • Pleural effusion, bilateral       1) Acute hypoxic respiratory failure, resolved  2) Sepsis due to suspected health care associated pneumonia - blood culture and pleural fluid culture showed no growth to date  3) Bilateral pleural effusions - Right is recurrent, left is worsening -status this post thoracentesis on October 7 Suspected due to heart failure, but cannot rule out infection or malignancy   4) Severe protein-calorie malnutrition  5) COPD without exacerbation  6) Combined systolic and diastolic heart failure, chronic (EF <40%)  7) Acute kidney injury on chronic kidney disease stage 5 ESRD -awaiting approval   8) Chronic normocytic anemia due to CKD  9) Thrombocytopenia, improved  10) Mild hypokalemia, resolved  11)  Hyperphosphotemia  12) Metabolic acidosis, anion gap due to uremia  13) Constipation, resolved; now has diarrhea and tested + for c. Diff - cont po vanc  14)Hypoglycemia (50) - resolved; hx of Diabetes -  Patient encourage to eat.  Will continue to monitor; prn d50; will consider /dc pre meal insulin and maintain only on ssi          Awaiting SNF and outpatient dialysis chair time  Cont po vanc  Cont supportive care  Discussed with nurse Mcmillan  Patient and family apprised of plan of care             Discharge Planning: soon  Max Jones MD   10/17/18   6:03 PM

## 2018-10-17 NOTE — PLAN OF CARE
Problem: Pneumonia (Adult)  Goal: Signs and Symptoms of Listed Potential Problems Will be Absent, Minimized or Managed (Pneumonia)  Outcome: Ongoing (interventions implemented as appropriate)   10/17/18 0429   Goal/Outcome Evaluation   Problems Assessed (Pneumonia) all   Problems Present (Pneumonia) none       Problem: Fall Risk (Adult)  Goal: Absence of Fall  Outcome: Ongoing (interventions implemented as appropriate)   10/17/18 0429   Fall Risk (Adult)   Absence of Fall achieves outcome       Problem: Skin Injury Risk (Adult)  Goal: Skin Health and Integrity  Outcome: Ongoing (interventions implemented as appropriate)   10/17/18 0429   Skin Injury Risk (Adult)   Skin Health and Integrity making progress toward outcome       Problem: Nutrition, Imbalanced: Inadequate Oral Intake (Adult)  Goal: Improved Oral Intake  Outcome: Ongoing (interventions implemented as appropriate)   10/17/18 0429   Nutrition, Imbalanced: Inadequate Oral Intake (Adult)   Improved Oral Intake making progress toward outcome     Goal: Prevent Further Weight Loss  Outcome: Ongoing (interventions implemented as appropriate)   10/17/18 0429   Nutrition, Imbalanced: Inadequate Oral Intake (Adult)   Prevent Further Weight Loss making progress toward outcome       Problem: Patient Care Overview  Goal: Plan of Care Review  Outcome: Ongoing (interventions implemented as appropriate)   10/17/18 0429   Plan of Care Review   Progress no change   OTHER   Outcome Summary VSS, c/o pain medicated per mar with some relief, zofran given once for nausea, pt postive for c-diff, PO vancomycin started, [facility] and [facility] home placement, will continue to monitor.   Coping/Psychosocial   Plan of Care Reviewed With patient       Problem: Hemodialysis (Adult)  Goal: Signs and Symptoms of Listed Potential Problems Will be Absent, Minimized or Managed (Hemodialysis)  Outcome: Ongoing (interventions implemented as appropriate)    10/17/18 0429   Goal/Outcome Evaluation   Problems Assessed (Hemodialysis) all   Problems Present (Hemodialysis) situational response

## 2018-10-17 NOTE — PROGRESS NOTES
Nephrology ([facility]) Progress Note      Patient:  Gavin Wilson  YOB: 1945  Date of Service: 10/17/2018  MRN: [id number]   Acct: [id number]   Primary Care Physician: Provider, No Known  Advance Directive:   Code Status and Medical Interventions:   Ordered at: 10/07/18 1309     Limited Support to NOT Include:    Intubation     Level Of Support Discussed With:    Patient     Code Status:    No CPR     Medical Interventions (Level of Support Prior to Arrest):    Limited     Admit Date: 10/6/2018       Hospital Day: 11  Referring Provider: Max Jones*      Patient personally seen and examined.  Complete chart including Consults, Notes, Operative Reports, Labs, Cardiology, and Radiology studies reviewed as able.        Subjective:  Gavin Wilson is a 73 y.o. male  whom we were consulted for CKD stage 4.  History of bladder cancer with prior cystectomy and ureterostomy formation.  Also history of esophogeal and prostate cancer.  Follows with nephrology at [facility]. Has left upper arm fistula in place.  Presented with dyspnea; admitted with bilateral pneumonia/pleural effusion.  Hospital course remarkable for attempt to use fistula on 10/10; infiltrated and was unable to dialyze.  On 10/11 had Permcath placed and first dialysis treatment; he had a run of V tach during first HD.  On 10/15 became hypotensive during dialysis.     Today is feeling the same; continues to have nausea intermittently.  No new overnight issues. Seen during dialysis  Dialysis   Pt was seen on RRT; tolerating well  Modality: Hemodialysis  Access: Catheter  Location: right upper  QB: 200  QD: 500  UF: 1200      Allergies:  Sulfa antibiotics    Home Meds:  Prescriptions Prior to Admission   Medication Sig Dispense Refill Last Dose   • acetaminophen (TYLENOL) 325 MG tablet Take 650 mg by mouth Every 6 (Six) Hours As Needed for Mild Pain .   Past Month at Unknown time   • albuterol (PROVENTIL  HFA;VENTOLIN HFA) 108 (90 Base) MCG/ACT inhaler Inhale 2 puffs Every 6 (Six) Hours As Needed for Wheezing.   Past Week at Unknown time   • aspirin 81 MG chewable tablet Chew 81 mg Daily.   10/5/2018 at Unknown time   • atorvastatin (LIPITOR) 80 MG tablet Take 40 mg by mouth Daily.   10/5/2018 at Unknown time   • budesonide-formoterol (SYMBICORT) 160-4.5 MCG/ACT inhaler Inhale 2 puffs 2 (Two) Times a Day. 1 inhaler 2 10/5/2018 at Unknown time   • bumetanide (BUMEX) 2 MG tablet Take 1 tablet by mouth Daily. 30 tablet 0 10/5/2018 at Unknown time   • calcium carbonate (OS-TASHI) 600 MG tablet Take 600 mg by mouth Daily.   10/5/2018 at Unknown time   • cholecalciferol (VITAMIN D3) 1000 units tablet Take 3,000 Units by mouth Daily.   10/5/2018 at Unknown time   • insulin aspart (novoLOG FLEXPEN) 100 UNIT/ML solution pen-injector sc pen Inject 6 Units under the skin into the appropriate area as directed 3 (Three) Times a Day With Meals.   10/5/2018 at Unknown time   • insulin detemir (LEVEMIR) 100 UNIT/ML injection Inject 6 Units under the skin into the appropriate area as directed 2 (Two) Times a Day.   10/5/2018 at Unknown time   • loperamide (IMODIUM) 2 MG capsule Take 2 mg by mouth Daily As Needed for Diarrhea.   Past Month at Unknown time   • metoprolol tartrate (LOPRESSOR) 25 MG tablet Take 12.5 mg by mouth 2 (Two) Times a Day.   10/5/2018 at Unknown time   • omeprazole (priLOSEC) 20 MG capsule Take 20 mg by mouth Daily.   10/5/2018 at Unknown time   • oxyCODONE (ROXICODONE) 5 MG immediate release tablet Take 5 mg by mouth Every 6 (Six) Hours As Needed for Moderate Pain .   Past Week at Unknown time   • sildenafil (VIAGRA) 100 MG tablet Take 100 mg by mouth Daily As Needed for erectile dysfunction.   More than a month at Unknown time       Medicines:  Current Facility-Administered Medications   Medication Dose Route Frequency Provider Last Rate Last Dose   • acetaminophen (TYLENOL) tablet 650 mg  650 mg Oral Q4H PRN  Max Jones MD       • amiodarone (PACERONE) tablet 200 mg  200 mg Oral Q24H Abdiel Stout MD   200 mg at 10/16/18 0839   • aspirin chewable tablet 81 mg  81 mg Oral Daily Vu Michael MD   81 mg at 10/16/18 0838   • atorvastatin (LIPITOR) tablet 80 mg  80 mg Oral Daily Max Jones MD   80 mg at 10/16/18 0838   • budesonide-formoterol (SYMBICORT) 160-4.5 MCG/ACT inhaler 2 puff  2 puff Inhalation BID - RT Max Jones MD   2 puff at 10/17/18 0651   • bumetanide (BUMEX) tablet 1 mg  1 mg Oral Daily Scott Soto MD   1 mg at 10/16/18 0839   • calcitriol (ROCALTROL) capsule 0.5 mcg  0.5 mcg Oral Daily Max Jones MD   0.5 mcg at 10/16/18 0839   • calcium carb-cholecalciferol 600-800 MG-UNIT tablet 1 tablet  1 tablet Oral Daily Max Jones MD   1 tablet at 10/16/18 0908   • dextrose (D50W) 25 g/ 50mL Intravenous Solution 25 g  25 g Intravenous Q15 Min PRN Max Jones MD       • dextrose (GLUTOSE) oral gel 15 g  15 g Oral Q15 Min PRN Max Jones MD       • Ferric Citrate tablet 210 mg  210 mg Oral TID With Meals Max Jones MD   210 mg at 10/16/18 1715   • glucagon (human recombinant) (GLUCAGEN DIAGNOSTIC) injection 1 mg  1 mg Subcutaneous PRN Max Jones MD       • heparin (porcine) 5000 UNIT/ML injection 5,000 Units  5,000 Units Subcutaneous Q12H Max Jones MD   5,000 Units at 10/16/18 2106   • heparin (porcine) injection 1,700 Units  1,700 Units Intracatheter PRN Samson Aragon MD   1,700 Units at 10/15/18 1758   • heparin (porcine) injection 1,700 Units  1,700 Units Intracatheter PRN Samson Aragon MD   1,700 Units at 10/15/18 1800   • HYDROcodone-acetaminophen (NORCO)  MG per tablet 1 tablet  1 tablet Oral Q6H PRN Max Jones MD   1 tablet at 10/17/18 0600   • hydroxypropyl methylcellulose (ISOPTO TEARS) 2.5 %  ophthalmic solution 1 drop  1 drop Both Eyes TID PRN Vu Michael MD   1 drop at 10/11/18 0636   • Influenza Vac Subunit Quad (FLUCELVAX) injection 0.5 mL  0.5 mL Intramuscular During Hospitalization Max Jones MD       • insulin lispro (humaLOG) injection 0-9 Units  0-9 Units Subcutaneous 4x Daily With Meals & Nightly Max Jones MD   2 Units at 10/16/18 1208   • insulin lispro (humaLOG) injection 6 Units  6 Units Subcutaneous TID AC Max Jones MD   6 Units at 10/16/18 1208   • ipratropium (ATROVENT) nebulizer solution 0.5 mg  0.5 mg Nebulization Q6H PRN Abdiel Stout MD   0.5 mg at 10/13/18 1619   • isosorbide dinitrate (ISORDIL) tablet 10 mg  10 mg Oral TID - Nitrates Abdiel Stout MD   10 mg at 10/16/18 1714   • lisinopril (PRINIVIL,ZESTRIL) tablet 2.5 mg  2.5 mg Oral Q24H Scott Soto MD   2.5 mg at 10/16/18 0839   • magnesium hydroxide (MILK OF MAGNESIA) suspension 2400 mg/10mL 10 mL  10 mL Oral Daily PRN Max Jones MD       • metoprolol tartrate (LOPRESSOR) tablet 25 mg  25 mg Oral Q12H Max Jones MD   25 mg at 10/16/18 2106   • nicotine (NICODERM CQ) 21 MG/24HR patch 1 patch  1 patch Transdermal Q24H Ashutosh Palafox MD   1 patch at 10/16/18 2107   • ondansetron (ZOFRAN) injection 4 mg  4 mg Intravenous Q6H PRN Chaya Duff APRN   4 mg at 10/17/18 0355   • sodium chloride 0.9 % flush 3 mL  3 mL Intravenous Q12H Max Jones MD   3 mL at 10/16/18 2109   • sodium chloride 0.9 % flush 3-10 mL  3-10 mL Intravenous PRN Max Jones, MD   10 mL at 10/17/18 0358   • tetrahydrozoline 0.05 % ophthalmic solution 1 drop  1 drop Both Eyes 4x Daily PRN Ney De MD   1 drop at 10/10/18 0339   • vancomycin oral solution reconstituted 125 mg  125 mg Oral Q6H Oleg Madrid DO   125 mg at 10/17/18 0600       Past Medical History:  Past Medical History:   Diagnosis Date   • CHF (congestive  "heart failure) (CMS/HCC)    • Coronary stent occlusion    • Diabetes mellitus (CMS/HCC)    • Hyperlipidemia    • Hypertension    • Stroke (CMS/HCC)        Past Surgical History:  Past Surgical History:   Procedure Laterality Date   • BLADDER SURGERY     • ESOPHAGECTOMY     • INSERTION HEMODIALYSIS CATHETER N/A 10/11/2018    Procedure: HEMODIALYSIS CATHETER INSERTION;  Surgeon: Hugo Bonilla MD;  Location: [place] [address];  Service: Vascular       Family History  Family History   Problem Relation Age of Onset   • No Known Problems Father    • No Known Problems Mother        Social History  Social History     Social History   • Marital status:      Spouse name: N/A   • Number of children: N/A   • Years of education: N/A     Occupational History   • Not on file.     Social History Main Topics   • Smoking status: Current Every Day Smoker   • Smokeless tobacco: Never Used   • Alcohol use No   • Drug use: No   • Sexual activity: Not on file     Other Topics Concern   • Not on file     Social History Narrative   • No narrative on file         Review of Systems:  History obtained from chart review and the patient  General ROS: No fever or chills  Respiratory ROS: No cough, shortness of breath, wheezing  Cardiovascular ROS: No chest pain or palpitations  Gastrointestinal ROS: No abdominal pain or melena  Genito-Urinary ROS: No dysuria or hematuria  Neurological ROS: no headache or dizziness    Objective:  /62 (BP Location: Right arm, Patient Position: Lying)   Pulse 80   Temp 96.9 °F (36.1 °C) (Temporal Artery )   Resp 16   Ht 170.2 cm (67\")   Wt 47.9 kg (105 lb 11.2 oz)   SpO2 98%   BMI 16.55 kg/m²     Intake/Output Summary (Last 24 hours) at 10/17/18 1004  Last data filed at 10/16/18 1847   Gross per 24 hour   Intake              240 ml   Output              150 ml   Net               90 ml     General: awake/alert    Neck: supple, no JVD  Chest:  clear to auscultation bilaterally without " respiratory distress  CVS: regular rate and rhythm  Abdominal: soft, nontender, normal bowel sounds  Extremities: no cyanosis or edema  Skin: warm and dry without rash  Neuro: no focal motor deficits    Labs:    Results from last 7 days  Lab Units 10/11/18  0355   WBC 10*3/mm3 4.56*   HEMOGLOBIN g/dL 9.1*   HEMATOCRIT % 28.7*   PLATELETS 10*3/mm3 190           Results from last 7 days  Lab Units 10/17/18  0556 10/15/18  0542 10/14/18  0417   SODIUM mmol/L 140 139 141   POTASSIUM mmol/L 5.0 4.5 3.9   CHLORIDE mmol/L 103 102 101   CO2 mmol/L 30.0 28.0 28.0   BUN mg/dL 30* 36* 23*   CREATININE mg/dL 2.99* 3.27* 2.23*   CALCIUM mg/dL 9.3 9.0 8.7   GLUCOSE mg/dL 138* 133* 96       Radiology:   Imaging Results (last 72 hours)     Procedure Component Value Units Date/Time    US Thoracentesis [[id number]] Collected:  10/07/18 1323    Specimen:  Body Fluid Updated:  10/13/18 0934    Narrative:       DATE OF PROCEDURE:  10/7/2018.     PREPROCEDURE DIAGNOSIS:  Left pleural effusion.  POSTPROCEDURE DIAGNOSIS:  Left pleural effusion.          INDICATIONS FOR PROCEDURE: Shortness of breath.     PROCEDURE:   1. Thoracentesis, diagnostic and therapeutic.  2. Ultrasound guidance for thoracentesis, imaging supervision and  interpretation.     ANESTHESIA: 1% lidocaine, injected locally.          COMPLICATIONS: None.        EXAMINATIONS FOR COMPARISON:  CT chest dated 10/6/2018.     DESCRIPTION OF PROCEDURE:   The risk and benefits of the procedure were explained to the patient.   Specifically, the risks of bleeding, infection, pneumothorax (possible  thoracostomy tube), and damage to surrounding structures were discussed  extensively. The patient's questions were answered. The patient opted to  proceed. Written and verbal consent were obtained from the patient.     TIME OUT was taken and the patient's name, medical record number, date  of birth, procedure, entry site, and listen allergies were confirmed.  The site of the procedure was  confirmed and marked.      The leftposterior thorax was prepped and draped in sterile fashion. The  area was anesthetized with buffered lidocaine 2%.      A 5 Danish 10 cm  catheter was inserted into the pleural effusion  using  ultrasound guidance. Approximately 400 mL of clear fluid was recovered  and sent to the pathology lab for analysis.      The patient tolerated the procedure well.   No immediate complications  were noted.       Impression:       Successful ultrasound guided leftthoracentesis. Approximately 400 mL of  clear fluid was recovered and sent to the pathology lab for analysis.   No immediate complications were noted.              This report was finalized on 10/07/2018 13:26 by Dr. Petra Mckinley MD.     Chest [[id number]] Collected:  10/07/18 1323     Updated:  10/13/18 0934    Narrative:       DATE OF PROCEDURE:  10/7/2018.     PREPROCEDURE DIAGNOSIS:  Left pleural effusion.  POSTPROCEDURE DIAGNOSIS:  Left pleural effusion.          INDICATIONS FOR PROCEDURE: Shortness of breath.     PROCEDURE:   1. Thoracentesis, diagnostic and therapeutic.  2. Ultrasound guidance for thoracentesis, imaging supervision and  interpretation.     ANESTHESIA: 1% lidocaine, injected locally.          COMPLICATIONS: None.        EXAMINATIONS FOR COMPARISON:  CT chest dated 10/6/2018.     DESCRIPTION OF PROCEDURE:   The risk and benefits of the procedure were explained to the patient.   Specifically, the risks of bleeding, infection, pneumothorax (possible  thoracostomy tube), and damage to surrounding structures were discussed  extensively. The patient's questions were answered. The patient opted to  proceed. Written and verbal consent were obtained from the patient.     TIME OUT was taken and the patient's name, medical record number, date  of birth, procedure, entry site, and listen allergies were confirmed.  The site of the procedure was confirmed and marked.      The leftposterior thorax was prepped and draped in  sterile fashion. The  area was anesthetized with buffered lidocaine 2%.      A 5 Vietnamese 10 cm  catheter was inserted into the pleural effusion  using  ultrasound guidance. Approximately 400 mL of clear fluid was recovered  and sent to the pathology lab for analysis.      The patient tolerated the procedure well.   No immediate complications  were noted.       Impression:       Successful ultrasound guided leftthoracentesis. Approximately 400 mL of  clear fluid was recovered and sent to the pathology lab for analysis.   No immediate complications were noted.              This report was finalized on 10/07/2018 13:26 by Dr. Petra Mckinley MD.          Culture:         Assessment   1.  CKD stage 4--now with progression to ESRD  2.  Type 2 diabetes with nephropathy  3.  Bilateral pneumonia  4.  Chronic diastolic congestive heart failure  5.  S/p ventricular tachycardia on 10/10  6.  Metabolic acidosis  7.  Secondary hyperparathyroidism    Plan:  1.  Dialysis today  2.  Monitor labs  3.  Working on SNF and outpatient dialysis placement      MACO Cole  10/17/2018  10:04 AM

## 2018-10-17 NOTE — PROGRESS NOTES
Continued Stay Note  RUTH Ceron     Patient Name: Gavin Wilson  MRN: [id number]  Today's Date: 10/17/2018    Admit Date: 10/6/2018          Discharge Plan     Row Name 10/17/18 1638       Plan    Plan Comments FAXED COMPLET[facility] TO COMPLETE [facility] PLACEMENT PROCESS.  ALSO SPOKE TO [employer] [facility] FOR [facility] TO GIVE INFO TO COMPLETE THAT PROCESS.  WILL FOLLOW.                Discharge Codes    No documentation.           LINDSEY Larose

## 2018-10-17 NOTE — PLAN OF CARE
Problem: Patient Care Overview  Goal: Plan of Care Review  Outcome: Ongoing (interventions implemented as appropriate)   10/17/18 0773   Plan of Care Review   Progress no change   OTHER   Outcome Summary Pt amb 10' withRW Min A. Pt had 3 LOB that required Min A to correct. Pt refused further treatment due to fatigue from dialysis.    Coping/Psychosocial   Plan of Care Reviewed With patient

## 2018-10-17 NOTE — PLAN OF CARE
Problem: Pneumonia (Adult)  Goal: Signs and Symptoms of Listed Potential Problems Will be Absent, Minimized or Managed (Pneumonia)  Outcome: Ongoing (interventions implemented as appropriate)      Problem: Fall Risk (Adult)  Goal: Absence of Fall  Outcome: Ongoing (interventions implemented as appropriate)      Problem: Skin Injury Risk (Adult)  Goal: Skin Health and Integrity  Outcome: Ongoing (interventions implemented as appropriate)      Problem: Nutrition, Imbalanced: Inadequate Oral Intake (Adult)  Goal: Improved Oral Intake  Outcome: Ongoing (interventions implemented as appropriate)    Goal: Prevent Further Weight Loss  Outcome: Ongoing (interventions implemented as appropriate)      Problem: Patient Care Overview  Goal: Plan of Care Review  Outcome: Ongoing (interventions implemented as appropriate)   10/17/18 3306   Plan of Care Review   Progress no change   OTHER   Outcome Summary Patient had dialysis today. D/C planning in process. Multiple liquid BMs today. Medicated for cx pain per MAR. NSR 78-91 on tele. Continue to monitor.    Coping/Psychosocial   Plan of Care Reviewed With patient       Problem: Hemodialysis (Adult)  Goal: Signs and Symptoms of Listed Potential Problems Will be Absent, Minimized or Managed (Hemodialysis)  Outcome: Ongoing (interventions implemented as appropriate)

## 2018-10-18 LAB
ANION GAP SERPL CALCULATED.3IONS-SCNC: 8 MMOL/L (ref 4–13)
BUN BLD-MCNC: 23 MG/DL (ref 5–21)
BUN/CREAT SERPL: 8.5 (ref 7–25)
CALCIUM SPEC-SCNC: 9.2 MG/DL (ref 8.4–10.4)
CHLORIDE SERPL-SCNC: 104 MMOL/L (ref 98–110)
CO2 SERPL-SCNC: 27 MMOL/L (ref 24–31)
CREAT BLD-MCNC: 2.72 MG/DL (ref 0.5–1.4)
GFR SERPL CREATININE-BSD FRML MDRD: 23 ML/MIN/1.73
GLUCOSE BLD-MCNC: 123 MG/DL (ref 70–100)
GLUCOSE BLDC GLUCOMTR-MCNC: 120 MG/DL (ref 70–130)
GLUCOSE BLDC GLUCOMTR-MCNC: 124 MG/DL (ref 70–130)
GLUCOSE BLDC GLUCOMTR-MCNC: 177 MG/DL (ref 70–130)
GLUCOSE BLDC GLUCOMTR-MCNC: 206 MG/DL (ref 70–130)
POTASSIUM BLD-SCNC: 4.8 MMOL/L (ref 3.5–5.3)
SODIUM BLD-SCNC: 139 MMOL/L (ref 135–145)

## 2018-10-18 PROCEDURE — 94799 UNLISTED PULMONARY SVC/PX: CPT

## 2018-10-18 PROCEDURE — 82962 GLUCOSE BLOOD TEST: CPT

## 2018-10-18 PROCEDURE — 97116 GAIT TRAINING THERAPY: CPT

## 2018-10-18 PROCEDURE — 25010000002 HEPARIN (PORCINE) PER 1000 UNITS: Performed by: INTERNAL MEDICINE

## 2018-10-18 PROCEDURE — 94760 N-INVAS EAR/PLS OXIMETRY 1: CPT

## 2018-10-18 PROCEDURE — 63710000001 INSULIN LISPRO (HUMAN) PER 5 UNITS: Performed by: INTERNAL MEDICINE

## 2018-10-18 PROCEDURE — 25010000002 ONDANSETRON PER 1 MG: Performed by: NURSE PRACTITIONER

## 2018-10-18 PROCEDURE — 80048 BASIC METABOLIC PNL TOTAL CA: CPT | Performed by: NURSE PRACTITIONER

## 2018-10-18 PROCEDURE — 97110 THERAPEUTIC EXERCISES: CPT

## 2018-10-18 RX ADMIN — ISOSORBIDE DINITRATE 10 MG: 10 TABLET ORAL at 09:00

## 2018-10-18 RX ADMIN — METOPROLOL TARTRATE 25 MG: 25 TABLET, FILM COATED ORAL at 20:39

## 2018-10-18 RX ADMIN — BUDESONIDE AND FORMOTEROL FUMARATE DIHYDRATE 2 PUFF: 160; 4.5 AEROSOL RESPIRATORY (INHALATION) at 07:54

## 2018-10-18 RX ADMIN — VANCOMYCIN HYDROCHLORIDE 125 MG: KIT at 17:11

## 2018-10-18 RX ADMIN — FERRIC CITRATE 210 MG: 210 TABLET, COATED ORAL at 17:10

## 2018-10-18 RX ADMIN — ISOSORBIDE DINITRATE 10 MG: 10 TABLET ORAL at 12:19

## 2018-10-18 RX ADMIN — ONDANSETRON HYDROCHLORIDE 4 MG: 2 INJECTION INTRAMUSCULAR; INTRAVENOUS at 20:15

## 2018-10-18 RX ADMIN — ASPIRIN 81 MG CHEWABLE TABLET 81 MG: 81 TABLET CHEWABLE at 09:01

## 2018-10-18 RX ADMIN — INSULIN LISPRO 6 UNITS: 100 INJECTION, SOLUTION INTRAVENOUS; SUBCUTANEOUS at 17:11

## 2018-10-18 RX ADMIN — HYDROCODONE BITARTRATE AND ACETAMINOPHEN 1 TABLET: 10; 325 TABLET ORAL at 18:39

## 2018-10-18 RX ADMIN — ATORVASTATIN CALCIUM 80 MG: 40 TABLET, FILM COATED ORAL at 09:01

## 2018-10-18 RX ADMIN — HEPARIN SODIUM 5000 UNITS: 5000 INJECTION, SOLUTION INTRAVENOUS; SUBCUTANEOUS at 20:15

## 2018-10-18 RX ADMIN — VANCOMYCIN HYDROCHLORIDE 125 MG: KIT at 12:17

## 2018-10-18 RX ADMIN — HYDROCODONE BITARTRATE AND ACETAMINOPHEN 1 TABLET: 10; 325 TABLET ORAL at 11:12

## 2018-10-18 RX ADMIN — INSULIN LISPRO 6 UNITS: 100 INJECTION, SOLUTION INTRAVENOUS; SUBCUTANEOUS at 12:17

## 2018-10-18 RX ADMIN — FERRIC CITRATE 210 MG: 210 TABLET, COATED ORAL at 09:00

## 2018-10-18 RX ADMIN — VANCOMYCIN HYDROCHLORIDE 125 MG: KIT at 06:16

## 2018-10-18 RX ADMIN — FERRIC CITRATE 210 MG: 210 TABLET, COATED ORAL at 12:16

## 2018-10-18 RX ADMIN — INSULIN LISPRO 2 UNITS: 100 INJECTION, SOLUTION INTRAVENOUS; SUBCUTANEOUS at 12:16

## 2018-10-18 RX ADMIN — HEPARIN SODIUM 5000 UNITS: 5000 INJECTION, SOLUTION INTRAVENOUS; SUBCUTANEOUS at 09:01

## 2018-10-18 RX ADMIN — Medication 3 ML: at 09:08

## 2018-10-18 RX ADMIN — VANCOMYCIN HYDROCHLORIDE 125 MG: KIT at 00:32

## 2018-10-18 RX ADMIN — AMIODARONE HYDROCHLORIDE 200 MG: 200 TABLET ORAL at 09:00

## 2018-10-18 RX ADMIN — BUDESONIDE AND FORMOTEROL FUMARATE DIHYDRATE 2 PUFF: 160; 4.5 AEROSOL RESPIRATORY (INHALATION) at 19:22

## 2018-10-18 RX ADMIN — NICOTINE 1 PATCH: 21 PATCH, EXTENDED RELEASE TRANSDERMAL at 20:20

## 2018-10-18 RX ADMIN — METOPROLOL TARTRATE 25 MG: 25 TABLET, FILM COATED ORAL at 09:01

## 2018-10-18 RX ADMIN — INSULIN LISPRO 6 UNITS: 100 INJECTION, SOLUTION INTRAVENOUS; SUBCUTANEOUS at 09:01

## 2018-10-18 RX ADMIN — LISINOPRIL 2.5 MG: 2.5 TABLET ORAL at 09:00

## 2018-10-18 RX ADMIN — Medication 3 ML: at 20:17

## 2018-10-18 RX ADMIN — INSULIN LISPRO 4 UNITS: 100 INJECTION, SOLUTION INTRAVENOUS; SUBCUTANEOUS at 20:16

## 2018-10-18 RX ADMIN — ISOSORBIDE DINITRATE 10 MG: 10 TABLET ORAL at 17:11

## 2018-10-18 RX ADMIN — ONDANSETRON HYDROCHLORIDE 4 MG: 2 INJECTION INTRAMUSCULAR; INTRAVENOUS at 12:47

## 2018-10-18 RX ADMIN — Medication 1 TABLET: at 09:01

## 2018-10-18 RX ADMIN — CALCITRIOL CAPSULES 0.25 MCG 0.5 MCG: 0.25 CAPSULE ORAL at 09:01

## 2018-10-18 RX ADMIN — BUMETANIDE 1 MG: 1 TABLET ORAL at 09:00

## 2018-10-18 NOTE — PROGRESS NOTES
Nephrology ([facility]) Progress Note      Patient:  Gavin Wilson  YOB: 1945  Date of Service: 10/18/2018  MRN: [id number]   Acct: [id number]   Primary Care Physician: Provider, No Known  Advance Directive:   Code Status and Medical Interventions:   Ordered at: 10/07/18 1309     Limited Support to NOT Include:    Intubation     Level Of Support Discussed With:    Patient     Code Status:    No CPR     Medical Interventions (Level of Support Prior to Arrest):    Limited     Admit Date: 10/6/2018       Hospital Day: 12  Referring Provider: Max Jones*      Patient personally seen and examined.  Complete chart including Consults, Notes, Operative Reports, Labs, Cardiology, and Radiology studies reviewed as able.        Subjective:  Gavin Wilson is a 73 y.o. male  whom we were consulted for CKD stage 4.  History of bladder cancer with prior cystectomy and ureterostomy formation.  Also history of esophogeal and prostate cancer.  Follows with nephrology at [facility]. Has left upper arm fistula in place.  Presented with dyspnea; admitted with bilateral pneumonia/pleural effusion.  Hospital course remarkable for attempt to use fistula on 10/10; infiltrated and was unable to dialyze.  On 10/11 had Permcath placed and first dialysis treatment; he had a run of V tach during first HD.  On 10/15 became hypotensive during dialysis.     Today is feeling the same; diarrhea is improving.  No new overnight issues.    Allergies:  Sulfa antibiotics    Home Meds:  Prescriptions Prior to Admission   Medication Sig Dispense Refill Last Dose   • acetaminophen (TYLENOL) 325 MG tablet Take 650 mg by mouth Every 6 (Six) Hours As Needed for Mild Pain .   Past Month at Unknown time   • albuterol (PROVENTIL HFA;VENTOLIN HFA) 108 (90 Base) MCG/ACT inhaler Inhale 2 puffs Every 6 (Six) Hours As Needed for Wheezing.   Past Week at Unknown time   • aspirin 81 MG chewable tablet Chew 81 mg  Daily.   10/5/2018 at Unknown time   • atorvastatin (LIPITOR) 80 MG tablet Take 40 mg by mouth Daily.   10/5/2018 at Unknown time   • budesonide-formoterol (SYMBICORT) 160-4.5 MCG/ACT inhaler Inhale 2 puffs 2 (Two) Times a Day. 1 inhaler 2 10/5/2018 at Unknown time   • bumetanide (BUMEX) 2 MG tablet Take 1 tablet by mouth Daily. 30 tablet 0 10/5/2018 at Unknown time   • calcium carbonate (OS-TASHI) 600 MG tablet Take 600 mg by mouth Daily.   10/5/2018 at Unknown time   • cholecalciferol (VITAMIN D3) 1000 units tablet Take 3,000 Units by mouth Daily.   10/5/2018 at Unknown time   • insulin aspart (novoLOG FLEXPEN) 100 UNIT/ML solution pen-injector sc pen Inject 6 Units under the skin into the appropriate area as directed 3 (Three) Times a Day With Meals.   10/5/2018 at Unknown time   • insulin detemir (LEVEMIR) 100 UNIT/ML injection Inject 6 Units under the skin into the appropriate area as directed 2 (Two) Times a Day.   10/5/2018 at Unknown time   • loperamide (IMODIUM) 2 MG capsule Take 2 mg by mouth Daily As Needed for Diarrhea.   Past Month at Unknown time   • metoprolol tartrate (LOPRESSOR) 25 MG tablet Take 12.5 mg by mouth 2 (Two) Times a Day.   10/5/2018 at Unknown time   • omeprazole (priLOSEC) 20 MG capsule Take 20 mg by mouth Daily.   10/5/2018 at Unknown time   • oxyCODONE (ROXICODONE) 5 MG immediate release tablet Take 5 mg by mouth Every 6 (Six) Hours As Needed for Moderate Pain .   Past Week at Unknown time   • sildenafil (VIAGRA) 100 MG tablet Take 100 mg by mouth Daily As Needed for erectile dysfunction.   More than a month at Unknown time       Medicines:  Current Facility-Administered Medications   Medication Dose Route Frequency Provider Last Rate Last Dose   • acetaminophen (TYLENOL) tablet 650 mg  650 mg Oral Q4H PRN Max Jones MD       • amiodarone (PACERONE) tablet 200 mg  200 mg Oral Q24H Abdiel Stout MD   200 mg at 10/18/18 0900   • aspirin chewable tablet 81 mg  81 mg Oral  Daily Vu Michael MD   81 mg at 10/18/18 0901   • atorvastatin (LIPITOR) tablet 80 mg  80 mg Oral Daily Max Jones MD   80 mg at 10/18/18 0901   • budesonide-formoterol (SYMBICORT) 160-4.5 MCG/ACT inhaler 2 puff  2 puff Inhalation BID - RT Max Jones MD   2 puff at 10/18/18 0754   • bumetanide (BUMEX) tablet 1 mg  1 mg Oral Daily Scott Soto MD   1 mg at 10/18/18 0900   • calcitriol (ROCALTROL) capsule 0.5 mcg  0.5 mcg Oral Daily Max Jones MD   0.5 mcg at 10/18/18 0901   • calcium carb-cholecalciferol 600-800 MG-UNIT tablet 1 tablet  1 tablet Oral Daily Max Jones MD   1 tablet at 10/18/18 0901   • dextrose (D50W) 25 g/ 50mL Intravenous Solution 25 g  25 g Intravenous Q15 Min PRN Max Jones MD       • dextrose (GLUTOSE) oral gel 15 g  15 g Oral Q15 Min PRN Max Jones MD       • Ferric Citrate tablet 210 mg  210 mg Oral TID With Meals Max Jones MD   210 mg at 10/18/18 0900   • glucagon (human recombinant) (GLUCAGEN DIAGNOSTIC) injection 1 mg  1 mg Subcutaneous PRN Max Jones MD       • heparin (porcine) 5000 UNIT/ML injection 5,000 Units  5,000 Units Subcutaneous Q12H Max Jones MD   5,000 Units at 10/18/18 0901   • heparin (porcine) injection 1,700 Units  1,700 Units Intracatheter PRN Samson Aragon MD   1,700 Units at 10/15/18 1758   • heparin (porcine) injection 1,700 Units  1,700 Units Intracatheter PRN Samson Aragon MD   1,700 Units at 10/15/18 1800   • HYDROcodone-acetaminophen (NORCO)  MG per tablet 1 tablet  1 tablet Oral Q6H PRN Max Jones Riego, MD   1 tablet at 10/18/18 1112   • hydroxypropyl methylcellulose (ISOPTO TEARS) 2.5 % ophthalmic solution 1 drop  1 drop Both Eyes TID PRN Vu Michael MD   1 drop at 10/11/18 0636   • Influenza Vac Subunit Quad (FLUCELVAX) injection 0.5 mL  0.5 mL Intramuscular  During Hospitalization Max Jones MD       • insulin lispro (humaLOG) injection 0-9 Units  0-9 Units Subcutaneous 4x Daily With Meals & Nightly Max Jones MD   8 Units at 10/17/18 1314   • insulin lispro (humaLOG) injection 6 Units  6 Units Subcutaneous TID AC Max Jones MD   6 Units at 10/18/18 0901   • ipratropium (ATROVENT) nebulizer solution 0.5 mg  0.5 mg Nebulization Q6H PRN Abdiel Stout MD   0.5 mg at 10/13/18 1619   • isosorbide dinitrate (ISORDIL) tablet 10 mg  10 mg Oral TID - Nitrates Abdiel Stout MD   10 mg at 10/18/18 0900   • lisinopril (PRINIVIL,ZESTRIL) tablet 2.5 mg  2.5 mg Oral Q24H Scott Soto MD   2.5 mg at 10/18/18 0900   • magnesium hydroxide (MILK OF MAGNESIA) suspension 2400 mg/10mL 10 mL  10 mL Oral Daily PRN Max Jones MD       • metoprolol tartrate (LOPRESSOR) tablet 25 mg  25 mg Oral Q12H Max Jones MD   25 mg at 10/18/18 0901   • nicotine (NICODERM CQ) 21 MG/24HR patch 1 patch  1 patch Transdermal Q24H Ashutosh Palafox MD   1 patch at 10/17/18 2058   • ondansetron (ZOFRAN) injection 4 mg  4 mg Intravenous Q6H PRN Chaya Duff APRN   4 mg at 10/17/18 1940   • sodium chloride 0.9 % flush 3 mL  3 mL Intravenous Q12H Max Jones MD   3 mL at 10/18/18 0908   • sodium chloride 0.9 % flush 3-10 mL  3-10 mL Intravenous PRN Max Jones MD   10 mL at 10/17/18 1941   • tetrahydrozoline 0.05 % ophthalmic solution 1 drop  1 drop Both Eyes 4x Daily PRN Ney De MD   1 drop at 10/10/18 0339   • vancomycin oral solution reconstituted 125 mg  125 mg Oral Q6H Oleg Madrid DO   125 mg at 10/18/18 0616       Past Medical History:  Past Medical History:   Diagnosis Date   • CHF (congestive heart failure) (CMS/HCC)    • Coronary stent occlusion    • Diabetes mellitus (CMS/HCC)    • Hyperlipidemia    • Hypertension    • Stroke (CMS/HCC)        Past Surgical History:  Past  "Surgical History:   Procedure Laterality Date   • BLADDER SURGERY     • ESOPHAGECTOMY     • INSERTION HEMODIALYSIS CATHETER N/A 10/11/2018    Procedure: HEMODIALYSIS CATHETER INSERTION;  Surgeon: Hugo Bonilla MD;  Location: [place] [address];  Service: Vascular       Family History  Family History   Problem Relation Age of Onset   • No Known Problems Father    • No Known Problems Mother        Social History  Social History     Social History   • Marital status:      Spouse name: N/A   • Number of children: N/A   • Years of education: N/A     Occupational History   • Not on file.     Social History Main Topics   • Smoking status: Current Every Day Smoker   • Smokeless tobacco: Never Used   • Alcohol use No   • Drug use: No   • Sexual activity: Not on file     Other Topics Concern   • Not on file     Social History Narrative   • No narrative on file         Review of Systems:  History obtained from chart review and the patient  General ROS: No fever or chills  Respiratory ROS: No cough, shortness of breath, wheezing  Cardiovascular ROS: No chest pain or palpitations  Gastrointestinal ROS: No abdominal pain or melena  Genito-Urinary ROS: No dysuria or hematuria  Neurological ROS: no headache or dizziness    Objective:  /50 (BP Location: Right arm)   Pulse 92   Temp 97 °F (36.1 °C) (Oral)   Resp 16   Ht 170.2 cm (67\")   Wt 45.3 kg (99 lb 12.8 oz)   SpO2 96%   BMI 15.63 kg/m²     Intake/Output Summary (Last 24 hours) at 10/18/18 1127  Last data filed at 10/18/18 0930   Gross per 24 hour   Intake              120 ml   Output              400 ml   Net             -280 ml     General: awake/alert    Neck: supple, no JVD  Chest:  clear to auscultation bilaterally without respiratory distress  CVS: regular rate and rhythm  Abdominal: soft, nontender, normal bowel sounds  Extremities: no cyanosis or edema  Skin: warm and dry without rash  Neuro: no focal motor deficits    Labs:    "         Results from last 7 days  Lab Units 10/18/18  0449 10/17/18  0556 10/15/18  0542   SODIUM mmol/L 139 140 139   POTASSIUM mmol/L 4.8 5.0 4.5   CHLORIDE mmol/L 104 103 102   CO2 mmol/L 27.0 30.0 28.0   BUN mg/dL 23* 30* 36*   CREATININE mg/dL 2.72* 2.99* 3.27*   CALCIUM mg/dL 9.2 9.3 9.0   GLUCOSE mg/dL 123* 138* 133*       Radiology:   Imaging Results (last 72 hours)     Procedure Component Value Units Date/Time     Thoracentesis [[id number]] Collected:  10/07/18 1323    Specimen:  Body Fluid Updated:  10/13/18 0934    Narrative:       DATE OF PROCEDURE:  10/7/2018.     PREPROCEDURE DIAGNOSIS:  Left pleural effusion.  POSTPROCEDURE DIAGNOSIS:  Left pleural effusion.          INDICATIONS FOR PROCEDURE: Shortness of breath.     PROCEDURE:   1. Thoracentesis, diagnostic and therapeutic.  2. Ultrasound guidance for thoracentesis, imaging supervision and  interpretation.     ANESTHESIA: 1% lidocaine, injected locally.          COMPLICATIONS: None.        EXAMINATIONS FOR COMPARISON:  CT chest dated 10/6/2018.     DESCRIPTION OF PROCEDURE:   The risk and benefits of the procedure were explained to the patient.   Specifically, the risks of bleeding, infection, pneumothorax (possible  thoracostomy tube), and damage to surrounding structures were discussed  extensively. The patient's questions were answered. The patient opted to  proceed. Written and verbal consent were obtained from the patient.     TIME OUT was taken and the patient's name, medical record number, date  of birth, procedure, entry site, and listen allergies were confirmed.  The site of the procedure was confirmed and marked.      The leftposterior thorax was prepped and draped in sterile fashion. The  area was anesthetized with buffered lidocaine 2%.      A 5 Yi 10 cm  catheter was inserted into the pleural effusion  using  ultrasound guidance. Approximately 400 mL of clear fluid was recovered  and sent to the pathology lab for analysis.      The  patient tolerated the procedure well.   No immediate complications  were noted.       Impression:       Successful ultrasound guided leftthoracentesis. Approximately 400 mL of  clear fluid was recovered and sent to the pathology lab for analysis.   No immediate complications were noted.              This report was finalized on 10/07/2018 13:26 by Dr. Petra Mckinley MD.     Chest [[id number]] Collected:  10/07/18 1323     Updated:  10/13/18 0934    Narrative:       DATE OF PROCEDURE:  10/7/2018.     PREPROCEDURE DIAGNOSIS:  Left pleural effusion.  POSTPROCEDURE DIAGNOSIS:  Left pleural effusion.          INDICATIONS FOR PROCEDURE: Shortness of breath.     PROCEDURE:   1. Thoracentesis, diagnostic and therapeutic.  2. Ultrasound guidance for thoracentesis, imaging supervision and  interpretation.     ANESTHESIA: 1% lidocaine, injected locally.          COMPLICATIONS: None.        EXAMINATIONS FOR COMPARISON:  CT chest dated 10/6/2018.     DESCRIPTION OF PROCEDURE:   The risk and benefits of the procedure were explained to the patient.   Specifically, the risks of bleeding, infection, pneumothorax (possible  thoracostomy tube), and damage to surrounding structures were discussed  extensively. The patient's questions were answered. The patient opted to  proceed. Written and verbal consent were obtained from the patient.     TIME OUT was taken and the patient's name, medical record number, date  of birth, procedure, entry site, and listen allergies were confirmed.  The site of the procedure was confirmed and marked.      The leftposterior thorax was prepped and draped in sterile fashion. The  area was anesthetized with buffered lidocaine 2%.      A 5 Albanian 10 cm  catheter was inserted into the pleural effusion  using  ultrasound guidance. Approximately 400 mL of clear fluid was recovered  and sent to the pathology lab for analysis.      The patient tolerated the procedure well.   No immediate complications  were  noted.       Impression:       Successful ultrasound guided leftthoracentesis. Approximately 400 mL of  clear fluid was recovered and sent to the pathology lab for analysis.   No immediate complications were noted.              This report was finalized on 10/07/2018 13:26 by Dr. Petra Mckinley MD.          Culture:         Assessment   1.  CKD stage 4--now with progression to ESRD  2.  Type 2 diabetes with nephropathy  3.  Bilateral pneumonia  4.  Chronic diastolic congestive heart failure  5.  S/p ventricular tachycardia on 10/10  6.  Metabolic acidosis  7.  Secondary hyperparathyroidism    Plan:  1.  Dialysis next due 10/19  2.  Monitor labs  3.  Work continues on SNF and outpatient dialysis placement      Roberto Carlos Myrick, MACO  10/18/2018  11:27 AM

## 2018-10-18 NOTE — PLAN OF CARE
Problem: Patient Care Overview  Goal: Plan of Care Review  Outcome: Ongoing (interventions implemented as appropriate)   10/18/18 1330   Plan of Care Review   Progress no change   OTHER   Outcome Summary Pt required increased assistance due to posterior lean and increase standing rest due to fatigue. Pt was Min-mod with RW to amb. Pt required encouragement to participate with therapy.   Coping/Psychosocial   Plan of Care Reviewed With patient

## 2018-10-18 NOTE — THERAPY TREATMENT NOTE
Acute Care - Physical Therapy Treatment Note  [facility]     Patient Name: Gavin Wilson  : 1945  MRN: [id number]  Today's Date: 10/18/2018  Onset of Illness/Injury or Date of Surgery: 10/06/18  Date of Referral to PT: 10/08/18  Referring Physician: Dr. Michael    Admit Date: 10/6/2018    Visit Dx:    ICD-10-CM ICD-9-CM   1. Esophageal dysphagia R13.10 787.20   2. Impaired mobility and ADLs Z74.09 799.89   3. Impaired functional mobility, balance, gait, and endurance Z74.09 V49.89   4. Chronic kidney disease, stage 4 (severe) (CMS/HCC) N18.4 585.4     Patient Active Problem List   Diagnosis   • Volume overload   • Acute renal failure superimposed on stage 4 chronic kidney disease (CMS/HCC)   • Acute on chronic combined systolic and diastolic congestive heart failure (CMS/HCC)   • Pleural effusion, bilateral   • Sepsis (CMS/[facility])   • Coronary artery disease involving native coronary artery of native heart without angina pectoris   • History of coronary artery stent placement   • Mild aortic valve stenosis   • Moderate aortic valve insufficiency   • Mild mitral valve regurgitation   • Acute on chronic respiratory failure with hypoxia (CMS/[facility])   • Pneumonia   • COPD (chronic obstructive pulmonary disease) (CMS/[facility])       Therapy Treatment          Rehabilitation Treatment Summary     Row Name 10/18/18 1314 10/18/18 0900          Treatment Time/Intention    Discipline physical therapy assistant  -WK physical therapy assistant  -WK     Document Type therapy note (daily note)  -WK therapy note (daily note)  -WK     Reason Treatment Not Performed  -- patient/family declined treatment   stated he was cold and asked to check back   -WK2     Existing Precautions/Restrictions fall   contact precautions   -WK  --     Recorded by [WK] Bernice Rico, PTA 10/18/18 1330 [WK] Bernice Rico, PTA 10/18/18 0901  [WK2] Bernice Rico, PTA 10/18/18 0903     Row Name 10/18/18 1314             Bed Mobility  Assessment/Treatment    Supine-Sit [place] (Bed Mobility) moderate assist (50% patient effort);verbal cues  -WK      Sit-Supine [place] (Bed Mobility) supervision  -WK      Recorded by [WK] Bernice Rico, PTA 10/18/18 1330      Row Name 10/18/18 1314             Sit-Stand Transfer    Sit-Stand [place] (Transfers) verbal cues;minimum assist (75% patient effort);moderate assist (50% patient effort)  -WK      Recorded by [WK] Bernice Rico PTA 10/18/18 1330      Row Name 10/18/18 1314             Stand-Sit Transfer    Stand-Sit [place] (Transfers) verbal cues;minimum assist (75% patient effort)  -WK      Recorded by [WK] Bernice iRco PTA 10/18/18 1330      Row Name 10/18/18 1314             Gait/Stairs Assessment/Training    [place] Level (Gait) verbal cues;minimum assist (75% patient effort);moderate assist (50% patient effort)  -WK      Assistive Device (Gait) walker, front-wheeled  -WK      Distance in Feet (Gait) 5'x4 standing rest due to fatigue   Pt had posterior lean and refused to amb further  -WK      Deviations/Abnormal Patterns (Gait) stride length decreased;gait speed decreased   multiple LOB   -WK      Recorded by [WK] Bernice Rico PTA 10/18/18 1330      Row Name 10/18/18 1313             Positioning and Restraints    Pre-Treatment Position in bed  -WK      Post Treatment Position bed  -WK      In Bed fowlers;call light within reach;encouraged to call for assist  -WK      Recorded by [WK] Bernice Rico PTA 10/18/18 1330      Row Name 10/18/18 1314             Pain Scale: Numbers Pre/Post-Treatment    Pain Scale: Numbers, Pretreatment 3/10  -WK      Pain Scale: Numbers, Post-Treatment 3/10  -WK      Pain Location --   generalized  -WK      Recorded by [WK] Bernice Rico, PTA 10/18/18 1330      Row Name                Wound 10/07/18 1255 Bilateral posterior coccyx    Wound - Properties Group Date first assessed: 10/07/18 [] Time first assessed: 1255 []  Side: Bilateral [SH] Orientation: posterior [SH] Location: coccyx [SH] Recorded by:  [SH] Virginia Ahn RN 10/07/18 1256    Row Name                Wound 10/11/18 0831 Other (See comments) chest incision    Wound - Properties Group Date first assessed: 10/11/18 [DS] Time first assessed: 0831 [DS] Side: Other (See comments) [DS] Location: chest [DS] Type: incision [DS] Recorded by:  [DS] Jeremy Liu RN 10/11/18 0831      User Key  (r) = Recorded By, (t) = Taken By, (c) = Cosigned By    Initials Name Effective Dates Discipline    SH Virginia Ahn RN 08/28/17 -  Nurse    DS Jeremy Liu RN 08/02/16 -  Nurse    WK Bernice Rico PTA 08/02/16 -  PT          Wound 10/07/18 1255 Bilateral posterior coccyx (Active)   Dressing Appearance dry;intact 10/18/2018  9:00 AM   Closure DAVID 10/18/2018  9:00 AM   Base dressing in place, unable to visualize 10/18/2018  9:00 AM   Periwound Temperature warm 10/18/2018  9:00 AM   Periwound Skin Turgor soft 10/18/2018  9:00 AM   Drainage Amount none 10/18/2018  9:00 AM   Dressing Care, Wound dressing changed 10/18/2018 10:30 AM       Wound 10/11/18 0831 Other (See comments) chest incision (Active)   Dressing Appearance dry;intact 10/18/2018  9:00 AM   Drainage Amount none 10/18/2018  9:00 AM   Dressing Care, Wound border dressing 10/18/2018  9:00 AM   Periwound Care, Wound dry periwound area maintained 10/17/2018  8:10 PM             Physical Therapy Education     Title: PT OT SLP Therapies (Active)     Topic: Physical Therapy (Active)     Point: Mobility training (Done)    Learning Progress Summary     Learner Status Readiness Method Response Comment Documented by    Patient Done Nonacceptance E NR,VU BOA WK 10/18/18 1332     Done Acceptance E VU BOA WK 10/17/18 1539     Active Acceptance E,D NR transfers and benefits of activity MF 10/16/18 1035     Active Acceptance E,D NR Educated on benefits of activity. Intructed in safety with bed mobility, transfers,and  LE Strengthening Exercises. LG 10/14/18 0918     Done Acceptance E VU,NR benefits of activity MF 10/12/18 1206     Active Acceptance E,D RT Effecient supine to sit EARLENE 10/09/18 1154     Done Acceptance E VU,NR Pt was educated on the importance and benefits of getting out of bed and how it affects his overall health. Pt was educated on why PT is working with him. Pt was encouraged to do more in treatment session. TD 10/08/18 1430     Done Acceptance E VU,NR Pt  was educated on benefits ot PT and PT POC. Pt was edcuated on body mechanics with transfers (pushing with one arm on bed to stand up/having legs touch bed before sitting down) and safety precautions (socks on, gait belt on) with transfers and gait. TD 10/08/18 1149          Point: Body mechanics (Active)    Learning Progress Summary     Learner Status Readiness Method Response Comment Documented by    Patient Active Acceptance ED NR Educated on benefits of activity. Intructed in safety with bed mobility, transfers,and LE Strengthening Exercises. LG 10/14/18 0918     Done Acceptance E VU,NR Pt was educated on the importance and benefits of getting out of bed and how it affects his overall health. Pt was educated on why PT is working with him. Pt was encouraged to do more in treatment session. TD 10/08/18 1430     Done Acceptance E VU,NR Pt  was educated on benefits ot PT and PT POC. Pt was edcuated on body mechanics with transfers (pushing with one arm on bed to stand up/having legs touch bed before sitting down) and safety precautions (socks on, gait belt on) with transfers and gait. TD 10/08/18 1149          Point: Precautions (Active)    Learning Progress Summary     Learner Status Readiness Method Response Comment Documented by    Patient Active Acceptance ED NR Educated on benefits of activity. Intructed in safety with bed mobility, transfers,and LE Strengthening Exercises. LG 10/14/18 0918     Done Acceptance E VU,NR Pt was educated on the importance and  benefits of getting out of bed and how it affects his overall health. Pt was educated on why PT is working with him. Pt was encouraged to do more in treatment session. TD 10/08/18 1430     Done La YEH,NR Pt  was educated on benefits ot PT and PT POC. Pt was edcuated on body mechanics with transfers (pushing with one arm on bed to stand up/having legs touch bed before sitting down) and safety precautions (socks on, gait belt on) with transfers and gait. TD 10/08/18 1149                      User Key     Initials Effective Dates Name Provider Type Discipline    LG 08/02/16 -  Roberto Pereira, PTA Physical Therapy Assistant PT    EARLENE 08/02/16 -  Kaylynn Mcgarry PTA Physical Therapy Assistant PT    MF 08/02/16 -  Liliane Collado PTA Physical Therapy Assistant PT    WK 08/02/16 -  Bernice Rico PTA Physical Therapy Assistant PT    TD 09/07/18 -  Rita Grullon, PT Student PT Student PT                    PT Recommendation and Plan     Plan of Care Reviewed With: patient  Progress: no change  Outcome Summary: Pt required increased assistance due to posterior lean and increase standing rest due to fatigue. Pt was Min-mod with RW to amb. Pt required encouragement to participate with therapy.          Outcome Measures     Row Name 10/16/18 1045 10/16/18 1002          How much help from another person do you currently need...    Turning from your back to your side while in flat bed without using bedrails?  -- 4  -MF     Moving from lying on back to sitting on the side of a flat bed without bedrails?  -- 3  -MF     Moving to and from a bed to a chair (including a wheelchair)?  -- 3  -MF     Standing up from a chair using your arms (e.g., wheelchair, bedside chair)?  -- 3  -MF     Climbing 3-5 steps with a railing?  -- 2  -MF     To walk in hospital room?  -- 3  -MF     AM-PAC 6 Clicks Score  -- 18  -MF        How much help from another is currently needed...    Putting on and taking off regular lower body  clothing? 2  -CJ  --     Bathing (including washing, rinsing, and drying) 2  -CJ  --     Toileting (which includes using toilet bed pan or urinal) 2  -CJ  --     Putting on and taking off regular upper body clothing 2  -CJ  --     Taking care of personal grooming (such as brushing teeth) 3  -CJ  --     Eating meals 4  -CJ  --     Score 15  -CJ  --        Functional Assessment    Outcome Measure Options  -- AM-PAC 6 Clicks Basic Mobility (PT)  -       User Key  (r) = Recorded By, (t) = Taken By, (c) = Cosigned By    Initials Name Provider Type    CJ Paulino Fuchs COTA/L Occupational Therapy Assistant    Liliane Bassett, URMILA Physical Therapy Assistant           Time Calculation:         PT Charges     Row Name 10/18/18 1333             Time Calculation    Start Time 1314  -WK      Stop Time 1326  -WK      Time Calculation (min) 12 min  -WK      PT Received On 10/18/18  -WK         Time Calculation- PT    Total Timed Code Minutes- PT 12 minute(s)  -WK        User Key  (r) = Recorded By, (t) = Taken By, (c) = Cosigned By    Initials Name Provider Type    Bernice Dailey, URMILA Physical Therapy Assistant        Therapy Suggested Charges     Code   Minutes Charges    35982 (CPT®) Hc Pt Neuromusc Re Education Ea 15 Min      96953 (CPT®) Hc Pt Ther Proc Ea 15 Min      61584 (CPT®) Hc Gait Training Ea 15 Min 12 1    29807 (CPT®) Hc Pt Therapeutic Act Ea 15 Min      62598 (CPT®) Hc Pt Manual Therapy Ea 15 Min      18984 (CPT®) Hc Pt Iontophoresis Ea 15 Min      52136 (CPT®) Hc Pt Elec Stim Ea-Per 15 Min      42425 (CPT®) Hc Pt Ultrasound Ea 15 Min      47175 (CPT®) Hc Pt Self Care/Mgmt/Train Ea 15 Min      00070 (CPT®) Hc Pt Prosthetic (S) Train Initial Encounter, Each 15 Min      85510 (CPT®) Hc Pt Orthotic(S)/Prosthetic(S) Encounter, Each 15 Min      80935 (CPT®) Hc Orthotic(S) Mgmt/Train Initial Encounter, Each 15min      Total  12 1        Therapy Charges for Today     Code Description Service Date  Service Provider Modifiers Qty    88694307008 HC GAIT TRAINING EA 15 MIN 10/17/2018 Bernice Rico, URMILA GP, KX 1    64162585735 HC GAIT TRAINING EA 15 MIN 10/18/2018 Bernice Rico PTA GP, KX 1          PT G-Codes  PT Professional Judgement Used?: Yes  Outcome Measure Options: AM-PAC 6 Clicks Basic Mobility (PT)  AM-PAC 6 Clicks Score: 18  Score: 15  Functional Limitation: Mobility: Walking and moving around  Mobility: Walking and Moving Around Current Status (): At least 20 percent but less than 40 percent impaired, limited or restricted  Mobility: Walking and Moving Around Goal Status (): At least 1 percent but less than 20 percent impaired, limited or restricted    Bernice Rico PTA  10/18/2018

## 2018-10-18 NOTE — PLAN OF CARE
Problem: Pneumonia (Adult)  Goal: Signs and Symptoms of Listed Potential Problems Will be Absent, Minimized or Managed (Pneumonia)  Outcome: Ongoing (interventions implemented as appropriate)      Problem: Fall Risk (Adult)  Goal: Absence of Fall  Outcome: Ongoing (interventions implemented as appropriate)      Problem: Skin Injury Risk (Adult)  Goal: Skin Health and Integrity  Outcome: Ongoing (interventions implemented as appropriate)      Problem: Nutrition, Imbalanced: Inadequate Oral Intake (Adult)  Goal: Improved Oral Intake  Outcome: Ongoing (interventions implemented as appropriate)    Goal: Prevent Further Weight Loss  Outcome: Outcome(s) achieved Date Met: 10/18/18      Problem: Patient Care Overview  Goal: Plan of Care Review  Outcome: Ongoing (interventions implemented as appropriate)   10/18/18 8698   Plan of Care Review   Progress no change   OTHER   Outcome Summary pt co pain and nausea. medication given with relief. dialysis in am. cont to monitor.   Coping/Psychosocial   Plan of Care Reviewed With patient     Goal: Individualization and Mutuality  Outcome: Ongoing (interventions implemented as appropriate)    Goal: Discharge Needs Assessment  Outcome: Ongoing (interventions implemented as appropriate)    Goal: Interprofessional Rounds/Family Conf  Outcome: Ongoing (interventions implemented as appropriate)      Problem: Hemodialysis (Adult)  Goal: Signs and Symptoms of Listed Potential Problems Will be Absent, Minimized or Managed (Hemodialysis)  Outcome: Ongoing (interventions implemented as appropriate)

## 2018-10-18 NOTE — PROGRESS NOTES
Continued Stay Note   Jie     Patient Name: Gavin Wilson  MRN: [id number]  Today's Date: 10/18/2018    Admit Date: 10/6/2018          Discharge Plan     Row Name 10/18/18 1647       Plan    Plan [facility] FACILITY; PENDING [facility]AL [facility]    Plan Comments PT. REC'S APPROVAL FOR OUTPATIENT HD AT [facility] FOR M-W-F AT 1600.  [facility]GER [facility] REPORTS THAT FINAL APPROVAL IS ANTICIPATED FOR PT'S D/C TO NH TOMORROW AFTER HD HERE.  WILL FOLLOW.  [facility].                Discharge Codes    No documentation.           LINDSEY Larose

## 2018-10-18 NOTE — PLAN OF CARE
Problem: Pneumonia (Adult)  Goal: Signs and Symptoms of Listed Potential Problems Will be Absent, Minimized or Managed (Pneumonia)   10/18/18 0522   Goal/Outcome Evaluation   Problems Assessed (Pneumonia) all   Problems Present (Pneumonia) none       Problem: Fall Risk (Adult)  Goal: Absence of Fall  Outcome: Ongoing (interventions implemented as appropriate)   10/18/18 0522   Fall Risk (Adult)   Absence of Fall making progress toward outcome       Problem: Skin Injury Risk (Adult)  Goal: Skin Health and Integrity  Outcome: Ongoing (interventions implemented as appropriate)   10/18/18 0522   Skin Injury Risk (Adult)   Skin Health and Integrity making progress toward outcome       Problem: Patient Care Overview  Goal: Plan of Care Review  Outcome: Ongoing (interventions implemented as appropriate)   10/18/18 0522   Plan of Care Review   Progress no change   OTHER   Outcome Summary Medicated for chronic pain as requested. Slept at intervals. Mepilex to coccyx. Urostomy bag emptied 100cc urine. S 79-89   Coping/Psychosocial   Plan of Care Reviewed With patient       Problem: Hemodialysis (Adult)  Goal: Signs and Symptoms of Listed Potential Problems Will be Absent, Minimized or Managed (Hemodialysis)   10/18/18 0522   Goal/Outcome Evaluation   Problems Assessed (Hemodialysis) all   Problems Present (Hemodialysis) situational response

## 2018-10-18 NOTE — PLAN OF CARE
Problem: Patient Care Overview  Goal: Plan of Care Review  Outcome: Ongoing (interventions implemented as appropriate)   10/18/18 0098   OTHER   Outcome Summary Pt. progressing as expected, no issues other than fatigue and being tired after previous therapy!   Coping/Psychosocial   Plan of Care Reviewed With patient

## 2018-10-18 NOTE — PROGRESS NOTES
"    [facility] Medicine Services  INPATIENT PROGRESS NOTE    Patient Name: Gavin Wilson  Date of Admission: 10/6/2018  Today's Date: 10/18/18  Length of Stay: 12  Primary Care Physician: Provider, No Known    Subjective   Chief Complaint:  f/u  HPI   Nurse states no reported diarrhea overnight   I ask him if I can do anything for him today.  He replied \"not right now\".  He has no new complaints.  Diarrhea slowed down  Review of Systems     All pertinent negatives and positives are as above. All other systems have been reviewed and are negative unless otherwise stated.     Objective    Temp:  [97 °F (36.1 °C)-98.4 °F (36.9 °C)] 97 °F (36.1 °C)  Heart Rate:  [83-93] 92  Resp:  [16-18] 16  BP: (104-120)/(48-52) 120/50  Physical Exam  Asleep when I came in to his room.  Took a while to wake him up. He states he is not wearing his hearing aid. Coherent  Constitutional: He is oriented to person, place   No distress. Chronically ill appearing.  Appears improved today   HENT:   Head: Atraumatic.   Temporal muscle wasting; prominent wasting around the neck and supraclavicular    Eyes: Conjunctivae are normal. No scleral icterus.   Cardiovascular: Normal rate, regular rhythm and intact distal pulses.    Murmur heard. Right upper chest permcath placed  Pulmonary/Chest: Effort normal. No respiratory distress. He has no wheezing. He has no rales.  Improved air movement   Abdominal: Soft. Bowel sounds are normal. He exhibits no distension. There is no tenderness.   RLQ ostomy  Neurological: He is alert and oriented to person, place, and time.  Skin: Skin is warm and dry. He is not diaphoretic.   Psychiatric: He has a normal mood and affect. His behavior is normal         Results Review:  I have reviewed the labs, radiology results, and diagnostic studies.    Laboratory Data:            Results from last 7 days  Lab Units 10/18/18  0449 10/17/18  0556 10/15/18  0542   SODIUM mmol/L 139 140 139 "   POTASSIUM mmol/L 4.8 5.0 4.5   CHLORIDE mmol/L 104 103 102   CO2 mmol/L 27.0 30.0 28.0   BUN mg/dL 23* 30* 36*   CREATININE mg/dL 2.72* 2.99* 3.27*   CALCIUM mg/dL 9.2 9.3 9.0   GLUCOSE mg/dL 123* 138* 133*       Culture Data:        Radiology Data:   Imaging Results (last 24 hours)     ** No results found for the last 24 hours. **          I have reviewed the patient's current medications.     Assessment/Plan     Active Hospital Problems    Diagnosis   • **Acute on chronic respiratory failure with hypoxia (CMS/[facility])   • Coronary artery disease involving native coronary artery of native heart without angina pectoris   • History of coronary artery stent placement   • Mild aortic valve stenosis   • Moderate aortic valve insufficiency   • Mild mitral valve regurgitation   • Pneumonia   • COPD (chronic obstructive pulmonary disease) (CMS/[facility])   • Sepsis (CMS/[facility])   • Acute renal failure superimposed on stage 4 chronic kidney disease (CMS/[facility])   • Acute on chronic combined systolic and diastolic congestive heart failure (CMS/[facility])   • Pleural effusion, bilateral       1) Acute hypoxic respiratory failure, resolved  2) Sepsis due to suspected health care associated pneumonia - blood culture and pleural fluid culture showed no growth to date  3) Bilateral pleural effusions - Right is recurrent, left is worsening -status this post thoracentesis on October 7 Suspected due to heart failure, but cannot rule out infection or malignancy   4) Severe protein-calorie malnutrition  5) COPD without exacerbation  6) Combined systolic and diastolic heart failure, chronic (EF <40%)  7) Acute kidney injury on chronic kidney disease stage 5 ESRD -awaiting approval   8) Chronic normocytic anemia due to CKD  9) Thrombocytopenia, improved  10) Mild hypokalemia, resolved  11) Hyperphosphotemia  12) Metabolic acidosis, anion gap due to uremia  13) Constipation, resolved; now has diarrhea and tested + for c. Diff - cont po vanc  14)Hypoglycemia  (50) - resolved; hx of Diabetes -  Patient encourage to eat.  Will continue to monitor; prn d50; will consider /dc pre meal insulin and maintain only on ssi (accu 123, 120)           Awaiting SNF and outpatient dialysis chair time  Cont po vanc  Cont supportive care  Discussed with nurse Brooke Giles  Patient and family apprised of plan of care                    Discharge Planning:when cleared from sw standpoint  Max Jones MD   10/18/18   8:31 AM

## 2018-10-19 LAB
ANION GAP SERPL CALCULATED.3IONS-SCNC: 7 MMOL/L (ref 4–13)
BUN BLD-MCNC: 31 MG/DL (ref 5–21)
BUN/CREAT SERPL: 7.9 (ref 7–25)
CALCIUM SPEC-SCNC: 9.2 MG/DL (ref 8.4–10.4)
CHLORIDE SERPL-SCNC: 102 MMOL/L (ref 98–110)
CO2 SERPL-SCNC: 28 MMOL/L (ref 24–31)
CREAT BLD-MCNC: 3.9 MG/DL (ref 0.5–1.4)
GFR SERPL CREATININE-BSD FRML MDRD: 15 ML/MIN/1.73
GLUCOSE BLD-MCNC: 150 MG/DL (ref 70–100)
GLUCOSE BLDC GLUCOMTR-MCNC: 141 MG/DL (ref 70–130)
GLUCOSE BLDC GLUCOMTR-MCNC: 197 MG/DL (ref 70–130)
GLUCOSE BLDC GLUCOMTR-MCNC: 204 MG/DL (ref 70–130)
GLUCOSE BLDC GLUCOMTR-MCNC: 257 MG/DL (ref 70–130)
POTASSIUM BLD-SCNC: 5 MMOL/L (ref 3.5–5.3)
SODIUM BLD-SCNC: 137 MMOL/L (ref 135–145)

## 2018-10-19 PROCEDURE — 25010000002 HEPARIN (PORCINE) PER 1000 UNITS: Performed by: INTERNAL MEDICINE

## 2018-10-19 PROCEDURE — 94799 UNLISTED PULMONARY SVC/PX: CPT

## 2018-10-19 PROCEDURE — G8987 SELF CARE CURRENT STATUS: HCPCS

## 2018-10-19 PROCEDURE — 80048 BASIC METABOLIC PNL TOTAL CA: CPT | Performed by: NURSE PRACTITIONER

## 2018-10-19 PROCEDURE — 97535 SELF CARE MNGMENT TRAINING: CPT

## 2018-10-19 PROCEDURE — 82962 GLUCOSE BLOOD TEST: CPT

## 2018-10-19 PROCEDURE — 63710000001 INSULIN LISPRO (HUMAN) PER 5 UNITS: Performed by: INTERNAL MEDICINE

## 2018-10-19 PROCEDURE — 25010000002 ONDANSETRON PER 1 MG: Performed by: NURSE PRACTITIONER

## 2018-10-19 PROCEDURE — G8988 SELF CARE GOAL STATUS: HCPCS

## 2018-10-19 PROCEDURE — 5A1D70Z PERFORMANCE OF URINARY FILTRATION, INTERMITTENT, LESS THAN 6 HOURS PER DAY: ICD-10-PCS | Performed by: INTERNAL MEDICINE

## 2018-10-19 PROCEDURE — 94760 N-INVAS EAR/PLS OXIMETRY 1: CPT

## 2018-10-19 RX ADMIN — METOPROLOL TARTRATE 25 MG: 25 TABLET, FILM COATED ORAL at 09:13

## 2018-10-19 RX ADMIN — VANCOMYCIN HYDROCHLORIDE 125 MG: KIT at 05:56

## 2018-10-19 RX ADMIN — BUMETANIDE 1 MG: 1 TABLET ORAL at 09:13

## 2018-10-19 RX ADMIN — ISOSORBIDE DINITRATE 10 MG: 10 TABLET ORAL at 18:41

## 2018-10-19 RX ADMIN — HYDROCODONE BITARTRATE AND ACETAMINOPHEN 1 TABLET: 10; 325 TABLET ORAL at 05:55

## 2018-10-19 RX ADMIN — VANCOMYCIN HYDROCHLORIDE 125 MG: KIT at 23:56

## 2018-10-19 RX ADMIN — Medication 3 ML: at 09:14

## 2018-10-19 RX ADMIN — LISINOPRIL 2.5 MG: 2.5 TABLET ORAL at 09:13

## 2018-10-19 RX ADMIN — ONDANSETRON HYDROCHLORIDE 4 MG: 2 INJECTION INTRAMUSCULAR; INTRAVENOUS at 21:52

## 2018-10-19 RX ADMIN — INSULIN LISPRO 6 UNITS: 100 INJECTION, SOLUTION INTRAVENOUS; SUBCUTANEOUS at 18:41

## 2018-10-19 RX ADMIN — ONDANSETRON HYDROCHLORIDE 4 MG: 2 INJECTION INTRAMUSCULAR; INTRAVENOUS at 05:55

## 2018-10-19 RX ADMIN — Medication 3 ML: at 21:50

## 2018-10-19 RX ADMIN — CALCITRIOL CAPSULES 0.25 MCG 0.5 MCG: 0.25 CAPSULE ORAL at 09:13

## 2018-10-19 RX ADMIN — ISOSORBIDE DINITRATE 10 MG: 10 TABLET ORAL at 09:13

## 2018-10-19 RX ADMIN — HYDROCODONE BITARTRATE AND ACETAMINOPHEN 1 TABLET: 10; 325 TABLET ORAL at 00:13

## 2018-10-19 RX ADMIN — AMIODARONE HYDROCHLORIDE 200 MG: 200 TABLET ORAL at 09:13

## 2018-10-19 RX ADMIN — HEPARIN SODIUM 5000 UNITS: 5000 INJECTION, SOLUTION INTRAVENOUS; SUBCUTANEOUS at 21:38

## 2018-10-19 RX ADMIN — INSULIN LISPRO 6 UNITS: 100 INJECTION, SOLUTION INTRAVENOUS; SUBCUTANEOUS at 21:49

## 2018-10-19 RX ADMIN — VANCOMYCIN HYDROCHLORIDE 125 MG: KIT at 00:14

## 2018-10-19 RX ADMIN — Medication 1 TABLET: at 09:13

## 2018-10-19 RX ADMIN — INSULIN LISPRO 6 UNITS: 100 INJECTION, SOLUTION INTRAVENOUS; SUBCUTANEOUS at 09:12

## 2018-10-19 RX ADMIN — BUDESONIDE AND FORMOTEROL FUMARATE DIHYDRATE 2 PUFF: 160; 4.5 AEROSOL RESPIRATORY (INHALATION) at 19:57

## 2018-10-19 RX ADMIN — ATORVASTATIN CALCIUM 80 MG: 40 TABLET, FILM COATED ORAL at 09:13

## 2018-10-19 RX ADMIN — ASPIRIN 81 MG CHEWABLE TABLET 81 MG: 81 TABLET CHEWABLE at 09:13

## 2018-10-19 RX ADMIN — FERRIC CITRATE 210 MG: 210 TABLET, COATED ORAL at 18:42

## 2018-10-19 RX ADMIN — INSULIN LISPRO 2 UNITS: 100 INJECTION, SOLUTION INTRAVENOUS; SUBCUTANEOUS at 09:14

## 2018-10-19 RX ADMIN — HEPARIN SODIUM 5000 UNITS: 5000 INJECTION, SOLUTION INTRAVENOUS; SUBCUTANEOUS at 09:12

## 2018-10-19 RX ADMIN — NICOTINE 1 PATCH: 21 PATCH, EXTENDED RELEASE TRANSDERMAL at 21:37

## 2018-10-19 RX ADMIN — METOPROLOL TARTRATE 25 MG: 25 TABLET, FILM COATED ORAL at 21:38

## 2018-10-19 RX ADMIN — VANCOMYCIN HYDROCHLORIDE 125 MG: KIT at 18:42

## 2018-10-19 RX ADMIN — BUDESONIDE AND FORMOTEROL FUMARATE DIHYDRATE 2 PUFF: 160; 4.5 AEROSOL RESPIRATORY (INHALATION) at 07:50

## 2018-10-19 RX ADMIN — FERRIC CITRATE 210 MG: 210 TABLET, COATED ORAL at 09:13

## 2018-10-19 RX ADMIN — HYDROCODONE BITARTRATE AND ACETAMINOPHEN 1 TABLET: 10; 325 TABLET ORAL at 12:37

## 2018-10-19 RX ADMIN — HYDROCODONE BITARTRATE AND ACETAMINOPHEN 1 TABLET: 10; 325 TABLET ORAL at 21:43

## 2018-10-19 NOTE — PLAN OF CARE
Problem: Nutrition, Imbalanced: Inadequate Oral Intake (Adult)  Goal: Improved Oral Intake  Outcome: Ongoing (interventions implemented as appropriate)      Problem: Patient Care Overview  Goal: Plan of Care Review  Outcome: Ongoing (interventions implemented as appropriate)   10/19/18 1614   Plan of Care Review   Progress no change   OTHER   Outcome Summary Cont to have poor appetite. Has only consumed 25% of the last 4 meals. He does not like the glucerna supplements. Encouraged intake. He is aware of available alternate selections. Cont to follow and encourage intake. Weight cont to gradually decline.   Coping/Psychosocial   Plan of Care Reviewed With patient

## 2018-10-19 NOTE — PLAN OF CARE
Problem: Patient Care Overview  Goal: Plan of Care Review  Outcome: Ongoing (interventions implemented as appropriate)   10/19/18 1320   OTHER   Outcome Summary Pt. progressing fairly at this tx date, cga-max. assist required from this keenan/l!   Coping/Psychosocial   Plan of Care Reviewed With patient

## 2018-10-19 NOTE — THERAPY TREATMENT NOTE
Acute Care - Occupational Therapy Progress Note  [facility]     Patient Name: Gavin Wilson  : 1945  MRN: [id number]  Today's Date: 10/19/2018  Onset of Illness/Injury or Date of Surgery: 10/06/18  Date of Referral to OT: 10/08/18  Referring Physician: Dr. Michael    Admit Date: 10/6/2018       ICD-10-CM ICD-9-CM   1. Esophageal dysphagia R13.10 787.20   2. Impaired mobility and ADLs Z74.09 799.89   3. Impaired functional mobility, balance, gait, and endurance Z74.09 V49.89   4. Chronic kidney disease, stage 4 (severe) (CMS/HCC) N18.4 585.4     Patient Active Problem List   Diagnosis   • Volume overload   • Acute renal failure superimposed on stage 4 chronic kidney disease (CMS/HCC)   • Acute on chronic combined systolic and diastolic congestive heart failure (CMS/HCC)   • Pleural effusion, bilateral   • Sepsis (CMS/[facility])   • Coronary artery disease involving native coronary artery of native heart without angina pectoris   • History of coronary artery stent placement   • Mild aortic valve stenosis   • Moderate aortic valve insufficiency   • Mild mitral valve regurgitation   • Acute on chronic respiratory failure with hypoxia (CMS/[facility])   • Pneumonia   • COPD (chronic obstructive pulmonary disease) (CMS/[facility])     Past Medical History:   Diagnosis Date   • CHF (congestive heart failure) (CMS/HCC)    • Coronary stent occlusion    • Diabetes mellitus (CMS/HCC)    • Hyperlipidemia    • Hypertension    • Stroke (CMS/HCC)      Past Surgical History:   Procedure Laterality Date   • BLADDER SURGERY     • ESOPHAGECTOMY     • INSERTION HEMODIALYSIS CATHETER N/A 10/11/2018    Procedure: HEMODIALYSIS CATHETER INSERTION;  Surgeon: Hugo Bonilla MD;  Location: [place] [address];  Service: Vascular          OT ASSESSMENT FLOWSHEET (last 72 hours)      Occupational Therapy Evaluation     Row Name 10/19/18 1020                   OT Evaluation Time/Intention    Subjective Information complains of;weakness  -MW         Document Type re-evaluation  -        Mode of Treatment occupational therapy  -           General Information    Patient Profile Reviewed? yes  -MW        Onset of Illness/Injury or Date of Surgery 10/06/18  -        Referring Physician Dr. Michael  -        Patient Observations alert;cooperative;agree to therapy  -MW        Patient/Family Observations no family present  -        General Observations of Patient fowlers in bed, urostomy  -MW        Prior Level of Function independent:;all household mobility;community mobility;ADL's  -MW        Equipment Currently Used at Home cane, quad;walker, rolling;colostomy/ostomy supplies;grab bar;commode, bedside;shower chair  -MW        Pertinent History of Current Functional Problem transferred from outlying facility, pt reports falling yesterday and unable to get up for two hours. dx: acute resp failure, DM, kidney disease  -MW        Existing Precautions/Restrictions fall   urostomy  -MW        Risks Reviewed patient:;LOB;nausea/vomiting;dizziness;increased discomfort;change in vital signs;increased drainage;lines disloged  -        Benefits Reviewed patient:;improve function;increase strength;increase independence;increase balance;decrease pain;decrease risk of DVT;improve skin integrity;increase knowledge  -MW           Cognitive Assessment/Intervention- PT/OT    Orientation Status (Cognition) oriented x 4  -MW           Bed Mobility Assessment/Treatment    Supine-Sit [place] (Bed Mobility) minimum assist (75% patient effort)  -        Comment (Bed Mobility) decreased trunk control requiring BUE support to maintain upright sitting, even with support pt leans posteriorly d/t weakness  -MW           Transfer Assessment/Treatment    Transfer Assessment/Treatment sit-stand transfer  -           Sit-Stand Transfer    Sit-Stand [place] (Transfers) moderate assist (50% patient effort)  -        Assistive Device (Sit-Stand Transfers) walker,  front-wheeled  -MW           ADL Assessment/Intervention    BADL Assessment/Intervention lower body dressing  -MW           Lower Body Dressing Assessment/Training    Lower Body Dressing [place] Level don;doff;socks;minimum assist (75% patient effort);moderate assist (50% patient effort)  -MW        Lower Body Dressing Position edge of bed sitting  -MW        Comment (Lower Body Dressing) significantly weaker from eval, unable to maintain upright sitting for activity requiring modA for stability to complete task  -MW           BADL Safety/Performance    Impairments, BADL Safety/Performance endurance/activity tolerance;strength;trunk/postural control  -MW           General ROM    GENERAL ROM COMMENTS BUE AROM WFL   -MW           MMT (Manual Muscle Testing)    General MMT Comments BUE 4/5  -MW           Motor Assessment/Interventions    Additional Documentation Balance (Group)  -MW           Balance    Balance dynamic sitting balance;static sitting balance  -MW           Static Sitting Balance    Level of [place] (Unsupported Sitting, Static Balance) contact guard assist  -MW        Sitting Position (Unsupported Sitting, Static Balance) sitting on edge of bed  -MW        Comment (Unsupported Sitting, Static Balance) BUE support  -MW           Dynamic Sitting Balance    Level of [place], Reaches Outside Midline (Sitting, Dynamic Balance) moderate assist, 50 to 74% patient effort  -MW        Sitting Position, Reaches Outside Midline (Sitting, Dynamic Balance) sitting on edge of bed  -MW           Positioning and Restraints    Pre-Treatment Position in bed  -MW        Post Treatment Position bed  -MW        In Bed sitting EOB;with OT  -MW           Pain Assessment    Additional Documentation Pain Scale: FACES Pre/Post-Treatment (Group)  -MW           Pain Scale: FACES Pre/Post-Treatment    Pain: FACES Scale, Pretreatment 2-->hurts little bit  -MW        Pain: FACES Scale, Post-Treatment 2-->hurts little  bit  -MW           Wound 10/07/18 1255 Bilateral posterior coccyx    Wound - Properties Group Date first assessed: 10/07/18  -SH Time first assessed: 1255  -SH Side: Bilateral  -SH Orientation: posterior  -SH Location: coccyx  -SH       Wound 10/11/18 0831 Other (See comments) chest incision    Wound - Properties Group Date first assessed: 10/11/18  -DS Time first assessed: 0831  -DS Side: Other (See comments)  -DS Location: chest  -DS Type: incision  -DS       Plan of Care Review    Plan of Care Reviewed With patient  -MW           Clinical Impression (OT)    Date of Referral to OT 10/08/18  -MW        OT Diagnosis decline in ADL  -MW        Prognosis (OT Eval) good  -MW        Patient/Family Goals Statement (OT Eval) return home  -MW        Criteria for Skilled Therapeutic Interventions Met (OT Eval) yes;treatment indicated  -MW        Rehab Potential (OT Eval) good, to achieve stated therapy goals  -MW        Therapy Frequency (OT Eval) 5 times/wk  -MW        Predicted Duration of Therapy Intervention (Therapy Eval) until d/c  -MW        Care Plan Review (OT) evaluation/treatment results reviewed;care plan/treatment goals reviewed;risks/benefits reviewed;current/potential barriers reviewed;patient/other agree to care plan  -MW        Anticipated Discharge Disposition (OT) assisted living facility ([facility]);home with home health;home with assist;skilled nursing facility  -MW           Transfer Goal 1 (OT)    Activity/Assistive Device (Transfer Goal 1, OT) toilet;tub;bed-to-chair/chair-to-bed;tub bench  -MW        [place] Level/Cues Needed (Transfer Goal 1, OT) supervision required  -MW        Time Frame (Transfer Goal 1, OT) long term goal (LTG);by discharge  -MW        Progress/Outcome (Transfer Goal 1, OT) continuing progress toward goal;goal ongoing  -MW           Bathing Goal 1 (OT)    Activity/Assistive Device (Bathing Goal 1, OT) bathing skills, all;tub bench  -MW        [place] Level/Cues Needed  (Bathing Goal 1, OT) supervision required  -MW        Time Frame (Bathing Goal 1, OT) long term goal (LTG);by discharge  -MW        Progress/Outcomes (Bathing Goal 1, OT) continuing progress toward goal;goal ongoing  -MW           Dressing Goal 1 (OT)    Activity/Assistive Device (Dressing Goal 1, OT) lower body dressing  -MW        [place]/Cues Needed (Dressing Goal 1, OT) minimum assist (75% or more patient effort)  -MW        Time Frame (Dressing Goal 1, OT) long term goal (LTG);by discharge  -MW        Progress/Outcome (Dressing Goal 1, OT) continuing progress toward goal;goal ongoing  -MW            Endurance Goal 1 (OT)    Endurance Goal 1 (OT) pt will complete functional activity demo good endurance to increase tolerance and safety for activity  -MW        Activity Level (Endurance Goal 1, OT) endurance 2 good -  -MW        Time Frame (Endurance Goal 1, OT) long term goal (LTG);by discharge  -MW        Progress/Outcome (Endurance Goal 1, OT) progress slower than expected;goal ongoing  -MW          User Key  (r) = Recorded By, (t) = Taken By, (c) = Cosigned By    Initials Name Effective Dates     Virginia Ahn RN 08/28/17 -     DS Jeremy Liu RN 08/02/16 -     MW Sharee Becerra, OTR/L 08/28/18 -            Occupational Therapy Education     Title: PT OT SLP Therapies (Active)     Topic: Occupational Therapy (Done)     Point: ADL training (Done)     Description: Instruct learner(s) on proper safety adaptation and remediation techniques during self care or transfers.   Instruct in proper use of assistive devices.   Learning Progress Summary     Learner Status Readiness Method Response Comment Documented by    Patient Done Acceptance E VU updated POC MW 10/19/18 1136     Done Acceptance MARVA HOLLINS DU,NR Pt. required mod. assist form this keenan/l for completing his adl bathing/dressing! CJ 10/15/18 1137     Done Acceptance MARVA HOLLINS DU,NR Pt. able to sit in rolling shower chair and get a shower with  this keenan/l assisting with the bathing and the dressing!  10/09/18 1513     Done Acceptance E VU benefit of increased activity, transfer training, bed mobility, ADL, OT POC  10/08/18 1513          Point: Home exercise program (Done)     Description: Instruct learner(s) on appropriate technique for monitoring, assisting and/or progressing therapeutic exercises/activities.   Learning Progress Summary     Learner Status Readiness Method Response Comment Documented by    Patient Done Acceptance E VU updated POC  10/19/18 1136     Done Acceptance E,D VU,DU,NR Pt. able to perform ue exs with frequent rests, tactile, vc's, pt. states that he just doesn't recover as quickly anymore!  10/18/18 1504     Done Acceptance E,D VU,DU,NR Pt. too tired to get oob, ue exs performed to increase pt's act. lauri and transfer ability!  10/16/18 1401     Done Acceptance E,D VU,DU,NR Pt. performed ue exs to increase his act. lauri, but pt. needed frequent rests to prevent sob!  10/12/18 1514          Point: Precautions (Done)     Description: Instruct learner(s) on prescribed precautions during self-care and functional transfers.   Learning Progress Summary     Learner Status Readiness Method Response Comment Documented by    Patient Done Acceptance E VU updated POC  10/19/18 1136     Done Acceptance E,D VU,DU,NR Pt. able to perform ue exs with frequent rests, tactile, vc's, pt. states that he just doesn't recover as quickly anymore!  10/18/18 1504     Done Acceptance E,D VU,DU,NR Pt. too tired to get oob, ue exs performed to increase pt's act. lauri and transfer ability!  10/16/18 1401     Done Acceptance E,D VU,DU,NR Pt. required mod. assist form this keenan/l for completing his adl bathing/dressing!  10/15/18 1137     Done Acceptance E,D VU,DU,NR Pt. performed ue exs to increase his act. lauri, but pt. needed frequent rests to prevent sob!  10/12/18 1514          Point: Body mechanics (Done)     Description: Instruct  learner(s) on proper positioning and spine alignment during self-care, functional mobility activities and/or exercises.   Learning Progress Summary     Learner Status Readiness Method Response Comment Documented by    Patient Done Acceptance E VU updated POC  10/19/18 1136     Done Acceptance E,D VU,DU,NR Pt. able to perform ue exs with frequent rests, tactile, vc's, pt. states that he just doesn't recover as quickly anymore!  10/18/18 1504     Done Acceptance E,D VU,DU,NR Pt. too tired to get oob, ue exs performed to increase pt's act. lauri and transfer ability!  10/16/18 1401     Done Acceptance E,D VU,DU,NR Pt. required mod. assist form this keenan/l for completing his adl bathing/dressing!  10/15/18 1137     Done Acceptance E,D VU,DU,NR Pt. performed ue exs to increase his act. lauri, but pt. needed frequent rests to prevent sob!  10/12/18 1514                      User Key     Initials Effective Dates Name Provider Type Discipline     08/02/16 -  Paulino Fuchs, KEENAN/L Occupational Therapy Assistant OT     08/28/18 -  Sharee Becerra, OTR/L Occupational Therapist OT                  OT Recommendation and Plan  Outcome Summary/Treatment Plan (OT)  Anticipated Discharge Disposition (OT): assisted living facility ([facility]), home with home health, home with assist, skilled nursing facility  Planned Therapy Interventions (OT Eval): activity tolerance training, BADL retraining, functional balance retraining, occupation/activity based interventions, patient/caregiver education/training, strengthening exercise, transfer/mobility retraining  Therapy Frequency (OT Eval): 5 times/wk  Plan of Care Review  Plan of Care Reviewed With: patient  Plan of Care Reviewed With: patient  Outcome Summary: OT re-eval completed. Pt demos increased weakness from eval. In static sitting pt requires CGA and BUE support to maintain balance. In dynamic sitting to don B socks, pt falls to bed x1, modA to maintain balance required  for other sock. Pt requires seated rest following LB dressing task. Pt getting shower with [place] following re-eval. Pt will need continued therapy at d/c and assistance as he is weaker w decreased postural control from eval. Continue OT POC.          Outcome Measures     Row Name 10/19/18 1100 10/18/18 1354          How much help from another is currently needed...    Putting on and taking off regular lower body clothing? 2  -MW 2  -CJ     Bathing (including washing, rinsing, and drying) 2  -MW 2  -CJ     Toileting (which includes using toilet bed pan or urinal) 3  -MW 2  -CJ     Putting on and taking off regular upper body clothing 3  -MW 2  -CJ     Taking care of personal grooming (such as brushing teeth) 3  -MW 3  -CJ     Eating meals 4  -MW 4  -CJ     Score 17  -MW 15  -CJ        Functional Assessment    Outcome Measure Options AM-PAC 6 Clicks Daily Activity (OT)  -MW AM-PAC 6 Clicks Daily Activity (OT)  -CJ       User Key  (r) = Recorded By, (t) = Taken By, (c) = Cosigned By    Initials Name Provider Type    Paulino Lopez [place]/L Occupational Therapy Assistant    Sharee Bacon, OTR/L Occupational Therapist          Time Calculation:         Time Calculation- OT     Row Name 10/19/18 1136             Time Calculation- OT    OT Start Time 1020  -MW      OT Stop Time 1035  -MW      OT Time Calculation (min) 15 min  -MW      OT Received On 10/19/18  -MW      OT Goal Re-Cert Due Date 10/29/18  -MW        User Key  (r) = Recorded By, (t) = Taken By, (c) = Cosigned By    Initials Name Provider Type    Sharee Bacon, OTR/L Occupational Therapist        Therapy Suggested Charges     Code   Minutes Charges    78304 (CPT®) Hc Ot Neuromusc Re Education Ea 15 Min      43229 (CPT®) Hc Ot Ther Proc Ea 15 Min      84859 (CPT®) Hc Ot Therapeutic Act Ea 15 Min      85826 (CPT®) Hc Ot Manual Therapy Ea 15 Min      94383 (CPT®) Hc Ot Iontophoresis Ea 15 Min      96156 (CPT®) Hc Ot Elec Stim Ea-Per 15 Min       88980 (CPT®) Hc Ot Ultrasound Ea 15 Min      51075 (CPT®) Hc Ot Self Care/Mgmt/Train Ea 15 Min 39 3    Total  39 3        Therapy Charges for Today     Code Description Service Date Service Provider Modifiers Qty    41807342588 HC OT SELFCARE CURRENT 10/19/2018 Sharee Becerra OTR/L TONY, CK 1    46647781865 HC OT SELFCARE PROJECTED 10/19/2018 Sharee Becerra OTR/L TONY, CJ 1    31749397414 HC OT SELF CARE/MGMT/TRAIN EA 15 MIN 10/19/2018 Sharee Becerra OTR/L TONY, KX 3          OT G-codes  OT Professional Judgement Used?: Yes  OT Functional Scales Options: AM-PAC 6 Clicks Daily Activity (OT)  Score: 17  Functional Limitation: Self care  Self Care Current Status (): At least 40 percent but less than 60 percent impaired, limited or restricted  Self Care Goal Status (): At least 20 percent but less than 40 percent impaired, limited or restricted    MATIAS Vu/VERNA  10/19/2018

## 2018-10-19 NOTE — PLAN OF CARE
Problem: Pneumonia (Adult)  Goal: Signs and Symptoms of Listed Potential Problems Will be Absent, Minimized or Managed (Pneumonia)  Outcome: Ongoing (interventions implemented as appropriate)      Problem: Fall Risk (Adult)  Goal: Absence of Fall  Outcome: Ongoing (interventions implemented as appropriate)      Problem: Skin Injury Risk (Adult)  Goal: Skin Health and Integrity  Outcome: Ongoing (interventions implemented as appropriate)      Problem: Nutrition, Imbalanced: Inadequate Oral Intake (Adult)  Goal: Improved Oral Intake  Outcome: Ongoing (interventions implemented as appropriate)      Problem: Patient Care Overview  Goal: Plan of Care Review  Outcome: Ongoing (interventions implemented as appropriate)  VSS, pt tolerated HD well, 800ml off. PRN PO pain medication required x1 to control back pain. Encouraged PO intake, MD allowed [place] toast this morning for breakfast and pt did eat all of it. Family brought food and pt did eat majority of that. Pt has had visitors throughout the day, plans to d/c to [place] early next week when isolation room is available. Pt has no further questions or complaints at this time, will continue to monitor.    10/19/18 9538   Plan of Care Review   Progress no change   Coping/Psychosocial   Plan of Care Reviewed With patient       Problem: Hemodialysis (Adult)  Goal: Signs and Symptoms of Listed Potential Problems Will be Absent, Minimized or Managed (Hemodialysis)  Outcome: Ongoing (interventions implemented as appropriate)

## 2018-10-19 NOTE — PROGRESS NOTES
Nephrology ([facility]) Progress Note      Patient:  Gavin Wilson  YOB: 1945  Date of Service: 10/19/2018  MRN: [id number]   Acct: [id number]   Primary Care Physician: Provider, No Known  Advance Directive:   Code Status and Medical Interventions:   Ordered at: 10/07/18 1309     Limited Support to NOT Include:    Intubation     Level Of Support Discussed With:    Patient     Code Status:    No CPR     Medical Interventions (Level of Support Prior to Arrest):    Limited     Admit Date: 10/6/2018       Hospital Day: 13  Referring Provider: Max Jones*      Patient personally seen and examined.  Complete chart including Consults, Notes, Operative Reports, Labs, Cardiology, and Radiology studies reviewed as able.        Subjective:  Gavin Wilson is a 73 y.o. male  whom we were consulted for CKD stage 4.  History of bladder cancer with prior cystectomy and ureterostomy formation.  Also history of esophogeal and prostate cancer.  Follows with nephrology at [facility]. Has left upper arm fistula in place.  Presented with dyspnea; admitted with bilateral pneumonia/pleural effusion.  Hospital course remarkable for attempt to use fistula on 10/10; infiltrated and was unable to dialyze.  On 10/11 had Permcath placed and first dialysis treatment; he had a run of V tach during first HD.  On 10/15 became hypotensive during dialysis.     Today is feeling a little better; still intermittently nauseated.  No new overnight issues.    Allergies:  Sulfa antibiotics    Home Meds:  Prescriptions Prior to Admission   Medication Sig Dispense Refill Last Dose   • acetaminophen (TYLENOL) 325 MG tablet Take 650 mg by mouth Every 6 (Six) Hours As Needed for Mild Pain .   Past Month at Unknown time   • albuterol (PROVENTIL HFA;VENTOLIN HFA) 108 (90 Base) MCG/ACT inhaler Inhale 2 puffs Every 6 (Six) Hours As Needed for Wheezing.   Past Week at Unknown time   • aspirin 81 MG chewable  tablet Chew 81 mg Daily.   10/5/2018 at Unknown time   • atorvastatin (LIPITOR) 80 MG tablet Take 40 mg by mouth Daily.   10/5/2018 at Unknown time   • budesonide-formoterol (SYMBICORT) 160-4.5 MCG/ACT inhaler Inhale 2 puffs 2 (Two) Times a Day. 1 inhaler 2 10/5/2018 at Unknown time   • bumetanide (BUMEX) 2 MG tablet Take 1 tablet by mouth Daily. 30 tablet 0 10/5/2018 at Unknown time   • calcium carbonate (OS-TASHI) 600 MG tablet Take 600 mg by mouth Daily.   10/5/2018 at Unknown time   • cholecalciferol (VITAMIN D3) 1000 units tablet Take 3,000 Units by mouth Daily.   10/5/2018 at Unknown time   • insulin aspart (novoLOG FLEXPEN) 100 UNIT/ML solution pen-injector sc pen Inject 6 Units under the skin into the appropriate area as directed 3 (Three) Times a Day With Meals.   10/5/2018 at Unknown time   • insulin detemir (LEVEMIR) 100 UNIT/ML injection Inject 6 Units under the skin into the appropriate area as directed 2 (Two) Times a Day.   10/5/2018 at Unknown time   • loperamide (IMODIUM) 2 MG capsule Take 2 mg by mouth Daily As Needed for Diarrhea.   Past Month at Unknown time   • metoprolol tartrate (LOPRESSOR) 25 MG tablet Take 12.5 mg by mouth 2 (Two) Times a Day.   10/5/2018 at Unknown time   • omeprazole (priLOSEC) 20 MG capsule Take 20 mg by mouth Daily.   10/5/2018 at Unknown time   • oxyCODONE (ROXICODONE) 5 MG immediate release tablet Take 5 mg by mouth Every 6 (Six) Hours As Needed for Moderate Pain .   Past Week at Unknown time   • sildenafil (VIAGRA) 100 MG tablet Take 100 mg by mouth Daily As Needed for erectile dysfunction.   More than a month at Unknown time       Medicines:  Current Facility-Administered Medications   Medication Dose Route Frequency Provider Last Rate Last Dose   • acetaminophen (TYLENOL) tablet 650 mg  650 mg Oral Q4H PRN Max Jones MD       • amiodarone (PACERONE) tablet 200 mg  200 mg Oral Q24H Abdiel Stout MD   200 mg at 10/19/18 0913   • aspirin chewable tablet 81  mg  81 mg Oral Daily Vu Michael MD   81 mg at 10/19/18 0913   • atorvastatin (LIPITOR) tablet 80 mg  80 mg Oral Daily Max Jones MD   80 mg at 10/19/18 0913   • budesonide-formoterol (SYMBICORT) 160-4.5 MCG/ACT inhaler 2 puff  2 puff Inhalation BID - RT Max Jones MD   2 puff at 10/19/18 0750   • bumetanide (BUMEX) tablet 1 mg  1 mg Oral Daily Scott Soto MD   1 mg at 10/19/18 0913   • calcitriol (ROCALTROL) capsule 0.5 mcg  0.5 mcg Oral Daily Max Jones MD   0.5 mcg at 10/19/18 0913   • calcium carb-cholecalciferol 600-800 MG-UNIT tablet 1 tablet  1 tablet Oral Daily Max Jones MD   1 tablet at 10/19/18 0913   • dextrose (D50W) 25 g/ 50mL Intravenous Solution 25 g  25 g Intravenous Q15 Min PRN Max Jones MD       • dextrose (GLUTOSE) oral gel 15 g  15 g Oral Q15 Min PRN Max Jones MD       • Ferric Citrate tablet 210 mg  210 mg Oral TID With Meals Max Jones MD   210 mg at 10/19/18 0913   • glucagon (human recombinant) (GLUCAGEN DIAGNOSTIC) injection 1 mg  1 mg Subcutaneous PRN Max Jones MD       • heparin (porcine) 5000 UNIT/ML injection 5,000 Units  5,000 Units Subcutaneous Q12H Max Jones MD   5,000 Units at 10/19/18 0912   • heparin (porcine) injection 1,700 Units  1,700 Units Intracatheter PRN Samson Aragon MD   1,700 Units at 10/15/18 1758   • heparin (porcine) injection 1,700 Units  1,700 Units Intracatheter PRN Samson Aragon MD   1,700 Units at 10/15/18 1800   • HYDROcodone-acetaminophen (NORCO)  MG per tablet 1 tablet  1 tablet Oral Q6H PRN Max Jones MD   1 tablet at 10/19/18 0555   • hydroxypropyl methylcellulose (ISOPTO TEARS) 2.5 % ophthalmic solution 1 drop  1 drop Both Eyes TID PRN Vu Michael MD   1 drop at 10/11/18 0636   • Influenza Vac Subunit Quad (FLUCELVAX) injection 0.5 mL  0.5 mL  Intramuscular During Hospitalization Max Jones MD       • insulin lispro (humaLOG) injection 0-9 Units  0-9 Units Subcutaneous 4x Daily With Meals & Nightly Max Jones MD   2 Units at 10/19/18 0914   • insulin lispro (humaLOG) injection 6 Units  6 Units Subcutaneous TID AC Max Jones MD   6 Units at 10/19/18 0912   • ipratropium (ATROVENT) nebulizer solution 0.5 mg  0.5 mg Nebulization Q6H PRN Abdiel Stout MD   0.5 mg at 10/13/18 1619   • isosorbide dinitrate (ISORDIL) tablet 10 mg  10 mg Oral TID - Nitrates Abdiel Stout MD   10 mg at 10/19/18 0913   • lisinopril (PRINIVIL,ZESTRIL) tablet 2.5 mg  2.5 mg Oral Q24H Scott Soto MD   2.5 mg at 10/19/18 0913   • magnesium hydroxide (MILK OF MAGNESIA) suspension 2400 mg/10mL 10 mL  10 mL Oral Daily PRN Max Jones MD       • metoprolol tartrate (LOPRESSOR) tablet 25 mg  25 mg Oral Q12H Max Jones MD   25 mg at 10/19/18 0913   • nicotine (NICODERM CQ) 21 MG/24HR patch 1 patch  1 patch Transdermal Q24H Ashutosh Palafox MD   1 patch at 10/18/18 2020   • ondansetron (ZOFRAN) injection 4 mg  4 mg Intravenous Q6H PRN Chaya Duff APRN   4 mg at 10/19/18 0555   • sodium chloride 0.9 % flush 3 mL  3 mL Intravenous Q12H Max Jones MD   3 mL at 10/19/18 0914   • sodium chloride 0.9 % flush 3-10 mL  3-10 mL Intravenous PRN Max Jones MD   10 mL at 10/17/18 1941   • tetrahydrozoline 0.05 % ophthalmic solution 1 drop  1 drop Both Eyes 4x Daily PRN Ney De MD   1 drop at 10/10/18 0339   • vancomycin oral solution reconstituted 125 mg  125 mg Oral Q6H Oleg Madrid DO   125 mg at 10/19/18 0556       Past Medical History:  Past Medical History:   Diagnosis Date   • CHF (congestive heart failure) (CMS/HCC)    • Coronary stent occlusion    • Diabetes mellitus (CMS/HCC)    • Hyperlipidemia    • Hypertension    • Stroke (CMS/HCC)        Past Surgical  "History:  Past Surgical History:   Procedure Laterality Date   • BLADDER SURGERY     • ESOPHAGECTOMY     • INSERTION HEMODIALYSIS CATHETER N/A 10/11/2018    Procedure: HEMODIALYSIS CATHETER INSERTION;  Surgeon: Hugo Bonilla MD;  Location: [place] [address];  Service: Vascular       Family History  Family History   Problem Relation Age of Onset   • No Known Problems Father    • No Known Problems Mother        Social History  Social History     Social History   • Marital status:      Spouse name: N/A   • Number of children: N/A   • Years of education: N/A     Occupational History   • Not on file.     Social History Main Topics   • Smoking status: Current Every Day Smoker   • Smokeless tobacco: Never Used   • Alcohol use No   • Drug use: No   • Sexual activity: Not on file     Other Topics Concern   • Not on file     Social History Narrative   • No narrative on file         Review of Systems:  History obtained from chart review and the patient  General ROS: No fever or chills  Respiratory ROS: No cough, shortness of breath, wheezing  Cardiovascular ROS: No chest pain or palpitations  Gastrointestinal ROS: No abdominal pain or melena  Genito-Urinary ROS: No dysuria or hematuria  Neurological ROS: no headache or dizziness    Objective:  /58 (BP Location: Left arm, Patient Position: Lying)   Pulse 84   Temp 97.2 °F (36.2 °C) (Oral)   Resp 18   Ht 170.2 cm (67\")   Wt 45 kg (99 lb 5 oz)   SpO2 97%   BMI 15.55 kg/m²     Intake/Output Summary (Last 24 hours) at 10/19/18 0942  Last data filed at 10/18/18 2328   Gross per 24 hour   Intake              360 ml   Output              220 ml   Net              140 ml     General: awake/alert    Neck: supple, no JVD  Chest:  clear to auscultation bilaterally without respiratory distress  CVS: regular rate and rhythm  Abdominal: soft, nontender, normal bowel sounds  Extremities: no cyanosis or edema  Skin: warm and dry without rash  Neuro: no focal " motor deficits    Labs:            Results from last 7 days  Lab Units 10/19/18  0442 10/18/18  0449 10/17/18  0556   SODIUM mmol/L 137 139 140   POTASSIUM mmol/L 5.0 4.8 5.0   CHLORIDE mmol/L 102 104 103   CO2 mmol/L 28.0 27.0 30.0   BUN mg/dL 31* 23* 30*   CREATININE mg/dL 3.90* 2.72* 2.99*   CALCIUM mg/dL 9.2 9.2 9.3   GLUCOSE mg/dL 150* 123* 138*       Radiology:   Imaging Results (last 72 hours)     Procedure Component Value Units Date/Time     Thoracentesis [[id number]] Collected:  10/07/18 1323    Specimen:  Body Fluid Updated:  10/13/18 0934    Narrative:       DATE OF PROCEDURE:  10/7/2018.     PREPROCEDURE DIAGNOSIS:  Left pleural effusion.  POSTPROCEDURE DIAGNOSIS:  Left pleural effusion.          INDICATIONS FOR PROCEDURE: Shortness of breath.     PROCEDURE:   1. Thoracentesis, diagnostic and therapeutic.  2. Ultrasound guidance for thoracentesis, imaging supervision and  interpretation.     ANESTHESIA: 1% lidocaine, injected locally.          COMPLICATIONS: None.        EXAMINATIONS FOR COMPARISON:  CT chest dated 10/6/2018.     DESCRIPTION OF PROCEDURE:   The risk and benefits of the procedure were explained to the patient.   Specifically, the risks of bleeding, infection, pneumothorax (possible  thoracostomy tube), and damage to surrounding structures were discussed  extensively. The patient's questions were answered. The patient opted to  proceed. Written and verbal consent were obtained from the patient.     TIME OUT was taken and the patient's name, medical record number, date  of birth, procedure, entry site, and listen allergies were confirmed.  The site of the procedure was confirmed and marked.      The leftposterior thorax was prepped and draped in sterile fashion. The  area was anesthetized with buffered lidocaine 2%.      A 5 German 10 cm  catheter was inserted into the pleural effusion  using  ultrasound guidance. Approximately 400 mL of clear fluid was recovered  and sent to the pathology  lab for analysis.      The patient tolerated the procedure well.   No immediate complications  were noted.       Impression:       Successful ultrasound guided leftthoracentesis. Approximately 400 mL of  clear fluid was recovered and sent to the pathology lab for analysis.   No immediate complications were noted.              This report was finalized on 10/07/2018 13:26 by Dr. Petra Mckinley MD.     Chest [[id number]] Collected:  10/07/18 1323     Updated:  10/13/18 0934    Narrative:       DATE OF PROCEDURE:  10/7/2018.     PREPROCEDURE DIAGNOSIS:  Left pleural effusion.  POSTPROCEDURE DIAGNOSIS:  Left pleural effusion.          INDICATIONS FOR PROCEDURE: Shortness of breath.     PROCEDURE:   1. Thoracentesis, diagnostic and therapeutic.  2. Ultrasound guidance for thoracentesis, imaging supervision and  interpretation.     ANESTHESIA: 1% lidocaine, injected locally.          COMPLICATIONS: None.        EXAMINATIONS FOR COMPARISON:  CT chest dated 10/6/2018.     DESCRIPTION OF PROCEDURE:   The risk and benefits of the procedure were explained to the patient.   Specifically, the risks of bleeding, infection, pneumothorax (possible  thoracostomy tube), and damage to surrounding structures were discussed  extensively. The patient's questions were answered. The patient opted to  proceed. Written and verbal consent were obtained from the patient.     TIME OUT was taken and the patient's name, medical record number, date  of birth, procedure, entry site, and listen allergies were confirmed.  The site of the procedure was confirmed and marked.      The leftposterior thorax was prepped and draped in sterile fashion. The  area was anesthetized with buffered lidocaine 2%.      A 5 [place] 10 cm  catheter was inserted into the pleural effusion  using  ultrasound guidance. Approximately 400 mL of clear fluid was recovered  and sent to the pathology lab for analysis.      The patient tolerated the procedure well.   No  immediate complications  were noted.       Impression:       Successful ultrasound guided leftthoracentesis. Approximately 400 mL of  clear fluid was recovered and sent to the pathology lab for analysis.   No immediate complications were noted.              This report was finalized on 10/07/2018 13:26 by Dr. Petra Mckinley MD.          Culture:         Assessment   1.  CKD stage 4--now with progression to ESRD  2.  Type 2 diabetes with nephropathy  3.  Bilateral pneumonia  4.  Chronic diastolic congestive heart failure  5.  S/p ventricular tachycardia on 10/10  6.  Metabolic acidosis  7.  Secondary hyperparathyroidism    Plan:  1.  Dialysis today  2.  Monitor labs  3.  Continuing to work on SNF and outpatient dialysis placement      Roberto Carlos Myrick, MACO  10/19/2018  9:42 AM

## 2018-10-19 NOTE — PLAN OF CARE
Problem: Pneumonia (Adult)  Goal: Signs and Symptoms of Listed Potential Problems Will be Absent, Minimized or Managed (Pneumonia)  Outcome: Ongoing (interventions implemented as appropriate)      Problem: Fall Risk (Adult)  Goal: Absence of Fall  Outcome: Ongoing (interventions implemented as appropriate)      Problem: Skin Injury Risk (Adult)  Goal: Skin Health and Integrity  Outcome: Ongoing (interventions implemented as appropriate)      Problem: Nutrition, Imbalanced: Inadequate Oral Intake (Adult)  Goal: Improved Oral Intake  Outcome: Ongoing (interventions implemented as appropriate)      Problem: Patient Care Overview  Goal: Plan of Care Review  Outcome: Ongoing (interventions implemented as appropriate)   10/19/18 8137   Plan of Care Review   Progress no change   OTHER   Outcome Summary Pt c/o pain throughout shift and has mild relief with prn pain meds. Zofran given for nausea. Turn Q2H; pt likes to lay on right side. Mepilex on coccyx intact. Contact precautions maintained. VSS and SR 79-87 on tele. Dialysis today and possible discharge today. Will continue to monitor.    Coping/Psychosocial   Plan of Care Reviewed With patient     Goal: Individualization and Mutuality  Outcome: Ongoing (interventions implemented as appropriate)    Goal: Discharge Needs Assessment  Outcome: Ongoing (interventions implemented as appropriate)      Problem: Hemodialysis (Adult)  Goal: Signs and Symptoms of Listed Potential Problems Will be Absent, Minimized or Managed (Hemodialysis)  Outcome: Ongoing (interventions implemented as appropriate)

## 2018-10-19 NOTE — PROGRESS NOTES
"    [facility]  INPATIENT PROGRESS NOTE    Patient Name: Gavin Wilson  Date of Admission: 10/6/2018  Today's Date: 10/19/18  Length of Stay: 13  Primary Care Physician: Provider, No Known    Subjective   Chief Complaint: f/u  HPI   Less amount of stool according to him but \"i really don't know\"        Review of Systems     All pertinent negatives and positives are as above. All other systems have been reviewed and are negative unless otherwise stated.     Objective    Temp:  [96.3 °F (35.7 °C)-99 °F (37.2 °C)] 96.3 °F (35.7 °C)  Heart Rate:  [72-88] 76  Resp:  [18-20] 18  BP: ()/(52-62) 90/58  Physical Exam  He is awaiting to be moved to medical floor from HD  Constitutional: He is oriented to person, place   No distress.stable .  Appears improved today   HENT:   Head: Atraumatic.   Temporal muscle wasting; prominent wasting around the neck and supraclavicular    Eyes: Conjunctivae are normal. No scleral icterus.   Cardiovascular: Normal rate, regular rhythm and intact distal pulses.    Murmur heard. Right upper chest permcath placed  Pulmonary/Chest: Effort normal. No respiratory distress. He has no wheezing. He has no rales.  Improved air movement   Abdominal: Soft. Bowel sounds are normal. He exhibits no distension. There is no tenderness.   RLQ ostomy  Neurological: He is alert and oriented to person, place, and time.  Skin: Skin is warm and dry. He is not diaphoretic.   Psychiatric: He has a normal mood and affect. His behavior is normal       Results Review:  I have reviewed the labs, radiology results, and diagnostic studies.    Laboratory Data:            Results from last 7 days  Lab Units 10/19/18  0442 10/18/18  0449 10/17/18  0556   SODIUM mmol/L 137 139 140   POTASSIUM mmol/L 5.0 4.8 5.0   CHLORIDE mmol/L 102 104 103   CO2 mmol/L 28.0 27.0 30.0   BUN mg/dL 31* 23* 30*   CREATININE mg/dL 3.90* 2.72* 2.99*   CALCIUM mg/dL 9.2 9.2 9.3   GLUCOSE " mg/dL 150* 123* 138*       Culture Data:        Radiology Data:   Imaging Results (last 24 hours)     ** No results found for the last 24 hours. **          I have reviewed the patient's current medications.     Assessment/Plan     Active Hospital Problems    Diagnosis   • **Acute on chronic respiratory failure with hypoxia (CMS/HCC)   • Coronary artery disease involving native coronary artery of native heart without angina pectoris   • History of coronary artery stent placement   • Mild aortic valve stenosis   • Moderate aortic valve insufficiency   • Mild mitral valve regurgitation   • Pneumonia   • COPD (chronic obstructive pulmonary disease) (CMS/[facility])   • Sepsis (CMS/[facility])   • Acute renal failure superimposed on stage 4 chronic kidney disease (CMS/[facility])   • Acute on chronic combined systolic and diastolic congestive heart failure (CMS/[facility])   • Pleural effusion, bilateral          1) Acute hypoxic respiratory failure, resolved  2) Sepsis due to suspected health care associated pneumonia - blood culture and pleural fluid culture showed no growth to date  3) Bilateral pleural effusions - Right is recurrent, left is worsening -status this post thoracentesis on October 7 Suspected due to heart failure, but cannot rule out infection or malignancy   4) Severe protein-calorie malnutrition  5) COPD without exacerbation  6) Combined systolic and diastolic heart failure, chronic (EF <40%)  7) Acute kidney injury on chronic kidney disease stage 5 ESRD -awaiting approval   8) Chronic normocytic anemia due to CKD  9) Thrombocytopenia, improved  10) Mild hypokalemia, resolved  11) Hyperphosphotemia  12) Metabolic acidosis, anion gap due to uremia  13) Constipation, resolved; now has diarrhea and tested + for c. Diff - cont po vanc  14)Hypoglycemia (50) - resolved; hx of Diabetes -  Patient encourage to eat.  Will continue to monitor; prn d50; will consider /dc pre meal insulin and maintain only on ssi (accu  197,204)        Discussed with head nurse and sw.  snf and hd chair time is accomplished although SNF from I've been informed will not take until middle of this week.  We will need to check and record how often he gets diarrhea and quantitate as he has no clue.   cpt        Discharge Planning:when cleared for snf  Max Jones MD   10/19/18   5:32 PM

## 2018-10-19 NOTE — PLAN OF CARE
Problem: Patient Care Overview  Goal: Plan of Care Review  Outcome: Ongoing (interventions implemented as appropriate)   10/19/18 1132   Plan of Care Review   Progress no change   OTHER   Outcome Summary OT re-eval completed. Pt demos increased weakness from eval. In static sitting pt requires CGA and BUE support to maintain balance. In dynamic sitting to don B socks, pt falls to bed x1, modA to maintain balance required for other sock. Pt requires seated rest following LB dressing task. Pt getting shower with [place] following re-eval. Pt will need continued therapy at d/c and assistance as he is weaker w decreased postural control from eval. Continue OT POC.   Coping/Psychosocial   Plan of Care Reviewed With patient

## 2018-10-19 NOTE — PROGRESS NOTES
Continued Stay Note  RUTH Ceron     Patient Name: Gavin Wilson  MRN: [id number]  Today's Date: 10/19/2018    Admit Date: 10/6/2018          Discharge Plan     Row Name 10/19/18 1416       Plan    Plan [facility]; SKILLED LEVEL    Patient/Family in Agreement with Plan yes    Plan Comments PT[facility] [place] [facility], HOWEVER DUE TO PT'S CDIFF, [facility] CAN NOT TAKE PT. TODAY AS THEY DO NOT HAVE AN ISOLATION ROOM.  REC'D CALL FROM CHARBEL AT [facility] ADVISING THAT THEY ANTICIPATE PT. TO BE ABLE TO D/C TO [place] WED THE 24TH.  WILL FOLLOW TO COORDINATE.               Discharge Codes    No documentation.           LINDSEY Larose

## 2018-10-20 LAB
ANION GAP SERPL CALCULATED.3IONS-SCNC: 7 MMOL/L (ref 4–13)
BUN BLD-MCNC: 19 MG/DL (ref 5–21)
BUN/CREAT SERPL: 8.6 (ref 7–25)
CALCIUM SPEC-SCNC: 8.7 MG/DL (ref 8.4–10.4)
CHLORIDE SERPL-SCNC: 104 MMOL/L (ref 98–110)
CO2 SERPL-SCNC: 27 MMOL/L (ref 24–31)
CREAT BLD-MCNC: 2.21 MG/DL (ref 0.5–1.4)
GFR SERPL CREATININE-BSD FRML MDRD: 29 ML/MIN/1.73
GLUCOSE BLD-MCNC: 104 MG/DL (ref 70–100)
GLUCOSE BLDC GLUCOMTR-MCNC: 142 MG/DL (ref 70–130)
GLUCOSE BLDC GLUCOMTR-MCNC: 164 MG/DL (ref 70–130)
GLUCOSE BLDC GLUCOMTR-MCNC: 301 MG/DL (ref 70–130)
GLUCOSE BLDC GLUCOMTR-MCNC: 81 MG/DL (ref 70–130)
POTASSIUM BLD-SCNC: 4.7 MMOL/L (ref 3.5–5.3)
SODIUM BLD-SCNC: 138 MMOL/L (ref 135–145)

## 2018-10-20 PROCEDURE — 94760 N-INVAS EAR/PLS OXIMETRY 1: CPT

## 2018-10-20 PROCEDURE — 82962 GLUCOSE BLOOD TEST: CPT

## 2018-10-20 PROCEDURE — 94799 UNLISTED PULMONARY SVC/PX: CPT

## 2018-10-20 PROCEDURE — 25010000002 ONDANSETRON PER 1 MG: Performed by: NURSE PRACTITIONER

## 2018-10-20 PROCEDURE — 25010000002 HEPARIN (PORCINE) PER 1000 UNITS: Performed by: INTERNAL MEDICINE

## 2018-10-20 PROCEDURE — 63710000001 INSULIN LISPRO (HUMAN) PER 5 UNITS: Performed by: INTERNAL MEDICINE

## 2018-10-20 PROCEDURE — 80048 BASIC METABOLIC PNL TOTAL CA: CPT | Performed by: NURSE PRACTITIONER

## 2018-10-20 RX ADMIN — ISOSORBIDE DINITRATE 10 MG: 10 TABLET ORAL at 17:04

## 2018-10-20 RX ADMIN — ISOSORBIDE DINITRATE 10 MG: 10 TABLET ORAL at 14:03

## 2018-10-20 RX ADMIN — METOPROLOL TARTRATE 25 MG: 25 TABLET, FILM COATED ORAL at 20:24

## 2018-10-20 RX ADMIN — AMIODARONE HYDROCHLORIDE 200 MG: 200 TABLET ORAL at 09:03

## 2018-10-20 RX ADMIN — BUMETANIDE 1 MG: 1 TABLET ORAL at 09:03

## 2018-10-20 RX ADMIN — VANCOMYCIN HYDROCHLORIDE 125 MG: KIT at 22:01

## 2018-10-20 RX ADMIN — ISOSORBIDE DINITRATE 10 MG: 10 TABLET ORAL at 09:02

## 2018-10-20 RX ADMIN — BUDESONIDE AND FORMOTEROL FUMARATE DIHYDRATE 2 PUFF: 160; 4.5 AEROSOL RESPIRATORY (INHALATION) at 20:09

## 2018-10-20 RX ADMIN — Medication 3 ML: at 08:14

## 2018-10-20 RX ADMIN — METOPROLOL TARTRATE 25 MG: 25 TABLET, FILM COATED ORAL at 09:02

## 2018-10-20 RX ADMIN — INSULIN LISPRO 6 UNITS: 100 INJECTION, SOLUTION INTRAVENOUS; SUBCUTANEOUS at 17:04

## 2018-10-20 RX ADMIN — LISINOPRIL 2.5 MG: 2.5 TABLET ORAL at 09:03

## 2018-10-20 RX ADMIN — HEPARIN SODIUM 5000 UNITS: 5000 INJECTION, SOLUTION INTRAVENOUS; SUBCUTANEOUS at 09:02

## 2018-10-20 RX ADMIN — HYDROCODONE BITARTRATE AND ACETAMINOPHEN 1 TABLET: 10; 325 TABLET ORAL at 22:01

## 2018-10-20 RX ADMIN — VANCOMYCIN HYDROCHLORIDE 125 MG: KIT at 05:47

## 2018-10-20 RX ADMIN — ONDANSETRON HYDROCHLORIDE 4 MG: 2 INJECTION INTRAMUSCULAR; INTRAVENOUS at 22:01

## 2018-10-20 RX ADMIN — Medication 1 TABLET: at 09:02

## 2018-10-20 RX ADMIN — IPRATROPIUM BROMIDE 0.5 MG: 0.5 SOLUTION RESPIRATORY (INHALATION) at 20:10

## 2018-10-20 RX ADMIN — VANCOMYCIN HYDROCHLORIDE 125 MG: KIT at 12:12

## 2018-10-20 RX ADMIN — VANCOMYCIN HYDROCHLORIDE 125 MG: KIT at 17:07

## 2018-10-20 RX ADMIN — ASPIRIN 81 MG CHEWABLE TABLET 81 MG: 81 TABLET CHEWABLE at 09:02

## 2018-10-20 RX ADMIN — INSULIN LISPRO 7 UNITS: 100 INJECTION, SOLUTION INTRAVENOUS; SUBCUTANEOUS at 17:05

## 2018-10-20 RX ADMIN — Medication 10 ML: at 16:14

## 2018-10-20 RX ADMIN — HEPARIN SODIUM 5000 UNITS: 5000 INJECTION, SOLUTION INTRAVENOUS; SUBCUTANEOUS at 20:24

## 2018-10-20 RX ADMIN — FERRIC CITRATE 210 MG: 210 TABLET, COATED ORAL at 09:02

## 2018-10-20 RX ADMIN — Medication 3 ML: at 20:24

## 2018-10-20 RX ADMIN — BUDESONIDE AND FORMOTEROL FUMARATE DIHYDRATE 2 PUFF: 160; 4.5 AEROSOL RESPIRATORY (INHALATION) at 07:26

## 2018-10-20 RX ADMIN — HYDROCODONE BITARTRATE AND ACETAMINOPHEN 1 TABLET: 10; 325 TABLET ORAL at 14:03

## 2018-10-20 RX ADMIN — FERRIC CITRATE 210 MG: 210 TABLET, COATED ORAL at 17:04

## 2018-10-20 RX ADMIN — FERRIC CITRATE 210 MG: 210 TABLET, COATED ORAL at 12:12

## 2018-10-20 RX ADMIN — ONDANSETRON HYDROCHLORIDE 4 MG: 2 INJECTION INTRAMUSCULAR; INTRAVENOUS at 08:13

## 2018-10-20 RX ADMIN — ATORVASTATIN CALCIUM 80 MG: 40 TABLET, FILM COATED ORAL at 09:03

## 2018-10-20 RX ADMIN — NICOTINE 1 PATCH: 21 PATCH, EXTENDED RELEASE TRANSDERMAL at 20:24

## 2018-10-20 RX ADMIN — ONDANSETRON HYDROCHLORIDE 4 MG: 2 INJECTION INTRAMUSCULAR; INTRAVENOUS at 16:12

## 2018-10-20 RX ADMIN — CALCITRIOL CAPSULES 0.25 MCG 0.5 MCG: 0.25 CAPSULE ORAL at 09:03

## 2018-10-20 NOTE — PROGRESS NOTES
Nephrology ([facility]) Progress Note      Patient:  Gavin Wilson  YOB: 1945  Date of Service: 10/20/2018  MRN: [id number]   Acct: [id number]   Primary Care Physician: Provider, No Known  Advance Directive:   Code Status and Medical Interventions:   Ordered at: 10/07/18 1309     Limited Support to NOT Include:    Intubation     Level Of Support Discussed With:    Patient     Code Status:    No CPR     Medical Interventions (Level of Support Prior to Arrest):    Limited     Admit Date: 10/6/2018       Hospital Day: 14  Referring Provider: Max Jones*    Subjective:  Gavin Wilson is a 73 y.o. male  whom we were consulted for CKD stage 4.  History of bladder cancer with prior cystectomy and ureterostomy formation.  Also history of esophogeal and prostate cancer.  Follows with nephrology at [facility]. Has left upper arm fistula in place.  Presented with dyspnea; admitted with bilateral pneumonia/pleural effusion.  Hospital course remarkable for attempt to use fistula on 10/10; infiltrated and was unable to dialyze.  On 10/11 had Permcath placed and first dialysis treatment; he had a run of V tach during first HD.  On 10/15 became hypotensive during dialysis.  He is s/p dialysis yesterday. Today, he remained weak, was nauseated and having diffuse body aches.     Allergies:  Sulfa antibiotics    Home Meds:  Prescriptions Prior to Admission   Medication Sig Dispense Refill Last Dose   • acetaminophen (TYLENOL) 325 MG tablet Take 650 mg by mouth Every 6 (Six) Hours As Needed for Mild Pain .   Past Month at Unknown time   • albuterol (PROVENTIL HFA;VENTOLIN HFA) 108 (90 Base) MCG/ACT inhaler Inhale 2 puffs Every 6 (Six) Hours As Needed for Wheezing.   Past Week at Unknown time   • aspirin 81 MG chewable tablet Chew 81 mg Daily.   10/5/2018 at Unknown time   • atorvastatin (LIPITOR) 80 MG tablet Take 40 mg by mouth Daily.   10/5/2018 at Unknown time   •  budesonide-formoterol (SYMBICORT) 160-4.5 MCG/ACT inhaler Inhale 2 puffs 2 (Two) Times a Day. 1 inhaler 2 10/5/2018 at Unknown time   • bumetanide (BUMEX) 2 MG tablet Take 1 tablet by mouth Daily. 30 tablet 0 10/5/2018 at Unknown time   • calcium carbonate (OS-TASHI) 600 MG tablet Take 600 mg by mouth Daily.   10/5/2018 at Unknown time   • cholecalciferol (VITAMIN D3) 1000 units tablet Take 3,000 Units by mouth Daily.   10/5/2018 at Unknown time   • insulin aspart (novoLOG FLEXPEN) 100 UNIT/ML solution pen-injector sc pen Inject 6 Units under the skin into the appropriate area as directed 3 (Three) Times a Day With Meals.   10/5/2018 at Unknown time   • insulin detemir (LEVEMIR) 100 UNIT/ML injection Inject 6 Units under the skin into the appropriate area as directed 2 (Two) Times a Day.   10/5/2018 at Unknown time   • loperamide (IMODIUM) 2 MG capsule Take 2 mg by mouth Daily As Needed for Diarrhea.   Past Month at Unknown time   • metoprolol tartrate (LOPRESSOR) 25 MG tablet Take 12.5 mg by mouth 2 (Two) Times a Day.   10/5/2018 at Unknown time   • omeprazole (priLOSEC) 20 MG capsule Take 20 mg by mouth Daily.   10/5/2018 at Unknown time   • oxyCODONE (ROXICODONE) 5 MG immediate release tablet Take 5 mg by mouth Every 6 (Six) Hours As Needed for Moderate Pain .   Past Week at Unknown time   • sildenafil (VIAGRA) 100 MG tablet Take 100 mg by mouth Daily As Needed for erectile dysfunction.   More than a month at Unknown time       Medicines:  Current Facility-Administered Medications   Medication Dose Route Frequency Provider Last Rate Last Dose   • acetaminophen (TYLENOL) tablet 650 mg  650 mg Oral Q4H PRN Max Jones MD       • amiodarone (PACERONE) tablet 200 mg  200 mg Oral Q24H Abdiel Stout MD   200 mg at 10/20/18 0903   • aspirin chewable tablet 81 mg  81 mg Oral Daily Vu Michael MD   81 mg at 10/20/18 0902   • atorvastatin (LIPITOR) tablet 80 mg  80 mg Oral Daily Max Jones  MD Trevon   80 mg at 10/20/18 0903   • budesonide-formoterol (SYMBICORT) 160-4.5 MCG/ACT inhaler 2 puff  2 puff Inhalation BID - RT Max Jones MD   2 puff at 10/20/18 0726   • bumetanide (BUMEX) tablet 1 mg  1 mg Oral Daily Scott Soto MD   1 mg at 10/20/18 0903   • calcitriol (ROCALTROL) capsule 0.5 mcg  0.5 mcg Oral Daily Max Jones MD   0.5 mcg at 10/20/18 0903   • calcium carb-cholecalciferol 600-800 MG-UNIT tablet 1 tablet  1 tablet Oral Daily Max Jones MD   1 tablet at 10/20/18 0902   • dextrose (D50W) 25 g/ 50mL Intravenous Solution 25 g  25 g Intravenous Q15 Min PRN Max Jones MD       • dextrose (GLUTOSE) oral gel 15 g  15 g Oral Q15 Min PRN Max Jones MD       • Ferric Citrate tablet 210 mg  210 mg Oral TID With Meals Max Jones MD   210 mg at 10/20/18 0902   • glucagon (human recombinant) (GLUCAGEN DIAGNOSTIC) injection 1 mg  1 mg Subcutaneous PRN Max Jones MD       • heparin (porcine) 5000 UNIT/ML injection 5,000 Units  5,000 Units Subcutaneous Q12H Max Jones MD   5,000 Units at 10/20/18 0902   • heparin (porcine) injection 1,700 Units  1,700 Units Intracatheter PRN Samson Aragon MD   1,700 Units at 10/15/18 1758   • heparin (porcine) injection 1,700 Units  1,700 Units Intracatheter PRN Samson Aragon MD   1,700 Units at 10/15/18 1800   • HYDROcodone-acetaminophen (NORCO)  MG per tablet 1 tablet  1 tablet Oral Q6H PRN Max Jones MD   1 tablet at 10/19/18 2143   • hydroxypropyl methylcellulose (ISOPTO TEARS) 2.5 % ophthalmic solution 1 drop  1 drop Both Eyes TID PRN Vu Michael MD   1 drop at 10/11/18 0636   • Influenza Vac Subunit Quad (FLUCELVAX) injection 0.5 mL  0.5 mL Intramuscular During Hospitalization Max Jones MD       • insulin lispro (humaLOG) injection 0-9 Units  0-9 Units Subcutaneous 4x  Daily With Meals & Nightly Max Jones MD   6 Units at 10/19/18 2149   • insulin lispro (humaLOG) injection 6 Units  6 Units Subcutaneous TID AC Max Jones MD   6 Units at 10/19/18 1841   • ipratropium (ATROVENT) nebulizer solution 0.5 mg  0.5 mg Nebulization Q6H PRN Abdiel Stout MD   0.5 mg at 10/13/18 1619   • isosorbide dinitrate (ISORDIL) tablet 10 mg  10 mg Oral TID - Nitrates Abdiel Stout MD   10 mg at 10/20/18 0902   • lisinopril (PRINIVIL,ZESTRIL) tablet 2.5 mg  2.5 mg Oral Q24H Scott Soto MD   2.5 mg at 10/20/18 0903   • magnesium hydroxide (MILK OF MAGNESIA) suspension 2400 mg/10mL 10 mL  10 mL Oral Daily PRN Max Jones MD       • metoprolol tartrate (LOPRESSOR) tablet 25 mg  25 mg Oral Q12H Max Jones MD   25 mg at 10/20/18 0902   • nicotine (NICODERM CQ) 21 MG/24HR patch 1 patch  1 patch Transdermal Q24H Ashutosh Palafox MD   1 patch at 10/19/18 2137   • ondansetron (ZOFRAN) injection 4 mg  4 mg Intravenous Q6H PRN Chaya Duff APRN   4 mg at 10/20/18 0813   • sodium chloride 0.9 % flush 3 mL  3 mL Intravenous Q12H Max Jones MD   3 mL at 10/20/18 0814   • sodium chloride 0.9 % flush 3-10 mL  3-10 mL Intravenous PRN Max Jones MD   10 mL at 10/17/18 1941   • tetrahydrozoline 0.05 % ophthalmic solution 1 drop  1 drop Both Eyes 4x Daily PRN Ney De MD   1 drop at 10/10/18 0339   • vancomycin oral solution reconstituted 125 mg  125 mg Oral Q6H Oleg Madrid DO   125 mg at 10/20/18 0547       Past Medical History:  Past Medical History:   Diagnosis Date   • CHF (congestive heart failure) (CMS/HCC)    • Coronary stent occlusion    • Diabetes mellitus (CMS/HCC)    • Hyperlipidemia    • Hypertension    • Stroke (CMS/HCC)        Past Surgical History:  Past Surgical History:   Procedure Laterality Date   • BLADDER SURGERY     • ESOPHAGECTOMY     • INSERTION HEMODIALYSIS CATHETER N/A  "10/11/2018    Procedure: HEMODIALYSIS CATHETER INSERTION;  Surgeon: Hugo Bonilla MD;  Location:  [facility];  Service: Vascular       Family History  Family History   Problem Relation Age of Onset   • No Known Problems Father    • No Known Problems Mother        Social History  Social History     Social History   • Marital status:      Spouse name: N/A   • Number of children: N/A   • Years of education: N/A     Occupational History   • Not on file.     Social History Main Topics   • Smoking status: Current Every Day Smoker   • Smokeless tobacco: Never Used   • Alcohol use No   • Drug use: No   • Sexual activity: Not on file     Other Topics Concern   • Not on file     Social History Narrative   • No narrative on file         Review of Systems:  History obtained from chart review and the patient  General ROS: No fever or chills  Respiratory ROS: No cough,+ shortness of breath, -wheezing  Cardiovascular ROS: No chest pain or palpitations  Gastrointestinal ROS: No abdominal pain or melena  Genito-Urinary ROS: No dysuria or hematuria  Neurological ROS: no headache or dizziness    Objective:  /50 (BP Location: Right arm, Patient Position: Lying)   Pulse 85   Temp 97.9 °F (36.6 °C) (Oral)   Resp 16   Ht 170.2 cm (67\")   Wt 45 kg (99 lb 5 oz)   SpO2 93%   BMI 15.55 kg/m²     Intake/Output Summary (Last 24 hours) at 10/20/18 1119  Last data filed at 10/20/18 0500   Gross per 24 hour   Intake                0 ml   Output              200 ml   Net             -200 ml     General: awake/alert    Neck: supple, no JVD  Chest:  clear to auscultation bilaterally without respiratory distress  CVS: regular rate and rhythm  Abdominal: soft, nontender, normal bowel sounds  Extremities: no cyanosis or edema  Skin: warm and dry without rash  Neuro: no focal motor deficits    Labs:            Results from last 7 days  Lab Units 10/20/18  0413 10/19/18  0442 10/18/18  0449   SODIUM mmol/L 138 137 139 "   POTASSIUM mmol/L 4.7 5.0 4.8   CHLORIDE mmol/L 104 102 104   CO2 mmol/L 27.0 28.0 27.0   BUN mg/dL 19 31* 23*   CREATININE mg/dL 2.21* 3.90* 2.72*   CALCIUM mg/dL 8.7 9.2 9.2   GLUCOSE mg/dL 104* 150* 123*       Radiology:   Imaging Results (last 72 hours)     Procedure Component Value Units Date/Time     Thoracentesis [[id number]] Collected:  10/07/18 1323    Specimen:  Body Fluid Updated:  10/13/18 0934    Narrative:       DATE OF PROCEDURE:  10/7/2018.     PREPROCEDURE DIAGNOSIS:  Left pleural effusion.  POSTPROCEDURE DIAGNOSIS:  Left pleural effusion.          INDICATIONS FOR PROCEDURE: Shortness of breath.     PROCEDURE:   1. Thoracentesis, diagnostic and therapeutic.  2. Ultrasound guidance for thoracentesis, imaging supervision and  interpretation.     ANESTHESIA: 1% lidocaine, injected locally.          COMPLICATIONS: None.        EXAMINATIONS FOR COMPARISON:  CT chest dated 10/6/2018.     DESCRIPTION OF PROCEDURE:   The risk and benefits of the procedure were explained to the patient.   Specifically, the risks of bleeding, infection, pneumothorax (possible  thoracostomy tube), and damage to surrounding structures were discussed  extensively. The patient's questions were answered. The patient opted to  proceed. Written and verbal consent were obtained from the patient.     TIME OUT was taken and the patient's name, medical record number, date  of birth, procedure, entry site, and listen allergies were confirmed.  The site of the procedure was confirmed and marked.      The leftposterior thorax was prepped and draped in sterile fashion. The  area was anesthetized with buffered lidocaine 2%.      A 5 Korean 10 cm  catheter was inserted into the pleural effusion  using  ultrasound guidance. Approximately 400 mL of clear fluid was recovered  and sent to the pathology lab for analysis.      The patient tolerated the procedure well.   No immediate complications  were noted.       Impression:       Successful  ultrasound guided leftthoracentesis. Approximately 400 mL of  clear fluid was recovered and sent to the pathology lab for analysis.   No immediate complications were noted.              This report was finalized on 10/07/2018 13:26 by Dr. Petra Mckinley MD.     Chest [[id number]] Collected:  10/07/18 1323     Updated:  10/13/18 0934    Narrative:       DATE OF PROCEDURE:  10/7/2018.     PREPROCEDURE DIAGNOSIS:  Left pleural effusion.  POSTPROCEDURE DIAGNOSIS:  Left pleural effusion.          INDICATIONS FOR PROCEDURE: Shortness of breath.     PROCEDURE:   1. Thoracentesis, diagnostic and therapeutic.  2. Ultrasound guidance for thoracentesis, imaging supervision and  interpretation.     ANESTHESIA: 1% lidocaine, injected locally.          COMPLICATIONS: None.        EXAMINATIONS FOR COMPARISON:  CT chest dated 10/6/2018.     DESCRIPTION OF PROCEDURE:   The risk and benefits of the procedure were explained to the patient.   Specifically, the risks of bleeding, infection, pneumothorax (possible  thoracostomy tube), and damage to surrounding structures were discussed  extensively. The patient's questions were answered. The patient opted to  proceed. Written and verbal consent were obtained from the patient.     TIME OUT was taken and the patient's name, medical record number, date  of birth, procedure, entry site, and listen allergies were confirmed.  The site of the procedure was confirmed and marked.      The leftposterior thorax was prepped and draped in sterile fashion. The  area was anesthetized with buffered lidocaine 2%.      A 5 Romansh 10 cm  catheter was inserted into the pleural effusion  using  ultrasound guidance. Approximately 400 mL of clear fluid was recovered  and sent to the pathology lab for analysis.      The patient tolerated the procedure well.   No immediate complications  were noted.       Impression:       Successful ultrasound guided leftthoracentesis. Approximately 400 mL of  clear fluid was  recovered and sent to the pathology lab for analysis.   No immediate complications were noted.              This report was finalized on 10/07/2018 13:26 by Dr. Petra Mckinley MD.          Culture:         Assessment   1.  CKD stage 4--now with progression to ESRD  2.  Type 2 diabetes with nephropathy  3.  Bilateral pneumonia  4.  Chronic diastolic congestive heart failure  5.  S/p ventricular tachycardia on 10/10  6.  Metabolic acidosis  7.  Secondary hyperparathyroidism  8.  Nausea  9.  Anemia     Plan:  1.  Dialysis next due 10/22  2.  Monitor labs  3.  Work continues on SNF and outpatient dialysis placement      Scott Soto MD  10/20/2018  11:19 AM

## 2018-10-20 NOTE — PROGRESS NOTES
"    [facility] Medicine Services  INPATIENT PROGRESS NOTE    Patient Name: Gavin Wilson  Date of Admission: 10/6/2018  Today's Date: 10/20/18  Length of Stay: 14  Primary Care Physician: Provider, No Known    Subjective   Chief Complaint:  Follow up  HPI   Doing better  Nurse at bedside said he has not had BM since her shift this morning  \"I would like to say better but I will be lying.\"    He claimed he ah snot deteriorated though    Review of Systems     All pertinent negatives and positives are as above. All other systems have been reviewed and are negative unless otherwise stated.     Objective    Temp:  [97.3 °F (36.3 °C)-98.9 °F (37.2 °C)] 98 °F (36.7 °C)  Heart Rate:  [81-92] 86  Resp:  [16-20] 16  BP: ()/(50-60) 100/52  Physical Exam  Pleasant, no distress.  Constitutional: He is oriented to person, place   No distress.stable .  Appears improved today   HENT:   Head: Atraumatic.   Temporal muscle wasting; prominent wasting around the neck and supraclavicular    Eyes: Conjunctivae are normal. No scleral icterus.   Cardiovascular: Normal rate, regular rhythm and intact distal pulses.    Murmur heard. Right upper chest permacath placed  Pulmonary/Chest: Effort normal. No respiratory distress. He has no wheezing. He has no rales.  Improved air movement   Abdominal: Soft. Bowel sounds are normal. He exhibits no distension. There is no tenderness.   RLQ ostomy pink in color. Empty bag  Neurological: He is alert and oriented to person, place, and time.  Skin: Skin is warm and dry. He is not diaphoretic.   Psychiatric: He has a normal mood and affect. His behavior is normal            Results Review:  I have reviewed the labs, radiology results, and diagnostic studies.    Laboratory Data:            Results from last 7 days  Lab Units 10/20/18  0413 10/19/18  0442 10/18/18  0449   SODIUM mmol/L 138 137 139   POTASSIUM mmol/L 4.7 5.0 4.8   CHLORIDE mmol/L 104 102 104   CO2 " mmol/L 27.0 28.0 27.0   BUN mg/dL 19 31* 23*   CREATININE mg/dL 2.21* 3.90* 2.72*   CALCIUM mg/dL 8.7 9.2 9.2   GLUCOSE mg/dL 104* 150* 123*       Culture Data:        Radiology Data:   Imaging Results (last 24 hours)     ** No results found for the last 24 hours. **          I have reviewed the patient's current medications.     Assessment/Plan     Active Hospital Problems    Diagnosis   • **Acute on chronic respiratory failure with hypoxia (CMS/[facility])   • Coronary artery disease involving native coronary artery of native heart without angina pectoris   • History of coronary artery stent placement   • Mild aortic valve stenosis   • Moderate aortic valve insufficiency   • Mild mitral valve regurgitation   • Pneumonia   • COPD (chronic obstructive pulmonary disease) (CMS/[facility])   • Sepsis (CMS/[facility])   • Acute renal failure superimposed on stage 4 chronic kidney disease (CMS/[facility])   • Acute on chronic combined systolic and diastolic congestive heart failure (CMS/[facility])   • Pleural effusion, bilateral           1) Acute hypoxic respiratory failure, resolved  2) Sepsis due to suspected health care associated pneumonia - blood culture and pleural fluid culture showed no growth to date  3) Bilateral pleural effusions - Right is recurrent, left is worsening -status this post thoracentesis on October 7; Suspected due to heart failure, but cannot rule out infection or malignancy   4) Severe protein-calorie malnutrition  5) COPD without exacerbation  6) Combined systolic and diastolic heart failure, 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 Diff - cont po vanc day 4 in terms of dosing   14)Hypoglycemia (50) - resolved; hx of Diabetes -  Patient encourage  to eat.  Will continue to monitor; prn d50; will consider /dc pre meal insulin and maintain only on ssi ([id number], 142, 164 )          snf and hd chair time is accomplished.  SNF unable to  Take him  until middle of this coming week.    cpt          Max Jones MD   10/20/18   3:20 PM

## 2018-10-20 NOTE — PLAN OF CARE
Problem: Pneumonia (Adult)  Goal: Signs and Symptoms of Listed Potential Problems Will be Absent, Minimized or Managed (Pneumonia)  Outcome: Ongoing (interventions implemented as appropriate)      Problem: Fall Risk (Adult)  Goal: Absence of Fall  Outcome: Ongoing (interventions implemented as appropriate)      Problem: Skin Injury Risk (Adult)  Goal: Skin Health and Integrity  Outcome: Ongoing (interventions implemented as appropriate)      Problem: Patient Care Overview  Goal: Plan of Care Review  Outcome: Ongoing (interventions implemented as appropriate)   10/20/18 2887   Plan of Care Review   Progress no change   OTHER   Outcome Summary Pt still awaiting dc to [facility] on wednesday, continued contact precaution, family bringing food, drinking ensure, mepilex changed to coccyx, barrier cream applied, tissue granulating, positioned off bony prominences, turned q 2 hour or when patient requested, n/v intermittent, iv zofran given with relief, air mattress helped patient with pain, norco given x1, vss, fall precautions, s 75-96 on tele, will cont to monitor   Coping/Psychosocial   Plan of Care Reviewed With patient

## 2018-10-20 NOTE — PLAN OF CARE
Problem: Pneumonia (Adult)  Goal: Signs and Symptoms of Listed Potential Problems Will be Absent, Minimized or Managed (Pneumonia)  Outcome: Ongoing (interventions implemented as appropriate)      Problem: Fall Risk (Adult)  Goal: Absence of Fall  Outcome: Ongoing (interventions implemented as appropriate)      Problem: Skin Injury Risk (Adult)  Goal: Skin Health and Integrity  Outcome: Ongoing (interventions implemented as appropriate)      Problem: Nutrition, Imbalanced: Inadequate Oral Intake (Adult)  Goal: Improved Oral Intake  Outcome: Ongoing (interventions implemented as appropriate)      Problem: Patient Care Overview  Goal: Plan of Care Review  Outcome: Ongoing (interventions implemented as appropriate)   10/20/18 8195   Plan of Care Review   Progress no change   OTHER   Outcome Summary Patient to be d/c to [facility] Wednesday. Continued contact isolation for cdiff. Malnutrition, EF <40%. Added air mattress due to pain to hip and back.. Some relief with prn pain medication. Poor appetite, offered food. Dialysis MWF. NO falls noted, no attempt to get out of bed. Continue to monitor.    Coping/Psychosocial   Plan of Care Reviewed With patient     Goal: Individualization and Mutuality  Outcome: Ongoing (interventions implemented as appropriate)    Goal: Discharge Needs Assessment  Outcome: Ongoing (interventions implemented as appropriate)    Goal: Interprofessional Rounds/Family Conf  Outcome: Ongoing (interventions implemented as appropriate)      Problem: Hemodialysis (Adult)  Goal: Signs and Symptoms of Listed Potential Problems Will be Absent, Minimized or Managed (Hemodialysis)  Outcome: Ongoing (interventions implemented as appropriate)

## 2018-10-21 LAB
ANION GAP SERPL CALCULATED.3IONS-SCNC: 6 MMOL/L (ref 4–13)
BUN BLD-MCNC: 35 MG/DL (ref 5–21)
BUN/CREAT SERPL: 10.8 (ref 7–25)
CALCIUM SPEC-SCNC: 9.2 MG/DL (ref 8.4–10.4)
CHLORIDE SERPL-SCNC: 103 MMOL/L (ref 98–110)
CO2 SERPL-SCNC: 27 MMOL/L (ref 24–31)
CREAT BLD-MCNC: 3.24 MG/DL (ref 0.5–1.4)
DEPRECATED RDW RBC AUTO: 65.4 FL (ref 40–54)
ERYTHROCYTE [DISTWIDTH] IN BLOOD BY AUTOMATED COUNT: 19.5 % (ref 12–15)
GFR SERPL CREATININE-BSD FRML MDRD: 19 ML/MIN/1.73
GLUCOSE BLD-MCNC: 119 MG/DL (ref 70–100)
GLUCOSE BLDC GLUCOMTR-MCNC: 113 MG/DL (ref 70–130)
GLUCOSE BLDC GLUCOMTR-MCNC: 179 MG/DL (ref 70–130)
GLUCOSE BLDC GLUCOMTR-MCNC: 201 MG/DL (ref 70–130)
GLUCOSE BLDC GLUCOMTR-MCNC: 205 MG/DL (ref 70–130)
HCT VFR BLD AUTO: 26.2 % (ref 40–52)
HGB BLD-MCNC: 8.2 G/DL (ref 14–18)
MCH RBC QN AUTO: 28.9 PG (ref 28–32)
MCHC RBC AUTO-ENTMCNC: 31.3 G/DL (ref 33–36)
MCV RBC AUTO: 92.3 FL (ref 82–95)
PLATELET # BLD AUTO: 166 10*3/MM3 (ref 130–400)
PMV BLD AUTO: 10 FL (ref 6–12)
POTASSIUM BLD-SCNC: 5.4 MMOL/L (ref 3.5–5.3)
RBC # BLD AUTO: 2.84 10*6/MM3 (ref 4.8–5.9)
SODIUM BLD-SCNC: 136 MMOL/L (ref 135–145)
WBC NRBC COR # BLD: 4.59 10*3/MM3 (ref 4.8–10.8)

## 2018-10-21 PROCEDURE — 25010000002 HEPARIN (PORCINE) PER 1000 UNITS: Performed by: INTERNAL MEDICINE

## 2018-10-21 PROCEDURE — 82962 GLUCOSE BLOOD TEST: CPT

## 2018-10-21 PROCEDURE — 94799 UNLISTED PULMONARY SVC/PX: CPT

## 2018-10-21 PROCEDURE — 63710000001 INSULIN LISPRO (HUMAN) PER 5 UNITS: Performed by: INTERNAL MEDICINE

## 2018-10-21 PROCEDURE — 85027 COMPLETE CBC AUTOMATED: CPT | Performed by: INTERNAL MEDICINE

## 2018-10-21 PROCEDURE — 25010000002 ONDANSETRON PER 1 MG: Performed by: NURSE PRACTITIONER

## 2018-10-21 PROCEDURE — 94760 N-INVAS EAR/PLS OXIMETRY 1: CPT

## 2018-10-21 PROCEDURE — 80048 BASIC METABOLIC PNL TOTAL CA: CPT | Performed by: NURSE PRACTITIONER

## 2018-10-21 RX ORDER — SODIUM CHLORIDE 9 MG/ML
50 INJECTION, SOLUTION INTRAVENOUS CONTINUOUS
Status: DISCONTINUED | OUTPATIENT
Start: 2018-10-21 | End: 2018-10-25 | Stop reason: HOSPADM

## 2018-10-21 RX ADMIN — INSULIN LISPRO 2 UNITS: 100 INJECTION, SOLUTION INTRAVENOUS; SUBCUTANEOUS at 18:01

## 2018-10-21 RX ADMIN — ISOSORBIDE DINITRATE 10 MG: 10 TABLET ORAL at 08:16

## 2018-10-21 RX ADMIN — VANCOMYCIN HYDROCHLORIDE 125 MG: KIT at 23:59

## 2018-10-21 RX ADMIN — CALCITRIOL CAPSULES 0.25 MCG 0.5 MCG: 0.25 CAPSULE ORAL at 08:17

## 2018-10-21 RX ADMIN — HEPARIN SODIUM 5000 UNITS: 5000 INJECTION, SOLUTION INTRAVENOUS; SUBCUTANEOUS at 20:36

## 2018-10-21 RX ADMIN — HYDROCODONE BITARTRATE AND ACETAMINOPHEN 1 TABLET: 10; 325 TABLET ORAL at 09:16

## 2018-10-21 RX ADMIN — VANCOMYCIN HYDROCHLORIDE 125 MG: KIT at 06:35

## 2018-10-21 RX ADMIN — BUMETANIDE 1 MG: 1 TABLET ORAL at 08:17

## 2018-10-21 RX ADMIN — HYDROCODONE BITARTRATE AND ACETAMINOPHEN 1 TABLET: 10; 325 TABLET ORAL at 15:42

## 2018-10-21 RX ADMIN — LISINOPRIL 2.5 MG: 2.5 TABLET ORAL at 08:17

## 2018-10-21 RX ADMIN — VANCOMYCIN HYDROCHLORIDE 125 MG: KIT at 18:41

## 2018-10-21 RX ADMIN — HEPARIN SODIUM 5000 UNITS: 5000 INJECTION, SOLUTION INTRAVENOUS; SUBCUTANEOUS at 08:15

## 2018-10-21 RX ADMIN — INSULIN LISPRO 6 UNITS: 100 INJECTION, SOLUTION INTRAVENOUS; SUBCUTANEOUS at 12:12

## 2018-10-21 RX ADMIN — FERRIC CITRATE 210 MG: 210 TABLET, COATED ORAL at 12:16

## 2018-10-21 RX ADMIN — NICOTINE 1 PATCH: 21 PATCH, EXTENDED RELEASE TRANSDERMAL at 20:39

## 2018-10-21 RX ADMIN — INSULIN LISPRO 4 UNITS: 100 INJECTION, SOLUTION INTRAVENOUS; SUBCUTANEOUS at 20:36

## 2018-10-21 RX ADMIN — METOPROLOL TARTRATE 25 MG: 25 TABLET, FILM COATED ORAL at 20:36

## 2018-10-21 RX ADMIN — FERRIC CITRATE 210 MG: 210 TABLET, COATED ORAL at 08:18

## 2018-10-21 RX ADMIN — FERRIC CITRATE 210 MG: 210 TABLET, COATED ORAL at 18:01

## 2018-10-21 RX ADMIN — ONDANSETRON HYDROCHLORIDE 4 MG: 2 INJECTION INTRAMUSCULAR; INTRAVENOUS at 15:42

## 2018-10-21 RX ADMIN — BUDESONIDE AND FORMOTEROL FUMARATE DIHYDRATE 2 PUFF: 160; 4.5 AEROSOL RESPIRATORY (INHALATION) at 21:00

## 2018-10-21 RX ADMIN — METOPROLOL TARTRATE 25 MG: 25 TABLET, FILM COATED ORAL at 08:17

## 2018-10-21 RX ADMIN — AMIODARONE HYDROCHLORIDE 200 MG: 200 TABLET ORAL at 08:17

## 2018-10-21 RX ADMIN — Medication 3 ML: at 08:18

## 2018-10-21 RX ADMIN — Medication 3 ML: at 20:37

## 2018-10-21 RX ADMIN — ASPIRIN 81 MG CHEWABLE TABLET 81 MG: 81 TABLET CHEWABLE at 08:18

## 2018-10-21 RX ADMIN — INSULIN LISPRO 4 UNITS: 100 INJECTION, SOLUTION INTRAVENOUS; SUBCUTANEOUS at 12:13

## 2018-10-21 RX ADMIN — ONDANSETRON HYDROCHLORIDE 4 MG: 2 INJECTION INTRAMUSCULAR; INTRAVENOUS at 09:16

## 2018-10-21 RX ADMIN — Medication 10 ML: at 09:16

## 2018-10-21 RX ADMIN — ATORVASTATIN CALCIUM 80 MG: 40 TABLET, FILM COATED ORAL at 08:17

## 2018-10-21 RX ADMIN — BUDESONIDE AND FORMOTEROL FUMARATE DIHYDRATE 2 PUFF: 160; 4.5 AEROSOL RESPIRATORY (INHALATION) at 07:07

## 2018-10-21 RX ADMIN — SODIUM CHLORIDE 50 ML/HR: 9 INJECTION, SOLUTION INTRAVENOUS at 15:52

## 2018-10-21 RX ADMIN — VANCOMYCIN HYDROCHLORIDE 125 MG: KIT at 13:45

## 2018-10-21 RX ADMIN — HYDROCODONE BITARTRATE AND ACETAMINOPHEN 1 TABLET: 10; 325 TABLET ORAL at 21:54

## 2018-10-21 RX ADMIN — INSULIN LISPRO 6 UNITS: 100 INJECTION, SOLUTION INTRAVENOUS; SUBCUTANEOUS at 18:01

## 2018-10-21 RX ADMIN — Medication 1 TABLET: at 08:17

## 2018-10-21 NOTE — PLAN OF CARE
Problem: Pneumonia (Adult)  Goal: Signs and Symptoms of Listed Potential Problems Will be Absent, Minimized or Managed (Pneumonia)  Outcome: Ongoing (interventions implemented as appropriate)      Problem: Fall Risk (Adult)  Goal: Absence of Fall  Outcome: Ongoing (interventions implemented as appropriate)      Problem: Skin Injury Risk (Adult)  Goal: Skin Health and Integrity  Outcome: Ongoing (interventions implemented as appropriate)      Problem: Nutrition, Imbalanced: Inadequate Oral Intake (Adult)  Goal: Improved Oral Intake  Outcome: Ongoing (interventions implemented as appropriate)      Problem: Patient Care Overview  Goal: Plan of Care Review  Outcome: Ongoing (interventions implemented as appropriate)   10/21/18 0333   OTHER   Outcome Summary Patient c/o back pain. STates air mattress helping. D/c to [facility] Wednesday. Contact precaution, c diff. Mepilex to coccyx. Encouraged food. Dialysis to be done Monday. Continue to monitor.      Goal: Individualization and Mutuality  Outcome: Ongoing (interventions implemented as appropriate)    Goal: Discharge Needs Assessment  Outcome: Ongoing (interventions implemented as appropriate)    Goal: Interprofessional Rounds/Family Conf  Outcome: Ongoing (interventions implemented as appropriate)      Problem: Hemodialysis (Adult)  Goal: Signs and Symptoms of Listed Potential Problems Will be Absent, Minimized or Managed (Hemodialysis)  Outcome: Ongoing (interventions implemented as appropriate)

## 2018-10-21 NOTE — PROGRESS NOTES
Nephrology ([facility]) Progress Note      Patient:  Gavin Wilson  YOB: 1945  Date of Service: 10/21/2018  MRN: [id number]   Acct: [id number]   Primary Care Physician: Provider, No Known  Advance Directive:   Code Status and Medical Interventions:   Ordered at: 10/07/18 1309     Limited Support to NOT Include:    Intubation     Level Of Support Discussed With:    Patient     Code Status:    No CPR     Medical Interventions (Level of Support Prior to Arrest):    Limited     Admit Date: 10/6/2018       Hospital Day: 15  Referring Provider: Max Jones*    Subjective:  Gavin Wilson is a 73 y.o. male  whom we were consulted for CKD stage 4.  History of bladder cancer with prior cystectomy and ureterostomy formation.  Also history of esophogeal and prostate cancer.  Follows with nephrology at [facility]. Has left upper arm fistula in place.  Presented with dyspnea; admitted with bilateral pneumonia/pleural effusion.  Hospital course remarkable for attempt to use fistula on 10/10; infiltrated and was unable to dialyze.  On 10/11 had Permcath placed and first dialysis treatment; he had a run of V tach during first HD.  On 10/15 became hypotensive during dialysis.  Today, he was having diarrhea and malaise.    Allergies:  Sulfa antibiotics    Home Meds:  Prescriptions Prior to Admission   Medication Sig Dispense Refill Last Dose   • acetaminophen (TYLENOL) 325 MG tablet Take 650 mg by mouth Every 6 (Six) Hours As Needed for Mild Pain .   Past Month at Unknown time   • albuterol (PROVENTIL HFA;VENTOLIN HFA) 108 (90 Base) MCG/ACT inhaler Inhale 2 puffs Every 6 (Six) Hours As Needed for Wheezing.   Past Week at Unknown time   • aspirin 81 MG chewable tablet Chew 81 mg Daily.   10/5/2018 at Unknown time   • atorvastatin (LIPITOR) 80 MG tablet Take 40 mg by mouth Daily.   10/5/2018 at Unknown time   • budesonide-formoterol (SYMBICORT) 160-4.5 MCG/ACT inhaler Inhale 2  puffs 2 (Two) Times a Day. 1 inhaler 2 10/5/2018 at Unknown time   • bumetanide (BUMEX) 2 MG tablet Take 1 tablet by mouth Daily. 30 tablet 0 10/5/2018 at Unknown time   • calcium carbonate (OS-TASHI) 600 MG tablet Take 600 mg by mouth Daily.   10/5/2018 at Unknown time   • cholecalciferol (VITAMIN D3) 1000 units tablet Take 3,000 Units by mouth Daily.   10/5/2018 at Unknown time   • insulin aspart (novoLOG FLEXPEN) 100 UNIT/ML solution pen-injector sc pen Inject 6 Units under the skin into the appropriate area as directed 3 (Three) Times a Day With Meals.   10/5/2018 at Unknown time   • insulin detemir (LEVEMIR) 100 UNIT/ML injection Inject 6 Units under the skin into the appropriate area as directed 2 (Two) Times a Day.   10/5/2018 at Unknown time   • loperamide (IMODIUM) 2 MG capsule Take 2 mg by mouth Daily As Needed for Diarrhea.   Past Month at Unknown time   • metoprolol tartrate (LOPRESSOR) 25 MG tablet Take 12.5 mg by mouth 2 (Two) Times a Day.   10/5/2018 at Unknown time   • omeprazole (priLOSEC) 20 MG capsule Take 20 mg by mouth Daily.   10/5/2018 at Unknown time   • oxyCODONE (ROXICODONE) 5 MG immediate release tablet Take 5 mg by mouth Every 6 (Six) Hours As Needed for Moderate Pain .   Past Week at Unknown time   • sildenafil (VIAGRA) 100 MG tablet Take 100 mg by mouth Daily As Needed for erectile dysfunction.   More than a month at Unknown time       Medicines:  Current Facility-Administered Medications   Medication Dose Route Frequency Provider Last Rate Last Dose   • acetaminophen (TYLENOL) tablet 650 mg  650 mg Oral Q4H PRN Max Jones MD       • amiodarone (PACERONE) tablet 200 mg  200 mg Oral Q24H Abdiel Stout MD   200 mg at 10/21/18 0817   • aspirin chewable tablet 81 mg  81 mg Oral Daily Vu Michael MD   81 mg at 10/21/18 0818   • atorvastatin (LIPITOR) tablet 80 mg  80 mg Oral Daily Max Jones MD   80 mg at 10/21/18 0817   • budesonide-formoterol (SYMBICORT)  160-4.5 MCG/ACT inhaler 2 puff  2 puff Inhalation BID - RT Max Jones MD   2 puff at 10/21/18 0707   • bumetanide (BUMEX) tablet 1 mg  1 mg Oral Daily Scott Soto MD   1 mg at 10/21/18 0817   • calcitriol (ROCALTROL) capsule 0.5 mcg  0.5 mcg Oral Daily Max Jones MD   0.5 mcg at 10/21/18 0817   • calcium carb-cholecalciferol 600-800 MG-UNIT tablet 1 tablet  1 tablet Oral Daily Max Jones MD   1 tablet at 10/21/18 0817   • dextrose (D50W) 25 g/ 50mL Intravenous Solution 25 g  25 g Intravenous Q15 Min PRN Max Jones MD       • dextrose (GLUTOSE) oral gel 15 g  15 g Oral Q15 Min PRN Max Jones MD       • Ferric Citrate tablet 210 mg  210 mg Oral TID With Meals Max Jones MD   210 mg at 10/21/18 1216   • glucagon (human recombinant) (GLUCAGEN DIAGNOSTIC) injection 1 mg  1 mg Subcutaneous PRN Max Jones MD       • heparin (porcine) 5000 UNIT/ML injection 5,000 Units  5,000 Units Subcutaneous Q12H Max Jones MD   5,000 Units at 10/21/18 0815   • heparin (porcine) injection 1,700 Units  1,700 Units Intracatheter PRN Samson Aragon MD   1,700 Units at 10/15/18 1758   • heparin (porcine) injection 1,700 Units  1,700 Units Intracatheter PRN Samson Aragon MD   1,700 Units at 10/15/18 1800   • HYDROcodone-acetaminophen (NORCO)  MG per tablet 1 tablet  1 tablet Oral Q6H PRN Max Jones MD   1 tablet at 10/21/18 0916   • hydroxypropyl methylcellulose (ISOPTO TEARS) 2.5 % ophthalmic solution 1 drop  1 drop Both Eyes TID PRN Vu Michael MD   1 drop at 10/11/18 0636   • Influenza Vac Subunit Quad (FLUCELVAX) injection 0.5 mL  0.5 mL Intramuscular During Hospitalization Max Jones MD       • insulin lispro (humaLOG) injection 0-9 Units  0-9 Units Subcutaneous 4x Daily With Meals & Nightly Max Jones MD   4 Units at  10/21/18 1213   • insulin lispro (humaLOG) injection 6 Units  6 Units Subcutaneous TID AC Max Jones MD   6 Units at 10/21/18 1212   • ipratropium (ATROVENT) nebulizer solution 0.5 mg  0.5 mg Nebulization Q6H PRN Abdiel Stout MD   0.5 mg at 10/20/18 2010   • isosorbide dinitrate (ISORDIL) tablet 10 mg  10 mg Oral TID - Nitrates Abdiel Stout MD   10 mg at 10/21/18 0816   • lisinopril (PRINIVIL,ZESTRIL) tablet 2.5 mg  2.5 mg Oral Q24H Scott Soto MD   2.5 mg at 10/21/18 0817   • magnesium hydroxide (MILK OF MAGNESIA) suspension 2400 mg/10mL 10 mL  10 mL Oral Daily PRN Max Jones MD       • metoprolol tartrate (LOPRESSOR) tablet 25 mg  25 mg Oral Q12H Max Jones MD   25 mg at 10/21/18 0817   • nicotine (NICODERM CQ) 21 MG/24HR patch 1 patch  1 patch Transdermal Q24H Ashutosh Palafox MD   1 patch at 10/20/18 2024   • ondansetron (ZOFRAN) injection 4 mg  4 mg Intravenous Q6H PRN Chaya Duff APRN   4 mg at 10/21/18 0916   • sodium chloride 0.9 % flush 3 mL  3 mL Intravenous Q12H Max Jones MD   3 mL at 10/21/18 0818   • sodium chloride 0.9 % flush 3-10 mL  3-10 mL Intravenous PRN Max Jones MD   10 mL at 10/21/18 0916   • tetrahydrozoline 0.05 % ophthalmic solution 1 drop  1 drop Both Eyes 4x Daily PRN Ney De MD   1 drop at 10/10/18 0339   • vancomycin oral solution reconstituted 125 mg  125 mg Oral Q6H Oleg Madrid DO   125 mg at 10/21/18 0635       Past Medical History:  Past Medical History:   Diagnosis Date   • CHF (congestive heart failure) (CMS/HCC)    • Coronary stent occlusion    • Diabetes mellitus (CMS/HCC)    • Hyperlipidemia    • Hypertension    • Stroke (CMS/HCC)        Past Surgical History:  Past Surgical History:   Procedure Laterality Date   • BLADDER SURGERY     • ESOPHAGECTOMY     • INSERTION HEMODIALYSIS CATHETER N/A 10/11/2018    Procedure: HEMODIALYSIS CATHETER INSERTION;  Surgeon:  "Hugo Bonilla MD;  Location: [facility] OR 12;  Service: Vascular       Family History  Family History   Problem Relation Age of Onset   • No Known Problems Father    • No Known Problems Mother        Social History  Social History     Social History   • Marital status:      Spouse name: N/A   • Number of children: N/A   • Years of education: N/A     Occupational History   • Not on file.     Social History Main Topics   • Smoking status: Current Every Day Smoker   • Smokeless tobacco: Never Used   • Alcohol use No   • Drug use: No   • Sexual activity: Not on file     Other Topics Concern   • Not on file     Social History Narrative   • No narrative on file         Review of Systems:  History obtained from chart review and the patient  General ROS: No fever or chills  Respiratory ROS: No cough,+ shortness of breath, -wheezing  Cardiovascular ROS: No chest pain or palpitations  Gastrointestinal ROS: No abdominal pain or melena  Genito-Urinary ROS: No dysuria or hematuria  Neurological ROS: no headache or dizziness    Objective:  BP 90/42 (BP Location: Right arm, Patient Position: Lying) Comment: RN notified  Pulse 74   Temp 97.1 °F (36.2 °C) (Oral)   Resp 20   Ht 170.2 cm (67\")   Wt 44.9 kg (99 lb 1 oz)   SpO2 96%   BMI 15.52 kg/m²     Intake/Output Summary (Last 24 hours) at 10/21/18 1222  Last data filed at 10/21/18 0800   Gross per 24 hour   Intake                0 ml   Output              400 ml   Net             -400 ml     General: awake/alert    Neck: supple, no JVD  Chest:  clear to auscultation bilaterally without respiratory distress  CVS: regular rate and rhythm  Abdominal: soft, nontender, normal bowel sounds  Extremities: no cyanosis or edema  Skin: warm and dry without rash  Neuro: no focal motor deficits    Labs:    Results from last 7 days  Lab Units 10/21/18  0503   WBC 10*3/mm3 4.59*   HEMOGLOBIN g/dL 8.2*   HEMATOCRIT % 26.2*   PLATELETS 10*3/mm3 166           Results from " last 7 days  Lab Units 10/21/18  0503 10/20/18  0413 10/19/18  0442   SODIUM mmol/L 136 138 137   POTASSIUM mmol/L 5.4* 4.7 5.0   CHLORIDE mmol/L 103 104 102   CO2 mmol/L 27.0 27.0 28.0   BUN mg/dL 35* 19 31*   CREATININE mg/dL 3.24* 2.21* 3.90*   CALCIUM mg/dL 9.2 8.7 9.2   GLUCOSE mg/dL 119* 104* 150*       Radiology:   Imaging Results (last 72 hours)     Procedure Component Value Units Date/Time     Thoracentesis [[id number]] Collected:  10/07/18 1323    Specimen:  Body Fluid Updated:  10/13/18 0934    Narrative:       DATE OF PROCEDURE:  10/7/2018.     PREPROCEDURE DIAGNOSIS:  Left pleural effusion.  POSTPROCEDURE DIAGNOSIS:  Left pleural effusion.          INDICATIONS FOR PROCEDURE: Shortness of breath.     PROCEDURE:   1. Thoracentesis, diagnostic and therapeutic.  2. Ultrasound guidance for thoracentesis, imaging supervision and  interpretation.     ANESTHESIA: 1% lidocaine, injected locally.          COMPLICATIONS: None.        EXAMINATIONS FOR COMPARISON:  CT chest dated 10/6/2018.     DESCRIPTION OF PROCEDURE:   The risk and benefits of the procedure were explained to the patient.   Specifically, the risks of bleeding, infection, pneumothorax (possible  thoracostomy tube), and damage to surrounding structures were discussed  extensively. The patient's questions were answered. The patient opted to  proceed. Written and verbal consent were obtained from the patient.     TIME OUT was taken and the patient's name, medical record number, date  of birth, procedure, entry site, and listen allergies were confirmed.  The site of the procedure was confirmed and marked.      The leftposterior thorax was prepped and draped in sterile fashion. The  area was anesthetized with buffered lidocaine 2%.      A 5 [place] 10 cm  catheter was inserted into the pleural effusion  using  ultrasound guidance. Approximately 400 mL of clear fluid was recovered  and sent to the pathology lab for analysis.      The patient tolerated  the procedure well.   No immediate complications  were noted.       Impression:       Successful ultrasound guided leftthoracentesis. Approximately 400 mL of  clear fluid was recovered and sent to the pathology lab for analysis.   No immediate complications were noted.              This report was finalized on 10/07/2018 13:26 by Dr. Petra Mckinley MD.     Chest [[id number]] Collected:  10/07/18 1323     Updated:  10/13/18 0934    Narrative:       DATE OF PROCEDURE:  10/7/2018.     PREPROCEDURE DIAGNOSIS:  Left pleural effusion.  POSTPROCEDURE DIAGNOSIS:  Left pleural effusion.          INDICATIONS FOR PROCEDURE: Shortness of breath.     PROCEDURE:   1. Thoracentesis, diagnostic and therapeutic.  2. Ultrasound guidance for thoracentesis, imaging supervision and  interpretation.     ANESTHESIA: 1% lidocaine, injected locally.          COMPLICATIONS: None.        EXAMINATIONS FOR COMPARISON:  CT chest dated 10/6/2018.     DESCRIPTION OF PROCEDURE:   The risk and benefits of the procedure were explained to the patient.   Specifically, the risks of bleeding, infection, pneumothorax (possible  thoracostomy tube), and damage to surrounding structures were discussed  extensively. The patient's questions were answered. The patient opted to  proceed. Written and verbal consent were obtained from the patient.     TIME OUT was taken and the patient's name, medical record number, date  of birth, procedure, entry site, and listen allergies were confirmed.  The site of the procedure was confirmed and marked.      The leftposterior thorax was prepped and draped in sterile fashion. The  area was anesthetized with buffered lidocaine 2%.      A 5 [place] 10 cm  catheter was inserted into the pleural effusion  using  ultrasound guidance. Approximately 400 mL of clear fluid was recovered  and sent to the pathology lab for analysis.      The patient tolerated the procedure well.   No immediate complications  were noted.        Impression:       Successful ultrasound guided leftthoracentesis. Approximately 400 mL of  clear fluid was recovered and sent to the pathology lab for analysis.   No immediate complications were noted.              This report was finalized on 10/07/2018 13:26 by Dr. Petra Mckinley MD.          Culture:         Assessment   1.  CKD stage 4--now with progression to ESRD  2.  Type 2 diabetes with nephropathy  3.  Bilateral pneumonia  4.  Chronic diastolic congestive heart failure  5.  S/p ventricular tachycardia on 10/10  6.  Metabolic acidosis  7.  Secondary hyperparathyroidism  8.  Nausea  9.  Anemia     Plan:  1.  Dialysis next due 10/22  2.  Monitor labs  3.  Work continues on SNF and outpatient dialysis placement  4.  Consider palliative care evaluation.       Scott Soto MD  10/21/2018  12:22 PM

## 2018-10-21 NOTE — PROGRESS NOTES
[facility]  INPATIENT PROGRESS NOTE    Patient Name: Gavin Wilson  Date of Admission: 10/6/2018  Today's Date: 10/21/18  Length of Stay: 15  Primary Care Physician: Provider, No Known    Subjective   Chief Complaint: f/u  HPI   Doing ok  + BM 4 x today according to nursing.  Nurse requesting rectal tube because of decub ulcer      Review of Systems     All pertinent negatives and positives are as above. All other systems have been reviewed and are negative unless otherwise stated.     Objective    Temp:  [97.1 °F (36.2 °C)-98.3 °F (36.8 °C)] 97.1 °F (36.2 °C)  Heart Rate:  [70-82] 74  Resp:  [18-20] 20  BP: ()/(42-52) 90/42  Physical Exam  He remains in good spirit, courteous gentleman.  No distress.   He is wasted like skin and bone with BMI of 17.  He has skin breakdown on sacrum.  He has green soft to watery stool when I checked him.  Normal respiratory effort  HENT:   Head: Atraumatic.   Temporal muscle wasting; prominent wasting around the neck and supraclavicular    Eyes: Conjunctivae are normal. No scleral icterus.   Cardiovascular: Normal rate, regular rhythm and intact distal pulses.    Murmur heard. Right upper chest permacath placed  Pulmonary/Chest: Effort normal. No respiratory distress. He has no wheezing. He has no rales.  Improved air movement   Abdominal: Soft. Bowel sounds are normal. He exhibits no distension. There is no tenderness.   RLQ ostomy pink in color. Empty bag  Neurological: He is alert and oriented to person, place, and time.  Skin: Skin is warm and dry. He is not diaphoretic.   Psychiatric: He has a normal mood and affect. His behavior is normal       Results Review:  I have reviewed the labs, radiology results, and diagnostic studies.    Laboratory Data:     Results from last 7 days  Lab Units 10/21/18  0503   WBC 10*3/mm3 4.59*   HEMOGLOBIN g/dL 8.2*   HEMATOCRIT % 26.2*   PLATELETS 10*3/mm3 166          Results from  last 7 days  Lab Units 10/21/18  0503 10/20/18  0413 10/19/18  0442   SODIUM mmol/L 136 138 137   POTASSIUM mmol/L 5.4* 4.7 5.0   CHLORIDE mmol/L 103 104 102   CO2 mmol/L 27.0 27.0 28.0   BUN mg/dL 35* 19 31*   CREATININE mg/dL 3.24* 2.21* 3.90*   CALCIUM mg/dL 9.2 8.7 9.2   GLUCOSE mg/dL 119* 104* 150*       Culture Data:        Radiology Data:   Imaging Results (last 24 hours)     ** No results found for the last 24 hours. **          I have reviewed the patient's current medications.     Assessment/Plan     Active Hospital Problems    Diagnosis   • **Acute on chronic respiratory failure with hypoxia (CMS/HCC)   • Coronary artery disease involving native coronary artery of native heart without angina pectoris   • History of coronary artery stent placement   • Mild aortic valve stenosis   • Moderate aortic valve insufficiency   • Mild mitral valve regurgitation   • Pneumonia   • COPD (chronic obstructive pulmonary disease) (CMS/HCC)   • Sepsis (CMS/HCC)   • Acute renal failure superimposed on stage 4 chronic kidney disease (CMS/HCC)   • Acute on chronic combined systolic and diastolic congestive heart failure (CMS/HCC)   • Pleural effusion, bilateral       1) Acute hypoxic respiratory failure, resolved  2) Sepsis due to suspected health care associated pneumonia - blood culture and pleural fluid culture showed no growth to date  3) Bilateral pleural effusions - Right is recurrent, left is worsening -status this post thoracentesis on October 7; Suspected due to heart failure, but cannot rule out infection or malignancy   4) Severe protein-calorie malnutrition  5) COPD without exacerbation  6) Combined systolic and diastolic heart failure, chronic (EF <40%)  7) Acute kidney injury on chronic kidney disease stage 5 ESRD -  8) Chronic normocytic anemia due to CKD  9) Thrombocytopenia, improved  10) Mild hypokalemia, resolved  11) Hyperphosphotemia  12) Metabolic acidosis, anion gap due to uremia  13) Constipation,  resolved; now has diarrhea and tested + for c. Diff - cont po vanc day 5 in terms of dosing   14)Hypoglycemia (50) - resolved; hx of Diabetes -  Patient encourage to eat.  Will continue to monitor; prn d50; will consider /dc pre meal insulin and maintain only on ssi (accu 119,113,205 )          I d/c his bumex as is in the low side. I also d/c for the same reason the ACEI plus the fact that his potassium is abnormal.  He already has diarrhea.  I noted his EF is only 37 %.  May need a little bit of IVF due to diarrhea (volume depleted).  He is frail man                Discharge Planning: awaiting acceptance from SNF  Max Jones MD   10/21/18   3:36 PM

## 2018-10-22 LAB
ANION GAP SERPL CALCULATED.3IONS-SCNC: 10 MMOL/L (ref 4–13)
BUN BLD-MCNC: 42 MG/DL (ref 5–21)
BUN/CREAT SERPL: 10.4 (ref 7–25)
CALCIUM SPEC-SCNC: 8.7 MG/DL (ref 8.4–10.4)
CHLORIDE SERPL-SCNC: 103 MMOL/L (ref 98–110)
CO2 SERPL-SCNC: 25 MMOL/L (ref 24–31)
CREAT BLD-MCNC: 4.02 MG/DL (ref 0.5–1.4)
GFR SERPL CREATININE-BSD FRML MDRD: 15 ML/MIN/1.73
GLUCOSE BLD-MCNC: 131 MG/DL (ref 70–100)
GLUCOSE BLDC GLUCOMTR-MCNC: 129 MG/DL (ref 70–130)
GLUCOSE BLDC GLUCOMTR-MCNC: 131 MG/DL (ref 70–130)
GLUCOSE BLDC GLUCOMTR-MCNC: 169 MG/DL (ref 70–130)
GLUCOSE BLDC GLUCOMTR-MCNC: 62 MG/DL (ref 70–130)
GLUCOSE BLDC GLUCOMTR-MCNC: 74 MG/DL (ref 70–130)
POTASSIUM BLD-SCNC: 5.3 MMOL/L (ref 3.5–5.3)
SODIUM BLD-SCNC: 138 MMOL/L (ref 135–145)

## 2018-10-22 PROCEDURE — 25010000002 ONDANSETRON PER 1 MG: Performed by: NURSE PRACTITIONER

## 2018-10-22 PROCEDURE — 25010000002 HEPARIN (PORCINE) PER 1000 UNITS: Performed by: INTERNAL MEDICINE

## 2018-10-22 PROCEDURE — 94799 UNLISTED PULMONARY SVC/PX: CPT

## 2018-10-22 PROCEDURE — 97535 SELF CARE MNGMENT TRAINING: CPT

## 2018-10-22 PROCEDURE — G8978 MOBILITY CURRENT STATUS: HCPCS

## 2018-10-22 PROCEDURE — 5A1D70Z PERFORMANCE OF URINARY FILTRATION, INTERMITTENT, LESS THAN 6 HOURS PER DAY: ICD-10-PCS | Performed by: INTERNAL MEDICINE

## 2018-10-22 PROCEDURE — 97164 PT RE-EVAL EST PLAN CARE: CPT

## 2018-10-22 PROCEDURE — 80048 BASIC METABOLIC PNL TOTAL CA: CPT | Performed by: NURSE PRACTITIONER

## 2018-10-22 PROCEDURE — 82962 GLUCOSE BLOOD TEST: CPT

## 2018-10-22 PROCEDURE — G8979 MOBILITY GOAL STATUS: HCPCS

## 2018-10-22 PROCEDURE — 94760 N-INVAS EAR/PLS OXIMETRY 1: CPT

## 2018-10-22 PROCEDURE — 97530 THERAPEUTIC ACTIVITIES: CPT

## 2018-10-22 PROCEDURE — 63710000001 INSULIN LISPRO (HUMAN) PER 5 UNITS: Performed by: INTERNAL MEDICINE

## 2018-10-22 RX ORDER — ALBUMIN (HUMAN) 12.5 G/50ML
12.5 SOLUTION INTRAVENOUS AS NEEDED
Status: CANCELLED | OUTPATIENT
Start: 2018-10-22 | End: 2018-10-23

## 2018-10-22 RX ADMIN — ASPIRIN 81 MG CHEWABLE TABLET 81 MG: 81 TABLET CHEWABLE at 08:58

## 2018-10-22 RX ADMIN — FERRIC CITRATE 210 MG: 210 TABLET, COATED ORAL at 18:04

## 2018-10-22 RX ADMIN — Medication 3 ML: at 21:37

## 2018-10-22 RX ADMIN — METOPROLOL TARTRATE 25 MG: 25 TABLET, FILM COATED ORAL at 08:58

## 2018-10-22 RX ADMIN — VANCOMYCIN HYDROCHLORIDE 125 MG: KIT at 05:47

## 2018-10-22 RX ADMIN — ISOSORBIDE DINITRATE 10 MG: 10 TABLET ORAL at 12:59

## 2018-10-22 RX ADMIN — HEPARIN SODIUM 1700 UNITS: 1000 INJECTION, SOLUTION INTRAVENOUS; SUBCUTANEOUS at 18:58

## 2018-10-22 RX ADMIN — ISOSORBIDE DINITRATE 10 MG: 10 TABLET ORAL at 08:59

## 2018-10-22 RX ADMIN — Medication 3 ML: at 08:59

## 2018-10-22 RX ADMIN — FERRIC CITRATE 210 MG: 210 TABLET, COATED ORAL at 08:59

## 2018-10-22 RX ADMIN — BUDESONIDE AND FORMOTEROL FUMARATE DIHYDRATE 2 PUFF: 160; 4.5 AEROSOL RESPIRATORY (INHALATION) at 07:40

## 2018-10-22 RX ADMIN — HEPARIN SODIUM 1700 UNITS: 1000 INJECTION INTRAVENOUS; SUBCUTANEOUS at 18:58

## 2018-10-22 RX ADMIN — BUDESONIDE AND FORMOTEROL FUMARATE DIHYDRATE 2 PUFF: 160; 4.5 AEROSOL RESPIRATORY (INHALATION) at 19:44

## 2018-10-22 RX ADMIN — INSULIN LISPRO 2 UNITS: 100 INJECTION, SOLUTION INTRAVENOUS; SUBCUTANEOUS at 21:24

## 2018-10-22 RX ADMIN — INSULIN LISPRO 6 UNITS: 100 INJECTION, SOLUTION INTRAVENOUS; SUBCUTANEOUS at 12:59

## 2018-10-22 RX ADMIN — AMIODARONE HYDROCHLORIDE 200 MG: 200 TABLET ORAL at 08:58

## 2018-10-22 RX ADMIN — METOPROLOL TARTRATE 25 MG: 25 TABLET, FILM COATED ORAL at 21:24

## 2018-10-22 RX ADMIN — VANCOMYCIN HYDROCHLORIDE 125 MG: KIT at 12:59

## 2018-10-22 RX ADMIN — HEPARIN SODIUM 5000 UNITS: 5000 INJECTION, SOLUTION INTRAVENOUS; SUBCUTANEOUS at 08:58

## 2018-10-22 RX ADMIN — ONDANSETRON HYDROCHLORIDE 4 MG: 2 INJECTION INTRAMUSCULAR; INTRAVENOUS at 09:56

## 2018-10-22 RX ADMIN — ATORVASTATIN CALCIUM 80 MG: 40 TABLET, FILM COATED ORAL at 08:58

## 2018-10-22 RX ADMIN — HYDROCODONE BITARTRATE AND ACETAMINOPHEN 1 TABLET: 10; 325 TABLET ORAL at 03:22

## 2018-10-22 RX ADMIN — FERRIC CITRATE 210 MG: 210 TABLET, COATED ORAL at 12:59

## 2018-10-22 RX ADMIN — CALCITRIOL CAPSULES 0.25 MCG 0.5 MCG: 0.25 CAPSULE ORAL at 08:58

## 2018-10-22 RX ADMIN — Medication 1 TABLET: at 08:58

## 2018-10-22 RX ADMIN — INSULIN LISPRO 6 UNITS: 100 INJECTION, SOLUTION INTRAVENOUS; SUBCUTANEOUS at 08:59

## 2018-10-22 RX ADMIN — VANCOMYCIN HYDROCHLORIDE 125 MG: KIT at 18:04

## 2018-10-22 RX ADMIN — ISOSORBIDE DINITRATE 10 MG: 10 TABLET ORAL at 18:04

## 2018-10-22 RX ADMIN — NICOTINE 1 PATCH: 21 PATCH, EXTENDED RELEASE TRANSDERMAL at 21:25

## 2018-10-22 RX ADMIN — HEPARIN SODIUM 5000 UNITS: 5000 INJECTION, SOLUTION INTRAVENOUS; SUBCUTANEOUS at 21:25

## 2018-10-22 NOTE — PROGRESS NOTES
Nephrology ([facility]) Progress Note      Patient:  Gavin Wilson  YOB: 1945  Date of Service: 10/22/2018  MRN: [id number]   Acct: [id number]   Primary Care Physician: Provider, No Known  Advance Directive:   Code Status and Medical Interventions:   Ordered at: 10/07/18 1309     Limited Support to NOT Include:    Intubation     Level Of Support Discussed With:    Patient     Code Status:    No CPR     Medical Interventions (Level of Support Prior to Arrest):    Limited     Admit Date: 10/6/2018       Hospital Day: 16  Referring Provider: Max Jones*    Subjective:  Gavin Wilson is a 73 y.o. male  whom we were consulted for CKD stage 4.  History of bladder cancer with prior cystectomy and ureterostomy formation.  Also history of esophogeal and prostate cancer.  Follows with nephrology at [facility]. Has left upper arm fistula in place.  Presented with dyspnea; admitted with bilateral pneumonia/pleural effusion.  Hospital course remarkable for attempt to use fistula on 10/10; infiltrated and was unable to dialyze.  On 10/11 had Permcath placed and first dialysis treatment; he had a run of V tach during first HD.  On 10/15 became hypotensive during dialysis.    Currently seen on hemodialysis  Hemodialysis access: Permacath  Hemodialysis: 3-1/2 hour  Ultrafiltration: 2000 mL  2K bath  Blood flow rate is 450 mL/m.    Allergies:  Sulfa antibiotics    Home Meds:  Prescriptions Prior to Admission   Medication Sig Dispense Refill Last Dose   • acetaminophen (TYLENOL) 325 MG tablet Take 650 mg by mouth Every 6 (Six) Hours As Needed for Mild Pain .   Past Month at Unknown time   • albuterol (PROVENTIL HFA;VENTOLIN HFA) 108 (90 Base) MCG/ACT inhaler Inhale 2 puffs Every 6 (Six) Hours As Needed for Wheezing.   Past Week at Unknown time   • aspirin 81 MG chewable tablet Chew 81 mg Daily.   10/5/2018 at Unknown time   • atorvastatin (LIPITOR) 80 MG tablet Take 40 mg by  mouth Daily.   10/5/2018 at Unknown time   • budesonide-formoterol (SYMBICORT) 160-4.5 MCG/ACT inhaler Inhale 2 puffs 2 (Two) Times a Day. 1 inhaler 2 10/5/2018 at Unknown time   • bumetanide (BUMEX) 2 MG tablet Take 1 tablet by mouth Daily. 30 tablet 0 10/5/2018 at Unknown time   • calcium carbonate (OS-TASHI) 600 MG tablet Take 600 mg by mouth Daily.   10/5/2018 at Unknown time   • cholecalciferol (VITAMIN D3) 1000 units tablet Take 3,000 Units by mouth Daily.   10/5/2018 at Unknown time   • insulin aspart (novoLOG FLEXPEN) 100 UNIT/ML solution pen-injector sc pen Inject 6 Units under the skin into the appropriate area as directed 3 (Three) Times a Day With Meals.   10/5/2018 at Unknown time   • insulin detemir (LEVEMIR) 100 UNIT/ML injection Inject 6 Units under the skin into the appropriate area as directed 2 (Two) Times a Day.   10/5/2018 at Unknown time   • loperamide (IMODIUM) 2 MG capsule Take 2 mg by mouth Daily As Needed for Diarrhea.   Past Month at Unknown time   • metoprolol tartrate (LOPRESSOR) 25 MG tablet Take 12.5 mg by mouth 2 (Two) Times a Day.   10/5/2018 at Unknown time   • omeprazole (priLOSEC) 20 MG capsule Take 20 mg by mouth Daily.   10/5/2018 at Unknown time   • oxyCODONE (ROXICODONE) 5 MG immediate release tablet Take 5 mg by mouth Every 6 (Six) Hours As Needed for Moderate Pain .   Past Week at Unknown time   • sildenafil (VIAGRA) 100 MG tablet Take 100 mg by mouth Daily As Needed for erectile dysfunction.   More than a month at Unknown time       Medicines:  Current Facility-Administered Medications   Medication Dose Route Frequency Provider Last Rate Last Dose   • acetaminophen (TYLENOL) tablet 650 mg  650 mg Oral Q4H PRN Max Jones MD       • amiodarone (PACERONE) tablet 200 mg  200 mg Oral Q24H Abdiel Stout MD   200 mg at 10/22/18 0858   • aspirin chewable tablet 81 mg  81 mg Oral Daily Vu Michael MD   81 mg at 10/22/18 0858   • atorvastatin (LIPITOR) tablet 80  mg  80 mg Oral Daily Max Jones MD   80 mg at 10/22/18 0858   • budesonide-formoterol (SYMBICORT) 160-4.5 MCG/ACT inhaler 2 puff  2 puff Inhalation BID - RT Max Jones MD   2 puff at 10/22/18 0740   • calcitriol (ROCALTROL) capsule 0.5 mcg  0.5 mcg Oral Daily Max Jones MD   0.5 mcg at 10/22/18 0858   • calcium carb-cholecalciferol 600-800 MG-UNIT tablet 1 tablet  1 tablet Oral Daily Max Jones MD   1 tablet at 10/22/18 0858   • dextrose (D50W) 25 g/ 50mL Intravenous Solution 25 g  25 g Intravenous Q15 Min PRN Max Jones MD       • dextrose (GLUTOSE) oral gel 15 g  15 g Oral Q15 Min PRN Max Jones MD       • Ferric Citrate tablet 210 mg  210 mg Oral TID With Meals Max Joens MD   210 mg at 10/22/18 1259   • glucagon (human recombinant) (GLUCAGEN DIAGNOSTIC) injection 1 mg  1 mg Subcutaneous PRN Max Jones MD       • heparin (porcine) 5000 UNIT/ML injection 5,000 Units  5,000 Units Subcutaneous Q12H Max Jones MD   5,000 Units at 10/22/18 0858   • heparin (porcine) injection 1,700 Units  1,700 Units Intracatheter PRN Samson Aragon MD   1,700 Units at 10/15/18 1758   • heparin (porcine) injection 1,700 Units  1,700 Units Intracatheter PRN Samson Aragon MD   1,700 Units at 10/15/18 1800   • HYDROcodone-acetaminophen (NORCO)  MG per tablet 1 tablet  1 tablet Oral Q6H PRN Max Jones MD   1 tablet at 10/22/18 0322   • hydroxypropyl methylcellulose (ISOPTO TEARS) 2.5 % ophthalmic solution 1 drop  1 drop Both Eyes TID PRN Vu Michael MD   1 drop at 10/11/18 0636   • Influenza Vac Subunit Quad (FLUCELVAX) injection 0.5 mL  0.5 mL Intramuscular During Hospitalization Max Jones MD       • insulin lispro (humaLOG) injection 0-9 Units  0-9 Units Subcutaneous 4x Daily With Meals & Nightly Max Jones MD   4 Units  at 10/21/18 2036   • insulin lispro (humaLOG) injection 6 Units  6 Units Subcutaneous TID AC Max Jones MD   6 Units at 10/22/18 1259   • ipratropium (ATROVENT) nebulizer solution 0.5 mg  0.5 mg Nebulization Q6H PRN Abdiel Stout MD   0.5 mg at 10/20/18 2010   • isosorbide dinitrate (ISORDIL) tablet 10 mg  10 mg Oral TID - Nitrates Abdiel Stout MD   10 mg at 10/22/18 1259   • magnesium hydroxide (MILK OF MAGNESIA) suspension 2400 mg/10mL 10 mL  10 mL Oral Daily PRN Max Jones MD       • metoprolol tartrate (LOPRESSOR) tablet 25 mg  25 mg Oral Q12H Max Jones MD   25 mg at 10/22/18 0858   • nicotine (NICODERM CQ) 21 MG/24HR patch 1 patch  1 patch Transdermal Q24H Ashutosh Palafox MD   1 patch at 10/21/18 2039   • ondansetron (ZOFRAN) injection 4 mg  4 mg Intravenous Q6H PRN Chaya Duff APRN   4 mg at 10/22/18 0956   • sodium chloride 0.9 % flush 3 mL  3 mL Intravenous Q12H Max Jones MD   3 mL at 10/22/18 0859   • sodium chloride 0.9 % flush 3-10 mL  3-10 mL Intravenous PRN Max Jones MD   10 mL at 10/21/18 0916   • sodium chloride 0.9 % infusion  50 mL/hr Intravenous Continuous Max Jones MD   Stopped at 10/21/18 1627   • tetrahydrozoline 0.05 % ophthalmic solution 1 drop  1 drop Both Eyes 4x Daily PRN Ney De MD   1 drop at 10/10/18 0339   • vancomycin oral solution reconstituted 125 mg  125 mg Oral Q6H Oleg Madrid DO   125 mg at 10/22/18 1259       Past Medical History:  Past Medical History:   Diagnosis Date   • CHF (congestive heart failure) (CMS/HCC)    • Coronary stent occlusion    • Diabetes mellitus (CMS/HCC)    • Hyperlipidemia    • Hypertension    • Stroke (CMS/HCC)        Past Surgical History:  Past Surgical History:   Procedure Laterality Date   • BLADDER SURGERY     • ESOPHAGECTOMY     • INSERTION HEMODIALYSIS CATHETER N/A 10/11/2018    Procedure: HEMODIALYSIS CATHETER INSERTION;  Surgeon:  "Hugo Bonilla MD;  Location: [facility] OR 12;  Service: Vascular       Family History  Family History   Problem Relation Age of Onset   • No Known Problems Father    • No Known Problems Mother        Social History  Social History     Social History   • Marital status:      Spouse name: N/A   • Number of children: N/A   • Years of education: N/A     Occupational History   • Not on file.     Social History Main Topics   • Smoking status: Current Every Day Smoker   • Smokeless tobacco: Never Used   • Alcohol use No   • Drug use: No   • Sexual activity: Not on file     Other Topics Concern   • Not on file     Social History Narrative   • No narrative on file         Review of Systems:  History obtained from chart review and the patient  General ROS: No fever or chills  Respiratory ROS: No cough,+ shortness of breath, -wheezing  Cardiovascular ROS: No chest pain or palpitations  Gastrointestinal ROS: No abdominal pain or melena  Genito-Urinary ROS: No dysuria or hematuria  Neurological ROS: no headache or dizziness    Objective:  /56 (BP Location: Right arm, Patient Position: Lying)   Pulse 81   Temp 98 °F (36.7 °C) (Oral)   Resp 16   Ht 170.2 cm (67\")   Wt 44.6 kg (98 lb 6 oz)   SpO2 98%   BMI 15.41 kg/m²     Intake/Output Summary (Last 24 hours) at 10/22/18 1551  Last data filed at 10/22/18 1536   Gross per 24 hour   Intake              360 ml   Output              100 ml   Net              260 ml     General: awake/alert    HEENT: Normocephalic atraumatic head  Neck: supple, no JVD  Chest:  clear to auscultation bilaterally without respiratory distress  CVS: regular rate and rhythm  Abdominal: soft, nontender, normal bowel sounds  Extremities: no cyanosis or edema  Skin: warm and dry without rash  Neuro: no focal motor deficits    Labs:    Results from last 7 days  Lab Units 10/21/18  0503   WBC 10*3/mm3 4.59*   HEMOGLOBIN g/dL 8.2*   HEMATOCRIT % 26.2*   PLATELETS 10*3/mm3 166 "           Results from last 7 days  Lab Units 10/22/18  0549 10/21/18  0503 10/20/18  0413   SODIUM mmol/L 138 136 138   POTASSIUM mmol/L 5.3 5.4* 4.7   CHLORIDE mmol/L 103 103 104   CO2 mmol/L 25.0 27.0 27.0   BUN mg/dL 42* 35* 19   CREATININE mg/dL 4.02* 3.24* 2.21*   CALCIUM mg/dL 8.7 9.2 8.7   GLUCOSE mg/dL 131* 119* 104*       Radiology:   Imaging Results (last 72 hours)     Procedure Component Value Units Date/Time     Thoracentesis [[id number]] Collected:  10/07/18 1323    Specimen:  Body Fluid Updated:  10/13/18 0934    Narrative:       DATE OF PROCEDURE:  10/7/2018.     PREPROCEDURE DIAGNOSIS:  Left pleural effusion.  POSTPROCEDURE DIAGNOSIS:  Left pleural effusion.          INDICATIONS FOR PROCEDURE: Shortness of breath.     PROCEDURE:   1. Thoracentesis, diagnostic and therapeutic.  2. Ultrasound guidance for thoracentesis, imaging supervision and  interpretation.     ANESTHESIA: 1% lidocaine, injected locally.          COMPLICATIONS: None.        EXAMINATIONS FOR COMPARISON:  CT chest dated 10/6/2018.     DESCRIPTION OF PROCEDURE:   The risk and benefits of the procedure were explained to the patient.   Specifically, the risks of bleeding, infection, pneumothorax (possible  thoracostomy tube), and damage to surrounding structures were discussed  extensively. The patient's questions were answered. The patient opted to  proceed. Written and verbal consent were obtained from the patient.     TIME OUT was taken and the patient's name, medical record number, date  of birth, procedure, entry site, and listen allergies were confirmed.  The site of the procedure was confirmed and marked.      The leftposterior thorax was prepped and draped in sterile fashion. The  area was anesthetized with buffered lidocaine 2%.      A 5 Cambodian 10 cm  catheter was inserted into the pleural effusion  using  ultrasound guidance. Approximately 400 mL of clear fluid was recovered  and sent to the pathology lab for analysis.       The patient tolerated the procedure well.   No immediate complications  were noted.       Impression:       Successful ultrasound guided leftthoracentesis. Approximately 400 mL of  clear fluid was recovered and sent to the pathology lab for analysis.   No immediate complications were noted.              This report was finalized on 10/07/2018 13:26 by Dr. Petra Mckinley MD.     Chest [[id number]] Collected:  10/07/18 1323     Updated:  10/13/18 0934    Narrative:       DATE OF PROCEDURE:  10/7/2018.     PREPROCEDURE DIAGNOSIS:  Left pleural effusion.  POSTPROCEDURE DIAGNOSIS:  Left pleural effusion.          INDICATIONS FOR PROCEDURE: Shortness of breath.     PROCEDURE:   1. Thoracentesis, diagnostic and therapeutic.  2. Ultrasound guidance for thoracentesis, imaging supervision and  interpretation.     ANESTHESIA: 1% lidocaine, injected locally.          COMPLICATIONS: None.        EXAMINATIONS FOR COMPARISON:  CT chest dated 10/6/2018.     DESCRIPTION OF PROCEDURE:   The risk and benefits of the procedure were explained to the patient.   Specifically, the risks of bleeding, infection, pneumothorax (possible  thoracostomy tube), and damage to surrounding structures were discussed  extensively. The patient's questions were answered. The patient opted to  proceed. Written and verbal consent were obtained from the patient.     TIME OUT was taken and the patient's name, medical record number, date  of birth, procedure, entry site, and listen allergies were confirmed.  The site of the procedure was confirmed and marked.      The leftposterior thorax was prepped and draped in sterile fashion. The  area was anesthetized with buffered lidocaine 2%.      A 5 [place]ican 10 cm  catheter was inserted into the pleural effusion  using  ultrasound guidance. Approximately 400 mL of clear fluid was recovered  and sent to the pathology lab for analysis.      The patient tolerated the procedure well.   No immediate  complications  were noted.       Impression:       Successful ultrasound guided leftthoracentesis. Approximately 400 mL of  clear fluid was recovered and sent to the pathology lab for analysis.   No immediate complications were noted.              This report was finalized on 10/07/2018 13:26 by Dr. Petra Mckinley MD.          Culture:         Assessment   1.  End-stage renal disease, currently seen on hemodialysis.  2.  Type 2 diabetes with nephropathy  3.  Bilateral pneumonia  4.  Chronic diastolic congestive heart failure  5.  S/p ventricular tachycardia on 10/10  6.  Metabolic acidosis  7.  Secondary hyperparathyroidism  8.  Nausea  9.  Anemia     Plan:  1.  Tolerating hemodialysis very well.  2.  Outpatient hemodialysis placement pending        Ulises Dejesus MD  10/22/2018  3:51 PM

## 2018-10-22 NOTE — PROGRESS NOTES
[facility]  INPATIENT PROGRESS NOTE    Patient Name: Gavin Wilson  Date of Admission: 10/6/2018  Today's Date: 10/22/18  Length of Stay: 16  Primary Care Physician: Provider, No Known    Subjective   Chief Complaint: No new problems.  Wants food from outside the hospital.   HPI   No chest pain.  No shortness of breath.  Some nausea occasionally.      Review of Systems     All pertinent negatives and positives are as above. All other systems have been reviewed and are negative unless otherwise stated.     Objective    Temp:  [98 °F (36.7 °C)-98.7 °F (37.1 °C)] 98 °F (36.7 °C)  Heart Rate:  [78-86] 81  Resp:  [16-20] 16  BP: (100-124)/(48-64) 115/56  Physical Exam   Constitutional: He is oriented to person, place, and time. He appears well-developed and well-nourished.   HENT:   Head: Normocephalic and atraumatic.   Eyes: Pupils are equal, round, and reactive to light. Conjunctivae and EOM are normal.   Neck: Normal range of motion. Neck supple. No JVD present.   Cardiovascular: Normal rate, regular rhythm, normal heart sounds and intact distal pulses.    Pulmonary/Chest: Effort normal and breath sounds normal.   Abdominal: Soft. Bowel sounds are normal.   Musculoskeletal: Normal range of motion.   Neurological: He is alert and oriented to person, place, and time.   Skin: Skin is warm and dry.   Psychiatric: He has a normal mood and affect. His behavior is normal.           Results Review:  I have reviewed the labs, radiology results, and diagnostic studies.    Laboratory Data:     Results from last 7 days  Lab Units 10/21/18  0503   WBC 10*3/mm3 4.59*   HEMOGLOBIN g/dL 8.2*   HEMATOCRIT % 26.2*   PLATELETS 10*3/mm3 166          Results from last 7 days  Lab Units 10/22/18  0549 10/21/18  0503 10/20/18  0413   SODIUM mmol/L 138 136 138   POTASSIUM mmol/L 5.3 5.4* 4.7   CHLORIDE mmol/L 103 103 104   CO2 mmol/L 25.0 27.0 27.0   BUN mg/dL 42* 35* 19   CREATININE  mg/dL 4.02* 3.24* 2.21*   CALCIUM mg/dL 8.7 9.2 8.7   GLUCOSE mg/dL 131* 119* 104*       Culture Data:        Radiology Data:   Imaging Results (last 24 hours)     ** No results found for the last 24 hours. **          I have reviewed the patient's current medications.     Assessment/Plan     Active Hospital Problems    Diagnosis   • **Acute on chronic respiratory failure with hypoxia (CMS/HCC)   • Coronary artery disease involving native coronary artery of native heart without angina pectoris   • History of coronary artery stent placement   • Mild aortic valve stenosis   • Moderate aortic valve insufficiency   • Mild mitral valve regurgitation   • Pneumonia   • COPD (chronic obstructive pulmonary disease) (CMS/[facility])   • Sepsis (CMS/[facility])   • Acute renal failure superimposed on stage 4 chronic kidney disease (CMS/[facility])   • Acute on chronic combined systolic and diastolic congestive heart failure (CMS/[facility])   • Pleural effusion, bilateral     Assessment    1) Acute hypoxic respiratory failure, resolved  2) Sepsis due to suspected health care associated pneumonia - blood culture and pleural fluid culture showed no growth to date  3) Bilateral pleural effusions - Right is recurrent, left is worsening -status this post thoracentesis on October 7; Suspected due to heart failure, but cannot rule out infection or malignancy   4) Severe protein-calorie malnutrition  5) COPD without exacerbation  6) Combined systolic and diastolic heart failure, 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 Diff - cont po vanc day 5 in terms of dosing   14)Hypoglycemia (50) - resolved; hx of Diabetes -  Patient encourage to eat.  Will continue to monitor; prn d50; will  consider /dc pre meal insulin and maintain only on ssi       Plan    Will continue current medications  Vancomycin will be done Wednesday.  Plans for dc to NH with chair time for dialysis.       Discharge Planning: I expect the patient to be discharged to NH in 2 days.    Nancy Salamanca,    10/22/18   5:18 PM

## 2018-10-22 NOTE — PLAN OF CARE
Problem: Patient Care Overview  Goal: Plan of Care Review  Outcome: Ongoing (interventions implemented as appropriate)   10/22/18 3192   OTHER   Outcome Summary Pt. progressing fairly, weak and fatigues easily, cga-mod. assist from this keenan/l required!   Coping/Psychosocial   Plan of Care Reviewed With patient

## 2018-10-22 NOTE — PLAN OF CARE
Problem: Pneumonia (Adult)  Goal: Signs and Symptoms of Listed Potential Problems Will be Absent, Minimized or Managed (Pneumonia)  Outcome: Ongoing (interventions implemented as appropriate)   10/20/18 1817   Goal/Outcome Evaluation   Problems Assessed (Pneumonia) all   Problems Present (Pneumonia) none       Problem: Fall Risk (Adult)  Goal: Absence of Fall  Outcome: Ongoing (interventions implemented as appropriate)   10/20/18 1817   Fall Risk (Adult)   Absence of Fall achieves outcome       Problem: Skin Injury Risk (Adult)  Goal: Skin Health and Integrity  Outcome: Ongoing (interventions implemented as appropriate)   10/22/18 0420   Skin Injury Risk (Adult)   Skin Health and Integrity making progress toward outcome       Problem: Patient Care Overview  Goal: Plan of Care Review   10/22/18 0420   Plan of Care Review   Progress no change   OTHER   Outcome Summary VSS. NSR 79-86. Pt turned q2 off coccyx. C/o back pain, medicated per MAR. Contact precautions maintained for cdiff. Dialysis to be done today. Plans for d/c to [place] on wednesday. Will continue to monitor,    Coping/Psychosocial   Plan of Care Reviewed With patient       Problem: Hemodialysis (Adult)  Goal: Signs and Symptoms of Listed Potential Problems Will be Absent, Minimized or Managed (Hemodialysis)  Outcome: Ongoing (interventions implemented as appropriate)   10/19/18 0447 10/19/18 1723   Goal/Outcome Evaluation   Problems Assessed (Hemodialysis) --  all   Problems Present (Hemodialysis) situational response --

## 2018-10-22 NOTE — PLAN OF CARE
Problem: Patient Care Overview  Goal: Plan of Care Review  Outcome: Ongoing (interventions implemented as appropriate)   10/22/18 0124   OTHER   Outcome Summary PT completed re-eval. He continues to demo weakness with activity and decreased activity tolerance/endurance. He continues with dialysis and has c-diff. PT will continue to progress strengthening and gait. He plans to d/c to [facility] this Wednesday for further therapy.    Coping/Psychosocial   Plan of Care Reviewed With patient

## 2018-10-22 NOTE — THERAPY RE-EVALUATION
Acute Care - Physical Therapy Re-Evaluation  [facility]     Patient Name: Gavin Wilson  : 1945  MRN: [id number]  Today's Date: 10/22/2018   Onset of Illness/Injury or Date of Surgery: 10/06/18  Date of Referral to PT: 10/08/18  Referring Physician: Dr. Michael      Admit Date: 10/6/2018    Visit Dx:     ICD-10-CM ICD-9-CM   1. Esophageal dysphagia R13.10 787.20   2. Impaired mobility and ADLs Z74.09 799.89   3. Impaired functional mobility, balance, gait, and endurance Z74.09 V49.89   4. Chronic kidney disease, stage 4 (severe) (CMS/HCC) N18.4 585.4     Patient Active Problem List   Diagnosis   • Volume overload   • Acute renal failure superimposed on stage 4 chronic kidney disease (CMS/HCC)   • Acute on chronic combined systolic and diastolic congestive heart failure (CMS/HCC)   • Pleural effusion, bilateral   • Sepsis (CMS/[facility])   • Coronary artery disease involving native coronary artery of native heart without angina pectoris   • History of coronary artery stent placement   • Mild aortic valve stenosis   • Moderate aortic valve insufficiency   • Mild mitral valve regurgitation   • Acute on chronic respiratory failure with hypoxia (CMS/[facility])   • Pneumonia   • COPD (chronic obstructive pulmonary disease) (CMS/HCC)     Past Medical History:   Diagnosis Date   • CHF (congestive heart failure) (CMS/HCC)    • Coronary stent occlusion    • Diabetes mellitus (CMS/HCC)    • Hyperlipidemia    • Hypertension    • Stroke (CMS/HCC)      Past Surgical History:   Procedure Laterality Date   • BLADDER SURGERY     • ESOPHAGECTOMY     • INSERTION HEMODIALYSIS CATHETER N/A 10/11/2018    Procedure: HEMODIALYSIS CATHETER INSERTION;  Surgeon: Hugo Bonilla MD;  Location: [place] [address];  Service: Vascular        PT ASSESSMENT (last 12 hours)      Physical Therapy Evaluation     Row Name 10/22/18 0942          PT Evaluation Time/Intention    Subjective Information complains of;weakness;fatigue  -JACEY     Document  Type re-evaluation  -JACEY     Mode of Treatment physical therapy  -JACEY     Row Name 10/22/18 0942          General Information    Patient Profile Reviewed? yes  -JACEY     Patient Observations alert;cooperative;agree to therapy  -JACEY     Patient/Family Observations no family present  -JACEY     General Observations of Patient Fowlers, alert, just finished bath with OT and returned to bed.   -JACEY     Pertinent History of Current Functional Problem transferred from outlying facility, pt reports falling yesterday and unable to get up for two hours. dx: acute resp failure, DM, kidney disease, c-diff  -JACEY     Existing Precautions/Restrictions fall   urostomy, c-diff  -JACEY     Risks Reviewed patient:;LOB;nausea/vomiting;dizziness;increased discomfort;change in vital signs  -JACEY     Benefits Reviewed patient:;improve function;increase independence;increase strength;increase balance;decrease pain;increase knowledge;improve skin integrity  -JACEY     Barriers to Rehab medically complex  -JACEY     Row Name 10/22/18 0942          Cognitive Assessment/Intervention- PT/OT    Affect/Mental Status (Cognitive) WFL  -JACEY     Orientation Status (Cognition) oriented to;person;place;situation  -JACEY     Follows Commands (Cognition) follows multi-step commands;over 90% accuracy  -JACEY     Personal Safety Interventions fall prevention program maintained;gait belt;muscle strengthening facilitated;nonskid shoes/slippers when out of bed  -JACEY     Row Name 10/22/18 0942          Safety Issues, Functional Mobility    Impairments Affecting Function (Mobility) balance;endurance/activity tolerance;strength  -JACEY     Row Name 10/22/18 0942          Bed Mobility Assessment/Treatment    Bed Mobility Assessment/Treatment supine-sit;sit-supine  -JACEY     Supine-Sit [place] (Bed Mobility) minimum assist (75% patient effort)  -JACEY     Sit-Supine [place] (Bed Mobility) minimum assist (75% patient effort)  -JACEY     Assistive Device (Bed Mobility) head of bed elevated  -JACEY      Row Name 10/22/18 0942          Transfer Assessment/Treatment    Comment (Transfers) Pt. just returned to bed from shower with OT, pt. requested to rest before dialysis today.   -JACEY     Row Name 10/22/18 0942          General ROM    GENERAL ROM COMMENTS B LE AROM impaired ~ 25%  -JACYE     Row Name 10/22/18 0942          MMT (Manual Muscle Testing)    General MMT Comments B LE functionally 3+/5 - 4-/5  -JACEY     Row Name 10/22/18 0942          Motor Assessment/Intervention    Additional Documentation Balance (Group)  -JACEY     Row Name 10/22/18 0942          Balance    Balance static sitting balance  -JACEY     Row Name 10/22/18 0942          Static Sitting Balance    Level of [place] (Unsupported Sitting, Static Balance) minimal assist, 75% patient effort  -JACEY     Sitting Position (Unsupported Sitting, Static Balance) long sitting on bed  -JACEY     Row Name 10/22/18 0942          Sensory Assessment/Intervention    Sensory General Assessment light touch sensation deficits identified   reports diminished sensation in B LE  -JACEY Tejeda Name 10/22/18 0942          Pain Assessment    Additional Documentation Pain Scale: FACES Pre/Post-Treatment (Group)  -JACEY Tejeda Name 10/22/18 0942          Pain Scale: FACES Pre/Post-Treatment    Pain: FACES Scale, Pretreatment 0-->no hurt  -JACEY Tejeda Name 10/22/18 0942          Health Promotion    Additional Documentation Coping (Group);Plan of Care Review (Group)  -JACEY Tejeda Name             Wound 10/07/18 1255 Bilateral posterior coccyx    Wound - Properties Group Date first assessed: 10/07/18  -SH Time first assessed: 1255  -SH Side: Bilateral  -SH Orientation: posterior  -SH Location: coccyx  -SH    Row Name             Wound 10/11/18 0831 Other (See comments) chest incision    Wound - Properties Group Date first assessed: 10/11/18  -DS Time first assessed: 0831  -DS Side: Other (See comments)  -DS Location: chest  -DS Type: incision  -DS    Row Name 10/22/18 0942          Coping     Observed Emotional State accepting;cooperative  -JACEY     Row Name 10/22/18 0942          Plan of Care Review    Plan of Care Reviewed With patient  -JACEY     Row Name 10/22/18 0942          Physical Therapy Clinical Impression    Patient/Family Goals Statement (PT Clinical Impression) Go to SNF this Wednesday  -JACEY     Criteria for Skilled Interventions Met (PT Clinical Impression) yes;treatment indicated  -JACEY     Impairments Found (describe specific impairments) gait, locomotion, and balance;aerobic capacity/endurance  -JACEY     Functional Limitations in Following Categories (Describe Specific Limitations) self-care;home management  -JACEY     Disability: Inability to Perform Actions/Activities of Required Roles (describe specific disability) community/leisure  -JACEY     Rehab Potential (PT Clinical Summary) good, to achieve stated therapy goals  -JACEY     Predicted Duration of Therapy (PT) until d/c  -JACEY     Care Plan Review (PT) evaluation/treatment results reviewed;risks/benefits reviewed;current/potential barriers reviewed;patient/other agree to care plan  -JACEY     Row Name 10/22/18 0942          Physical Therapy Goals    Bed Mobility Goal Selection (PT) bed mobility, PT goal 1  -JACEY     Transfer Goal Selection (PT) transfer, PT goal 1  -JACEY     Gait Training Goal Selection (PT) gait training, PT goal 1  -JACEY     Row Name 10/22/18 0942          Bed Mobility Goal 1 (PT)    Progress/Outcomes (Bed Mobility Goal 1, PT) goal not met;goal ongoing  -JACEY     Row Name 10/22/18 0942          Transfer Goal 1 (PT)    Progress/Outcome (Transfer Goal 1, PT) goal not met;goal ongoing  -JACEY     Row Name 10/22/18 0942          Gait Training Goal 1 (PT)    Progress/Outcome (Gait Training Goal 1, PT) goal not met;goal ongoing  -JACEY     Row Name 10/22/18 0942          Patient Education Goal (PT)    Progress/Outcome (Patient Education Goal, PT) goal not met;goal ongoing  -JACEY     Row Name 10/22/18 0942          Positioning and Restraints     Pre-Treatment Position in bed  -JACEY     Post Treatment Position bed  -JACEY     In Bed notified nsg;fowlers;call light within reach;encouraged to call for assist;legs elevated  -JACEY       User Key  (r) = Recorded By, (t) = Taken By, (c) = Cosigned By    Initials Name Provider Type    Richar Pedroza, PT DPT Physical Therapist    Virginia Diallo, RN Registered Nurse    Jeremy Almaraz RN Registered Nurse          Physical Therapy Education     Title: PT OT SLP Therapies (Active)     Topic: Physical Therapy (Active)     Point: Mobility training (Done)    Learning Progress Summary     Learner Status Readiness Method Response Comment Documented by    Patient Done Acceptance E VU,NR Educated pt. on progression of PT POC and benefits of activity JACEY 10/22/18 0942     Done Nonacceptance E NR,VU BOA WK 10/18/18 1332     Done Acceptance E VU BOA WK 10/17/18 1539     Active Acceptance E,D NR transfers and benefits of activity MF 10/16/18 1035     Active Acceptance E,D NR Educated on benefits of activity. Intructed in safety with bed mobility, transfers,and LE Strengthening Exercises. LG 10/14/18 0918     Done Acceptance E VU,NR benefits of activity MF 10/12/18 1206     Active Acceptance E,D RT Effecient supine to sit EARLENE 10/09/18 1154     Done Acceptance E VU,NR Pt was educated on the importance and benefits of getting out of bed and how it affects his overall health. Pt was educated on why PT is working with him. Pt was encouraged to do more in treatment session. TD 10/08/18 1430     Done Acceptance E VU,NR Pt  was educated on benefits ot PT and PT POC. Pt was edcuated on body mechanics with transfers (pushing with one arm on bed to stand up/having legs touch bed before sitting down) and safety precautions (socks on, gait belt on) with transfers and gait. TD 10/08/18 1149          Point: Body mechanics (Active)    Learning Progress Summary     Learner Status Readiness Method Response Comment Documented by     Patient Active Acceptance E,D NR Educated on benefits of activity. Intructed in safety with bed mobility, transfers,and LE Strengthening Exercises. LG 10/14/18 0918     Done Acceptance E VU,NR Pt was educated on the importance and benefits of getting out of bed and how it affects his overall health. Pt was educated on why PT is working with him. Pt was encouraged to do more in treatment session. TD 10/08/18 1430     Done Acceptance E VU,NR Pt  was educated on benefits ot PT and PT POC. Pt was edcuated on body mechanics with transfers (pushing with one arm on bed to stand up/having legs touch bed before sitting down) and safety precautions (socks on, gait belt on) with transfers and gait. TD 10/08/18 1149          Point: Precautions (Active)    Learning Progress Summary     Learner Status Readiness Method Response Comment Documented by    Patient Active Acceptance E,D NR Educated on benefits of activity. Intructed in safety with bed mobility, transfers,and LE Strengthening Exercises. LG 10/14/18 0918     Done Acceptance E VU,NR Pt was educated on the importance and benefits of getting out of bed and how it affects his overall health. Pt was educated on why PT is working with him. Pt was encouraged to do more in treatment session. TD 10/08/18 1430     Done Acceptance E VU,NR Pt  was educated on benefits ot PT and PT POC. Pt was edcuated on body mechanics with transfers (pushing with one arm on bed to stand up/having legs touch bed before sitting down) and safety precautions (socks on, gait belt on) with transfers and gait. TD 10/08/18 1149                      User Key     Initials Effective Dates Name Provider Type Discipline    LG 08/02/16 -  Roberto Pereira, PTA Physical Therapy Assistant PT    JACEY 08/02/16 -  Richar Perdue PT DPT Physical Therapist PT    EARLENE 08/02/16 -  Kaylynn Mcgarry PTA Physical Therapy Assistant PT    MF 08/02/16 -  Liliane Collado, PTA Physical Therapy Assistant PT    WK 08/02/16 -   Bernice Rico, PTA Physical Therapy Assistant PT    TD 09/07/18 -  Rita Grullon, PT Student PT Student PT                PT Recommendation and Plan  Anticipated Discharge Disposition (PT): skilled nursing facility  Planned Therapy Interventions (PT Eval): bed mobility training, transfer training, gait training, balance training, home exercise program, patient/family education, postural re-education, strengthening  Therapy Frequency (PT Clinical Impression): daily  Outcome Summary/Treatment Plan (PT)  Anticipated Discharge Disposition (PT): skilled nursing facility  Plan of Care Reviewed With: patient  Outcome Summary: PT completed re-eval. He continues to demo weakness with activity and decreased activity tolerance/endurance. He continues with dialysis and has c-diff. PT will continue to progress strengthening and gait. He plans to d/c to [facility] this Wednesday for further therapy.           Outcome Measures     Row Name 10/22/18 0942 10/19/18 1100          How much help from another person do you currently need...    Turning from your back to your side while in flat bed without using bedrails? 3  -JACEY  --     Moving from lying on back to sitting on the side of a flat bed without bedrails? 3  -JACEY  --     Moving to and from a bed to a chair (including a wheelchair)? 3  -JACEY  --     Standing up from a chair using your arms (e.g., wheelchair, bedside chair)? 3  -JACEY  --     Climbing 3-5 steps with a railing? 2  -JACEY  --     To walk in hospital room? 2  -JACEY  --     AM-PAC 6 Clicks Score 16  -JACEY  --        How much help from another is currently needed...    Putting on and taking off regular lower body clothing?  -- 2  -MW     Bathing (including washing, rinsing, and drying)  -- 2  -MW     Toileting (which includes using toilet bed pan or urinal)  -- 3  -MW     Putting on and taking off regular upper body clothing  -- 3  -MW     Taking care of personal grooming (such as brushing teeth)  -- 3  -MW     Eating meals   -- 4  -MW     Score  -- 17  -MW        Functional Assessment    Outcome Measure Options AM-PAC 6 Clicks Basic Mobility (PT)  -JACEY AM-PAC 6 Clicks Daily Activity (OT)  -MW       User Key  (r) = Recorded By, (t) = Taken By, (c) = Cosigned By    Initials Name Provider Type    Richar Pedroza, PT DPT Physical Therapist    MW Sharee Becerra, OTR/L Occupational Therapist           Time Calculation:         PT Charges     Row Name 10/22/18 1045             Time Calculation    Start Time 0942  -JACEY      Stop Time 1025  -JACEY      Time Calculation (min) 43 min  -JACEY      PT Received On 10/22/18  -JACEY      PT Goal Re-Cert Due Date 11/01/18  -JACEY         Time Calculation- PT    Total Timed Code Minutes- PT 13 minute(s)  -JACEY         Timed Charges    99614 - PT Therapeutic Activity Minutes 13  -JACEY        User Key  (r) = Recorded By, (t) = Taken By, (c) = Cosigned By    Initials Name Provider Type    Richar Pedroza, PT DPT Physical Therapist        Therapy Suggested Charges     Code   Minutes Charges    48382 (CPT®) Hc Pt Neuromusc Re Education Ea 15 Min      75369 (CPT®) Hc Pt Ther Proc Ea 15 Min      88643 (CPT®) Hc Gait Training Ea 15 Min      77325 (CPT®) Hc Pt Therapeutic Act Ea 15 Min 13 1    08720 (CPT®) Hc Pt Manual Therapy Ea 15 Min      03127 (CPT®) Hc Pt Iontophoresis Ea 15 Min      38184 (CPT®) Hc Pt Elec Stim Ea-Per 15 Min      19396 (CPT®) Hc Pt Ultrasound Ea 15 Min      38782 (CPT®) Hc Pt Self Care/Mgmt/Train Ea 15 Min      03194 (CPT®) Hc Pt Prosthetic (S) Train Initial Encounter, Each 15 Min      27286 (CPT®) Hc Pt Orthotic(S)/Prosthetic(S) Encounter, Each 15 Min      18259 (CPT®) Hc Orthotic(S) Mgmt/Train Initial Encounter, Each 15min      Total  13 1        Therapy Charges for Today     Code Description Service Date Service Provider Modifiers Qty    36788229924 HC PT MOBILITY CURRENT 10/22/2018 Richar Perdue, PT DPT GP, CK 1    99981255473 HC PT MOBILITY PROJECTED 10/22/2018 Richar Perdue, PT DPT  GP, CJ 1    [id number]  PT THERAPEUTIC ACT EA 15 MIN 10/22/2018 Richar Perdue, PT DPT GP, KX 1    [id number]  PT RE-EVAL ESTABLISHED PLAN 2 10/22/2018 Richar Perdue, PT DPT GP, KX 1          PT G-Codes  PT Professional Judgement Used?: Yes  Outcome Measure Options: AM-PAC 6 Clicks Basic Mobility (PT)  AM-PAC 6 Clicks Score: 16  Score: 17  Functional Limitation: Mobility: Walking and moving around  Mobility: Walking and Moving Around Current Status (): At least 40 percent but less than 60 percent impaired, limited or restricted  Mobility: Walking and Moving Around Goal Status (): At least 20 percent but less than 40 percent impaired, limited or restricted      Richar Perdue, PT DPT  10/22/2018

## 2018-10-22 NOTE — PROGRESS NOTES
Continued Stay Note   Jie     Patient Name: Gavin Wilson  MRN: [id number]  Today's Date: 10/22/2018    Admit Date: 10/6/2018          Discharge Plan     Row Name 10/22/18 0844       Plan    Plan [place]; SKILLED LEVEL    Patient/Family in Agreement with Plan yes    Plan Comments REVIEWED CHART; EVENTS NOTED.  WILL CONT. TO FOLLOW TO COORDINATE PT'S TRANSFER TO NH ANTICIPATED WEDNESDAY.                Discharge Codes    No documentation.           ILNDSEY Larose

## 2018-10-23 LAB
ALBUMIN SERPL-MCNC: 2.9 G/DL (ref 3.5–5)
ALBUMIN/GLOB SERPL: 1.2 G/DL (ref 1.1–2.5)
ALP SERPL-CCNC: 63 U/L (ref 24–120)
ALT SERPL W P-5'-P-CCNC: 26 U/L (ref 0–54)
ANION GAP SERPL CALCULATED.3IONS-SCNC: 7 MMOL/L (ref 4–13)
AST SERPL-CCNC: 11 U/L (ref 7–45)
BASOPHILS # BLD AUTO: 0.05 10*3/MM3 (ref 0–0.2)
BASOPHILS NFR BLD AUTO: 0.9 % (ref 0–2)
BILIRUB SERPL-MCNC: 0.4 MG/DL (ref 0.1–1)
BUN BLD-MCNC: 18 MG/DL (ref 5–21)
BUN/CREAT SERPL: 8.1 (ref 7–25)
CALCIUM SPEC-SCNC: 8.7 MG/DL (ref 8.4–10.4)
CHLORIDE SERPL-SCNC: 99 MMOL/L (ref 98–110)
CO2 SERPL-SCNC: 30 MMOL/L (ref 24–31)
CREAT BLD-MCNC: 2.21 MG/DL (ref 0.5–1.4)
DEPRECATED RDW RBC AUTO: 63.7 FL (ref 40–54)
EOSINOPHIL # BLD AUTO: 0.09 10*3/MM3 (ref 0–0.7)
EOSINOPHIL NFR BLD AUTO: 1.6 % (ref 0–4)
ERYTHROCYTE [DISTWIDTH] IN BLOOD BY AUTOMATED COUNT: 19.3 % (ref 12–15)
GFR SERPL CREATININE-BSD FRML MDRD: 29 ML/MIN/1.73
GLOBULIN UR ELPH-MCNC: 2.5 GM/DL
GLUCOSE BLD-MCNC: 103 MG/DL (ref 70–100)
GLUCOSE BLDC GLUCOMTR-MCNC: 122 MG/DL (ref 70–130)
GLUCOSE BLDC GLUCOMTR-MCNC: 181 MG/DL (ref 70–130)
GLUCOSE BLDC GLUCOMTR-MCNC: 320 MG/DL (ref 70–130)
HCT VFR BLD AUTO: 25.3 % (ref 40–52)
HGB BLD-MCNC: 8.1 G/DL (ref 14–18)
IMM GRANULOCYTES # BLD: 0.02 10*3/MM3 (ref 0–0.03)
IMM GRANULOCYTES NFR BLD: 0.4 % (ref 0–5)
LYMPHOCYTES # BLD AUTO: 0.72 10*3/MM3 (ref 0.72–4.86)
LYMPHOCYTES NFR BLD AUTO: 13 % (ref 15–45)
MCH RBC QN AUTO: 29.3 PG (ref 28–32)
MCHC RBC AUTO-ENTMCNC: 32 G/DL (ref 33–36)
MCV RBC AUTO: 91.7 FL (ref 82–95)
MONOCYTES # BLD AUTO: 0.63 10*3/MM3 (ref 0.19–1.3)
MONOCYTES NFR BLD AUTO: 11.4 % (ref 4–12)
NEUTROPHILS # BLD AUTO: 4.02 10*3/MM3 (ref 1.87–8.4)
NEUTROPHILS NFR BLD AUTO: 72.7 % (ref 39–78)
NRBC BLD MANUAL-RTO: 0 /100 WBC (ref 0–0)
PLATELET # BLD AUTO: 162 10*3/MM3 (ref 130–400)
PMV BLD AUTO: 9.8 FL (ref 6–12)
POTASSIUM BLD-SCNC: 4.4 MMOL/L (ref 3.5–5.3)
PROT SERPL-MCNC: 5.4 G/DL (ref 6.3–8.7)
RBC # BLD AUTO: 2.76 10*6/MM3 (ref 4.8–5.9)
SODIUM BLD-SCNC: 136 MMOL/L (ref 135–145)
WBC NRBC COR # BLD: 5.53 10*3/MM3 (ref 4.8–10.8)

## 2018-10-23 PROCEDURE — 80053 COMPREHEN METABOLIC PANEL: CPT | Performed by: INTERNAL MEDICINE

## 2018-10-23 PROCEDURE — 63710000001 INSULIN LISPRO (HUMAN) PER 5 UNITS: Performed by: INTERNAL MEDICINE

## 2018-10-23 PROCEDURE — 25010000002 ONDANSETRON PER 1 MG: Performed by: NURSE PRACTITIONER

## 2018-10-23 PROCEDURE — 97110 THERAPEUTIC EXERCISES: CPT

## 2018-10-23 PROCEDURE — 82962 GLUCOSE BLOOD TEST: CPT

## 2018-10-23 PROCEDURE — 97116 GAIT TRAINING THERAPY: CPT

## 2018-10-23 PROCEDURE — 85025 COMPLETE CBC W/AUTO DIFF WBC: CPT | Performed by: INTERNAL MEDICINE

## 2018-10-23 PROCEDURE — 94799 UNLISTED PULMONARY SVC/PX: CPT

## 2018-10-23 PROCEDURE — 25010000002 HEPARIN (PORCINE) PER 1000 UNITS: Performed by: INTERNAL MEDICINE

## 2018-10-23 RX ADMIN — VANCOMYCIN HYDROCHLORIDE 125 MG: KIT at 12:56

## 2018-10-23 RX ADMIN — ISOSORBIDE DINITRATE 10 MG: 10 TABLET ORAL at 17:20

## 2018-10-23 RX ADMIN — ONDANSETRON HYDROCHLORIDE 4 MG: 2 INJECTION INTRAMUSCULAR; INTRAVENOUS at 05:53

## 2018-10-23 RX ADMIN — ATORVASTATIN CALCIUM 80 MG: 40 TABLET, FILM COATED ORAL at 10:17

## 2018-10-23 RX ADMIN — Medication 1 TABLET: at 09:42

## 2018-10-23 RX ADMIN — HYDROCODONE BITARTRATE AND ACETAMINOPHEN 1 TABLET: 10; 325 TABLET ORAL at 10:18

## 2018-10-23 RX ADMIN — ASPIRIN 81 MG CHEWABLE TABLET 81 MG: 81 TABLET CHEWABLE at 10:19

## 2018-10-23 RX ADMIN — ISOSORBIDE DINITRATE 10 MG: 10 TABLET ORAL at 10:16

## 2018-10-23 RX ADMIN — VANCOMYCIN HYDROCHLORIDE 125 MG: KIT at 05:53

## 2018-10-23 RX ADMIN — BUDESONIDE AND FORMOTEROL FUMARATE DIHYDRATE 2 PUFF: 160; 4.5 AEROSOL RESPIRATORY (INHALATION) at 20:40

## 2018-10-23 RX ADMIN — INSULIN LISPRO 9 UNITS: 100 INJECTION, SOLUTION INTRAVENOUS; SUBCUTANEOUS at 12:55

## 2018-10-23 RX ADMIN — FERRIC CITRATE 210 MG: 210 TABLET, COATED ORAL at 12:56

## 2018-10-23 RX ADMIN — Medication 3 ML: at 10:20

## 2018-10-23 RX ADMIN — Medication 3 ML: at 21:21

## 2018-10-23 RX ADMIN — ONDANSETRON HYDROCHLORIDE 4 MG: 2 INJECTION INTRAMUSCULAR; INTRAVENOUS at 17:15

## 2018-10-23 RX ADMIN — VANCOMYCIN HYDROCHLORIDE 125 MG: KIT at 01:28

## 2018-10-23 RX ADMIN — INSULIN LISPRO 6 UNITS: 100 INJECTION, SOLUTION INTRAVENOUS; SUBCUTANEOUS at 17:19

## 2018-10-23 RX ADMIN — NICOTINE 1 PATCH: 21 PATCH, EXTENDED RELEASE TRANSDERMAL at 21:19

## 2018-10-23 RX ADMIN — INSULIN LISPRO 6 UNITS: 100 INJECTION, SOLUTION INTRAVENOUS; SUBCUTANEOUS at 12:55

## 2018-10-23 RX ADMIN — Medication 3 ML: at 17:16

## 2018-10-23 RX ADMIN — HYDROCODONE BITARTRATE AND ACETAMINOPHEN 1 TABLET: 10; 325 TABLET ORAL at 21:19

## 2018-10-23 RX ADMIN — FERRIC CITRATE 210 MG: 210 TABLET, COATED ORAL at 10:18

## 2018-10-23 RX ADMIN — AMIODARONE HYDROCHLORIDE 200 MG: 200 TABLET ORAL at 10:19

## 2018-10-23 RX ADMIN — INSULIN LISPRO 6 UNITS: 100 INJECTION, SOLUTION INTRAVENOUS; SUBCUTANEOUS at 17:20

## 2018-10-23 RX ADMIN — FERRIC CITRATE 210 MG: 210 TABLET, COATED ORAL at 17:19

## 2018-10-23 RX ADMIN — HEPARIN SODIUM 5000 UNITS: 5000 INJECTION, SOLUTION INTRAVENOUS; SUBCUTANEOUS at 21:19

## 2018-10-23 RX ADMIN — HEPARIN SODIUM 5000 UNITS: 5000 INJECTION, SOLUTION INTRAVENOUS; SUBCUTANEOUS at 10:17

## 2018-10-23 RX ADMIN — BUDESONIDE AND FORMOTEROL FUMARATE DIHYDRATE 2 PUFF: 160; 4.5 AEROSOL RESPIRATORY (INHALATION) at 07:24

## 2018-10-23 RX ADMIN — HYDROCODONE BITARTRATE AND ACETAMINOPHEN 1 TABLET: 10; 325 TABLET ORAL at 15:12

## 2018-10-23 RX ADMIN — CALCITRIOL CAPSULES 0.25 MCG 0.5 MCG: 0.25 CAPSULE ORAL at 10:19

## 2018-10-23 NOTE — PROGRESS NOTES
[facility] Medicine Services  INPATIENT PROGRESS NOTE    Patient Name: Gavin Wilson  Date of Admission: 10/6/2018  Today's Date: 10/23/18  Length of Stay: 17  Primary Care Physician: Provider, No Known    Subjective   Chief Complaint: Can't hear   No pain.    HPI   No nasuea   Eating lunch   No chest pain.  No shortness of breath.        Review of Systems     All pertinent negatives and positives are as above. All other systems have been reviewed and are negative unless otherwise stated.     Objective    Temp:  [97 °F (36.1 °C)-97.9 °F (36.6 °C)] 97.7 °F (36.5 °C)  Heart Rate:  [67-88] 88  Resp:  [16-20] 18  BP: ()/(42-52) 94/42  Physical Exam   Constitutional: He is oriented to person, place, and time. He appears well-developed and well-nourished.   HENT:   Head: Normocephalic and atraumatic.   Eyes: Pupils are equal, round, and reactive to light. Conjunctivae and EOM are normal.   Neck: Normal range of motion. Neck supple. No JVD present.   Cardiovascular: Normal rate, regular rhythm, normal heart sounds and intact distal pulses.    Pulmonary/Chest: Effort normal and breath sounds normal.   Abdominal: Soft. Bowel sounds are normal.   Musculoskeletal: Normal range of motion.   Neurological: He is alert and oriented to person, place, and time.   Skin: Skin is warm and dry.   Psychiatric: He has a normal mood and affect. His behavior is normal.           Results Review:  I have reviewed the labs, radiology results, and diagnostic studies.    Laboratory Data:     Results from last 7 days  Lab Units 10/23/18  0505 10/21/18  0503   WBC 10*3/mm3 5.53 4.59*   HEMOGLOBIN g/dL 8.1* 8.2*   HEMATOCRIT % 25.3* 26.2*   PLATELETS 10*3/mm3 162 166          Results from last 7 days  Lab Units 10/23/18  0505 10/22/18  0549 10/21/18  0503   SODIUM mmol/L 136 138 136   POTASSIUM mmol/L 4.4 5.3 5.4*   CHLORIDE mmol/L 99 103 103   CO2 mmol/L 30.0 25.0 27.0   BUN mg/dL 18 42* 35*   CREATININE  mg/dL 2.21* 4.02* 3.24*   CALCIUM mg/dL 8.7 8.7 9.2   BILIRUBIN mg/dL 0.4  --   --    ALK PHOS U/L 63  --   --    ALT (SGPT) U/L 26  --   --    AST (SGOT) U/L 11  --   --    GLUCOSE mg/dL 103* 131* 119*       Culture Data:        Radiology Data:   Imaging Results (last 24 hours)     ** No results found for the last 24 hours. **          I have reviewed the patient's current medications.     Assessment/Plan     \Assessment     1) Acute hypoxic respiratory failure, resolved  2) Sepsis due to suspected health care associated pneumonia - blood culture and pleural fluid culture showed no growth to date  3) Bilateral pleural effusions - Right is recurrent, left is worsening -status this post thoracentesis on October 7; Suspected due to heart failure, but cannot rule out infection or malignancy   4) Severe protein-calorie malnutrition  5) COPD without exacerbation  6) Combined systolic and diastolic heart failure, 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 Diff - cont po vanc day 5 in terms of dosing   14)Hypoglycemia (50) - resolved; hx of Diabetes -  Patient encourage to eat.  Will continue to monitor; prn d50; will consider /dc pre meal insulin and maintain only on ssi        Plan    Last dose of vancomycin in AM  Dialysis tomorrow.  TO NH post dialysis.    Discharge Planning: I expect the patient to be discharged to NH in 1 days.    Nancy Salamanca DO   10/23/18   2:43 PM

## 2018-10-23 NOTE — PLAN OF CARE
Problem: Patient Care Overview  Goal: Plan of Care Review  Outcome: Ongoing (interventions implemented as appropriate)   10/23/18 1200   Plan of Care Review   Progress improving   OTHER   Outcome Summary Pt was in bed upon arrival and agreed to therapy. Pt supine to sitting EOB was done min A x1 and verbal cues. Pt went from sittiing EOB to standing with CGA and front rolling walker. Pt ambulated 60' with CGA and front rolling walker. Pt states that this is the best he has felt walking in a while. Pt stand to sit was done with CGA and verbal cues. Pt was left in fowlers with call light near.   Coping/Psychosocial   Plan of Care Reviewed With patient

## 2018-10-23 NOTE — PLAN OF CARE
Problem: Patient Care Overview  Goal: Plan of Care Review  Outcome: Ongoing (interventions implemented as appropriate)   10/23/18 110   OTHER   Outcome Summary Pt. had no issues with tx, takes two small rests of 1-2 mins. to complete ue exs!   Coping/Psychosocial   Plan of Care Reviewed With patient

## 2018-10-23 NOTE — PROGRESS NOTES
Nephrology ([facility]) Progress Note      Patient:  Gavin Wilson  YOB: 1945  Date of Service: 10/23/2018  MRN: [id number]   Acct: [id number]   Primary Care Physician: Provider, No Known  Advance Directive:   Code Status and Medical Interventions:   Ordered at: 10/07/18 1309     Limited Support to NOT Include:    Intubation     Level Of Support Discussed With:    Patient     Code Status:    No CPR     Medical Interventions (Level of Support Prior to Arrest):    Limited     Admit Date: 10/6/2018       Hospital Day: 17  Referring Provider: Max Jones*      Patient personally seen and examined.  Complete chart including Consults, Notes, Operative Reports, Labs, Cardiology, and Radiology studies reviewed as able.        Subjective:  Gavin Wilson is a 73 y.o. male  whom we were consulted for CKD stage 4.  History of bladder cancer with prior cystectomy and ureterostomy formation.  Also history of esophogeal and prostate cancer.  Follows with nephrology at [facility]. Has left upper arm fistula in place.  Presented with dyspnea; admitted with bilateral pneumonia/pleural effusion.  Hospital course remarkable for attempt to use fistula on 10/10; infiltrated and was unable to dialyze.  On 10/11 had Permcath placed and first dialysis treatment; he had a run of V tach during first HD.  On 10/15 became hypotensive during dialysis. Tolerating treatments well since then.     Today is complaining of fatigue.  No new overnight issues.    Allergies:  Sulfa antibiotics    Home Meds:  Prescriptions Prior to Admission   Medication Sig Dispense Refill Last Dose   • acetaminophen (TYLENOL) 325 MG tablet Take 650 mg by mouth Every 6 (Six) Hours As Needed for Mild Pain .   Past Month at Unknown time   • albuterol (PROVENTIL HFA;VENTOLIN HFA) 108 (90 Base) MCG/ACT inhaler Inhale 2 puffs Every 6 (Six) Hours As Needed for Wheezing.   Past Week at Unknown time   • aspirin 81 MG  chewable tablet Chew 81 mg Daily.   10/5/2018 at Unknown time   • atorvastatin (LIPITOR) 80 MG tablet Take 40 mg by mouth Daily.   10/5/2018 at Unknown time   • budesonide-formoterol (SYMBICORT) 160-4.5 MCG/ACT inhaler Inhale 2 puffs 2 (Two) Times a Day. 1 inhaler 2 10/5/2018 at Unknown time   • bumetanide (BUMEX) 2 MG tablet Take 1 tablet by mouth Daily. 30 tablet 0 10/5/2018 at Unknown time   • calcium carbonate (OS-TASHI) 600 MG tablet Take 600 mg by mouth Daily.   10/5/2018 at Unknown time   • cholecalciferol (VITAMIN D3) 1000 units tablet Take 3,000 Units by mouth Daily.   10/5/2018 at Unknown time   • insulin aspart (novoLOG FLEXPEN) 100 UNIT/ML solution pen-injector sc pen Inject 6 Units under the skin into the appropriate area as directed 3 (Three) Times a Day With Meals.   10/5/2018 at Unknown time   • insulin detemir (LEVEMIR) 100 UNIT/ML injection Inject 6 Units under the skin into the appropriate area as directed 2 (Two) Times a Day.   10/5/2018 at Unknown time   • loperamide (IMODIUM) 2 MG capsule Take 2 mg by mouth Daily As Needed for Diarrhea.   Past Month at Unknown time   • metoprolol tartrate (LOPRESSOR) 25 MG tablet Take 12.5 mg by mouth 2 (Two) Times a Day.   10/5/2018 at Unknown time   • omeprazole (priLOSEC) 20 MG capsule Take 20 mg by mouth Daily.   10/5/2018 at Unknown time   • oxyCODONE (ROXICODONE) 5 MG immediate release tablet Take 5 mg by mouth Every 6 (Six) Hours As Needed for Moderate Pain .   Past Week at Unknown time   • sildenafil (VIAGRA) 100 MG tablet Take 100 mg by mouth Daily As Needed for erectile dysfunction.   More than a month at Unknown time       Medicines:  Current Facility-Administered Medications   Medication Dose Route Frequency Provider Last Rate Last Dose   • acetaminophen (TYLENOL) tablet 650 mg  650 mg Oral Q4H PRN Max Jones MD       • amiodarone (PACERONE) tablet 200 mg  200 mg Oral Q24H Abdiel Stout MD   200 mg at 10/23/18 1019   • aspirin chewable  tablet 81 mg  81 mg Oral Daily Vu Michael MD   81 mg at 10/23/18 1019   • atorvastatin (LIPITOR) tablet 80 mg  80 mg Oral Daily Max Jones MD   80 mg at 10/23/18 1017   • budesonide-formoterol (SYMBICORT) 160-4.5 MCG/ACT inhaler 2 puff  2 puff Inhalation BID - RT Max Jones MD   2 puff at 10/23/18 0724   • calcitriol (ROCALTROL) capsule 0.5 mcg  0.5 mcg Oral Daily Max Jones MD   0.5 mcg at 10/23/18 1019   • calcium carb-cholecalciferol 600-800 MG-UNIT tablet 1 tablet  1 tablet Oral Daily Max Jones MD   1 tablet at 10/23/18 0942   • dextrose (D50W) 25 g/ 50mL Intravenous Solution 25 g  25 g Intravenous Q15 Min PRN Max Jones MD       • dextrose (GLUTOSE) oral gel 15 g  15 g Oral Q15 Min PRN Max Jones MD       • Ferric Citrate tablet 210 mg  210 mg Oral TID With Meals Max Jones MD   210 mg at 10/23/18 1018   • glucagon (human recombinant) (GLUCAGEN DIAGNOSTIC) injection 1 mg  1 mg Subcutaneous PRN Max Jones MD       • heparin (porcine) 5000 UNIT/ML injection 5,000 Units  5,000 Units Subcutaneous Q12H Max Jones MD   5,000 Units at 10/23/18 1017   • heparin (porcine) injection 1,700 Units  1,700 Units Intracatheter PRN Samson Aragon MD   1,700 Units at 10/22/18 1858   • heparin (porcine) injection 1,700 Units  1,700 Units Intracatheter PRN Samson Aragon MD   1,700 Units at 10/22/18 1858   • HYDROcodone-acetaminophen (NORCO)  MG per tablet 1 tablet  1 tablet Oral Q6H PRN Max Jones MD   1 tablet at 10/23/18 1018   • hydroxypropyl methylcellulose (ISOPTO TEARS) 2.5 % ophthalmic solution 1 drop  1 drop Both Eyes TID Vu Christensen MD   1 drop at 10/11/18 0636   • Influenza Vac Subunit Quad (FLUCELVAX) injection 0.5 mL  0.5 mL Intramuscular During Hospitalization Max Jones MD       • insulin lispro  (humaLOG) injection 0-9 Units  0-9 Units Subcutaneous 4x Daily With Meals & Nightly Max Jones MD   2 Units at 10/22/18 2124   • insulin lispro (humaLOG) injection 6 Units  6 Units Subcutaneous TID AC Max Jones MD   6 Units at 10/22/18 1259   • ipratropium (ATROVENT) nebulizer solution 0.5 mg  0.5 mg Nebulization Q6H PRN Abdiel Stout MD   0.5 mg at 10/20/18 2010   • isosorbide dinitrate (ISORDIL) tablet 10 mg  10 mg Oral TID - Nitrates Abdiel Stout MD   10 mg at 10/23/18 1016   • magnesium hydroxide (MILK OF MAGNESIA) suspension 2400 mg/10mL 10 mL  10 mL Oral Daily PRN Max Jones MD       • metoprolol tartrate (LOPRESSOR) tablet 25 mg  25 mg Oral Q12H Max Jones MD   25 mg at 10/22/18 2124   • nicotine (NICODERM CQ) 21 MG/24HR patch 1 patch  1 patch Transdermal Q24H Ashutosh Palfaox MD   1 patch at 10/22/18 2125   • ondansetron (ZOFRAN) injection 4 mg  4 mg Intravenous Q6H PRN Chaya Duff APRN   4 mg at 10/23/18 0553   • sodium chloride 0.9 % flush 3 mL  3 mL Intravenous Q12H Max Jones MD   3 mL at 10/23/18 1020   • sodium chloride 0.9 % flush 3-10 mL  3-10 mL Intravenous PRN Max Jones MD   10 mL at 10/21/18 0916   • sodium chloride 0.9 % infusion  50 mL/hr Intravenous Continuous Max Jones MD   Stopped at 10/21/18 1627   • tetrahydrozoline 0.05 % ophthalmic solution 1 drop  1 drop Both Eyes 4x Daily PRN Ney De MD   1 drop at 10/10/18 0339   • vancomycin oral solution reconstituted 125 mg  125 mg Oral Q6H Oleg Madrid DO   125 mg at 10/23/18 0553       Past Medical History:  Past Medical History:   Diagnosis Date   • CHF (congestive heart failure) (CMS/HCC)    • Coronary stent occlusion    • Diabetes mellitus (CMS/HCC)    • Hyperlipidemia    • Hypertension    • Stroke (CMS/HCC)        Past Surgical History:  Past Surgical History:   Procedure Laterality Date   • BLADDER SURGERY     •  "ESOPHAGECTOMY     • INSERTION HEMODIALYSIS CATHETER N/A 10/11/2018    Procedure: HEMODIALYSIS CATHETER INSERTION;  Surgeon: Hugo Bonilla MD;  Location: [facility] OR ;  Service: Vascular       Family History  Family History   Problem Relation Age of Onset   • No Known Problems Father    • No Known Problems Mother        Social History  Social History     Social History   • Marital status:      Spouse name: N/A   • Number of children: N/A   • Years of education: N/A     Occupational History   • Not on file.     Social History Main Topics   • Smoking status: Current Every Day Smoker   • Smokeless tobacco: Never Used   • Alcohol use No   • Drug use: No   • Sexual activity: Not on file     Other Topics Concern   • Not on file     Social History Narrative   • No narrative on file         Review of Systems:  History obtained from chart review and the patient  General ROS: No fever or chills  Respiratory ROS: No cough, shortness of breath, wheezing  Cardiovascular ROS: No chest pain or palpitations  Gastrointestinal ROS: No abdominal pain or melena  Genito-Urinary ROS: No dysuria or hematuria  Neurological ROS: no headache or dizziness    Objective:  /48 (BP Location: Right arm, Patient Position: Lying)   Pulse 82   Temp 97.9 °F (36.6 °C) (Oral)   Resp 20   Ht 170.2 cm (67\")   Wt 44.6 kg (98 lb 6 oz)   SpO2 98%   BMI 15.41 kg/m²     Intake/Output Summary (Last 24 hours) at 10/23/18 1128  Last data filed at 10/22/18 2304   Gross per 24 hour   Intake              240 ml   Output              250 ml   Net              -10 ml     General: awake/alert    Neck: supple, no JVD  Chest:  clear to auscultation bilaterally without respiratory distress  CVS: regular rate and rhythm  Abdominal: soft, nontender, normal bowel sounds  Extremities: no cyanosis or edema  Skin: warm and dry without rash  Neuro: no focal motor deficits    Labs:    Results from last 7 days  Lab Units 10/23/18  0507 " 10/21/18  0503   WBC 10*3/mm3 5.53 4.59*   HEMOGLOBIN g/dL 8.1* 8.2*   HEMATOCRIT % 25.3* 26.2*   PLATELETS 10*3/mm3 162 166           Results from last 7 days  Lab Units 10/23/18  0505 10/22/18  0549 10/21/18  0503   SODIUM mmol/L 136 138 136   POTASSIUM mmol/L 4.4 5.3 5.4*   CHLORIDE mmol/L 99 103 103   CO2 mmol/L 30.0 25.0 27.0   BUN mg/dL 18 42* 35*   CREATININE mg/dL 2.21* 4.02* 3.24*   CALCIUM mg/dL 8.7 8.7 9.2   BILIRUBIN mg/dL 0.4  --   --    ALK PHOS U/L 63  --   --    ALT (SGPT) U/L 26  --   --    AST (SGOT) U/L 11  --   --    GLUCOSE mg/dL 103* 131* 119*       Radiology:   Imaging Results (last 72 hours)     Procedure Component Value Units Date/Time    US Thoracentesis [[id number]] Collected:  10/07/18 1323    Specimen:  Body Fluid Updated:  10/13/18 0934    Narrative:       DATE OF PROCEDURE:  10/7/2018.     PREPROCEDURE DIAGNOSIS:  Left pleural effusion.  POSTPROCEDURE DIAGNOSIS:  Left pleural effusion.          INDICATIONS FOR PROCEDURE: Shortness of breath.     PROCEDURE:   1. Thoracentesis, diagnostic and therapeutic.  2. Ultrasound guidance for thoracentesis, imaging supervision and  interpretation.     ANESTHESIA: 1% lidocaine, injected locally.          COMPLICATIONS: None.        EXAMINATIONS FOR COMPARISON:  CT chest dated 10/6/2018.     DESCRIPTION OF PROCEDURE:   The risk and benefits of the procedure were explained to the patient.   Specifically, the risks of bleeding, infection, pneumothorax (possible  thoracostomy tube), and damage to surrounding structures were discussed  extensively. The patient's questions were answered. The patient opted to  proceed. Written and verbal consent were obtained from the patient.     TIME OUT was taken and the patient's name, medical record number, date  of birth, procedure, entry site, and listen allergies were confirmed.  The site of the procedure was confirmed and marked.      The leftposterior thorax was prepped and draped in sterile fashion. The  area  was anesthetized with buffered lidocaine 2%.      A 5 Maldivian 10 cm  catheter was inserted into the pleural effusion  using  ultrasound guidance. Approximately 400 mL of clear fluid was recovered  and sent to the pathology lab for analysis.      The patient tolerated the procedure well.   No immediate complications  were noted.       Impression:       Successful ultrasound guided leftthoracentesis. Approximately 400 mL of  clear fluid was recovered and sent to the pathology lab for analysis.   No immediate complications were noted.              This report was finalized on 10/07/2018 13:26 by Dr. Petra Mckinley MD.     Chest [[id number]] Collected:  10/07/18 1323     Updated:  10/13/18 0934    Narrative:       DATE OF PROCEDURE:  10/7/2018.     PREPROCEDURE DIAGNOSIS:  Left pleural effusion.  POSTPROCEDURE DIAGNOSIS:  Left pleural effusion.          INDICATIONS FOR PROCEDURE: Shortness of breath.     PROCEDURE:   1. Thoracentesis, diagnostic and therapeutic.  2. Ultrasound guidance for thoracentesis, imaging supervision and  interpretation.     ANESTHESIA: 1% lidocaine, injected locally.          COMPLICATIONS: None.        EXAMINATIONS FOR COMPARISON:  CT chest dated 10/6/2018.     DESCRIPTION OF PROCEDURE:   The risk and benefits of the procedure were explained to the patient.   Specifically, the risks of bleeding, infection, pneumothorax (possible  thoracostomy tube), and damage to surrounding structures were discussed  extensively. The patient's questions were answered. The patient opted to  proceed. Written and verbal consent were obtained from the patient.     TIME OUT was taken and the patient's name, medical record number, date  of birth, procedure, entry site, and listen allergies were confirmed.  The site of the procedure was confirmed and marked.      The leftposterior thorax was prepped and draped in sterile fashion. The  area was anesthetized with buffered lidocaine 2%.      A 5 Maldivian 10 cm  catheter  was inserted into the pleural effusion  using  ultrasound guidance. Approximately 400 mL of clear fluid was recovered  and sent to the pathology lab for analysis.      The patient tolerated the procedure well.   No immediate complications  were noted.       Impression:       Successful ultrasound guided leftthoracentesis. Approximately 400 mL of  clear fluid was recovered and sent to the pathology lab for analysis.   No immediate complications were noted.              This report was finalized on 10/07/2018 13:26 by Dr. Petra Mckinley MD.          Culture:         Assessment   1.  CKD stage 4--now with progression to ESRD  2.  Type 2 diabetes with nephropathy  3.  Bilateral pneumonia  4.  Chronic diastolic congestive heart failure  5.  S/p ventricular tachycardia on 10/10  6.  Metabolic acidosis  7.  Secondary hyperparathyroidism    Plan:  1.  Dialysis next due 10/24  2.  Monitor labs  3.  Continuing to work on outpatient dialysis placement      MACO Cole  10/23/2018  11:28 AM

## 2018-10-23 NOTE — NURSING NOTE
Pt awake and alert.  Area to coccyx reddened and denuded.  Appears to have been there since 10/7.  Using Barrier cream and Mepilex.  Pt has had freq diarrhea and positive for C-Diff.  Will cont same treatment and encourage pt to turn, which he is compliant with.

## 2018-10-23 NOTE — THERAPY TREATMENT NOTE
Acute Care - Physical Therapy Treatment Note  [facility]     Patient Name: Gavin Wilson  : 1945  MRN: [id number]  Today's Date: 10/23/2018  Onset of Illness/Injury or Date of Surgery: 10/06/18  Date of Referral to PT: 10/08/18  Referring Physician: Dr. Michael    Admit Date: 10/6/2018    Visit Dx:    ICD-10-CM ICD-9-CM   1. Esophageal dysphagia R13.10 787.20   2. Impaired mobility and ADLs Z74.09 799.89   3. Impaired functional mobility, balance, gait, and endurance Z74.09 V49.89   4. Chronic kidney disease, stage 4 (severe) (CMS/HCC) N18.4 585.4     Patient Active Problem List   Diagnosis   • Volume overload   • Acute renal failure superimposed on stage 4 chronic kidney disease (CMS/HCC)   • Acute on chronic combined systolic and diastolic congestive heart failure (CMS/HCC)   • Pleural effusion, bilateral   • Sepsis (CMS/[facility])   • Coronary artery disease involving native coronary artery of native heart without angina pectoris   • History of coronary artery stent placement   • Mild aortic valve stenosis   • Moderate aortic valve insufficiency   • Mild mitral valve regurgitation   • Acute on chronic respiratory failure with hypoxia (CMS/[facility])   • Pneumonia   • COPD (chronic obstructive pulmonary disease) (CMS/[facility])       Therapy Treatment          Rehabilitation Treatment Summary     Row Name 10/23/18 0815             Treatment Time/Intention    Discipline occupational therapy assistant  -CJ      Document Type therapy note (daily note)  -      Subjective Information complains of;weakness;fatigue  -      Mode of Treatment individual therapy;occupational therapy  -CJ      Patient Effort adequate  -CJ      Existing Precautions/Restrictions fall   Dialysis!  -CJ      Recorded by [CJ] Paulino Fuchs COTA/L 10/23/18 1108      Row Name 10/23/18 0815             Bed Mobility Assessment/Treatment    Scooting/Bridging [place] (Bed Mobility) set up;verbal cues;maximum assist (25% patient effort);2 person  assist  -CJ      Comment (Bed Mobility) fowlers in bed!  -CJ      Recorded by [CJ] Paulino Fuchs COTA/L 10/23/18 1108      Row Name 10/23/18 0815             Motor Skills Assessment/Interventions    Additional Documentation Therapeutic Exercise (Group)  -CJ      Recorded by [CJ] Paulino Fuchs COTA/L 10/23/18 1108      Row Name 10/23/18 0815             Therapeutic Exercise    Upper Extremity (Therapeutic Exercise) bicep curl, bilateral;wand exercises  -CJ      Upper Extremity Range of Motion (Therapeutic Exercise) shoulder flexion/extension, bilateral;elbow flexion/extension, bilateral;wrist flexion/extension, bilateral  -CJ      Weight/Resistance (Therapeutic Exercise) 2 pounds;3 pounds;red  -CJ      Exercise Type (Therapeutic Exercise) AROM (active range of motion)  -CJ      Position (Therapeutic Exercise) seated  -CJ      Sets/Reps (Therapeutic Exercise) 2 sets x 15 reps  -CJ      Equipment (Therapeutic Exercise) dowel indio/wand;free weight, barbell;resistive bands  -CJ      Expected Outcome (Therapeutic Exercise) facilitate normal movement patterns;improve functional stability;improve functional tolerance, self-care activity;improve motor control;improve neuromuscular control;improve performance, BADLs;improve performance, fine motor coordination skills;improve performance, gait skills;improve performance, transfer skills;improve postural control;increase active range of motion  -CJ      Recorded by [CJ] Paulino Fuchs COTA/L 10/23/18 1108      Row Name 10/23/18 0815             Positioning and Restraints    Pre-Treatment Position in bed  -CJ      Post Treatment Position bed  -CJ      In Bed fowlers;call light within reach;encouraged to call for assist;side rails up x2  -CJ      Recorded by [CJ] Paulino Fuchs COTA/L 10/23/18 1108      Row Name 10/23/18 0815             Pain Assessment    Additional Documentation Pain Scale 2: Word Pre/Post-Treatment (Group)  -CJ      Recorded by [CJ]  Paulino Fuchs [place]/L 10/23/18 1108      Row Name 10/23/18 0815             Pain Scale: Numbers Pre/Post-Treatment    Pain Scale: Numbers, Pretreatment 0/10 - no pain  -      Pain Scale: Numbers, Post-Treatment 0/10 - no pain  -CJ      Recorded by [CJ] Paulino Fuchs COTA/L 10/23/18 1108      Row Name                Wound 10/07/18 1255 Bilateral posterior coccyx    Wound - Properties Group Date first assessed: 10/07/18 [SH] Time first assessed: 1255 [SH] Side: Bilateral [SH] Orientation: posterior [SH] Location: coccyx [SH] Recorded by:  [SH] Virginia Ahn RN 10/07/18 1256    Row Name                Wound 10/11/18 0831 Other (See comments) chest incision    Wound - Properties Group Date first assessed: 10/11/18 [DS] Time first assessed: 0831 [DS] Side: Other (See comments) [DS] Location: chest [DS] Type: incision [DS] Recorded by:  [DS] Jeremy Liu RN 10/11/18 0831    Row Name 10/23/18 0815             Outcome Summary/Treatment Plan (OT)    Daily Summary of Progress (OT) progress toward functional goals as expected  -      Barriers to Overall Progress (OT) weakness/dialysis!  -      Plan for Continued Treatment (OT) continue with ot poc!  -CJ      Recorded by [CJ] Paulino Fuchs COTA/VERNA 10/23/18 1108        User Key  (r) = Recorded By, (t) = Taken By, (c) = Cosigned By    Initials Name Effective Dates Discipline    CJ Paluino Fuchs COTA/L 08/02/16 -  OT    SH Virginia Ahn RN 08/28/17 -  Nurse    DS Jeremy Liu RN 08/02/16 -  Nurse          Wound 10/07/18 1255 Bilateral posterior coccyx (Active)   Dressing Appearance intact;dry 10/23/2018  8:02 AM   Closure DAVID 10/23/2018  8:02 AM   Base dressing in place, unable to visualize 10/23/2018  8:02 AM   Drainage Amount none 10/23/2018  8:02 AM       Wound 10/11/18 0831 Other (See comments) chest incision (Active)   Dressing Appearance dry;intact 10/23/2018  8:02 AM   Closure DAVID 10/23/2018  8:02 AM   Base dressing in  place, unable to visualize 10/23/2018  8:02 AM   Drainage Amount none 10/23/2018  8:02 AM   Dressing Care, Wound border dressing 10/22/2018  7:23 PM   Periwound Care, Wound dry periwound area maintained 10/22/2018  7:23 PM             Physical Therapy Education     Title: PT OT SLP Therapies (Active)     Topic: Physical Therapy (Active)     Point: Mobility training (Done)    Learning Progress Summary     Learner Status Readiness Method Response Comment Documented by    Patient Done Eager E VU,DU Pt educated on progress of POC and benefits of activity NH 10/23/18 1158     Done Acceptance E VU,NR Educated pt. on progression of PT POC and benefits of activity JACEY 10/22/18 0942     Done Nonacceptance E NR,VU BOA WK 10/18/18 1332     Done Acceptance E VU BOA WK 10/17/18 1539     Active Acceptance E,D NR transfers and benefits of activity MF 10/16/18 1035     Active Acceptance E,D NR Educated on benefits of activity. Intructed in safety with bed mobility, transfers,and LE Strengthening Exercises. LG 10/14/18 0918     Done Acceptance E VU,NR benefits of activity MF 10/12/18 1206     Active Acceptance E,D RT Effecient supine to sit EARLENE 10/09/18 1154     Done Acceptance E VU,NR Pt was educated on the importance and benefits of getting out of bed and how it affects his overall health. Pt was educated on why PT is working with him. Pt was encouraged to do more in treatment session. TD 10/08/18 1430     Done Acceptance E VU,NR Pt  was educated on benefits ot PT and PT POC. Pt was edcuated on body mechanics with transfers (pushing with one arm on bed to stand up/having legs touch bed before sitting down) and safety precautions (socks on, gait belt on) with transfers and gait. TD 10/08/18 1149          Point: Body mechanics (Active)    Learning Progress Summary     Learner Status Readiness Method Response Comment Documented by    Patient Active Acceptance E,D NR Educated on benefits of activity. Intructed in safety with bed  mobility, transfers,and LE Strengthening Exercises. LG 10/14/18 0918     Done Acceptance E VU,NR Pt was educated on the importance and benefits of getting out of bed and how it affects his overall health. Pt was educated on why PT is working with him. Pt was encouraged to do more in treatment session. TD 10/08/18 1430     Done Acceptance E VU,NR Pt  was educated on benefits ot PT and PT POC. Pt was edcuated on body mechanics with transfers (pushing with one arm on bed to stand up/having legs touch bed before sitting down) and safety precautions (socks on, gait belt on) with transfers and gait. TD 10/08/18 1149          Point: Precautions (Active)    Learning Progress Summary     Learner Status Readiness Method Response Comment Documented by    Patient Active Acceptance E,D NR Educated on benefits of activity. Intructed in safety with bed mobility, transfers,and LE Strengthening Exercises. LG 10/14/18 0918     Done Acceptance E VU,NR Pt was educated on the importance and benefits of getting out of bed and how it affects his overall health. Pt was educated on why PT is working with him. Pt was encouraged to do more in treatment session. TD 10/08/18 1430     Done Acceptance E VU,NR Pt  was educated on benefits ot PT and PT POC. Pt was edcuated on body mechanics with transfers (pushing with one arm on bed to stand up/having legs touch bed before sitting down) and safety precautions (socks on, gait belt on) with transfers and gait. TD 10/08/18 1149                      User Key     Initials Effective Dates Name Provider Type Discipline    LG 08/02/16 -  Roberto Pereira, PTA Physical Therapy Assistant PT    JACEY 08/02/16 -  Richar Perdue PT DPT Physical Therapist PT    EARLENE 08/02/16 -  Kaylynn Mcgarry PTA Physical Therapy Assistant PT    MF 08/02/16 -  Liliane Collado, PTA Physical Therapy Assistant PT    WK 08/02/16 -  Bernice Rico PTA Physical Therapy Assistant PT    TD 09/07/18 -  Rita Grullon, JUVENTINO Student  PT Student PT    NH 09/19/18 -  Gab Munoz, PTA Student PTA Student PT                    PT Recommendation and Plan     Plan of Care Reviewed With: (P) patient  Progress: (P) improving  Outcome Summary: (P) Pt was in bed upon arrival and agreed to therapy.  Pt supine to sitting EOB was done min A x1 and verbal cues.  Pt went from sittiing EOB to standing with CGA and front rolling walker.  Pt ambulated 60' with CGA and front rolling walker.  Pt states that this is the best he has felt walking in a while.  Pt stand to sit was done with CGA and verbal cues.  Pt was left in fowlers with call light near.          Outcome Measures     Row Name 10/23/18 1136 10/23/18 0815 10/22/18 0948       How much help from another person do you currently need...    Turning from your back to your side while in flat bed without using bedrails? (P)  2  -NH  --  --    Moving from lying on back to sitting on the side of a flat bed without bedrails? (P)  2  -NH  --  --    Moving to and from a bed to a chair (including a wheelchair)? (P)  3  -NH  --  --    Standing up from a chair using your arms (e.g., wheelchair, bedside chair)? (P)  3  -NH  --  --    Climbing 3-5 steps with a railing? (P)  3  -NH  --  --    To walk in hospital room? (P)  4  -NH  --  --    AM-PAC 6 Clicks Score (P)  17  -NH  --  --       How much help from another is currently needed...    Putting on and taking off regular lower body clothing?  -- 2  -CJ 2  -CJ    Bathing (including washing, rinsing, and drying)  -- 2  -CJ 2  -CJ    Toileting (which includes using toilet bed pan or urinal)  -- 3  -CJ 3  -CJ    Putting on and taking off regular upper body clothing  -- 3  -CJ 3  -CJ    Taking care of personal grooming (such as brushing teeth)  -- 3  -CJ 3  -CJ    Eating meals  -- 4  -CJ 4  -CJ    Score  -- 17  -CJ 17  -CJ       Functional Assessment    Outcome Measure Options (P)  AM-PAC 6 Clicks Basic Mobility (PT)  -NH AM-PAC 6 Clicks Daily Activity (OT)  -CJ AM-PAC  6 Clicks Daily Activity (OT)  -    Row Name 10/22/18 0942             How much help from another person do you currently need...    Turning from your back to your side while in flat bed without using bedrails? 3  -JACEY      Moving from lying on back to sitting on the side of a flat bed without bedrails? 3  -JACEY      Moving to and from a bed to a chair (including a wheelchair)? 3  -JACEY      Standing up from a chair using your arms (e.g., wheelchair, bedside chair)? 3  -JACEY      Climbing 3-5 steps with a railing? 2  -JACEY      To walk in hospital room? 2  -JACEY      AM-PAC 6 Clicks Score 16  -JACEY         Functional Assessment    Outcome Measure Options AM-PAC 6 Clicks Basic Mobility (PT)  -JACEY        User Key  (r) = Recorded By, (t) = Taken By, (c) = Cosigned By    Initials Name Provider Type     Paulino Fuchs COTA/L Occupational Therapy Assistant    Richar Pedroza, PT DPT Physical Therapist    NH Gab Munoz, PTA Student PTA Student           Time Calculation:         PT Charges     Row Name 10/23/18 1203 10/23/18 1141 10/23/18 1136       Time Calculation    Start Time (P)  1136  -NH  --  --    Stop Time (P)  1151  -NH  --  --    Time Calculation (min) (P)  15 min  -NH  --  --    PT Received On (P)  10/23/18  -NH 10/23/18  -WK  --    PT Goal Re-Cert Due Date (P)  11/01/18  -NH  --  --       Time Calculation- PT    Total Timed Code Minutes- PT (P)  15 minute(s)  -NH  --  --       Timed Charges    22298 - Gait Training Minutes  (P)  15  -NH  -- (P)  15  -NH      User Key  (r) = Recorded By, (t) = Taken By, (c) = Cosigned By    Initials Name Provider Type    Bernice Dailey PTA Physical Therapy Assistant    Gab Mancia, URMILA Student PTA Student        Therapy Suggested Charges     Code   Minutes Charges    49519 (CPT®) Hc Pt Neuromusc Re Education Ea 15 Min      47692 (CPT®) Hc Pt Ther Proc Ea 15 Min      96051 (CPT®) Hc Gait Training Ea 15 Min 30 2    06862 (CPT®) Hc Pt Therapeutic Act Ea 15 Min       58693 (CPT®) Hc Pt Manual Therapy Ea 15 Min      89416 (CPT®) Hc Pt Iontophoresis Ea 15 Min      90343 (CPT®) Hc Pt Elec Stim Ea-Per 15 Min      73532 (CPT®) Hc Pt Ultrasound Ea 15 Min      02561 (CPT®) Hc Pt Self Care/Mgmt/Train Ea 15 Min      83923 (CPT®) Hc Pt Prosthetic (S) Train Initial Encounter, Each 15 Min      31926 (CPT®) Hc Pt Orthotic(S)/Prosthetic(S) Encounter, Each 15 Min      75686 (CPT®) Hc Orthotic(S) Mgmt/Train Initial Encounter, Each 15min      Total  30 2        Therapy Charges for Today     Code Description Service Date Service Provider Modifiers Qty    46373910742 HC GAIT TRAINING EA 15 MIN 10/23/2018 Gab Munoz PTA Student GP, KX 1          PT G-Codes  PT Professional Judgement Used?: Yes  Outcome Measure Options: (P) AM-PAC 6 Clicks Basic Mobility (PT)  AM-PAC 6 Clicks Score: (P) 17  Score: 17  Functional Limitation: Mobility: Walking and moving around  Mobility: Walking and Moving Around Current Status (): At least 40 percent but less than 60 percent impaired, limited or restricted  Mobility: Walking and Moving Around Goal Status (): At least 20 percent but less than 40 percent impaired, limited or restricted    Gab Munoz PTA Student  10/23/2018

## 2018-10-23 NOTE — PROGRESS NOTES
Continued Stay Note   [place]     Patient Name: Gavin Wilson  MRN: [id number]  Today's Date: 10/23/2018    Admit Date: 10/6/2018          Discharge Plan     Row Name 10/23/18 1633       Plan    Plan Comments SPOKE TO [facility] AND WAS ADVISED THAT THEY WILL TAKE PT. FOR HIS TREATMENT EARLY TOMORROW FOR EARLY D/C, HOWEVER [facility] REPORTS HE HAS TO DO A FACE TO FACE EVAL WITH PT. AGAIN BEFORE THEY CAN AFFIRM HIS ADMIT APPROVAL.  WILL FOLLOW               Discharge Codes    No documentation.           LINDSEY Larose

## 2018-10-23 NOTE — PLAN OF CARE
Problem: Pneumonia (Adult)  Goal: Signs and Symptoms of Listed Potential Problems Will be Absent, Minimized or Managed (Pneumonia)  Outcome: Ongoing (interventions implemented as appropriate)   10/23/18 0214   Goal/Outcome Evaluation   Problems Assessed (Pneumonia) all   Problems Present (Pneumonia) none       Problem: Patient Care Overview  Goal: Plan of Care Review   10/23/18 0214   Plan of Care Review   Progress no change   OTHER   Outcome Summary VSS. No c/o pain. Patient states he is tired from dialysis. possible DC to [place] on 10/24   Coping/Psychosocial   Plan of Care Reviewed With patient     Goal: Individualization and Mutuality   10/10/18 1635   Mutuality/Individual Preferences   What Anxieties, Fears, Concerns, or Questions Do You Have About Your Care? NONE     Goal: Discharge Needs Assessment  Outcome: Ongoing (interventions implemented as appropriate)   10/07/18 1025 10/14/18 1450   Discharge Needs Assessment   Concerns to be Addressed --  discharge planning   Patient/Family Anticipates Transition to --  long term care facility   Patient/Family Anticipated Services at Transition home health care;skilled nursing --    Transportation Anticipated family or friend will provide --    Equipment Needed After Discharge none --    Current Discharge Risk chronically ill;lives alone --    Disability   Equipment Currently Used at Home cane, quad;walker, rolling;colostomy/ostomy supplies --      Goal: Interprofessional Rounds/Family Conf  Outcome: Ongoing (interventions implemented as appropriate)      Problem: Hemodialysis (Adult)  Goal: Signs and Symptoms of Listed Potential Problems Will be Absent, Minimized or Managed (Hemodialysis)  Outcome: Ongoing (interventions implemented as appropriate)   10/23/18 0214   Goal/Outcome Evaluation   Problems Assessed (Hemodialysis) all   Problems Present (Hemodialysis) none      10/23/18 0214   Goal/Outcome Evaluation   Problems Assessed (Hemodialysis) all   Problems  Present (Hemodialysis) none

## 2018-10-24 LAB
ALBUMIN SERPL-MCNC: 2.8 G/DL (ref 3.5–5)
ALBUMIN/GLOB SERPL: 1.2 G/DL (ref 1.1–2.5)
ALP SERPL-CCNC: 67 U/L (ref 24–120)
ALT SERPL W P-5'-P-CCNC: 31 U/L (ref 0–54)
ANION GAP SERPL CALCULATED.3IONS-SCNC: 7 MMOL/L (ref 4–13)
AST SERPL-CCNC: 12 U/L (ref 7–45)
BASOPHILS # BLD AUTO: 0.03 10*3/MM3 (ref 0–0.2)
BASOPHILS NFR BLD AUTO: 0.5 % (ref 0–2)
BILIRUB SERPL-MCNC: 0.2 MG/DL (ref 0.1–1)
BUN BLD-MCNC: 29 MG/DL (ref 5–21)
BUN/CREAT SERPL: 9 (ref 7–25)
CALCIUM SPEC-SCNC: 8.6 MG/DL (ref 8.4–10.4)
CHLORIDE SERPL-SCNC: 99 MMOL/L (ref 98–110)
CO2 SERPL-SCNC: 30 MMOL/L (ref 24–31)
CREAT BLD-MCNC: 3.21 MG/DL (ref 0.5–1.4)
DEPRECATED RDW RBC AUTO: 66.7 FL (ref 40–54)
EOSINOPHIL # BLD AUTO: 0.12 10*3/MM3 (ref 0–0.7)
EOSINOPHIL NFR BLD AUTO: 2.1 % (ref 0–4)
ERYTHROCYTE [DISTWIDTH] IN BLOOD BY AUTOMATED COUNT: 19.4 % (ref 12–15)
GFR SERPL CREATININE-BSD FRML MDRD: 19 ML/MIN/1.73
GLOBULIN UR ELPH-MCNC: 2.3 GM/DL
GLUCOSE BLD-MCNC: 109 MG/DL (ref 70–100)
GLUCOSE BLDC GLUCOMTR-MCNC: 140 MG/DL (ref 70–130)
GLUCOSE BLDC GLUCOMTR-MCNC: 246 MG/DL (ref 70–130)
GLUCOSE BLDC GLUCOMTR-MCNC: 99 MG/DL (ref 70–130)
HCT VFR BLD AUTO: 24 % (ref 40–52)
HGB BLD-MCNC: 7.6 G/DL (ref 14–18)
IMM GRANULOCYTES # BLD: 0.01 10*3/MM3 (ref 0–0.03)
IMM GRANULOCYTES NFR BLD: 0.2 % (ref 0–5)
LYMPHOCYTES # BLD AUTO: 0.93 10*3/MM3 (ref 0.72–4.86)
LYMPHOCYTES NFR BLD AUTO: 16 % (ref 15–45)
MCH RBC QN AUTO: 30 PG (ref 28–32)
MCHC RBC AUTO-ENTMCNC: 31.7 G/DL (ref 33–36)
MCV RBC AUTO: 94.9 FL (ref 82–95)
MONOCYTES # BLD AUTO: 0.49 10*3/MM3 (ref 0.19–1.3)
MONOCYTES NFR BLD AUTO: 8.4 % (ref 4–12)
NEUTROPHILS # BLD AUTO: 4.24 10*3/MM3 (ref 1.87–8.4)
NEUTROPHILS NFR BLD AUTO: 72.8 % (ref 39–78)
NRBC BLD MANUAL-RTO: 0 /100 WBC (ref 0–0)
PLATELET # BLD AUTO: 152 10*3/MM3 (ref 130–400)
PMV BLD AUTO: 9.8 FL (ref 6–12)
POTASSIUM BLD-SCNC: 5.1 MMOL/L (ref 3.5–5.3)
PROT SERPL-MCNC: 5.1 G/DL (ref 6.3–8.7)
RBC # BLD AUTO: 2.53 10*6/MM3 (ref 4.8–5.9)
SODIUM BLD-SCNC: 136 MMOL/L (ref 135–145)
WBC NRBC COR # BLD: 5.82 10*3/MM3 (ref 4.8–10.8)

## 2018-10-24 PROCEDURE — 63710000001 INSULIN LISPRO (HUMAN) PER 5 UNITS: Performed by: INTERNAL MEDICINE

## 2018-10-24 PROCEDURE — 97116 GAIT TRAINING THERAPY: CPT

## 2018-10-24 PROCEDURE — 5A1D70Z PERFORMANCE OF URINARY FILTRATION, INTERMITTENT, LESS THAN 6 HOURS PER DAY: ICD-10-PCS | Performed by: INTERNAL MEDICINE

## 2018-10-24 PROCEDURE — 25010000002 HEPARIN (PORCINE) PER 1000 UNITS: Performed by: INTERNAL MEDICINE

## 2018-10-24 PROCEDURE — 94799 UNLISTED PULMONARY SVC/PX: CPT

## 2018-10-24 PROCEDURE — 94760 N-INVAS EAR/PLS OXIMETRY 1: CPT

## 2018-10-24 PROCEDURE — 80053 COMPREHEN METABOLIC PANEL: CPT | Performed by: INTERNAL MEDICINE

## 2018-10-24 PROCEDURE — 85025 COMPLETE CBC W/AUTO DIFF WBC: CPT | Performed by: INTERNAL MEDICINE

## 2018-10-24 PROCEDURE — 82962 GLUCOSE BLOOD TEST: CPT

## 2018-10-24 RX ADMIN — ASPIRIN 81 MG CHEWABLE TABLET 81 MG: 81 TABLET CHEWABLE at 12:01

## 2018-10-24 RX ADMIN — Medication 3 ML: at 12:03

## 2018-10-24 RX ADMIN — Medication 3 ML: at 20:17

## 2018-10-24 RX ADMIN — FERRIC CITRATE 210 MG: 210 TABLET, COATED ORAL at 12:02

## 2018-10-24 RX ADMIN — HEPARIN SODIUM 1700 UNITS: 1000 INJECTION INTRAVENOUS; SUBCUTANEOUS at 11:16

## 2018-10-24 RX ADMIN — BUDESONIDE AND FORMOTEROL FUMARATE DIHYDRATE 2 PUFF: 160; 4.5 AEROSOL RESPIRATORY (INHALATION) at 19:03

## 2018-10-24 RX ADMIN — ATORVASTATIN CALCIUM 80 MG: 40 TABLET, FILM COATED ORAL at 12:02

## 2018-10-24 RX ADMIN — ISOSORBIDE DINITRATE 10 MG: 10 TABLET ORAL at 12:05

## 2018-10-24 RX ADMIN — AMIODARONE HYDROCHLORIDE 200 MG: 200 TABLET ORAL at 12:01

## 2018-10-24 RX ADMIN — INSULIN LISPRO 4 UNITS: 100 INJECTION, SOLUTION INTRAVENOUS; SUBCUTANEOUS at 20:44

## 2018-10-24 RX ADMIN — ISOSORBIDE DINITRATE 10 MG: 10 TABLET ORAL at 17:52

## 2018-10-24 RX ADMIN — HYDROCODONE BITARTRATE AND ACETAMINOPHEN 1 TABLET: 10; 325 TABLET ORAL at 20:18

## 2018-10-24 RX ADMIN — Medication 1 TABLET: at 12:02

## 2018-10-24 RX ADMIN — METOPROLOL TARTRATE 25 MG: 25 TABLET, FILM COATED ORAL at 12:02

## 2018-10-24 RX ADMIN — METOPROLOL TARTRATE 25 MG: 25 TABLET, FILM COATED ORAL at 20:23

## 2018-10-24 RX ADMIN — INSULIN LISPRO 6 UNITS: 100 INJECTION, SOLUTION INTRAVENOUS; SUBCUTANEOUS at 17:53

## 2018-10-24 RX ADMIN — HEPARIN SODIUM 1700 UNITS: 1000 INJECTION, SOLUTION INTRAVENOUS; SUBCUTANEOUS at 11:15

## 2018-10-24 RX ADMIN — HEPARIN SODIUM 5000 UNITS: 5000 INJECTION, SOLUTION INTRAVENOUS; SUBCUTANEOUS at 20:18

## 2018-10-24 RX ADMIN — CALCITRIOL CAPSULES 0.25 MCG 0.5 MCG: 0.25 CAPSULE ORAL at 12:02

## 2018-10-24 RX ADMIN — NICOTINE 1 PATCH: 21 PATCH, EXTENDED RELEASE TRANSDERMAL at 20:19

## 2018-10-24 RX ADMIN — FERRIC CITRATE 210 MG: 210 TABLET, COATED ORAL at 17:52

## 2018-10-24 NOTE — PLAN OF CARE
Problem: Patient Care Overview  Goal: Plan of Care Review  Outcome: Ongoing (interventions implemented as appropriate)   10/24/18 9129   Plan of Care Review   Progress improving   OTHER   Outcome Summary RD nutrition follow-up completed. PO intake has improved to an average of 58%. Plans are for pt to be d/c'd to a SNF in the morning.

## 2018-10-24 NOTE — PLAN OF CARE
Problem: Patient Care Overview  Goal: Plan of Care Review  Outcome: Ongoing (interventions implemented as appropriate)   10/24/18 1201   Plan of Care Review   Progress improving   OTHER   Outcome Summary pt trans to EOB cga, sit-stand cga, pt amb 50 feet at a time with rwx cga,Pt would benefit from SNF   Coping/Psychosocial   Plan of Care Reviewed With patient

## 2018-10-24 NOTE — PROGRESS NOTES
Continued Stay Note   [facility]     Patient Name: Gavin Wilson  MRN: [id number]  Today's Date: 10/24/2018    Admit Date: 10/6/2018          Discharge Plan     Row Name 10/24/18 1042       Plan    Plan [facility]; SKILLED LEVEL    Patient/Family in Agreement with Plan yes    Plan Comments REC'D CALL FROM [facility] ADVISING THEY CAN TAKE PT. IN AM.  CONTACTED ER[facility]GER TO ADVISE AND GET TRANSPORT SET UP FOR A 0900 .  WILL FOLLOW.                Discharge Codes    No documentation.           LINDSEY Larose

## 2018-10-24 NOTE — PROGRESS NOTES
Nephrology ([facility]) Progress Note      Patient:  Gavin Wilson  YOB: 1945  Date of Service: 10/24/2018  MRN: [id number]   Acct: [id number]   Primary Care Physician: Provider, No Known  Advance Directive:   Code Status and Medical Interventions:   Ordered at: 10/07/18 1309     Limited Support to NOT Include:    Intubation     Level Of Support Discussed With:    Patient     Code Status:    No CPR     Medical Interventions (Level of Support Prior to Arrest):    Limited     Admit Date: 10/6/2018       Hospital Day: 18  Referring Provider: Max Jones*      Patient personally seen and examined.  Complete chart including Consults, Notes, Operative Reports, Labs, Cardiology, and Radiology studies reviewed as able.        Subjective:  Gavin Wilson is a 73 y.o. male  whom we were consulted for CKD stage 4.  History of bladder cancer with prior cystectomy and ureterostomy formation.  Also history of esophogeal and prostate cancer.  Follows with nephrology at [facility]. Has left upper arm fistula in place.  Presented with dyspnea; admitted with bilateral pneumonia/pleural effusion.  Hospital course remarkable for attempt to use fistula on 10/10; infiltrated and was unable to dialyze.  On 10/11 had Permcath placed and first dialysis treatment; he had a run of V tach during first HD.  On 10/15 became hypotensive during dialysis. Tolerating treatments well since then.     Today is feeling a bit better. No new overnight issues. Seen during dialysis.  Dialysis   Pt was seen on RRT--tolerating well  Modality: Hemodialysis  Access: Catheter  Location: right upper  QB: 350  QD: 600  UF: 600  Allergies:  Sulfa antibiotics    Home Meds:  Prescriptions Prior to Admission   Medication Sig Dispense Refill Last Dose   • acetaminophen (TYLENOL) 325 MG tablet Take 650 mg by mouth Every 6 (Six) Hours As Needed for Mild Pain .   Past Month at Unknown time   • albuterol (PROVENTIL  HFA;VENTOLIN HFA) 108 (90 Base) MCG/ACT inhaler Inhale 2 puffs Every 6 (Six) Hours As Needed for Wheezing.   Past Week at Unknown time   • aspirin 81 MG chewable tablet Chew 81 mg Daily.   10/5/2018 at Unknown time   • atorvastatin (LIPITOR) 80 MG tablet Take 40 mg by mouth Daily.   10/5/2018 at Unknown time   • budesonide-formoterol (SYMBICORT) 160-4.5 MCG/ACT inhaler Inhale 2 puffs 2 (Two) Times a Day. 1 inhaler 2 10/5/2018 at Unknown time   • bumetanide (BUMEX) 2 MG tablet Take 1 tablet by mouth Daily. 30 tablet 0 10/5/2018 at Unknown time   • calcium carbonate (OS-TASHI) 600 MG tablet Take 600 mg by mouth Daily.   10/5/2018 at Unknown time   • cholecalciferol (VITAMIN D3) 1000 units tablet Take 3,000 Units by mouth Daily.   10/5/2018 at Unknown time   • insulin aspart (novoLOG FLEXPEN) 100 UNIT/ML solution pen-injector sc pen Inject 6 Units under the skin into the appropriate area as directed 3 (Three) Times a Day With Meals.   10/5/2018 at Unknown time   • insulin detemir (LEVEMIR) 100 UNIT/ML injection Inject 6 Units under the skin into the appropriate area as directed 2 (Two) Times a Day.   10/5/2018 at Unknown time   • loperamide (IMODIUM) 2 MG capsule Take 2 mg by mouth Daily As Needed for Diarrhea.   Past Month at Unknown time   • metoprolol tartrate (LOPRESSOR) 25 MG tablet Take 12.5 mg by mouth 2 (Two) Times a Day.   10/5/2018 at Unknown time   • omeprazole (priLOSEC) 20 MG capsule Take 20 mg by mouth Daily.   10/5/2018 at Unknown time   • oxyCODONE (ROXICODONE) 5 MG immediate release tablet Take 5 mg by mouth Every 6 (Six) Hours As Needed for Moderate Pain .   Past Week at Unknown time   • sildenafil (VIAGRA) 100 MG tablet Take 100 mg by mouth Daily As Needed for erectile dysfunction.   More than a month at Unknown time       Medicines:  Current Facility-Administered Medications   Medication Dose Route Frequency Provider Last Rate Last Dose   • acetaminophen (TYLENOL) tablet 650 mg  650 mg Oral Q4H PRN  Max Jones MD       • amiodarone (PACERONE) tablet 200 mg  200 mg Oral Q24H Abdiel Stout MD   200 mg at 10/23/18 1019   • aspirin chewable tablet 81 mg  81 mg Oral Daily Vu Michael MD   81 mg at 10/23/18 1019   • atorvastatin (LIPITOR) tablet 80 mg  80 mg Oral Daily Max Jones MD   80 mg at 10/23/18 1017   • budesonide-formoterol (SYMBICORT) 160-4.5 MCG/ACT inhaler 2 puff  2 puff Inhalation BID - RT Max Jones MD   2 puff at 10/23/18 2040   • calcitriol (ROCALTROL) capsule 0.5 mcg  0.5 mcg Oral Daily Max Jones MD   0.5 mcg at 10/23/18 1019   • calcium carb-cholecalciferol 600-800 MG-UNIT tablet 1 tablet  1 tablet Oral Daily Max Jones MD   1 tablet at 10/23/18 0942   • dextrose (D50W) 25 g/ 50mL Intravenous Solution 25 g  25 g Intravenous Q15 Min PRN Max Jones MD       • dextrose (GLUTOSE) oral gel 15 g  15 g Oral Q15 Min PRN Max Jones MD       • Ferric Citrate tablet 210 mg  210 mg Oral TID With Meals Max Jones MD   210 mg at 10/23/18 1719   • glucagon (human recombinant) (GLUCAGEN DIAGNOSTIC) injection 1 mg  1 mg Subcutaneous PRN Max Jones MD       • heparin (porcine) 5000 UNIT/ML injection 5,000 Units  5,000 Units Subcutaneous Q12H Max Jones MD   5,000 Units at 10/23/18 2119   • heparin (porcine) injection 1,700 Units  1,700 Units Intracatheter PRN Samson Aragon MD   1,700 Units at 10/22/18 1858   • heparin (porcine) injection 1,700 Units  1,700 Units Intracatheter PRN Samson Aragon MD   1,700 Units at 10/22/18 1858   • HYDROcodone-acetaminophen (NORCO)  MG per tablet 1 tablet  1 tablet Oral Q6H PRN Max Jones MD   1 tablet at 10/23/18 2119   • hydroxypropyl methylcellulose (ISOPTO TEARS) 2.5 % ophthalmic solution 1 drop  1 drop Both Eyes TID PRN Vu Michael MD   1 drop at 10/11/18 0653   •  Influenza Vac Subunit Quad (FLUCELVAX) injection 0.5 mL  0.5 mL Intramuscular During Hospitalization Max Jones MD       • insulin lispro (humaLOG) injection 0-9 Units  0-9 Units Subcutaneous 4x Daily With Meals & Nightly Max Jones MD   6 Units at 10/23/18 1719   • insulin lispro (humaLOG) injection 6 Units  6 Units Subcutaneous TID AC Max Jones MD   6 Units at 10/23/18 1720   • ipratropium (ATROVENT) nebulizer solution 0.5 mg  0.5 mg Nebulization Q6H PRN Abdiel Stout MD   0.5 mg at 10/20/18 2010   • isosorbide dinitrate (ISORDIL) tablet 10 mg  10 mg Oral TID - Nitrates Abdiel Stout MD   10 mg at 10/23/18 1720   • magnesium hydroxide (MILK OF MAGNESIA) suspension 2400 mg/10mL 10 mL  10 mL Oral Daily PRN Max Jones MD       • metoprolol tartrate (LOPRESSOR) tablet 25 mg  25 mg Oral Q12H Max Jones MD   25 mg at 10/22/18 2124   • nicotine (NICODERM CQ) 21 MG/24HR patch 1 patch  1 patch Transdermal Q24H Ashutosh Palafox MD   1 patch at 10/23/18 2119   • ondansetron (ZOFRAN) injection 4 mg  4 mg Intravenous Q6H PRN Chaya Duff APRN   4 mg at 10/23/18 1715   • sodium chloride 0.9 % flush 3 mL  3 mL Intravenous Q12H Max Jones MD   3 mL at 10/23/18 2121   • sodium chloride 0.9 % flush 3-10 mL  3-10 mL Intravenous PRN Max Jones MD   10 mL at 10/21/18 0916   • sodium chloride 0.9 % infusion  50 mL/hr Intravenous Continuous Max Jones MD   Stopped at 10/21/18 1627   • tetrahydrozoline 0.05 % ophthalmic solution 1 drop  1 drop Both Eyes 4x Daily PRN Ney De MD   1 drop at 10/10/18 0339       Past Medical History:  Past Medical History:   Diagnosis Date   • CHF (congestive heart failure) (CMS/HCC)    • Coronary stent occlusion    • Diabetes mellitus (CMS/HCC)    • Hyperlipidemia    • Hypertension    • Stroke (CMS/HCC)        Past Surgical History:  Past Surgical History:   Procedure  "Laterality Date   • BLADDER SURGERY     • ESOPHAGECTOMY     • INSERTION HEMODIALYSIS CATHETER N/A 10/11/2018    Procedure: HEMODIALYSIS CATHETER INSERTION;  Surgeon: Hugo Bonilla MD;  Location: [place] [address];  Service: Vascular       Family History  Family History   Problem Relation Age of Onset   • No Known Problems Father    • No Known Problems Mother        Social History  Social History     Social History   • Marital status:      Spouse name: N/A   • Number of children: N/A   • Years of education: N/A     Occupational History   • Not on file.     Social History Main Topics   • Smoking status: Current Every Day Smoker   • Smokeless tobacco: Never Used   • Alcohol use No   • Drug use: No   • Sexual activity: Not on file     Other Topics Concern   • Not on file     Social History Narrative   • No narrative on file         Review of Systems:  History obtained from chart review and the patient  General ROS: No fever or chills  Respiratory ROS: No cough, shortness of breath, wheezing  Cardiovascular ROS: No chest pain or palpitations  Gastrointestinal ROS: No abdominal pain or melena  Genito-Urinary ROS: No dysuria or hematuria  Neurological ROS: no headache or dizziness    Objective:  /46 (BP Location: Right arm, Patient Position: Lying)   Pulse 77   Temp 96.9 °F (36.1 °C) (Oral)   Resp 16   Ht 170.2 cm (67\")   Wt 45.5 kg (100 lb 6.4 oz)   SpO2 99%   BMI 15.72 kg/m²     Intake/Output Summary (Last 24 hours) at 10/24/18 0932  Last data filed at 10/23/18 1904   Gross per 24 hour   Intake              360 ml   Output              150 ml   Net              210 ml     General: awake/alert    Neck: supple, no JVD  Chest:  clear to auscultation bilaterally without respiratory distress  CVS: regular rate and rhythm  Abdominal: soft, nontender, normal bowel sounds  Extremities: no cyanosis or edema  Skin: warm and dry without rash  Neuro: no focal motor deficits    Labs:    Results from " last 7 days  Lab Units 10/24/18  0510 10/23/18  0505 10/21/18  0503   WBC 10*3/mm3 5.82 5.53 4.59*   HEMOGLOBIN g/dL 7.6* 8.1* 8.2*   HEMATOCRIT % 24.0* 25.3* 26.2*   PLATELETS 10*3/mm3 152 162 166           Results from last 7 days  Lab Units 10/24/18  0510 10/23/18  0505 10/22/18  0549   SODIUM mmol/L 136 136 138   POTASSIUM mmol/L 5.1 4.4 5.3   CHLORIDE mmol/L 99 99 103   CO2 mmol/L 30.0 30.0 25.0   BUN mg/dL 29* 18 42*   CREATININE mg/dL 3.21* 2.21* 4.02*   CALCIUM mg/dL 8.6 8.7 8.7   BILIRUBIN mg/dL 0.2 0.4  --    ALK PHOS U/L 67 63  --    ALT (SGPT) U/L 31 26  --    AST (SGOT) U/L 12 11  --    GLUCOSE mg/dL 109* 103* 131*       Radiology:   Imaging Results (last 72 hours)     Procedure Component Value Units Date/Time    US Thoracentesis [[id number]] Collected:  10/07/18 1323    Specimen:  Body Fluid Updated:  10/13/18 0934    Narrative:       DATE OF PROCEDURE:  10/7/2018.     PREPROCEDURE DIAGNOSIS:  Left pleural effusion.  POSTPROCEDURE DIAGNOSIS:  Left pleural effusion.          INDICATIONS FOR PROCEDURE: Shortness of breath.     PROCEDURE:   1. Thoracentesis, diagnostic and therapeutic.  2. Ultrasound guidance for thoracentesis, imaging supervision and  interpretation.     ANESTHESIA: 1% lidocaine, injected locally.          COMPLICATIONS: None.        EXAMINATIONS FOR COMPARISON:  CT chest dated 10/6/2018.     DESCRIPTION OF PROCEDURE:   The risk and benefits of the procedure were explained to the patient.   Specifically, the risks of bleeding, infection, pneumothorax (possible  thoracostomy tube), and damage to surrounding structures were discussed  extensively. The patient's questions were answered. The patient opted to  proceed. Written and verbal consent were obtained from the patient.     TIME OUT was taken and the patient's name, medical record number, date  of birth, procedure, entry site, and listen allergies were confirmed.  The site of the procedure was confirmed and marked.      The  leftposterior thorax was prepped and draped in sterile fashion. The  area was anesthetized with buffered lidocaine 2%.      A 5 Citizen of [place] 10 cm  catheter was inserted into the pleural effusion  using  ultrasound guidance. Approximately 400 mL of clear fluid was recovered  and sent to the pathology lab for analysis.      The patient tolerated the procedure well.   No immediate complications  were noted.       Impression:       Successful ultrasound guided leftthoracentesis. Approximately 400 mL of  clear fluid was recovered and sent to the pathology lab for analysis.   No immediate complications were noted.              This report was finalized on 10/07/2018 13:26 by Dr. Petra Mckinley MD.     Chest [[id number]] Collected:  10/07/18 1323     Updated:  10/13/18 0934    Narrative:       DATE OF PROCEDURE:  10/7/2018.     PREPROCEDURE DIAGNOSIS:  Left pleural effusion.  POSTPROCEDURE DIAGNOSIS:  Left pleural effusion.          INDICATIONS FOR PROCEDURE: Shortness of breath.     PROCEDURE:   1. Thoracentesis, diagnostic and therapeutic.  2. Ultrasound guidance for thoracentesis, imaging supervision and  interpretation.     ANESTHESIA: 1% lidocaine, injected locally.          COMPLICATIONS: None.        EXAMINATIONS FOR COMPARISON:  CT chest dated 10/6/2018.     DESCRIPTION OF PROCEDURE:   The risk and benefits of the procedure were explained to the patient.   Specifically, the risks of bleeding, infection, pneumothorax (possible  thoracostomy tube), and damage to surrounding structures were discussed  extensively. The patient's questions were answered. The patient opted to  proceed. Written and verbal consent were obtained from the patient.     TIME OUT was taken and the patient's name, medical record number, date  of birth, procedure, entry site, and listen allergies were confirmed.  The site of the procedure was confirmed and marked.      The leftposterior thorax was prepped and draped in sterile fashion. The  area was  anesthetized with buffered lidocaine 2%.      A 5 [place] 10 cm  catheter was inserted into the pleural effusion  using  ultrasound guidance. Approximately 400 mL of clear fluid was recovered  and sent to the pathology lab for analysis.      The patient tolerated the procedure well.   No immediate complications  were noted.       Impression:       Successful ultrasound guided leftthoracentesis. Approximately 400 mL of  clear fluid was recovered and sent to the pathology lab for analysis.   No immediate complications were noted.              This report was finalized on 10/07/2018 13:26 by Dr. Petra Mckinley MD.          Culture:         Assessment   1.  CKD stage 4--now with progression to ESRD  2.  Type 2 diabetes with nephropathy  3.  Bilateral pneumonia  4.  Chronic diastolic congestive heart failure  5.  S/p ventricular tachycardia on 10/10  6.  Metabolic acidosis  7.  Secondary hyperparathyroidism    Plan:  1.  Dialysis today  2.  Possible discharge to NH soon      Roberto Carlos Myrick, MACO  10/24/2018  9:59 AM

## 2018-10-24 NOTE — NURSING NOTE
Dialysis nurse Roma called to notify staff that patient's urostomy bag had become dislodged and was leaking. I went down to dialysis dept with patient's urostomy supplies and changed his appliance without difficulty.

## 2018-10-24 NOTE — THERAPY TREATMENT NOTE
Acute Care - Physical Therapy Treatment Note  [facility]     Patient Name: Gavin Wilson  : 1945  MRN: [id number]  Today's Date: 10/24/2018  Onset of Illness/Injury or Date of Surgery: 10/06/18  Date of Referral to PT: 10/08/18  Referring Physician: Dr. Michael    Admit Date: 10/6/2018    Visit Dx:    ICD-10-CM ICD-9-CM   1. Esophageal dysphagia R13.10 787.20   2. Impaired mobility and ADLs Z74.09 799.89   3. Impaired functional mobility, balance, gait, and endurance Z74.09 V49.89   4. Chronic kidney disease, stage 4 (severe) (CMS/HCC) N18.4 585.4     Patient Active Problem List   Diagnosis   • Volume overload   • Acute renal failure superimposed on stage 4 chronic kidney disease (CMS/HCC)   • Acute on chronic combined systolic and diastolic congestive heart failure (CMS/HCC)   • Pleural effusion, bilateral   • Sepsis (CMS/[facility])   • Coronary artery disease involving native coronary artery of native heart without angina pectoris   • History of coronary artery stent placement   • Mild aortic valve stenosis   • Moderate aortic valve insufficiency   • Mild mitral valve regurgitation   • Acute on chronic respiratory failure with hypoxia (CMS/[facility])   • Pneumonia   • COPD (chronic obstructive pulmonary disease) (CMS/[facility])       Therapy Treatment          Rehabilitation Treatment Summary     Row Name 10/24/18 1137 10/24/18 1018 10/24/18 0858       Treatment Time/Intention    Discipline physical therapy assistant  - physical therapy assistant  -  --    Document Type therapy note (daily note)  -[facility]  --  --    Subjective Information complains of;pain;weakness  -[facility]  --  --    Comment  -- dialysis  -[facility] Pt. in dialysis.  -    Reason Treatment Not Performed  -- unavailable for treatment  -[facility] unavailable for treatment  -    Existing Precautions/Restrictions fall  -[facility]  --  --    Treatment Considerations/Comments urostomy  -[facility]  --  --    Recorded by [] Kathie Taylor PTA 10/24/18 1137  [[facility]] Kathie Taylor PTA  10/24/18 1156 [] Kathie Taylor, PTA 10/24/18 1018  [[facility]2] Kathie Taylor, PTA 10/24/18 1020 [CH] Jennifer Escobar, OTR/L 10/24/18 0858    Row Name 10/24/18 1137             Bed Mobility Assessment/Treatment    Supine-Sit [place] (Bed Mobility) contact guard;verbal cues  -AH      Sit-Supine [place] (Bed Mobility) contact guard;verbal cues  -AH      Recorded by [] Kathie Taylor, PTA 10/24/18 1159      Row Name 10/24/18 1137             Sit-Stand Transfer    Sit-Stand [place] (Transfers) contact guard;verbal cues  -AH      Assistive Device (Sit-Stand Transfers) walker, front-wheeled  -AH      Recorded by [] Kathie Taylor, PTA 10/24/18 1159      Row Name 10/24/18 1137             Stand-Sit Transfer    Stand-Sit [place] (Transfers) contact guard;verbal cues  -      Assistive Device (Stand-Sit Transfers) walker, front-wheeled  -AH      Recorded by [] Kathie Taylor, PTA 10/24/18 1159      Row Name 10/24/18 1137             Gait/Stairs Assessment/Training    [place] Level (Gait) contact guard;verbal cues  -      Assistive Device (Gait) walker, front-wheeled  -AH      Distance in Feet (Gait) 50   50 x 3  -AH      Deviations/Abnormal Patterns (Gait) kathy decreased  -AH      Recorded by [] Kathie Taylor, PTA 10/24/18 1159      Row Name 10/24/18 1137             Positioning and Restraints    Pre-Treatment Position in bed  -AH      Post Treatment Position bed  -AH      In Bed fowlers;side lying right;call light within reach;encouraged to call for assist  -AH      Recorded by [] Kathie Taylor, PTA 10/24/18 1159      Row Name 10/24/18 1137             Pain Assessment    Additional Documentation Pain Scale: Numbers Pre/Post-Treatment (Group)  -AH      Recorded by [] Kathie Taylor, PTA 10/24/18 1159      Row Name 10/24/18 1137             Pain Scale: Numbers Pre/Post-Treatment    Pain Scale: Numbers, Pretreatment 4/10  -      Pain Location --   sore on bottom  -      Pain  Intervention(s) Repositioned  -AH      Recorded by [] Kathie Taylor, PTA 10/24/18 1159      Row Name                Wound 10/07/18 1255 Bilateral posterior coccyx    Wound - Properties Group Date first assessed: 10/07/18 [SH] Time first assessed: 1255 [SH] Side: Bilateral [SH] Orientation: posterior [SH] Location: coccyx [SH] Recorded by:  [SH] Virginia Ahn RN 10/07/18 1256    Row Name                Wound 10/11/18 0831 Other (See comments) chest incision    Wound - Properties Group Date first assessed: 10/11/18 [DS] Time first assessed: 0831 [DS] Side: Other (See comments) [DS] Location: chest [DS] Type: incision [DS] Recorded by:  [DS] Jeremy Liu RN 10/11/18 0831      User Key  (r) = Recorded By, (t) = Taken By, (c) = Cosigned By    Initials Name Effective Dates Discipline     Kathie Taylor, PTA 08/02/16 -  PT    Jennifer Price, OTR/L 08/02/16 -  OT    Virginia Diallo RN 08/28/17 -  Nurse    DS Jeremy Liu RN 08/02/16 -  Nurse          Wound 10/07/18 1255 Bilateral posterior coccyx (Active)   Dressing Appearance intact;dry 10/23/2018  8:00 PM   Closure DAVID 10/23/2018  8:00 PM   Base dressing in place, unable to visualize 10/23/2018  8:00 PM   Periwound Skin Turgor firm 10/23/2018  1:00 PM   Edges rolled/closed 10/23/2018  1:00 PM   Drainage Amount none 10/23/2018  8:00 PM   Care, Wound cleansed with;irrigated with;soap and water;sterile normal saline;barrier applied 10/23/2018  1:00 PM   Dressing Care, Wound foam 10/23/2018  8:00 PM       Wound 10/11/18 0831 Other (See comments) chest incision (Active)   Dressing Appearance dry;intact 10/23/2018  8:00 PM   Closure DAVID 10/23/2018  8:00 PM   Base dressing in place, unable to visualize 10/23/2018  8:00 PM   Drainage Amount none 10/23/2018  8:00 PM   Dressing Care, Wound border dressing 10/23/2018  8:00 PM             Physical Therapy Education     Title: PT OT SLP Therapies (Active)     Topic: Physical Therapy (Active)      Point: Mobility training (Done)    Learning Progress Summary     Learner Status Readiness Method Response Comment Documented by    Patient Done Acceptance E VU  TS 10/23/18 1337     Done Eager E VU,DU Pt educated on progress of POC and benefits of activity NH 10/23/18 1158     Done Acceptance E VU,NR Educated pt. on progression of PT POC and benefits of activity JACEY 10/22/18 0942     Done Nonacceptance E NR,VU BOA WK 10/18/18 1332     Done Acceptance E VU BOA WK 10/17/18 1539     Active Acceptance E,D NR transfers and benefits of activity MF 10/16/18 1035     Active Acceptance E,D NR Educated on benefits of activity. Intructed in safety with bed mobility, transfers,and LE Strengthening Exercises. LG 10/14/18 0918     Done Acceptance E VU,NR benefits of activity MF 10/12/18 1206     Active Acceptance E,D RT Effecient supine to sit EARLENE 10/09/18 1154     Done Acceptance E VU,NR Pt was educated on the importance and benefits of getting out of bed and how it affects his overall health. Pt was educated on why PT is working with him. Pt was encouraged to do more in treatment session. TD 10/08/18 1430     Done Acceptance E VU,NR Pt  was educated on benefits ot PT and PT POC. Pt was edcuated on body mechanics with transfers (pushing with one arm on bed to stand up/having legs touch bed before sitting down) and safety precautions (socks on, gait belt on) with transfers and gait. TD 10/08/18 1149          Point: Body mechanics (Active)    Learning Progress Summary     Learner Status Readiness Method Response Comment Documented by    Patient Active Acceptance E,D NR Educated on benefits of activity. Intructed in safety with bed mobility, transfers,and LE Strengthening Exercises. LG 10/14/18 0918     Done Acceptance E VU,NR Pt was educated on the importance and benefits of getting out of bed and how it affects his overall health. Pt was educated on why PT is working with him. Pt was encouraged to do more in treatment session. TD  10/08/18 1430     Done Acceptance E VU,NR Pt  was educated on benefits ot PT and PT POC. Pt was edcuated on body mechanics with transfers (pushing with one arm on bed to stand up/having legs touch bed before sitting down) and safety precautions (socks on, gait belt on) with transfers and gait. TD 10/08/18 1149          Point: Precautions (Done)    Learning Progress Summary     Learner Status Readiness Method Response Comment Documented by    Patient Done Acceptance E,TB VU pressure relief from bottom  10/24/18 1159     Active Acceptance E,D NR Educated on benefits of activity. Intructed in safety with bed mobility, transfers,and LE Strengthening Exercises.  10/14/18 0918     Done Acceptance E VU,NR Pt was educated on the importance and benefits of getting out of bed and how it affects his overall health. Pt was educated on why PT is working with him. Pt was encouraged to do more in treatment session. TD 10/08/18 1430     Done Acceptance E VU,NR Pt  was educated on benefits ot PT and PT POC. Pt was edcuated on body mechanics with transfers (pushing with one arm on bed to stand up/having legs touch bed before sitting down) and safety precautions (socks on, gait belt on) with transfers and gait. TD 10/08/18 1149                      User Key     Initials Effective Dates Name Provider Type Discipline     08/02/16 -  Roberto Pereira, PTA Physical Therapy Assistant PT     08/02/16 -  Kathie Taylor, PTA Physical Therapy Assistant PT    JACEY 08/02/16 -  Richar Perdue, PT DPT Physical Therapist PT    EARLENE 08/02/16 -  Kaylynn Mcgarry, PTA Physical Therapy Assistant PT    TS 08/02/16 -  Sushma Chan LPN Licensed Nurse Nurse     08/02/16 -  Liliane Collado, PTA Physical Therapy Assistant PT    WK 08/02/16 -  Bernice Rico, PTA Physical Therapy Assistant PT    TD 09/07/18 -  Rita Grullon, PT Student PT Student PT    NH 09/19/18 -  Gab Munoz, PTA Student PTA Student PT                    PT  Recommendation and Plan     Plan of Care Reviewed With: patient  Progress: improving  Outcome Summary: pt trans to EOB cga, sit-stand cga, pt amb 50 feet at a time with rwx cga,Pt would benefit from SNF          Outcome Measures     Row Name 10/24/18 1200 10/23/18 1136 10/23/18 0815       How much help from another person do you currently need...    Turning from your back to your side while in flat bed without using bedrails? 3  - 2  -LH (r) NH (t) LH (c)  --    Moving from lying on back to sitting on the side of a flat bed without bedrails? 3  - 2  -LH (r) NH (t) LH (c)  --    Moving to and from a bed to a chair (including a wheelchair)? 3  - 3  -LH (r) NH (t) LH (c)  --    Standing up from a chair using your arms (e.g., wheelchair, bedside chair)? 3  - 3  -LH (r) NH (t) LH (c)  --    Climbing 3-5 steps with a railing? 3  - 3  -LH (r) NH (t) LH (c)  --    To walk in hospital room? 3  - 4  -LH (r) NH (t) LH (c)  --    AM-PAC 6 Clicks Score 18  - 17  -LH (r) NH (t)  --       How much help from another is currently needed...    Putting on and taking off regular lower body clothing?  --  -- 2  -CJ    Bathing (including washing, rinsing, and drying)  --  -- 2  -CJ    Toileting (which includes using toilet bed pan or urinal)  --  -- 3  -CJ    Putting on and taking off regular upper body clothing  --  -- 3  -CJ    Taking care of personal grooming (such as brushing teeth)  --  -- 3  -CJ    Eating meals  --  -- 4  -CJ    Score  --  -- 17  -       Functional Assessment    Outcome Measure Options AM-PAC 6 Clicks Basic Mobility (PT)  - AM-PAC 6 Clicks Basic Mobility (PT)  - (r) NH (t) LH (c) AM-PAC 6 Clicks Daily Activity (OT)  -    Row Name 10/22/18 0948 10/22/18 0942          How much help from another person do you currently need...    Turning from your back to your side while in flat bed without using bedrails?  -- 3  -JACEY     Moving from lying on back to sitting on the side of a flat bed without  bedrails?  -- 3  -JACEY     Moving to and from a bed to a chair (including a wheelchair)?  -- 3  -JACEY     Standing up from a chair using your arms (e.g., wheelchair, bedside chair)?  -- 3  -JACEY     Climbing 3-5 steps with a railing?  -- 2  -JACEY     To walk in hospital room?  -- 2  -JACEY     AM-[facility] 6 Clicks Score  -- 16  -JACEY        How much help from another is currently needed...    Putting on and taking off regular lower body clothing? 2  -CJ  --     Bathing (including washing, rinsing, and drying) 2  -CJ  --     Toileting (which includes using toilet bed pan or urinal) 3  -CJ  --     Putting on and taking off regular upper body clothing 3  -CJ  --     Taking care of personal grooming (such as brushing teeth) 3  -CJ  --     Eating meals 4  -CJ  --     Score 17  -CJ  --        Functional Assessment    Outcome Measure Options AM-[facility] 6 Clicks Daily Activity (OT)  - AM-[facility] 6 Clicks Basic Mobility (PT)  -       User Key  (r) = Recorded By, (t) = Taken By, (c) = Cosigned By    Initials Name Provider Type    Kathie Hardy, PTA Physical Therapy Assistant    Roney Wise, PT Physical Therapist    Paulino Lopez COTA/L Occupational Therapy Assistant    Richar Pedroza, PT DPT Physical Therapist    Gab Mancia, PTA Student PTA Student           Time Calculation:         PT Charges     Row Name 10/24/18 1203             Time Calculation    Start Time 1137  -      Stop Time 1200  -      Time Calculation (min) 23 min  -      PT Received On 10/24/18  -      PT Goal Re-Cert Due Date 11/01/18  -         Time Calculation- PT    Total Timed Code Minutes- PT 23 minute(s)  -         Timed Charges    38730 - Gait Training Minutes  23  -        User Key  (r) = Recorded By, (t) = Taken By, (c) = Cosigned By    Initials Name Provider Type    Kathie Hardy, PTA Physical Therapy Assistant        Therapy Suggested Charges     Code   Minutes Charges    68902 (CPT®) Hc Pt Neuromusc Re Education Ea 15  Min      72230 (CPT®) Hc Pt Ther Proc Ea 15 Min      85185 (CPT®) Hc Gait Training Ea 15 Min 23 2    31247 (CPT®) Hc Pt Therapeutic Act Ea 15 Min      54904 (CPT®) Hc Pt Manual Therapy Ea 15 Min      94769 (CPT®) Hc Pt Iontophoresis Ea 15 Min      87128 (CPT®) Hc Pt Elec Stim Ea-Per 15 Min      65094 (CPT®) Hc Pt Ultrasound Ea 15 Min      02132 (CPT®) Hc Pt Self Care/Mgmt/Train Ea 15 Min      78145 (CPT®) Hc Pt Prosthetic (S) Train Initial Encounter, Each 15 Min      01321 (CPT®) Hc Pt Orthotic(S)/Prosthetic(S) Encounter, Each 15 Min      70710 (CPT®) Hc Orthotic(S) Mgmt/Train Initial Encounter, Each 15min      Total  23 2        Therapy Charges for Today     Code Description Service Date Service Provider Modifiers Qty    [id number] HC GAIT TRAINING EA 15 MIN 10/24/2018 Kathie Taylor PTA GP, KX 2          PT G-Codes  PT Professional Judgement Used?: Yes  Outcome Measure Options: AM-PAC 6 Clicks Basic Mobility (PT)  AM-PAC 6 Clicks Score: 18  Score: 17  Functional Limitation: Mobility: Walking and moving around  Mobility: Walking and Moving Around Current Status (): At least 40 percent but less than 60 percent impaired, limited or restricted  Mobility: Walking and Moving Around Goal Status (): At least 20 percent but less than 40 percent impaired, limited or restricted    Kathie Taylor PTA  10/24/2018

## 2018-10-24 NOTE — PLAN OF CARE
Problem: Pneumonia (Adult)  Goal: Signs and Symptoms of Listed Potential Problems Will be Absent, Minimized or Managed (Pneumonia)  Outcome: Ongoing (interventions implemented as appropriate)   10/24/18 0455   Goal/Outcome Evaluation   Problems Assessed (Pneumonia) all   Problems Present (Pneumonia) none       Problem: Fall Risk (Adult)  Goal: Absence of Fall  Outcome: Ongoing (interventions implemented as appropriate)   10/24/18 0455   Fall Risk (Adult)   Absence of Fall achieves outcome       Problem: Skin Injury Risk (Adult)  Goal: Skin Health and Integrity  Outcome: Ongoing (interventions implemented as appropriate)   10/24/18 0455   Skin Injury Risk (Adult)   Skin Health and Integrity making progress toward outcome       Problem: Nutrition, Imbalanced: Inadequate Oral Intake (Adult)  Goal: Improved Oral Intake  Outcome: Ongoing (interventions implemented as appropriate)   10/24/18 0455   Nutrition, Imbalanced: Inadequate Oral Intake (Adult)   Improved Oral Intake making progress toward outcome       Problem: Patient Care Overview  Goal: Plan of Care Review  Outcome: Ongoing (interventions implemented as appropriate)   10/24/18 0455   Plan of Care Review   Progress improving   OTHER   Outcome Summary Medicated for c/o chronic back pain sleeping during rounds. Encouraged to turn assisted as requested. Mepilex to coccyx. Contact isolated maintained.  S 81-90   Coping/Psychosocial   Plan of Care Reviewed With patient

## 2018-10-24 NOTE — PROGRESS NOTES
[facility] Medicine Services  INPATIENT PROGRESS NOTE    Patient Name: Gavin Wilson  Date of Admission: 10/6/2018  Today's Date: 10/24/18  Length of Stay: 18  Primary Care Physician: Provider, No Known    Subjective   Chief Complaint: No new problems    HPI   No complaints other than cold.   Has had dialysis today.  [place] will not take today as planned.         Review of Systems     All pertinent negatives and positives are as above. All other systems have been reviewed and are negative unless otherwise stated.     Objective    Temp:  [96.9 °F (36.1 °C)-98.2 °F (36.8 °C)] 97.4 °F (36.3 °C)  Heart Rate:  [77-90] 80  Resp:  [16-18] 16  BP: ()/(40-55) 115/55  Physical Exam   Constitutional: He is oriented to person, place, and time. He appears well-developed and well-nourished.   HENT:   Head: Normocephalic and atraumatic.   Eyes: Pupils are equal, round, and reactive to light. Conjunctivae and EOM are normal.   Neck: Normal range of motion. Neck supple. No JVD present.   Cardiovascular: Normal rate, regular rhythm, normal heart sounds and intact distal pulses.    Pulmonary/Chest: Effort normal and breath sounds normal.   Abdominal: Soft. Bowel sounds are normal.   Musculoskeletal: Normal range of motion.   Neurological: He is alert and oriented to person, place, and time.   Skin: Skin is warm and dry.   Psychiatric: He has a normal mood and affect. His behavior is normal.   Nursing note and vitals reviewed.          Results Review:  I have reviewed the labs, radiology results, and diagnostic studies.    Laboratory Data:     Results from last 7 days  Lab Units 10/24/18  0510 10/23/18  0505 10/21/18  0503   WBC 10*3/mm3 5.82 5.53 4.59*   HEMOGLOBIN g/dL 7.6* 8.1* 8.2*   HEMATOCRIT % 24.0* 25.3* 26.2*   PLATELETS 10*3/mm3 152 162 166          Results from last 7 days  Lab Units 10/24/18  0510 10/23/18  0505 10/22/18  0549   SODIUM mmol/L 136 136 138   POTASSIUM mmol/L 5.1  4.4 5.3   CHLORIDE mmol/L 99 99 103   CO2 mmol/L 30.0 30.0 25.0   BUN mg/dL 29* 18 42*   CREATININE mg/dL 3.21* 2.21* 4.02*   CALCIUM mg/dL 8.6 8.7 8.7   BILIRUBIN mg/dL 0.2 0.4  --    ALK PHOS U/L 67 63  --    ALT (SGPT) U/L 31 26  --    AST (SGOT) U/L 12 11  --    GLUCOSE mg/dL 109* 103* 131*       Culture Data:        Radiology Data:   Imaging Results (last 24 hours)     ** No results found for the last 24 hours. **          I have reviewed the patient's current medications.     Assessment/Plan     Assessment     1) Acute hypoxic respiratory failure, resolved  2) Sepsis due to suspected health care associated pneumonia - blood culture and pleural fluid culture showed no growth to date  3) Bilateral pleural effusions - Right is recurrent, left is worsening -status this post thoracentesis on October 7; Suspected due to heart failure, but cannot rule out infection or malignancy   4) Severe protein-calorie malnutrition  5) COPD without exacerbation  6) Combined systolic and diastolic heart failure, 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 Diff - cont po vanc day 5 in terms of dosing   14)Hypoglycemia (50) - resolved; hx of Diabetes -  Patient encourage to eat.  Will continue to monitor; prn d50; will consider /dc pre meal insulin and maintain only on ssi     Plan    Patient is ready for discharge however the NH is now not willing to take today.  Will dc in the morning.  Continue current care.       Discharge Planning: I expect the patient to be discharged to [facility] in AM.    Nancy Salamanca DO   10/24/18   2:38 PM

## 2018-10-25 VITALS
DIASTOLIC BLOOD PRESSURE: 50 MMHG | OXYGEN SATURATION: 95 % | HEART RATE: 80 BPM | WEIGHT: 100.5 LBS | BODY MASS INDEX: 15.78 KG/M2 | HEIGHT: 67 IN | SYSTOLIC BLOOD PRESSURE: 118 MMHG | TEMPERATURE: 97.5 F | RESPIRATION RATE: 16 BRPM

## 2018-10-25 LAB
ANION GAP SERPL CALCULATED.3IONS-SCNC: 11 MMOL/L (ref 4–13)
BUN BLD-MCNC: 27 MG/DL (ref 5–21)
BUN/CREAT SERPL: 13 (ref 7–25)
CALCIUM SPEC-SCNC: 8.1 MG/DL (ref 8.4–10.4)
CHLORIDE SERPL-SCNC: 99 MMOL/L (ref 98–110)
CO2 SERPL-SCNC: 29 MMOL/L (ref 24–31)
CREAT BLD-MCNC: 2.08 MG/DL (ref 0.5–1.4)
GFR SERPL CREATININE-BSD FRML MDRD: 31 ML/MIN/1.73
GLUCOSE BLD-MCNC: 256 MG/DL (ref 70–100)
GLUCOSE BLDC GLUCOMTR-MCNC: 194 MG/DL (ref 70–130)
POTASSIUM BLD-SCNC: 3.6 MMOL/L (ref 3.5–5.3)
SODIUM BLD-SCNC: 139 MMOL/L (ref 135–145)

## 2018-10-25 PROCEDURE — 94799 UNLISTED PULMONARY SVC/PX: CPT

## 2018-10-25 PROCEDURE — 25010000002 HEPARIN (PORCINE) PER 1000 UNITS: Performed by: INTERNAL MEDICINE

## 2018-10-25 PROCEDURE — 94760 N-INVAS EAR/PLS OXIMETRY 1: CPT

## 2018-10-25 PROCEDURE — 82962 GLUCOSE BLOOD TEST: CPT

## 2018-10-25 PROCEDURE — 63710000001 INSULIN LISPRO (HUMAN) PER 5 UNITS: Performed by: INTERNAL MEDICINE

## 2018-10-25 PROCEDURE — 80048 BASIC METABOLIC PNL TOTAL CA: CPT | Performed by: NURSE PRACTITIONER

## 2018-10-25 RX ORDER — ISOSORBIDE DINITRATE 10 MG/1
10 TABLET ORAL
Start: 2018-10-25

## 2018-10-25 RX ORDER — OXYCODONE HYDROCHLORIDE 5 MG/1
5 TABLET ORAL EVERY 6 HOURS PRN
Qty: 18 TABLET | Refills: 0 | Status: SHIPPED | OUTPATIENT
Start: 2018-10-25

## 2018-10-25 RX ORDER — TETRAHYDROZOLINE HCL 0.05 %
1 DROPS OPHTHALMIC (EYE) 4 TIMES DAILY PRN
Start: 2018-10-25

## 2018-10-25 RX ORDER — AMIODARONE HYDROCHLORIDE 200 MG/1
200 TABLET ORAL
Start: 2018-10-25

## 2018-10-25 RX ORDER — NICOTINE 21 MG/24HR
1 PATCH, TRANSDERMAL 24 HOURS TRANSDERMAL EVERY 24 HOURS
Start: 2018-10-25

## 2018-10-25 RX ORDER — CALCITRIOL 0.5 UG/1
0.5 CAPSULE, LIQUID FILLED ORAL DAILY
Start: 2018-10-25

## 2018-10-25 RX ORDER — NICOTINE POLACRILEX 4 MG
15 LOZENGE BUCCAL
Start: 2018-10-25

## 2018-10-25 RX ORDER — HYDROCODONE BITARTRATE AND ACETAMINOPHEN 10; 325 MG/1; MG/1
1 TABLET ORAL EVERY 6 HOURS PRN
Qty: 18 TABLET | Refills: 0 | Status: SHIPPED | OUTPATIENT
Start: 2018-10-25

## 2018-10-25 RX ADMIN — FERRIC CITRATE 210 MG: 210 TABLET, COATED ORAL at 08:35

## 2018-10-25 RX ADMIN — BUDESONIDE AND FORMOTEROL FUMARATE DIHYDRATE 2 PUFF: 160; 4.5 AEROSOL RESPIRATORY (INHALATION) at 07:30

## 2018-10-25 RX ADMIN — CALCITRIOL CAPSULES 0.25 MCG 0.5 MCG: 0.25 CAPSULE ORAL at 08:35

## 2018-10-25 RX ADMIN — ASPIRIN 81 MG CHEWABLE TABLET 81 MG: 81 TABLET CHEWABLE at 08:35

## 2018-10-25 RX ADMIN — INSULIN LISPRO 4 UNITS: 100 INJECTION, SOLUTION INTRAVENOUS; SUBCUTANEOUS at 08:36

## 2018-10-25 RX ADMIN — HEPARIN SODIUM 5000 UNITS: 5000 INJECTION, SOLUTION INTRAVENOUS; SUBCUTANEOUS at 08:35

## 2018-10-25 RX ADMIN — Medication 1 TABLET: at 08:35

## 2018-10-25 RX ADMIN — ISOSORBIDE DINITRATE 10 MG: 10 TABLET ORAL at 08:35

## 2018-10-25 RX ADMIN — AMIODARONE HYDROCHLORIDE 200 MG: 200 TABLET ORAL at 08:35

## 2018-10-25 RX ADMIN — INSULIN LISPRO 6 UNITS: 100 INJECTION, SOLUTION INTRAVENOUS; SUBCUTANEOUS at 08:34

## 2018-10-25 RX ADMIN — Medication 3 ML: at 08:36

## 2018-10-25 RX ADMIN — ATORVASTATIN CALCIUM 80 MG: 40 TABLET, FILM COATED ORAL at 08:35

## 2018-10-25 RX ADMIN — METOPROLOL TARTRATE 25 MG: 25 TABLET, FILM COATED ORAL at 08:35

## 2018-10-25 NOTE — PLAN OF CARE
Problem: Pneumonia (Adult)  Goal: Signs and Symptoms of Listed Potential Problems Will be Absent, Minimized or Managed (Pneumonia)  Outcome: Ongoing (interventions implemented as appropriate)   10/25/18 0435   Goal/Outcome Evaluation   Problems Assessed (Pneumonia) all   Problems Present (Pneumonia) none       Problem: Fall Risk (Adult)  Goal: Absence of Fall  Outcome: Ongoing (interventions implemented as appropriate)   10/25/18 0435   Fall Risk (Adult)   Absence of Fall achieves outcome       Problem: Skin Injury Risk (Adult)  Goal: Skin Health and Integrity  Outcome: Ongoing (interventions implemented as appropriate)   10/25/18 0435   Skin Injury Risk (Adult)   Skin Health and Integrity making progress toward outcome       Problem: Nutrition, Imbalanced: Inadequate Oral Intake (Adult)  Goal: Improved Oral Intake  Outcome: Ongoing (interventions implemented as appropriate)   10/25/18 0435   Nutrition, Imbalanced: Inadequate Oral Intake (Adult)   Improved Oral Intake making progress toward outcome       Problem: Patient Care Overview  Goal: Plan of Care Review  Outcome: Ongoing (interventions implemented as appropriate)   10/25/18 0435   Plan of Care Review   Progress improving   OTHER   Outcome Summary VSS, c/o pain this shift, medicated per MAR with relief, patient's appetite improved, will continue to monitor for changes, possible DC to NH today.   Coping/Psychosocial   Plan of Care Reviewed With patient       Problem: Hemodialysis (Adult)  Goal: Signs and Symptoms of Listed Potential Problems Will be Absent, Minimized or Managed (Hemodialysis)  Outcome: Ongoing (interventions implemented as appropriate)   10/25/18 0435   Goal/Outcome Evaluation   Problems Assessed (Hemodialysis) all   Problems Present (Hemodialysis) none

## 2018-10-25 NOTE — THERAPY DISCHARGE NOTE
Acute Care - Physical Therapy Discharge Summary  [facility]       Patient Name: Gavin Wilson  : 1945  MRN: [id number]    Today's Date: 10/25/2018  Onset of Illness/Injury or Date of Surgery: 10/06/18    Date of Referral to PT: 10/08/18  Referring Physician: Dr. Michael      Admit Date: 10/6/2018      PT Recommendation and Plan    Visit Dx:    ICD-10-CM ICD-9-CM   1. Esophageal dysphagia R13.10 787.20   2. Impaired mobility and ADLs Z74.09 799.89   3. Impaired functional mobility, balance, gait, and endurance Z74.09 V49.89   4. Chronic kidney disease, stage 4 (severe) (CMS/HCC) N18.4 585.4             Outcome Measures     Row Name 10/24/18 1200 10/23/18 1136 10/23/18 0815       How much help from another person do you currently need...    Turning from your back to your side while in flat bed without using bedrails? 3  - 2  -LH (r) NH (t) LH (c)  --    Moving from lying on back to sitting on the side of a flat bed without bedrails? 3  - 2  -LH (r) NH (t) LH (c)  --    Moving to and from a bed to a chair (including a wheelchair)? 3  - 3  -LH (r) NH (t) LH (c)  --    Standing up from a chair using your arms (e.g., wheelchair, bedside chair)? 3  - 3  -LH (r) NH (t) LH (c)  --    Climbing 3-5 steps with a railing? 3  - 3  -LH (r) NH (t) LH (c)  --    To walk in hospital room? 3  - 4  -LH (r) NH (t) LH (c)  --    AM-PAC 6 Clicks Score 18  - 17  -LH (r) NH (t)  --       How much help from another is currently needed...    Putting on and taking off regular lower body clothing?  --  -- 2  -CJ    Bathing (including washing, rinsing, and drying)  --  -- 2  -CJ    Toileting (which includes using toilet bed pan or urinal)  --  -- 3  -CJ    Putting on and taking off regular upper body clothing  --  -- 3  -CJ    Taking care of personal grooming (such as brushing teeth)  --  -- 3  -CJ    Eating meals  --  -- 4  -CJ    Score  --  -- 17  -CJ       Functional Assessment    Outcome Measure Options AM-PAC 6 Clicks  Basic Mobility (PT)  - AM-PAC 6 Clicks Basic Mobility (PT)  - (r) NH (t)  (c) AM-PAC 6 Clicks Daily Activity (OT)  -      User Key  (r) = Recorded By, (t) = Taken By, (c) = Cosigned By    Initials Name Provider Type     Kathie Taylor, PTA Physical Therapy Assistant     Roney Reyes, PT Physical Therapist     Paulino Fuchs, [place]/L Occupational Therapy Assistant    [facility] Gab Munoz, URMILA Student PTA Student            Therapy Suggested Charges     Code   Minutes Charges    31262 (CPT®) Hc Pt Neuromusc Re Education Ea 15 Min      77261 (CPT®) Hc Pt Ther Proc Ea 15 Min      98042 (CPT®) Hc Gait Training Ea 15 Min 23 2    47025 (CPT®) Hc Pt Therapeutic Act Ea 15 Min      52208 (CPT®) Hc Pt Manual Therapy Ea 15 Min      13166 (CPT®) Hc Pt Iontophoresis Ea 15 Min      51916 (CPT®) Hc Pt Elec Stim Ea-Per 15 Min      15708 (CPT®) Hc Pt Ultrasound Ea 15 Min      21287 (CPT®) Hc Pt Self Care/Mgmt/Train Ea 15 Min      47744 (CPT®) Hc Pt Prosthetic (S) Train Initial Encounter, Each 15 Min      08526 (CPT®) Hc Pt Orthotic(S)/Prosthetic(S) Encounter, Each 15 Min      29135 (CPT®) Hc Orthotic(S) Mgmt/Train Initial Encounter, Each 15min      Total  23 2                PT Rehab Goals     Row Name 10/25/18 1123             Bed Mobility Goal 1 (PT)    Activity/Assistive Device (Bed Mobility Goal 1, PT) bed mobility activities, all  -MF      [place] Level/Cues Needed (Bed Mobility Goal 1, PT) conditional independence  -MF      Time Frame (Bed Mobility Goal 1, PT) by discharge  -MF      Progress/Outcomes (Bed Mobility Goal 1, PT) goal not met  -MF         Transfer Goal 1 (PT)    Activity/Assistive Device (Transfer Goal 1, PT) transfers, all  -MF      [place] Level/Cues Needed (Transfer Goal 1, PT) conditional independence  -MF      Time Frame (Transfer Goal 1, PT) by discharge  -MF      Progress/Outcome (Transfer Goal 1, PT) goal not met  -MF         Gait Training Goal 1 (PT)    Activity/Assistive Device  (Gait Training Goal 1, PT) gait (walking locomotion);assistive device use;decrease fall risk;diminish gait deviation;improve balance and speed;increase endurance/gait distance;increase energy conservation  -MF      [place] Level (Gait Training Goal 1, PT) standby assist  -MF      Distance (Gait Goal 1, PT) 50 feet  -MF      Time Frame (Gait Training Goal 1, PT) by discharge  -MF      Progress/Outcome (Gait Training Goal 1, PT) goal not met  -MF         Patient Education Goal (PT)    Activity (Patient Education Goal, PT) Safety Precaution/Awareness: Pt able to understand safety precautions/body mechanics with transfers/mobility  -MF      [place]/Cues/Accuracy (Memory Goal 2, PT) demonstrates adequately;verbalizes understanding  -MF      Time Frame (Patient Education Goal, PT) by discharge  -MF      Progress/Outcome (Patient Education Goal, PT) goal not met  -MF        User Key  (r) = Recorded By, (t) = Taken By, (c) = Cosigned By    Initials Name Provider Type Discipline     Liliane Collado, PTA Physical Therapy Assistant PT              PT Discharge Summary  Anticipated Discharge Disposition (PT): skilled nursing facility  Reason for Discharge: Discharge from facility  Outcomes Achieved: Unable to make functional progress toward goals at this time  Discharge Destination: SNF      Liliane Collado PTA   10/25/2018

## 2018-10-25 NOTE — DISCHARGE SUMMARY
[facility] Medicine Services  DISCHARGE SUMMARY       Date of Admission: 10/6/2018  Date of Discharge:  10/25/2018  Primary Care Physician: Provider, No Known    Presenting Problem/History of Present Illness:  Sepsis (CMS/[facility]) [A41.9]     Final Discharge Diagnoses:  1) Acute hypoxic respiratory failure, resolved  2) Sepsis due to suspected health care associated pneumonia - blood culture and pleural fluid culture showed no growth to date  3) Bilateral pleural effusions - Right is recurrent, left is worsening -status this post thoracentesis on October 7; Suspected due to heart failure, but cannot rule out infection or malignancy   4) Severe protein-calorie malnutrition  5) COPD without exacerbation  6) Combined systolic and diastolic heart failure, 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 Diff - 14)Hypoglycemia (50) - resolved; hx of Diabetes -  Patient encourage to eat.  Will continue to monitor; prn d50; will consider /dc pre meal insulin and maintain only on ssi     Consults:   Nephrology  Vascular surgery  Cardiology    Procedures Performed:   1.) Percutaneous access of right internal jugular vein under ultrasound guidance  2.) Insertion of right internal jugular cuffed tunneled hemodialysis catheter (Permacath)  Dialysis  Thoracentesis      Pertinent Test Results:      Value Units Date/Time    Basic Metabolic Panel [[id number]] (Abnormal) Collected: 10/25/18 0516   Lab Status: Final result Specimen: Blood Updated: 10/25/18 0621    Glucose 256 (H) mg/dL     BUN 27 (H) mg/dL     Creatinine 2.08 (H) mg/dL     Sodium 139 mmol/L     Potassium 3.6 mmol/L     Chloride 99 mmol/L     CO2 29.0 mmol/L      Calcium 8.1 (L) mg/dL     eGFR Non African Amer 31 (L) mL/min/1.73     BUN/Creatinine Ratio 13.0    Anion Gap 11.0 mmol/L    Narrative:     The MDRD GFR formula is only valid for adults with stable renal function between ages 18 and 70.   Comprehensive Metabolic Panel [[id number]] (Abnormal) Collected: 10/24/18 0510   Lab Status: Final result Specimen: Blood Updated: 10/24/18 0557    Glucose 109 (H) mg/dL     BUN 29 (H) mg/dL     Creatinine 3.21 (H) mg/dL     Sodium 136 mmol/L     Potassium 5.1 mmol/L     Chloride 99 mmol/L     CO2 30.0 mmol/L     Calcium 8.6 mg/dL     Total Protein 5.1 (L) g/dL     Albumin 2.80 (L) g/dL     ALT (SGPT) 31 U/L     AST (SGOT) 12 U/L     Alkaline Phosphatase 67 U/L     Total Bilirubin 0.2 mg/dL     eGFR Non African Amer 19 (L) mL/min/1.73     Globulin 2.3 gm/dL     A/G Ratio 1.2 g/dL     BUN/Creatinine Ratio 9.0    Anion Gap 7.0 mmol/L    Narrative:     The MDRD GFR formula is only valid for adults with stable renal function between ages 18 and 70.   CBC Auto Differential [[id number]] (Abnormal) Collected: 10/24/18 0510   Lab Status: Final result Specimen: Blood Updated: 10/24/18 0535    WBC 5.82 10*3/mm3     RBC 2.53 (L) 10*6/mm3     Hemoglobin 7.6 (L) g/dL     Hematocrit 24.0 (L) %     MCV 94.9 fL     MCH 30.0 pg     MCHC 31.7 (L) g/dL     RDW 19.4 (H) %     RDW-SD 66.7 (H) fl     MPV 9.8 fL     Platelets 152 10*3/mm3     Neutrophil % 72.8 %     Lymphocyte % 16.0 %     Monocyte % 8.4 %     Eosinophil % 2.1 %     Basophil % 0.5 %     Immature Grans % 0.2 %     Neutrophils, Absolute 4.24 10*3/mm3     Lymphocytes, Absolute 0.93 10*3/mm3     Monocytes, Absolute 0.49 10*3/mm3     Eosinophils, Absolute 0.12 10*3/mm3     Basophils, Absolute 0.03 10*3/mm3     Immature Grans, Absolute 0.01 10*3/mm3     nRBC 0.0 /100 WBC    Comprehensive Metabolic Panel [[id number]] (Abnormal) Collected: 10/23/18 0501   Lab Status: Final result Specimen: Blood Updated: 10/23/18 0540    Glucose 103 (H) mg/dL      "BUN 18 mg/dL     Creatinine 2.21 (H) mg/dL     Sodium 136 mmol/L     Potassium 4.4 mmol/L     Chloride 99 mmol/L     CO2 30.0 mmol/L     Calcium 8.7 mg/dL     Total Protein 5.4 (L) g/dL     Albumin 2.90 (L) g/dL     ALT (SGPT) 26 U/L     AST (SGOT) 11 U/L     Alkaline Phosphatase 63 U/L     Total Bilirubin 0.4 mg/dL     eGFR Non African Amer 29 (L) mL/min/1.73     Globulin 2.5 gm/dL     A/G Ratio 1.2 g/dL     BUN/Creatinine Ratio 8.1    Anion Gap 7.0 mmol/L    Narrative:     The MDRD GFR formula is only valid for adults with stable renal function between ages 18 and 70.       Chief Complaint on Day of Discharge:   No new complaints    History of Present Illness on Day of Discharge:   Sepsis has resolved.    Hospital Course:  The patient is a 73 y.o. male who presented to [facility] with sepsis, hypoxia, pneumonia.    He has chronic renal failure.  He was treated with vancomycin and cefepime.  Dialysis instituted for worsening kidney failure.  Symptomatically he improved and placement request made.    [facility]    [facility]acement arranged for at [facility].    Was planned for 10-24-18 however the NH refused and move DC to today.  For more details of stay the reader is referred to the progress notes.     Condition on Discharge:  Improved stable    Physical Exam on Discharge:  /49 (BP Location: Right arm, Patient Position: Lying)   Pulse 80   Temp 97 °F (36.1 °C) (Temporal Artery )   Resp 18   Ht 170.2 cm (67\")   Wt 45.6 kg (100 lb 8 oz)   SpO2 98%   BMI 15.74 kg/m²   Physical Exam   Constitutional: He is oriented to person, place, and time. He appears well-developed and well-nourished.   Eyes: Pupils are equal, round, and reactive to light. Conjunctivae and EOM are normal.   Neck: Normal range of motion. Neck supple. No JVD present.   Cardiovascular: Normal rate, regular rhythm, normal heart sounds and intact distal pulses.    Pulmonary/Chest: Effort normal and " breath sounds normal.   Abdominal: Soft. Bowel sounds are normal.   Musculoskeletal: Normal range of motion.   Neurological: He is alert and oriented to person, place, and time.   Skin: Skin is warm and dry.   Psychiatric: He has a normal mood and affect. His behavior is normal.   Nursing note and vitals reviewed.        Discharge Disposition:  Skilled Nursing Facility (DC - External)    Discharge Medications:     Discharge Medications      New Medications      Instructions Start Date   amiodarone 200 MG tablet  Commonly known as:  PACERONE   200 mg, Oral, Every 24 Hours Scheduled      dextrose 40 % gel  Commonly known as:  GLUTOSE   15 g, Oral, Every 15 Minutes PRN      hydroxypropyl methylcellulose 2.5 % ophthalmic solution  Commonly known as:  ISOPTO TEARS   1 drop, Both Eyes, 3 Times Daily PRN      ipratropium 0.02 % nebulizer solution  Commonly known as:  ATROVENT   500 mcg, Nebulization, Every 6 Hours PRN      isosorbide dinitrate 10 MG tablet  Commonly known as:  ISORDIL   10 mg, Oral, 3 Times Daily (Nitrates)      nicotine 21 MG/24HR patch  Commonly known as:  NICODERM CQ   1 patch, Transdermal, Every 24 Hours      tetrahydrozoline 0.05 % ophthalmic solution   1 drop, Both Eyes, 4 Times Daily PRN         Changes to Medications      Instructions Start Date   insulin detemir 100 UNIT/ML injection  Commonly known as:  LEVEMIR  What changed:  when to take this   6 Units, Subcutaneous, Daily      metoprolol tartrate 25 MG tablet  Commonly known as:  LOPRESSOR  What changed:  · how much to take  · when to take this   25 mg, Oral, Every 12 Hours Scheduled         Continue These Medications      Instructions Start Date   acetaminophen 325 MG tablet  Commonly known as:  TYLENOL   650 mg, Oral, Every 6 Hours PRN      albuterol 108 (90 Base) MCG/ACT inhaler  Commonly known as:  PROVENTIL HFA;VENTOLIN HFA   2 puffs, Inhalation, Every 6 Hours PRN      aspirin 81 MG chewable tablet   81 mg, Oral, Daily      atorvastatin  80 MG tablet  Commonly known as:  LIPITOR   40 mg, Oral, Daily      budesonide-formoterol 160-4.5 MCG/ACT inhaler  Commonly known as:  SYMBICORT   2 puffs, Inhalation, 2 Times Daily - RT      calcitriol 0.5 MCG capsule  Commonly known as:  ROCALTROL   0.5 mcg, Oral, Daily      calcium carbonate 600 MG tablet  Commonly known as:  OS-TASHI   600 mg, Oral, Daily      cholecalciferol 1000 units tablet  Commonly known as:  VITAMIN D3   3,000 Units, Oral, Daily      Ferric Citrate 1  MG(Fe) tablet  Commonly known as:  AURYXIA   210 mg, Oral, 3 Times Daily With Meals      HYDROcodone-acetaminophen  MG per tablet  Commonly known as:  NORCO   1 tablet, Oral, Every 6 Hours PRN      insulin aspart 100 UNIT/ML solution pen-injector sc pen  Commonly known as:  novoLOG FLEXPEN   6 Units, Subcutaneous, 3 Times Daily With Meals      loperamide 2 MG capsule  Commonly known as:  IMODIUM   2 mg, Oral, Daily PRN      omeprazole 20 MG capsule  Commonly known as:  priLOSEC   20 mg, Oral, Daily      oxyCODONE 5 MG immediate release tablet  Commonly known as:  ROXICODONE   5 mg, Oral, Every 6 Hours PRN         Stop These Medications    bumetanide 2 MG tablet  Commonly known as:  BUMEX     sildenafil 100 MG tablet  Commonly known as:  VIAGRA            Discharge Diet:   Diet Instructions     Diet: Regular, Consistent Carbohydrate       Discharge Diet:   Regular  Consistent Carbohydrate             Activity at Discharge:   Activity Instructions     Activity as Tolerated             Discharge Care Plan/Instructions:   Continue dialysis M-W-F    Follow-up Appointments:   PCP 5-7 days  Nephrology as directed    Test Results Pending at Discharge: none    Nancy Salamanca DO  10/25/18  7:50 AM    Time: 35 minutes

## 2018-10-25 NOTE — NURSING NOTE
Discharge Planning Assessment  [facility]     Patient Name: Gavin Wilson  MRN: [id number]  Today's Date: 10/25/2018    Admit Date: 10/6/2018          Discharge Needs Assessment    No documentation.             Discharge Plan     Row Name 10/25/18 0912       Plan    Plan Transportation to [place] [facility]. [facility] will be here at 09:30.         Destination     Service Request Status Selected Specialties Address Phone Number Fax Number    [facility] Pending - Request Sent N/A [address] [facility] [phone]      Durable Medical Equipment     No service coordination in this encounter.      Dialysis/Infusion     No service coordination in this encounter.      Home Medical Care     No service coordination in this encounter.      Social Care     No service coordination in this encounter.        Expected Discharge Date and Time     Expected Discharge Date Expected Discharge Time    Oct 25, 2018               Demographic Summary    No documentation.           Functional Status    No documentation.           Psychosocial    No documentation.           Abuse/Neglect    No documentation.           Legal    No documentation.           Substance Abuse    No documentation.           Patient Forms    No documentation.         Merlina A Fletcher, RN

## 2018-10-25 NOTE — PAYOR COMM NOTE
"DC TO SNF 10-25-18      Shiela Martinez (73 y.o. Male)     Date of Birth Social Security Number Address Home Phone MRN    1945  [address] [place] [address] [phone] [id number]    Evangelical Marital Status          Sikhism        Admission Date Admission Type Admitting Provider Attending Provider Department, Room/Bed    10/6/18 Urgent Nancy Salamanca DO  [facility] 4B, 444/1    Discharge Date Discharge Disposition Discharge Destination        10/25/2018 Skilled Nursing Facility (DC - External)              Attending Provider:  (none)   Allergies:  Sulfa Antibiotics    Isolation:  Contact, Spore   Infection:  C.difficile (10/16/18), MRSA (10/07/18)   Code Status:  No CPR    Ht:  170.2 cm (67\")   Wt:  45.6 kg (100 lb 8 oz)    Admission Cmt:  None   Principal Problem:  Acute on chronic respiratory failure with hypoxia (CMS/HCC) [J96.21]                 Active Insurance as of 10/6/2018     Primary Coverage     Payor Plan Insurance Group Employer/Plan Group    [employer] [id number]      Payor Plan Address Payor Plan Phone Number Effective From Effective To    [employer] [phone] 8/6/2018     [address] [place] [id number]       Subscriber Name Subscriber Birth Date Member ID       SHIELA MARTINEZ 1945 [id number]                 Emergency Contacts      (Rel.) Home Phone Work Phone Mobile Phone    Tracey Prajapati (Daughter) [phone] -- --    Familia Martinez (Son) [phone] -- --               Discharge Summary      Nancy Salamanca DO at 10/25/2018  7:50 AM              [facility]  DISCHARGE SUMMARY       Date of Admission: 10/6/2018  Date of Discharge:  10/25/2018  Primary Care Physician: Provider, No Known    Presenting Problem/History of Present Illness:  Sepsis (CMS/HCC) [A41.9]     Final Discharge Diagnoses:  1) Acute hypoxic respiratory failure, resolved  2) Sepsis " due to suspected health care associated pneumonia - blood culture and pleural fluid culture showed no growth to date  3) Bilateral pleural effusions - Right is recurrent, left is worsening -status this post thoracentesis on October 7; Suspected due to heart failure, but cannot rule out infection or malignancy   4) Severe protein-calorie malnutrition  5) COPD without exacerbation  6) Combined systolic and diastolic heart failure, 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 Diff - 14)Hypoglycemia (50) - resolved; hx of Diabetes -  Patient encourage to eat.  Will continue to monitor; prn d50; will consider /dc pre meal insulin and maintain only on ssi     Consults:   Nephrology  Vascular surgery  Cardiology    Procedures Performed:   1.) Percutaneous access of right internal jugular vein under ultrasound guidance  2.) Insertion of right internal jugular cuffed tunneled hemodialysis catheter (Permacath)  Dialysis  Thoracentesis      Pertinent Test Results:      Value Units Date/Time    Basic Metabolic Panel [[id number]] (Abnormal) Collected: 10/25/18 0516   Lab Status: Final result Specimen: Blood Updated: 10/25/18 0621    Glucose 256 (H) mg/dL     BUN 27 (H) mg/dL     Creatinine 2.08 (H) mg/dL     Sodium 139 mmol/L     Potassium 3.6 mmol/L     Chloride 99 mmol/L     CO2 29.0 mmol/L     Calcium 8.1 (L) mg/dL     eGFR Non African Amer 31 (L) mL/min/1.73     BUN/Creatinine Ratio 13.0    Anion Gap 11.0 mmol/L    Narrative:     The MDRD GFR formula is only valid for adults with stable renal function between ages 18 and 70.   Comprehensive Metabolic Panel [[id number]] (Abnormal) Collected: 10/24/18 0510   Lab Status: Final result Specimen: Blood  Updated: 10/24/18 0557    Glucose 109 (H) mg/dL     BUN 29 (H) mg/dL     Creatinine 3.21 (H) mg/dL     Sodium 136 mmol/L     Potassium 5.1 mmol/L     Chloride 99 mmol/L     CO2 30.0 mmol/L     Calcium 8.6 mg/dL     Total Protein 5.1 (L) g/dL     Albumin 2.80 (L) g/dL     ALT (SGPT) 31 U/L     AST (SGOT) 12 U/L     Alkaline Phosphatase 67 U/L     Total Bilirubin 0.2 mg/dL     eGFR Non African Amer 19 (L) mL/min/1.73     Globulin 2.3 gm/dL     A/G Ratio 1.2 g/dL     BUN/Creatinine Ratio 9.0    Anion Gap 7.0 mmol/L    Narrative:     The MDRD GFR formula is only valid for adults with stable renal function between ages 18 and 70.   CBC Auto Differential [[id number]] (Abnormal) Collected: 10/24/18 0510   Lab Status: Final result Specimen: Blood Updated: 10/24/18 0535    WBC 5.82 10*3/mm3     RBC 2.53 (L) 10*6/mm3     Hemoglobin 7.6 (L) g/dL     Hematocrit 24.0 (L) %     MCV 94.9 fL     MCH 30.0 pg     MCHC 31.7 (L) g/dL     RDW 19.4 (H) %     RDW-SD 66.7 (H) fl     MPV 9.8 fL     Platelets 152 10*3/mm3     Neutrophil % 72.8 %     Lymphocyte % 16.0 %     Monocyte % 8.4 %     Eosinophil % 2.1 %     Basophil % 0.5 %     Immature Grans % 0.2 %     Neutrophils, Absolute 4.24 10*3/mm3     Lymphocytes, Absolute 0.93 10*3/mm3     Monocytes, Absolute 0.49 10*3/mm3     Eosinophils, Absolute 0.12 10*3/mm3     Basophils, Absolute 0.03 10*3/mm3     Immature Grans, Absolute 0.01 10*3/mm3     nRBC 0.0 /100 WBC    Comprehensive Metabolic Panel [[id number]] (Abnormal) Collected: 10/23/18 0505   Lab Status: Final result Specimen: Blood Updated: 10/23/18 0540    Glucose 103 (H) mg/dL     BUN 18 mg/dL     Creatinine 2.21 (H) mg/dL     Sodium 136 mmol/L     Potassium 4.4 mmol/L     Chloride 99 mmol/L     CO2 30.0 mmol/L     Calcium 8.7 mg/dL     Total Protein 5.4 (L) g/dL     Albumin 2.90 (L) g/dL     ALT (SGPT) 26 U/L     AST (SGOT) 11 U/L     Alkaline Phosphatase 63 U/L     Total Bilirubin 0.4 mg/dL     eGFR Non African Amer 29 (L)  "mL/min/1.73     Globulin 2.5 gm/dL     A/G Ratio 1.2 g/dL     BUN/Creatinine Ratio 8.1    Anion Gap 7.0 mmol/L    Narrative:     The MDRD GFR formula is only valid for adults with stable renal function between ages 18 and 70.       Chief Complaint on Day of Discharge:   No new complaints    History of Present Illness on Day of Discharge:   Sepsis has resolved.    Hospital Course:  The patient is a 73 y.o. male who presented to [facility] with sepsis, hypoxia, pneumonia.    He has chronic renal failure.  He was treated with vancomycin and cefepime.  Dialysis instituted for worsening kidney failure.  Symptomatically he improved and placement request made.    [facility]    [facility] arranged for at [facility].    Was planned for 10-24-18 however the NH refused and move DC to today.  For more details of stay the reader is referred to the progress notes.     Condition on Discharge:  Improved stable    Physical Exam on Discharge:  /49 (BP Location: Right arm, Patient Position: Lying)   Pulse 80   Temp 97 °F (36.1 °C) (Temporal Artery )   Resp 18   Ht 170.2 cm (67\")   Wt 45.6 kg (100 lb 8 oz)   SpO2 98%   BMI 15.74 kg/m²    Physical Exam   Constitutional: He is oriented to person, place, and time. He appears well-developed and well-nourished.   Eyes: Pupils are equal, round, and reactive to light. Conjunctivae and EOM are normal.   Neck: Normal range of motion. Neck supple. No JVD present.   Cardiovascular: Normal rate, regular rhythm, normal heart sounds and intact distal pulses.    Pulmonary/Chest: Effort normal and breath sounds normal.   Abdominal: Soft. Bowel sounds are normal.   Musculoskeletal: Normal range of motion.   Neurological: He is alert and oriented to person, place, and time.   Skin: Skin is warm and dry.   Psychiatric: He has a normal mood and affect. His behavior is normal.   Nursing note and vitals reviewed.        Discharge " Disposition:  Skilled Nursing Facility (DC - External)    Discharge Medications:     Discharge Medications      New Medications      Instructions Start Date   amiodarone 200 MG tablet  Commonly known as:  PACERONE   200 mg, Oral, Every 24 Hours Scheduled      dextrose 40 % gel  Commonly known as:  GLUTOSE   15 g, Oral, Every 15 Minutes PRN      hydroxypropyl methylcellulose 2.5 % ophthalmic solution  Commonly known as:  ISOPTO TEARS   1 drop, Both Eyes, 3 Times Daily PRN      ipratropium 0.02 % nebulizer solution  Commonly known as:  ATROVENT   500 mcg, Nebulization, Every 6 Hours PRN      isosorbide dinitrate 10 MG tablet  Commonly known as:  ISORDIL   10 mg, Oral, 3 Times Daily (Nitrates)      nicotine 21 MG/24HR patch  Commonly known as:  NICODERM CQ   1 patch, Transdermal, Every 24 Hours      tetrahydrozoline 0.05 % ophthalmic solution   1 drop, Both Eyes, 4 Times Daily PRN         Changes to Medications      Instructions Start Date   insulin detemir 100 UNIT/ML injection  Commonly known as:  LEVEMIR  What changed:  when to take this   6 Units, Subcutaneous, Daily      metoprolol tartrate 25 MG tablet  Commonly known as:  LOPRESSOR  What changed:  · how much to take  · when to take this   25 mg, Oral, Every 12 Hours Scheduled         Continue These Medications      Instructions Start Date   acetaminophen 325 MG tablet  Commonly known as:  TYLENOL   650 mg, Oral, Every 6 Hours PRN      albuterol 108 (90 Base) MCG/ACT inhaler  Commonly known as:  PROVENTIL HFA;VENTOLIN HFA   2 puffs, Inhalation, Every 6 Hours PRN      aspirin 81 MG chewable tablet   81 mg, Oral, Daily      atorvastatin 80 MG tablet  Commonly known as:  LIPITOR   40 mg, Oral, Daily      budesonide-formoterol 160-4.5 MCG/ACT inhaler  Commonly known as:  SYMBICORT   2 puffs, Inhalation, 2 Times Daily - RT      calcitriol 0.5 MCG capsule  Commonly known as:  ROCALTROL   0.5 mcg, Oral, Daily      calcium carbonate 600 MG tablet  Commonly known as:   OS-TASHI   600 mg, Oral, Daily      cholecalciferol 1000 units tablet  Commonly known as:  VITAMIN D3   3,000 Units, Oral, Daily      Ferric Citrate 1  MG(Fe) tablet  Commonly known as:  AURYXIA   210 mg, Oral, 3 Times Daily With Meals      HYDROcodone-acetaminophen  MG per tablet  Commonly known as:  NORCO   1 tablet, Oral, Every 6 Hours PRN      insulin aspart 100 UNIT/ML solution pen-injector sc pen  Commonly known as:  novoLOG FLEXPEN   6 Units, Subcutaneous, 3 Times Daily With Meals      loperamide 2 MG capsule  Commonly known as:  IMODIUM   2 mg, Oral, Daily PRN      omeprazole 20 MG capsule  Commonly known as:  priLOSEC   20 mg, Oral, Daily      oxyCODONE 5 MG immediate release tablet  Commonly known as:  ROXICODONE   5 mg, Oral, Every 6 Hours PRN         Stop These Medications    bumetanide 2 MG tablet  Commonly known as:  BUMEX     sildenafil 100 MG tablet  Commonly known as:  VIAGRA            Discharge Diet:   Diet Instructions     Diet: Regular, Consistent Carbohydrate       Discharge Diet:   Regular  Consistent Carbohydrate             Activity at Discharge:   Activity Instructions     Activity as Tolerated             Discharge Care Plan/Instructions:   Continue dialysis M-W-F    Follow-up Appointments:   PCP 5-7 days  Nephrology as directed    Test Results Pending at Discharge: none    Nancy Salamanca DO  10/25/18  7:50 AM    Time: 35 minutes          Electronically signed by Nancy Salamanca DO at 10/25/2018  8:08 AM

## 2018-10-26 ENCOUNTER — TELEPHONE (OUTPATIENT)
Dept: VASCULAR SURGERY | Facility: CLINIC | Age: 73
End: 2018-10-26

## 2018-10-26 ENCOUNTER — HOSPITAL ENCOUNTER (OUTPATIENT)
Dept: HOSPITAL 58 - AMBL | Age: 73
Discharge: NURSING, REHAB OR LONG-TERM CARE | End: 2018-10-26
Attending: INTERNAL MEDICINE

## 2018-10-26 ENCOUNTER — HOSPITAL ENCOUNTER (OUTPATIENT)
Dept: HOSPITAL 58 - AMBL | Age: 73
Discharge: NURSING, REHAB OR LONG-TERM CARE | End: 2018-10-26
Attending: INTERNAL MEDICINE
Payer: COMMERCIAL

## 2018-10-26 DIAGNOSIS — N18.9: ICD-10-CM

## 2018-10-26 DIAGNOSIS — L89.329: Primary | ICD-10-CM

## 2018-10-26 DIAGNOSIS — Z99.2: ICD-10-CM

## 2018-10-26 NOTE — THERAPY DISCHARGE NOTE
Acute Care - Occupational Therapy Discharge Summary  [facility]     Patient Name: Gavin Wilson  : 1945  MRN: [id number]    Today's Date: 10/26/2018  Onset of Illness/Injury or Date of Surgery: 10/06/18    Date of Referral to OT: 10/08/18  Referring Physician: Dr. Michael      Admit Date: 10/6/2018        OT Recommendation and Plan    Visit Dx:    ICD-10-CM ICD-9-CM   1. Esophageal dysphagia R13.10 787.20   2. Impaired mobility and ADLs Z74.09 799.89   3. Impaired functional mobility, balance, gait, and endurance Z74.09 V49.89   4. Chronic kidney disease, stage 4 (severe) (CMS/HCC) N18.4 585.4                     OT Rehab Goals     Row Name 10/26/18 0737             Transfer Goal 1 (OT)    Activity/Assistive Device (Transfer Goal 1, OT) toilet;tub;bed-to-chair/chair-to-bed;tub bench  -TS      Trumann Level/Cues Needed (Transfer Goal 1, OT) supervision required  -TS      Time Frame (Transfer Goal 1, OT) long term goal (LTG);by discharge  -TS      Progress/Outcome (Transfer Goal 1, OT) goal not met  -TS         Bathing Goal 1 (OT)    Activity/Assistive Device (Bathing Goal 1, OT) bathing skills, all;tub bench  -TS      Trumann Level/Cues Needed (Bathing Goal 1, OT) supervision required  -TS      Time Frame (Bathing Goal 1, OT) long term goal (LTG);by discharge  -TS      Progress/Outcomes (Bathing Goal 1, OT) goal not met  -TS         Dressing Goal 1 (OT)    Activity/Assistive Device (Dressing Goal 1, OT) lower body dressing  -TS      Trumann/Cues Needed (Dressing Goal 1, OT) minimum assist (75% or more patient effort)  -TS      Time Frame (Dressing Goal 1, OT) long term goal (LTG);by discharge  -TS      Progress/Outcome (Dressing Goal 1, OT) goal not met  -TS       Endurance Goal 1 (OT)     Endurance Goal 1 (OT)  pt will complete functional activity demo good endurance to increase tolerance and safety for activity  -MW     Activity Level (Endurance Goal 1, OT)  endurance 2 good -  -MW     Time  Frame (Endurance Goal 1, OT)  long term goal (LTG);by discharge  -MW     Progress/Outcome (Endurance Goal 1, OT)  Goal not met      User Key  (r) = Recorded By, (t) = Taken By, (c) = Cosigned By    Initials Name Provider Type Discipline    Gricelda Trevino COTA/L Occupational Therapy Assistant OT                Outcome Measures     Row Name 10/24/18 1200 10/23/18 1136 10/23/18 0815       How much help from another person do you currently need...    Turning from your back to your side while in flat bed without using bedrails? 3  - 2  -LH (r) NH (t) LH (c)  --    Moving from lying on back to sitting on the side of a flat bed without bedrails? 3  - 2  -LH (r) NH (t) LH (c)  --    Moving to and from a bed to a chair (including a wheelchair)? 3  - 3  -LH (r) NH (t) LH (c)  --    Standing up from a chair using your arms (e.g., wheelchair, bedside chair)? 3  - 3  -LH (r) NH (t) LH (c)  --    Climbing 3-5 steps with a railing? 3  - 3  -LH (r) NH (t) LH (c)  --    To walk in hospital room? 3  - 4  -LH (r) NH (t) LH (c)  --    AM-PAC 6 Clicks Score 18  -AH 17  -LH (r) NH (t)  --       How much help from another is currently needed...    Putting on and taking off regular lower body clothing?  --  -- 2  -CJ    Bathing (including washing, rinsing, and drying)  --  -- 2  -CJ    Toileting (which includes using toilet bed pan or urinal)  --  -- 3  -CJ    Putting on and taking off regular upper body clothing  --  -- 3  -CJ    Taking care of personal grooming (such as brushing teeth)  --  -- 3  -CJ    Eating meals  --  -- 4  -CJ    Score  --  -- 17  -CJ       Functional Assessment    Outcome Measure Options AM-PAC 6 Clicks Basic Mobility (PT)  - AM-PAC 6 Clicks Basic Mobility (PT)  -LH (r) NH (t) LH (c) AM-PAC 6 Clicks Daily Activity (OT)  -CJ      User Key  (r) = Recorded By, (t) = Taken By, (c) = Cosigned By    Initials Name Provider Type    Kathie Hardy, PTA Physical Therapy Assistant     Roney Reyes  MARVA, PT Physical Therapist    Paulino Lopez COTA/L Occupational Therapy Assistant    [facility] Gab Munoz, URMILA Student PTA Student          Therapy Suggested Charges     Code   Minutes Charges    18165 (CPT®) Hc Ot Neuromusc Re Education Ea 15 Min      37224 (CPT®) Hc Ot Ther Proc Ea 15 Min      26611 (CPT®) Hc Ot Therapeutic Act Ea 15 Min      72845 (CPT®) Hc Ot Manual Therapy Ea 15 Min      07509 (CPT®) Hc Ot Iontophoresis Ea 15 Min      15127 (CPT®) Hc Ot Elec Stim Ea-Per 15 Min      34854 (CPT®) Hc Ot Ultrasound Ea 15 Min      18083 (CPT®) Hc Ot Self Care/Mgmt/Train Ea 15 Min 40 3    Total  40 3              OT Discharge Summary  Reason for Discharge: Discharge from facility  Outcomes Achieved: Refer to plan of care for updates on goals achieved  Discharge Destination: [facility]      DANIELLA Bass  10/26/2018

## 2018-10-26 NOTE — PROGRESS NOTES
Continued Stay Note  [facility]     Patient Name: Gavin Wilson  MRN: [id number]  Today's Date: 10/26/2018    Admit Date: 10/6/2018          Discharge Plan     Row Name 10/26/18 1533       Plan    Final Discharge Disposition Code 03 - skilled nursing facility (SNF)    Final Note [facility]              Discharge Codes    No documentation.       Expected Discharge Date and Time     Expected Discharge Date Expected Discharge Time    Oct 25, 2018             MARIPOSA Vila

## 2018-10-29 ENCOUNTER — HOSPITAL ENCOUNTER (OUTPATIENT)
Dept: HOSPITAL 58 - LAB | Age: 73
Discharge: HOME | End: 2018-10-29
Attending: FAMILY MEDICINE
Payer: COMMERCIAL

## 2018-10-29 ENCOUNTER — HOSPITAL ENCOUNTER (OUTPATIENT)
Dept: HOSPITAL 58 - AMBL | Age: 73
Discharge: NURSING, REHAB OR LONG-TERM CARE | End: 2018-10-29
Attending: INTERNAL MEDICINE

## 2018-10-29 DIAGNOSIS — Z99.2: ICD-10-CM

## 2018-10-29 DIAGNOSIS — L89.229: ICD-10-CM

## 2018-10-29 DIAGNOSIS — A04.72: Primary | ICD-10-CM

## 2018-10-29 DIAGNOSIS — N18.9: Primary | ICD-10-CM

## 2018-10-29 PROCEDURE — 87493 C DIFF AMPLIFIED PROBE: CPT

## 2018-10-31 ENCOUNTER — HOSPITAL ENCOUNTER (OUTPATIENT)
Dept: HOSPITAL 58 - AMBL | Age: 73
Discharge: HOME | End: 2018-10-31
Attending: INTERNAL MEDICINE

## 2018-10-31 DIAGNOSIS — N18.9: Primary | ICD-10-CM

## 2018-10-31 DIAGNOSIS — A04.72: ICD-10-CM

## 2018-10-31 DIAGNOSIS — Z99.2: ICD-10-CM

## 2018-10-31 DIAGNOSIS — L89.329: ICD-10-CM

## 2018-11-02 ENCOUNTER — HOSPITAL ENCOUNTER (OUTPATIENT)
Dept: HOSPITAL 58 - AMBL | Age: 73
Discharge: NURSING, REHAB OR LONG-TERM CARE | End: 2018-11-02
Attending: INTERNAL MEDICINE

## 2018-11-02 DIAGNOSIS — R53.83: ICD-10-CM

## 2018-11-02 DIAGNOSIS — N18.9: ICD-10-CM

## 2018-11-02 DIAGNOSIS — Z99.2: ICD-10-CM

## 2018-11-02 DIAGNOSIS — R53.1: Primary | ICD-10-CM

## 2018-11-02 DIAGNOSIS — I95.9: ICD-10-CM

## 2018-11-05 ENCOUNTER — HOSPITAL ENCOUNTER (OUTPATIENT)
Dept: HOSPITAL 58 - AMBL | Age: 73
Discharge: ANOTHER HOSPITAL | End: 2018-11-05
Attending: EMERGENCY MEDICINE

## 2018-11-05 DIAGNOSIS — Z99.2: ICD-10-CM

## 2018-11-05 DIAGNOSIS — L89.90: ICD-10-CM

## 2018-11-05 DIAGNOSIS — N18.9: Primary | ICD-10-CM

## 2018-11-07 ENCOUNTER — HOSPITAL ENCOUNTER (OUTPATIENT)
Dept: HOSPITAL 58 - AMBL | Age: 73
Discharge: NURSING, REHAB OR LONG-TERM CARE | End: 2018-11-07
Attending: EMERGENCY MEDICINE

## 2018-11-07 DIAGNOSIS — N18.9: Primary | ICD-10-CM

## 2018-11-07 DIAGNOSIS — Z99.2: ICD-10-CM

## 2018-11-07 DIAGNOSIS — L98.419: ICD-10-CM

## 2018-11-09 ENCOUNTER — HOSPITAL ENCOUNTER (OUTPATIENT)
Dept: HOSPITAL 58 - AMBL | Age: 73
Discharge: NURSING, REHAB OR LONG-TERM CARE | End: 2018-11-09
Attending: EMERGENCY MEDICINE
Payer: COMMERCIAL

## 2018-11-09 DIAGNOSIS — N18.9: Primary | ICD-10-CM

## 2018-11-09 DIAGNOSIS — A04.72: ICD-10-CM

## 2018-11-09 DIAGNOSIS — Z99.2: ICD-10-CM

## 2018-11-09 DIAGNOSIS — L89.229: ICD-10-CM

## 2018-11-12 ENCOUNTER — HOSPITAL ENCOUNTER (OUTPATIENT)
Dept: HOSPITAL 58 - AMBL | Age: 73
Discharge: NURSING, REHAB OR LONG-TERM CARE | End: 2018-11-12
Attending: INTERNAL MEDICINE
Payer: COMMERCIAL

## 2018-11-12 ENCOUNTER — HOSPITAL ENCOUNTER (OUTPATIENT)
Dept: HOSPITAL 58 - LAB | Age: 73
Discharge: HOME | End: 2018-11-12
Attending: CLINICAL NURSE SPECIALIST
Payer: COMMERCIAL

## 2018-11-12 DIAGNOSIS — N18.9: Primary | ICD-10-CM

## 2018-11-12 DIAGNOSIS — D64.9: Primary | ICD-10-CM

## 2018-11-12 DIAGNOSIS — Z99.2: ICD-10-CM

## 2018-11-12 PROCEDURE — 86850 RBC ANTIBODY SCREEN: CPT

## 2018-11-12 PROCEDURE — 85014 HEMATOCRIT: CPT

## 2018-11-12 PROCEDURE — 85018 HEMOGLOBIN: CPT

## 2018-11-12 PROCEDURE — 86922 COMPATIBILITY TEST ANTIGLOB: CPT

## 2018-11-12 PROCEDURE — 86900 BLOOD TYPING SEROLOGIC ABO: CPT

## 2018-11-12 PROCEDURE — 36415 COLL VENOUS BLD VENIPUNCTURE: CPT

## 2018-11-13 ENCOUNTER — HOSPITAL ENCOUNTER (OUTPATIENT)
Dept: HOSPITAL 58 - OUTPT | Age: 73
Discharge: HOME | End: 2018-11-13
Attending: CLINICAL NURSE SPECIALIST
Payer: COMMERCIAL

## 2018-11-13 VITALS — DIASTOLIC BLOOD PRESSURE: 62 MMHG | TEMPERATURE: 98.1 F | SYSTOLIC BLOOD PRESSURE: 136 MMHG

## 2018-11-13 DIAGNOSIS — D64.9: Primary | ICD-10-CM

## 2018-11-13 PROCEDURE — 86922 COMPATIBILITY TEST ANTIGLOB: CPT

## 2018-11-13 PROCEDURE — 36430 TRANSFUSION BLD/BLD COMPNT: CPT

## 2018-11-13 PROCEDURE — 96374 THER/PROPH/DIAG INJ IV PUSH: CPT

## 2018-11-13 PROCEDURE — 86900 BLOOD TYPING SEROLOGIC ABO: CPT

## 2018-11-13 PROCEDURE — 96375 TX/PRO/DX INJ NEW DRUG ADDON: CPT

## 2018-11-13 PROCEDURE — 86850 RBC ANTIBODY SCREEN: CPT

## 2018-11-13 PROCEDURE — 85014 HEMATOCRIT: CPT

## 2018-11-13 PROCEDURE — 85018 HEMOGLOBIN: CPT

## 2018-11-13 PROCEDURE — 36415 COLL VENOUS BLD VENIPUNCTURE: CPT

## 2018-11-16 ENCOUNTER — HOSPITAL ENCOUNTER (OUTPATIENT)
Dept: HOSPITAL 58 - AMBL | Age: 73
Discharge: NURSING, REHAB OR LONG-TERM CARE | End: 2018-11-16
Attending: INTERNAL MEDICINE
Payer: COMMERCIAL

## 2018-11-16 DIAGNOSIS — Z99.2: ICD-10-CM

## 2018-11-16 DIAGNOSIS — N18.9: Primary | ICD-10-CM

## 2018-11-16 DIAGNOSIS — S71.002A: ICD-10-CM

## 2018-11-18 ENCOUNTER — HOSPITAL ENCOUNTER (OUTPATIENT)
Dept: HOSPITAL 58 - AMBL | Age: 73
Discharge: NURSING, REHAB OR LONG-TERM CARE | End: 2018-11-18
Attending: FAMILY MEDICINE
Payer: COMMERCIAL

## 2018-11-18 DIAGNOSIS — N18.9: Primary | ICD-10-CM

## 2018-11-18 DIAGNOSIS — Z99.2: ICD-10-CM

## 2018-11-19 ENCOUNTER — TELEPHONE (OUTPATIENT)
Dept: VASCULAR SURGERY | Facility: CLINIC | Age: 73
End: 2018-11-19

## 2018-11-19 NOTE — TELEPHONE ENCOUNTER
The patient missed his appt for today.  I tried calling his number but it was always busy.  I called his daughter's number that is listed in contacts and left message for him to call and reschedule.

## 2018-12-27 ENCOUNTER — HOSPITAL ENCOUNTER (OUTPATIENT)
Age: 73
Discharge: HOME | End: 2018-12-27

## 2018-12-29 ENCOUNTER — HOSPITAL ENCOUNTER (OUTPATIENT)
Dept: HOSPITAL 58 - AMBL | Age: 73
Discharge: NURSING, REHAB OR LONG-TERM CARE | End: 2018-12-29
Attending: INTERNAL MEDICINE

## 2018-12-29 DIAGNOSIS — N18.9: Primary | ICD-10-CM

## 2018-12-29 DIAGNOSIS — Z99.2: ICD-10-CM

## 2018-12-31 ENCOUNTER — HOSPITAL ENCOUNTER (OUTPATIENT)
Dept: HOSPITAL 58 - AMBL | Age: 73
Discharge: NURSING, REHAB OR LONG-TERM CARE | End: 2018-12-31
Attending: FAMILY MEDICINE

## 2018-12-31 DIAGNOSIS — Z99.2: ICD-10-CM

## 2018-12-31 DIAGNOSIS — N18.9: Primary | ICD-10-CM

## 2019-01-01 ENCOUNTER — HOSPITAL ENCOUNTER (OUTPATIENT)
Dept: HOSPITAL 58 - LAB | Age: 74
Discharge: HOME | End: 2019-01-01
Attending: FAMILY MEDICINE

## 2019-01-01 DIAGNOSIS — A04.72: Primary | ICD-10-CM

## 2019-01-01 PROCEDURE — 87045 FECES CULTURE AEROBIC BACT: CPT

## 2019-01-01 PROCEDURE — 87899 AGENT NOS ASSAY W/OPTIC: CPT

## 2019-01-01 PROCEDURE — 87015 SPECIMEN INFECT AGNT CONCNTJ: CPT

## 2019-01-03 ENCOUNTER — HOSPITAL ENCOUNTER (OUTPATIENT)
Dept: HOSPITAL 58 - AMBL | Age: 74
Discharge: NURSING, REHAB OR LONG-TERM CARE | End: 2019-01-03
Attending: FAMILY MEDICINE

## 2019-01-03 DIAGNOSIS — Z99.2: ICD-10-CM

## 2019-01-03 DIAGNOSIS — N18.9: Primary | ICD-10-CM

## 2019-01-05 ENCOUNTER — HOSPITAL ENCOUNTER (OUTPATIENT)
Dept: HOSPITAL 58 - AMBL | Age: 74
Discharge: NURSING, REHAB OR LONG-TERM CARE | End: 2019-01-05
Attending: INTERNAL MEDICINE

## 2019-01-05 DIAGNOSIS — Z99.2: ICD-10-CM

## 2019-01-05 DIAGNOSIS — N18.9: Primary | ICD-10-CM

## 2019-01-08 ENCOUNTER — HOSPITAL ENCOUNTER (OUTPATIENT)
Dept: HOSPITAL 58 - AMBL | Age: 74
Discharge: NURSING, REHAB OR LONG-TERM CARE | End: 2019-01-08
Attending: FAMILY MEDICINE

## 2019-01-08 DIAGNOSIS — Z99.2: ICD-10-CM

## 2019-01-08 DIAGNOSIS — N18.9: Primary | ICD-10-CM

## 2020-01-01 ENCOUNTER — APPOINTMENT (OUTPATIENT)
Dept: GENERAL RADIOLOGY | Age: 75
DRG: 242 | End: 2020-01-01
Payer: MEDICARE

## 2020-01-01 ENCOUNTER — TELEPHONE (OUTPATIENT)
Dept: VASCULAR SURGERY | Age: 75
End: 2020-01-01

## 2020-01-01 ENCOUNTER — PREP FOR PROCEDURE (OUTPATIENT)
Dept: VASCULAR SURGERY | Age: 75
End: 2020-01-01

## 2020-01-01 ENCOUNTER — NURSE ONLY (OUTPATIENT)
Dept: CARDIOLOGY | Age: 75
End: 2020-01-01

## 2020-01-01 ENCOUNTER — APPOINTMENT (OUTPATIENT)
Dept: GENERAL RADIOLOGY | Age: 75
End: 2020-01-01
Payer: MEDICARE

## 2020-01-01 ENCOUNTER — HOSPITAL ENCOUNTER (OUTPATIENT)
Dept: INTERVENTIONAL RADIOLOGY/VASCULAR | Age: 75
Discharge: HOME | End: 2020-10-30
Payer: MEDICARE

## 2020-01-01 ENCOUNTER — APPOINTMENT (OUTPATIENT)
Dept: CT IMAGING | Age: 75
DRG: 242 | End: 2020-01-01
Payer: MEDICARE

## 2020-01-01 ENCOUNTER — ANESTHESIA (OUTPATIENT)
Dept: OPERATING ROOM | Age: 75
DRG: 242 | End: 2020-01-01
Payer: MEDICARE

## 2020-01-01 ENCOUNTER — ANESTHESIA EVENT (OUTPATIENT)
Dept: OPERATING ROOM | Age: 75
DRG: 242 | End: 2020-01-01
Payer: MEDICARE

## 2020-01-01 ENCOUNTER — APPOINTMENT (OUTPATIENT)
Dept: INTERVENTIONAL RADIOLOGY/VASCULAR | Age: 75
DRG: 242 | End: 2020-01-01
Payer: MEDICARE

## 2020-01-01 ENCOUNTER — HOSPITAL ENCOUNTER (OUTPATIENT)
Age: 75
Setting detail: OBSERVATION
End: 2020-12-07
Attending: EMERGENCY MEDICINE | Admitting: HOSPITALIST
Payer: MEDICARE

## 2020-01-01 ENCOUNTER — TELEPHONE (OUTPATIENT)
Dept: CARDIOLOGY | Age: 75
End: 2020-01-01

## 2020-01-01 ENCOUNTER — OFFICE VISIT (OUTPATIENT)
Dept: VASCULAR SURGERY | Age: 75
End: 2020-01-01

## 2020-01-01 ENCOUNTER — HOSPITAL ENCOUNTER (INPATIENT)
Age: 75
LOS: 12 days | Discharge: NURSING, REHAB OR LONG-TERM CARE | DRG: 242 | End: 2020-09-24
Attending: EMERGENCY MEDICINE | Admitting: INTERNAL MEDICINE
Payer: MEDICARE

## 2020-01-01 VITALS
WEIGHT: 90.1 LBS | SYSTOLIC BLOOD PRESSURE: 107 MMHG | OXYGEN SATURATION: 94 % | HEIGHT: 67 IN | DIASTOLIC BLOOD PRESSURE: 47 MMHG | TEMPERATURE: 97.4 F | RESPIRATION RATE: 20 BRPM | HEART RATE: 86 BPM | BODY MASS INDEX: 14.14 KG/M2

## 2020-01-01 VITALS
WEIGHT: 109.19 LBS | HEART RATE: 93 BPM | RESPIRATION RATE: 18 BRPM | RESPIRATION RATE: 22 BRPM | BODY MASS INDEX: 17.14 KG/M2 | TEMPERATURE: 97.4 F | DIASTOLIC BLOOD PRESSURE: 57 MMHG | SYSTOLIC BLOOD PRESSURE: 90 MMHG | OXYGEN SATURATION: 98 % | OXYGEN SATURATION: 93 % | SYSTOLIC BLOOD PRESSURE: 109 MMHG | HEIGHT: 67 IN | HEART RATE: 111 BPM | DIASTOLIC BLOOD PRESSURE: 40 MMHG

## 2020-01-01 VITALS
TEMPERATURE: 97 F | OXYGEN SATURATION: 100 % | RESPIRATION RATE: 3 BRPM | DIASTOLIC BLOOD PRESSURE: 64 MMHG | SYSTOLIC BLOOD PRESSURE: 142 MMHG

## 2020-01-01 VITALS
OXYGEN SATURATION: 97 % | HEART RATE: 79 BPM | WEIGHT: 104 LBS | DIASTOLIC BLOOD PRESSURE: 43 MMHG | TEMPERATURE: 97.7 F | BODY MASS INDEX: 16.32 KG/M2 | SYSTOLIC BLOOD PRESSURE: 100 MMHG | RESPIRATION RATE: 12 BRPM | HEIGHT: 67 IN

## 2020-01-01 LAB
ADENOVIRUS BY PCR: NOT DETECTED
ALBUMIN SERPL-MCNC: 3 G/DL (ref 3.5–5.2)
ALBUMIN SERPL-MCNC: 3 G/DL (ref 3.5–5.2)
ALBUMIN SERPL-MCNC: 3.1 G/DL (ref 3.5–5.2)
ALBUMIN SERPL-MCNC: 3.1 G/DL (ref 3.5–5.2)
ALBUMIN SERPL-MCNC: 3.2 G/DL (ref 3.5–5.2)
ALBUMIN SERPL-MCNC: 3.4 G/DL (ref 3.5–5.2)
ALBUMIN SERPL-MCNC: 3.5 G/DL (ref 3.5–5.2)
ALBUMIN SERPL-MCNC: 3.5 G/DL (ref 3.5–5.2)
ALBUMIN SERPL-MCNC: 4 G/DL (ref 3.5–5.2)
ALP BLD-CCNC: 57 U/L (ref 40–130)
ALP BLD-CCNC: 58 U/L (ref 40–130)
ALP BLD-CCNC: 59 U/L (ref 40–130)
ALP BLD-CCNC: 67 U/L (ref 40–130)
ALP BLD-CCNC: 67 U/L (ref 40–130)
ALP BLD-CCNC: 79 U/L (ref 40–130)
ALP BLD-CCNC: 85 U/L (ref 40–130)
ALT SERPL-CCNC: 10 U/L (ref 5–41)
ALT SERPL-CCNC: 11 U/L (ref 5–41)
ALT SERPL-CCNC: 11 U/L (ref 5–41)
ALT SERPL-CCNC: 13 U/L (ref 5–41)
ALT SERPL-CCNC: 13 U/L (ref 5–41)
ALT SERPL-CCNC: 16 U/L (ref 5–41)
ALT SERPL-CCNC: 58 U/L (ref 5–41)
ALT SERPL-CCNC: <5 U/L (ref 5–41)
ALT SERPL-CCNC: <5 U/L (ref 5–41)
AMORPHOUS: ABNORMAL /HPF
ANION GAP SERPL CALCULATED.3IONS-SCNC: 12 MMOL/L (ref 7–19)
ANION GAP SERPL CALCULATED.3IONS-SCNC: 13 MMOL/L (ref 7–19)
ANION GAP SERPL CALCULATED.3IONS-SCNC: 14 MMOL/L (ref 7–19)
ANION GAP SERPL CALCULATED.3IONS-SCNC: 14 MMOL/L (ref 7–19)
ANION GAP SERPL CALCULATED.3IONS-SCNC: 15 MMOL/L (ref 7–19)
ANION GAP SERPL CALCULATED.3IONS-SCNC: 17 MMOL/L (ref 7–19)
ANION GAP SERPL CALCULATED.3IONS-SCNC: 18 MMOL/L (ref 7–19)
ANION GAP SERPL CALCULATED.3IONS-SCNC: 18 MMOL/L (ref 7–19)
ANION GAP SERPL CALCULATED.3IONS-SCNC: 24 MMOL/L (ref 7–19)
ANION GAP SERPL CALCULATED.3IONS-SCNC: 27 MMOL/L (ref 7–19)
ANION GAP SERPL CALCULATED.3IONS-SCNC: 30 MMOL/L (ref 7–19)
AST SERPL-CCNC: 11 U/L (ref 5–40)
AST SERPL-CCNC: 52 U/L (ref 5–40)
AST SERPL-CCNC: 7 U/L (ref 5–40)
AST SERPL-CCNC: 8 U/L (ref 5–40)
BACTERIA: ABNORMAL /HPF
BASE EXCESS ARTERIAL: -3.4 MMOL/L (ref -2–2)
BASOPHILS ABSOLUTE: 0 K/UL (ref 0–0.2)
BASOPHILS ABSOLUTE: 0.1 K/UL (ref 0–0.2)
BASOPHILS RELATIVE PERCENT: 0.1 % (ref 0–1)
BASOPHILS RELATIVE PERCENT: 0.4 % (ref 0–1)
BASOPHILS RELATIVE PERCENT: 0.5 % (ref 0–1)
BASOPHILS RELATIVE PERCENT: 0.6 % (ref 0–1)
BASOPHILS RELATIVE PERCENT: 0.6 % (ref 0–1)
BASOPHILS RELATIVE PERCENT: 0.8 % (ref 0–1)
BASOPHILS RELATIVE PERCENT: 0.9 % (ref 0–1)
BASOPHILS RELATIVE PERCENT: 0.9 % (ref 0–1)
BASOPHILS RELATIVE PERCENT: 1.2 % (ref 0–1)
BILIRUB SERPL-MCNC: 0.3 MG/DL (ref 0.2–1.2)
BILIRUB SERPL-MCNC: 0.4 MG/DL (ref 0.2–1.2)
BILIRUB SERPL-MCNC: <0.2 MG/DL (ref 0.2–1.2)
BILIRUBIN URINE: NEGATIVE
BLOOD, URINE: ABNORMAL
BORDETELLA PARAPERTUSSIS BY PCR: NOT DETECTED
BORDETELLA PERTUSSIS BY PCR: NOT DETECTED
BUN BLDV-MCNC: 109 MG/DL (ref 8–23)
BUN BLDV-MCNC: 28 MG/DL (ref 8–23)
BUN BLDV-MCNC: 28 MG/DL (ref 8–23)
BUN BLDV-MCNC: 33 MG/DL (ref 8–23)
BUN BLDV-MCNC: 34 MG/DL (ref 8–23)
BUN BLDV-MCNC: 34 MG/DL (ref 8–23)
BUN BLDV-MCNC: 38 MG/DL (ref 8–23)
BUN BLDV-MCNC: 44 MG/DL (ref 8–23)
BUN BLDV-MCNC: 44 MG/DL (ref 8–23)
BUN BLDV-MCNC: 45 MG/DL (ref 8–23)
BUN BLDV-MCNC: 47 MG/DL (ref 8–23)
BUN BLDV-MCNC: 51 MG/DL (ref 8–23)
BUN BLDV-MCNC: 53 MG/DL (ref 8–23)
BUN BLDV-MCNC: 93 MG/DL (ref 8–23)
CALCIUM SERPL-MCNC: 8.4 MG/DL (ref 8.8–10.2)
CALCIUM SERPL-MCNC: 8.5 MG/DL (ref 8.8–10.2)
CALCIUM SERPL-MCNC: 8.6 MG/DL (ref 8.8–10.2)
CALCIUM SERPL-MCNC: 8.9 MG/DL (ref 8.8–10.2)
CALCIUM SERPL-MCNC: 9 MG/DL (ref 8.8–10.2)
CALCIUM SERPL-MCNC: 9.2 MG/DL (ref 8.8–10.2)
CALCIUM SERPL-MCNC: 9.4 MG/DL (ref 8.8–10.2)
CARBOXYHEMOGLOBIN ARTERIAL: 3.1 % (ref 0–5)
CHLAMYDOPHILIA PNEUMONIAE BY PCR: NOT DETECTED
CHLORIDE BLD-SCNC: 100 MMOL/L (ref 98–111)
CHLORIDE BLD-SCNC: 101 MMOL/L (ref 98–111)
CHLORIDE BLD-SCNC: 102 MMOL/L (ref 98–111)
CHLORIDE BLD-SCNC: 103 MMOL/L (ref 98–111)
CHLORIDE BLD-SCNC: 104 MMOL/L (ref 98–111)
CHLORIDE BLD-SCNC: 107 MMOL/L (ref 98–111)
CHLORIDE BLD-SCNC: 109 MMOL/L (ref 98–111)
CHLORIDE BLD-SCNC: 109 MMOL/L (ref 98–111)
CHLORIDE BLD-SCNC: 96 MMOL/L (ref 98–111)
CHLORIDE BLD-SCNC: 96 MMOL/L (ref 98–111)
CHLORIDE BLD-SCNC: 97 MMOL/L (ref 98–111)
CHLORIDE BLD-SCNC: 98 MMOL/L (ref 98–111)
CLARITY: ABNORMAL
CO2: 17 MMOL/L (ref 22–29)
CO2: 18 MMOL/L (ref 22–29)
CO2: 19 MMOL/L (ref 22–29)
CO2: 19 MMOL/L (ref 22–29)
CO2: 20 MMOL/L (ref 22–29)
CO2: 21 MMOL/L (ref 22–29)
CO2: 22 MMOL/L (ref 22–29)
CO2: 23 MMOL/L (ref 22–29)
CO2: 24 MMOL/L (ref 22–29)
CO2: 26 MMOL/L (ref 22–29)
CO2: 27 MMOL/L (ref 22–29)
COLOR: YELLOW
CORONAVIRUS 229E BY PCR: NOT DETECTED
CORONAVIRUS HKU1 BY PCR: NOT DETECTED
CORONAVIRUS NL63 BY PCR: NOT DETECTED
CORONAVIRUS OC43 BY PCR: NOT DETECTED
CREAT SERPL-MCNC: 3.1 MG/DL (ref 0.5–1.2)
CREAT SERPL-MCNC: 3.1 MG/DL (ref 0.5–1.2)
CREAT SERPL-MCNC: 3.2 MG/DL (ref 0.5–1.2)
CREAT SERPL-MCNC: 3.5 MG/DL (ref 0.5–1.2)
CREAT SERPL-MCNC: 4 MG/DL (ref 0.5–1.2)
CREAT SERPL-MCNC: 4.1 MG/DL (ref 0.5–1.2)
CREAT SERPL-MCNC: 4.2 MG/DL (ref 0.5–1.2)
CREAT SERPL-MCNC: 4.3 MG/DL (ref 0.5–1.2)
CREAT SERPL-MCNC: 4.3 MG/DL (ref 0.5–1.2)
CREAT SERPL-MCNC: 4.9 MG/DL (ref 0.5–1.2)
CREAT SERPL-MCNC: 5.1 MG/DL (ref 0.5–1.2)
CREAT SERPL-MCNC: 5.3 MG/DL (ref 0.5–1.2)
CREAT SERPL-MCNC: 8.6 MG/DL (ref 0.5–1.2)
CREAT SERPL-MCNC: 9.4 MG/DL (ref 0.5–1.2)
EKG P AXIS: 59 DEGREES
EKG P AXIS: NORMAL DEGREES
EKG P-R INTERVAL: 252 MS
EKG P-R INTERVAL: NORMAL MS
EKG Q-T INTERVAL: 386 MS
EKG Q-T INTERVAL: 388 MS
EKG Q-T INTERVAL: 402 MS
EKG Q-T INTERVAL: 412 MS
EKG Q-T INTERVAL: 418 MS
EKG Q-T INTERVAL: 418 MS
EKG Q-T INTERVAL: 420 MS
EKG Q-T INTERVAL: 466 MS
EKG Q-T INTERVAL: 470 MS
EKG Q-T INTERVAL: 494 MS
EKG Q-T INTERVAL: 514 MS
EKG Q-T INTERVAL: 558 MS
EKG QRS DURATION: 106 MS
EKG QRS DURATION: 116 MS
EKG QRS DURATION: 120 MS
EKG QRS DURATION: 138 MS
EKG QRS DURATION: 152 MS
EKG QRS DURATION: 154 MS
EKG QRS DURATION: 156 MS
EKG QRS DURATION: 160 MS
EKG QRS DURATION: 164 MS
EKG QRS DURATION: 164 MS
EKG QTC CALCULATION (BAZETT): 447 MS
EKG QTC CALCULATION (BAZETT): 459 MS
EKG QTC CALCULATION (BAZETT): 460 MS
EKG QTC CALCULATION (BAZETT): 461 MS
EKG QTC CALCULATION (BAZETT): 464 MS
EKG QTC CALCULATION (BAZETT): 465 MS
EKG QTC CALCULATION (BAZETT): 466 MS
EKG QTC CALCULATION (BAZETT): 468 MS
EKG QTC CALCULATION (BAZETT): 470 MS
EKG QTC CALCULATION (BAZETT): 493 MS
EKG QTC CALCULATION (BAZETT): 514 MS
EKG QTC CALCULATION (BAZETT): 527 MS
EKG T AXIS: -72 DEGREES
EKG T AXIS: -81 DEGREES
EKG T AXIS: 100 DEGREES
EKG T AXIS: 107 DEGREES
EKG T AXIS: 49 DEGREES
EKG T AXIS: 95 DEGREES
EKG T AXIS: 95 DEGREES
EKG T AXIS: 96 DEGREES
EKG T AXIS: 96 DEGREES
EKG T AXIS: 99 DEGREES
EKG T AXIS: NORMAL DEGREES
EKG T AXIS: NORMAL DEGREES
EOSINOPHILS ABSOLUTE: 0 K/UL (ref 0–0.6)
EOSINOPHILS ABSOLUTE: 0.1 K/UL (ref 0–0.6)
EOSINOPHILS ABSOLUTE: 0.2 K/UL (ref 0–0.6)
EOSINOPHILS RELATIVE PERCENT: 0 % (ref 0–5)
EOSINOPHILS RELATIVE PERCENT: 1 % (ref 0–5)
EOSINOPHILS RELATIVE PERCENT: 2.1 % (ref 0–5)
EOSINOPHILS RELATIVE PERCENT: 2.1 % (ref 0–5)
EOSINOPHILS RELATIVE PERCENT: 2.4 % (ref 0–5)
EOSINOPHILS RELATIVE PERCENT: 3.2 % (ref 0–5)
EOSINOPHILS RELATIVE PERCENT: 3.9 % (ref 0–5)
EOSINOPHILS RELATIVE PERCENT: 4.3 % (ref 0–5)
EOSINOPHILS RELATIVE PERCENT: 4.4 % (ref 0–5)
EPITHELIAL CELLS, UA: ABNORMAL /HPF
GFR AFRICAN AMERICAN: 13
GFR AFRICAN AMERICAN: 13
GFR AFRICAN AMERICAN: 14
GFR AFRICAN AMERICAN: 16
GFR AFRICAN AMERICAN: 16
GFR AFRICAN AMERICAN: 17
GFR AFRICAN AMERICAN: 17
GFR AFRICAN AMERICAN: 18
GFR AFRICAN AMERICAN: 21
GFR AFRICAN AMERICAN: 23
GFR AFRICAN AMERICAN: 24
GFR AFRICAN AMERICAN: 24
GFR AFRICAN AMERICAN: 7
GFR AFRICAN AMERICAN: 7
GFR NON-AFRICAN AMERICAN: 11
GFR NON-AFRICAN AMERICAN: 11
GFR NON-AFRICAN AMERICAN: 12
GFR NON-AFRICAN AMERICAN: 13
GFR NON-AFRICAN AMERICAN: 13
GFR NON-AFRICAN AMERICAN: 14
GFR NON-AFRICAN AMERICAN: 14
GFR NON-AFRICAN AMERICAN: 15
GFR NON-AFRICAN AMERICAN: 17
GFR NON-AFRICAN AMERICAN: 19
GFR NON-AFRICAN AMERICAN: 20
GFR NON-AFRICAN AMERICAN: 20
GFR NON-AFRICAN AMERICAN: 5
GFR NON-AFRICAN AMERICAN: 6
GLUCOSE BLD-MCNC: 101 MG/DL (ref 74–109)
GLUCOSE BLD-MCNC: 103 MG/DL (ref 70–99)
GLUCOSE BLD-MCNC: 104 MG/DL (ref 74–109)
GLUCOSE BLD-MCNC: 107 MG/DL (ref 70–99)
GLUCOSE BLD-MCNC: 109 MG/DL (ref 70–99)
GLUCOSE BLD-MCNC: 109 MG/DL (ref 70–99)
GLUCOSE BLD-MCNC: 114 MG/DL (ref 70–99)
GLUCOSE BLD-MCNC: 116 MG/DL (ref 70–99)
GLUCOSE BLD-MCNC: 117 MG/DL (ref 70–99)
GLUCOSE BLD-MCNC: 120 MG/DL (ref 70–99)
GLUCOSE BLD-MCNC: 120 MG/DL (ref 74–109)
GLUCOSE BLD-MCNC: 122 MG/DL (ref 74–109)
GLUCOSE BLD-MCNC: 126 MG/DL (ref 70–99)
GLUCOSE BLD-MCNC: 128 MG/DL (ref 70–99)
GLUCOSE BLD-MCNC: 131 MG/DL (ref 70–99)
GLUCOSE BLD-MCNC: 133 MG/DL (ref 70–99)
GLUCOSE BLD-MCNC: 133 MG/DL (ref 70–99)
GLUCOSE BLD-MCNC: 135 MG/DL (ref 70–99)
GLUCOSE BLD-MCNC: 136 MG/DL (ref 70–99)
GLUCOSE BLD-MCNC: 137 MG/DL (ref 74–109)
GLUCOSE BLD-MCNC: 138 MG/DL (ref 74–109)
GLUCOSE BLD-MCNC: 139 MG/DL (ref 70–99)
GLUCOSE BLD-MCNC: 139 MG/DL (ref 70–99)
GLUCOSE BLD-MCNC: 142 MG/DL (ref 70–99)
GLUCOSE BLD-MCNC: 142 MG/DL (ref 70–99)
GLUCOSE BLD-MCNC: 142 MG/DL (ref 74–109)
GLUCOSE BLD-MCNC: 145 MG/DL (ref 70–99)
GLUCOSE BLD-MCNC: 146 MG/DL (ref 70–99)
GLUCOSE BLD-MCNC: 148 MG/DL (ref 74–109)
GLUCOSE BLD-MCNC: 153 MG/DL (ref 70–99)
GLUCOSE BLD-MCNC: 154 MG/DL (ref 70–99)
GLUCOSE BLD-MCNC: 160 MG/DL (ref 74–109)
GLUCOSE BLD-MCNC: 161 MG/DL (ref 70–99)
GLUCOSE BLD-MCNC: 164 MG/DL (ref 70–99)
GLUCOSE BLD-MCNC: 167 MG/DL (ref 70–99)
GLUCOSE BLD-MCNC: 170 MG/DL (ref 70–99)
GLUCOSE BLD-MCNC: 178 MG/DL (ref 70–99)
GLUCOSE BLD-MCNC: 191 MG/DL (ref 70–99)
GLUCOSE BLD-MCNC: 198 MG/DL (ref 70–99)
GLUCOSE BLD-MCNC: 212 MG/DL (ref 70–99)
GLUCOSE BLD-MCNC: 215 MG/DL (ref 70–99)
GLUCOSE BLD-MCNC: 221 MG/DL (ref 70–99)
GLUCOSE BLD-MCNC: 247 MG/DL (ref 70–99)
GLUCOSE BLD-MCNC: 249 MG/DL (ref 70–99)
GLUCOSE BLD-MCNC: 253 MG/DL (ref 70–99)
GLUCOSE BLD-MCNC: 258 MG/DL (ref 70–99)
GLUCOSE BLD-MCNC: 262 MG/DL (ref 70–99)
GLUCOSE BLD-MCNC: 40 MG/DL (ref 74–109)
GLUCOSE BLD-MCNC: 44 MG/DL (ref 74–109)
GLUCOSE BLD-MCNC: 46 MG/DL (ref 70–99)
GLUCOSE BLD-MCNC: 49 MG/DL (ref 70–99)
GLUCOSE BLD-MCNC: 51 MG/DL (ref 70–99)
GLUCOSE BLD-MCNC: 56 MG/DL (ref 74–109)
GLUCOSE BLD-MCNC: 80 MG/DL (ref 70–99)
GLUCOSE BLD-MCNC: 86 MG/DL (ref 70–99)
GLUCOSE BLD-MCNC: 86 MG/DL (ref 70–99)
GLUCOSE BLD-MCNC: 88 MG/DL (ref 70–99)
GLUCOSE BLD-MCNC: 88 MG/DL (ref 70–99)
GLUCOSE BLD-MCNC: 89 MG/DL (ref 70–99)
GLUCOSE BLD-MCNC: 90 MG/DL (ref 70–99)
GLUCOSE BLD-MCNC: 92 MG/DL (ref 70–99)
GLUCOSE BLD-MCNC: 93 MG/DL (ref 70–99)
GLUCOSE BLD-MCNC: 93 MG/DL (ref 70–99)
GLUCOSE BLD-MCNC: 94 MG/DL (ref 70–99)
GLUCOSE BLD-MCNC: 94 MG/DL (ref 74–109)
GLUCOSE BLD-MCNC: 96 MG/DL (ref 74–109)
GLUCOSE BLD-MCNC: 97 MG/DL (ref 70–99)
GLUCOSE URINE: NEGATIVE MG/DL
HCO3 ARTERIAL: 21.4 MMOL/L (ref 22–26)
HCT VFR BLD CALC: 28.3 % (ref 42–52)
HCT VFR BLD CALC: 28.4 % (ref 42–52)
HCT VFR BLD CALC: 29.2 % (ref 42–52)
HCT VFR BLD CALC: 29.8 % (ref 42–52)
HCT VFR BLD CALC: 29.9 % (ref 42–52)
HCT VFR BLD CALC: 30.1 % (ref 42–52)
HCT VFR BLD CALC: 31.4 % (ref 42–52)
HCT VFR BLD CALC: 31.9 % (ref 42–52)
HCT VFR BLD CALC: 32 % (ref 42–52)
HCT VFR BLD CALC: 32.3 % (ref 42–52)
HCT VFR BLD CALC: 33.5 % (ref 42–52)
HCT VFR BLD CALC: 40.4 % (ref 42–52)
HEMOGLOBIN, ART, EXTENDED: 12.1 G/DL (ref 14–18)
HEMOGLOBIN: 10 G/DL (ref 14–18)
HEMOGLOBIN: 10.2 G/DL (ref 14–18)
HEMOGLOBIN: 10.9 G/DL (ref 14–18)
HEMOGLOBIN: 12.5 G/DL (ref 14–18)
HEMOGLOBIN: 8.6 G/DL (ref 14–18)
HEMOGLOBIN: 8.8 G/DL (ref 14–18)
HEMOGLOBIN: 9.2 G/DL (ref 14–18)
HEMOGLOBIN: 9.3 G/DL (ref 14–18)
HEMOGLOBIN: 9.4 G/DL (ref 14–18)
HEMOGLOBIN: 9.4 G/DL (ref 14–18)
HEMOGLOBIN: 9.5 G/DL (ref 14–18)
HEMOGLOBIN: 9.7 G/DL (ref 14–18)
HUMAN METAPNEUMOVIRUS BY PCR: NOT DETECTED
HUMAN RHINOVIRUS/ENTEROVIRUS BY PCR: NOT DETECTED
IMMATURE GRANULOCYTES #: 0 K/UL
INFLUENZA A BY PCR: NOT DETECTED
INFLUENZA B BY PCR: NOT DETECTED
INR BLD: 1.13 (ref 0.88–1.18)
KETONES, URINE: NEGATIVE MG/DL
LACTIC ACID: 1.2 MMOL/L (ref 0.5–1.9)
LEUKOCYTE ESTERASE, URINE: ABNORMAL
LIPASE: 12 U/L (ref 13–60)
LV EF: 50 %
LVEF MODALITY: NORMAL
LYMPHOCYTES ABSOLUTE: 0.7 K/UL (ref 1.1–4.5)
LYMPHOCYTES ABSOLUTE: 0.9 K/UL (ref 1.1–4.5)
LYMPHOCYTES ABSOLUTE: 0.9 K/UL (ref 1.1–4.5)
LYMPHOCYTES ABSOLUTE: 1.1 K/UL (ref 1.1–4.5)
LYMPHOCYTES ABSOLUTE: 1.2 K/UL (ref 1.1–4.5)
LYMPHOCYTES ABSOLUTE: 1.2 K/UL (ref 1.1–4.5)
LYMPHOCYTES RELATIVE PERCENT: 10.9 % (ref 20–40)
LYMPHOCYTES RELATIVE PERCENT: 17.5 % (ref 20–40)
LYMPHOCYTES RELATIVE PERCENT: 18.4 % (ref 20–40)
LYMPHOCYTES RELATIVE PERCENT: 21.1 % (ref 20–40)
LYMPHOCYTES RELATIVE PERCENT: 21.3 % (ref 20–40)
LYMPHOCYTES RELATIVE PERCENT: 22 % (ref 20–40)
LYMPHOCYTES RELATIVE PERCENT: 25.2 % (ref 20–40)
LYMPHOCYTES RELATIVE PERCENT: 25.3 % (ref 20–40)
LYMPHOCYTES RELATIVE PERCENT: 9.7 % (ref 20–40)
MAGNESIUM: 2.1 MG/DL (ref 1.6–2.4)
MAGNESIUM: 2.1 MG/DL (ref 1.6–2.4)
MAGNESIUM: 2.3 MG/DL (ref 1.6–2.4)
MAGNESIUM: 2.4 MG/DL (ref 1.6–2.4)
MAGNESIUM: 2.5 MG/DL (ref 1.6–2.4)
MCH RBC QN AUTO: 30.8 PG (ref 27–31)
MCH RBC QN AUTO: 31.2 PG (ref 27–31)
MCH RBC QN AUTO: 31.2 PG (ref 27–31)
MCH RBC QN AUTO: 31.3 PG (ref 27–31)
MCH RBC QN AUTO: 31.3 PG (ref 27–31)
MCH RBC QN AUTO: 31.4 PG (ref 27–31)
MCH RBC QN AUTO: 31.6 PG (ref 27–31)
MCH RBC QN AUTO: 31.7 PG (ref 27–31)
MCH RBC QN AUTO: 31.9 PG (ref 27–31)
MCH RBC QN AUTO: 32 PG (ref 27–31)
MCHC RBC AUTO-ENTMCNC: 30.3 G/DL (ref 33–37)
MCHC RBC AUTO-ENTMCNC: 30.3 G/DL (ref 33–37)
MCHC RBC AUTO-ENTMCNC: 30.4 G/DL (ref 33–37)
MCHC RBC AUTO-ENTMCNC: 30.6 G/DL (ref 33–37)
MCHC RBC AUTO-ENTMCNC: 30.9 G/DL (ref 33–37)
MCHC RBC AUTO-ENTMCNC: 31 G/DL (ref 33–37)
MCHC RBC AUTO-ENTMCNC: 31.1 G/DL (ref 33–37)
MCHC RBC AUTO-ENTMCNC: 31.1 G/DL (ref 33–37)
MCHC RBC AUTO-ENTMCNC: 31.5 G/DL (ref 33–37)
MCHC RBC AUTO-ENTMCNC: 31.9 G/DL (ref 33–37)
MCHC RBC AUTO-ENTMCNC: 32.2 G/DL (ref 33–37)
MCHC RBC AUTO-ENTMCNC: 32.5 G/DL (ref 33–37)
MCV RBC AUTO: 100 FL (ref 80–94)
MCV RBC AUTO: 100.4 FL (ref 80–94)
MCV RBC AUTO: 100.6 FL (ref 80–94)
MCV RBC AUTO: 101 FL (ref 80–94)
MCV RBC AUTO: 101 FL (ref 80–94)
MCV RBC AUTO: 102.4 FL (ref 80–94)
MCV RBC AUTO: 103.6 FL (ref 80–94)
MCV RBC AUTO: 103.9 FL (ref 80–94)
MCV RBC AUTO: 104.7 FL (ref 80–94)
MCV RBC AUTO: 97.7 FL (ref 80–94)
MCV RBC AUTO: 97.7 FL (ref 80–94)
MCV RBC AUTO: 98.2 FL (ref 80–94)
METHEMOGLOBIN ARTERIAL: 1.2 %
MONOCYTES ABSOLUTE: 0.4 K/UL (ref 0–0.9)
MONOCYTES ABSOLUTE: 0.5 K/UL (ref 0–0.9)
MONOCYTES ABSOLUTE: 0.5 K/UL (ref 0–0.9)
MONOCYTES ABSOLUTE: 0.6 K/UL (ref 0–0.9)
MONOCYTES ABSOLUTE: 0.7 K/UL (ref 0–0.9)
MONOCYTES RELATIVE PERCENT: 10.5 % (ref 0–10)
MONOCYTES RELATIVE PERCENT: 11 % (ref 0–10)
MONOCYTES RELATIVE PERCENT: 11.2 % (ref 0–10)
MONOCYTES RELATIVE PERCENT: 12.6 % (ref 0–10)
MONOCYTES RELATIVE PERCENT: 12.8 % (ref 0–10)
MONOCYTES RELATIVE PERCENT: 5.2 % (ref 0–10)
MONOCYTES RELATIVE PERCENT: 8.9 % (ref 0–10)
MONOCYTES RELATIVE PERCENT: 8.9 % (ref 0–10)
MONOCYTES RELATIVE PERCENT: 9.4 % (ref 0–10)
MYCOPLASMA PNEUMONIAE BY PCR: NOT DETECTED
NEUTROPHILS ABSOLUTE: 2.5 K/UL (ref 1.5–7.5)
NEUTROPHILS ABSOLUTE: 2.9 K/UL (ref 1.5–7.5)
NEUTROPHILS ABSOLUTE: 3 K/UL (ref 1.5–7.5)
NEUTROPHILS ABSOLUTE: 3.4 K/UL (ref 1.5–7.5)
NEUTROPHILS ABSOLUTE: 3.4 K/UL (ref 1.5–7.5)
NEUTROPHILS ABSOLUTE: 3.8 K/UL (ref 1.5–7.5)
NEUTROPHILS ABSOLUTE: 4 K/UL (ref 1.5–7.5)
NEUTROPHILS ABSOLUTE: 5.6 K/UL (ref 1.5–7.5)
NEUTROPHILS ABSOLUTE: 7 K/UL (ref 1.5–7.5)
NEUTROPHILS RELATIVE PERCENT: 57.7 % (ref 50–65)
NEUTROPHILS RELATIVE PERCENT: 58.9 % (ref 50–65)
NEUTROPHILS RELATIVE PERCENT: 61.7 % (ref 50–65)
NEUTROPHILS RELATIVE PERCENT: 63.1 % (ref 50–65)
NEUTROPHILS RELATIVE PERCENT: 64.9 % (ref 50–65)
NEUTROPHILS RELATIVE PERCENT: 68 % (ref 50–65)
NEUTROPHILS RELATIVE PERCENT: 68.3 % (ref 50–65)
NEUTROPHILS RELATIVE PERCENT: 79.6 % (ref 50–65)
NEUTROPHILS RELATIVE PERCENT: 83.4 % (ref 50–65)
NITRITE, URINE: NEGATIVE
O2 CONTENT ARTERIAL: 14.5 ML/DL
O2 SAT, ARTERIAL: 85.2 %
O2 THERAPY: ABNORMAL
ORGANISM: ABNORMAL
PARAINFLUENZA VIRUS 1 BY PCR: NOT DETECTED
PARAINFLUENZA VIRUS 2 BY PCR: NOT DETECTED
PARAINFLUENZA VIRUS 3 BY PCR: NOT DETECTED
PARAINFLUENZA VIRUS 4 BY PCR: NOT DETECTED
PCO2 ARTERIAL: 37 MMHG (ref 35–45)
PDW BLD-RTO: 14.6 % (ref 11.5–14.5)
PDW BLD-RTO: 14.8 % (ref 11.5–14.5)
PDW BLD-RTO: 14.9 % (ref 11.5–14.5)
PDW BLD-RTO: 15 % (ref 11.5–14.5)
PDW BLD-RTO: 15.2 % (ref 11.5–14.5)
PDW BLD-RTO: 15.3 % (ref 11.5–14.5)
PERFORMED ON: ABNORMAL
PERFORMED ON: NORMAL
PH ARTERIAL: 7.37 (ref 7.35–7.45)
PH UA: 7 (ref 5–8)
PHOSPHORUS: 3.4 MG/DL (ref 2.5–4.5)
PHOSPHORUS: 3.8 MG/DL (ref 2.5–4.5)
PHOSPHORUS: 4.8 MG/DL (ref 2.5–4.5)
PHOSPHORUS: 5.6 MG/DL (ref 2.5–4.5)
PLATELET # BLD: 103 K/UL (ref 130–400)
PLATELET # BLD: 114 K/UL (ref 130–400)
PLATELET # BLD: 115 K/UL (ref 130–400)
PLATELET # BLD: 144 K/UL (ref 130–400)
PLATELET # BLD: 153 K/UL (ref 130–400)
PLATELET # BLD: 154 K/UL (ref 130–400)
PLATELET # BLD: 156 K/UL (ref 130–400)
PLATELET # BLD: 161 K/UL (ref 130–400)
PLATELET # BLD: 168 K/UL (ref 130–400)
PLATELET # BLD: 182 K/UL (ref 130–400)
PLATELET # BLD: 192 K/UL (ref 130–400)
PLATELET # BLD: 94 K/UL (ref 130–400)
PMV BLD AUTO: 10 FL (ref 9.4–12.4)
PMV BLD AUTO: 10.1 FL (ref 9.4–12.4)
PMV BLD AUTO: 10.2 FL (ref 9.4–12.4)
PMV BLD AUTO: 10.2 FL (ref 9.4–12.4)
PMV BLD AUTO: 10.7 FL (ref 9.4–12.4)
PMV BLD AUTO: 11.5 FL (ref 9.4–12.4)
PMV BLD AUTO: 11.5 FL (ref 9.4–12.4)
PMV BLD AUTO: 11.7 FL (ref 9.4–12.4)
PMV BLD AUTO: 9.4 FL (ref 9.4–12.4)
PMV BLD AUTO: 9.4 FL (ref 9.4–12.4)
PMV BLD AUTO: 9.6 FL (ref 9.4–12.4)
PMV BLD AUTO: 9.6 FL (ref 9.4–12.4)
PO2 ARTERIAL: 56 MMHG (ref 80–100)
POTASSIUM REFLEX MAGNESIUM: 4.3 MMOL/L (ref 3.5–5)
POTASSIUM REFLEX MAGNESIUM: 4.6 MMOL/L (ref 3.5–5)
POTASSIUM REFLEX MAGNESIUM: 5.2 MMOL/L (ref 3.5–5)
POTASSIUM SERPL-SCNC: 3.7 MMOL/L (ref 3.5–5)
POTASSIUM SERPL-SCNC: 4 MMOL/L (ref 3.5–5)
POTASSIUM SERPL-SCNC: 4.1 MMOL/L (ref 3.5–5)
POTASSIUM SERPL-SCNC: 4.2 MMOL/L (ref 3.5–5)
POTASSIUM SERPL-SCNC: 4.2 MMOL/L (ref 3.5–5)
POTASSIUM SERPL-SCNC: 4.3 MMOL/L (ref 3.5–5)
POTASSIUM SERPL-SCNC: 4.6 MMOL/L (ref 3.5–5)
POTASSIUM SERPL-SCNC: 4.7 MMOL/L (ref 3.5–5)
POTASSIUM SERPL-SCNC: 4.7 MMOL/L (ref 3.5–5)
POTASSIUM SERPL-SCNC: 4.8 MMOL/L (ref 3.5–5)
POTASSIUM SERPL-SCNC: 5.9 MMOL/L (ref 3.5–5)
POTASSIUM, WHOLE BLOOD: 4.9
PRO-BNP: 9156 PG/ML (ref 0–1800)
PRO-BNP: ABNORMAL PG/ML (ref 0–1800)
PROTEIN UA: 100 MG/DL
PROTHROMBIN TIME: 14.5 SEC (ref 12–14.6)
RBC # BLD: 2.74 M/UL (ref 4.7–6.1)
RBC # BLD: 2.82 M/UL (ref 4.7–6.1)
RBC # BLD: 2.94 M/UL (ref 4.7–6.1)
RBC # BLD: 2.96 M/UL (ref 4.7–6.1)
RBC # BLD: 2.99 M/UL (ref 4.7–6.1)
RBC # BLD: 3 M/UL (ref 4.7–6.1)
RBC # BLD: 3.05 M/UL (ref 4.7–6.1)
RBC # BLD: 3.07 M/UL (ref 4.7–6.1)
RBC # BLD: 3.2 M/UL (ref 4.7–6.1)
RBC # BLD: 3.21 M/UL (ref 4.7–6.1)
RBC # BLD: 3.41 M/UL (ref 4.7–6.1)
RBC # BLD: 4 M/UL (ref 4.7–6.1)
RBC UA: ABNORMAL /HPF (ref 0–2)
REASON FOR REJECTION: NORMAL
REASON FOR REJECTION: NORMAL
REJECTED TEST: NORMAL
REJECTED TEST: NORMAL
RESPIRATORY SYNCYTIAL VIRUS BY PCR: NOT DETECTED
SARS-COV-2, NAAT: NOT DETECTED
SARS-COV-2, PCR: DETECTED
SARS-COV-2, PCR: NOT DETECTED
SARS-COV-2, PCR: NOT DETECTED
SODIUM BLD-SCNC: 137 MMOL/L (ref 136–145)
SODIUM BLD-SCNC: 139 MMOL/L (ref 136–145)
SODIUM BLD-SCNC: 140 MMOL/L (ref 136–145)
SODIUM BLD-SCNC: 140 MMOL/L (ref 136–145)
SODIUM BLD-SCNC: 141 MMOL/L (ref 136–145)
SODIUM BLD-SCNC: 142 MMOL/L (ref 136–145)
SODIUM BLD-SCNC: 144 MMOL/L (ref 136–145)
SODIUM BLD-SCNC: 144 MMOL/L (ref 136–145)
SPECIFIC GRAVITY UA: 1.02 (ref 1–1.03)
TOTAL PROTEIN: 5 G/DL (ref 6.6–8.7)
TOTAL PROTEIN: 5.3 G/DL (ref 6.6–8.7)
TOTAL PROTEIN: 5.3 G/DL (ref 6.6–8.7)
TOTAL PROTEIN: 5.4 G/DL (ref 6.6–8.7)
TOTAL PROTEIN: 5.6 G/DL (ref 6.6–8.7)
TOTAL PROTEIN: 5.8 G/DL (ref 6.6–8.7)
TOTAL PROTEIN: 6 G/DL (ref 6.6–8.7)
TOTAL PROTEIN: 6.4 G/DL (ref 6.6–8.7)
TOTAL PROTEIN: 6.9 G/DL (ref 6.6–8.7)
TROPONIN: 0.26 NG/ML (ref 0–0.03)
TROPONIN: 0.43 NG/ML (ref 0–0.03)
URINE CULTURE, ROUTINE: ABNORMAL
UROBILINOGEN, URINE: 0.2 E.U./DL
WBC # BLD: 4.4 K/UL (ref 4.8–10.8)
WBC # BLD: 4.9 K/UL (ref 4.8–10.8)
WBC # BLD: 4.9 K/UL (ref 4.8–10.8)
WBC # BLD: 5 K/UL (ref 4.8–10.8)
WBC # BLD: 5.3 K/UL (ref 4.8–10.8)
WBC # BLD: 5.7 K/UL (ref 4.8–10.8)
WBC # BLD: 5.8 K/UL (ref 4.8–10.8)
WBC # BLD: 5.9 K/UL (ref 4.8–10.8)
WBC # BLD: 6.9 K/UL (ref 4.8–10.8)
WBC # BLD: 7.1 K/UL (ref 4.8–10.8)
WBC # BLD: 7.1 K/UL (ref 4.8–10.8)
WBC # BLD: 8.4 K/UL (ref 4.8–10.8)
WBC UA: ABNORMAL /HPF (ref 0–5)

## 2020-01-01 PROCEDURE — 6370000000 HC RX 637 (ALT 250 FOR IP): Performed by: SURGERY

## 2020-01-01 PROCEDURE — 2580000003 HC RX 258: Performed by: HOSPITALIST

## 2020-01-01 PROCEDURE — 6360000002 HC RX W HCPCS: Performed by: SURGERY

## 2020-01-01 PROCEDURE — 82947 ASSAY GLUCOSE BLOOD QUANT: CPT

## 2020-01-01 PROCEDURE — 6360000002 HC RX W HCPCS: Performed by: HOSPITALIST

## 2020-01-01 PROCEDURE — 64415 NJX AA&/STRD BRCH PLXS IMG: CPT | Performed by: NURSE ANESTHETIST, CERTIFIED REGISTERED

## 2020-01-01 PROCEDURE — 2580000003 HC RX 258: Performed by: ANESTHESIOLOGY

## 2020-01-01 PROCEDURE — 99024 POSTOP FOLLOW-UP VISIT: CPT | Performed by: INTERNAL MEDICINE

## 2020-01-01 PROCEDURE — 99231 SBSQ HOSP IP/OBS SF/LOW 25: CPT | Performed by: NEUROLOGICAL SURGERY

## 2020-01-01 PROCEDURE — 93005 ELECTROCARDIOGRAM TRACING: CPT | Performed by: SURGERY

## 2020-01-01 PROCEDURE — G0378 HOSPITAL OBSERVATION PER HR: HCPCS

## 2020-01-01 PROCEDURE — 6370000000 HC RX 637 (ALT 250 FOR IP): Performed by: STUDENT IN AN ORGANIZED HEALTH CARE EDUCATION/TRAINING PROGRAM

## 2020-01-01 PROCEDURE — 2000000000 HC ICU R&B

## 2020-01-01 PROCEDURE — 80053 COMPREHEN METABOLIC PANEL: CPT

## 2020-01-01 PROCEDURE — 2580000003 HC RX 258: Performed by: SURGERY

## 2020-01-01 PROCEDURE — 2500000003 HC RX 250 WO HCPCS: Performed by: ANESTHESIOLOGY

## 2020-01-01 PROCEDURE — 87086 URINE CULTURE/COLONY COUNT: CPT

## 2020-01-01 PROCEDURE — 93306 TTE W/DOPPLER COMPLETE: CPT

## 2020-01-01 PROCEDURE — 93005 ELECTROCARDIOGRAM TRACING: CPT | Performed by: INTERNAL MEDICINE

## 2020-01-01 PROCEDURE — 83735 ASSAY OF MAGNESIUM: CPT

## 2020-01-01 PROCEDURE — 99024 POSTOP FOLLOW-UP VISIT: CPT | Performed by: NURSE PRACTITIONER

## 2020-01-01 PROCEDURE — 3700000001 HC ADD 15 MINUTES (ANESTHESIA): Performed by: SURGERY

## 2020-01-01 PROCEDURE — 2580000003 HC RX 258: Performed by: INTERNAL MEDICINE

## 2020-01-01 PROCEDURE — G0257 UNSCHED DIALYSIS ESRD PT HOS: HCPCS

## 2020-01-01 PROCEDURE — 0JH606Z INSERTION OF PACEMAKER, DUAL CHAMBER INTO CHEST SUBCUTANEOUS TISSUE AND FASCIA, OPEN APPROACH: ICD-10-PCS | Performed by: INTERNAL MEDICINE

## 2020-01-01 PROCEDURE — 94761 N-INVAS EAR/PLS OXIMETRY MLT: CPT

## 2020-01-01 PROCEDURE — 6370000000 HC RX 637 (ALT 250 FOR IP): Performed by: HOSPITALIST

## 2020-01-01 PROCEDURE — 80048 BASIC METABOLIC PNL TOTAL CA: CPT

## 2020-01-01 PROCEDURE — 84484 ASSAY OF TROPONIN QUANT: CPT

## 2020-01-01 PROCEDURE — 2140000000 HC CCU INTERMEDIATE R&B

## 2020-01-01 PROCEDURE — 0202U NFCT DS 22 TRGT SARS-COV-2: CPT

## 2020-01-01 PROCEDURE — 6360000002 HC RX W HCPCS: Performed by: INTERNAL MEDICINE

## 2020-01-01 PROCEDURE — 72125 CT NECK SPINE W/O DYE: CPT

## 2020-01-01 PROCEDURE — 36600 WITHDRAWAL OF ARTERIAL BLOOD: CPT

## 2020-01-01 PROCEDURE — 36415 COLL VENOUS BLD VENIPUNCTURE: CPT

## 2020-01-01 PROCEDURE — 85025 COMPLETE CBC W/AUTO DIFF WBC: CPT

## 2020-01-01 PROCEDURE — C1768 GRAFT, VASCULAR: HCPCS | Performed by: SURGERY

## 2020-01-01 PROCEDURE — 99231 SBSQ HOSP IP/OBS SF/LOW 25: CPT | Performed by: NURSE PRACTITIONER

## 2020-01-01 PROCEDURE — 2700000000 HC OXYGEN THERAPY PER DAY

## 2020-01-01 PROCEDURE — 85027 COMPLETE CBC AUTOMATED: CPT

## 2020-01-01 PROCEDURE — 99232 SBSQ HOSP IP/OBS MODERATE 35: CPT | Performed by: INTERNAL MEDICINE

## 2020-01-01 PROCEDURE — 84132 ASSAY OF SERUM POTASSIUM: CPT

## 2020-01-01 PROCEDURE — 7100000001 HC PACU RECOVERY - ADDTL 15 MIN: Performed by: SURGERY

## 2020-01-01 PROCEDURE — 33208 INSRT HEART PM ATRIAL & VENT: CPT

## 2020-01-01 PROCEDURE — 8010000000 HC HEMODIALYSIS ACUTE INPT

## 2020-01-01 PROCEDURE — 94640 AIRWAY INHALATION TREATMENT: CPT

## 2020-01-01 PROCEDURE — 93010 ELECTROCARDIOGRAM REPORT: CPT | Performed by: INTERNAL MEDICINE

## 2020-01-01 PROCEDURE — 3E0102A INTRODUCTION OF ANTI-INFECTIVE ENVELOPE INTO SUBCUTANEOUS TISSUE, OPEN APPROACH: ICD-10-PCS | Performed by: INTERNAL MEDICINE

## 2020-01-01 PROCEDURE — 99153 MOD SED SAME PHYS/QHP EA: CPT

## 2020-01-01 PROCEDURE — 1210000000 HC MED SURG R&B

## 2020-01-01 PROCEDURE — 6360000002 HC RX W HCPCS

## 2020-01-01 PROCEDURE — 06HY33Z INSERTION OF INFUSION DEVICE INTO LOWER VEIN, PERCUTANEOUS APPROACH: ICD-10-PCS | Performed by: SURGERY

## 2020-01-01 PROCEDURE — 71045 X-RAY EXAM CHEST 1 VIEW: CPT

## 2020-01-01 PROCEDURE — 96375 TX/PRO/DX INJ NEW DRUG ADDON: CPT

## 2020-01-01 PROCEDURE — 83880 ASSAY OF NATRIURETIC PEPTIDE: CPT

## 2020-01-01 PROCEDURE — 6370000000 HC RX 637 (ALT 250 FOR IP): Performed by: INTERNAL MEDICINE

## 2020-01-01 PROCEDURE — 99152 MOD SED SAME PHYS/QHP 5/>YRS: CPT

## 2020-01-01 PROCEDURE — 2580000003 HC RX 258: Performed by: NURSE PRACTITIONER

## 2020-01-01 PROCEDURE — 93308 TTE F-UP OR LMTD: CPT

## 2020-01-01 PROCEDURE — 6370000000 HC RX 637 (ALT 250 FOR IP): Performed by: NURSE PRACTITIONER

## 2020-01-01 PROCEDURE — 6360000002 HC RX W HCPCS: Performed by: PHYSICIAN ASSISTANT

## 2020-01-01 PROCEDURE — 2500000003 HC RX 250 WO HCPCS: Performed by: SURGERY

## 2020-01-01 PROCEDURE — 93005 ELECTROCARDIOGRAM TRACING: CPT | Performed by: EMERGENCY MEDICINE

## 2020-01-01 PROCEDURE — 2709999900 HC NON-CHARGEABLE SUPPLY

## 2020-01-01 PROCEDURE — 33210 INSERT ELECTRD/PM CATH SNGL: CPT

## 2020-01-01 PROCEDURE — 99220 PR INITIAL OBSERVATION CARE/DAY 70 MINUTES: CPT | Performed by: PSYCHIATRY & NEUROLOGY

## 2020-01-01 PROCEDURE — 2709999900 HC NON-CHARGEABLE SUPPLY: Performed by: SURGERY

## 2020-01-01 PROCEDURE — 99231 SBSQ HOSP IP/OBS SF/LOW 25: CPT | Performed by: INTERNAL MEDICINE

## 2020-01-01 PROCEDURE — 84100 ASSAY OF PHOSPHORUS: CPT

## 2020-01-01 PROCEDURE — 2580000003 HC RX 258: Performed by: STUDENT IN AN ORGANIZED HEALTH CARE EDUCATION/TRAINING PROGRAM

## 2020-01-01 PROCEDURE — 83690 ASSAY OF LIPASE: CPT

## 2020-01-01 PROCEDURE — 99221 1ST HOSP IP/OBS SF/LOW 40: CPT | Performed by: NEUROLOGICAL SURGERY

## 2020-01-01 PROCEDURE — 99283 EMERGENCY DEPT VISIT LOW MDM: CPT

## 2020-01-01 PROCEDURE — 76937 US GUIDE VASCULAR ACCESS: CPT

## 2020-01-01 PROCEDURE — 71260 CT THORAX DX C+: CPT

## 2020-01-01 PROCEDURE — U0003 INFECTIOUS AGENT DETECTION BY NUCLEIC ACID (DNA OR RNA); SEVERE ACUTE RESPIRATORY SYNDROME CORONAVIRUS 2 (SARS-COV-2) (CORONAVIRUS DISEASE [COVID-19]), AMPLIFIED PROBE TECHNIQUE, MAKING USE OF HIGH THROUGHPUT TECHNOLOGIES AS DESCRIBED BY CMS-2020-01-R: HCPCS

## 2020-01-01 PROCEDURE — 5A1223Z PERFORMANCE OF CARDIAC PACING, CONTINUOUS: ICD-10-PCS | Performed by: INTERNAL MEDICINE

## 2020-01-01 PROCEDURE — 6360000004 HC RX CONTRAST MEDICATION: Performed by: EMERGENCY MEDICINE

## 2020-01-01 PROCEDURE — 6360000002 HC RX W HCPCS: Performed by: NURSE PRACTITIONER

## 2020-01-01 PROCEDURE — U0002 COVID-19 LAB TEST NON-CDC: HCPCS

## 2020-01-01 PROCEDURE — 5A1D70Z PERFORMANCE OF URINARY FILTRATION, INTERMITTENT, LESS THAN 6 HOURS PER DAY: ICD-10-PCS | Performed by: INTERNAL MEDICINE

## 2020-01-01 PROCEDURE — 85610 PROTHROMBIN TIME: CPT

## 2020-01-01 PROCEDURE — 82803 BLOOD GASES ANY COMBINATION: CPT

## 2020-01-01 PROCEDURE — 36556 INSERT NON-TUNNEL CV CATH: CPT

## 2020-01-01 PROCEDURE — 6360000002 HC RX W HCPCS: Performed by: NURSE ANESTHETIST, CERTIFIED REGISTERED

## 2020-01-01 PROCEDURE — 3600000013 HC SURGERY LEVEL 3 ADDTL 15MIN: Performed by: SURGERY

## 2020-01-01 PROCEDURE — 6360000002 HC RX W HCPCS: Performed by: EMERGENCY MEDICINE

## 2020-01-01 PROCEDURE — 99152 MOD SED SAME PHYS/QHP 5/>YRS: CPT | Performed by: INTERNAL MEDICINE

## 2020-01-01 PROCEDURE — 36901 INTRO CATH DIALYSIS CIRCUIT: CPT

## 2020-01-01 PROCEDURE — 6360000002 HC RX W HCPCS: Performed by: ANESTHESIOLOGY

## 2020-01-01 PROCEDURE — C1898 LEAD, PMKR, OTHER THAN TRANS: HCPCS

## 2020-01-01 PROCEDURE — 2580000003 HC RX 258: Performed by: PHYSICIAN ASSISTANT

## 2020-01-01 PROCEDURE — C1752 CATH,HEMODIALYSIS,SHORT-TERM: HCPCS

## 2020-01-01 PROCEDURE — 2500000003 HC RX 250 WO HCPCS

## 2020-01-01 PROCEDURE — C1785 PMKR, DUAL, RATE-RESP: HCPCS

## 2020-01-01 PROCEDURE — 3700000000 HC ANESTHESIA ATTENDED CARE: Performed by: SURGERY

## 2020-01-01 PROCEDURE — 02H63JZ INSERTION OF PACEMAKER LEAD INTO RIGHT ATRIUM, PERCUTANEOUS APPROACH: ICD-10-PCS | Performed by: INTERNAL MEDICINE

## 2020-01-01 PROCEDURE — 96374 THER/PROPH/DIAG INJ IV PUSH: CPT

## 2020-01-01 PROCEDURE — 74177 CT ABD & PELVIS W/CONTRAST: CPT

## 2020-01-01 PROCEDURE — 36556 INSERT NON-TUNNEL CV CATH: CPT | Performed by: SURGERY

## 2020-01-01 PROCEDURE — 77001 FLUOROGUIDE FOR VEIN DEVICE: CPT

## 2020-01-01 PROCEDURE — 3600000003 HC SURGERY LEVEL 3 BASE: Performed by: SURGERY

## 2020-01-01 PROCEDURE — 99222 1ST HOSP IP/OBS MODERATE 55: CPT | Performed by: NURSE PRACTITIONER

## 2020-01-01 PROCEDURE — 03170JD BYPASS RIGHT BRACHIAL ARTERY TO UPPER ARM VEIN WITH SYNTHETIC SUBSTITUTE, OPEN APPROACH: ICD-10-PCS | Performed by: SURGERY

## 2020-01-01 PROCEDURE — C1894 INTRO/SHEATH, NON-LASER: HCPCS

## 2020-01-01 PROCEDURE — 36830 ARTERY-VEIN NONAUTOGRAFT: CPT | Performed by: SURGERY

## 2020-01-01 PROCEDURE — 33208 INSRT HEART PM ATRIAL & VENT: CPT | Performed by: INTERNAL MEDICINE

## 2020-01-01 PROCEDURE — 2500000003 HC RX 250 WO HCPCS: Performed by: NURSE ANESTHETIST, CERTIFIED REGISTERED

## 2020-01-01 PROCEDURE — 87186 SC STD MICRODIL/AGAR DIL: CPT

## 2020-01-01 PROCEDURE — 2780000010 HC IMPLANT OTHER

## 2020-01-01 PROCEDURE — 99999 PR OFFICE/OUTPT VISIT,PROCEDURE ONLY: CPT | Performed by: EMERGENCY MEDICINE

## 2020-01-01 PROCEDURE — 87077 CULTURE AEROBIC IDENTIFY: CPT

## 2020-01-01 PROCEDURE — 7100000000 HC PACU RECOVERY - FIRST 15 MIN: Performed by: SURGERY

## 2020-01-01 PROCEDURE — 02HK3JZ INSERTION OF PACEMAKER LEAD INTO RIGHT VENTRICLE, PERCUTANEOUS APPROACH: ICD-10-PCS | Performed by: INTERNAL MEDICINE

## 2020-01-01 PROCEDURE — 99024 POSTOP FOLLOW-UP VISIT: CPT | Performed by: PHYSICIAN ASSISTANT

## 2020-01-01 PROCEDURE — 36901 INTRO CATH DIALYSIS CIRCUIT: CPT | Performed by: SURGERY

## 2020-01-01 PROCEDURE — 83605 ASSAY OF LACTIC ACID: CPT

## 2020-01-01 PROCEDURE — 87040 BLOOD CULTURE FOR BACTERIA: CPT

## 2020-01-01 PROCEDURE — 93985 DUP-SCAN HEMO COMPL BI STD: CPT

## 2020-01-01 PROCEDURE — 70450 CT HEAD/BRAIN W/O DYE: CPT

## 2020-01-01 PROCEDURE — 81001 URINALYSIS AUTO W/SCOPE: CPT

## 2020-01-01 DEVICE — GRAFT VASC L45CM ID4-7MM TAPR ACUSEAL: Type: IMPLANTABLE DEVICE | Site: ARM | Status: FUNCTIONAL

## 2020-01-01 RX ORDER — ROPIVACAINE HYDROCHLORIDE 5 MG/ML
INJECTION, SOLUTION EPIDURAL; INFILTRATION; PERINEURAL
Status: COMPLETED | OUTPATIENT
Start: 2020-01-01 | End: 2020-01-01

## 2020-01-01 RX ORDER — PROMETHAZINE HYDROCHLORIDE 12.5 MG/1
12.5 TABLET ORAL EVERY 6 HOURS PRN
Status: DISCONTINUED | OUTPATIENT
Start: 2020-01-01 | End: 2020-01-01 | Stop reason: HOSPADM

## 2020-01-01 RX ORDER — DEXTROSE MONOHYDRATE 50 MG/ML
100 INJECTION, SOLUTION INTRAVENOUS PRN
Status: DISCONTINUED | OUTPATIENT
Start: 2020-01-01 | End: 2020-01-01 | Stop reason: HOSPADM

## 2020-01-01 RX ORDER — ISOSORBIDE DINITRATE 10 MG/1
10 TABLET ORAL 3 TIMES DAILY
COMMUNITY

## 2020-01-01 RX ORDER — ASPIRIN 81 MG/1
81 TABLET ORAL ONCE
Status: CANCELLED | OUTPATIENT
Start: 2020-01-01 | End: 2020-01-01

## 2020-01-01 RX ORDER — ONDANSETRON 2 MG/ML
4 INJECTION INTRAMUSCULAR; INTRAVENOUS EVERY 6 HOURS PRN
Status: DISCONTINUED | OUTPATIENT
Start: 2020-01-01 | End: 2020-01-01 | Stop reason: HOSPADM

## 2020-01-01 RX ORDER — HEPARIN SODIUM 1000 [USP'U]/ML
3200 INJECTION, SOLUTION INTRAVENOUS; SUBCUTANEOUS
Status: COMPLETED | OUTPATIENT
Start: 2020-01-01 | End: 2020-01-01

## 2020-01-01 RX ORDER — DEXAMETHASONE 4 MG/1
4 TABLET ORAL
Status: ON HOLD | COMMUNITY
End: 2020-01-01 | Stop reason: HOSPADM

## 2020-01-01 RX ORDER — INSULIN GLARGINE 100 [IU]/ML
6 INJECTION, SOLUTION SUBCUTANEOUS NIGHTLY
Status: DISCONTINUED | OUTPATIENT
Start: 2020-01-01 | End: 2020-01-01

## 2020-01-01 RX ORDER — HYDRALAZINE HYDROCHLORIDE 20 MG/ML
10 INJECTION INTRAMUSCULAR; INTRAVENOUS EVERY 4 HOURS PRN
Status: DISCONTINUED | OUTPATIENT
Start: 2020-01-01 | End: 2020-01-01 | Stop reason: HOSPADM

## 2020-01-01 RX ORDER — CHLORHEXIDINE GLUCONATE 4 G/100ML
SOLUTION TOPICAL 2 TIMES DAILY
Status: COMPLETED | OUTPATIENT
Start: 2020-01-01 | End: 2020-01-01

## 2020-01-01 RX ORDER — LIDOCAINE AND PRILOCAINE 25; 25 MG/G; MG/G
CREAM TOPICAL PRN
COMMUNITY

## 2020-01-01 RX ORDER — AMIODARONE HYDROCHLORIDE 200 MG/1
200 TABLET ORAL DAILY
COMMUNITY

## 2020-01-01 RX ORDER — ASCORBIC ACID 500 MG
1000 TABLET ORAL DAILY
COMMUNITY

## 2020-01-01 RX ORDER — OXYCODONE HYDROCHLORIDE 5 MG/1
5 TABLET ORAL EVERY 4 HOURS PRN
Status: DISCONTINUED | OUTPATIENT
Start: 2020-01-01 | End: 2020-01-01 | Stop reason: HOSPADM

## 2020-01-01 RX ORDER — MIDODRINE HYDROCHLORIDE 5 MG/1
5 TABLET ORAL 3 TIMES DAILY
COMMUNITY

## 2020-01-01 RX ORDER — DOPAMINE HYDROCHLORIDE 160 MG/100ML
2.5 INJECTION, SOLUTION INTRAVENOUS CONTINUOUS
Status: DISCONTINUED | OUTPATIENT
Start: 2020-01-01 | End: 2020-01-01

## 2020-01-01 RX ORDER — ATORVASTATIN CALCIUM 20 MG/1
40 TABLET, FILM COATED ORAL DAILY
Status: DISCONTINUED | OUTPATIENT
Start: 2020-01-01 | End: 2020-01-01 | Stop reason: HOSPADM

## 2020-01-01 RX ORDER — MIDAZOLAM HYDROCHLORIDE 1 MG/ML
INJECTION INTRAMUSCULAR; INTRAVENOUS
Status: COMPLETED | OUTPATIENT
Start: 2020-01-01 | End: 2020-01-01

## 2020-01-01 RX ORDER — HEPARIN SODIUM 5000 [USP'U]/ML
5000 INJECTION, SOLUTION INTRAVENOUS; SUBCUTANEOUS EVERY 8 HOURS SCHEDULED
Status: DISCONTINUED | OUTPATIENT
Start: 2020-01-01 | End: 2020-01-01 | Stop reason: HOSPADM

## 2020-01-01 RX ORDER — LIDOCAINE HYDROCHLORIDE 20 MG/ML
INJECTION, SOLUTION INFILTRATION; PERINEURAL
Status: COMPLETED | OUTPATIENT
Start: 2020-01-01 | End: 2020-01-01

## 2020-01-01 RX ORDER — FENTANYL CITRATE 50 UG/ML
INJECTION, SOLUTION INTRAMUSCULAR; INTRAVENOUS PRN
Status: DISCONTINUED | OUTPATIENT
Start: 2020-01-01 | End: 2020-01-01 | Stop reason: SDUPTHER

## 2020-01-01 RX ORDER — ACETAMINOPHEN 325 MG/1
650 TABLET ORAL EVERY 6 HOURS PRN
Status: DISCONTINUED | OUTPATIENT
Start: 2020-01-01 | End: 2020-01-01

## 2020-01-01 RX ORDER — 0.9 % SODIUM CHLORIDE 0.9 %
250 INTRAVENOUS SOLUTION INTRAVENOUS ONCE
Status: COMPLETED | OUTPATIENT
Start: 2020-01-01 | End: 2020-01-01

## 2020-01-01 RX ORDER — LIDOCAINE HYDROCHLORIDE 10 MG/ML
INJECTION, SOLUTION EPIDURAL; INFILTRATION; INTRACAUDAL; PERINEURAL PRN
Status: DISCONTINUED | OUTPATIENT
Start: 2020-01-01 | End: 2020-01-01 | Stop reason: SDUPTHER

## 2020-01-01 RX ORDER — BUDESONIDE AND FORMOTEROL FUMARATE DIHYDRATE 160; 4.5 UG/1; UG/1
2 AEROSOL RESPIRATORY (INHALATION) 2 TIMES DAILY
COMMUNITY

## 2020-01-01 RX ORDER — SODIUM CHLORIDE 0.9 % (FLUSH) 0.9 %
10 SYRINGE (ML) INJECTION PRN
Status: DISCONTINUED | OUTPATIENT
Start: 2020-01-01 | End: 2020-01-01 | Stop reason: HOSPADM

## 2020-01-01 RX ORDER — NICOTINE POLACRILEX 4 MG
15 LOZENGE BUCCAL PRN
Status: DISCONTINUED | OUTPATIENT
Start: 2020-01-01 | End: 2020-01-01 | Stop reason: HOSPADM

## 2020-01-01 RX ORDER — AMIODARONE HYDROCHLORIDE 200 MG/1
200 TABLET ORAL DAILY
Status: DISCONTINUED | OUTPATIENT
Start: 2020-01-01 | End: 2020-01-01 | Stop reason: HOSPADM

## 2020-01-01 RX ORDER — SODIUM POLYSTYRENE SULFONATE 15 G/60ML
15 SUSPENSION ORAL; RECTAL ONCE
Status: DISCONTINUED | OUTPATIENT
Start: 2020-01-01 | End: 2020-01-01 | Stop reason: HOSPADM

## 2020-01-01 RX ORDER — SODIUM BICARBONATE 650 MG/1
650 TABLET ORAL 4 TIMES DAILY
Status: DISCONTINUED | OUTPATIENT
Start: 2020-01-01 | End: 2020-01-01 | Stop reason: HOSPADM

## 2020-01-01 RX ORDER — DEXTROSE MONOHYDRATE 25 G/50ML
12.5 INJECTION, SOLUTION INTRAVENOUS PRN
Status: DISCONTINUED | OUTPATIENT
Start: 2020-01-01 | End: 2020-01-01 | Stop reason: HOSPADM

## 2020-01-01 RX ORDER — SODIUM CHLORIDE 9 MG/ML
INJECTION, SOLUTION INTRAVENOUS CONTINUOUS
Status: CANCELLED | OUTPATIENT
Start: 2020-01-01

## 2020-01-01 RX ORDER — SODIUM CHLORIDE 9 MG/ML
INJECTION, SOLUTION INTRAVENOUS CONTINUOUS
Status: DISCONTINUED | OUTPATIENT
Start: 2020-01-01 | End: 2020-01-01

## 2020-01-01 RX ORDER — ONDANSETRON 2 MG/ML
INJECTION INTRAMUSCULAR; INTRAVENOUS PRN
Status: DISCONTINUED | OUTPATIENT
Start: 2020-01-01 | End: 2020-01-01 | Stop reason: SDUPTHER

## 2020-01-01 RX ORDER — ATROPINE SULFATE 0.1 MG/ML
1 INJECTION INTRAVENOUS ONCE
Status: COMPLETED | OUTPATIENT
Start: 2020-01-01 | End: 2020-01-01

## 2020-01-01 RX ORDER — SODIUM CHLORIDE 0.9 % (FLUSH) 0.9 %
10 SYRINGE (ML) INJECTION PRN
Status: DISCONTINUED | OUTPATIENT
Start: 2020-01-01 | End: 2020-01-01

## 2020-01-01 RX ORDER — DEXAMETHASONE 6 MG/1
6 TABLET ORAL EVERY 6 HOURS
Qty: 9 TABLET | Refills: 0 | Status: SHIPPED | OUTPATIENT
Start: 2020-12-08 | End: 2020-12-17

## 2020-01-01 RX ORDER — SODIUM CHLORIDE 0.9 % (FLUSH) 0.9 %
10 SYRINGE (ML) INJECTION EVERY 12 HOURS SCHEDULED
Status: DISCONTINUED | OUTPATIENT
Start: 2020-01-01 | End: 2020-01-01

## 2020-01-01 RX ORDER — SODIUM CHLORIDE 450 MG/100ML
INJECTION, SOLUTION INTRAVENOUS CONTINUOUS
Status: DISCONTINUED | OUTPATIENT
Start: 2020-01-01 | End: 2020-01-01 | Stop reason: HOSPADM

## 2020-01-01 RX ORDER — MIDODRINE HYDROCHLORIDE 5 MG/1
5 TABLET ORAL 3 TIMES DAILY
Status: DISCONTINUED | OUTPATIENT
Start: 2020-01-01 | End: 2020-01-01 | Stop reason: HOSPADM

## 2020-01-01 RX ORDER — OXYCODONE HYDROCHLORIDE 5 MG/1
5 CAPSULE ORAL EVERY 6 HOURS PRN
COMMUNITY

## 2020-01-01 RX ORDER — ACETAMINOPHEN 650 MG/1
650 SUPPOSITORY RECTAL EVERY 6 HOURS PRN
Status: DISCONTINUED | OUTPATIENT
Start: 2020-01-01 | End: 2020-01-01 | Stop reason: HOSPADM

## 2020-01-01 RX ORDER — FENTANYL CITRATE 50 UG/ML
INJECTION, SOLUTION INTRAMUSCULAR; INTRAVENOUS
Status: COMPLETED | OUTPATIENT
Start: 2020-01-01 | End: 2020-01-01

## 2020-01-01 RX ORDER — HEPARIN SODIUM 5000 [USP'U]/ML
INJECTION, SOLUTION INTRAVENOUS; SUBCUTANEOUS
Status: COMPLETED | OUTPATIENT
Start: 2020-01-01 | End: 2020-01-01

## 2020-01-01 RX ORDER — PROPOFOL 10 MG/ML
INJECTION, EMULSION INTRAVENOUS PRN
Status: DISCONTINUED | OUTPATIENT
Start: 2020-01-01 | End: 2020-01-01 | Stop reason: SDUPTHER

## 2020-01-01 RX ORDER — FAMOTIDINE 20 MG/1
10 TABLET, FILM COATED ORAL DAILY
Status: DISCONTINUED | OUTPATIENT
Start: 2020-12-08 | End: 2020-01-01 | Stop reason: HOSPADM

## 2020-01-01 RX ORDER — ACETAMINOPHEN 325 MG/1
650 TABLET ORAL EVERY 4 HOURS PRN
Status: DISCONTINUED | OUTPATIENT
Start: 2020-01-01 | End: 2020-01-01 | Stop reason: HOSPADM

## 2020-01-01 RX ORDER — MIDAZOLAM HYDROCHLORIDE 1 MG/ML
2 INJECTION INTRAMUSCULAR; INTRAVENOUS ONCE
Status: DISCONTINUED | OUTPATIENT
Start: 2020-01-01 | End: 2020-01-01 | Stop reason: HOSPADM

## 2020-01-01 RX ORDER — MIDODRINE HYDROCHLORIDE 5 MG/1
5 TABLET ORAL 3 TIMES DAILY
Status: DISCONTINUED | OUTPATIENT
Start: 2020-01-01 | End: 2020-01-01

## 2020-01-01 RX ORDER — HEPARIN SODIUM 1000 [USP'U]/ML
INJECTION, SOLUTION INTRAVENOUS; SUBCUTANEOUS PRN
Status: DISCONTINUED | OUTPATIENT
Start: 2020-01-01 | End: 2020-01-01 | Stop reason: SDUPTHER

## 2020-01-01 RX ORDER — FAMOTIDINE 20 MG/1
20 TABLET, FILM COATED ORAL DAILY
Status: DISCONTINUED | OUTPATIENT
Start: 2020-01-01 | End: 2020-01-01

## 2020-01-01 RX ORDER — SODIUM CHLORIDE 0.9 % (FLUSH) 0.9 %
10 SYRINGE (ML) INJECTION PRN
Status: CANCELLED | OUTPATIENT
Start: 2020-01-01

## 2020-01-01 RX ORDER — ZINC SULFATE 50(220)MG
50 CAPSULE ORAL DAILY
COMMUNITY

## 2020-01-01 RX ORDER — ASPIRIN 81 MG
1 TABLET, DELAYED RELEASE (ENTERIC COATED) ORAL DAILY
COMMUNITY

## 2020-01-01 RX ORDER — MORPHINE SULFATE 4 MG/ML
2 INJECTION, SOLUTION INTRAMUSCULAR; INTRAVENOUS EVERY 4 HOURS PRN
Status: DISCONTINUED | OUTPATIENT
Start: 2020-01-01 | End: 2020-01-01 | Stop reason: HOSPADM

## 2020-01-01 RX ORDER — DEXAMETHASONE SODIUM PHOSPHATE 10 MG/ML
6 INJECTION, SOLUTION INTRAMUSCULAR; INTRAVENOUS DAILY
Status: DISCONTINUED | OUTPATIENT
Start: 2020-01-01 | End: 2020-01-01 | Stop reason: HOSPADM

## 2020-01-01 RX ORDER — VITAMIN B COMPLEX
2000 TABLET ORAL DAILY
Status: DISCONTINUED | OUTPATIENT
Start: 2020-01-01 | End: 2020-01-01 | Stop reason: HOSPADM

## 2020-01-01 RX ORDER — SODIUM CHLORIDE 9 MG/ML
INJECTION, SOLUTION INTRAVENOUS CONTINUOUS
Status: DISCONTINUED | OUTPATIENT
Start: 2020-01-01 | End: 2020-01-01 | Stop reason: HOSPADM

## 2020-01-01 RX ORDER — ASPIRIN 81 MG/1
81 TABLET ORAL ONCE
Status: DISCONTINUED | OUTPATIENT
Start: 2020-01-01 | End: 2020-01-01 | Stop reason: HOSPADM

## 2020-01-01 RX ORDER — INSULIN GLARGINE 100 [IU]/ML
12 INJECTION, SOLUTION SUBCUTANEOUS NIGHTLY
COMMUNITY

## 2020-01-01 RX ORDER — FAMOTIDINE 20 MG/1
20 TABLET, FILM COATED ORAL 2 TIMES DAILY
COMMUNITY

## 2020-01-01 RX ORDER — CHLORHEXIDINE GLUCONATE 4 G/100ML
SOLUTION TOPICAL ONCE
Status: DISCONTINUED | OUTPATIENT
Start: 2020-01-01 | End: 2020-01-01

## 2020-01-01 RX ORDER — SODIUM CHLORIDE 0.9 % (FLUSH) 0.9 %
10 SYRINGE (ML) INJECTION EVERY 12 HOURS SCHEDULED
Status: DISCONTINUED | OUTPATIENT
Start: 2020-01-01 | End: 2020-01-01 | Stop reason: HOSPADM

## 2020-01-01 RX ORDER — DOCUSATE SODIUM 100 MG/1
100 CAPSULE, LIQUID FILLED ORAL 2 TIMES DAILY PRN
Status: DISCONTINUED | OUTPATIENT
Start: 2020-01-01 | End: 2020-01-01 | Stop reason: HOSPADM

## 2020-01-01 RX ORDER — OMEPRAZOLE 10 MG/1
10 CAPSULE, DELAYED RELEASE ORAL DAILY
Status: ON HOLD | COMMUNITY
End: 2020-01-01

## 2020-01-01 RX ORDER — LIDOCAINE HYDROCHLORIDE 10 MG/ML
INJECTION, SOLUTION INFILTRATION; PERINEURAL
Status: COMPLETED | OUTPATIENT
Start: 2020-01-01 | End: 2020-01-01

## 2020-01-01 RX ORDER — BUDESONIDE AND FORMOTEROL FUMARATE DIHYDRATE 160; 4.5 UG/1; UG/1
2 AEROSOL RESPIRATORY (INHALATION) 2 TIMES DAILY
Status: DISCONTINUED | OUTPATIENT
Start: 2020-01-01 | End: 2020-01-01

## 2020-01-01 RX ORDER — ISOSORBIDE DINITRATE 10 MG/1
10 TABLET ORAL 3 TIMES DAILY
Status: DISCONTINUED | OUTPATIENT
Start: 2020-01-01 | End: 2020-01-01 | Stop reason: HOSPADM

## 2020-01-01 RX ORDER — OXYCODONE HYDROCHLORIDE 5 MG/1
5 TABLET ORAL EVERY 6 HOURS PRN
Status: DISCONTINUED | OUTPATIENT
Start: 2020-01-01 | End: 2020-01-01 | Stop reason: HOSPADM

## 2020-01-01 RX ORDER — NICOTINE 21 MG/24HR
1 PATCH, TRANSDERMAL 24 HOURS TRANSDERMAL EVERY 24 HOURS
Status: ON HOLD | COMMUNITY
End: 2020-01-01

## 2020-01-01 RX ADMIN — IPRATROPIUM BROMIDE 0.5 MG: 0.5 SOLUTION RESPIRATORY (INHALATION) at 10:40

## 2020-01-01 RX ADMIN — IPRATROPIUM BROMIDE 0.5 MG: 0.5 SOLUTION RESPIRATORY (INHALATION) at 18:59

## 2020-01-01 RX ADMIN — SODIUM CHLORIDE, PRESERVATIVE FREE 10 ML: 5 INJECTION INTRAVENOUS at 21:24

## 2020-01-01 RX ADMIN — INSULIN LISPRO 2 UNITS: 100 INJECTION, SOLUTION INTRAVENOUS; SUBCUTANEOUS at 08:00

## 2020-01-01 RX ADMIN — DOCUSATE SODIUM 100 MG: 100 CAPSULE, LIQUID FILLED ORAL at 17:33

## 2020-01-01 RX ADMIN — HEPARIN SODIUM 5000 UNITS: 5000 INJECTION INTRAVENOUS; SUBCUTANEOUS at 15:21

## 2020-01-01 RX ADMIN — IPRATROPIUM BROMIDE 0.5 MG: 0.5 SOLUTION RESPIRATORY (INHALATION) at 10:45

## 2020-01-01 RX ADMIN — PHENYLEPHRINE HYDROCHLORIDE 80 MCG: 10 INJECTION INTRAVENOUS at 17:29

## 2020-01-01 RX ADMIN — INSULIN LISPRO 2 UNITS: 100 INJECTION, SOLUTION INTRAVENOUS; SUBCUTANEOUS at 12:55

## 2020-01-01 RX ADMIN — ENOXAPARIN SODIUM 30 MG: 30 INJECTION SUBCUTANEOUS at 17:33

## 2020-01-01 RX ADMIN — CHOLECALCIFEROL (VITAMIN D3) 25 MCG (1,000 UNIT) TABLET 2000 UNITS: TABLET at 10:46

## 2020-01-01 RX ADMIN — AMPICILLIN SODIUM AND SULBACTAM SODIUM 1.5 G: 1; .5 INJECTION, POWDER, FOR SOLUTION INTRAMUSCULAR; INTRAVENOUS at 12:00

## 2020-01-01 RX ADMIN — MIDODRINE HYDROCHLORIDE 5 MG: 5 TABLET ORAL at 02:26

## 2020-01-01 RX ADMIN — FERRIC CITRATE 420 MG: 210 TABLET, COATED ORAL at 10:48

## 2020-01-01 RX ADMIN — DARBEPOETIN ALFA 100 MCG: 100 SOLUTION INTRAVENOUS; SUBCUTANEOUS at 11:00

## 2020-01-01 RX ADMIN — VANCOMYCIN HYDROCHLORIDE 1000 MG: 10 INJECTION, POWDER, LYOPHILIZED, FOR SOLUTION INTRAVENOUS at 11:00

## 2020-01-01 RX ADMIN — AMPICILLIN SODIUM AND SULBACTAM SODIUM 1.5 G: 1; .5 INJECTION, POWDER, FOR SOLUTION INTRAMUSCULAR; INTRAVENOUS at 20:43

## 2020-01-01 RX ADMIN — DOCUSATE SODIUM 100 MG: 100 CAPSULE, LIQUID FILLED ORAL at 22:37

## 2020-01-01 RX ADMIN — IOPAMIDOL 90 ML: 755 INJECTION, SOLUTION INTRAVENOUS at 12:42

## 2020-01-01 RX ADMIN — IPRATROPIUM BROMIDE 0.5 MG: 0.5 SOLUTION RESPIRATORY (INHALATION) at 18:17

## 2020-01-01 RX ADMIN — AMPICILLIN SODIUM AND SULBACTAM SODIUM 1.5 G: 1; .5 INJECTION, POWDER, FOR SOLUTION INTRAMUSCULAR; INTRAVENOUS at 20:36

## 2020-01-01 RX ADMIN — IPRATROPIUM BROMIDE 0.5 MG: 0.5 SOLUTION RESPIRATORY (INHALATION) at 18:07

## 2020-01-01 RX ADMIN — SODIUM CHLORIDE, PRESERVATIVE FREE 10 ML: 5 INJECTION INTRAVENOUS at 08:54

## 2020-01-01 RX ADMIN — LIDOCAINE HYDROCHLORIDE 15 ML: 20 INJECTION, SOLUTION INFILTRATION; PERINEURAL at 15:42

## 2020-01-01 RX ADMIN — AMPICILLIN SODIUM AND SULBACTAM SODIUM 1.5 G: 1; .5 INJECTION, POWDER, FOR SOLUTION INTRAMUSCULAR; INTRAVENOUS at 08:22

## 2020-01-01 RX ADMIN — INSULIN LISPRO 3 UNITS: 100 INJECTION, SOLUTION INTRAVENOUS; SUBCUTANEOUS at 18:04

## 2020-01-01 RX ADMIN — OXYCODONE 5 MG: 5 TABLET ORAL at 21:28

## 2020-01-01 RX ADMIN — IPRATROPIUM BROMIDE 0.5 MG: 0.5 SOLUTION RESPIRATORY (INHALATION) at 14:59

## 2020-01-01 RX ADMIN — PHENYLEPHRINE HYDROCHLORIDE 80 MCG: 10 INJECTION INTRAVENOUS at 18:08

## 2020-01-01 RX ADMIN — Medication 2 MG: at 00:08

## 2020-01-01 RX ADMIN — IPRATROPIUM BROMIDE 0.5 MG: 0.5 SOLUTION RESPIRATORY (INHALATION) at 10:15

## 2020-01-01 RX ADMIN — SODIUM CHLORIDE: 9 INJECTION, SOLUTION INTRAVENOUS at 08:51

## 2020-01-01 RX ADMIN — Medication 2 MG: at 18:07

## 2020-01-01 RX ADMIN — SODIUM CHLORIDE, PRESERVATIVE FREE 10 ML: 5 INJECTION INTRAVENOUS at 21:48

## 2020-01-01 RX ADMIN — HEPARIN SODIUM 5000 UNITS: 5000 INJECTION INTRAVENOUS; SUBCUTANEOUS at 08:22

## 2020-01-01 RX ADMIN — AMPICILLIN SODIUM AND SULBACTAM SODIUM 1.5 G: 1; .5 INJECTION, POWDER, FOR SOLUTION INTRAMUSCULAR; INTRAVENOUS at 08:18

## 2020-01-01 RX ADMIN — Medication 2 MG: at 13:36

## 2020-01-01 RX ADMIN — SODIUM CHLORIDE: 4.5 INJECTION, SOLUTION INTRAVENOUS at 20:37

## 2020-01-01 RX ADMIN — HEPARIN SODIUM 5000 UNITS: 5000 INJECTION INTRAVENOUS; SUBCUTANEOUS at 20:43

## 2020-01-01 RX ADMIN — PHENYLEPHRINE HYDROCHLORIDE 80 MCG: 10 INJECTION INTRAVENOUS at 17:12

## 2020-01-01 RX ADMIN — HYDRALAZINE HYDROCHLORIDE 10 MG: 20 INJECTION INTRAMUSCULAR; INTRAVENOUS at 22:10

## 2020-01-01 RX ADMIN — PROPOFOL 140 MG: 10 INJECTION, EMULSION INTRAVENOUS at 16:09

## 2020-01-01 RX ADMIN — ACETAMINOPHEN 650 MG: 325 TABLET, FILM COATED ORAL at 17:51

## 2020-01-01 RX ADMIN — HEPARIN SODIUM 5000 UNITS: 5000 INJECTION INTRAVENOUS; SUBCUTANEOUS at 05:14

## 2020-01-01 RX ADMIN — SODIUM CHLORIDE, PRESERVATIVE FREE 10 ML: 5 INJECTION INTRAVENOUS at 20:47

## 2020-01-01 RX ADMIN — OXYCODONE 5 MG: 5 TABLET ORAL at 22:37

## 2020-01-01 RX ADMIN — Medication 2 MG: at 08:05

## 2020-01-01 RX ADMIN — ONDANSETRON 4 MG: 2 INJECTION INTRAMUSCULAR; INTRAVENOUS at 23:14

## 2020-01-01 RX ADMIN — INSULIN LISPRO 1 UNITS: 100 INJECTION, SOLUTION INTRAVENOUS; SUBCUTANEOUS at 21:22

## 2020-01-01 RX ADMIN — ACETAMINOPHEN 650 MG: 325 TABLET, FILM COATED ORAL at 09:45

## 2020-01-01 RX ADMIN — CHLORHEXIDINE GLUCONATE: 213 SOLUTION TOPICAL at 10:59

## 2020-01-01 RX ADMIN — IPRATROPIUM BROMIDE 0.5 MG: 0.5 SOLUTION RESPIRATORY (INHALATION) at 14:13

## 2020-01-01 RX ADMIN — FENTANYL CITRATE 25 MCG: 50 INJECTION INTRAMUSCULAR; INTRAVENOUS at 16:08

## 2020-01-01 RX ADMIN — INSULIN LISPRO 2 UNITS: 100 INJECTION, SOLUTION INTRAVENOUS; SUBCUTANEOUS at 20:44

## 2020-01-01 RX ADMIN — HEPARIN SODIUM 5000 UNITS: 5000 INJECTION INTRAVENOUS; SUBCUTANEOUS at 06:52

## 2020-01-01 RX ADMIN — HEPARIN SODIUM 5000 UNITS: 5000 INJECTION INTRAVENOUS; SUBCUTANEOUS at 06:42

## 2020-01-01 RX ADMIN — NYSTATIN 500000 UNITS: 100000 SUSPENSION ORAL at 10:48

## 2020-01-01 RX ADMIN — IPRATROPIUM BROMIDE 0.5 MG: 0.5 SOLUTION RESPIRATORY (INHALATION) at 10:14

## 2020-01-01 RX ADMIN — DEXTROSE MONOHYDRATE 12.5 G: 500 INJECTION PARENTERAL at 12:21

## 2020-01-01 RX ADMIN — FENTANYL CITRATE 25 MCG: 50 INJECTION INTRAMUSCULAR; INTRAVENOUS at 17:59

## 2020-01-01 RX ADMIN — IPRATROPIUM BROMIDE 0.5 MG: 0.5 SOLUTION RESPIRATORY (INHALATION) at 10:39

## 2020-01-01 RX ADMIN — AMPICILLIN SODIUM AND SULBACTAM SODIUM 1.5 G: 1; .5 INJECTION, POWDER, FOR SOLUTION INTRAMUSCULAR; INTRAVENOUS at 21:00

## 2020-01-01 RX ADMIN — HEPARIN SODIUM 5000 UNITS: 5000 INJECTION INTRAVENOUS; SUBCUTANEOUS at 15:07

## 2020-01-01 RX ADMIN — OXYCODONE 5 MG: 5 TABLET ORAL at 05:44

## 2020-01-01 RX ADMIN — OXYCODONE 5 MG: 5 TABLET ORAL at 00:07

## 2020-01-01 RX ADMIN — INSULIN GLARGINE 6 UNITS: 100 INJECTION, SOLUTION SUBCUTANEOUS at 21:45

## 2020-01-01 RX ADMIN — OXYCODONE 5 MG: 5 TABLET ORAL at 19:18

## 2020-01-01 RX ADMIN — HEPARIN SODIUM 5000 UNITS: 5000 INJECTION INTRAVENOUS; SUBCUTANEOUS at 06:30

## 2020-01-01 RX ADMIN — INSULIN GLARGINE 6 UNITS: 100 INJECTION, SOLUTION SUBCUTANEOUS at 20:59

## 2020-01-01 RX ADMIN — IPRATROPIUM BROMIDE 0.5 MG: 0.5 SOLUTION RESPIRATORY (INHALATION) at 14:55

## 2020-01-01 RX ADMIN — IPRATROPIUM BROMIDE 0.5 MG: 0.5 SOLUTION RESPIRATORY (INHALATION) at 14:04

## 2020-01-01 RX ADMIN — OXYCODONE 5 MG: 5 TABLET ORAL at 16:09

## 2020-01-01 RX ADMIN — OXYCODONE 5 MG: 5 TABLET ORAL at 21:20

## 2020-01-01 RX ADMIN — LIDOCAINE HYDROCHLORIDE 50 MG: 10 INJECTION, SOLUTION EPIDURAL; INFILTRATION; INTRACAUDAL; PERINEURAL at 16:08

## 2020-01-01 RX ADMIN — ACETAMINOPHEN 650 MG: 325 TABLET, FILM COATED ORAL at 20:07

## 2020-01-01 RX ADMIN — ENOXAPARIN SODIUM 30 MG: 30 INJECTION SUBCUTANEOUS at 14:49

## 2020-01-01 RX ADMIN — Medication 2 MG: at 18:49

## 2020-01-01 RX ADMIN — INSULIN LISPRO 1 UNITS: 100 INJECTION, SOLUTION INTRAVENOUS; SUBCUTANEOUS at 20:45

## 2020-01-01 RX ADMIN — Medication 2 MG: at 16:23

## 2020-01-01 RX ADMIN — AMPICILLIN SODIUM AND SULBACTAM SODIUM 1.5 G: 1; .5 INJECTION, POWDER, FOR SOLUTION INTRAMUSCULAR; INTRAVENOUS at 20:44

## 2020-01-01 RX ADMIN — MEROPENEM 1 G: 1 INJECTION, POWDER, FOR SOLUTION INTRAVENOUS at 17:32

## 2020-01-01 RX ADMIN — ACETAMINOPHEN 650 MG: 325 TABLET, FILM COATED ORAL at 12:16

## 2020-01-01 RX ADMIN — Medication 1000 MG: at 09:39

## 2020-01-01 RX ADMIN — MEROPENEM 1 G: 1 INJECTION, POWDER, FOR SOLUTION INTRAVENOUS at 16:34

## 2020-01-01 RX ADMIN — DEXTROSE MONOHYDRATE 12.5 G: 500 INJECTION PARENTERAL at 04:15

## 2020-01-01 RX ADMIN — MIDAZOLAM 0.5 MG: 1 INJECTION INTRAMUSCULAR; INTRAVENOUS at 10:02

## 2020-01-01 RX ADMIN — HEPARIN SODIUM 5000 UNITS: 5000 INJECTION INTRAVENOUS; SUBCUTANEOUS at 21:20

## 2020-01-01 RX ADMIN — HEPARIN SODIUM 5000 UNITS: 5000 INJECTION INTRAVENOUS; SUBCUTANEOUS at 06:01

## 2020-01-01 RX ADMIN — IPRATROPIUM BROMIDE 0.5 MG: 0.5 SOLUTION RESPIRATORY (INHALATION) at 06:24

## 2020-01-01 RX ADMIN — HEPARIN SODIUM 5000 UNITS: 5000 INJECTION INTRAVENOUS; SUBCUTANEOUS at 21:22

## 2020-01-01 RX ADMIN — ONDANSETRON HYDROCHLORIDE 4 MG: 2 INJECTION, SOLUTION INTRAMUSCULAR; INTRAVENOUS at 18:01

## 2020-01-01 RX ADMIN — OXYCODONE 5 MG: 5 TABLET ORAL at 13:05

## 2020-01-01 RX ADMIN — SODIUM CHLORIDE 250 ML: 9 INJECTION, SOLUTION INTRAVENOUS at 04:22

## 2020-01-01 RX ADMIN — HEPARIN SODIUM 5000 UNITS: 5000 INJECTION INTRAVENOUS; SUBCUTANEOUS at 21:29

## 2020-01-01 RX ADMIN — IPRATROPIUM BROMIDE 0.5 MG: 0.5 SOLUTION RESPIRATORY (INHALATION) at 10:28

## 2020-01-01 RX ADMIN — IPRATROPIUM BROMIDE 0.5 MG: 0.5 SOLUTION RESPIRATORY (INHALATION) at 06:33

## 2020-01-01 RX ADMIN — ISOSORBIDE DINITRATE 10 MG: 10 TABLET ORAL at 10:46

## 2020-01-01 RX ADMIN — IPRATROPIUM BROMIDE 0.5 MG: 0.5 SOLUTION RESPIRATORY (INHALATION) at 07:22

## 2020-01-01 RX ADMIN — HEPARIN SODIUM 5000 UNITS: 5000 INJECTION INTRAVENOUS; SUBCUTANEOUS at 14:36

## 2020-01-01 RX ADMIN — OXYCODONE 5 MG: 5 TABLET ORAL at 07:16

## 2020-01-01 RX ADMIN — IPRATROPIUM BROMIDE 0.5 MG: 0.5 SOLUTION RESPIRATORY (INHALATION) at 06:27

## 2020-01-01 RX ADMIN — HEPARIN SODIUM 5000 UNITS: 5000 INJECTION, SOLUTION INTRAVENOUS; SUBCUTANEOUS at 10:02

## 2020-01-01 RX ADMIN — SODIUM CHLORIDE, PRESERVATIVE FREE 10 ML: 5 INJECTION INTRAVENOUS at 20:37

## 2020-01-01 RX ADMIN — DEXTROSE MONOHYDRATE 12.5 G: 500 INJECTION PARENTERAL at 15:23

## 2020-01-01 RX ADMIN — PHENYLEPHRINE HYDROCHLORIDE 80 MCG: 10 INJECTION INTRAVENOUS at 16:38

## 2020-01-01 RX ADMIN — IPRATROPIUM BROMIDE 0.5 MG: 0.5 SOLUTION RESPIRATORY (INHALATION) at 14:06

## 2020-01-01 RX ADMIN — HEPARIN SODIUM 5000 UNITS: 5000 INJECTION INTRAVENOUS; SUBCUTANEOUS at 18:06

## 2020-01-01 RX ADMIN — OXYCODONE 5 MG: 5 TABLET ORAL at 17:36

## 2020-01-01 RX ADMIN — INSULIN LISPRO 1 UNITS: 100 INJECTION, SOLUTION INTRAVENOUS; SUBCUTANEOUS at 22:14

## 2020-01-01 RX ADMIN — FAMOTIDINE 20 MG: 20 TABLET, FILM COATED ORAL at 10:48

## 2020-01-01 RX ADMIN — LIDOCAINE HYDROCHLORIDE 20 ML: 20 INJECTION, SOLUTION INFILTRATION; PERINEURAL at 17:15

## 2020-01-01 RX ADMIN — LIDOCAINE HYDROCHLORIDE 5 ML: 20 INJECTION, SOLUTION INFILTRATION; PERINEURAL at 10:02

## 2020-01-01 RX ADMIN — SODIUM CHLORIDE, PRESERVATIVE FREE 10 ML: 5 INJECTION INTRAVENOUS at 21:00

## 2020-01-01 RX ADMIN — ACETAMINOPHEN 650 MG: 325 TABLET, FILM COATED ORAL at 19:18

## 2020-01-01 RX ADMIN — SODIUM CHLORIDE, PRESERVATIVE FREE 10 ML: 5 INJECTION INTRAVENOUS at 08:19

## 2020-01-01 RX ADMIN — OXYCODONE 5 MG: 5 TABLET ORAL at 20:07

## 2020-01-01 RX ADMIN — SODIUM CHLORIDE, PRESERVATIVE FREE 10 ML: 5 INJECTION INTRAVENOUS at 09:53

## 2020-01-01 RX ADMIN — IPRATROPIUM BROMIDE 0.5 MG: 0.5 SOLUTION RESPIRATORY (INHALATION) at 14:40

## 2020-01-01 RX ADMIN — ACETAMINOPHEN 650 MG: 325 TABLET, FILM COATED ORAL at 00:07

## 2020-01-01 RX ADMIN — OXYCODONE 5 MG: 5 TABLET ORAL at 20:48

## 2020-01-01 RX ADMIN — IPRATROPIUM BROMIDE 0.5 MG: 0.5 SOLUTION RESPIRATORY (INHALATION) at 06:29

## 2020-01-01 RX ADMIN — ATORVASTATIN CALCIUM 40 MG: 20 TABLET, FILM COATED ORAL at 10:48

## 2020-01-01 RX ADMIN — IPRATROPIUM BROMIDE 0.5 MG: 0.5 SOLUTION RESPIRATORY (INHALATION) at 11:09

## 2020-01-01 RX ADMIN — AMPICILLIN SODIUM AND SULBACTAM SODIUM 1.5 G: 1; .5 INJECTION, POWDER, FOR SOLUTION INTRAMUSCULAR; INTRAVENOUS at 21:24

## 2020-01-01 RX ADMIN — SODIUM CHLORIDE 1.5 G: 900 INJECTION INTRAVENOUS at 09:00

## 2020-01-01 RX ADMIN — SODIUM CHLORIDE, PRESERVATIVE FREE 10 ML: 5 INJECTION INTRAVENOUS at 15:23

## 2020-01-01 RX ADMIN — OXYCODONE 5 MG: 5 TABLET ORAL at 01:35

## 2020-01-01 RX ADMIN — DOPAMINE HYDROCHLORIDE IN DEXTROSE 2.5 MCG/KG/MIN: 1.6 INJECTION, SOLUTION INTRAVENOUS at 12:52

## 2020-01-01 RX ADMIN — LIDOCAINE HYDROCHLORIDE 1 ML: 10 INJECTION, SOLUTION INFILTRATION; PERINEURAL at 15:42

## 2020-01-01 RX ADMIN — IPRATROPIUM BROMIDE 0.5 MG: 0.5 SOLUTION RESPIRATORY (INHALATION) at 18:29

## 2020-01-01 RX ADMIN — IPRATROPIUM BROMIDE 0.5 MG: 0.5 SOLUTION RESPIRATORY (INHALATION) at 10:33

## 2020-01-01 RX ADMIN — OXYCODONE 5 MG: 5 TABLET ORAL at 12:54

## 2020-01-01 RX ADMIN — IPRATROPIUM BROMIDE 0.5 MG: 0.5 SOLUTION RESPIRATORY (INHALATION) at 18:22

## 2020-01-01 RX ADMIN — INSULIN LISPRO 1 UNITS: 100 INJECTION, SOLUTION INTRAVENOUS; SUBCUTANEOUS at 21:29

## 2020-01-01 RX ADMIN — SODIUM CHLORIDE, PRESERVATIVE FREE 10 ML: 5 INJECTION INTRAVENOUS at 21:59

## 2020-01-01 RX ADMIN — INSULIN LISPRO 2 UNITS: 100 INJECTION, SOLUTION INTRAVENOUS; SUBCUTANEOUS at 21:28

## 2020-01-01 RX ADMIN — DEXAMETHASONE SODIUM PHOSPHATE 6 MG: 10 INJECTION, SOLUTION INTRAMUSCULAR; INTRAVENOUS at 02:26

## 2020-01-01 RX ADMIN — OXYCODONE 5 MG: 5 TABLET ORAL at 12:25

## 2020-01-01 RX ADMIN — IPRATROPIUM BROMIDE 0.5 MG: 0.5 SOLUTION RESPIRATORY (INHALATION) at 14:07

## 2020-01-01 RX ADMIN — IPRATROPIUM BROMIDE 0.5 MG: 0.5 SOLUTION RESPIRATORY (INHALATION) at 14:09

## 2020-01-01 RX ADMIN — HEPARIN SODIUM 3200 UNITS: 1000 INJECTION INTRAVENOUS; SUBCUTANEOUS at 11:15

## 2020-01-01 RX ADMIN — Medication 2 MG: at 22:30

## 2020-01-01 RX ADMIN — DEXTROSE MONOHYDRATE 12.5 G: 500 INJECTION PARENTERAL at 06:22

## 2020-01-01 RX ADMIN — SODIUM CHLORIDE, PRESERVATIVE FREE 10 ML: 5 INJECTION INTRAVENOUS at 20:44

## 2020-01-01 RX ADMIN — AMIODARONE HYDROCHLORIDE 200 MG: 200 TABLET ORAL at 10:46

## 2020-01-01 RX ADMIN — SODIUM CHLORIDE, PRESERVATIVE FREE 10 ML: 5 INJECTION INTRAVENOUS at 09:32

## 2020-01-01 RX ADMIN — ROPIVACAINE HYDROCHLORIDE 15 ML: 5 INJECTION, SOLUTION EPIDURAL; INFILTRATION; PERINEURAL at 15:42

## 2020-01-01 RX ADMIN — IPRATROPIUM BROMIDE 0.5 MG: 0.5 SOLUTION RESPIRATORY (INHALATION) at 07:00

## 2020-01-01 RX ADMIN — SODIUM CHLORIDE, PRESERVATIVE FREE 10 ML: 5 INJECTION INTRAVENOUS at 20:45

## 2020-01-01 RX ADMIN — IPRATROPIUM BROMIDE 0.5 MG: 0.5 SOLUTION RESPIRATORY (INHALATION) at 06:36

## 2020-01-01 RX ADMIN — SODIUM CHLORIDE, PRESERVATIVE FREE 10 ML: 5 INJECTION INTRAVENOUS at 23:15

## 2020-01-01 RX ADMIN — OXYCODONE 5 MG: 5 TABLET ORAL at 14:37

## 2020-01-01 RX ADMIN — DARBEPOETIN ALFA 100 MCG: 100 SOLUTION INTRAVENOUS; SUBCUTANEOUS at 11:13

## 2020-01-01 RX ADMIN — AMPICILLIN SODIUM AND SULBACTAM SODIUM 1.5 G: 1; .5 INJECTION, POWDER, FOR SOLUTION INTRAMUSCULAR; INTRAVENOUS at 21:59

## 2020-01-01 RX ADMIN — AMPICILLIN SODIUM AND SULBACTAM SODIUM 1.5 G: 1; .5 INJECTION, POWDER, FOR SOLUTION INTRAMUSCULAR; INTRAVENOUS at 21:20

## 2020-01-01 RX ADMIN — AMPICILLIN SODIUM AND SULBACTAM SODIUM 1.5 G: 1; .5 INJECTION, POWDER, FOR SOLUTION INTRAMUSCULAR; INTRAVENOUS at 09:32

## 2020-01-01 RX ADMIN — IPRATROPIUM BROMIDE 0.5 MG: 0.5 SOLUTION RESPIRATORY (INHALATION) at 14:10

## 2020-01-01 RX ADMIN — ONDANSETRON 4 MG: 2 INJECTION INTRAMUSCULAR; INTRAVENOUS at 10:54

## 2020-01-01 RX ADMIN — IPRATROPIUM BROMIDE 0.5 MG: 0.5 SOLUTION RESPIRATORY (INHALATION) at 06:46

## 2020-01-01 RX ADMIN — INSULIN LISPRO 1 UNITS: 100 INJECTION, SOLUTION INTRAVENOUS; SUBCUTANEOUS at 17:58

## 2020-01-01 RX ADMIN — INSULIN LISPRO 1 UNITS: 100 INJECTION, SOLUTION INTRAVENOUS; SUBCUTANEOUS at 18:12

## 2020-01-01 RX ADMIN — ACETAMINOPHEN 650 MG: 325 TABLET, FILM COATED ORAL at 07:16

## 2020-01-01 RX ADMIN — HEPARIN SODIUM 5000 UNITS: 5000 INJECTION INTRAVENOUS; SUBCUTANEOUS at 20:45

## 2020-01-01 RX ADMIN — SODIUM CHLORIDE, PRESERVATIVE FREE 10 ML: 5 INJECTION INTRAVENOUS at 17:32

## 2020-01-01 RX ADMIN — HEPARIN SODIUM 3000 UNITS: 1000 INJECTION, SOLUTION INTRAVENOUS; SUBCUTANEOUS at 17:01

## 2020-01-01 RX ADMIN — ENOXAPARIN SODIUM 30 MG: 30 INJECTION SUBCUTANEOUS at 16:51

## 2020-01-01 RX ADMIN — INSULIN LISPRO 2 UNITS: 100 INJECTION, SOLUTION INTRAVENOUS; SUBCUTANEOUS at 14:18

## 2020-01-01 RX ADMIN — Medication 2 MG: at 10:43

## 2020-01-01 RX ADMIN — SODIUM CHLORIDE, PRESERVATIVE FREE 10 ML: 5 INJECTION INTRAVENOUS at 11:00

## 2020-01-01 RX ADMIN — OXYCODONE 5 MG: 5 TABLET ORAL at 02:32

## 2020-01-01 RX ADMIN — HEPARIN SODIUM 5000 UNITS: 5000 INJECTION INTRAVENOUS; SUBCUTANEOUS at 14:18

## 2020-01-01 RX ADMIN — Medication 2 MG: at 16:51

## 2020-01-01 RX ADMIN — OXYCODONE 5 MG: 5 TABLET ORAL at 17:03

## 2020-01-01 RX ADMIN — IPRATROPIUM BROMIDE 0.5 MG: 0.5 SOLUTION RESPIRATORY (INHALATION) at 18:30

## 2020-01-01 RX ADMIN — HEPARIN SODIUM 5000 UNITS: 5000 INJECTION INTRAVENOUS; SUBCUTANEOUS at 18:03

## 2020-01-01 RX ADMIN — SODIUM CHLORIDE, PRESERVATIVE FREE 10 ML: 5 INJECTION INTRAVENOUS at 12:53

## 2020-01-01 RX ADMIN — SODIUM CHLORIDE, PRESERVATIVE FREE 10 ML: 5 INJECTION INTRAVENOUS at 19:55

## 2020-01-01 RX ADMIN — SODIUM CHLORIDE: 4.5 INJECTION, SOLUTION INTRAVENOUS at 14:16

## 2020-01-01 RX ADMIN — OXYCODONE 5 MG: 5 TABLET ORAL at 08:12

## 2020-01-01 RX ADMIN — SODIUM CHLORIDE, PRESERVATIVE FREE 10 ML: 5 INJECTION INTRAVENOUS at 09:00

## 2020-01-01 RX ADMIN — IPRATROPIUM BROMIDE 0.5 MG: 0.5 SOLUTION RESPIRATORY (INHALATION) at 18:36

## 2020-01-01 RX ADMIN — BUDESONIDE AND FORMOTEROL FUMARATE DIHYDRATE 2 PUFF: 160; 4.5 AEROSOL RESPIRATORY (INHALATION) at 02:26

## 2020-01-01 RX ADMIN — HEPARIN SODIUM 5000 UNITS: 5000 INJECTION INTRAVENOUS; SUBCUTANEOUS at 22:15

## 2020-01-01 RX ADMIN — SODIUM BICARBONATE 650 MG: 650 TABLET ORAL at 10:46

## 2020-01-01 RX ADMIN — AMPICILLIN SODIUM AND SULBACTAM SODIUM 1.5 G: 1; .5 INJECTION, POWDER, FOR SOLUTION INTRAMUSCULAR; INTRAVENOUS at 11:00

## 2020-01-01 RX ADMIN — MIDODRINE HYDROCHLORIDE 5 MG: 5 TABLET ORAL at 10:46

## 2020-01-01 RX ADMIN — SODIUM CHLORIDE, PRESERVATIVE FREE 10 ML: 5 INJECTION INTRAVENOUS at 09:46

## 2020-01-01 RX ADMIN — SODIUM CHLORIDE, PRESERVATIVE FREE 10 ML: 5 INJECTION INTRAVENOUS at 20:59

## 2020-01-01 RX ADMIN — Medication 2 MG: at 19:45

## 2020-01-01 RX ADMIN — VANCOMYCIN HYDROCHLORIDE 750 MG: 750 INJECTION, POWDER, LYOPHILIZED, FOR SOLUTION INTRAVENOUS at 15:51

## 2020-01-01 RX ADMIN — Medication 2 MG: at 04:04

## 2020-01-01 RX ADMIN — ENOXAPARIN SODIUM 30 MG: 30 INJECTION SUBCUTANEOUS at 18:00

## 2020-01-01 RX ADMIN — METOPROLOL TARTRATE 25 MG: 25 TABLET, FILM COATED ORAL at 10:46

## 2020-01-01 RX ADMIN — DOCUSATE SODIUM 100 MG: 100 CAPSULE, LIQUID FILLED ORAL at 21:28

## 2020-01-01 RX ADMIN — CHLORHEXIDINE GLUCONATE: 213 SOLUTION TOPICAL at 22:19

## 2020-01-01 RX ADMIN — ENOXAPARIN SODIUM 40 MG: 40 INJECTION SUBCUTANEOUS at 16:56

## 2020-01-01 RX ADMIN — IPRATROPIUM BROMIDE 0.5 MG: 0.5 SOLUTION RESPIRATORY (INHALATION) at 18:19

## 2020-01-01 RX ADMIN — AMPICILLIN SODIUM AND SULBACTAM SODIUM 1.5 G: 1; .5 INJECTION, POWDER, FOR SOLUTION INTRAMUSCULAR; INTRAVENOUS at 12:52

## 2020-01-01 RX ADMIN — OXYCODONE 5 MG: 5 TABLET ORAL at 09:45

## 2020-01-01 RX ADMIN — FENTANYL CITRATE 25 MCG: 50 INJECTION, SOLUTION INTRAMUSCULAR; INTRAVENOUS at 10:02

## 2020-01-01 RX ADMIN — PHENYLEPHRINE HYDROCHLORIDE 160 MCG: 10 INJECTION INTRAVENOUS at 16:25

## 2020-01-01 RX ADMIN — OXYCODONE 5 MG: 5 TABLET ORAL at 21:22

## 2020-01-01 RX ADMIN — PHENYLEPHRINE HYDROCHLORIDE 80 MCG: 10 INJECTION INTRAVENOUS at 17:00

## 2020-01-01 RX ADMIN — ATROPINE SULFATE 1 MG: 0.1 INJECTION, SOLUTION ENDOTRACHEAL; INTRAMUSCULAR; INTRAVENOUS; SUBCUTANEOUS at 12:54

## 2020-01-01 RX ADMIN — IPRATROPIUM BROMIDE 0.5 MG: 0.5 SOLUTION RESPIRATORY (INHALATION) at 10:52

## 2020-01-01 RX ADMIN — IPRATROPIUM BROMIDE 0.5 MG: 0.5 SOLUTION RESPIRATORY (INHALATION) at 14:11

## 2020-01-01 RX ADMIN — SODIUM CHLORIDE, PRESERVATIVE FREE 10 ML: 5 INJECTION INTRAVENOUS at 21:25

## 2020-01-01 RX ADMIN — HEPARIN SODIUM 5000 UNITS: 5000 INJECTION INTRAVENOUS; SUBCUTANEOUS at 14:16

## 2020-01-01 RX ADMIN — OXYCODONE 5 MG: 5 TABLET ORAL at 14:49

## 2020-01-01 RX ADMIN — IPRATROPIUM BROMIDE 0.5 MG: 0.5 SOLUTION RESPIRATORY (INHALATION) at 18:24

## 2020-01-01 RX ADMIN — DOCUSATE SODIUM 100 MG: 100 CAPSULE, LIQUID FILLED ORAL at 15:24

## 2020-01-01 RX ADMIN — MEROPENEM 1 G: 1 INJECTION, POWDER, FOR SOLUTION INTRAVENOUS at 16:03

## 2020-01-01 RX ADMIN — MEROPENEM 0.5 G: 1 INJECTION, POWDER, FOR SOLUTION INTRAVENOUS at 04:22

## 2020-01-01 ASSESSMENT — PAIN SCALES - GENERAL
PAINLEVEL_OUTOF10: 2
PAINLEVEL_OUTOF10: 0
PAINLEVEL_OUTOF10: 9
PAINLEVEL_OUTOF10: 6
PAINLEVEL_OUTOF10: 0
PAINLEVEL_OUTOF10: 0
PAINLEVEL_OUTOF10: 8
PAINLEVEL_OUTOF10: 4
PAINLEVEL_OUTOF10: 0
PAINLEVEL_OUTOF10: 5
PAINLEVEL_OUTOF10: 4
PAINLEVEL_OUTOF10: 1
PAINLEVEL_OUTOF10: 8
PAINLEVEL_OUTOF10: 0
PAINLEVEL_OUTOF10: 2
PAINLEVEL_OUTOF10: 5
PAINLEVEL_OUTOF10: 9
PAINLEVEL_OUTOF10: 6
PAINLEVEL_OUTOF10: 2
PAINLEVEL_OUTOF10: 0
PAINLEVEL_OUTOF10: 5
PAINLEVEL_OUTOF10: 5
PAINLEVEL_OUTOF10: 0
PAINLEVEL_OUTOF10: 2
PAINLEVEL_OUTOF10: 0
PAINLEVEL_OUTOF10: 9
PAINLEVEL_OUTOF10: 2
PAINLEVEL_OUTOF10: 3
PAINLEVEL_OUTOF10: 8
PAINLEVEL_OUTOF10: 8
PAINLEVEL_OUTOF10: 0
PAINLEVEL_OUTOF10: 8
PAINLEVEL_OUTOF10: 2
PAINLEVEL_OUTOF10: 9
PAINLEVEL_OUTOF10: 1
PAINLEVEL_OUTOF10: 8
PAINLEVEL_OUTOF10: 9
PAINLEVEL_OUTOF10: 4
PAINLEVEL_OUTOF10: 0
PAINLEVEL_OUTOF10: 9
PAINLEVEL_OUTOF10: 8
PAINLEVEL_OUTOF10: 7
PAINLEVEL_OUTOF10: 0
PAINLEVEL_OUTOF10: 10
PAINLEVEL_OUTOF10: 5
PAINLEVEL_OUTOF10: 0
PAINLEVEL_OUTOF10: 9
PAINLEVEL_OUTOF10: 8
PAINLEVEL_OUTOF10: 0
PAINLEVEL_OUTOF10: 4
PAINLEVEL_OUTOF10: 0
PAINLEVEL_OUTOF10: 1
PAINLEVEL_OUTOF10: 8
PAINLEVEL_OUTOF10: 0
PAINLEVEL_OUTOF10: 0
PAINLEVEL_OUTOF10: 8
PAINLEVEL_OUTOF10: 7
PAINLEVEL_OUTOF10: 8
PAINLEVEL_OUTOF10: 3
PAINLEVEL_OUTOF10: 0
PAINLEVEL_OUTOF10: 4
PAINLEVEL_OUTOF10: 0
PAINLEVEL_OUTOF10: 6
PAINLEVEL_OUTOF10: 4
PAINLEVEL_OUTOF10: 9
PAINLEVEL_OUTOF10: 0
PAINLEVEL_OUTOF10: 4
PAINLEVEL_OUTOF10: 4
PAINLEVEL_OUTOF10: 1
PAINLEVEL_OUTOF10: 7
PAINLEVEL_OUTOF10: 8
PAINLEVEL_OUTOF10: 2
PAINLEVEL_OUTOF10: 3
PAINLEVEL_OUTOF10: 9
PAINLEVEL_OUTOF10: 8
PAINLEVEL_OUTOF10: 7
PAINLEVEL_OUTOF10: 0

## 2020-01-01 ASSESSMENT — PAIN DESCRIPTION - DESCRIPTORS
DESCRIPTORS: ACHING;SORE
DESCRIPTORS: ACHING;SORE
DESCRIPTORS: PATIENT UNABLE TO DESCRIBE
DESCRIPTORS: ACHING;SORE
DESCRIPTORS: CRAMPING
DESCRIPTORS: ACHING
DESCRIPTORS: ACHING;SORE
DESCRIPTORS: SORE
DESCRIPTORS: ACHING;DISCOMFORT
DESCRIPTORS: ACHING
DESCRIPTORS: CRAMPING
DESCRIPTORS: ACHING;DISCOMFORT
DESCRIPTORS: ACHING
DESCRIPTORS: CRAMPING
DESCRIPTORS: DISCOMFORT;ACHING
DESCRIPTORS: SORE
DESCRIPTORS: ACHING;DISCOMFORT
DESCRIPTORS: ACHING;DISCOMFORT

## 2020-01-01 ASSESSMENT — ENCOUNTER SYMPTOMS
CONSTIPATION: 0
SHORTNESS OF BREATH: 0
BACK PAIN: 0
RHINORRHEA: 0
SHORTNESS OF BREATH: 0
BACK PAIN: 0
VOICE CHANGE: 0
COUGH: 0
CONSTIPATION: 0
NAUSEA: 0
EYE REDNESS: 0
VOMITING: 0
NAUSEA: 0
DIARRHEA: 0
VOMITING: 0
DIARRHEA: 0
NAUSEA: 0
EYES NEGATIVE: 1
VOMITING: 0
SHORTNESS OF BREATH: 0
SHORTNESS OF BREATH: 1
DIARRHEA: 0
DIARRHEA: 0
VOMITING: 0
NAUSEA: 0
CONSTIPATION: 0
GASTROINTESTINAL NEGATIVE: 1
BACK PAIN: 0
SHORTNESS OF BREATH: 0
BACK PAIN: 0
VOMITING: 0
EYE PAIN: 0
ALLERGIC/IMMUNOLOGIC NEGATIVE: 1
NAUSEA: 0
DIARRHEA: 0
ABDOMINAL PAIN: 1
BACK PAIN: 0
SHORTNESS OF BREATH: 0

## 2020-01-01 ASSESSMENT — PAIN DESCRIPTION - PROGRESSION
CLINICAL_PROGRESSION: GRADUALLY IMPROVING
CLINICAL_PROGRESSION: RESOLVED
CLINICAL_PROGRESSION: RESOLVED
CLINICAL_PROGRESSION: NOT CHANGED
CLINICAL_PROGRESSION: NOT CHANGED
CLINICAL_PROGRESSION: RESOLVED

## 2020-01-01 ASSESSMENT — PAIN DESCRIPTION - LOCATION
LOCATION: BACK
LOCATION: NECK
LOCATION: GENERALIZED;NECK;SHOULDER
LOCATION: BACK;NECK
LOCATION: NECK
LOCATION: SHOULDER
LOCATION: BACK
LOCATION: BACK;NECK
LOCATION: SHOULDER
LOCATION: GENERALIZED
LOCATION: SHOULDER
LOCATION: HEAD;NECK;BACK
LOCATION: GENERALIZED
LOCATION: NECK;BACK
LOCATION: BACK;NECK
LOCATION: BACK;NECK
LOCATION: NECK
LOCATION: GENERALIZED
LOCATION: BACK;NECK
LOCATION: NECK
LOCATION: BACK
LOCATION: NECK
LOCATION: NECK
LOCATION: BACK;NECK
LOCATION: BACK;NECK
LOCATION: NECK;ARM
LOCATION: BACK;NECK
LOCATION: BACK
LOCATION: BACK

## 2020-01-01 ASSESSMENT — PAIN DESCRIPTION - PAIN TYPE
TYPE: CHRONIC PAIN
TYPE: CHRONIC PAIN
TYPE: ACUTE PAIN
TYPE: CHRONIC PAIN
TYPE: CHRONIC PAIN
TYPE: ACUTE PAIN
TYPE: ACUTE PAIN
TYPE: CHRONIC PAIN
TYPE: CHRONIC PAIN
TYPE: SURGICAL PAIN
TYPE: ACUTE PAIN
TYPE: ACUTE PAIN
TYPE: CHRONIC PAIN
TYPE: CHRONIC PAIN
TYPE: CHRONIC PAIN;SURGICAL PAIN
TYPE: ACUTE PAIN
TYPE: ACUTE PAIN
TYPE: ACUTE PAIN;CHRONIC PAIN
TYPE: CHRONIC PAIN;SURGICAL PAIN
TYPE: ACUTE PAIN;CHRONIC PAIN
TYPE: CHRONIC PAIN
TYPE: SURGICAL PAIN
TYPE: ACUTE PAIN
TYPE: ACUTE PAIN;CHRONIC PAIN
TYPE: ACUTE PAIN;CHRONIC PAIN
TYPE: CHRONIC PAIN
TYPE: ACUTE PAIN
TYPE: ACUTE PAIN
TYPE: ACUTE PAIN;CHRONIC PAIN
TYPE: CHRONIC PAIN
TYPE: CHRONIC PAIN

## 2020-01-01 ASSESSMENT — PAIN DESCRIPTION - ONSET
ONSET: ON-GOING
ONSET: OTHER (COMMENT)

## 2020-01-01 ASSESSMENT — PAIN - FUNCTIONAL ASSESSMENT
PAIN_FUNCTIONAL_ASSESSMENT: PREVENTS OR INTERFERES SOME ACTIVE ACTIVITIES AND ADLS
PAIN_FUNCTIONAL_ASSESSMENT: PREVENTS OR INTERFERES SOME ACTIVE ACTIVITIES AND ADLS
PAIN_FUNCTIONAL_ASSESSMENT: PREVENTS OR INTERFERES WITH ALL ACTIVE AND SOME PASSIVE ACTIVITIES

## 2020-01-01 ASSESSMENT — PAIN DESCRIPTION - ORIENTATION
ORIENTATION: LOWER;MID
ORIENTATION: LEFT
ORIENTATION: LEFT
ORIENTATION: RIGHT;LEFT
ORIENTATION: LEFT
ORIENTATION: MID;LOWER
ORIENTATION: LEFT
ORIENTATION: LEFT

## 2020-01-01 ASSESSMENT — PAIN DESCRIPTION - FREQUENCY
FREQUENCY: INTERMITTENT
FREQUENCY: CONTINUOUS
FREQUENCY: INTERMITTENT
FREQUENCY: CONTINUOUS

## 2020-01-01 ASSESSMENT — PAIN SCALES - WONG BAKER
WONGBAKER_NUMERICALRESPONSE: 0

## 2020-05-02 ENCOUNTER — HOSPITAL ENCOUNTER (EMERGENCY)
Facility: HOSPITAL | Age: 75
Discharge: HOME | End: 2020-05-02
Attending: EMERGENCY MEDICINE | Admitting: EMERGENCY MEDICINE

## 2020-05-02 ENCOUNTER — APPOINTMENT (OUTPATIENT)
Dept: GENERAL RADIOLOGY | Facility: HOSPITAL | Age: 75
End: 2020-05-02

## 2020-05-02 ENCOUNTER — NURSE TRIAGE (OUTPATIENT)
Dept: CALL CENTER | Facility: HOSPITAL | Age: 75
End: 2020-05-02

## 2020-05-02 VITALS
HEIGHT: 67 IN | SYSTOLIC BLOOD PRESSURE: 136 MMHG | WEIGHT: 104 LBS | DIASTOLIC BLOOD PRESSURE: 61 MMHG | TEMPERATURE: 98 F | BODY MASS INDEX: 16.32 KG/M2 | HEART RATE: 87 BPM | RESPIRATION RATE: 17 BRPM | OXYGEN SATURATION: 96 %

## 2020-05-02 DIAGNOSIS — Z20.822 SUSPECTED COVID-19 VIRUS INFECTION: ICD-10-CM

## 2020-05-02 DIAGNOSIS — J18.9 PNEUMONIA DUE TO INFECTIOUS ORGANISM, UNSPECIFIED LATERALITY, UNSPECIFIED PART OF LUNG: ICD-10-CM

## 2020-05-02 DIAGNOSIS — N18.6 END STAGE RENAL DISEASE (HCC): Primary | ICD-10-CM

## 2020-05-02 LAB
ALBUMIN SERPL-MCNC: 4.2 G/DL (ref 3.5–5.2)
ALBUMIN/GLOB SERPL: 1.6 G/DL
ALP SERPL-CCNC: 69 U/L (ref 39–117)
ALT SERPL W P-5'-P-CCNC: 25 U/L (ref 1–41)
ANION GAP SERPL CALCULATED.3IONS-SCNC: 15 MMOL/L (ref 5–15)
AST SERPL-CCNC: 9 U/L (ref 1–40)
B PARAPERT DNA SPEC QL NAA+PROBE: NOT DETECTED
B PERT DNA SPEC QL NAA+PROBE: NOT DETECTED
BILIRUB SERPL-MCNC: 0.2 MG/DL (ref 0.2–1.2)
BUN BLD-MCNC: 49 MG/DL (ref 8–23)
BUN/CREAT SERPL: 11.2 (ref 7–25)
C PNEUM DNA NPH QL NAA+NON-PROBE: NOT DETECTED
CALCIUM SPEC-SCNC: 9.7 MG/DL (ref 8.6–10.5)
CHLORIDE SERPL-SCNC: 97 MMOL/L (ref 98–107)
CO2 SERPL-SCNC: 27 MMOL/L (ref 22–29)
CREAT BLD-MCNC: 4.37 MG/DL (ref 0.76–1.27)
D-LACTATE SERPL-SCNC: 1.1 MMOL/L (ref 0.5–2)
DEPRECATED RDW RBC AUTO: 51.8 FL (ref 37–54)
ERYTHROCYTE [DISTWIDTH] IN BLOOD BY AUTOMATED COUNT: 14.7 % (ref 12.3–15.4)
FLUAV H1 2009 PAND RNA NPH QL NAA+PROBE: NOT DETECTED
FLUAV H1 HA GENE NPH QL NAA+PROBE: NOT DETECTED
FLUAV H3 RNA NPH QL NAA+PROBE: NOT DETECTED
FLUAV SUBTYP SPEC NAA+PROBE: NOT DETECTED
FLUBV RNA ISLT QL NAA+PROBE: NOT DETECTED
GFR SERPL CREATININE-BSD FRML MDRD: 13 ML/MIN/1.73
GFR SERPL CREATININE-BSD FRML MDRD: ABNORMAL ML/MIN/{1.73_M2}
GLOBULIN UR ELPH-MCNC: 2.7 GM/DL
GLUCOSE BLD-MCNC: 233 MG/DL (ref 65–99)
HADV DNA SPEC NAA+PROBE: NOT DETECTED
HCOV 229E RNA SPEC QL NAA+PROBE: NOT DETECTED
HCOV HKU1 RNA SPEC QL NAA+PROBE: NOT DETECTED
HCOV NL63 RNA SPEC QL NAA+PROBE: NOT DETECTED
HCOV OC43 RNA SPEC QL NAA+PROBE: NOT DETECTED
HCT VFR BLD AUTO: 31.7 % (ref 37.5–51)
HGB BLD-MCNC: 10.4 G/DL (ref 13–17.7)
HMPV RNA NPH QL NAA+NON-PROBE: NOT DETECTED
HPIV1 RNA SPEC QL NAA+PROBE: NOT DETECTED
HPIV2 RNA SPEC QL NAA+PROBE: NOT DETECTED
HPIV3 RNA NPH QL NAA+PROBE: NOT DETECTED
HPIV4 P GENE NPH QL NAA+PROBE: NOT DETECTED
L PNEUMO1 AG UR QL IA: NEGATIVE
LYMPHOCYTES # BLD MANUAL: 0.82 10*3/MM3 (ref 0.7–3.1)
LYMPHOCYTES NFR BLD MANUAL: 15.3 % (ref 19.6–45.3)
LYMPHOCYTES NFR BLD MANUAL: 7.1 % (ref 5–12)
M PNEUMO IGG SER IA-ACNC: NOT DETECTED
MCH RBC QN AUTO: 32 PG (ref 26.6–33)
MCHC RBC AUTO-ENTMCNC: 32.8 G/DL (ref 31.5–35.7)
MCV RBC AUTO: 97.5 FL (ref 79–97)
MONOCYTES # BLD AUTO: 0.38 10*3/MM3 (ref 0.1–0.9)
NEUTROPHILS # BLD AUTO: 4.09 10*3/MM3 (ref 1.7–7)
NEUTROPHILS NFR BLD MANUAL: 76.5 % (ref 42.7–76)
PLATELET # BLD AUTO: 69 10*3/MM3 (ref 140–450)
PMV BLD AUTO: 11.4 FL (ref 6–12)
POTASSIUM BLD-SCNC: 3.9 MMOL/L (ref 3.5–5.2)
PROT SERPL-MCNC: 6.9 G/DL (ref 6–8.5)
RBC # BLD AUTO: 3.25 10*6/MM3 (ref 4.14–5.8)
RBC MORPH BLD: NORMAL
RHINOVIRUS RNA SPEC NAA+PROBE: NOT DETECTED
RSV RNA NPH QL NAA+NON-PROBE: NOT DETECTED
S PNEUM AG SPEC QL LA: NEGATIVE
SARS-COV-2 RNA RESP QL NAA+PROBE: NOT DETECTED
SMALL PLATELETS BLD QL SMEAR: ABNORMAL
SODIUM BLD-SCNC: 139 MMOL/L (ref 136–145)
VARIANT LYMPHS NFR BLD MANUAL: 1 % (ref 0–5)
WBC MORPH BLD: NORMAL
WBC NRBC COR # BLD: 5.34 10*3/MM3 (ref 3.4–10.8)

## 2020-05-02 PROCEDURE — 83605 ASSAY OF LACTIC ACID: CPT | Performed by: EMERGENCY MEDICINE

## 2020-05-02 PROCEDURE — 87899 AGENT NOS ASSAY W/OPTIC: CPT | Performed by: EMERGENCY MEDICINE

## 2020-05-02 PROCEDURE — 80053 COMPREHEN METABOLIC PANEL: CPT | Performed by: EMERGENCY MEDICINE

## 2020-05-02 PROCEDURE — 0100U HC BIOFIRE FILMARRAY RESP PANEL 2: CPT | Performed by: EMERGENCY MEDICINE

## 2020-05-02 PROCEDURE — 87040 BLOOD CULTURE FOR BACTERIA: CPT | Performed by: EMERGENCY MEDICINE

## 2020-05-02 PROCEDURE — 99284 EMERGENCY DEPT VISIT MOD MDM: CPT

## 2020-05-02 PROCEDURE — G0257 UNSCHED DIALYSIS ESRD PT HOS: HCPCS

## 2020-05-02 PROCEDURE — 85007 BL SMEAR W/DIFF WBC COUNT: CPT | Performed by: EMERGENCY MEDICINE

## 2020-05-02 PROCEDURE — 87635 SARS-COV-2 COVID-19 AMP PRB: CPT | Performed by: EMERGENCY MEDICINE

## 2020-05-02 PROCEDURE — 85025 COMPLETE CBC W/AUTO DIFF WBC: CPT | Performed by: EMERGENCY MEDICINE

## 2020-05-02 PROCEDURE — 71045 X-RAY EXAM CHEST 1 VIEW: CPT

## 2020-05-02 RX ORDER — AZITHROMYCIN 250 MG/1
500 TABLET, FILM COATED ORAL ONCE
Status: DISCONTINUED | OUTPATIENT
Start: 2020-05-02 | End: 2020-05-02

## 2020-05-02 RX ORDER — CEFDINIR 300 MG/1
300 CAPSULE ORAL EVERY 12 HOURS SCHEDULED
Status: DISCONTINUED | OUTPATIENT
Start: 2020-05-02 | End: 2020-05-02

## 2020-05-02 RX ORDER — ONDANSETRON 4 MG/1
4 TABLET, FILM COATED ORAL EVERY 8 HOURS PRN
COMMUNITY

## 2020-05-02 RX ORDER — CEFDINIR 300 MG/1
300 CAPSULE ORAL DAILY
Qty: 10 CAPSULE | Refills: 0 | Status: SHIPPED | OUTPATIENT
Start: 2020-05-02 | End: 2020-05-12

## 2020-05-02 NOTE — ED NOTES
Spoke with Darby STOLL with dialysis. She states that the pt will be able to receive dialysis here in the MICU before being discharged. They will call when ready and notify of room number.      Santana Tomlinson RN  05/02/20 0997

## 2020-05-02 NOTE — ED PROVIDER NOTES
Subjective   Patient presented to dialysis center and [facility] had fever the sent him to ER [facility] at the ER [facility] he was told by the ER doctor that there COVID test takes a week so they sent him down to our ER  Patient denies any fever he said he is been having some cough for the past couple days no history exposure no history of travel      Fever   Temp source:  Subjective and oral  Severity:  Mild  Onset quality:  Gradual  Associated symptoms: cough    Associated symptoms: no chest pain, no chills, no congestion, no headaches, no myalgias, no nausea, no sore throat and no vomiting    Risk factors: no contaminated food, no contaminated water, no immunosuppression and no occupational exposure    Cough   Cough characteristics:  Non-productive  Sputum characteristics:  Nondescript  Severity:  Moderate  Onset quality:  Gradual  Timing:  Intermittent  Progression:  Waxing and waning  Chronicity:  Recurrent  Context: not animal exposure, not exposure to allergens, not fumes, not sick contacts, not smoke exposure, not weather changes and not with activity    Relieved by:  Nothing  Worsened by:  Nothing  Ineffective treatments:  None tried  Associated symptoms: fever    Associated symptoms: no chest pain, no chills, no diaphoresis, no headaches, no myalgias, no shortness of breath, no sinus congestion and no sore throat    Risk factors: no chemical exposure        Review of Systems   Constitutional: Positive for fever. Negative for chills, diaphoresis and fatigue.   HENT: Negative.  Negative for congestion and sore throat.    Respiratory: Positive for cough. Negative for chest tightness, shortness of breath and stridor.    Cardiovascular: Negative.  Negative for chest pain.   Gastrointestinal: Negative.  Negative for abdominal distention, abdominal pain, nausea and vomiting.   Endocrine: Negative.    Genitourinary: Negative.  Negative for difficulty urinating and flank pain.   Musculoskeletal: Negative.  Negative for  myalgias.   Skin: Negative.  Negative for color change.   Neurological: Negative.  Negative for dizziness and headaches.   All other systems reviewed and are negative.      Past Medical History:   Diagnosis Date   • Cancer (CMS/HCC)     bladder and esophagus    • CHF (congestive heart failure) (CMS/HCC)    • Coronary stent occlusion    • Diabetes mellitus (CMS/HCC)    • Hyperlipidemia    • Hypertension    • Myocardial infarction (CMS/HCC)    • Renal disorder    • Stroke (CMS/HCC)        Allergies   Allergen Reactions   • Sulfa Antibiotics        Past Surgical History:   Procedure Laterality Date   • BLADDER SURGERY     • CORONARY ANGIOPLASTY WITH STENT PLACEMENT     • ESOPHAGECTOMY     • INSERTION HEMODIALYSIS CATHETER N/A 10/11/2018    Procedure: HEMODIALYSIS CATHETER INSERTION;  Surgeon: Hugo Bonilla MD;  Location: [place] [address];  Service: Vascular       Family History   Problem Relation Age of Onset   • No Known Problems Father    • No Known Problems Mother        Social History     Socioeconomic History   • Marital status:      Spouse name: Not on file   • Number of children: Not on file   • Years of education: Not on file   • Highest education level: Not on file   Tobacco Use   • Smoking status: Current Every Day Smoker   • Smokeless tobacco: Never Used   Substance and Sexual Activity   • Alcohol use: No   • Drug use: No           Objective   Physical Exam   Constitutional: He is oriented to person, place, and time. He appears well-developed and well-nourished.   HENT:   Head: Normocephalic and atraumatic.   Eyes: Pupils are equal, round, and reactive to light. Conjunctivae and EOM are normal.   Neck: Normal range of motion. Neck supple.   Cardiovascular: Normal rate, regular rhythm, normal heart sounds and intact distal pulses.   Pulmonary/Chest: Effort normal and breath sounds normal.   Abdominal: Soft. Bowel sounds are normal.   Patient has a urostomy   Musculoskeletal: Normal range  of motion.   Fistula left upper arm good thrill no evidence of infection   Neurological: He is alert and oriented to person, place, and time. He has normal reflexes.   Skin: Skin is warm and dry.   Psychiatric: He has a normal mood and affect.   Nursing note and vitals reviewed.      Procedures           ED Course  ED Course as of May 02 1124   Sat May 02, 2020   1033 Patient is in no distress may have a touch pneumonia versus increased fluid overload because he is not had dialysis keeping consideration his cough and will place him antibiotics and will give that antibiotics p.o. because he needs to get dialyzed after dialysis can be discharged home with isolation    [TS]   1033 Risks and benefits of treatments given and alternative treatment options discussed with patient/family. I answered all the questions in simple, plain language, and there was voiced understanding and agreement with plan of care. There were no further questions. Differential diagnosis discussed. Patient/family was advised that the practice of medicine is not always an exact science, and sometimes tests, physical exam, or history may not show the underlying conditions with certainty. Additionally, the condition may change or show itself later after initial presentation. There was also expressed understanding and agreement with this limitation of emergency medicine practice. Patient/family was asked to return to ED if any problem or issues or if condition worsens or does not improved. Patient/family agreed to follow up with PCP/specialist as advised, or return to ED if unable to see a provider in a timely fashion for continued symptoms.     [TS]   1117 The patient's pneumonia scores indicate inpatient admission but he does not want to be admitted the possibility of increased morbidity and mortality has been explained the patient at length he understands and wants to go home    [TS]   1117 He is going to get dialyzed and then discharged home     [TS]   1117 Risks and benefits of treatments given and alternative treatment options discussed with patient/family. I answered all the questions in simple, plain language, and there was voiced understanding and agreement with plan of care. There were no further questions. Differential diagnosis discussed. Patient/family was advised that the practice of medicine is not always an exact science, and sometimes tests, physical exam, or history may not show the underlying conditions with certainty. Additionally, the condition may change or show itself later after initial presentation. There was also expressed understanding and agreement with this limitation of emergency medicine practice. Patient/family was asked to return to ED if any problem or issues or if condition worsens or does not improved. Patient/family agreed to follow up with PCP/specialist as advised, or return to ED if unable to see a provider in a timely fashion for continued symptoms.     [TS]      ED Course User Index  [TS] Az Greco MD                                         CURB-65 Score (for pneumonia severity) reviewed and/or performed as part of the patient evaluation and treatment planning process.  The result associated with this review/performance is: 2    PSI/PORT Score (for pneumonia severity - CAP) reviewed and/or performed as part of the patient evaluation and treatment planning process.  The result associated with this review/performance is: 105       MDM      Final diagnoses:   End stage renal disease (CMS/[facility])   Suspected Covid-19 Virus Infection   Pneumonia due to infectious organism, unspecified laterality, unspecified part of lung            Az Greco MD  05/02/20 1127

## 2020-05-02 NOTE — DISCHARGE INSTRUCTIONS
Follow up with one of the [facility] physician groups below to setup primary care. If you have trouble making an appointment, please call the [facility] Nurse Line at [phone]    Dr. Nancy Salamanca DO, Dr. Oleg Madrid DO, and MACO Marquez  [facility]  [address] [place] [address], [id number] [phone]    Dr. Yair Bragg MD  [facility] Internal Medicine - [place] [address], [address], [place], [place] [address] [phone]    Dr. Michael Alonzo DO, Dr. Saeid Cunha DO,  MACO Gonsalves, and MACO Alas  [facility] - [place] [address], [address], [place], [place] [id number] [phone]     Dr. Chaya Peraza MD, and MACO Abdi  [facility] Family [address] [id number] [phone]    Dr. Maged Mcknight MD and Dr. Skyler Cruz MD  [address], [address] [phone]    Dr. Pal Alfaro MD  [facility]  [address] [place] [address], [address], [place], [place], [address] [phone]    Dr. Oscar Baires MD  [facility]  [address], [place], [address] [phone]                Community-Acquired Pneumonia, Adult  Pneumonia is a type of lung infection that causes swelling in the airways of the lungs. Mucus and fluid may also build up inside the airways. This may cause coughing and difficulty breathing.  There are different types of pneumonia. One type can develop while a person is in a hospital. A different type is called community-acquired pneumonia. It develops in people who are not, and have not recently been, in the hospital or another type of health care facility.  What are the causes?  This condition may  be caused by:  · Viruses. This is the most common cause of pneumonia.  · Bacteria. Community-acquired pneumonia is often caused by Streptococcus pneumoniae bacteria. These bacteria are often passed from one person to another by breathing in droplets from the cough or sneeze of an infected person.  · Fungi. This is the least common cause of pneumonia.  What increases the risk?  The following factors may make you more likely to develop this condition:  · Having a chronic disease, such as chronic obstructive pulmonary disease (COPD), asthma, congestive heart failure, cystic fibrosis, diabetes, or kidney disease.  · Having early-stage or late-stage HIV.  · Having sickle cell disease.  · Having had your spleen removed (splenectomy).  · Having poor dental hygiene.  · Having a medical condition that increases the risk of breathing in (aspirating) secretions from your own mouth and nose.  · Having a weakened body defense system (immune system).  · Being a smoker.  · Traveling to areas where pneumonia-causing germs commonly exist.  · Being around animal habitats or animals that have pneumonia-causing germs, including birds, bats, rabbits, cats, and farm animals.  What are the signs or symptoms?  Symptoms of this condition include:  · A dry cough.  · A wet (productive) cough.  · Fever.  · Sweating.  · Chest pain, especially when breathing deeply or coughing.  · Rapid breathing or difficulty breathing.  · Shortness of breath.  · Shaking chills.  · Fatigue.  · Muscle aches.  How is this diagnosed?  This condition may be diagnosed based on:  · Your medical history.  · A physical exam.  You may also have tests, including:  · Chest X-rays.  · Tests of your blood oxygen level and other blood gases.  · Tests on blood, mucus (sputum), fluid around your lungs (pleural fluid), and urine.  If your pneumonia is severe, other tests may be done to find the exact cause of your illness.  How is this treated?  Treatment for this condition  depends on many factors, such as the cause of your pneumonia, the medicines you take, and other medical conditions that you have.  For most adults, treatment and recovery from pneumonia may occur at home. In some cases, treatment must happen in a hospital. Treatment may include:  · Medicines that are given by mouth or through an IV, including:  ? Antibiotic medicines, if the pneumonia was caused by bacteria.  ? Antiviral medicines, if the pneumonia was caused by a virus.  · Being given extra oxygen.  · Respiratory therapy.  Although rare, treating severe pneumonia may include:  · Using a machine to help you breathe (mechanical ventilation). This is done if you are not breathing well on your own and you cannot maintain a safe blood oxygen level.  · Thoracentesis. This is a procedure to remove fluid from around one lung or both lungs to help you breathe better.  Follow these instructions at home:    Medicines  · Take over-the-counter and prescription medicines only as told by your health care provider.  ? Only take cough medicine if you are losing sleep. Be aware that cough medicine can prevent your body's natural ability to remove mucus from your lungs.  · If you were prescribed an antibiotic medicine, take it as told by your health care provider. Do not stop taking the antibiotic even if you start to feel better.  General instructions  · Sleep in a semi-upright position at night. Try sleeping in a reclining chair, or place a few pillows under your head.  · Rest as needed and get at least 8 hours of sleep each night.  · Drink enough water to keep your urine pale yellow. This will help to thin out mucus secretions in your lungs.  · Eat a healthy diet that includes plenty of vegetables, fruits, whole grains, low-fat dairy products, and lean protein.  · Do not use any products that contain nicotine or tobacco, such as cigarettes, e-cigarettes, and chewing tobacco. If you need help quitting, ask your health care  provider.  · Keep all follow-up visits as told by your health care provider. This is important.  How is this prevented?  You can lower your risk of developing community-acquired pneumonia by:  · Getting a pneumococcal vaccine. There are different types and schedules of pneumococcal vaccines. Ask your health care provider which option is best for you. Consider getting the vaccine if:  ? You are older than 65 years of age.  ? You are older than 19 years of age and are undergoing cancer treatment, have chronic lung disease, or have other medical conditions that affect your immune system. Ask your health care provider if this applies to you.  · Getting an influenza vaccine every year. Ask your health care provider which type of vaccine is best for you.  · Getting regular checkups from your dentist.  · Washing your hands often. If soap and water are not available, use hand .  Contact a health care provider if:  · You have a fever.  · You are losing sleep because you cannot control your cough with cough medicine.  Get help right away if:  · You have worsening shortness of breath.  · You have increased chest pain.  · Your sickness becomes worse, especially if you are an older adult or have a weakened immune system.  · You cough up blood.  Summary  · Pneumonia is an infection of the lungs.  · Community-acquired pneumonia develops in people who have not been in the hospital. It can be caused by bacteria, viruses, or fungi.  · This condition may be treated with antibiotics or antiviral medicines.  · Severe cases may require hospitalization, mechanical ventilation, and other procedures to drain fluid from the lungs.  This information is not intended to replace advice given to you by your health care provider. Make sure you discuss any questions you have with your health care provider.  Document Released: 12/18/2006 Document Revised: 11/27/2019 Document Reviewed: 08/15/2019  [employer] Interactive Patient Education ©  2020 [employer]

## 2020-05-03 NOTE — TELEPHONE ENCOUNTER
"He was tested today at ER at [facility], and had temp. Was wanting to know if he has the VIRUS, COVID. Told him will be called results, he said ok thank you,. That is what I wanted to know.    Reason for Disposition  • Health Information question, no triage required and triager able to answer question    Additional Information  • Negative: [1] Caller is not with the adult (patient) AND [2] reporting urgent symptoms  • Negative: Lab result questions  • Negative: Medication questions  • Negative: Caller can't be reached by phone  • Negative: Caller has already spoken to PCP or another triager  • Negative: RN needs further essential information from caller in order to complete triage  • Negative: Requesting regular office appointment  • Negative: [1] Caller requesting NON-URGENT health information AND [2] PCP's office is the best resource  • Negative: General information question, no triage required and triager able to answer question  • Negative: Question about upcoming scheduled test, no triage required and triager able to answer question  • Negative: [1] Caller is not with the adult (patient) AND [2] probable NON-URGENT symptoms    Answer Assessment - Initial Assessment Questions  1. REASON FOR CALL or QUESTION: \"What is your reason for calling today?\" or \"How can I best help you?\" or \"What question do you have that I can help answer?\"      Wants to know results of COVID test    Protocols used: INFORMATION ONLY CALL-ADULT-      "

## 2020-05-04 ENCOUNTER — EPISODE CHANGES (OUTPATIENT)
Dept: CASE MANAGEMENT | Facility: OTHER | Age: 75
End: 2020-05-04

## 2020-05-04 ENCOUNTER — TELEPHONE (OUTPATIENT)
Dept: EMERGENCY DEPT | Facility: HOSPITAL | Age: 75
End: 2020-05-04

## 2020-05-07 LAB
BACTERIA SPEC AEROBE CULT: NORMAL
BACTERIA SPEC AEROBE CULT: NORMAL

## 2020-05-20 ENCOUNTER — EPISODE CHANGES (OUTPATIENT)
Dept: CASE MANAGEMENT | Facility: OTHER | Age: 75
End: 2020-05-20

## 2020-09-12 PROBLEM — R00.1 BRADYCARDIA: Status: ACTIVE | Noted: 2020-01-01

## 2020-09-12 NOTE — CONSULTS
DO    sodium chloride flush 0.9 % injection 10 mL, 10 mL, Intravenous, PRN, Etha Juancarlos, DO    acetaminophen (TYLENOL) tablet 650 mg, 650 mg, Oral, Q6H PRN **OR** acetaminophen (TYLENOL) suppository 650 mg, 650 mg, Rectal, Q6H PRN, Etha Juancarlos, DO    magnesium hydroxide (MILK OF MAGNESIA) 400 MG/5ML suspension 30 mL, 30 mL, Oral, Daily PRN, Etha Juancarlos, DO    promethazine (PHENERGAN) tablet 12.5 mg, 12.5 mg, Oral, Q6H PRN **OR** ondansetron (ZOFRAN) injection 4 mg, 4 mg, Intravenous, Q6H PRN, Etha Juancarlos, DO    enoxaparin (LOVENOX) injection 40 mg, 40 mg, Subcutaneous, Q24H, Etha Juancarlos, DO  Sulfa antibiotics    Social History  Social History     Tobacco Use   Smoking Status Current Every Day Smoker    Packs/day: 2.00    Years: 55.00    Pack years: 110.00   Smokeless Tobacco Current User     Social History     Substance and Sexual Activity   Alcohol Use No         Family History   Problem Relation Age of Onset    Diabetes Mother          REVIEW OF SYSTEMS:  Constitutional: No fevers No chills  Neck:No stiffness  Respiratory: No shortness of breath  Cardiovascular: + chest pain   Gastrointestinal: No abdominal pain    Genitourinary: No Dysuria  Neurological: No headache, no confusion    PHYSICAL EXAM:  Vitals:    09/12/20 1600   BP: (!) 149/40   Pulse: 60   Resp: 18   Temp: 97.2 °F (36.2 °C)   SpO2: 100%       Constitutional: The patient appears well-developed and well-nourished. Eyes - conjunctiva normal.  Conjugate Gaze  Ear, nose, throat - No scars, masses, or lesions over external nose or ears, no atrophy of tongue  Neck-symmetric, no masses noted, no jugular vein distension  Respiration- chest wall appears symmetric, good expansion, normal effort without use of accessory muscles  Musculoskeletal - no significant wasting of muscles noted, no bony deformities  Extremities-no clubbing, cyanosis or edema  Skin - warm, dry, and intact.   No rash, erythema, or pallor. Psychiatric - mood, affect, and behavior appear normal.     Neurologic Examination  Awake, Alert and oriented x 4  Normal speech pattern, following commands  Motor 5/5 all extremities, no drift  EOMI, CN II-XII intact  No deficits to light touch   No Homans or hyperreflexia    DATA:  Nursing/pcp notes, imaging,labs and vitals reviewed. PT,OT and/or speech notes reviewed    Lab Results   Component Value Date    WBC 7.1 09/12/2020    HGB 10.9 (L) 09/12/2020    HCT 33.5 (L) 09/12/2020    MCV 98.2 (H) 09/12/2020     (L) 09/12/2020     Lab Results   Component Value Date     09/12/2020    K 4.3 09/12/2020    CL 98 09/12/2020    CO2 27 09/12/2020    BUN 33 (H) 09/12/2020    CREATININE 3.5 (H) 09/12/2020    GLUCOSE 160 (H) 09/12/2020    CALCIUM 9.4 09/12/2020    PROT 6.4 (L) 09/12/2020    LABALBU 4.0 09/12/2020    BILITOT 0.3 09/12/2020    ALKPHOS 79 09/12/2020    AST 8 09/12/2020    ALT 16 09/12/2020    LABGLOM 17 (A) 09/12/2020    GFRAA 21 (L) 09/12/2020     Lab Results   Component Value Date    INR 2.87 (H) 03/25/2016    INR 1.76 (H) 03/24/2016    INR 1.16 03/23/2016    PROTIME 28.7 (H) 03/25/2016    PROTIME 19.8 (H) 03/24/2016    PROTIME 14.4 03/23/2016     EXAMINATION:  CT CERVICAL SPINE WO CONTRAST  9/12/2020 1:17 PM    HISTORY: The patient fell. Rule out fracture. No x-rays obtained. TECHNIQUE: Spiral CT was performed of the cervical spine. Sagittal and    coronal images were reconstructed. COMPARISON: No comparison study. DLP: 260 mGy-cm. Automated exposure control was utilized. FINDINGS: There is fairly prominent pannus formation along the    posterior aspect of the dens. There is a fairly large dens erosion    located posteriorly. There is a nondisplaced fracture line anteriorly    that is likely related to the weakening of the dens related to the    erosion. Therefore, this is likely a pathologic fracture.  This is    fairly high on the dens and I will classify this as a type I injury. There is no displacement. There is no disruption of the anterior    vertebral line or the posterior spinolaminar line. No other cervical    spine fracture is identified. There is fairly severe carotid artery    calcification in the neck bilaterally. There is vertebral artery    calcification on the left. At C2-3, there is moderate to severe disc narrowing. There is severe    facet arthropathy. There is mild to moderate foraminal narrowing    bilaterally. No central spinal canal narrowing. At C3-4, there is moderate disc narrowing. There is minimal anterior    subluxation of C3 compared to C4. There is bilateral uncinate spurring    and bilateral facet arthropathy. There is severe bilateral foraminal    stenosis at this level. At C4-5, there is moderate disc narrowing. There is uncinate spurring    and facet arthropathy. The right-sided facet joint is fused. There is    severe right and moderate left-sided foraminal stenosis. At C5-6, there is disc narrowing with spondylitic and uncinate    spurring. There is bilateral facet arthropathy. There is no    significant central spinal canal narrowing. There is mild to moderate    bilateral foraminal stenosis. At C6-7, there is moderate to severe disc narrowing. There is    spondylitic and uncinate spurring. There is anterior spurring. There    is facet arthropathy left greater than right. There is mild narrowing    of the central spinal canal at this level. There is moderate right and    mild left foraminal stenosis. At C7-T1, there is moderate to severe disc narrowing. There is mild    anterior subluxation of C7 compared to T1. There is some scoliosis of    the cervical-thoracic Junction in this area. This causes moderate to    severe left-sided foraminal stenosis at this level. There is disc degeneration at multiple thoracic disc levels included    on the cervical spine CT. No significant spinal or foraminal stenosis    is visualized.      Impression    1. Type I dens fracture without displacement. There is a fairly large    erosion in the posterior dens related to pannus formation. The type    one fracture is seen best on the sagittal images anterior to the    erosion. Therefore, this is likely a pathologic fracture given the    presence of the erosion. 2. Degenerative changes throughout the cervical spine. 3. Dense carotid artery calcification in the neck. There is also some    calcification involving the left vertebral artery in the neck. Results of the dens fracture were called to Dr. Molly Larose in the    emergency room at 1:30 PM on 9/12/2020. Signed by Dr Lynette Leonard on 9/12/2020 1:31 PM            IMPRESSION  67year old male s/p fall with type I non-displaced fracture, neurologically stable    RECOMMENDATIONS:    No urgent neurosurgical intervention warranted  The type I odontoid fracture is stable in nature and can be treated with hard collar for at least 6 weeks  Will consider obtaining MRI once cardiac issues have resolved and he is out of the ICU  Hard cervical collar placed.   He may sit up or get OOB with assistance from a neurosurgical standpoint       Marsha Green DO

## 2020-09-12 NOTE — ED NOTES
Verbal consent obtained from pt to have a temporary pacemaker placement. Witnessed by CATHERINE Murillo RN and ALEKSEY Bowen RN.      Jamie Weaver RN  09/12/20 6017

## 2020-09-12 NOTE — CONSULTS
[id number] [facility]  Cardiology Consult      Requesting MD:  Marcello Jessica., MD   Admit Status:         History obtained from:   [] Patient  [] Other (specify):     Patient:  David Rosario  [id number]     Chief Complaint:   Chief Complaint   Patient presents with    Abdominal Pain    Fall      out of bed no LOC skin tear to mid chest region         HPI: Mr. Aubrey Carmichael is a 67 y.o. male with a history of PVD s/p aortic stent graft. Esrd, dm, cad with stents more than 10 years ago. Last echo [facility] ef 30 to 35%. Seen for falling down. Pt has advanced av block and 8 sec pause. Pt on metoprolol and amio at home. Lives in nursing home,  Not very active. No chest pain. Review of Systems:  Review of Systems   Constitutional: Positive for fatigue. HENT: Negative. Eyes: Negative. Respiratory: Positive for shortness of breath. Cardiovascular: Positive for palpitations. Gastrointestinal: Negative. Endocrine: Negative. Genitourinary: Negative. Musculoskeletal: Negative. Allergic/Immunologic: Negative. Neurological: Negative. Hematological: Negative. Psychiatric/Behavioral: Negative.         Cardiac Specific Data:  Specialty Problems        Cardiology Problems    CAD (coronary artery disease)        Hypertension        Coronary artery disease involving native heart without angina pectoris        Essential hypertension              Past Medical History:  Past Medical History:   Diagnosis Date    Bladder cancer ([address]     CAD (coronary artery disease)     Cancer ([address]     COPD (chronic obstructive pulmonary disease) ([address]     Diabetes mellitus ([address]     Hypertension     Prostate cancer ([facility]         Past Surgical History:  Past Surgical History:   Procedure Laterality Date    ABDOMEN SURGERY      esophageal CA, used part of stomach     APPENDECTOMY      [facility]    CARDIAC CATHETERIZATION      [facility], had stents, unknown details    URETEROSTOMY         Past Family History:  Family History   Problem Relation Age of Onset    Diabetes Mother        Past Social History:  Social History     Socioeconomic History    Marital status:      Spouse name: Not on file    Number of children: Not on file    Years of education: Not on file    Highest education level: Not on file   Occupational History    Not on file   Social Needs    Financial resource strain: Not on file    Food insecurity     Worry: Not on file     Inability: Not on file    Transportation needs     Medical: Not on file     Non-medical: Not on file   Tobacco Use    Smoking status: Current Every Day Smoker     Packs/day: 2.00     Years: 55.00     Pack years: 110.00    Smokeless tobacco: Current User   Substance and Sexual Activity    Alcohol use: No    Drug use: No    Sexual activity: Not Currently   Lifestyle    Physical activity     Days per week: Not on file     Minutes per session: Not on file    Stress: Not on file   Relationships    Social connections     Talks on phone: Not on file     Gets together: Not on file     Attends Worship service: Not on file     Active member of club or organization: Not on file     Attends meetings of clubs or organizations: Not on file     Relationship status: Not on file    Intimate partner violence     Fear of current or ex partner: Not on file     Emotionally abused: Not on file     Physically abused: Not on file     Forced sexual activity: Not on file   Other Topics Concern    Not on file   Social History Narrative    Not on file       Allergies: Allergies   Allergen Reactions    Sulfa Antibiotics Other (See Comments)     diarrhea       Home Meds:  Prior to Admission medications    Medication Sig Start Date End Date Taking?  Authorizing Provider   amiodarone (CORDARONE) 200 MG tablet Take 200 mg by mouth daily   Yes Historical Provider, MD   Ferric Citrate (AURYXIA PO) Take by mouth   Yes Historical Provider, MD   VITAMIN D PO Take by mouth   Yes Historical Provider, MD   ipratropium (ATROVENT) 0.02 % nebulizer solution Take 0.5 mg by nebulization 4 times daily   Yes Historical Provider, MD   Hypromellose (ISOPTO TEARS OP) Apply to eye   Yes Historical Provider, MD   isosorbide dinitrate (ISORDIL) 10 MG tablet Take 10 mg by mouth 3 times daily   Yes Historical Provider, MD   insulin glargine (LANTUS) 100 UNIT/ML injection vial Inject into the skin nightly   Yes Historical Provider, MD   midodrine (PROAMATINE) 5 MG tablet Take 5 mg by mouth 3 times daily   Yes Historical Provider, MD   nicotine (NICODERM CQ) 14 MG/24HR Place 1 patch onto the skin every 24 hours   Yes Historical Provider, MD   omeprazole (PRILOSEC) 10 MG delayed release capsule Take 10 mg by mouth daily   Yes Historical Provider, MD   oxyCODONE 5 MG capsule Take 5 mg by mouth every 4 hours as needed for Pain.    Yes Historical Provider, MD   Budesonide-Formoterol Fumarate (SYMBICORT IN) Inhale into the lungs   Yes Historical Provider, MD   acetaminophen 650 MG TABS Take 650 mg by mouth every 4 hours as needed 3/25/16  Yes Olive Navarrete MD   aspirin 81 MG chewable tablet Take 81 mg by mouth daily   Yes Historical Provider, MD   atorvastatin (LIPITOR) 80 MG tablet Take 80 mg by mouth daily   Yes Historical Provider, MD   calcium carbonate (OSCAL) 500 MG TABS tablet Take 1,000 mg by mouth daily   Yes Historical Provider, MD   vitamin D 1000 UNITS CAPS Take 2 capsules by mouth daily   Yes Historical Provider, MD   insulin aspart (NOVOLOG) 100 UNIT/ML injection vial Inject 7 Units into the skin nightly   Yes Historical Provider, MD   insulin lispro (HUMALOG) 100 UNIT/ML injection vial Inject 5 Units into the skin 3 times daily (before meals)   Yes Historical Provider, MD   metoprolol (LOPRESSOR) 25 MG tablet Take 25 mg by mouth 2 times daily Indications: 1/2 tab   Yes Historical Provider, MD   warfarin (COUMADIN) 4 MG tablet Take 1 tablet by mouth daily Or as directed were reconstructed. DLP: 675 mGy-cm. Automated exposure control was utilized. COMPARISON: No comparison study. FINDINGS: There are no hemorrhage, edema or mass effect. There is moderate to severe atrophy. There is moderate to severe dilatation of the lateral and third ventricles. There is low-density in the hemispheric white matter that is nonspecific. The visualized paranasal sinuses and mastoid air cells are clear. No calvarial fracture is seen. There is biparietal calvarial thinning. 1. No hemorrhage, edema or mass effect. 2. Moderate to severe atrophy. Moderate to severe dilatation of the lateral and third ventricles is likely related. Hydrocephalus is not excluded. 3. Low-density in the hemispheric white matter is nonspecific and most likely due to chronic small vessel disease. The full report of this exam was immediately signed and available to the emergency room. The patient is currently in the emergency room. Signed by Dr Anthony Singh on 9/12/2020 12:56 PM    Ct Abdomen Pelvis W Iv Contrast Additional Contrast? None    Result Date: 9/12/2020  EXAMINATION:  CT ABDOMEN PELVIS W IV CONTRAST  9/12/2020 1:00 PM HISTORY: The patient fell. Epigastric pain. Prior surgical resection of the urinary bladder and appendectomy. TECHNIQUE: Spiral CT was performed of the abdomen and pelvis with contrast. Multiplanar images were reconstructed. DLP: 320 mGy-cm. Automated exposure control was utilized. COMPARISON: No comparison study. LUNG BASES: The lung bases are clear. There is a moderate size hiatal hernia. LIVER AND SPLEEN: Prior cholecystectomy. There is intrahepatic and extrahepatic biliary tree dilatation probably related to reservoir effect. No visible obstructing stone is seen. The liver is otherwise unremarkable. The spleen is normal in size. PANCREAS: There is no pancreatic mass or inflammatory change. KIDNEYS AND ADRENALS: The adrenal glands are normal in size. There is cortical thinning of both kidneys.  Both kidneys are small in size. The left kidney measures 5.8 cm and the right kidney measures 5.7 cm. The urinary bladder is surgically absent. There is an aortoiliac stent graft. The graft extends above the origins of both renal arteries. It also covers the origins of the celiac plexus and superior mesenteric arteries. The native aortic lumen measures 5.5 cm in transverse diameter. There is mild enhancement within the native lumen anteriorly suggesting an endoleak. The aortoiliac stent graft is patent. BOWEL: No oral contrast was given. There is underdistention of the stomach. Small bowel loops are nondilated. There is an ostomy in the right mid to lower abdomen likely related to the history of ureteral diversion. Some of the colon is underdistended. There is moderate to large stool in the colon. OTHER: There are degenerative changes of the spine. 1. Prior cholecystectomy. Intrahepatic and extrahepatic biliary tree dilatation is likely related to reservoir effect. No visible calcified stone is seen within the ductal system. 2. Diffuse cortical thinning of both kidneys. The kidneys are small in size. The left kidney measures 5.8 cm on the right kidney measures 5.7 cm. The renal arteries are covered by the aortoiliac stent graft. 3. Prior bladder resection. An ostomy in the right lower abdomen/upper pelvis is likely a ureteral diversion. 4. Patent aortoiliac stent graft. The native aortic lumen measures 5.5 cm transversely. There is some contrast enhancement anteriorly in the native lumen suggesting endoleak. I do not see a connecting vessel that is patent at the endoleak. The superior mesenteric artery and celiac plexus are also covered by the stent graft. 5. No definite acute bowel abnormality. No oral contrast was given and therefore evaluation is very limited. The patient also has very little intra-abdominal fat. The full report of this exam was immediately signed and available to the emergency room.  The patient is currently in the emergency room. Signed by Dr Regine Zuniga on 9/12/2020 1:10 PM    Xr Chest Portable    Result Date: 9/12/2020  EXAMINATION:  XR CHEST PORTABLE  9/12/2020 12:13 PM HISTORY: The patient fell. Chest pain. COMPARISON: 3/23/2016. FINDINGS:  There is no evidence of lung contusion or pneumothorax. There is no mediastinal widening. The heart is normal in size. The thoracic aorta is atheromatous. There is no dense infiltrate or effusion. There is mild biapical scarring. 1. Mild chronic changes. 2. No evidence of lung contusion, pneumothorax or mediastinal widening. 3. The patient has a chest CT already scheduled. Please see that report separately. Signed by Dr Regine Zuniga on 9/12/2020 12:14 PM      Assessment:  1. Advanced ab block with pause 8 sec  2. S/p fall  3. ESRD  4. DM  5. CAD s/p stents in past  6. PVD s/p aortic stent graft. Recommendations:    Temporary pacer today  ECHO for EF  Hold metoprolol and amio. D/w pt . Asa 3.  Mallampati class 2

## 2020-09-12 NOTE — H&P
[facility] Medicine H&P    Patient:  Silver Hernandes  MRN: [id number]    Consulting Physician: Shaylee Cox DO  Reason for Consult: Medical Management  Primacy Care Physician: Unknown Provider Result (Inactive)    HISTORY OF PRESENT ILLNESS:   The patient is a 67 y.o. male was on hemodialysis today and had a near syncopal episode. He was shown to be quite bradycardic. He was sent over to the emergency department and his heart rate was in the low 30s. He was very symptomatic from this. Urgent consultation was obtained with cardiology, and he underwent temporary transvenous pacemaker insertion. He also had a fall a day or 2 ago, and struck his head. CT of his neck shows a type I dens fracture without displacement. We have placed him in a neck brace. Consultation will be obtained with neurosurgery for evaluation.     Past Medical History:        Diagnosis Date    Anemia in chronic kidney disease (CODE)     Atherosclerotic heart disease     [facility]     CAD (coronary artery disease)     Cancer ([facility]     Chronic kidney disease     COPD [facility]chronic obstructive pulmonary disease) [facility]     Diabetes mellitus ([address]     End stage renal disease (HCC)     Enterocolitis due to Clostridioides difficile     GERD (gastroesophageal reflux disease)     Hemodialysis patient ([address]     Hyperlipidemia     Hypertension     Other chronic pain     Prostate cancer ([facility] [address] [facility]     Vitamin D deficiency        Past Surgical History:        Procedure Laterality Date    ABDOMEN SURGERY      esophageal CA, used part of stomach     APPENDECTOMY      [facility]    CARDIAC CATHETERIZATION      [facility], had stents, unknown details    URETEROSTOMY         Medications: Scheduled Meds:   sodium chloride flush  10 mL Intravenous 2 times per day    enoxaparin  40 mg Subcutaneous Q24H     Continuous Infusions:   DOPamine 2.5 mcg/kg/min (09/12/20 1252)     PRN Meds:.sodium chloride flush, acetaminophen **OR** acetaminophen, magnesium hydroxide, promethazine **OR** ondansetron    Allergies:  Sulfa antibiotics    Social History:   TOBACCO:   reports that he has been smoking. He has a 110.00 pack-year smoking history. He uses smokeless tobacco.  ETOH:   reports no history of alcohol use. Family History:       Problem Relation Age of Onset    Diabetes Mother        ROS:  Review of Systems   Constitutional: Negative for activity change and fever. Respiratory: Negative for shortness of breath. Cardiovascular: Negative for chest pain and leg swelling. Gastrointestinal: Negative for constipation, diarrhea, nausea and vomiting. Genitourinary: Negative for difficulty urinating and dysuria. Musculoskeletal: Positive for gait problem. Negative for back pain and neck pain. Neurological: Positive for weakness and light-headedness. Negative for dizziness. Physical Exam:    Vitals: BP (!) 149/40   Pulse 60   Temp 97.2 °F (36.2 °C) (Temporal)   Resp 18   Ht 5' 7\" (1.702 m)   Wt 111 lb 6.4 oz (50.5 kg)   SpO2 100%   BMI 17.45 kg/m²     Physical Exam  Vitals signs reviewed. Constitutional:       Appearance: He is well-developed. HENT:      Head: Normocephalic and atraumatic. Eyes:      Conjunctiva/sclera: Conjunctivae normal.      Pupils: Pupils are equal, round, and reactive to light. Cardiovascular:      Rate and Rhythm: Regular rhythm. Bradycardia present. Heart sounds: Murmur present. Pulmonary:      Effort: Pulmonary effort is normal.      Breath sounds: Normal breath sounds. Abdominal:      General: Abdomen is flat. Palpations: Abdomen is soft. Skin:     General: Skin is warm and dry. Neurological:      General: No focal deficit present. Mental Status: He is alert and oriented to person, place, and time.       Coordination: Coordination normal.         CBC:   Recent Labs     09/12/20  1132   WBC 7.1   HGB 10.9*   *     BMP:    Recent Labs     09/12/20  1132    K 4.3   CL 98   CO2 27   BUN 33*   CREATININE 3.5*   GLUCOSE 160*     Hepatic:   Recent Labs     09/12/20  1132   AST 8   ALT 16   BILITOT 0.3   ALKPHOS 79     Troponin:   Recent Labs     09/12/20  1132   TROPONINI 0.26*     BNP: No results for input(s): BNP in the last 72 hours. Lipids: No results for input(s): CHOL, HDL in the last 72 hours. Invalid input(s): LDLCALCU  ABGs: No results found for: PHART, PO2ART, [id number]  INR: No results for input(s): INR in the last 72 hours. -----------------------------------------------------------------  Ct Head Wo Contrast    Result Date: 9/12/2020  EXAMINATION:  CT HEAD WO CONTRAST  9/12/2020 12:53 PM HISTORY: The patient fell. Rule out intracranial injury. TECHNIQUE: Multiple axial images were obtained through the brain without contrast infusion. Multiplanar images were reconstructed. DLP: 675 mGy-cm. Automated exposure control was utilized. COMPARISON: No comparison study. FINDINGS: There are no hemorrhage, edema or mass effect. There is moderate to severe atrophy. There is moderate to severe dilatation of the lateral and third ventricles. There is low-density in the hemispheric white matter that is nonspecific. The visualized paranasal sinuses and mastoid air cells are clear. No calvarial fracture is seen. There is biparietal calvarial thinning. 1. No hemorrhage, edema or mass effect. 2. Moderate to severe atrophy. Moderate to severe dilatation of the lateral and third ventricles is likely related. Hydrocephalus is not excluded. 3. Low-density in the hemispheric white matter is nonspecific and most likely due to chronic small vessel disease. The full report of this exam was immediately signed and available to the emergency room. The patient is currently in the emergency room. Signed by Dr Navarro Has on 9/12/2020 12:56 PM    Ct Chest W Contrast    Result Date: 9/12/2020  EXAMINATION:  CT CHEST W CONTRAST  9/12/2020 1:11 PM HISTORY: The patient fell. Anterior chest skin tear. Hypotension during dialysis. Normal chest x-ray. COMPARISON: No comparison study. DLP: 320 mGy-cm. Automated exposure control was utilized. TECHNIQUE: Spiral CT was performed of the chest with contrast. Multiplanar images were reconstructed. MEDIASTINUM/VASCULAR: There is atheromatous calcification of the thoracic aorta and coronary arteries. Heart size is upper limits of normal. Pulmonary arteries are normal in caliber. There are no enlarged mediastinal lymph nodes. There is a moderate size hiatal hernia. There is wall thickening of the esophagus in the chest diffusely. LUNGS: There is centrilobular emphysema. There is paraseptal emphysema. There are few scattered calcified granulomas. There is no airspace consolidation or pleural effusion. There is no pneumothorax or lung contusion. BONES AND SOFT TISSUES: The clavicles are intact. The scapulae are intact. No definite rib fracture is seen. No visible acute thoracic spine fracture is seen. There are some degenerative changes of the spine. The bones are demineralized. The sternum appears to be intact. UPPER ABDOMEN: Please see the abdomen and pelvis CT report separately. 1. No evidence of pneumothorax or lung contusion. No mediastinal hematoma. 2. Atheromatous disease of the thoracic aorta and coronary arteries. Heart size is prominent. 3. Moderate size hiatal hernia. Diffuse thickening of the wall of the thoracic esophagus. Correlate with any history of esophagitis and reflux disease. 4. Centrilobular and paraseptal emphysema. Old granulomatous disease. 5. Demineralized bones. No definite acute fracture. The full report of this exam was immediately signed and available to the emergency room. The patient is currently in the emergency room. Signed by Dr Bk Kessler on 9/12/2020 1:16 PM    Ct Cervical Spine Wo Contrast    Result Date: 9/12/2020  EXAMINATION:  CT CERVICAL SPINE WO CONTRAST  9/12/2020 1:17 PM HISTORY: The patient fell. Rule out fracture. No x-rays obtained. TECHNIQUE: Spiral CT was performed of the cervical spine. Sagittal and coronal images were reconstructed. COMPARISON: No comparison study. DLP: 260 mGy-cm. Automated exposure control was utilized. FINDINGS: There is fairly prominent pannus formation along the posterior aspect of the dens. There is a fairly large dens erosion located posteriorly. There is a nondisplaced fracture line anteriorly that is likely related to the weakening of the dens related to the erosion. Therefore, this is likely a pathologic fracture. This is fairly high on the dens and I will classify this as a type I injury. There is no displacement. There is no disruption of the anterior vertebral line or the posterior spinolaminar line. No other cervical spine fracture is identified. There is fairly severe carotid artery calcification in the neck bilaterally. There is vertebral artery calcification on the left. At C2-3, there is moderate to severe disc narrowing. There is severe facet arthropathy. There is mild to moderate foraminal narrowing bilaterally. No central spinal canal narrowing. At C3-4, there is moderate disc narrowing. There is minimal anterior subluxation of C3 compared to C4. There is bilateral uncinate spurring and bilateral facet arthropathy. There is severe bilateral foraminal stenosis at this level. At C4-5, there is moderate disc narrowing. There is uncinate spurring and facet arthropathy. The right-sided facet joint is fused. There is severe right and moderate left-sided foraminal stenosis. At C5-6, there is disc narrowing with spondylitic and uncinate spurring. There is bilateral facet arthropathy. There is no significant central spinal canal narrowing. There is mild to moderate bilateral foraminal stenosis. At C6-7, there is moderate to severe disc narrowing. There is spondylitic and uncinate spurring. There is anterior spurring. There is facet arthropathy left greater than right.  There is mild narrowing of the central spinal canal at this level. There is moderate right and mild left foraminal stenosis. At C7-T1, there is moderate to severe disc narrowing. There is mild anterior subluxation of C7 compared to T1. There is some scoliosis of the cervical-thoracic Junction in this area. This causes moderate to severe left-sided foraminal stenosis at this level. There is disc degeneration at multiple thoracic disc levels included on the cervical spine CT. No significant spinal or foraminal stenosis is visualized. 1. Type I dens fracture without displacement. There is a fairly large erosion in the posterior dens related to pannus formation. The type one fracture is seen best on the sagittal images anterior to the erosion. Therefore, this is likely a pathologic fracture given the presence of the erosion. 2. Degenerative changes throughout the cervical spine. 3. Dense carotid artery calcification in the neck. There is also some calcification involving the left vertebral artery in the neck. Results of the dens fracture were called to Dr. Shannan Castano in the emergency room at 1:30 PM on 9/12/2020. Signed by Dr Natasha Langley on 9/12/2020 1:31 PM    Ct Abdomen Pelvis W Iv Contrast Additional Contrast? None    Result Date: 9/12/2020  EXAMINATION:  CT ABDOMEN PELVIS W IV CONTRAST  9/12/2020 1:00 PM HISTORY: The patient fell. Epigastric pain. Prior surgical resection of the urinary bladder and appendectomy. TECHNIQUE: Spiral CT was performed of the abdomen and pelvis with contrast. Multiplanar images were reconstructed. DLP: 320 mGy-cm. Automated exposure control was utilized. COMPARISON: No comparison study. LUNG BASES: The lung bases are clear. There is a moderate size hiatal hernia. LIVER AND SPLEEN: Prior cholecystectomy. There is intrahepatic and extrahepatic biliary tree dilatation probably related to reservoir effect. No visible obstructing stone is seen. The liver is otherwise unremarkable.  The spleen is normal in size. PANCREAS: There is no pancreatic mass or inflammatory change. KIDNEYS AND ADRENALS: The adrenal glands are normal in size. There is cortical thinning of both kidneys. Both kidneys are small in size. The left kidney measures 5.8 cm and the right kidney measures 5.7 cm. The urinary bladder is surgically absent. There is an aortoiliac stent graft. The graft extends above the origins of both renal arteries. It also covers the origins of the celiac plexus and superior mesenteric arteries. The native aortic lumen measures 5.5 cm in transverse diameter. There is mild enhancement within the native lumen anteriorly suggesting an endoleak. The aortoiliac stent graft is patent. BOWEL: No oral contrast was given. There is underdistention of the stomach. Small bowel loops are nondilated. There is an ostomy in the right mid to lower abdomen likely related to the history of ureteral diversion. Some of the colon is underdistended. There is moderate to large stool in the colon. OTHER: There are degenerative changes of the spine. 1. Prior cholecystectomy. Intrahepatic and extrahepatic biliary tree dilatation is likely related to reservoir effect. No visible calcified stone is seen within the ductal system. 2. Diffuse cortical thinning of both kidneys. The kidneys are small in size. The left kidney measures 5.8 cm on the right kidney measures 5.7 cm. The renal arteries are covered by the aortoiliac stent graft. 3. Prior bladder resection. An ostomy in the right lower abdomen/upper pelvis is likely a ureteral diversion. 4. Patent aortoiliac stent graft. The native aortic lumen measures 5.5 cm transversely. There is some contrast enhancement anteriorly in the native lumen suggesting endoleak. I do not see a connecting vessel that is patent at the endoleak. The superior mesenteric artery and celiac plexus are also covered by the stent graft. 5. No definite acute bowel abnormality.  No oral contrast was given and therefore evaluation is very limited. The patient also has very little intra-abdominal fat. The full report of this exam was immediately signed and available to the emergency room. The patient is currently in the emergency room. Signed by Dr Zandra Goltz on 9/12/2020 1:10 PM    Xr Chest Portable    Result Date: 9/12/2020  EXAMINATION:  XR CHEST PORTABLE  9/12/2020 12:13 PM HISTORY: The patient fell. Chest pain. COMPARISON: 3/23/2016. FINDINGS:  There is no evidence of lung contusion or pneumothorax. There is no mediastinal widening. The heart is normal in size. The thoracic aorta is atheromatous. There is no dense infiltrate or effusion. There is mild biapical scarring. 1. Mild chronic changes. 2. No evidence of lung contusion, pneumothorax or mediastinal widening. 3. The patient has a chest CT already scheduled. Please see that report separately. Signed by Dr Zandra Goltz on 9/12/2020 12:14 PM      EKG: Paced rhythm    Assessment and Plan     Symptomatic bradycardia. Status post transvenous temporary pacemaker. Cardiology is following. Determine need for permanent pacemaker. Type I dens fracture of the cervical spine. Place in cervical collar. Neurosurgery evaluation. End-stage renal disease. Continue maintenance dialysis. Type 2 diabetes. Monitor blood glucose. COPD. Clinically stable. Please document 60 minutes of critical care time.       Shaylee Cox,

## 2020-09-12 NOTE — ED PROVIDER NOTES
[facility] ICU  EMERGENCY DEPARTMENT ENCOUNTER      Pt Name: Mya Enrique  MRN: [id number]  [place] 1945  Date of evaluation: 9/12/2020  Provider: Olu Palacios MD    [address] [place] [address]       Chief Complaint   Patient presents with    Abdominal Pain    Fall      out of bed no LOC skin tear to mid chest region         HISTORY OF PRESENT ILLNESS   (Location/Symptom, Timing/Onset,Context/Setting, Quality, Duration, Modifying Factors, Severity)  Note limiting factors. Mya Enrique is a 67 y.o. male who presents to the emergency department for evaluation after developing hypotension and chest pain during dialysis this morning. Patient states he fell getting out of bed at the nursing home this morning and sustained an injury to his chest and that is where the pain was from, but states most of the pain is more in the epigastric region. Has associated belching and nausea. Patient noted to have a skin tear to his anterior left chest.  Denies any shortness of breath. History is limited by patient significant hearing impairment. HPI    NursingNotes were reviewed. REVIEW OF SYSTEMS    (2-9 systems for level 4, 10 or more for level 5)     Review of Systems   Constitutional: Negative for fatigue and fever. HENT: Negative for congestion, rhinorrhea and voice change. Eyes: Negative for pain and redness. Respiratory: Negative for cough and shortness of breath. Cardiovascular: Positive for chest pain. Gastrointestinal: Positive for abdominal pain. Negative for diarrhea and vomiting. Endocrine: Negative. Genitourinary: Negative. Musculoskeletal: Negative for arthralgias and gait problem. Skin: Negative for rash and wound. Neurological: Positive for dizziness. Negative for weakness and headaches. Hematological: Negative. Psychiatric/Behavioral: Negative. All other systems reviewed and are negative.       A complete review of systems was performed and is negative except as noted above in the HPI. PAST MEDICAL HISTORY     Past Medical History:   Diagnosis Date    Bladder cancer ([address]     CAD (coronary artery disease)     [facility]     COPD [facility]chronic obstructive pulmonary disease) [facility]     Diabetes mellitus ([facility]     Hypertension     Prostate cancer ([facility]          SURGICAL HISTORY       Past Surgical History:   Procedure Laterality Date    ABDOMEN SURGERY      esophageal CA, used part of stomach     APPENDECTOMY      [facility]    [facility]ARDIAC CATHETERIZATION      [facility], had stents, unknown details    URETEROSTOMY           CURRENT MEDICATIONS       Current Discharge Medication List      CONTINUE these medications which have NOT CHANGED    Details   amiodarone (CORDARONE) 200 MG tablet Take 200 mg by mouth daily      Ferric Citrate (AURYXIA PO) Take by mouth      !! VITAMIN D PO Take by mouth      ipratropium (ATROVENT) 0.02 % nebulizer solution Take 0.5 mg by nebulization 4 times daily      Hypromellose (ISOPTO TEARS OP) Apply to eye      isosorbide dinitrate (ISORDIL) 10 MG tablet Take 10 mg by mouth 3 times daily      insulin glargine (LANTUS) 100 UNIT/ML injection vial Inject into the skin nightly      midodrine (PROAMATINE) 5 MG tablet Take 5 mg by mouth 3 times daily      nicotine (NICODERM CQ) 14 MG/24HR Place 1 patch onto the skin every 24 hours      omeprazole (PRILOSEC) 10 MG delayed release capsule Take 10 mg by mouth daily      oxyCODONE 5 MG capsule Take 5 mg by mouth every 4 hours as needed for Pain.       Budesonide-Formoterol Fumarate (SYMBICORT IN) Inhale into the lungs      acetaminophen 650 MG TABS Take 650 mg by mouth every 4 hours as needed  Qty: 120 tablet, Refills: 3      aspirin 81 MG chewable tablet Take 81 mg by mouth daily      atorvastatin (LIPITOR) 80 MG tablet Take 80 mg by mouth daily      calcium carbonate (OSCAL) 500 MG TABS tablet Take 1,000 mg by mouth daily      !! vitamin D 1000 UNITS CAPS Take 2 capsules by mouth daily EXAM    (up to 7 for level 4, 8 or more for level 5)     ED Triage Vitals   BP Temp Temp src Pulse Resp SpO2 Height Weight   09/12/20 1112 09/12/20 1108 -- 09/12/20 1108 09/12/20 1112 09/12/20 1112 09/12/20 1108 09/12/20 1108   (!) 121/40 98 °F (36.7 °C)  (!) 43 15 93 % 5' 7\" (1.702 m) 110 lb (49.9 kg)       Physical Exam  Vitals signs and nursing note reviewed. Constitutional:       General: He is not in acute distress. Appearance: He is well-developed. He is not diaphoretic. HENT:      Head: Normocephalic and atraumatic. Eyes:      General: No scleral icterus. Neck:      Vascular: No JVD. Cardiovascular:      Rate and Rhythm: Normal rate. Rhythm irregular. Pulses:           Radial pulses are 2+ on the right side and 2+ on the left side. Dorsalis pedis pulses are 2+ on the right side and 2+ on the left side. Heart sounds: Normal heart sounds. No murmur. No friction rub. No gallop. Pulmonary:      Effort: Pulmonary effort is normal. No accessory muscle usage or respiratory distress. Breath sounds: Normal breath sounds. No stridor. No decreased breath sounds, wheezing, rhonchi or rales. Chest:      Chest wall: Tenderness present. Abdominal:      General: There is no distension. Palpations: Abdomen is soft. Tenderness: There is no abdominal tenderness. There is no guarding or rebound. Musculoskeletal: Normal range of motion. General: No deformity. Right lower leg: No edema. Left lower leg: No edema. Skin:     General: Skin is warm and dry. Coloration: Skin is not pale. Findings: No erythema. Neurological:      Mental Status: He is alert and oriented to person, place, and time. GCS: GCS eye subscore is 4. GCS verbal subscore is 5. GCS motor subscore is 6. Cranial Nerves: No cranial nerve deficit. Motor: No abnormal muscle tone.       Coordination: Coordination normal.   Psychiatric:         Behavior: Behavior normal. Judgment: Judgment normal.         DIAGNOSTIC RESULTS     EKG: All EKG's are interpreted by the Emergency Department Physician who either signs or Co-signs this chart in the absence of a cardiologist.        RADIOLOGY:   Non-plain film images such as CT, Ultrasound and MRI are read by the radiologist. Plainradiographic images are visualized and preliminarily interpreted by the emergency physician with the below findings:        Interpretation per the Radiologist below, if available at the time of this note:    CT Chest W Contrast   Final Result   1. No evidence of pneumothorax or lung contusion. No mediastinal   hematoma. 2. Atheromatous disease of the thoracic aorta and coronary arteries. Heart size is prominent. 3. Moderate size hiatal hernia. Diffuse thickening of the wall of the   thoracic esophagus. Correlate with any history of esophagitis and   reflux disease. 4. Centrilobular and paraseptal emphysema. Old granulomatous disease. 5. Demineralized bones. No definite acute fracture. The full report of this exam was immediately signed and available to   the emergency room. The patient is currently in the emergency room. Signed by Dr Ghulam Martinez on 9/12/2020 1:16 PM      CT Cervical Spine WO Contrast   Final Result   1. Type I dens fracture without displacement. There is a fairly large   erosion in the posterior dens related to pannus formation. The type   one fracture is seen best on the sagittal images anterior to the   erosion. Therefore, this is likely a pathologic fracture given the   presence of the erosion. 2. Degenerative changes throughout the cervical spine. 3. Dense carotid artery calcification in the neck. There is also some   calcification involving the left vertebral artery in the neck. Results of the dens fracture were called to Dr. Melissa Quintana in the   emergency room at 1:30 PM on 9/12/2020.    Signed by Dr Ghulam Martinez on 9/12/2020 1:31 PM      CT ABDOMEN PELVIS W IV CONTRAST Additional Contrast? None   Final Result   1. Prior cholecystectomy. Intrahepatic and extrahepatic biliary tree   dilatation is likely related to reservoir effect. No visible calcified   stone is seen within the ductal system. 2. Diffuse cortical thinning of both kidneys. The kidneys are small in   size. The left kidney measures 5.8 cm on the right kidney measures 5.7   cm. The renal arteries are covered by the aortoiliac stent graft. 3. Prior bladder resection. An ostomy in the right lower abdomen/upper   pelvis is likely a ureteral diversion. 4. Patent aortoiliac stent graft. The native aortic lumen measures 5.5   cm transversely. There is some contrast enhancement anteriorly in the   native lumen suggesting endoleak. I do not see a connecting vessel   that is patent at the endoleak. The superior mesenteric artery and   celiac plexus are also covered by the stent graft. 5. No definite acute bowel abnormality. No oral contrast was given and   therefore evaluation is very limited. The patient also has very little   intra-abdominal fat. The full report of this exam was immediately signed and available to   the emergency room. The patient is currently in the emergency room. Signed by Dr Amtia Guzman on 9/12/2020 1:10 PM      CT Head WO Contrast   Final Result   1. No hemorrhage, edema or mass effect. 2. Moderate to severe atrophy. Moderate to severe dilatation of the   lateral and third ventricles is likely related. Hydrocephalus is not   excluded. 3. Low-density in the hemispheric white matter is nonspecific and most   likely due to chronic small vessel disease. The full report of this exam was immediately signed and available to   the emergency room. The patient is currently in the emergency room. Signed by Dr Amita Guzman on 9/12/2020 12:56 PM      XR CHEST PORTABLE   Final Result   1. Mild chronic changes. 2. No evidence of lung contusion, pneumothorax or mediastinal   widening. 3. The patient has a chest CT already scheduled. Please see that   report separately. Signed by Dr Mauro Massey on 9/12/2020 12:14 PM            ED BEDSIDE ULTRASOUND:   Performed by ED Physician - none    LABS:  Labs Reviewed   CBC WITH AUTO DIFFERENTIAL - Abnormal; Notable for the following components:       Result Value    RBC 3.41 (*)     Hemoglobin 10.9 (*)     Hematocrit 33.5 (*)     MCV 98.2 (*)     MCH 32.0 (*)     MCHC 32.5 (*)     RDW 15.3 (*)     Platelets 417 (*)     Neutrophils % 79.6 (*)     Lymphocytes % 9.7 (*)     Lymphocytes Absolute 0.7 (*)     All other components within normal limits   COMPREHENSIVE METABOLIC PANEL W/ REFLEX TO MG FOR LOW K - Abnormal; Notable for the following components:    Glucose 160 (*)     BUN 33 (*)     CREATININE 3.5 (*)     GFR Non- 17 (*)     GFR  21 (*)     Total Protein 6.4 (*)     All other components within normal limits   LIPASE - Abnormal; Notable for the following components:    Lipase 12 (*)     All other components within normal limits   TROPONIN - Abnormal; Notable for the following components:    Troponin 0.26 (*)     All other components within normal limits    Narrative:     CALL  [facility]  K[facility] tel. ,  Chemistry results called to and read back by Joanie Cantu RN in ED, 09/12/2020  12:08, by [address] - Abnormal; Notable for the following components:    Pro-BNP 9,156 (*)     All other components within normal limits   URINE RT REFLEX TO CULTURE       All other labs were within normal range or not returned as of this dictation.     Medications   DOPamine (INTROPIN) 400 mg in dextrose 5 % 250 mL infusion (2.5 mcg/kg/min × 49.9 kg Intravenous New Bag 9/12/20 1252)   iopamidol (ISOVUE-370) 76 % injection 90 mL (90 mLs Intravenous Given 9/12/20 1242)   atropine injection 1 mg (1 mg Intravenous Given 9/12/20 1254)       EMERGENCY DEPARTMENT COURSE and DIFFERENTIALDIAGNOSIS/MDM:   Vitals:    Vitals:    09/12/20 1152 09/12/20 1203 09/12/20 1233 09/12/20 1303   BP: 130/68 (!) 121/33 (!) 150/74 (!) 128/41   Pulse: 57 69 70 (!) 39   Resp: 13 14 16 16   Temp:       SpO2: 95% 95% 93% 100%   Weight:       Height:           MDM    ED Course as of Sep 12 1442   Sat Sep 12, 2020   1141 Initial EKG was unremarkable. After a few minutes in the department, patient was noted to have irregular heart rate with what appeared to be periods of asystole. Patient was set back up on the twelve-lead machine and rhythm strips showed dropped P waves. Underlying rhythm is difficult to discern but appears to represent a high degree heart block, likely second-degree but could be complete block with atrial bigeminy    [SONAL]   1150 Pads were placed on patient and he was moved to resuscitation room. Work-up for traumatic injury was initiated along with work-up for possible cardiac ischemia. [SONAL]   0379 Discussed with cardiology. At this point given patient is hemodynamically stable, plan for admission to hospitalist service with plan to hold amiodarone and metoprolol. [SONAL]   1239 Several sinus pauses were captured on telemetry. Cardiology was updated with the rhythm strips. [SONAL]   4025 Cardiology at bedside. Patient was updated and is agreeable to proceeding with temporary pacemaker placement. [SONAL]      ED Course User Index  [SONAL] Sarah Riley MD     Patient had several pauses with nonconducted P waves lasting up to 9 seconds. CT shows a type I dens fracture, unclear whether this is acute or not as patient is adamant he did not hit his head and has no neck pain. Based on the evaluation and work-up here patient is felt to require further monitoring, work-up, or treatment that is available in the emergency department. Case was discussed with hospitalist who agrees for observation or admission for further management.   Treatment and stabilization as necessary were provided in the emergency department prior to transfer of care to the medicine ICU service. CONSULTS:  IP CONSULT TO CARDIOLOGY    PROCEDURES:  Unless otherwise notedbelow, none     Procedures  CRITICAL CARE TIME   Total Critical Care time was >30 minutes, excluding separately reportable procedures. There was a high probability of clinically significant/life threatening deterioration in the patient's condition which required my urgent intervention. FINAL IMPRESSION     1. Hypotensive episode    2. Heart block    3. ESRD on dialysis ([address]    4. History of repair of thoracic aortic aneurysm    5. Chronic systolic congestive heart failure [facility])          DISPOSITION/PLAN   DISPOSITION Decision To Admit 09/12/2020 12:46:18 PM      PATIENT REFERRED TO:  No follow-up provider specified.     DISCHARGE MEDICATIONS:  Current Discharge Medication List             (Please note that portions of this note were completed with a voice recognition program.  Efforts were made to edit the dictations butoccasionally words are mis-transcribed.)    Adolfo Mathews MD (electronically signed)  AttendingEmergency Physician    gabi Mathews., MD  09/12/20 0085

## 2020-09-12 NOTE — PROGRESS NOTES
Timothy Vaca arrived to room 144 by bed from cath lab post temporary pacemaker placement. VSS. Patient sleeping, lying flat, and will continue to monitor.

## 2020-09-13 NOTE — CONSULTS
Renal Consult Note    Thank you to requesting provider: Dr Ling Zhong, for asking us to see Clovis Lo    Reason for consultation:  ESRD    Chief Complaint: Bradycardia      History of Presenting Illness      Patient is a 67 y.o. man who was admitted after a near syncopal episode when he was on dialysis. He was found bradycardic. At the emergency department, his heart rate was in the low 30s. He is currently in ICU and he has a temporary transvenous pacemaker. Patient has also reported a fall 2 days ago with head trauma. CT of his neck shows a type I dens fracture without displacement. He was seen by neurosurgery and has a neck brace. Renal service was consulted to manage his ESRD. Patient was having malaise. No dyspnea. He didn't appear to be very comfortable and was in fact a bit irritable.      Past Medical/Surgical History      Active Ambulatory Problems     Diagnosis Date Noted    Complicated UTI (urinary tract infection) 03/18/2016    Sepsis ([address] 03/18/2016    JOY (acute kidney injury) ([address] 03/18/2016    Hypokalemia 03/18/2016    Lower abdominal pain 03/18/2016    Hydronephrosis of right kidney     Diabetes mellitus ([address] 03/22/2016    COPD (chronic obstructive pulmonary disease) ([address] 03/22/2016    Hypertension 03/22/2016    CAD (coronary artery disease) 03/22/2016    Coronary artery disease involving native heart without angina pectoris     Chronic obstructive pulmonary disease (HCC)     Type 2 diabetes mellitus with diabetic chronic kidney disease ([address]     Essential hypertension      Resolved Ambulatory Problems     Diagnosis Date Noted    Dehydration 03/18/2016     Past Medical History:   Diagnosis Date    Anemia in chronic kidney disease (CODE)     Atherosclerotic heart disease     Bladder cancer ([address]     Cancer ([address]     Chronic kidney disease     End stage renal disease ([address]     Enterocolitis due to Clostridioides difficile     GERD (gastroesophageal reflux disease)     [facility]     Hyperlipidemia     Other chronic pain     [facility]     Vitamin D deficiency          Review of Systems     Constitutional:  No weight loss, no fever/chills  Eyes:  No eye pain, no eye redness  Cardiovascular:  No chest pain, no worsening of edema  Respiratory:  No hemoptysis, no stidor  Gastrointestinal:  No blood in stool, no n/v, no diarrhea  Genitoruinary:  No hematuria, no difficulty with urination  Musculoskeletal:  No joint swelling, no redness  Integumentary:  No Rash, no itching  Neurological:  No focal weakness, No new sensory deficit  Psychiatric:  No depression, no confusion  Endocrine:  No polyuria, no polydipsia       Medications        Current Facility-Administered Medications:     enoxaparin (LOVENOX) injection 30 mg, 30 mg, Subcutaneous, Q24H, Shaylee Cox, DO    oxyCODONE (ROXICODONE) immediate release tablet 5 mg, 5 mg, Oral, Q4H PRN, Shaylee Cox, DO    ipratropium (ATROVENT) 0.02 % nebulizer solution 0.5 mg, 0.5 mg, Nebulization, 4x daily, Shaylee Cox, , 0.5 mg at 09/13/20 1014    sodium chloride flush 0.9 % injection 10 mL, 10 mL, Intravenous, 2 times per day, Shaylee Cox DO, 10 mL at 09/13/20 0953    sodium chloride flush 0.9 % injection 10 mL, 10 mL, Intravenous, PRN, Shaylee Sewelling, DO    acetaminophen (TYLENOL) tablet 650 mg, 650 mg, Oral, Q6H PRN, 650 mg at 09/12/20 1751 **OR** acetaminophen (TYLENOL) suppository 650 mg, 650 mg, Rectal, Q6H PRN, Shaylee Sewelling, DO    magnesium hydroxide (MILK OF MAGNESIA) 400 MG/5ML suspension 30 mL, 30 mL, Oral, Daily PRN, Shaylee Sewelling, DO    promethazine (PHENERGAN) tablet 12.5 mg, 12.5 mg, Oral, Q6H PRN **OR** ondansetron (ZOFRAN) injection 4 mg, 4 mg, Intravenous, Q6H PRN, Shaylee Sewelling, DO    morphine injection 2 mg, 2 mg, Intravenous, Q4H PRN, Shaylee Cox DO, 2 mg at 09/13/20 0805    insulin glargine (LANTUS) injection vial 6 Units, 6 Units, Subcutaneous, Nightly, Elliott Hernandes MD, 6 Units at 09/12/20 2145    glucose (GLUTOSE) 40 % oral gel 15 g, 15 g, Oral, PRNElliott MD    dextrose 50 % IV solution, 12.5 g, Intravenous, PRNElliott MD    glucagon (rDNA) injection 1 mg, 1 mg, Intramuscular, PRNElliott MD    dextrose 5 % solution, 100 mL/hr, Intravenous, PRNElliott MD    insulin lispro (HUMALOG) injection vial 0-6 Units, 0-6 Units, Subcutaneous, TID WCElliott MD    insulin lispro (HUMALOG) injection vial 0-3 Units, 0-3 Units, Subcutaneous, Nightly, Elliott Hernandes MD  Outpatient Medications Marked as Taking for the 9/12/20 encounter [facility] Encounter)   Medication Sig Dispense Refill    VITAMIN D PO Take by mouth      ipratropium (ATROVENT) 0.02 % nebulizer solution Take 0.5 mg by nebulization 4 times daily      Hypromellose (ISOPTO TEARS OP) Apply to eye      nicotine (NICODERM CQ) 14 MG/24HR Place 1 patch onto the skin every 24 hours      omeprazole (PRILOSEC) 10 MG delayed release capsule Take 10 mg by mouth daily      calcium carbonate (OSCAL) 500 MG TABS tablet Take 1,000 mg by mouth daily      vitamin D 1000 UNITS CAPS Take 3 capsules by mouth daily       insulin lispro (HUMALOG) 100 UNIT/ML injection vial Inject 5 Units into the skin 3 times daily (before meals)      metoprolol (LOPRESSOR) 25 MG tablet Take 25 mg by mouth every other day MON, WED, FRI, SUN         Allergies   Sulfa antibiotics    Family History       Family History   Problem Relation Age of Onset    Diabetes Mother      Family history negative for kidney disease.      Social History      Social History     Socioeconomic History    Marital status:      Spouse name: None    Number of children: None    Years of education: None    Highest education level: None   Occupational History    None   Social Needs    Financial resource strain: None    Food insecurity     Worry: None Inability: None    Transportation needs     Medical: None     Non-medical: None   Tobacco Use    Smoking status: Current Every Day Smoker     Packs/day: 2.00     Years: 55.00     Pack years: 110.00    Smokeless tobacco: Current User   Substance and Sexual Activity    Alcohol use: No    Drug use: No    Sexual activity: Not Currently   Lifestyle    Physical activity     Days per week: None     Minutes per session: None    Stress: None   Relationships    Social connections     Talks on phone: None     Gets together: None     Attends Denominational service: None     Active member of club or organization: None     Attends meetings of clubs or organizations: None     Relationship status: None    Intimate partner violence     Fear of current or ex partner: None     Emotionally abused: None     Physically abused: None     Forced sexual activity: None   Other Topics Concern    None   Social History Narrative    None       Physical Exam     Blood pressure (!) 143/41, pulse 60, temperature 98.2 °F (36.8 °C), temperature source Temporal, resp. rate 12, height 5' 7\" (1.702 m), weight 112 lb 6.4 oz (51 kg), SpO2 95 %.     General:  NAD, A+Ox3, ill-appearing, normal body habitus  HEENT:  No apparent external trauma, cervical collar in place  Neck:  Supple, normal range of movement  Chest:  CTAB, good respiratory effort, good air movement  CV:  RRR, soft systolic murmur  Abdomen:  NTND, soft, +BS, no hepatosplenomegaly  Extremities:  No peripheral edema  Neurological:  Moving all four extremities  Lymphatics:  No palpable lymph nodes   Skin:  No rash, no jaundice  Psychiatric:  Normal insight and judgement, good recall    Data     Recent Labs     09/12/20  1132 09/13/20  0347   WBC 7.1 6.9   HGB 10.9* 10.2*   HCT 33.5* 32.0*   MCV 98.2* 100.0*   * 168     Recent Labs     09/12/20  1132 09/13/20  0538    140   K 4.3 4.0   CL 98 102   CO2 27 21*   GLUCOSE 160* 56*   BUN 33* 38*   CREATININE 3.5* 4.3*   LABGLOM 17* 13*   GFRAA 21* 16*       Assessment:  1. ESRD (on dialysis TTS)  2. S/p fall and head neck fracture  3. Severe bradycardia (neatr syncope)  4. Anemia of CKD  5. Type 2 DM with renal disease      Plan:  · Follow up labs. Will provide dialysis on TTS during this hospital stay. Avoid hypotension.  For pacemake placement in AM.     Thank you for asking us to participate in the management of your patient, please do not hesitate to contact me for any concerns regarding my recommendations as outlined above.    -----------------------------  Electronically signed by Filemon Ramos MD on 9/13/20 at 12:07 PM CDT

## 2020-09-13 NOTE — PLAN OF CARE
Problem: Falls - Risk of:  Goal: Will remain free from falls  Description: Will remain free from falls  Outcome: Met This Shift  Goal: Absence of physical injury  Description: Absence of physical injury  Outcome: Met This Shift     Problem: Cardiac:  Goal: Ability to maintain vital signs within normal range will improve  Description: Ability to maintain vital signs within normal range will improve  Outcome: Met This Shift     Problem: Skin Integrity:  Goal: Will show no infection signs and symptoms  Description: Will show no infection signs and symptoms  Outcome: Ongoing  Goal: Absence of new skin breakdown  Description: Absence of new skin breakdown  Outcome: Ongoing     Problem: Pain:  Goal: Pain level will decrease  Description: Pain level will decrease  Outcome: Ongoing  Goal: Control of acute pain  Description: Control of acute pain  Outcome: Ongoing  Goal: Control of chronic pain  Description: Control of chronic pain  Outcome: Ongoing     Problem: Cardiac:  Goal: Cardiovascular alteration will improve  Description: Cardiovascular alteration will improve  Outcome: Ongoing     Problem: Physical Regulation:  Goal: Complications related to the disease process, condition or treatment will be avoided or minimized  Description: Complications related to the disease process, condition or treatment will be avoided or minimized  Outcome: Ongoing

## 2020-09-13 NOTE — PROGRESS NOTES
Cardiology Daily Note Dorys Delgadillo MD      Patient:  Maryellen Gonzalez  [id number]    Patient Active Problem List    Diagnosis Date Noted    Complicated UTI (urinary tract infection) 03/18/2016     Priority: High    Sepsis ([facility] 03/18/2016     Priority: High    Diabetes mellitus ([facility] 03/22/2016     Priority: Medium    COPD (chronic obstructive pulmonary disease) ([facility] 03/22/2016     Priority: Medium    Hypertension 03/22/2016     Priority: Medium    CAD (coronary artery disease) 03/22/2016     Priority: Medium    JOY (acute kidney injury) ([facility] 03/18/2016     Priority: Medium    Lower abdominal pain 03/18/2016     Priority: Medium    Hydronephrosis of right kidney      Priority: Medium    Bradycardia 09/12/2020     Priority: Low    Coronary artery disease involving native heart without angina pectoris      Priority: Low    Chronic obstructive pulmonary disease (HCC)      Priority: Low    Type 2 diabetes mellitus with diabetic chronic kidney disease ([facility]      Priority: Low    Essential hypertension      Priority: Low    Hypokalemia 03/18/2016     Priority: Low       Admit Date:  9/12/2020    Admission Problem List: Present on Admission:   Bradycardia      Cardiac Specific Data:  Specialty Problems        Cardiology Problems    CAD (coronary artery disease)        Hypertension        Coronary artery disease involving native heart without angina pectoris        Essential hypertension        Bradycardia              Subjective:  Mr. Jose Angel Tate feels ok. Pacer dependant. Objective:   BP (!) 156/44   Pulse 60   Temp 98.2 °F (36.8 °C) (Temporal)   Resp 17   Ht 5' 7\" (1.702 m)   Wt 112 lb 6.4 oz (51 kg)   SpO2 95%   BMI 17.60 kg/m²       Intake/Output Summary (Last 24 hours) at 9/13/2020 1122  Last data filed at 9/13/2020 1000  Gross per 24 hour   Intake 430 ml   Output 625 ml   Net -195 ml       Prior to Admission medications    Medication Sig Start Date End Date Taking?  Authorizing Provider VITAMIN D PO Take by mouth   Yes Historical Provider, MD   ipratropium (ATROVENT) 0.02 % nebulizer solution Take 0.5 mg by nebulization 4 times daily   Yes Historical Provider, MD   Hypromellose (ISOPTO TEARS OP) Apply to eye   Yes Historical Provider, MD   nicotine (NICODERM CQ) 14 MG/24HR Place 1 patch onto the skin every 24 hours   Yes Historical Provider, MD   omeprazole (PRILOSEC) 10 MG delayed release capsule Take 10 mg by mouth daily   Yes Historical Provider, MD   calcium carbonate (OSCAL) 500 MG TABS tablet Take 1,000 mg by mouth daily   Yes Historical Provider, MD   vitamin D 1000 UNITS CAPS Take 3 capsules by mouth daily    Yes Historical Provider, MD   insulin lispro (HUMALOG) 100 UNIT/ML injection vial Inject 5 Units into the skin 3 times daily (before meals)   Yes Historical Provider, MD   metoprolol (LOPRESSOR) 25 MG tablet Take 25 mg by mouth every other day MON, WED, FRI, SUN   Yes Historical Provider, MD   amiodarone (CORDARONE) 200 MG tablet Take 200 mg by mouth daily    Historical Provider, MD   Ferric Citrate (AURYXIA) 1  MG(Fe) TABS Take 2 tablets by mouth 3 times daily     Historical Provider, MD   isosorbide dinitrate (ISORDIL) 10 MG tablet Take 10 mg by mouth 3 times daily    Historical Provider, MD   insulin glargine (LANTUS) 100 UNIT/ML injection vial Inject 12 Units into the skin nightly     Historical Provider, MD   midodrine (PROAMATINE) 5 MG tablet Take 5 mg by mouth 3 times daily    Historical Provider, MD   oxyCODONE 5 MG capsule Take 5 mg by mouth every 6 hours as needed for Pain.      Historical Provider, MD   budesonide-formoterol (SYMBICORT) 160-4.5 MCG/ACT AERO Inhale 2 puffs into the lungs 2 times daily     Historical Provider, MD   famotidine (PEPCID) 20 MG tablet Take 20 mg by mouth daily    Historical Provider, MD   acetaminophen 650 MG TABS Take 650 mg by mouth every 4 hours as needed  Patient taking differently: Take 650 mg by mouth every 6 hours as needed for Pain 3/25/16   Gunner Olivares MD   warfarin (COUMADIN) 4 MG tablet Take 1 tablet by mouth daily Or as directed by MD to keep INR 2-3 3/25/16   Gunner Olivares MD   aspirin 81 MG chewable tablet Take 81 mg by mouth daily    Historical Provider, MD   atorvastatin (LIPITOR) 20 MG tablet Take 20 mg by mouth daily     Historical Provider, MD   insulin aspart (NOVOLOG) 100 UNIT/ML injection vial Inject 7 Units into the skin nightly SLIDING SCALE    Historical Provider, MD        enoxaparin  30 mg Subcutaneous Q24H    ipratropium  0.5 mg Nebulization 4x daily    sodium chloride flush  10 mL Intravenous 2 times per day    insulin glargine  6 Units Subcutaneous Nightly    insulin lispro  0-6 Units Subcutaneous TID WC    insulin lispro  0-3 Units Subcutaneous Nightly       TELEMETRY: paced     Physical Exam:      Physical Exam      General:  Awake, alert, NAD  Skin:  Warm and dry  Neck:  no jvd , no carotid bruits  Chest:  Clear to auscultation, no wheezing or rales  Cardiovascular:  RRR S1S2 . 3/6 ESM Aortic area. No clicks, gallups, or rubs  Abdomen:  Soft nontender, nondistended, bowel sounds present  Extremities:  Edema: none left Radial Artery withnl palpable pulses; Hematoma: No  Neuro: Intact neurovascular function Yes    Lab Data:  CBC:   Recent Labs     09/12/20  1132 09/13/20  0347   WBC 7.1 6.9   HGB 10.9* 10.2*   HCT 33.5* 32.0*   MCV 98.2* 100.0*   * 168     BMP:   Recent Labs     09/12/20  1132 09/13/20  0538    140   K 4.3 4.0   CL 98 102   CO2 27 21*   BUN 33* 38*   CREATININE 3.5* 4.3*     LIVER PROFILE:   Recent Labs     09/12/20  1132 09/13/20  0538   AST 8 7   ALT 16 13   LIPASE 12*  --    BILITOT 0.3 <0.2   ALKPHOS 79 67     PT/INR:   Recent Labs     09/13/20  0347   PROTIME 14.5   INR 1.13     APTT: No results for input(s): APTT in the last 72 hours. BNP:  No results for input(s): BNP in the last 72 hours.   CK, CKMB, Troponin: @LABRCNT (CKTOTAL:3, CKMB:3, TROPONINI:3)@    IMAGING:  Ct Head Wo Contrast    Result Date: 9/12/2020  EXAMINATION:  CT HEAD WO CONTRAST  9/12/2020 12:53 PM HISTORY: The patient fell. Rule out intracranial injury. TECHNIQUE: Multiple axial images were obtained through the brain without contrast infusion. Multiplanar images were reconstructed. DLP: 675 mGy-cm. Automated exposure control was utilized. COMPARISON: No comparison study. FINDINGS: There are no hemorrhage, edema or mass effect. There is moderate to severe atrophy. There is moderate to severe dilatation of the lateral and third ventricles. There is low-density in the hemispheric white matter that is nonspecific. The visualized paranasal sinuses and mastoid air cells are clear. No calvarial fracture is seen. There is biparietal calvarial thinning. 1. No hemorrhage, edema or mass effect. 2. Moderate to severe atrophy. Moderate to severe dilatation of the lateral and third ventricles is likely related. Hydrocephalus is not excluded. 3. Low-density in the hemispheric white matter is nonspecific and most likely due to chronic small vessel disease. The full report of this exam was immediately signed and available to the emergency room. The patient is currently in the emergency room. Signed by Dr Margaret Baez on 9/12/2020 12:56 PM    Ct Chest W Contrast    Result Date: 9/12/2020  EXAMINATION:  CT CHEST W CONTRAST  9/12/2020 1:11 PM HISTORY: The patient fell. Anterior chest skin tear. Hypotension during dialysis. Normal chest x-ray. COMPARISON: No comparison study. DLP: 320 mGy-cm. Automated exposure control was utilized. TECHNIQUE: Spiral CT was performed of the chest with contrast. Multiplanar images were reconstructed. MEDIASTINUM/VASCULAR: There is atheromatous calcification of the thoracic aorta and coronary arteries. Heart size is upper limits of normal. Pulmonary arteries are normal in caliber. There are no enlarged mediastinal lymph nodes. There is a moderate size hiatal hernia. There is wall thickening of the esophagus in the chest diffusely. LUNGS: There is centrilobular emphysema. There is paraseptal emphysema. There are few scattered calcified granulomas. There is no airspace consolidation or pleural effusion. There is no pneumothorax or lung contusion. BONES AND SOFT TISSUES: The clavicles are intact. The scapulae are intact. No definite rib fracture is seen. No visible acute thoracic spine fracture is seen. There are some degenerative changes of the spine. The bones are demineralized. The sternum appears to be intact. UPPER ABDOMEN: Please see the abdomen and pelvis CT report separately. 1. No evidence of pneumothorax or lung contusion. No mediastinal hematoma. 2. Atheromatous disease of the thoracic aorta and coronary arteries. Heart size is prominent. 3. Moderate size hiatal hernia. Diffuse thickening of the wall of the thoracic esophagus. Correlate with any history of esophagitis and reflux disease. 4. Centrilobular and paraseptal emphysema. Old granulomatous disease. 5. Demineralized bones. No definite acute fracture. The full report of this exam was immediately signed and available to the emergency room. The patient is currently in the emergency room. Signed by Dr Elio Mccray on 9/12/2020 1:16 PM    Ct Cervical Spine Wo Contrast    Result Date: 9/12/2020  EXAMINATION:  CT CERVICAL SPINE WO CONTRAST  9/12/2020 1:17 PM HISTORY: The patient fell. Rule out fracture. No x-rays obtained. TECHNIQUE: Spiral CT was performed of the cervical spine. Sagittal and coronal images were reconstructed. COMPARISON: No comparison study. DLP: 260 mGy-cm. Automated exposure control was utilized. FINDINGS: There is fairly prominent pannus formation along the posterior aspect of the dens. There is a fairly large dens erosion located posteriorly. There is a nondisplaced fracture line anteriorly that is likely related to the weakening of the dens related to the erosion.  Therefore, this is likely a pathologic fracture. This is fairly high on the dens and I will classify this as a type I injury. There is no displacement. There is no disruption of the anterior vertebral line or the posterior spinolaminar line. No other cervical spine fracture is identified. There is fairly severe carotid artery calcification in the neck bilaterally. There is vertebral artery calcification on the left. At C2-3, there is moderate to severe disc narrowing. There is severe facet arthropathy. There is mild to moderate foraminal narrowing bilaterally. No central spinal canal narrowing. At C3-4, there is moderate disc narrowing. There is minimal anterior subluxation of C3 compared to C4. There is bilateral uncinate spurring and bilateral facet arthropathy. There is severe bilateral foraminal stenosis at this level. At C4-5, there is moderate disc narrowing. There is uncinate spurring and facet arthropathy. The right-sided facet joint is fused. There is severe right and moderate left-sided foraminal stenosis. At C5-6, there is disc narrowing with spondylitic and uncinate spurring. There is bilateral facet arthropathy. There is no significant central spinal canal narrowing. There is mild to moderate bilateral foraminal stenosis. At C6-7, there is moderate to severe disc narrowing. There is spondylitic and uncinate spurring. There is anterior spurring. There is facet arthropathy left greater than right. There is mild narrowing of the central spinal canal at this level. There is moderate right and mild left foraminal stenosis. At C7-T1, there is moderate to severe disc narrowing. There is mild anterior subluxation of C7 compared to T1. There is some scoliosis of the cervical-thoracic Junction in this area. This causes moderate to severe left-sided foraminal stenosis at this level. There is disc degeneration at multiple thoracic disc levels included on the cervical spine CT. No significant spinal or foraminal stenosis is visualized.     1. Type I dens fracture without displacement. There is a fairly large erosion in the posterior dens related to pannus formation. The type one fracture is seen best on the sagittal images anterior to the erosion. Therefore, this is likely a pathologic fracture given the presence of the erosion. 2. Degenerative changes throughout the cervical spine. 3. Dense carotid artery calcification in the neck. There is also some calcification involving the left vertebral artery in the neck. Results of the dens fracture were called to Dr. Donte Toro in the emergency room at 1:30 PM on 9/12/2020. Signed by Dr Tabitha Castellanos on 9/12/2020 1:31 PM    Ct Abdomen Pelvis W Iv Contrast Additional Contrast? None    Result Date: 9/12/2020  EXAMINATION:  CT ABDOMEN PELVIS W IV CONTRAST  9/12/2020 1:00 PM HISTORY: The patient fell. Epigastric pain. Prior surgical resection of the urinary bladder and appendectomy. TECHNIQUE: Spiral CT was performed of the abdomen and pelvis with contrast. Multiplanar images were reconstructed. DLP: 320 mGy-cm. Automated exposure control was utilized. COMPARISON: No comparison study. LUNG BASES: The lung bases are clear. There is a moderate size hiatal hernia. LIVER AND SPLEEN: Prior cholecystectomy. There is intrahepatic and extrahepatic biliary tree dilatation probably related to reservoir effect. No visible obstructing stone is seen. The liver is otherwise unremarkable. The spleen is normal in size. PANCREAS: There is no pancreatic mass or inflammatory change. KIDNEYS AND ADRENALS: The adrenal glands are normal in size. There is cortical thinning of both kidneys. Both kidneys are small in size. The left kidney measures 5.8 cm and the right kidney measures 5.7 cm. The urinary bladder is surgically absent. There is an aortoiliac stent graft. The graft extends above the origins of both renal arteries. It also covers the origins of the celiac plexus and superior mesenteric arteries.  The native aortic lumen measures 5.5 cm in transverse diameter. There is mild enhancement within the native lumen anteriorly suggesting an endoleak. The aortoiliac stent graft is patent. BOWEL: No oral contrast was given. There is underdistention of the stomach. Small bowel loops are nondilated. There is an ostomy in the right mid to lower abdomen likely related to the history of ureteral diversion. Some of the colon is underdistended. There is moderate to large stool in the colon. OTHER: There are degenerative changes of the spine. 1. Prior cholecystectomy. Intrahepatic and extrahepatic biliary tree dilatation is likely related to reservoir effect. No visible calcified stone is seen within the ductal system. 2. Diffuse cortical thinning of both kidneys. The kidneys are small in size. The left kidney measures 5.8 cm on the right kidney measures 5.7 cm. The renal arteries are covered by the aortoiliac stent graft. 3. Prior bladder resection. An ostomy in the right lower abdomen/upper pelvis is likely a ureteral diversion. 4. Patent aortoiliac stent graft. The native aortic lumen measures 5.5 cm transversely. There is some contrast enhancement anteriorly in the native lumen suggesting endoleak. I do not see a connecting vessel that is patent at the endoleak. The superior mesenteric artery and celiac plexus are also covered by the stent graft. 5. No definite acute bowel abnormality. No oral contrast was given and therefore evaluation is very limited. The patient also has very little intra-abdominal fat. The full report of this exam was immediately signed and available to the emergency room. The patient is currently in the emergency room. Signed by Dr Nat Sharp on 9/12/2020 1:10 PM    Xr Chest Portable    Result Date: 9/12/2020  EXAMINATION:  XR CHEST PORTABLE  9/12/2020 12:13 PM HISTORY: The patient fell. Chest pain. COMPARISON: 3/23/2016. FINDINGS:  There is no evidence of lung contusion or pneumothorax. There is no mediastinal widening.  The heart is normal in size. The thoracic aorta is atheromatous. There is no dense infiltrate or effusion. There is mild biapical scarring. 1. Mild chronic changes. 2. No evidence of lung contusion, pneumothorax or mediastinal widening. 3. The patient has a chest CT already scheduled. Please see that report separately. Signed by Dr Margaret Baez on 9/12/2020 12:14 PM        Assessment:  1. Advanced ab block with pause 8 sec  2. S/p fall  3. ESRD  4. DM  5. CAD s/p stents in past  6. PVD s/p aortic stent graft.        Recommendations:     S/p Temporary pacer   ECHO for EF: normal EF. Severe AS. Hold metoprolol and amio. Permanent pacer tomorrow. D/w pt .     Jennifer Wei MD 9/13/2020 11:22 AM

## 2020-09-13 NOTE — PROGRESS NOTES
Hospitalist Progress Note    Patient:  Trang Hancock  YOB: 1945  Date of Service: 9/13/2020  MRN: [id number]   Acct: [de-identified]   Primary Care Physician: Unknown Provider Result (Inactive)  Advance Directive: Full Code  Admit Date: 9/12/2020       Hospital Day: 1  Referring Provider: Yeimi Li DO    Patient Seen, Chart, Consults, Notes, Labs, Radiology studies reviewed. Subjective:  Trang Hancock is a 67 y.o. male  whom we are following for symptomatic bradycardia, dens fracture, end-stage renal disease. He did fairly well overnight. He is still requiring pacing. Hemodynamics are stable. He has quite a bit of pain. Neurologically he is intact. No plans for surgery for his neck at this point. He may need a permanent pacemaker. Will defer to cardiology.     Allergies:  Sulfa antibiotics    Medicines:  Current Facility-Administered Medications   Medication Dose Route Frequency Provider Last Rate Last Dose    enoxaparin (LOVENOX) injection 30 mg  30 mg Subcutaneous Q24H Yeimi Li DO        DOPamine (INTROPIN) 400 mg in dextrose 5 % 250 mL infusion  2.5 mcg/kg/min Intravenous Continuous Claudia Kramer MD 4.7 mL/hr at 09/12/20 1252 2.5 mcg/kg/min at 09/12/20 1252    sodium chloride flush 0.9 % injection 10 mL  10 mL Intravenous 2 times per day Yeimi Li DO   10 mL at 09/12/20 1955    sodium chloride flush 0.9 % injection 10 mL  10 mL Intravenous PRN Yeimi Li DO        acetaminophen (TYLENOL) tablet 650 mg  650 mg Oral Q6H PRN Yeimi Li DO   650 mg at 09/12/20 1751    Or    acetaminophen (TYLENOL) suppository 650 mg  650 mg Rectal Q6H PRN Yeimi Li DO        magnesium hydroxide (MILK OF MAGNESIA) 400 MG/5ML suspension 30 mL  30 mL Oral Daily PRN Yeimi Li DO        promethazine (PHENERGAN) tablet 12.5 mg  12.5 mg Oral Q6H PRN Yeimi Li DO        Or    [facility] injection 4 mg  4 mg Intravenous Q6H PRN Jessi Bonillae, DO        morphine injection 2 mg  2 mg Intravenous Q4H PRN Jessi Bonillae, DO   2 mg at 09/13/20 0404    insulin glargine (LANTUS) injection vial 6 Units  6 Units Subcutaneous Nightly Jamilah Wong MD   6 Units at 09/12/20 2145    glucose (GLUTOSE) 40 % oral gel 15 g  15 g Oral PRN Jamilah Wong MD        dextrose 50 % IV solution  12.5 g Intravenous PRN Jamilah Wong MD        glucagon (rDNA) injection 1 mg  1 mg Intramuscular PRN Jamilah Wong MD        dextrose 5 % solution  100 mL/hr Intravenous IZAIAH Wong MD        insulin lispro (HUMALOG) injection vial 0-6 Units  0-6 Units Subcutaneous TID WC Jamilah Wong MD        insulin lispro (HUMALOG) injection vial 0-3 Units  0-3 Units Subcutaneous Nightly Jmailah Wong MD           Past Medical History:  Past Medical History:   Diagnosis Date    Anemia in chronic kidney disease (CODE)     Atherosclerotic heart disease     [facility] [facility]     CAD (coronary artery disease)     Cancer ([facility]     Chronic kidney disease     COPD [facility]chronic obstructive pulmonary disease) [facility] [facility]     Diabetes mellitus ([facility] [facility]     End stage renal disease ([facility] [facility]     Enterocolitis due to Clostridioides difficile     GERD (gastroesophageal reflux disease)     Hemodialysis patient ([facility] [facility]     Hyperlipidemia     Hypertension     Other chronic pain     Prostate cancer ([facility] [facility]     Vitamin D deficiency        Past Surgical History:  Past Surgical History:   Procedure Laterality Date    ABDOMEN SURGERY      esophageal CA, used part of stomach     APPENDECTOMY      [facility]    [facility]ARDIAC CATHETERIZATION      [facility], had stents, unknown details    URETEROSTOMY         Family History  Family History   Problem Relation Age of Onset    Diabetes Mother        Social History  Social History     Socioeconomic History    Marital status:      Spouse name: Not on file    Number of temperature source Temporal, resp. rate 12, height 5' 7\" (1.702 m), weight 112 lb 6.4 oz (51 kg), SpO2 94 %. Intake/Output Summary (Last 24 hours) at 9/13/2020 0745  Last data filed at 9/13/2020 0600  Gross per 24 hour   Intake 370 ml   Output 475 ml   Net -105 ml       Physical Exam  Vitals signs reviewed. Constitutional:       Appearance: He is well-developed. HENT:      Head: Normocephalic and atraumatic. Eyes:      Conjunctiva/sclera: Conjunctivae normal.      Pupils: Pupils are equal, round, and reactive to light. Cardiovascular:      Rate and Rhythm: Normal rate and regular rhythm. Heart sounds: Murmur present. Pulmonary:      Effort: Pulmonary effort is normal.      Breath sounds: Normal breath sounds. Abdominal:      General: Abdomen is flat. Skin:     General: Skin is warm and dry. Neurological:      Mental Status: He is alert and oriented to person, place, and time. Labs:  BMP:   Recent Labs     09/12/20  1132 09/13/20  0538    140   K 4.3 4.0   CL 98 102   CO2 27 21*   BUN 33* 38*   CREATININE 3.5* 4.3*   CALCIUM 9.4 8.6*     CBC:   Recent Labs     09/12/20  1132 09/13/20  0347   WBC 7.1 6.9   HGB 10.9* 10.2*   HCT 33.5* 32.0*   MCV 98.2* 100.0*   * 168     LIVER PROFILE:   Recent Labs     09/12/20  1132 09/13/20  0538   AST 8 7   ALT 16 13   LIPASE 12*  --    BILITOT 0.3 <0.2   ALKPHOS 79 67     PT/INR:   Recent Labs     09/13/20  0347   PROTIME 14.5   INR 1.13     APTT: No results for input(s): APTT in the last 72 hours. BNP:  No results for input(s): BNP in the last 72 hours. Ionized Calcium:No results for input(s): IONCA in the last 72 hours. Magnesium:No results for input(s): MG in the last 72 hours. Phosphorus:No results for input(s): PHOS in the last 72 hours. HgbA1C: No results for input(s): LABA1C in the last 72 hours.   Hepatic:   Recent Labs     09/12/20  1132 09/13/20  0538   ALKPHOS 79 67   ALT 16 13   AST 8 7   PROT 6.4* 6.0*   BILITOT 0.3 <0.2   LABALBU 4.0 3.5     Lactic Acid: No results for input(s): LACTA in the last 72 hours. Troponin: No results for input(s): CKTOTAL, CKMB, TROPONINT in the last 72 hours. ABGs: No results for input(s): PH, PCO2, PO2, HCO3, O2SAT in the last 72 hours. CRP:  No results for input(s): CRP in the last 72 hours. Sed Rate:  No results for input(s): SEDRATE in the last 72 hours. Cultures:   No results for input(s): CULTURE in the last 72 hours. No results for input(s): BC, Lonia Wilfrido in the last 72 hours. No results for input(s): CXSURG in the last 72 hours. Radiology reports as per the Radiologist  Radiology: Ct Head Wo Contrast    Result Date: 9/12/2020  EXAMINATION:  CT HEAD WO CONTRAST  9/12/2020 12:53 PM HISTORY: The patient fell. Rule out intracranial injury. TECHNIQUE: Multiple axial images were obtained through the brain without contrast infusion. Multiplanar images were reconstructed. DLP: 675 mGy-cm. Automated exposure control was utilized. COMPARISON: No comparison study. FINDINGS: There are no hemorrhage, edema or mass effect. There is moderate to severe atrophy. There is moderate to severe dilatation of the lateral and third ventricles. There is low-density in the hemispheric white matter that is nonspecific. The visualized paranasal sinuses and mastoid air cells are clear. No calvarial fracture is seen. There is biparietal calvarial thinning. 1. No hemorrhage, edema or mass effect. 2. Moderate to severe atrophy. Moderate to severe dilatation of the lateral and third ventricles is likely related. Hydrocephalus is not excluded. 3. Low-density in the hemispheric white matter is nonspecific and most likely due to chronic small vessel disease. The full report of this exam was immediately signed and available to the emergency room. The patient is currently in the emergency room.  Signed by Dr Tana Valle on 9/12/2020 12:56 PM    Ct Chest W Contrast    Result Date: 9/12/2020  EXAMINATION:  CT SPINE WO CONTRAST  9/12/2020 1:17 PM HISTORY: The patient fell. Rule out fracture. No x-rays obtained. TECHNIQUE: Spiral CT was performed of the cervical spine. Sagittal and coronal images were reconstructed. COMPARISON: No comparison study. DLP: 260 mGy-cm. Automated exposure control was utilized. FINDINGS: There is fairly prominent pannus formation along the posterior aspect of the dens. There is a fairly large dens erosion located posteriorly. There is a nondisplaced fracture line anteriorly that is likely related to the weakening of the dens related to the erosion. Therefore, this is likely a pathologic fracture. This is fairly high on the dens and I will classify this as a type I injury. There is no displacement. There is no disruption of the anterior vertebral line or the posterior spinolaminar line. No other cervical spine fracture is identified. There is fairly severe carotid artery calcification in the neck bilaterally. There is vertebral artery calcification on the left. At C2-3, there is moderate to severe disc narrowing. There is severe facet arthropathy. There is mild to moderate foraminal narrowing bilaterally. No central spinal canal narrowing. At C3-4, there is moderate disc narrowing. There is minimal anterior subluxation of C3 compared to C4. There is bilateral uncinate spurring and bilateral facet arthropathy. There is severe bilateral foraminal stenosis at this level. At C4-5, there is moderate disc narrowing. There is uncinate spurring and facet arthropathy. The right-sided facet joint is fused. There is severe right and moderate left-sided foraminal stenosis. At C5-6, there is disc narrowing with spondylitic and uncinate spurring. There is bilateral facet arthropathy. There is no significant central spinal canal narrowing. There is mild to moderate bilateral foraminal stenosis. At C6-7, there is moderate to severe disc narrowing. There is spondylitic and uncinate spurring.  There is anterior stone is seen. The liver is otherwise unremarkable. The spleen is normal in size. PANCREAS: There is no pancreatic mass or inflammatory change. KIDNEYS AND ADRENALS: The adrenal glands are normal in size. There is cortical thinning of both kidneys. Both kidneys are small in size. The left kidney measures 5.8 cm and the right kidney measures 5.7 cm. The urinary bladder is surgically absent. There is an aortoiliac stent graft. The graft extends above the origins of both renal arteries. It also covers the origins of the celiac plexus and superior mesenteric arteries. The native aortic lumen measures 5.5 cm in transverse diameter. There is mild enhancement within the native lumen anteriorly suggesting an endoleak. The aortoiliac stent graft is patent. BOWEL: No oral contrast was given. There is underdistention of the stomach. Small bowel loops are nondilated. There is an ostomy in the right mid to lower abdomen likely related to the history of ureteral diversion. Some of the colon is underdistended. There is moderate to large stool in the colon. OTHER: There are degenerative changes of the spine. 1. Prior cholecystectomy. Intrahepatic and extrahepatic biliary tree dilatation is likely related to reservoir effect. No visible calcified stone is seen within the ductal system. 2. Diffuse cortical thinning of both kidneys. The kidneys are small in size. The left kidney measures 5.8 cm on the right kidney measures 5.7 cm. The renal arteries are covered by the aortoiliac stent graft. 3. Prior bladder resection. An ostomy in the right lower abdomen/upper pelvis is likely a ureteral diversion. 4. Patent aortoiliac stent graft. The native aortic lumen measures 5.5 cm transversely. There is some contrast enhancement anteriorly in the native lumen suggesting endoleak. I do not see a connecting vessel that is patent at the endoleak. The superior mesenteric artery and celiac plexus are also covered by the stent graft.  5. No

## 2020-09-13 NOTE — PROGRESS NOTES
[facility]  Progress Note    LAST 24 HRS:  No major issues overnight. Remains neurologically stable. Complains of neck pain. HANSEN well    CHIEF COMPLAINT:  Hx of fall, odontoid fracture    HISTORY OF PRESENT ILLNESS:      The patient is a 67 y.o. male who presented to the ED with chest pain and hypotension while at dialysis today. Upon evaluation here he was noted to have bradycardia with a AV block that required placement of a temporary pacemaker by Dr. Norris Drake. He was also noted to have fallen while at his nursing home. He complained of some neck pain. CT revealed a non-displaced Type 1 odontoid fracture for which I was asked to evaluate. He complains of mild neck pain without any clear radicular pains. No new weakness of loss of sensation. The patient does take antiplatelet medication.  ASA      Past Medical History:   Diagnosis Date    Anemia in chronic kidney disease (CODE)     Atherosclerotic heart disease     [facility] [facility]     CAD (coronary artery disease)     Cancer ([facility] [facility]     Chronic kidney disease     COPD (chronic obstructive pulmonary disease) ([facility] [address] [facility]     Diabetes mellitus ([address]     End stage renal disease ([address]     Enterocolitis due to Clostridioides difficile     GERD (gastroesophageal reflux disease)     Hemodialysis [address] ([address]     Hyperlipidemia     Hypertension     Other chronic pain     Prostate cancer ([address]     Vitamin D deficiency        Past Surgical History:   Procedure Laterality Date    ABDOMEN SURGERY      esophageal CA, used part of stomach     APPENDECTOMY      [facility]    CARDIAC CATHETERIZATION      [facility], had stents, unknown details    URETEROSTOMY          Medications    Current Facility-Administered Medications:     enoxaparin (LOVENOX) injection 30 mg, 30 mg, Subcutaneous, Q24H, Katheryn PelaezfDO    oxyCODONE (ROXICODONE) immediate release tablet 5 mg, 5 mg, Oral, Q4H PRN, Katheryn Ankit, DO    ipratropium (ATROVENT) 0.02 % nebulizer solution 0.5 mg, 0.5 mg, Nebulization, 4x daily, Joe Chang,     sodium chloride flush 0.9 % injection 10 mL, 10 mL, Intravenous, 2 times per day, Joe Chang, DO, 10 mL at 09/12/20 1955    sodium chloride flush 0.9 % injection 10 mL, 10 mL, Intravenous, PRN, Joe Chang, DO    acetaminophen (TYLENOL) tablet 650 mg, 650 mg, Oral, Q6H PRN, 650 mg at 09/12/20 1751 **OR** acetaminophen (TYLENOL) suppository 650 mg, 650 mg, Rectal, Q6H PRN, Joe Chang,     magnesium hydroxide (MILK OF MAGNESIA) 400 MG/5ML suspension 30 mL, 30 mL, Oral, Daily PRN, Joe Chang,     promethazine (PHENERGAN) tablet 12.5 mg, 12.5 mg, Oral, Q6H PRN **OR** ondansetron (ZOFRAN) injection 4 mg, 4 mg, Intravenous, Q6H PRN, Joe Chang,     morphine injection 2 mg, 2 mg, Intravenous, Q4H PRN, Joe Chang, , 2 mg at 09/13/20 0805    insulin glargine (LANTUS) injection vial 6 Units, 6 Units, Subcutaneous, Nightly, Meme August MD, 6 Units at 09/12/20 2145    glucose (GLUTOSE) 40 % oral gel 15 g, 15 g, Oral, PRN, Meme August MD    dextrose 50 % IV solution, 12.5 g, Intravenous, PRN, Meme August MD    glucagon (rDNA) injection 1 mg, 1 mg, Intramuscular, PRN, Meme August MD    dextrose 5 % solution, 100 mL/hr, Intravenous, PRN, Meme August MD    insulin lispro (HUMALOG) injection vial 0-6 Units, 0-6 Units, Subcutaneous, TID WC, Meme August MD    insulin lispro (HUMALOG) injection vial 0-3 Units, 0-3 Units, Subcutaneous, Nightly, Meme August MD  Sulfa antibiotics    Social History  Social History     Tobacco Use   Smoking Status Current Every Day Smoker    Packs/day: 2.00    Years: 55.00    Pack years: 110.00   Smokeless Tobacco Current User     Social History     Substance and Sexual Activity   Alcohol Use No         Family History   Problem Relation Age of Onset    Diabetes Mother REVIEW OF SYSTEMS:  Constitutional: No fevers No chills  Neck:No stiffness  Respiratory: No shortness of breath  Cardiovascular: + chest pain   Gastrointestinal: No abdominal pain    Genitourinary: No Dysuria  Neurological: No headache, no confusion    PHYSICAL EXAM:  Vitals:    09/13/20 0700   BP: (!) 143/65   Pulse: 60   Resp: 12   Temp:    SpO2: 94%       Constitutional: The patient appears well-developed and well-nourished. Eyes - conjunctiva normal.  Conjugate Gaze  Ear, nose, throat - No scars, masses, or lesions over external nose or ears, no atrophy of tongue  Neck-symmetric, no masses noted, no jugular vein distension  Respiration- chest wall appears symmetric, good expansion, normal effort without use of accessory muscles  Musculoskeletal - no significant wasting of muscles noted, no bony deformities  Extremities-no clubbing, cyanosis or edema  Skin - warm, dry, and intact. No rash, erythema, or pallor. Psychiatric - mood, affect, and behavior appear normal.     Neurologic Examination  Awake, Alert and oriented x 4  Normal speech pattern, following commands  Motor 5/5 all extremities, no drift  EOMI, CN II-XII intact  No deficits to light touch   No Homans or hyperreflexia    DATA:  Nursing/pcp notes, imaging,labs and vitals reviewed.      PT,OT and/or speech notes reviewed    Lab Results   Component Value Date    WBC 6.9 09/13/2020    HGB 10.2 (L) 09/13/2020    HCT 32.0 (L) 09/13/2020    .0 (H) 09/13/2020     09/13/2020     Lab Results   Component Value Date     09/13/2020    K 4.0 09/13/2020     09/13/2020    CO2 21 (L) 09/13/2020    BUN 38 (H) 09/13/2020    CREATININE 4.3 (H) 09/13/2020    GLUCOSE 56 (L) 09/13/2020    CALCIUM 8.6 (L) 09/13/2020    PROT 6.0 (L) 09/13/2020    LABALBU 3.5 09/13/2020    BILITOT <0.2 09/13/2020    ALKPHOS 67 09/13/2020    AST 7 09/13/2020    ALT 13 09/13/2020    LABGLOM 13 (A) 09/13/2020    GFRAA 16 (L) 09/13/2020     Lab Results Component Value Date    INR 1.13 09/13/2020    INR 2.87 (H) 03/25/2016    INR 1.76 (H) 03/24/2016    PROTIME 14.5 09/13/2020    PROTIME 28.7 (H) 03/25/2016    PROTIME 19.8 (H) 03/24/2016     EXAMINATION:  CT CERVICAL SPINE WO CONTRAST  9/12/2020 1:17 PM    HISTORY: The patient fell. Rule out fracture. No x-rays obtained. TECHNIQUE: Spiral CT was performed of the cervical spine. Sagittal and    coronal images were reconstructed. COMPARISON: No comparison study. DLP: 260 mGy-cm. Automated exposure control was utilized. FINDINGS: There is fairly prominent pannus formation along the    posterior aspect of the dens. There is a fairly large dens erosion    located posteriorly. There is a nondisplaced fracture line anteriorly    that is likely related to the weakening of the dens related to the    erosion. Therefore, this is likely a pathologic fracture. This is    fairly high on the dens and I will classify this as a type I injury. There is no displacement. There is no disruption of the anterior    vertebral line or the posterior spinolaminar line. No other cervical    spine fracture is identified. There is fairly severe carotid artery    calcification in the neck bilaterally. There is vertebral artery    calcification on the left. At C2-3, there is moderate to severe disc narrowing. There is severe    facet arthropathy. There is mild to moderate foraminal narrowing    bilaterally. No central spinal canal narrowing. At C3-4, there is moderate disc narrowing. There is minimal anterior    subluxation of C3 compared to C4. There is bilateral uncinate spurring    and bilateral facet arthropathy. There is severe bilateral foraminal    stenosis at this level. At C4-5, there is moderate disc narrowing. There is uncinate spurring    and facet arthropathy. The right-sided facet joint is fused. There is    severe right and moderate left-sided foraminal stenosis.     At C5-6, there is disc narrowing with spondylitic and uncinate    spurring. There is bilateral facet arthropathy. There is no    significant central spinal canal narrowing. There is mild to moderate    bilateral foraminal stenosis. At C6-7, there is moderate to severe disc narrowing. There is    spondylitic and uncinate spurring. There is anterior spurring. There    is facet arthropathy left greater than right. There is mild narrowing    of the central spinal canal at this level. There is moderate right and    mild left foraminal stenosis. At C7-T1, there is moderate to severe disc narrowing. There is mild    anterior subluxation of C7 compared to T1. There is some scoliosis of    the cervical-thoracic Junction in this area. This causes moderate to    severe left-sided foraminal stenosis at this level. There is disc degeneration at multiple thoracic disc levels included    on the cervical spine CT. No significant spinal or foraminal stenosis    is visualized.         Impression    1. Type I dens fracture without displacement. There is a fairly large    erosion in the posterior dens related to pannus formation. The type    one fracture is seen best on the sagittal images anterior to the    erosion. Therefore, this is likely a pathologic fracture given the    presence of the erosion. 2. Degenerative changes throughout the cervical spine. 3. Dense carotid artery calcification in the neck. There is also some    calcification involving the left vertebral artery in the neck. Results of the dens fracture were called to Dr. Josee Baer in the    emergency room at 1:30 PM on 9/12/2020.     Signed by Dr Malick Davies on 9/12/2020 1:31 PM            IMPRESSION  67year old male s/p fall with neck pain and a type I non-displaced odontoid fracture, neurologically stable    RECOMMENDATIONS:    Patient remains neurologically stable  No urgent neurosurgical intervention warranted  The type I odontoid fracture is stable in nature and can be treated with hard

## 2020-09-14 NOTE — PROGRESS NOTES
[facility]  Progress Note    LAST 24 HRS:  No major issues overnight. Remains neurologically stable. Complains of neck pain that is unchanged. HANSEN well. Going for pacemaker placement this morning. CHIEF COMPLAINT:  Hx of fall, odontoid fracture    HISTORY OF PRESENT ILLNESS:      The patient is a 67 y.o. male who presented to the ED with chest pain and hypotension while at dialysis today. Upon evaluation here he was noted to have bradycardia with a AV block that required placement of a temporary pacemaker by Dr. Kylah Milan. He was also noted to have fallen while at his nursing home. He complained of some neck pain. CT revealed a non-displaced Type 1 odontoid fracture for which I was asked to evaluate. He complains of mild neck pain without any clear radicular pains. No new weakness of loss of sensation. The patient does take antiplatelet medication.  ASA      Past Medical History:   Diagnosis Date    Anemia in chronic kidney disease (CODE)     Atherosclerotic heart disease     [facility]     CAD (coronary artery disease)     Cancer ([facility]     Chronic kidney disease     COPD (chronic obstructive pulmonary disease) ([facility] [facility]     Diabetes mellitus ([address]     End stage renal disease ([facility] [address] [facility]     Enterocolitis due to Clostridioides difficile     GERD (gastroesophageal reflux disease)     Hemodialysis patient ([facility] [address] [facility]     Hyperlipidemia     Hypertension     Other chronic pain     Prostate cancer ([facility] [address] [facility]     Vitamin D deficiency        Past Surgical History:   Procedure Laterality Date    ABDOMEN SURGERY      esophageal CA, used part of stomach     APPENDECTOMY      [facility]    CARDIAC [facility]HETERIZATION      [facility], had stents, unknown details    URETEROSTOMY          Medications    Current Facility-Administered Medications:     enoxaparin (LOVENOX) injection 30 mg, 30 mg, Subcutaneous, Q24H, Etha Juancarlos, DO, 30 mg at 09/13/20 2714   oxyCODONE (ROXICODONE) immediate release tablet 5 mg, 5 mg, Oral, Q4H PRN, Eduardo Thornton DO, 5 mg at 09/14/20 0544    ipratropium (ATROVENT) 0.02 % nebulizer solution 0.5 mg, 0.5 mg, Nebulization, 4x daily, Eduardo Thornton DO, 0.5 mg at 09/14/20 0700    sodium chloride flush 0.9 % injection 10 mL, 10 mL, Intravenous, 2 times per day, Eduardo Thornton DO, 10 mL at 09/13/20 2059    sodium chloride flush 0.9 % injection 10 mL, 10 mL, Intravenous, PRN, Eduardo Thornton DO    acetaminophen (TYLENOL) tablet 650 mg, 650 mg, Oral, Q6H PRN, 650 mg at 09/14/20 0007 **OR** acetaminophen (TYLENOL) suppository 650 mg, 650 mg, Rectal, Q6H PRN, Eduardo Thornton DO    magnesium hydroxide (MILK OF MAGNESIA) 400 MG/5ML suspension 30 mL, 30 mL, Oral, Daily PRN, Eduardo Thornton DO    promethazine (PHENERGAN) tablet 12.5 mg, 12.5 mg, Oral, Q6H PRN **OR** ondansetron (ZOFRAN) injection 4 mg, 4 mg, Intravenous, Q6H PRN, Eduardo Thornton DO    morphine injection 2 mg, 2 mg, Intravenous, Q4H PRN, Eduardo Thornton DO, 2 mg at 09/13/20 1651    insulin glargine (LANTUS) injection vial 6 Units, 6 Units, Subcutaneous, Nightly, Manav Hernadez MD, 6 Units at 09/13/20 2059    glucose (GLUTOSE) 40 % oral gel 15 g, 15 g, Oral, PRN, Manav Hernadez MD    dextrose 50 % IV solution, 12.5 g, Intravenous, PRN, Manav Hernadez MD, 12.5 g at 09/14/20 0622    glucagon (rDNA) injection 1 mg, 1 mg, Intramuscular, PRN, Manav Hernadez MD    dextrose 5 % solution, 100 mL/hr, Intravenous, PRN, MD Laura Wilson  insulin lispro (HUMALOG) injection vial 0-6 Units, 0-6 Units, Subcutaneous, TID WC, Manav Hernadez MD    insulin lispro (HUMALOG) injection vial 0-3 Units, 0-3 Units, Subcutaneous, Nightly, Manav Hernadez MD  Sulfa antibiotics    Social History  Social History     Tobacco Use   Smoking Status Current Every Day Smoker    Packs/day: 2.00    Years: 55.00    Pack years: 110.00   Smokeless Tobacco Current User     Social History     Substance and Sexual Activity   Alcohol Use No         Family History   Problem Relation Age of Onset    Diabetes Mother          REVIEW OF SYSTEMS:  Constitutional: No fevers No chills  Neck:No stiffness  Respiratory: No shortness of breath  Cardiovascular: + chest pain   Gastrointestinal: No abdominal pain    Genitourinary: No Dysuria  Neurological: No headache, no confusion    PHYSICAL EXAM:  Vitals:    09/14/20 0700   BP: (!) 152/56   Pulse: 59   Resp: 16   Temp:    SpO2: 95%       Constitutional: The patient appears well-developed and well-nourished. Eyes - conjunctiva normal.  Conjugate Gaze  Ear, nose, throat - No scars, masses, or lesions over external nose or ears, no atrophy of tongue  Neck-symmetric, no masses noted, no jugular vein distension  Respiration- chest wall appears symmetric, good expansion, normal effort without use of accessory muscles  Musculoskeletal - no significant wasting of muscles noted, no bony deformities  Extremities-no clubbing, cyanosis or edema  Skin - warm, dry, and intact. No rash, erythema, or pallor. Psychiatric - mood, affect, and behavior appear normal.     Neurologic Examination  Awake, Alert and oriented x 4  Normal speech pattern, following commands  Motor 5/5 all extremities, no drift  EOMI, CN II-XII intact  No deficits to light touch   No Homans or hyperreflexia    DATA:  Nursing/pcp notes, imaging,labs and vitals reviewed.      PT,OT and/or speech notes reviewed    Lab Results   Component Value Date    WBC 6.9 09/13/2020    HGB 10.2 (L) 09/13/2020    HCT 32.0 (L) 09/13/2020    .0 (H) 09/13/2020     09/13/2020     Lab Results   Component Value Date     09/14/2020    K 4.6 09/14/2020     09/14/2020    CO2 19 (L) 09/14/2020    BUN 44 (H) 09/14/2020    CREATININE 4.9 (H) 09/14/2020    GLUCOSE 44 (LL) 09/14/2020    CALCIUM 8.9 09/14/2020    PROT 6.0 (L) 09/13/2020    LABALBU 3.5 09/13/2020 Patient remains neurologically stable  No urgent neurosurgical intervention warranted  Again, the type I odontoid fracture is stable in nature and can be treated with hard collar for at least 6 weeks  Hard cervical collar in place. Keep cervical spine in neutral position during pacemaker placement. He may sit up or get OOB with assistance from a neurosurgical standpoint. JUNAID Crowley     Neurosurgery Attending  I have reviewed this case thoroughly with the provider above. I have seen and evaluated this patient myself. I have reviewed all the imaging studies, labs, notes etc. within the chart. I agree. Patient remains neurologically stable. Neck pain stable. Hard collar in place. For placement of pacemaker today.       Cassi Torres, DO

## 2020-09-14 NOTE — PROGRESS NOTES
CATHETERIZATION      [facility], had stents, unknown details    URETEROSTOMY         Family History  Family History   Problem Relation Age of Onset    Diabetes Mother        Social History  Social History     Socioeconomic History    Marital status:      Spouse name: Not on file    Number of children: Not on file    Years of education: Not on file    Highest education level: Not on file   Occupational History    Not on file   Social Needs    Financial resource strain: Not on file    Food insecurity     Worry: Not on file     Inability: Not on file    Transportation needs     Medical: Not on file     Non-medical: Not on file   Tobacco Use    Smoking status: Current Every Day Smoker     Packs/day: 2.00     Years: 55.00     Pack years: 110.00    Smokeless tobacco: Current User   Substance and Sexual Activity    Alcohol use: No    Drug use: No    Sexual activity: Not Currently   Lifestyle    Physical activity     Days per week: Not on file     Minutes per session: Not on file    Stress: Not on file   Relationships    Social connections     Talks on phone: Not on file     Gets together: Not on file     Attends Sikh service: Not on file     Active member of club or organization: Not on file     Attends meetings of clubs or organizations: Not on file     Relationship status: Not on file    Intimate partner violence     Fear of current or ex partner: Not on file     Emotionally abused: Not on file     Physically abused: Not on file     Forced sexual activity: Not on file   Other Topics Concern    Not on file   Social History Narrative    Not on file         Review of Systems:  History obtained from chart review and the patient  General ROS: No fever or chills  Respiratory ROS: positive for - shortness of breath  Cardiovascular ROS: No chest pain or palpitations  Gastrointestinal ROS: No abdominal pain or melena  Genito-Urinary ROS: No dysuria or hematuria  Musculoskeletal ROS: No joint pain or swelling   14 point ROS reviewed with the patient and negative except as noted above and in the HPI unless unable to obtain. Objective:  Patient Vitals for the past 24 hrs:   BP Temp Temp src Pulse Resp SpO2 Weight   09/14/20 0900 (!) 154/44 -- -- 60 12 96 % --   09/14/20 0800 (!) 149/50 -- -- 65 13 96 % --   09/14/20 0700 (!) 152/56 -- -- 59 16 95 % --   09/14/20 0600 (!) 142/50 -- -- 60 10 97 % --   09/14/20 0503 (!) 166/52 -- -- 79 18 97 % --   09/14/20 0400 (!) 122/46 97.6 °F (36.4 °C) Temporal 60 18 95 % 112 lb 4.8 oz (50.9 kg)   09/14/20 0300 (!) 158/52 -- -- 60 13 96 % --   09/14/20 0200 (!) 131/47 -- -- 60 14 96 % --   09/14/20 0100 (!) 142/50 -- -- 60 16 92 % --   09/14/20 0000 (!) 162/53 99.1 °F (37.3 °C) Temporal 60 16 97 % --   09/13/20 2300 (!) 126/45 -- -- 60 (!) 7 95 % --   09/13/20 2200 (!) 137/44 -- -- 60 10 96 % --   09/13/20 2100 (!) 176/49 -- -- 60 15 95 % --   09/13/20 2033 (!) 156/42 -- -- 60 13 95 % --   09/13/20 2000 (!) 174/41 98.4 °F (36.9 °C) Temporal 60 16 96 % --   09/13/20 1930 (!) 142/51 -- -- 60 16 96 % --   09/13/20 1900 (!) 172/54 -- -- 60 18 94 % --   09/13/20 1824 -- -- -- -- 15 96 % --   09/13/20 1800 (!) 156/45 -- -- 60 16 -- --   09/13/20 1700 (!) 171/52 -- Temporal 60 18 -- --   09/13/20 1600 (!) 153/52 98.5 °F (36.9 °C) Temporal 60 13 -- --   09/13/20 1500 (!) 166/53 -- -- 60 16 98 % --   09/13/20 1400 (!) 132/44 -- -- 60 17 96 % --   09/13/20 1300 (!) 164/40 -- -- 60 15 94 % --   09/13/20 1200 (!) 143/41 98.2 °F (36.8 °C) Temporal 60 12 95 % --   09/13/20 1100 (!) 155/42 -- -- 60 13 94 % --   09/13/20 1000 (!) 156/43 -- -- 60 12 94 % --       Intake/Output Summary (Last 24 hours) at 9/14/2020 0916  Last data filed at 9/14/2020 0400  Gross per 24 hour   Intake 470 ml   Output 375 ml   Net 95 ml     General: awake/alert   HEENT: Normocephalic atraumatic head  Neck: Supple/neck collar.   Chest:  clear to auscultation bilaterally  CVS: regular rate and emphysema. There are few scattered calcified granulomas. There is no airspace consolidation or pleural effusion. There is no pneumothorax or lung contusion. BONES AND SOFT TISSUES: The clavicles are intact. The scapulae are intact. No definite rib fracture is seen. No visible acute thoracic spine fracture is seen. There are some degenerative changes of the spine. The bones are demineralized. The sternum appears to be intact. UPPER ABDOMEN: Please see the abdomen and pelvis CT report separately. 1. No evidence of pneumothorax or lung contusion. No mediastinal hematoma. 2. Atheromatous disease of the thoracic aorta and coronary arteries. Heart size is prominent. 3. Moderate size hiatal hernia. Diffuse thickening of the wall of the thoracic esophagus. Correlate with any history of esophagitis and reflux disease. 4. Centrilobular and paraseptal emphysema. Old granulomatous disease. 5. Demineralized bones. No definite acute fracture. The full report of this exam was immediately signed and available to the emergency room. The patient is currently in the emergency room. Signed by Dr Edenilson Tavares on 9/12/2020 1:16 PM    Ct Cervical Spine Wo Contrast    Result Date: 9/12/2020  EXAMINATION:  CT CERVICAL SPINE WO CONTRAST  9/12/2020 1:17 PM HISTORY: The patient fell. Rule out fracture. No x-rays obtained. TECHNIQUE: Spiral CT was performed of the cervical spine. Sagittal and coronal images were reconstructed. COMPARISON: No comparison study. DLP: 260 mGy-cm. Automated exposure control was utilized. FINDINGS: There is fairly prominent pannus formation along the posterior aspect of the dens. There is a fairly large dens erosion located posteriorly. There is a nondisplaced fracture line anteriorly that is likely related to the weakening of the dens related to the erosion. Therefore, this is likely a pathologic fracture. This is fairly high on the dens and I will classify this as a type I injury. There is no displacement. There is no disruption of the anterior vertebral line or the posterior spinolaminar line. No other cervical spine fracture is identified. There is fairly severe carotid artery calcification in the neck bilaterally. There is vertebral artery calcification on the left. At C2-3, there is moderate to severe disc narrowing. There is severe facet arthropathy. There is mild to moderate foraminal narrowing bilaterally. No central spinal canal narrowing. At C3-4, there is moderate disc narrowing. There is minimal anterior subluxation of C3 compared to C4. There is bilateral uncinate spurring and bilateral facet arthropathy. There is severe bilateral foraminal stenosis at this level. At C4-5, there is moderate disc narrowing. There is uncinate spurring and facet arthropathy. The right-sided facet joint is fused. There is severe right and moderate left-sided foraminal stenosis. At C5-6, there is disc narrowing with spondylitic and uncinate spurring. There is bilateral facet arthropathy. There is no significant central spinal canal narrowing. There is mild to moderate bilateral foraminal stenosis. At C6-7, there is moderate to severe disc narrowing. There is spondylitic and uncinate spurring. There is anterior spurring. There is facet arthropathy left greater than right. There is mild narrowing of the central spinal canal at this level. There is moderate right and mild left foraminal stenosis. At C7-T1, there is moderate to severe disc narrowing. There is mild anterior subluxation of C7 compared to T1. There is some scoliosis of the cervical-thoracic Junction in this area. This causes moderate to severe left-sided foraminal stenosis at this level. There is disc degeneration at multiple thoracic disc levels included on the cervical spine CT. No significant spinal or foraminal stenosis is visualized. 1. Type I dens fracture without displacement. There is a fairly large erosion in the posterior dens related to pannus formation. The type one fracture is seen best on the sagittal images anterior to the erosion. Therefore, this is likely a pathologic fracture given the presence of the erosion. 2. Degenerative changes throughout the cervical spine. 3. Dense carotid artery calcification in the neck. There is also some calcification involving the left vertebral artery in the neck. Results of the dens fracture were called to Dr. Nataliya Costello in the emergency room at 1:30 PM on 9/12/2020. Signed by Dr Roberto Frank on 9/12/2020 1:31 PM    Ct Abdomen Pelvis W Iv Contrast Additional Contrast? None    Result Date: 9/12/2020  EXAMINATION:  CT ABDOMEN PELVIS W IV CONTRAST  9/12/2020 1:00 PM HISTORY: The patient fell. Epigastric pain. Prior surgical resection of the urinary bladder and appendectomy. TECHNIQUE: Spiral CT was performed of the abdomen and pelvis with contrast. Multiplanar images were reconstructed. DLP: 320 mGy-cm. Automated exposure control was utilized. COMPARISON: No comparison study. LUNG BASES: The lung bases are clear. There is a moderate size hiatal hernia. LIVER AND SPLEEN: Prior cholecystectomy. There is intrahepatic and extrahepatic biliary tree dilatation probably related to reservoir effect. No visible obstructing stone is seen. The liver is otherwise unremarkable. The spleen is normal in size. PANCREAS: There is no pancreatic mass or inflammatory change. KIDNEYS AND ADRENALS: The adrenal glands are normal in size. There is cortical thinning of both kidneys. Both kidneys are small in size. The left kidney measures 5.8 cm and the right kidney measures 5.7 cm. The urinary bladder is surgically absent. There is an aortoiliac stent graft. The graft extends above the origins of both renal arteries. It also covers the origins of the celiac plexus and superior mesenteric arteries. The native aortic lumen measures 5.5 cm in transverse diameter. There is mild enhancement within the native lumen anteriorly suggesting an endoleak.  The biapical scarring. 1. Mild chronic changes. 2. No evidence of lung contusion, pneumothorax or mediastinal widening. 3. The patient has a chest CT already scheduled. Please see that report separately. Signed by Dr Navarro Has on 9/12/2020 12:14 PM       Assessment   1. End-stage renal disease on maintenance dialysis. 2.  Status post fall and neck fracture. 3.  Severe bradycardia/recurrent syncopal episode. 4.  Anemia of chronic kidney disease. 5.  Type II diabetic nephropathy. 6.  Secondary hyperparathyroidism. 7.  Hyperphosphatemia. Plan:  1. Patient will receive routine hemodialysis treatment tomorrow. 2.  He is scheduled to have pacemaker implant today. 3.  Continue transvenous pacemaker.   4.  Plan was discussed with registered nurse    Camron Garcia MD  09/14/20  9:16 AM

## 2020-09-14 NOTE — PROGRESS NOTES
Ry Willl, DO        morphine injection 2 mg  2 mg Intravenous Q4H PRN Silver Lennox, DO   2 mg at 09/13/20 1651    glucose (GLUTOSE) 40 % oral gel 15 g  15 g Oral PRN Lesvia Suarez MD        dextrose 50 % IV solution  12.5 g Intravenous PRN Lesvia Suarez MD   12.5 g at 09/14/20 0622    glucagon (rDNA) injection 1 mg  1 mg Intramuscular PRN Lesvia Suarez MD        dextrose 5 % solution  100 mL/hr Intravenous PRN MD Trenton Waite insulin lispro (HUMALOG) injection vial 0-6 Units  0-6 Units Subcutaneous TID WC MD Trenton Waite insulin lispro (HUMALOG) injection vial 0-3 Units  0-3 Units Subcutaneous Nightly Lesvia Suarez MD           Past Medical History:  Past Medical History:   Diagnosis Date    Anemia in chronic kidney disease (CODE)     Atherosclerotic heart disease     [facility]     CAD (coronary artery disease)     Cancer ([facility]     Chronic kidney disease     COPD [facility]chronic obstructive pulmonary disease) [facility]     Diabetes mellitus ([facility] [facility]     End stage renal disease ([facility] [facility]     Enterocolitis due to Clostridioides difficile     GERD (gastroesophageal reflux disease)     Hemodialysis patient ([facility] [facility]     Hyperlipidemia     Hypertension     Other chronic pain     Prostate cancer ([facility] [address] [facility]     Vitamin D deficiency        Past Surgical History:  Past Surgical History:   Procedure Laterality Date    ABDOMEN SURGERY      esophageal CA, used part of stomach     APPENDECTOMY      [facility]    [facility]ARDIAC CATHETERIZATION      [facility], had stents, unknown details    URETEROSTOMY         Family History  Family History   Problem Relation Age of Onset    Diabetes Mother        Social History  Social History     Socioeconomic History    Marital status:      Spouse name: Not on file    Number of children: Not on file    Years of education: Not on file    Highest education level: Not on file   Occupational History    Not on file Social Needs    Financial resource strain: Not on file    Food insecurity     Worry: Not on file     Inability: Not on file    Transportation needs     Medical: Not on file     Non-medical: Not on file   Tobacco Use    Smoking status: Current Every Day Smoker     Packs/day: 2.00     Years: 55.00     Pack years: 110.00    Smokeless tobacco: Current User   Substance and Sexual Activity    Alcohol use: No    Drug use: No    Sexual activity: Not Currently   Lifestyle    Physical activity     Days per week: Not on file     Minutes per session: Not on file    Stress: Not on file   Relationships    Social connections     Talks on phone: Not on file     Gets together: Not on file     Attends Buddhist service: Not on file     Active member of club or organization: Not on file     Attends meetings of clubs or organizations: Not on file     Relationship status: Not on file    Intimate partner violence     Fear of current or ex partner: Not on file     Emotionally abused: Not on file     Physically abused: Not on file     Forced sexual activity: Not on file   Other Topics Concern    Not on file   Social History Narrative    Not on file         Review of Systems:    Review of Systems   Constitutional: Negative for activity change and fever. Respiratory: Negative for shortness of breath. Cardiovascular: Negative for chest pain and leg swelling. Gastrointestinal: Negative for constipation, diarrhea, nausea and vomiting. Genitourinary: Negative for difficulty urinating and dysuria. Musculoskeletal: Positive for gait problem, neck pain and neck stiffness. Negative for back pain. Neurological: Negative for dizziness and light-headedness. Objective:  Blood pressure (!) 145/49, pulse 60, temperature 97.9 °F (36.6 °C), temperature source Temporal, resp. rate 16, height 5' 7\" (1.702 m), weight 112 lb 4.8 oz (50.9 kg), SpO2 97 %.     Intake/Output Summary (Last 24 hours) at 9/14/2020 1142  Last data filed at 9/14/2020 1000  Gross per 24 hour   Intake 530 ml   Output 400 ml   Net 130 ml       Physical Exam  Vitals signs reviewed. Constitutional:       Appearance: He is well-developed. HENT:      Head: Normocephalic and atraumatic. Eyes:      Conjunctiva/sclera: Conjunctivae normal.      Pupils: Pupils are equal, round, and reactive to light. Neck:      Comments: In a hard cervical collar  Cardiovascular:      Rate and Rhythm: Normal rate and regular rhythm. Heart sounds: Normal heart sounds. Pulmonary:      Effort: Pulmonary effort is normal.      Breath sounds: Normal breath sounds. Abdominal:      General: Abdomen is flat. Palpations: Abdomen is soft. Skin:     General: Skin is warm and dry. Neurological:      Mental Status: He is alert and oriented to person, place, and time. Labs:  BMP:   Recent Labs     09/12/20 1132 09/13/20 0538 09/14/20  0439    140 141   K 4.3 4.0 4.6   CL 98 102 107   CO2 27 21* 19*   PHOS  --   --  5.6*   BUN 33* 38* 44*   CREATININE 3.5* 4.3* 4.9*   CALCIUM 9.4 8.6* 8.9     CBC:   Recent Labs     09/12/20 1132 09/13/20  0347   WBC 7.1 6.9   HGB 10.9* 10.2*   HCT 33.5* 32.0*   MCV 98.2* 100.0*   * 168     LIVER PROFILE:   Recent Labs     09/12/20 1132 09/13/20  0538   AST 8 7   ALT 16 13   LIPASE 12*  --    BILITOT 0.3 <0.2   ALKPHOS 79 67     PT/INR:   Recent Labs     09/13/20  0347   PROTIME 14.5   INR 1.13     APTT: No results for input(s): APTT in the last 72 hours. BNP:  No results for input(s): BNP in the last 72 hours. Ionized Calcium:No results for input(s): IONCA in the last 72 hours. Magnesium:No results for input(s): MG in the last 72 hours. Phosphorus:  Recent Labs     09/14/20  0439   PHOS 5.6*     HgbA1C: No results for input(s): LABA1C in the last 72 hours.   Hepatic:   Recent Labs     09/12/20 1132 09/13/20  0538   ALKPHOS 79 67   ALT 16 13   AST 8 7   PROT 6.4* 6.0*   BILITOT 0.3 <0.2   LABALBU 4.0 3.5     Lactic Acid: No results for input(s): LACTA in the last 72 hours. Troponin: No results for input(s): CKTOTAL, CKMB, TROPONINT in the last 72 hours. ABGs: No results for input(s): PH, PCO2, PO2, HCO3, O2SAT in the last 72 hours. CRP:  No results for input(s): CRP in the last 72 hours. Sed Rate:  No results for input(s): SEDRATE in the last 72 hours. Cultures:   No results for input(s): CULTURE in the last 72 hours. No results for input(s): BC, Mylinda Browner in the last 72 hours. No results for input(s): CXSURG in the last 72 hours. Radiology reports as per the Radiologist  Radiology: Ct Head Wo Contrast    Result Date: 9/12/2020  EXAMINATION:  CT HEAD WO CONTRAST  9/12/2020 12:53 PM HISTORY: The patient fell. Rule out intracranial injury. TECHNIQUE: Multiple axial images were obtained through the brain without contrast infusion. Multiplanar images were reconstructed. DLP: 675 mGy-cm. Automated exposure control was utilized. COMPARISON: No comparison study. FINDINGS: There are no hemorrhage, edema or mass effect. There is moderate to severe atrophy. There is moderate to severe dilatation of the lateral and third ventricles. There is low-density in the hemispheric white matter that is nonspecific. The visualized paranasal sinuses and mastoid air cells are clear. No calvarial fracture is seen. There is biparietal calvarial thinning. 1. No hemorrhage, edema or mass effect. 2. Moderate to severe atrophy. Moderate to severe dilatation of the lateral and third ventricles is likely related. Hydrocephalus is not excluded. 3. Low-density in the hemispheric white matter is nonspecific and most likely due to chronic small vessel disease. The full report of this exam was immediately signed and available to the emergency room. The patient is currently in the emergency room.  Signed by Dr Ramon Church on 9/12/2020 12:56 PM    Ct Chest W Contrast    Result Date: 9/12/2020  EXAMINATION:  CT CHEST W CONTRAST  9/12/2020 1:11 PM HISTORY: The patient fell. Anterior chest skin tear. Hypotension during dialysis. Normal chest x-ray. COMPARISON: No comparison study. DLP: 320 mGy-cm. Automated exposure control was utilized. TECHNIQUE: Spiral CT was performed of the chest with contrast. Multiplanar images were reconstructed. MEDIASTINUM/VASCULAR: There is atheromatous calcification of the thoracic aorta and coronary arteries. Heart size is upper limits of normal. Pulmonary arteries are normal in caliber. There are no enlarged mediastinal lymph nodes. There is a moderate size hiatal hernia. There is wall thickening of the esophagus in the chest diffusely. LUNGS: There is centrilobular emphysema. There is paraseptal emphysema. There are few scattered calcified granulomas. There is no airspace consolidation or pleural effusion. There is no pneumothorax or lung contusion. BONES AND SOFT TISSUES: The clavicles are intact. The scapulae are intact. No definite rib fracture is seen. No visible acute thoracic spine fracture is seen. There are some degenerative changes of the spine. The bones are demineralized. The sternum appears to be intact. UPPER ABDOMEN: Please see the abdomen and pelvis CT report separately. 1. No evidence of pneumothorax or lung contusion. No mediastinal hematoma. 2. Atheromatous disease of the thoracic aorta and coronary arteries. Heart size is prominent. 3. Moderate size hiatal hernia. Diffuse thickening of the wall of the thoracic esophagus. Correlate with any history of esophagitis and reflux disease. 4. Centrilobular and paraseptal emphysema. Old granulomatous disease. 5. Demineralized bones. No definite acute fracture. The full report of this exam was immediately signed and available to the emergency room. The patient is currently in the emergency room.  Signed by Dr Natasha Langley on 9/12/2020 1:16 PM    Ct Cervical Spine Wo Contrast    Result Date: 9/12/2020  EXAMINATION:  CT CERVICAL SPINE WO CONTRAST  9/12/2020 1:17 PM HISTORY: The patient fell. Rule out fracture. No x-rays obtained. TECHNIQUE: Spiral CT was performed of the cervical spine. Sagittal and coronal images were reconstructed. COMPARISON: No comparison study. DLP: 260 mGy-cm. Automated exposure control was utilized. FINDINGS: There is fairly prominent pannus formation along the posterior aspect of the dens. There is a fairly large dens erosion located posteriorly. There is a nondisplaced fracture line anteriorly that is likely related to the weakening of the dens related to the erosion. Therefore, this is likely a pathologic fracture. This is fairly high on the dens and I will classify this as a type I injury. There is no displacement. There is no disruption of the anterior vertebral line or the posterior spinolaminar line. No other cervical spine fracture is identified. There is fairly severe carotid artery calcification in the neck bilaterally. There is vertebral artery calcification on the left. At C2-3, there is moderate to severe disc narrowing. There is severe facet arthropathy. There is mild to moderate foraminal narrowing bilaterally. No central spinal canal narrowing. At C3-4, there is moderate disc narrowing. There is minimal anterior subluxation of C3 compared to C4. There is bilateral uncinate spurring and bilateral facet arthropathy. There is severe bilateral foraminal stenosis at this level. At C4-5, there is moderate disc narrowing. There is uncinate spurring and facet arthropathy. The right-sided facet joint is fused. There is severe right and moderate left-sided foraminal stenosis. At C5-6, there is disc narrowing with spondylitic and uncinate spurring. There is bilateral facet arthropathy. There is no significant central spinal canal narrowing. There is mild to moderate bilateral foraminal stenosis. At C6-7, there is moderate to severe disc narrowing. There is spondylitic and uncinate spurring. There is anterior spurring.  There is facet arthropathy left greater than right. There is mild narrowing of the central spinal canal at this level. There is moderate right and mild left foraminal stenosis. At C7-T1, there is moderate to severe disc narrowing. There is mild anterior subluxation of C7 compared to T1. There is some scoliosis of the cervical-thoracic Junction in this area. This causes moderate to severe left-sided foraminal stenosis at this level. There is disc degeneration at multiple thoracic disc levels included on the cervical spine CT. No significant spinal or foraminal stenosis is visualized. 1. Type I dens fracture without displacement. There is a fairly large erosion in the posterior dens related to pannus formation. The type one fracture is seen best on the sagittal images anterior to the erosion. Therefore, this is likely a pathologic fracture given the presence of the erosion. 2. Degenerative changes throughout the cervical spine. 3. Dense carotid artery calcification in the neck. There is also some calcification involving the left vertebral artery in the neck. Results of the dens fracture were called to Dr. Jeramie Salazar in the emergency room at 1:30 PM on 9/12/2020. Signed by Dr Ricci Arredondo on 9/12/2020 1:31 PM    Ct Abdomen Pelvis W Iv Contrast Additional Contrast? None    Result Date: 9/12/2020  EXAMINATION:  CT ABDOMEN PELVIS W IV CONTRAST  9/12/2020 1:00 PM HISTORY: The patient fell. Epigastric pain. Prior surgical resection of the urinary bladder and appendectomy. TECHNIQUE: Spiral CT was performed of the abdomen and pelvis with contrast. Multiplanar images were reconstructed. DLP: 320 mGy-cm. Automated exposure control was utilized. COMPARISON: No comparison study. LUNG BASES: The lung bases are clear. There is a moderate size hiatal hernia. LIVER AND SPLEEN: Prior cholecystectomy. There is intrahepatic and extrahepatic biliary tree dilatation probably related to reservoir effect. No visible obstructing stone is seen.  The liver is otherwise unremarkable. The spleen is normal in size. PANCREAS: There is no pancreatic mass or inflammatory change. KIDNEYS AND ADRENALS: The adrenal glands are normal in size. There is cortical thinning of both kidneys. Both kidneys are small in size. The left kidney measures 5.8 cm and the right kidney measures 5.7 cm. The urinary bladder is surgically absent. There is an aortoiliac stent graft. The graft extends above the origins of both renal arteries. It also covers the origins of the celiac plexus and superior mesenteric arteries. The native aortic lumen measures 5.5 cm in transverse diameter. There is mild enhancement within the native lumen anteriorly suggesting an endoleak. The aortoiliac stent graft is patent. BOWEL: No oral contrast was given. There is underdistention of the stomach. Small bowel loops are nondilated. There is an ostomy in the right mid to lower abdomen likely related to the history of ureteral diversion. Some of the colon is underdistended. There is moderate to large stool in the colon. OTHER: There are degenerative changes of the spine. 1. Prior cholecystectomy. Intrahepatic and extrahepatic biliary tree dilatation is likely related to reservoir effect. No visible calcified stone is seen within the ductal system. 2. Diffuse cortical thinning of both kidneys. The kidneys are small in size. The left kidney measures 5.8 cm on the right kidney measures 5.7 cm. The renal arteries are covered by the aortoiliac stent graft. 3. Prior bladder resection. An ostomy in the right lower abdomen/upper pelvis is likely a ureteral diversion. 4. Patent aortoiliac stent graft. The native aortic lumen measures 5.5 cm transversely. There is some contrast enhancement anteriorly in the native lumen suggesting endoleak. I do not see a connecting vessel that is patent at the endoleak. The superior mesenteric artery and celiac plexus are also covered by the stent graft. 5. No definite acute bowel abnormality.  No oral contrast was given and therefore evaluation is very limited. The patient also has very little intra-abdominal fat. The full report of this exam was immediately signed and available to the emergency room. The patient is currently in the emergency room. Signed by Dr Ricci Arredondo on 9/12/2020 1:10 PM    Xr Chest Portable    Result Date: 9/12/2020  EXAMINATION:  XR CHEST PORTABLE  9/12/2020 12:13 PM HISTORY: The patient fell. Chest pain. COMPARISON: 3/23/2016. FINDINGS:  There is no evidence of lung contusion or pneumothorax. There is no mediastinal widening. The heart is normal in size. The thoracic aorta is atheromatous. There is no dense infiltrate or effusion. There is mild biapical scarring. 1. Mild chronic changes. 2. No evidence of lung contusion, pneumothorax or mediastinal widening. 3. The patient has a chest CT already scheduled. Please see that report separately. Signed by Dr Ricci Arredondo on 9/12/2020 12:14 PM       Assessment     Symptomatic bradycardia. Status post transvenous temporary pacemaker. Permanent pacer today.     Type I dens fracture of the cervical spine. Hard cervical collar for 6 weeks. Rusty Castellanos No plans for surgery.     End-stage renal disease. Continue maintenance dialysis.     Type 2 diabetes. Monitor blood glucose.     COPD. Clinically stable.     Please document 30 minutes of critical care time.       Theador Ankit, DO

## 2020-09-14 NOTE — PLAN OF CARE
Problem: Falls - Risk of:  Goal: Will remain free from falls  Description: Will remain free from falls  9/14/2020 0230 by Usama Anderson RN  Outcome: Met This Shift  9/13/2020 1721 by Susana Mckenna RN  Outcome: Met This Shift  Goal: Absence of physical injury  Description: Absence of physical injury  9/14/2020 0230 by Usama Anderson RN  Outcome: Met This Shift  9/13/2020 1721 by Susana Mckenna RN  Outcome: Met This Shift     Problem: Skin Integrity:  Goal: Will show no infection signs and symptoms  Description: Will show no infection signs and symptoms  9/14/2020 0230 by Usama Anderson RN  Outcome: Ongoing  9/13/2020 1721 by Susana Mckenna RN  Outcome: Ongoing  Goal: Absence of new skin breakdown  Description: Absence of new skin breakdown  9/14/2020 0230 by Usama Anderson RN  Outcome: Ongoing  9/13/2020 1721 by Susana Mckenna RN  Outcome: Ongoing     Problem: Pain:  Description: Pain management should include both nonpharmacologic and pharmacologic interventions.   Goal: Pain level will decrease  Description: Pain level will decrease  9/14/2020 0230 by Usama Anderson RN  Outcome: Ongoing  9/13/2020 1721 by Susana Mckenna RN  Outcome: Ongoing  Goal: Control of acute pain  Description: Control of acute pain  9/14/2020 0230 by Usama Anderson RN  Outcome: Ongoing  9/13/2020 1721 by Susana Mckenna RN  Outcome: Ongoing  Goal: Control of chronic pain  Description: Control of chronic pain  9/14/2020 0230 by Usama Anderson RN  Outcome: Ongoing  9/13/2020 1721 by Susana Mckenna RN  Outcome: Ongoing     Problem: Cardiac:  Goal: Ability to maintain vital signs within normal range will improve  Description: Ability to maintain vital signs within normal range will improve  9/14/2020 0230 by Usama Anderson RN  Outcome: Ongoing  9/13/2020 1721 by Susana Mckenna RN  Outcome: Met This Shift  Goal: Cardiovascular alteration will improve  Description: Cardiovascular alteration will improve  9/14/2020 0230 by Say Ovalles RN  Outcome: Ongoing  9/13/2020 1721 by Marlene Espinoza RN  Outcome: Ongoing     Problem: Physical Regulation:  Goal: Complications related to the disease process, condition or treatment will be avoided or minimized  Description: Complications related to the disease process, condition or treatment will be avoided or minimized  9/14/2020 0230 by Say Ovalles RN  Outcome: Ongoing  9/13/2020 1721 by Marlene Espinoza RN  Outcome: Ongoing

## 2020-09-14 NOTE — PLAN OF CARE
Problem: Skin Integrity:  Goal: Absence of new skin breakdown  Description: Absence of new skin breakdown  Outcome: Met This Shift     Problem: Falls - Risk of:  Goal: Will remain free from falls  Description: Will remain free from falls  Outcome: Met This Shift  Goal: Absence of physical injury  Description: Absence of physical injury  Outcome: Met This Shift     Problem: Pain:  Goal: Pain level will decrease  Description: Pain level will decrease  Outcome: Ongoing  Goal: Control of acute pain  Description: Control of acute pain  Outcome: Ongoing  Goal: Control of chronic pain  Description: Control of chronic pain  Outcome: Ongoing     Problem: Cardiac:  Goal: Ability to maintain vital signs within normal range will improve  Description: Ability to maintain vital signs within normal range will improve  Outcome: Ongoing  Goal: Cardiovascular alteration will improve  Description: Cardiovascular alteration will improve  Outcome: Ongoing     Problem: Physical Regulation:  Goal: Complications related to the disease process, condition or treatment will be avoided or minimized  Description: Complications related to the disease process, condition or treatment will be avoided or minimized  Outcome: Ongoing     Problem: Skin Integrity:  Goal: Will show no infection signs and symptoms  Description: Will show no infection signs and symptoms  Outcome: Not Met This Shift

## 2020-09-15 PROBLEM — Z51.5 PALLIATIVE CARE PATIENT: Status: ACTIVE | Noted: 2020-01-01

## 2020-09-15 NOTE — PROGRESS NOTES
[facility]      [facility]    CARDIAC CATHETERIZATION      [facility], had stents, unknown details    URETEROSTOMY         Family History  Family History   Problem Relation Age of Onset    Diabetes Mother        Social History  Social History     Socioeconomic History    Marital status:      Spouse name: Not on file    Number of children: Not on file    Years of education: Not on file    Highest education level: Not on file   Occupational History    Not on file   Social Needs    Financial resource strain: Not on file    Food insecurity     Worry: Not on file     Inability: Not on file    Transportation needs     Medical: Not on file     Non-medical: Not on file   Tobacco Use    Smoking status: Current Every Day Smoker     Packs/day: 2.00     Years: 55.00     Pack years: 110.00    Smokeless tobacco: Current User   Substance and Sexual Activity    Alcohol use: No    Drug use: No    Sexual activity: Not Currently   Lifestyle    Physical activity     Days per week: Not on file     Minutes per session: Not on file    Stress: Not on file   Relationships    Social connections     Talks on phone: Not on file     Gets together: Not on file     Attends Jewish service: Not on file     Active member of club or organization: Not on file     Attends meetings of clubs or organizations: Not on file     Relationship status: Not on file    Intimate partner violence     Fear of current or ex partner: Not on file     Emotionally abused: Not on file     Physically abused: Not on file     Forced sexual activity: Not on file   Other Topics Concern    Not on file   Social History Narrative    Not on file         Review of Systems:    Review of Systems   Constitutional: Positive for activity change. Negative for fever. Respiratory: Negative for shortness of breath. Cardiovascular: Negative for chest pain and leg swelling. Gastrointestinal: Negative for constipation, diarrhea, nausea and vomiting. Genitourinary: Negative for difficulty urinating and dysuria. Musculoskeletal: Positive for gait problem and neck pain. Negative for back pain. Neurological: Positive for weakness. Negative for dizziness and light-headedness. Objective:  Blood pressure (!) 174/53, pulse 60, temperature 98.1 °F (36.7 °C), temperature source Temporal, resp. rate 19, height 5' 7\" (1.702 m), weight 115 lb 14.4 oz (52.6 kg), SpO2 95 %. Intake/Output Summary (Last 24 hours) at 9/15/2020 0910  Last data filed at 9/15/2020 0854  Gross per 24 hour   Intake 100 ml   Output 575 ml   Net -475 ml       Physical Exam  Vitals signs reviewed. Constitutional:       Appearance: He is well-developed. HENT:      Head: Normocephalic and atraumatic. Eyes:      Conjunctiva/sclera: Conjunctivae normal.      Pupils: Pupils are equal, round, and reactive to light. Cardiovascular:      Rate and Rhythm: Normal rate and regular rhythm. Heart sounds: Normal heart sounds. Pulmonary:      Effort: Pulmonary effort is normal.      Breath sounds: Normal breath sounds. Abdominal:      General: Abdomen is flat. Palpations: Abdomen is soft. Skin:     General: Skin is warm and dry. Neurological:      Mental Status: He is alert and oriented to person, place, and time.          Labs:  BMP:   Recent Labs     09/13/20 0538 09/14/20  0439 09/15/20  0332    141 142   K 4.0 4.6 4.6    107 109   CO2 21* 19* 18*   PHOS  --  5.6*  --    BUN 38* 44* 45*   CREATININE 4.3* 4.9* 5.1*   CALCIUM 8.6* 8.9 8.6*     CBC:   Recent Labs     09/12/20 1132 09/13/20  0347 09/15/20  0332   WBC 7.1 6.9 7.1   HGB 10.9* 10.2* 9.5*   HCT 33.5* 32.0* 31.4*   MCV 98.2* 100.0* 104.7*   * 168 153     LIVER PROFILE:   Recent Labs     09/12/20  1132 09/13/20  0538 09/15/20  0332   AST 8 7 7   ALT 16 13 13   LIPASE 12*  --   --    BILITOT 0.3 <0.2 <0.2   ALKPHOS 79 67 58     PT/INR:   Recent Labs     09/13/20  0347   PROTIME 14.5   INR 1.13 edema or mass effect. 2. Moderate to severe atrophy. Moderate to severe dilatation of the lateral and third ventricles is likely related. Hydrocephalus is not excluded. 3. Low-density in the hemispheric white matter is nonspecific and most likely due to chronic small vessel disease. The full report of this exam was immediately signed and available to the emergency room. The patient is currently in the emergency room. Signed by Dr Bk Kessler on 9/12/2020 12:56 PM    Ct Chest W Contrast    Result Date: 9/12/2020  EXAMINATION:  CT CHEST W CONTRAST  9/12/2020 1:11 PM HISTORY: The patient fell. Anterior chest skin tear. Hypotension during dialysis. Normal chest x-ray. COMPARISON: No comparison study. DLP: 320 mGy-cm. Automated exposure control was utilized. TECHNIQUE: Spiral CT was performed of the chest with contrast. Multiplanar images were reconstructed. MEDIASTINUM/VASCULAR: There is atheromatous calcification of the thoracic aorta and coronary arteries. Heart size is upper limits of normal. Pulmonary arteries are normal in caliber. There are no enlarged mediastinal lymph nodes. There is a moderate size hiatal hernia. There is wall thickening of the esophagus in the chest diffusely. LUNGS: There is centrilobular emphysema. There is paraseptal emphysema. There are few scattered calcified granulomas. There is no airspace consolidation or pleural effusion. There is no pneumothorax or lung contusion. BONES AND SOFT TISSUES: The clavicles are intact. The scapulae are intact. No definite rib fracture is seen. No visible acute thoracic spine fracture is seen. There are some degenerative changes of the spine. The bones are demineralized. The sternum appears to be intact. UPPER ABDOMEN: Please see the abdomen and pelvis CT report separately. 1. No evidence of pneumothorax or lung contusion. No mediastinal hematoma. 2. Atheromatous disease of the thoracic aorta and coronary arteries. Heart size is prominent.  3. Moderate C4-5, there is moderate disc narrowing. There is uncinate spurring and facet arthropathy. The right-sided facet joint is fused. There is severe right and moderate left-sided foraminal stenosis. At C5-6, there is disc narrowing with spondylitic and uncinate spurring. There is bilateral facet arthropathy. There is no significant central spinal canal narrowing. There is mild to moderate bilateral foraminal stenosis. At C6-7, there is moderate to severe disc narrowing. There is spondylitic and uncinate spurring. There is anterior spurring. There is facet arthropathy left greater than right. There is mild narrowing of the central spinal canal at this level. There is moderate right and mild left foraminal stenosis. At C7-T1, there is moderate to severe disc narrowing. There is mild anterior subluxation of C7 compared to T1. There is some scoliosis of the cervical-thoracic Junction in this area. This causes moderate to severe left-sided foraminal stenosis at this level. There is disc degeneration at multiple thoracic disc levels included on the cervical spine CT. No significant spinal or foraminal stenosis is visualized. 1. Type I dens fracture without displacement. There is a fairly large erosion in the posterior dens related to pannus formation. The type one fracture is seen best on the sagittal images anterior to the erosion. Therefore, this is likely a pathologic fracture given the presence of the erosion. 2. Degenerative changes throughout the cervical spine. 3. Dense carotid artery calcification in the neck. There is also some calcification involving the left vertebral artery in the neck. Results of the dens fracture were called to Dr. Hector Stallings in the emergency room at 1:30 PM on 9/12/2020. Signed by Dr Lizzy Tim on 9/12/2020 1:31 PM    Ct Abdomen Pelvis W Iv Contrast Additional Contrast? None    Result Date: 9/12/2020  EXAMINATION:  CT ABDOMEN PELVIS W IV CONTRAST  9/12/2020 1:00 PM HISTORY: The patient fell. Epigastric pain. Prior surgical resection of the urinary bladder and appendectomy. TECHNIQUE: Spiral CT was performed of the abdomen and pelvis with contrast. Multiplanar images were reconstructed. DLP: 320 mGy-cm. Automated exposure control was utilized. COMPARISON: No comparison study. LUNG BASES: The lung bases are clear. There is a moderate size hiatal hernia. LIVER AND SPLEEN: Prior cholecystectomy. There is intrahepatic and extrahepatic biliary tree dilatation probably related to reservoir effect. No visible obstructing stone is seen. The liver is otherwise unremarkable. The spleen is normal in size. PANCREAS: There is no pancreatic mass or inflammatory change. KIDNEYS AND ADRENALS: The adrenal glands are normal in size. There is cortical thinning of both kidneys. Both kidneys are small in size. The left kidney measures 5.8 cm and the right kidney measures 5.7 cm. The urinary bladder is surgically absent. There is an aortoiliac stent graft. The graft extends above the origins of both renal arteries. It also covers the origins of the celiac plexus and superior mesenteric arteries. The native aortic lumen measures 5.5 cm in transverse diameter. There is mild enhancement within the native lumen anteriorly suggesting an endoleak. The aortoiliac stent graft is patent. BOWEL: No oral contrast was given. There is underdistention of the stomach. Small bowel loops are nondilated. There is an ostomy in the right mid to lower abdomen likely related to the history of ureteral diversion. Some of the colon is underdistended. There is moderate to large stool in the colon. OTHER: There are degenerative changes of the spine. 1. Prior cholecystectomy. Intrahepatic and extrahepatic biliary tree dilatation is likely related to reservoir effect. No visible calcified stone is seen within the ductal system. 2. Diffuse cortical thinning of both kidneys. The kidneys are small in size.  The left kidney measures 5.8 cm on the right kidney measures 5.7 cm. The renal arteries are covered by the aortoiliac stent graft. 3. Prior bladder resection. An ostomy in the right lower abdomen/upper pelvis is likely a ureteral diversion. 4. Patent aortoiliac stent graft. The native aortic lumen measures 5.5 cm transversely. There is some contrast enhancement anteriorly in the native lumen suggesting endoleak. I do not see a connecting vessel that is patent at the endoleak. The superior mesenteric artery and celiac plexus are also covered by the stent graft. 5. No definite acute bowel abnormality. No oral contrast was given and therefore evaluation is very limited. The patient also has very little intra-abdominal fat. The full report of this exam was immediately signed and available to the emergency room. The patient is currently in the emergency room. Signed by Dr Ricci Arredondo on 9/12/2020 1:10 PM    Xr Chest Portable    Result Date: 9/12/2020  EXAMINATION:  XR CHEST PORTABLE  9/12/2020 12:13 PM HISTORY: The patient fell. Chest pain. COMPARISON: 3/23/2016. FINDINGS:  There is no evidence of lung contusion or pneumothorax. There is no mediastinal widening. The heart is normal in size. The thoracic aorta is atheromatous. There is no dense infiltrate or effusion. There is mild biapical scarring. 1. Mild chronic changes. 2. No evidence of lung contusion, pneumothorax or mediastinal widening. 3. The patient has a chest CT already scheduled. Please see that report separately. Signed by Dr Ricci Arredondo on 9/12/2020 12:14 PM       Assessment     Symptomatic bradycardia. Status post transvenous temporary pacemaker. Permanent pacer Thursday.     Type I dens fracture of the cervical spine. Hard cervical collar for 6 weeks. Rusty Geronimo plans for surgery.     End-stage renal disease. Maintenance dialysis today. Urinary tract infection. Meropenem 1 g every 24 given his renal failure.     Type 2 diabetes. Monitor blood glucose.     COPD.   Clinically stable.     Please document 30 minutes of critical care time.       Harinder Yeung, DO

## 2020-09-15 NOTE — PROGRESS NOTES
Nephrology ([id number]) Progress Note      Patient:  Maryellen Gonzalez  YOB: 1945  Date of Service: 9/15/2020  MRN: [id number]   Acct: [de-identified]   Primary Care Physician: Unknown Provider Result (Inactive)  Advance Directive: Full Code  Admit Date: 9/12/2020       Hospital Day: 3  Referring Provider: Maribell Gregory DO    Patient independently seen and examined, Chart, Consults, Notes, Operative notes, Labs, Cardiology, and Radiology studies reviewed as available. Chief complaint: End-stage renal disease. Subjective:    Patient is a 78 y. o. man who was admitted after a near syncopal episode when he was on dialysis. He was found bradycardic.  At the emergency department, his heart rate was in the low 30s. He is currently in ICU and he has a temporary transvenous pacemaker. Patient has also reported a fall 2 days ago with head trauma.  CT of his neck shows a type I dens fracture without displacement. He was seen by neurosurgery and has a neck brace. His hemodialysis access has high venous pressure with the recirculation problem. Currently seen on hemodialysis  Hemodialysis Access: aVF  Hemodialysis: 3-1/2-hour  Ultrafiltration: 2000 cc  2K bath  Blood flow rate is 450 cc/min.     Sulfa antibiotics    Medicines:  Current Facility-Administered Medications   Medication Dose Route Frequency Provider Last Rate Last Dose    meropenem (MERREM) 1 g in sterile water 20 mL IV syringe  1 g Intravenous Q24H Maribell Leonore, DO   1 g at 09/14/20 1634    enoxaparin (LOVENOX) injection 30 mg  30 mg Subcutaneous Q24H Maribell Bri, DO   30 mg at 09/14/20 1449    oxyCODONE (ROXICODONE) immediate release tablet 5 mg  5 mg Oral Q4H PRN Maribell Leonore, DO   5 mg at 09/15/20 0716    ipratropium (ATROVENT) 0.02 % nebulizer solution 0.5 mg  0.5 mg Nebulization 4x daily Maribell Leonore, DO   0.5 mg at 09/15/20 3296    sodium chloride flush 0.9 % injection 10 mL  10 mL Intravenous 2 times per day Kathalene Kathryn, DO   10 mL at 09/15/20 3683    sodium chloride flush 0.9 % injection 10 mL  10 mL Intravenous PRN Kathalene Kathryn, DO   10 mL at 09/14/20 1523    acetaminophen (TYLENOL) tablet 650 mg  650 mg Oral Q6H PRN Kathalene Kathryn, DO   650 mg at 09/15/20 3562    Or    acetaminophen (TYLENOL) suppository 650 mg  650 mg Rectal Q6H PRN Kathalene Kathryn, DO        magnesium hydroxide (MILK OF MAGNESIA) 400 MG/5ML suspension 30 mL  30 mL Oral Daily PRN Kathalene Kathryn, DO        promethazine (PHENERGAN) tablet 12.5 mg  12.5 mg Oral Q6H PRN Kathalene Kathryn, DO        Or    [facility] injection 4 mg  4 mg Intravenous Q6H PRN Kathalene Kathryn, DO        morphine injection 2 mg  2 mg Intravenous Q4H PRN Amandaene Kathryn, DO   2 mg at 09/13/20 1651    glucose (GLUTOSE) 40 % oral gel 15 g  15 g Oral PRN Den Teixeira MD        dextrose 50 % IV solution  12.5 g Intravenous PRN Den Teixeira MD   12.5 g at 09/15/20 0415    glucagon (rDNA) injection 1 mg  1 mg Intramuscular PRN Den Teixeira MD        dextrose 5 % solution  100 mL/hr Intravenous PRN Den Teixeira MD       Christine Jossy insulin lispro (HUMALOG) injection vial 0-6 Units  0-6 Units Subcutaneous TID WC Den Teixeira MD        insulin lispro (HUMALOG) injection vial 0-3 Units  0-3 Units Subcutaneous Nightly Den Teixeira MD           Past Medical History:  Past Medical History:   Diagnosis Date    Anemia in chronic kidney disease (CODE)     Atherosclerotic heart disease     [facility]     CAD (coronary artery disease)     Cancer [facility]     Chronic kidney disease     COPD [facility]chronic obstructive pulmonary disease) [facility]     Diabetes mellitus ([facility] [address]     End stage renal disease ([facility]     Enterocolitis due to Clostridioides difficile     GERD (gastroesophageal reflux disease)     [facility]od[facility]     Hyperlipidemia     Hypertension     Other chronic pain     Palliative care patient 09/15/2020    Pro[facility]     Vitamin D deficiency        Past Surgical History:  Past Surgical History:   Procedure Laterality Date    ABDOMEN SURGERY      esophageal CA, used part of stomach     APPENDECTOMY      [facility]    CARDIAC CATHETERIZATION      [facility], had stents, unknown details    URETEROSTOMY         Family History  Family History   Problem Relation Age of Onset    Diabetes Mother        Social History  Social History     Socioeconomic History    Marital status:      Spouse name: Not on file    Number of children: Not on file    Years of education: Not on file    Highest education level: Not on file   Occupational History    Not on file   Social Needs    Financial resource strain: Not on file    Food insecurity     Worry: Not on file     Inability: Not on file    Transportation needs     Medical: Not on file     Non-medical: Not on file   Tobacco Use    Smoking status: Current Every Day Smoker     Packs/day: 2.00     Years: 55.00     Pack years: 110.00    Smokeless tobacco: Current User   Substance and Sexual Activity    Alcohol use: No    Drug use: No    Sexual activity: Not Currently   Lifestyle    Physical activity     Days per week: Not on file     Minutes per session: Not on file    Stress: Not on file   Relationships    Social connections     Talks on phone: Not on file     Gets together: Not on file     Attends Zoroastrian service: Not on file     Active member of club or organization: Not on file     Attends meetings of clubs or organizations: Not on file     Relationship status: Not on file    Intimate partner violence     Fear of current or ex partner: Not on file     Emotionally abused: Not on file     Physically abused: Not on file     Forced sexual activity: Not on file   Other Topics Concern    Not on file   Social History Narrative    Not on file         Review of Systems:  History obtained from chart review and the patient  General ROS: No fever or chills  Respiratory ROS: positive for - shortness of breath  Cardiovascular ROS: No chest pain or palpitations  Gastrointestinal ROS: No abdominal pain or melena  Genito-Urinary ROS: No dysuria or hematuria  Musculoskeletal ROS: No joint pain or swelling   14 point ROS reviewed with the patient and negative except as noted above and in the HPI unless unable to obtain.     Objective:  Patient Vitals for the past 24 hrs:   BP Temp Temp src Pulse Resp SpO2 Weight   09/15/20 0800 (!) 174/53 98.1 °F (36.7 °C) Temporal 60 19 95 % --   09/15/20 0730 (!) 168/53 -- -- 60 12 95 % --   09/15/20 0700 (!) 137/46 -- -- 60 17 97 % --   09/15/20 0600 (!) 140/44 -- -- 60 16 96 % --   09/15/20 0500 (!) 140/47 -- -- 60 12 96 % --   09/15/20 0400 (!) 146/40 98.9 °F (37.2 °C) Temporal 60 12 95 % 115 lb 14.4 oz (52.6 kg)   09/15/20 0300 (!) 120/47 -- -- 60 14 95 % --   09/15/20 0200 (!) 130/36 -- -- 60 12 95 % --   09/15/20 0100 (!) 139/35 -- -- 60 12 95 % --   09/15/20 0000 (!) 133/49 97.7 °F (36.5 °C) Temporal 60 12 97 % --   09/14/20 2300 (!) 143/48 -- -- 60 19 97 % --   09/14/20 2200 (!) 139/47 -- -- 60 10 96 % --   09/14/20 2100 (!) 121/44 -- -- 60 15 94 % --   09/14/20 2000 (!) 125/35 98.9 °F (37.2 °C) Temporal 60 14 95 % --   09/14/20 1900 (!) 143/50 -- -- 60 11 95 % --   09/14/20 1818 -- -- -- -- 13 96 % --   09/14/20 1800 114/60 -- -- 60 17 93 % --   09/14/20 1700 (!) 170/64 -- -- 60 15 97 % --   09/14/20 1600 (!) 172/82 98.9 °F (37.2 °C) Temporal 60 18 97 % --   09/14/20 1500 (!) 165/47 -- -- 60 15 96 % --   09/14/20 1400 (!) 145/50 -- -- 60 11 97 % --   09/14/20 1300 (!) 141/53 -- -- 60 20 96 % --   09/14/20 1200 (!) 144/44 97.6 °F (36.4 °C) Temporal 60 17 95 % --   09/14/20 1100 (!) 154/48 -- -- 60 11 97 % --   09/14/20 1000 (!) 145/49 -- -- 60 16 97 % --       Intake/Output Summary (Last 24 hours) at 9/15/2020 0901  Last data filed at 9/15/2020 0854  Gross per 24 hour   Intake 100 ml   Output 575 ml   Net -475 ml     General: awake/alert   HEENT: Normocephalic atraumatic head  Neck: Supple/neck collar. Chest:  clear to auscultation bilaterally  CVS: regular rate and rhythm  Abdominal: soft, nontender, normal bowel sounds  Extremities: no cyanosis or edema  Skin: warm and dry without rash      Labs:  BMP:   Recent Labs     09/13/20  0538 09/14/20  0439 09/15/20  0332    141 142   K 4.0 4.6 4.6    107 109   CO2 21* 19* 18*   PHOS  --  5.6*  --    BUN 38* 44* 45*   CREATININE 4.3* 4.9* 5.1*   CALCIUM 8.6* 8.9 8.6*     CBC:   Recent Labs     09/12/20  1132 09/13/20  0347 09/15/20  0332   WBC 7.1 6.9 7.1   HGB 10.9* 10.2* 9.5*   HCT 33.5* 32.0* 31.4*   MCV 98.2* 100.0* 104.7*   * 168 153     LIVER PROFILE:   Recent Labs     09/12/20 1132 09/13/20  0538 09/15/20  0332   AST 8 7 7   ALT 16 13 13   LIPASE 12*  --   --    BILITOT 0.3 <0.2 <0.2   ALKPHOS 79 67 58     PT/INR:   Recent Labs     09/13/20 0347   PROTIME 14.5   INR 1.13     APTT: No results for input(s): APTT in the last 72 hours. BNP:  No results for input(s): BNP in the last 72 hours. Ionized Calcium:No results for input(s): IONCA in the last 72 hours. Magnesium:No results for input(s): MG in the last 72 hours. Phosphorus:  Recent Labs     09/14/20 0439   PHOS 5.6*     HgbA1C: No results for input(s): LABA1C in the last 72 hours. Hepatic:   Recent Labs     09/12/20  1132 09/13/20  0538 09/15/20  0332   ALKPHOS 79 67 58   ALT 16 13 13   AST 8 7 7   PROT 6.4* 6.0* 5.6*   BILITOT 0.3 <0.2 <0.2   LABALBU 4.0 3.5 3.0*     Lactic Acid: No results for input(s): LACTA in the last 72 hours. Troponin: No results for input(s): CKTOTAL, CKMB, TROPONINT in the last 72 hours. ABGs: No results for input(s): PH, PCO2, PO2, HCO3, O2SAT in the last 72 hours. CRP:  No results for input(s): CRP in the last 72 hours. Sed Rate:  No results for input(s): SEDRATE in the last 72 hours.       Cultures:   No results for input(s): CULTURE in the last 72 hours. No results for input(s): BC, Aron Grime in the last 72 hours. No results for input(s): CXSURG in the last 72 hours. Radiology reports as per the Radiologist  Radiology: Ct Head Wo Contrast    Result Date: 9/12/2020  EXAMINATION:  CT HEAD WO CONTRAST  9/12/2020 12:53 PM HISTORY: The patient fell. Rule out intracranial injury. TECHNIQUE: Multiple axial images were obtained through the brain without contrast infusion. Multiplanar images were reconstructed. DLP: 675 mGy-cm. Automated exposure control was utilized. COMPARISON: No comparison study. FINDINGS: There are no hemorrhage, edema or mass effect. There is moderate to severe atrophy. There is moderate to severe dilatation of the lateral and third ventricles. There is low-density in the hemispheric white matter that is nonspecific. The visualized paranasal sinuses and mastoid air cells are clear. No calvarial fracture is seen. There is biparietal calvarial thinning. 1. No hemorrhage, edema or mass effect. 2. Moderate to severe atrophy. Moderate to severe dilatation of the lateral and third ventricles is likely related. Hydrocephalus is not excluded. 3. Low-density in the hemispheric white matter is nonspecific and most likely due to chronic small vessel disease. The full report of this exam was immediately signed and available to the emergency room. The patient is currently in the emergency room. Signed by Dr Elio Mccray on 9/12/2020 12:56 PM    Ct Chest W Contrast    Result Date: 9/12/2020  EXAMINATION:  CT CHEST W CONTRAST  9/12/2020 1:11 PM HISTORY: The patient fell. Anterior chest skin tear. Hypotension during dialysis. Normal chest x-ray. COMPARISON: No comparison study. DLP: 320 mGy-cm. Automated exposure control was utilized. TECHNIQUE: Spiral CT was performed of the chest with contrast. Multiplanar images were reconstructed.  MEDIASTINUM/VASCULAR: There is atheromatous calcification of the thoracic aorta and coronary arteries. Heart size is upper limits of normal. Pulmonary arteries are normal in caliber. There are no enlarged mediastinal lymph nodes. There is a moderate size hiatal hernia. There is wall thickening of the esophagus in the chest diffusely. LUNGS: There is centrilobular emphysema. There is paraseptal emphysema. There are few scattered calcified granulomas. There is no airspace consolidation or pleural effusion. There is no pneumothorax or lung contusion. BONES AND SOFT TISSUES: The clavicles are intact. The scapulae are intact. No definite rib fracture is seen. No visible acute thoracic spine fracture is seen. There are some degenerative changes of the spine. The bones are demineralized. The sternum appears to be intact. UPPER ABDOMEN: Please see the abdomen and pelvis CT report separately. 1. No evidence of pneumothorax or lung contusion. No mediastinal hematoma. 2. Atheromatous disease of the thoracic aorta and coronary arteries. Heart size is prominent. 3. Moderate size hiatal hernia. Diffuse thickening of the wall of the thoracic esophagus. Correlate with any history of esophagitis and reflux disease. 4. Centrilobular and paraseptal emphysema. Old granulomatous disease. 5. Demineralized bones. No definite acute fracture. The full report of this exam was immediately signed and available to the emergency room. The patient is currently in the emergency room. Signed by Dr Zandra Goltz on 9/12/2020 1:16 PM    Ct Cervical Spine Wo Contrast    Result Date: 9/12/2020  EXAMINATION:  CT CERVICAL SPINE WO CONTRAST  9/12/2020 1:17 PM HISTORY: The patient fell. Rule out fracture. No x-rays obtained. TECHNIQUE: Spiral CT was performed of the cervical spine. Sagittal and coronal images were reconstructed. COMPARISON: No comparison study. DLP: 260 mGy-cm. Automated exposure control was utilized. FINDINGS: There is fairly prominent pannus formation along the posterior aspect of the dens.  There is a fairly large dens erosion located posteriorly. There is a nondisplaced fracture line anteriorly that is likely related to the weakening of the dens related to the erosion. Therefore, this is likely a pathologic fracture. This is fairly high on the dens and I will classify this as a type I injury. There is no displacement. There is no disruption of the anterior vertebral line or the posterior spinolaminar line. No other cervical spine fracture is identified. There is fairly severe carotid artery calcification in the neck bilaterally. There is vertebral artery calcification on the left. At C2-3, there is moderate to severe disc narrowing. There is severe facet arthropathy. There is mild to moderate foraminal narrowing bilaterally. No central spinal canal narrowing. At C3-4, there is moderate disc narrowing. There is minimal anterior subluxation of C3 compared to C4. There is bilateral uncinate spurring and bilateral facet arthropathy. There is severe bilateral foraminal stenosis at this level. At C4-5, there is moderate disc narrowing. There is uncinate spurring and facet arthropathy. The right-sided facet joint is fused. There is severe right and moderate left-sided foraminal stenosis. At C5-6, there is disc narrowing with spondylitic and uncinate spurring. There is bilateral facet arthropathy. There is no significant central spinal canal narrowing. There is mild to moderate bilateral foraminal stenosis. At C6-7, there is moderate to severe disc narrowing. There is spondylitic and uncinate spurring. There is anterior spurring. There is facet arthropathy left greater than right. There is mild narrowing of the central spinal canal at this level. There is moderate right and mild left foraminal stenosis. At C7-T1, there is moderate to severe disc narrowing. There is mild anterior subluxation of C7 compared to T1. There is some scoliosis of the cervical-thoracic Junction in this area.  This causes moderate to severe left-sided foraminal stenosis at this level. There is disc degeneration at multiple thoracic disc levels included on the cervical spine CT. No significant spinal or foraminal stenosis is visualized. 1. Type I dens fracture without displacement. There is a fairly large erosion in the posterior dens related to pannus formation. The type one fracture is seen best on the sagittal images anterior to the erosion. Therefore, this is likely a pathologic fracture given the presence of the erosion. 2. Degenerative changes throughout the cervical spine. 3. Dense carotid artery calcification in the neck. There is also some calcification involving the left vertebral artery in the neck. Results of the dens fracture were called to Dr. Keisha Wick in the emergency room at 1:30 PM on 9/12/2020. Signed by Dr Nat Sharp on 9/12/2020 1:31 PM    Ct Abdomen Pelvis W Iv Contrast Additional Contrast? None    Result Date: 9/12/2020  EXAMINATION:  CT ABDOMEN PELVIS W IV CONTRAST  9/12/2020 1:00 PM HISTORY: The patient fell. Epigastric pain. Prior surgical resection of the urinary bladder and appendectomy. TECHNIQUE: Spiral CT was performed of the abdomen and pelvis with contrast. Multiplanar images were reconstructed. DLP: 320 mGy-cm. Automated exposure control was utilized. COMPARISON: No comparison study. LUNG BASES: The lung bases are clear. There is a moderate size hiatal hernia. LIVER AND SPLEEN: Prior cholecystectomy. There is intrahepatic and extrahepatic biliary tree dilatation probably related to reservoir effect. No visible obstructing stone is seen. The liver is otherwise unremarkable. The spleen is normal in size. PANCREAS: There is no pancreatic mass or inflammatory change. KIDNEYS AND ADRENALS: The adrenal glands are normal in size. There is cortical thinning of both kidneys. Both kidneys are small in size. The left kidney measures 5.8 cm and the right kidney measures 5.7 cm. The urinary bladder is surgically absent.  There is an aortoiliac stent graft. The graft extends above the origins of both renal arteries. It also covers the origins of the celiac plexus and superior mesenteric arteries. The native aortic lumen measures 5.5 cm in transverse diameter. There is mild enhancement within the native lumen anteriorly suggesting an endoleak. The aortoiliac stent graft is patent. BOWEL: No oral contrast was given. There is underdistention of the stomach. Small bowel loops are nondilated. There is an ostomy in the right mid to lower abdomen likely related to the history of ureteral diversion. Some of the colon is underdistended. There is moderate to large stool in the colon. OTHER: There are degenerative changes of the spine. 1. Prior cholecystectomy. Intrahepatic and extrahepatic biliary tree dilatation is likely related to reservoir effect. No visible calcified stone is seen within the ductal system. 2. Diffuse cortical thinning of both kidneys. The kidneys are small in size. The left kidney measures 5.8 cm on the right kidney measures 5.7 cm. The renal arteries are covered by the aortoiliac stent graft. 3. Prior bladder resection. An ostomy in the right lower abdomen/upper pelvis is likely a ureteral diversion. 4. Patent aortoiliac stent graft. The native aortic lumen measures 5.5 cm transversely. There is some contrast enhancement anteriorly in the native lumen suggesting endoleak. I do not see a connecting vessel that is patent at the endoleak. The superior mesenteric artery and celiac plexus are also covered by the stent graft. 5. No definite acute bowel abnormality. No oral contrast was given and therefore evaluation is very limited. The patient also has very little intra-abdominal fat. The full report of this exam was immediately signed and available to the emergency room. The patient is currently in the emergency room.  Signed by Dr Jimena Portillo on 9/12/2020 1:10 PM    Xr Chest Portable    Result Date: 9/12/2020  EXAMINATION:  XR CHEST PORTABLE  9/12/2020 12:13 PM HISTORY: The patient fell. Chest pain. COMPARISON: 3/23/2016. FINDINGS:  There is no evidence of lung contusion or pneumothorax. There is no mediastinal widening. The heart is normal in size. The thoracic aorta is atheromatous. There is no dense infiltrate or effusion. There is mild biapical scarring. 1. Mild chronic changes. 2. No evidence of lung contusion, pneumothorax or mediastinal widening. 3. The patient has a chest CT already scheduled. Please see that report separately. Signed by Dr Navarro Has on 9/12/2020 12:14 PM       Assessment   1. End-stage renal disease/currently seen on hemodialysis. 2.  Status post fall and neck fracture. 3.  Severe bradycardia/recurrent syncopal episode. 4.  Anemia of chronic kidney disease. 5.  Type II diabetic nephropathy. 6.  Secondary hyperparathyroidism. 7.  Hyperphosphatemia. 8.  Urinary tract infection. Plan:  1. Tolerating dialysis very well. 2.  Vascular surgery consultation. 3.  Permanent pacemaker mediated once he has clear urine and no infection.   4.  Plan was discussed with registered nurse    Camron Garcia MD  09/15/20  9:01 AM

## 2020-09-15 NOTE — PROGRESS NOTES
meropenem (MERREM) 1 g in sterile water 20 mL IV syringe, 1 g, Intravenous, Q24H, Adonis Slider, DO, 1 g at 09/14/20 1634    enoxaparin (LOVENOX) injection 30 mg, 30 mg, Subcutaneous, Q24H, Adonis Slider, DO, 30 mg at 09/14/20 1449    oxyCODONE (ROXICODONE) immediate release tablet 5 mg, 5 mg, Oral, Q4H PRN, Adonis Slider, DO, 5 mg at 09/15/20 0716    ipratropium (ATROVENT) 0.02 % nebulizer solution 0.5 mg, 0.5 mg, Nebulization, 4x daily, Adonis Slider, DO, 0.5 mg at 09/15/20 3234    sodium chloride flush 0.9 % injection 10 mL, 10 mL, Intravenous, 2 times per day, Adonis Slider, DO, 10 mL at 09/14/20 2125    sodium chloride flush 0.9 % injection 10 mL, 10 mL, Intravenous, PRN, Adonis Slider, DO, 10 mL at 09/14/20 1523    acetaminophen (TYLENOL) tablet 650 mg, 650 mg, Oral, Q6H PRN, 650 mg at 09/15/20 0716 **OR** acetaminophen (TYLENOL) suppository 650 mg, 650 mg, Rectal, Q6H PRN, Adonis Slider, DO    magnesium hydroxide (MILK OF MAGNESIA) 400 MG/5ML suspension 30 mL, 30 mL, Oral, Daily PRN, Adonis Slider, DO    promethazine (PHENERGAN) tablet 12.5 mg, 12.5 mg, Oral, Q6H PRN **OR** ondansetron (ZOFRAN) injection 4 mg, 4 mg, Intravenous, Q6H PRN, Adonis Slider, DO    morphine injection 2 mg, 2 mg, Intravenous, Q4H PRN, Adonis Slider, DO, 2 mg at 09/13/20 1651    glucose (GLUTOSE) 40 % oral gel 15 g, 15 g, Oral, PRN, Robin Salas MD    dextrose 50 % IV solution, 12.5 g, Intravenous, PRN, Robin Salas MD, 12.5 g at 09/15/20 0415    glucagon (rDNA) injection 1 mg, 1 mg, Intramuscular, PRN, Robin Salas MD    dextrose 5 % solution, 100 mL/hr, Intravenous, PRN, Robin Salas MD    insulin lispro (HUMALOG) injection vial 0-6 Units, 0-6 Units, Subcutaneous, TID WC, Robin Salas MD    insulin lispro (HUMALOG) injection vial 0-3 Units, 0-3 Units, Subcutaneous, Nightly, Robin Salas MD  Sulfa antibiotics    Social History  Social History     Tobacco Use   Smoking Status Current Every Day Smoker    Packs/day: 2.00    Years: 55.00    Pack years: 110.00   Smokeless Tobacco Current User     Social History     Substance and Sexual Activity   Alcohol Use No         Family History   Problem Relation Age of Onset    Diabetes Mother          REVIEW OF SYSTEMS:  Constitutional: No fevers No chills  Neck:No stiffness  Respiratory: No shortness of breath  Cardiovascular: + chest pain   Gastrointestinal: No abdominal pain    Genitourinary: No Dysuria  Neurological: No headache, no confusion    PHYSICAL EXAM:  Vitals:    09/15/20 0800   BP: (!) 174/53   Pulse: 60   Resp: 19   Temp: 98.1 °F (36.7 °C)   SpO2: 95%       Constitutional: The patient appears well-developed and well-nourished. Eyes - conjunctiva normal.  Conjugate Gaze  Ear, nose, throat - No scars, masses, or lesions over external nose or ears, no atrophy of tongue  Neck-symmetric, no masses noted, no jugular vein distension  Respiration- chest wall appears symmetric, good expansion, normal effort without use of accessory muscles  Musculoskeletal - no significant wasting of muscles noted, no bony deformities  Extremities-no clubbing, cyanosis or edema  Skin - warm, dry, and intact. No rash, erythema, or pallor. Psychiatric - mood, affect, and behavior appear normal.     Neurologic Examination  Awake, Alert and oriented x 4  Normal speech pattern, following commands  Motor 5/5 all extremities, no drift  EOMI, CN II-XII intact  No deficits to light touch   No Homans or hyperreflexia    DATA:  Nursing/pcp notes, imaging,labs and vitals reviewed.      PT,OT and/or speech notes reviewed    Lab Results   Component Value Date    WBC 7.1 09/15/2020    HGB 9.5 (L) 09/15/2020    HCT 31.4 (L) 09/15/2020    .7 (H) 09/15/2020     09/15/2020     Lab Results   Component Value Date     09/15/2020    K 4.6 09/15/2020     09/15/2020    CO2 18 (L) 09/15/2020    BUN 45 (H) 09/15/2020    CREATININE 5.1 (H) 09/15/2020    GLUCOSE 40 (LL) 09/15/2020    CALCIUM 8.6 (L) 09/15/2020    PROT 5.6 (L) 09/15/2020    LABALBU 3.0 (L) 09/15/2020    BILITOT <0.2 09/15/2020    ALKPHOS 58 09/15/2020    AST 7 09/15/2020    ALT 13 09/15/2020    LABGLOM 11 (A) 09/15/2020    GFRAA 13 (L) 09/15/2020     Lab Results   Component Value Date    INR 1.13 09/13/2020    INR 2.87 (H) 03/25/2016    INR 1.76 (H) 03/24/2016    PROTIME 14.5 09/13/2020    PROTIME 28.7 (H) 03/25/2016    PROTIME 19.8 (H) 03/24/2016     EXAMINATION:  CT CERVICAL SPINE WO CONTRAST  9/12/2020 1:17 PM    HISTORY: The patient fell. Rule out fracture. No x-rays obtained. TECHNIQUE: Spiral CT was performed of the cervical spine. Sagittal and    coronal images were reconstructed. COMPARISON: No comparison study. DLP: 260 mGy-cm. Automated exposure control was utilized. FINDINGS: There is fairly prominent pannus formation along the    posterior aspect of the dens. There is a fairly large dens erosion    located posteriorly. There is a nondisplaced fracture line anteriorly    that is likely related to the weakening of the dens related to the    erosion. Therefore, this is likely a pathologic fracture. This is    fairly high on the dens and I will classify this as a type I injury. There is no displacement. There is no disruption of the anterior    vertebral line or the posterior spinolaminar line. No other cervical    spine fracture is identified. There is fairly severe carotid artery    calcification in the neck bilaterally. There is vertebral artery    calcification on the left. At C2-3, there is moderate to severe disc narrowing. There is severe    facet arthropathy. There is mild to moderate foraminal narrowing    bilaterally. No central spinal canal narrowing. At C3-4, there is moderate disc narrowing. There is minimal anterior    subluxation of C3 compared to C4.  There is bilateral uncinate spurring    and bilateral facet PM            IMPRESSION  67year old male s/p fall with neck pain and a type I non-displaced odontoid fracture, neurologically stable    RECOMMENDATIONS:    Patient remains neurologically stable  No urgent neurosurgical intervention warranted  Again, the type I odontoid fracture is stable in nature and can be treated with hard collar for at least 6 weeks  Hard cervical collar in place. Keep cervical spine in neutral position during pacemaker placement. He may sit up or get OOB with assistance from a neurosurgical standpoint.         Jazzmine Turner, APRN

## 2020-09-15 NOTE — PLAN OF CARE
Problem: Skin Integrity:  Goal: Will show no infection signs and symptoms  Description: Will show no infection signs and symptoms  9/15/2020 0409 by Himanshu Lucas RN  Outcome: Ongoing  9/14/2020 1847 by Barbi Villagomez  Outcome: Not Met This Shift  Goal: Absence of new skin breakdown  Description: Absence of new skin breakdown  9/15/2020 0409 by Himanshu Lucas RN  Outcome: Ongoing  9/14/2020 1847 by Barbi Villagomez  Outcome: Met This Shift     Problem: Falls - Risk of:  Goal: Will remain free from falls  Description: Will remain free from falls  9/15/2020 0409 by Himanshu Lucas RN  Outcome: Ongoing  9/14/2020 1847 by Barbi Villagomez  Outcome: Met This Shift  Goal: Absence of physical injury  Description: Absence of physical injury  9/15/2020 0409 by Himanshu Lucas RN  Outcome: Ongoing  9/14/2020 1847 by Barbi Villagomez  Outcome: Met This Shift     Problem: Pain:  Description: Pain management should include both nonpharmacologic and pharmacologic interventions.   Goal: Pain level will decrease  Description: Pain level will decrease  9/15/2020 0409 by Himanshu Lucas RN  Outcome: Ongoing  9/14/2020 1847 by Barbi Villagomez  Outcome: Ongoing  Goal: Control of acute pain  Description: Control of acute pain  9/15/2020 0409 by Himanshu Lucas RN  Outcome: Ongoing  9/14/2020 1847 by Barbi Villagomez  Outcome: Ongoing  Goal: Control of chronic pain  Description: Control of chronic pain  9/15/2020 0409 by Himnashu Lucas RN  Outcome: Ongoing  9/14/2020 1847 by Barbi Villagomez  Outcome: Ongoing     Problem: Cardiac:  Goal: Ability to maintain vital signs within normal range will improve  Description: Ability to maintain vital signs within normal range will improve  9/15/2020 0409 by Himanshu Lucas RN  Outcome: Ongoing  9/14/2020 1847 by Barbi Villagomez  Outcome: Ongoing  Goal: Cardiovascular alteration will improve  Description: Cardiovascular alteration will improve  9/15/2020 0409 by Danni Fall RN  Outcome: Ongoing  9/14/2020 1847 by Angel Villaogmez  Outcome: Ongoing     Problem: Physical Regulation:  Goal: Complications related to the disease process, condition or treatment will be avoided or minimized  Description: Complications related to the disease process, condition or treatment will be avoided or minimized  9/15/2020 0409 by Danni Fall RN  Outcome: Ongoing  9/14/2020 1847 by Angel Villagomez  Outcome: Ongoing

## 2020-09-15 NOTE — CONSULTS
[facility]    CONSULT    Patient:  Mya Enrique  YOB: 1945  Date of Service: 9/15/2020  MRN: [id number]   Acct: [de-identified]   Primary Care Physician: Unknown Provider Result (Inactive)    Reason for consult: AV Fistula Malfunction    Requesting Physician: Dr. Pippa Dsouza    History Obtained From: Patient, Chart    HISTORY OF PRESENT ILLNESS:  Mr. Mya Enrique is a 67 y.o. male who experienced a near syncopal episode while on outpatient hemodialysis. He was found to be bradycardic, therefore he was sent to [facility] ER via EMS. Upon arrival to ER, patient's HR remained in low 30s. Cardiology was called and a temporary transvenous pacemaker was placed. It was reported that he had fallen at home a couple of days ago. CT spine was completed and patient found to have Type I dens fracture without displacement. Neurosurgery was consulted. Patient admitted to hospitalist service. Vascular surgery consulted as dialysis nurse unable to obtain access via left upper arm AV fistula.         Past Medical History:       Diagnosis Date    Anemia in chronic kidney disease (CODE)     Atherosclerotic heart disease     Bladder cancer ([address]     CAD (coronary artery disease)     Cancer ([address]     Chronic kidney disease     COPD (chronic obstructive pulmonary disease) ([address]     Diabetes mellitus ([address]     End stage renal disease (HCC)     Enterocolitis due to Clostridioides difficile     GERD (gastroesophageal reflux disease)     Hemodialysis patient ([address]     Hyperlipidemia     Hypertension     Other chronic pain     Palliative care patient 09/15/2020    Prostate cancer ([facility]     Vitamin D deficiency        Past Surgical History:        Procedure Laterality Date    ABDOMEN SURGERY      esophageal CA, used part of stomach     APPENDECTOMY      [facility]    CARDIAC CATHETERIZATION      [facility], had stents, unknown details    URETEROSTOMY Allergies:  Sulfa antibiotics    Medications Current:   Current Facility-Administered Medications   Medication Dose Route Frequency Provider Last Rate Last Dose    meropenem (MERREM) 1 g in sterile water 20 mL IV syringe  1 g Intravenous Q24H Marti Ganja, DO   1 g at 09/14/20 1634    enoxaparin (LOVENOX) injection 30 mg  30 mg Subcutaneous Q24H Marti Ganja, DO   30 mg at 09/14/20 1449    oxyCODONE (ROXICODONE) immediate release tablet 5 mg  5 mg Oral Q4H PRN Marti Ganja, DO   5 mg at 09/15/20 7261    ipratropium (ATROVENT) 0.02 % nebulizer solution 0.5 mg  0.5 mg Nebulization 4x daily Marti Ganja, DO   0.5 mg at 09/15/20 1028    sodium chloride flush 0.9 % injection 10 mL  10 mL Intravenous 2 times per day Marti Ganja, DO   10 mL at 09/15/20 0854    sodium chloride flush 0.9 % injection 10 mL  10 mL Intravenous PRN Marti Ganja, DO   10 mL at 09/14/20 1523    acetaminophen (TYLENOL) tablet 650 mg  650 mg Oral Q6H PRN Marti Ganja, DO   650 mg at 09/15/20 8917    Or    acetaminophen (TYLENOL) suppository 650 mg  650 mg Rectal Q6H PRN Marti Ganja, DO        magnesium hydroxide (MILK OF MAGNESIA) 400 MG/5ML suspension 30 mL  30 mL Oral Daily PRN Marti Ganja, DO        promethazine (PHENERGAN) tablet 12.5 mg  12.5 mg Oral Q6H PRN Marti Ganja, DO        Or    [facility] injection 4 mg  4 mg Intravenous Q6H PRN Marti Ganja, DO        morphine injection 2 mg  2 mg Intravenous Q4H PRN Marti Ganja, DO   2 mg at 09/13/20 1651    glucose (GLUTOSE) 40 % oral gel 15 g  15 g Oral PRN Sean Limon MD        dextrose 50 % IV solution  12.5 g Intravenous PRN Sean Limon MD   12.5 g at 09/15/20 0415    glucagon (rDNA) injection 1 mg  1 mg Intramuscular PRN Sean Limon MD        dextrose 5 % solution  100 mL/hr Intravenous PRN Sean Limon MD        insulin lispro (HUMALOG) injection vial 0-6 Units  0-6 Units Subcutaneous TID WC Adrian Green MD        insulin lispro (HUMALOG) injection vial 0-3 Units  0-3 Units Subcutaneous Nightly Adrian Green MD           Social History:  Reports that he has been smoking. He has a 110.00 pack-year smoking history. He uses smokeless tobacco. He reports that he does not drink alcohol or use drugs. Family History:      Problem Relation Age of Onset    Diabetes Mother        REVIEW OF SYSTEMS:  General: Denies any fever or chills. Denies any unexplained weight loss or gain. Denies any change in activity or endurance. Fall at home. HEENT: Denies any headaches or visual changes. Hard of hearing. Respiratory: Denies any cough or hoarseness. Cardiac: Denies any chest pain or pressure. Denies any palpitations. Denies any presyncope or syncope. Denies any orthopnea or PND. Denies any lower extremity edema. GI: Denies any abdominal pain. Denies any nausea or vomiting. Denies any change in bowel habits. Denies any recent history of GI tract blood loss. : Denies any hematuria, frequency, hesitancy, or dysuria. Musculoskeletal: Denies any pain or swelling in his joints. Generalized weakness. Neurological: Denies any paraesthesias. Denies any history of seizure or stroke symptoms. Complains of light-headedness. Psychological: Denies any problems with anxiety or depression. All other systems are negative except where stated above. PHYSICAL EXAM:  BP (!) 175/63   Pulse 60   Temp 98.1 °F (36.7 °C) (Temporal)   Resp 19   Ht 5' 7\" (1.702 m)   Wt 115 lb 14.4 oz (52.6 kg)   SpO2 96%   BMI 18.15 kg/m²   General appearance: Demonstrates a well-developed, well-nourished male who is alert and oriented in no acute distress. HEENT: Normocephalic. Atraumatic. YANELI. NECK: Cervical collar in place. Chest: Clear to auscultation bilaterally without wheezes or rhonchi.   Cardiac: Normal heart tones with regular rate and rhythm, S1, S2 normal. 2/6 systolic murmur, no gallops, or rubs auscultated. Abdomen: Soft, non-tender; non-distended normal bowel sounds no masses, no organomegaly. Extremities: No clubbing or cyanosis. No peripheral edema. Peripheral pulses palpable. Left upper arm AV fistula - unable to palpate thrill. No bruit auscultated. Skin: Skin color, texture, turgor normal. No rashes or lesions  Neurologic: Grossly intact. LABS AND DIAGNOSTICS:      CBC with Differential:   Lab Results   Component Value Date    WBC 7.1 09/15/2020    RBC 3.00 09/15/2020    HGB 9.5 09/15/2020    HCT 31.4 09/15/2020     09/15/2020    .7 09/15/2020     BMP:   Lab Results   Component Value Date     09/15/2020    K 4.6 09/15/2020     09/15/2020    CO2 18 09/15/2020    BUN 45 09/15/2020    CREATININE 5.1 09/15/2020    CALCIUM 8.6 09/15/2020    GFRAA 13 09/15/2020    LABGLOM 11 09/15/2020    GLUCOSE 40 09/15/2020       ASSESSMENT:    1. AV Fistula Malfunction, Left Upper Arm  2. Bradycardia - Temporary Pacer in Place  3. Fall at Home  4. Proteus, E-coli UTI  5. DM, Type 2  6. Essential HTN  7. CAD  8. DM, Type 2        PLAN:    1.  Plan for [address] [place] [address]

## 2020-09-15 NOTE — PROGRESS NOTES
Cardiology Progress Note Hayden Narayan MD      Patient:  David Rosario  [id number]    Patient Active Problem List    Diagnosis Date Noted    Complicated UTI (urinary tract infection) 03/18/2016     Priority: High    Sepsis ([facility] 03/18/2016     Priority: High    Diabetes mellitus ([address] 03/22/2016     Priority: Medium     Overview Note:     A1c 8.6 on 3/19/16      COPD (chronic obstructive pulmonary disease) ([facility] 03/22/2016     Priority: Medium    CAD (coronary artery disease) 03/22/2016     Priority: Medium    JOY (acute kidney injury) ([facility] [address] 03/18/2016     Priority: Medium    Lower abdominal pain 03/18/2016     Priority: Medium    Hydronephrosis of right kidney      Priority: Medium    Palliative care patient 09/15/2020     Priority: Low    Dialysis AV fistula malfunction (HCC)      Priority: Low    Bradycardia 09/12/2020     Priority: Low    Coronary artery disease involving native heart without angina pectoris      Priority: Low    Chronic obstructive pulmonary disease (HCC)      Priority: Low    Type 2 diabetes mellitus with diabetic chronic kidney disease ([facility] [address]      Priority: Low    Essential hypertension      Priority: Low    Hypokalemia 03/18/2016     Priority: Low       Admit Date:  9/12/2020    Admission Problem List: Present on Admission:   Bradycardia   Palliative care patient   Dialysis AV fistula malfunction [facility])      Cardiac Specific Data:  Specialty Problems        Cardiology Problems    CAD (coronary artery disease)        Coronary artery disease involving native heart without angina pectoris        Essential hypertension        Bradycardia        Dialysis AV fistula malfunction (HCC)            1. High-grade AV block with falls and injury, status post temporary pacemaker placement 9/12/2020.  2. End-stage renal disease on hemodialysis with atrophied renals and prior nephrostomy drainage.   3. CAD with history of prior PCI's, ejection fraction 50%, posterior basal and basal septal severe hypokinesis, moderate to severe aortic stenosis (mean gradient 25 mmHg). 4. AAA status post endovascular repair. 5. Diabetes mellitus. 6. Cervical fracture of dens post traumatic fall. Subjective:  Mr. Kelsey Epstein remains in bed. He remains pacemaker dependent with high-grade AV block. No symptoms otherwise. On IV antibiotics. Left-sided AV fistula with difficulty accessing for hemodialysis. Will require angiography/intervention possibly. Objective:   BP (!) 153/40   Pulse 60   Temp 98.3 °F (36.8 °C) (Temporal)   Resp 13   Ht 5' 7\" (1.702 m)   Wt 115 lb 14.4 oz (52.6 kg)   SpO2 94%   BMI 18.15 kg/m²       Intake/Output Summary (Last 24 hours) at 9/15/2020 1438  Last data filed at 9/15/2020 1400  Gross per 24 hour   Intake 660 ml   Output 1000 ml   Net -340 ml       Prior to Admission medications    Medication Sig Start Date End Date Taking? Authorizing Provider   insulin lispro (HUMALOG) 100 UNIT/ML injection vial Inject into the skin See Admin Instructions Sliding Scale BID   Yes Historical Provider, MD   amiodarone (CORDARONE) 200 MG tablet Take 200 mg by mouth daily    Historical Provider, MD   Ferric Citrate (AURYXIA) 1  MG(Fe) TABS Take 2 tablets by mouth 3 times daily     Historical Provider, MD   Hypromellose (ISOPTO TEARS OP) Apply to eye    Historical Provider, MD   isosorbide dinitrate (ISORDIL) 10 MG tablet Take 10 mg by mouth 3 times daily    Historical Provider, MD   insulin glargine (LANTUS) 100 UNIT/ML injection vial Inject 12 Units into the skin nightly     Historical Provider, MD   midodrine (PROAMATINE) 5 MG tablet Take 5 mg by mouth 3 times daily    Historical Provider, MD   oxyCODONE 5 MG capsule Take 5 mg by mouth every 6 hours as needed for Pain.      Historical Provider, MD   budesonide-formoterol (SYMBICORT) 160-4.5 MCG/ACT AERO Inhale 2 puffs into the lungs 2 times daily     Historical Provider, MD   famotidine (PEPCID) 20 MG tablet Take 20 mg by mouth daily    Historical Provider, MD   acetaminophen 650 MG TABS Take 650 mg by mouth every 4 hours as needed  Patient taking differently: Take 650 mg by mouth every 6 hours as needed for Pain  3/25/16   Ingrid Adamson MD   aspirin 81 MG chewable tablet Take 81 mg by mouth daily    Historical Provider, MD   atorvastatin (LIPITOR) 20 MG tablet Take 20 mg by mouth daily     Historical Provider, MD   vitamin D 1000 UNITS CAPS Take 3 capsules by mouth daily     Historical Provider, MD   metoprolol (LOPRESSOR) 25 MG tablet Take 25 mg by mouth every other day MON, WED, FRI, SUN    Historical Provider, MD        meropenem  1 g Intravenous Q24H    enoxaparin  30 mg Subcutaneous Q24H    ipratropium  0.5 mg Nebulization 4x daily    sodium chloride flush  10 mL Intravenous 2 times per day    insulin lispro  0-6 Units Subcutaneous TID WC    insulin lispro  0-3 Units Subcutaneous Nightly       TELEMETRY: Sinus and ventricular paced     Physical Exam:      Physical Exam  Constitutional:       Appearance: He is well-developed. Comments: Appears in good spirits with no shortness of breath or chest pain. HENT:      Mouth/Throat:      Pharynx: No oropharyngeal exudate. Eyes:      General: No scleral icterus. Right eye: No discharge. Left eye: No discharge. Neck:      Thyroid: No thyromegaly. Vascular: No JVD. Cardiovascular:      Rate and Rhythm: Normal rate and regular rhythm. Heart sounds: No murmur. No friction rub. No gallop. Comments: Systolic murmur with mid to late peaking and diminished A2  No JVD  No edema    Pulmonary:      Effort: No respiratory distress. Breath sounds: No stridor. No wheezing or rales. Abdominal:      General: Bowel sounds are normal. There is no distension. Palpations: Abdomen is soft. There is no mass. Tenderness: There is no abdominal tenderness. There is no guarding or rebound.       Comments: No palpable organomegaly  Right-sided nephrostomy bag Musculoskeletal:         General: No deformity. Skin:     General: Skin is warm. Coloration: Skin is not pale. Findings: No erythema or rash. Neurological:      Mental Status: He is alert and oriented to person, place, and time. Motor: No abnormal muscle tone. Coordination: Coordination normal.      Deep Tendon Reflexes: Reflexes normal.                 Lab Data:  CBC:   Recent Labs     09/13/20  0347 09/15/20  0332   WBC 6.9 7.1   HGB 10.2* 9.5*   HCT 32.0* 31.4*   .0* 104.7*    153     BMP:   Recent Labs     09/13/20  0538 09/14/20  0439 09/15/20  0332    141 142   K 4.0 4.6 4.6    107 109   CO2 21* 19* 18*   PHOS  --  5.6*  --    BUN 38* 44* 45*   CREATININE 4.3* 4.9* 5.1*     LIVER PROFILE:   Recent Labs     09/13/20  0538 09/15/20  0332   AST 7 7   ALT 13 13   BILITOT <0.2 <0.2   ALKPHOS 67 58     PT/INR:   Recent Labs     09/13/20 0347   PROTIME 14.5   INR 1.13     APTT: No results for input(s): APTT in the last 72 hours. BNP:  No results for input(s): BNP in the last 72 hours. CK, CKMB, Troponin: @LABRCNT (CKTOTAL:3, CKMB:3, TROPONINI:3)@    IMAGING:  Ct Head Wo Contrast    Result Date: 9/12/2020  EXAMINATION:  CT HEAD WO CONTRAST  9/12/2020 12:53 PM HISTORY: The patient fell. Rule out intracranial injury. TECHNIQUE: Multiple axial images were obtained through the brain without contrast infusion. Multiplanar images were reconstructed. DLP: 675 mGy-cm. Automated exposure control was utilized. COMPARISON: No comparison study. FINDINGS: There are no hemorrhage, edema or mass effect. There is moderate to severe atrophy. There is moderate to severe dilatation of the lateral and third ventricles. There is low-density in the hemispheric white matter that is nonspecific. The visualized paranasal sinuses and mastoid air cells are clear. No calvarial fracture is seen. There is biparietal calvarial thinning. 1. No hemorrhage, edema or mass effect.  2. Moderate to severe atrophy. Moderate to severe dilatation of the lateral and third ventricles is likely related. Hydrocephalus is not excluded. 3. Low-density in the hemispheric white matter is nonspecific and most likely due to chronic small vessel disease. The full report of this exam was immediately signed and available to the emergency room. The patient is currently in the emergency room. Signed by Dr Lynette Leonard on 9/12/2020 12:56 PM    Ct Chest W Contrast    Result Date: 9/12/2020  EXAMINATION:  CT CHEST W CONTRAST  9/12/2020 1:11 PM HISTORY: The patient fell. Anterior chest skin tear. Hypotension during dialysis. Normal chest x-ray. COMPARISON: No comparison study. DLP: 320 mGy-cm. Automated exposure control was utilized. TECHNIQUE: Spiral CT was performed of the chest with contrast. Multiplanar images were reconstructed. MEDIASTINUM/VASCULAR: There is atheromatous calcification of the thoracic aorta and coronary arteries. Heart size is upper limits of normal. Pulmonary arteries are normal in caliber. There are no enlarged mediastinal lymph nodes. There is a moderate size hiatal hernia. There is wall thickening of the esophagus in the chest diffusely. LUNGS: There is centrilobular emphysema. There is paraseptal emphysema. There are few scattered calcified granulomas. There is no airspace consolidation or pleural effusion. There is no pneumothorax or lung contusion. BONES AND SOFT TISSUES: The clavicles are intact. The scapulae are intact. No definite rib fracture is seen. No visible acute thoracic spine fracture is seen. There are some degenerative changes of the spine. The bones are demineralized. The sternum appears to be intact. UPPER ABDOMEN: Please see the abdomen and pelvis CT report separately. 1. No evidence of pneumothorax or lung contusion. No mediastinal hematoma. 2. Atheromatous disease of the thoracic aorta and coronary arteries. Heart size is prominent. 3. Moderate size hiatal hernia.  Diffuse narrowing. There is uncinate spurring and facet arthropathy. The right-sided facet joint is fused. There is severe right and moderate left-sided foraminal stenosis. At C5-6, there is disc narrowing with spondylitic and uncinate spurring. There is bilateral facet arthropathy. There is no significant central spinal canal narrowing. There is mild to moderate bilateral foraminal stenosis. At C6-7, there is moderate to severe disc narrowing. There is spondylitic and uncinate spurring. There is anterior spurring. There is facet arthropathy left greater than right. There is mild narrowing of the central spinal canal at this level. There is moderate right and mild left foraminal stenosis. At C7-T1, there is moderate to severe disc narrowing. There is mild anterior subluxation of C7 compared to T1. There is some scoliosis of the cervical-thoracic Junction in this area. This causes moderate to severe left-sided foraminal stenosis at this level. There is disc degeneration at multiple thoracic disc levels included on the cervical spine CT. No significant spinal or foraminal stenosis is visualized. 1. Type I dens fracture without displacement. There is a fairly large erosion in the posterior dens related to pannus formation. The type one fracture is seen best on the sagittal images anterior to the erosion. Therefore, this is likely a pathologic fracture given the presence of the erosion. 2. Degenerative changes throughout the cervical spine. 3. Dense carotid artery calcification in the neck. There is also some calcification involving the left vertebral artery in the neck. Results of the dens fracture were called to Dr. Ruba Patricio in the emergency room at 1:30 PM on 9/12/2020. Signed by Dr Estanislado Boxer on 9/12/2020 1:31 PM    Ct Abdomen Pelvis W Iv Contrast Additional Contrast? None    Result Date: 9/12/2020  EXAMINATION:  CT ABDOMEN PELVIS W IV CONTRAST  9/12/2020 1:00 PM HISTORY: The patient fell. Epigastric pain.  Prior surgical resection of the urinary bladder and appendectomy. TECHNIQUE: Spiral CT was performed of the abdomen and pelvis with contrast. Multiplanar images were reconstructed. DLP: 320 mGy-cm. Automated exposure control was utilized. COMPARISON: No comparison study. LUNG BASES: The lung bases are clear. There is a moderate size hiatal hernia. LIVER AND SPLEEN: Prior cholecystectomy. There is intrahepatic and extrahepatic biliary tree dilatation probably related to reservoir effect. No visible obstructing stone is seen. The liver is otherwise unremarkable. The spleen is normal in size. PANCREAS: There is no pancreatic mass or inflammatory change. KIDNEYS AND ADRENALS: The adrenal glands are normal in size. There is cortical thinning of both kidneys. Both kidneys are small in size. The left kidney measures 5.8 cm and the right kidney measures 5.7 cm. The urinary bladder is surgically absent. There is an aortoiliac stent graft. The graft extends above the origins of both renal arteries. It also covers the origins of the celiac plexus and superior mesenteric arteries. The native aortic lumen measures 5.5 cm in transverse diameter. There is mild enhancement within the native lumen anteriorly suggesting an endoleak. The aortoiliac stent graft is patent. BOWEL: No oral contrast was given. There is underdistention of the stomach. Small bowel loops are nondilated. There is an ostomy in the right mid to lower abdomen likely related to the history of ureteral diversion. Some of the colon is underdistended. There is moderate to large stool in the colon. OTHER: There are degenerative changes of the spine. 1. Prior cholecystectomy. Intrahepatic and extrahepatic biliary tree dilatation is likely related to reservoir effect. No visible calcified stone is seen within the ductal system. 2. Diffuse cortical thinning of both kidneys. The kidneys are small in size. The left kidney measures 5.8 cm on the right kidney measures 5.7 cm. Remains pacemaker dependent. On IV antibiotics. Plan for possible permanent pacemaker Thursday. Will require follow-up of aortic valve going forward.     Giorgio To MD 9/15/2020 2:38 PM

## 2020-09-15 NOTE — CONSULTS
Palliative Care:          Past Medical History:        Past Medical History:   Diagnosis Date    Anemia in chronic kidney disease (CODE)     Atherosclerotic heart disease     Bladder cancer ([facility]     CAD (coronary artery disease)     Cancer ([facility]     Chronic kidney disease     COPD (chronic obstructive pulmonary disease) ([facility]     Diabetes mellitus ([facility]     End stage renal disease ([facility]     Enterocolitis due to Clostridioides difficile     GERD (gastroesophageal reflux disease)     Hemodialysis patient ([facility]     Hyperlipidemia     Hypertension     Other chronic pain     Palliative care patient 09/15/2020    Prostate cancer ([facility]     Vitamin D deficiency        Advance Directives:         Pain/Other Symptoms:        Activity:             Psychological/Spiritual:                Plan:        Patient/family discussion r/t goals:    Review of any needed services:              Electronically signed by Barbra Perales RN on 9/15/2020 at 11:01 AM

## 2020-09-15 NOTE — PROGRESS NOTES
Cardiology Progress Note Jackelyn Allen MD      Patient:  Tasia De  [id number]    Patient Active Problem List    Diagnosis Date Noted    Complicated UTI (urinary tract infection) 03/18/2016     Priority: High    Sepsis ([address] 03/18/2016     Priority: High    Diabetes mellitus ([address] 03/22/2016     Priority: Medium     Overview Note:     A1c 8.6 on 3/19/16      COPD (chronic obstructive pulmonary disease) ([address] 03/22/2016     Priority: Medium    Hypertension 03/22/2016     Priority: Medium    CAD (coronary artery disease) 03/22/2016     Priority: Medium    JOY (acute kidney injury) ([address] 03/18/2016     Priority: Medium    Lower abdominal pain 03/18/2016     Priority: Medium    Hydronephrosis of right kidney      Priority: Medium    Bradycardia 09/12/2020     Priority: Low    Coronary artery disease involving native heart without angina pectoris      Priority: Low    Chronic obstructive pulmonary disease (HCC)      Priority: Low    Type 2 diabetes mellitus with diabetic chronic kidney disease ([facility]      Priority: Low    Essential hypertension      Priority: Low    Hypokalemia 03/18/2016     Priority: Low       Admit Date:  9/12/2020    Admission Problem List: Present on Admission:   Bradycardia      Cardiac Specific Data:  Specialty Problems        Cardiology Problems    CAD (coronary artery disease)        Hypertension        Coronary artery disease involving native heart without angina pectoris        Essential hypertension        Bradycardia            1. High-grade AV block with falls and injury, status post temporary pacemaker placement 9/12/2020.  2. End-stage renal disease on hemodialysis with atrophied renals and prior nephrostomy drainage. 3. CAD with history of prior PCI's, ejection fraction 50%, posterior basal and basal septal severe hypokinesis, moderate to severe aortic stenosis (mean gradient 25 mmHg). 4. AAA status post endovascular repair. 5. Diabetes mellitus.   6. Cervical fracture of dens post traumatic fall. Subjective:  Mr. Dali Marion remains pacemaker dependent with high-grade AV block. Reports no chest pain or shortness of breath currently. Objective:   BP (!) 143/50   Pulse 60   Temp 98.9 °F (37.2 °C) (Temporal)   Resp 11   Ht 5' 7\" (1.702 m)   Wt 112 lb 4.8 oz (50.9 kg)   SpO2 95%   BMI 17.59 kg/m²       Intake/Output Summary (Last 24 hours) at 9/14/2020 2006  Last data filed at 9/14/2020 1800  Gross per 24 hour   Intake 410 ml   Output 650 ml   Net -240 ml       Prior to Admission medications    Medication Sig Start Date End Date Taking? Authorizing Provider   insulin lispro (HUMALOG) 100 UNIT/ML injection vial Inject into the skin See Admin Instructions Sliding Scale BID   Yes Historical Provider, MD   amiodarone (CORDARONE) 200 MG tablet Take 200 mg by mouth daily    Historical Provider, MD   Ferric Citrate (AURYXIA) 1  MG(Fe) TABS Take 2 tablets by mouth 3 times daily     Historical Provider, MD   Hypromellose (ISOPTO TEARS OP) Apply to eye    Historical Provider, MD   isosorbide dinitrate (ISORDIL) 10 MG tablet Take 10 mg by mouth 3 times daily    Historical Provider, MD   insulin glargine (LANTUS) 100 UNIT/ML injection vial Inject 12 Units into the skin nightly     Historical Provider, MD   midodrine (PROAMATINE) 5 MG tablet Take 5 mg by mouth 3 times daily    Historical Provider, MD   oxyCODONE 5 MG capsule Take 5 mg by mouth every 6 hours as needed for Pain.      Historical Provider, MD   budesonide-formoterol (SYMBICORT) 160-4.5 MCG/ACT AERO Inhale 2 puffs into the lungs 2 times daily     Historical Provider, MD   famotidine (PEPCID) 20 MG tablet Take 20 mg by mouth daily    Historical Provider, MD   acetaminophen 650 MG TABS Take 650 mg by mouth every 4 hours as needed  Patient taking differently: Take 650 mg by mouth every 6 hours as needed for Pain  3/25/16   Sandra Leonardo MD   aspirin 81 MG chewable tablet Take 81 mg by mouth daily    Historical Provider, MD   atorvastatin (LIPITOR) 20 MG tablet Take 20 mg by mouth daily     Historical Provider, MD   vitamin D 1000 UNITS CAPS Take 3 capsules by mouth daily     Historical Provider, MD   metoprolol (LOPRESSOR) 25 MG tablet Take 25 mg by mouth every other day MON, WED, FRI, SUN    Historical Provider, MD        meropenem  1 g Intravenous Q24H    enoxaparin  30 mg Subcutaneous Q24H    ipratropium  0.5 mg Nebulization 4x daily    sodium chloride flush  10 mL Intravenous 2 times per day    insulin lispro  0-6 Units Subcutaneous TID     insulin lispro  0-3 Units Subcutaneous Nightly       TELEMETRY: Ventricular paced with A-V dissociation     Physical Exam:      Physical Exam  Constitutional:       Appearance: He is well-developed. HENT:      Ears:      Comments: Decreased hearing     Mouth/Throat:      Pharynx: No oropharyngeal exudate. Eyes:      General: No scleral icterus. Right eye: No discharge. Left eye: No discharge. Neck:      Thyroid: No thyromegaly. Vascular: No JVD. Comments: Cervical collar in place  Cardiovascular:      Rate and Rhythm: Normal rate and regular rhythm. Heart sounds: Murmur present. No friction rub. No gallop. Comments: No JVD  No edema  Systolic murmur with mid-to-late peak with a 2 diminished in intensity  Pulmonary:      Effort: No respiratory distress. Breath sounds: No stridor. No wheezing or rales. Abdominal:      General: Bowel sounds are normal. There is no distension. Palpations: Abdomen is soft. There is no mass. Tenderness: There is no abdominal tenderness. There is no guarding or rebound. Comments: No palpable organomegaly  Nephrostomy bag   Musculoskeletal:         General: No deformity. Skin:     General: Skin is warm. Coloration: Skin is not pale. Findings: No erythema or rash. Neurological:      Mental Status: He is alert and oriented to person, place, and time.       Motor: No abnormal muscle tone.      Coordination: Coordination normal.      Deep Tendon Reflexes: Reflexes normal.                 Lab Data:  CBC:   Recent Labs     09/12/20  1132 09/13/20  0347   WBC 7.1 6.9   HGB 10.9* 10.2*   HCT 33.5* 32.0*   MCV 98.2* 100.0*   * 168     BMP:   Recent Labs     09/12/20  1132 09/13/20  0538 09/14/20  0439    140 141   K 4.3 4.0 4.6   CL 98 102 107   CO2 27 21* 19*   PHOS  --   --  5.6*   BUN 33* 38* 44*   CREATININE 3.5* 4.3* 4.9*     LIVER PROFILE:   Recent Labs     09/12/20  1132 09/13/20  0538   AST 8 7   ALT 16 13   LIPASE 12*  --    BILITOT 0.3 <0.2   ALKPHOS 79 67     PT/INR:   Recent Labs     09/13/20  0347   PROTIME 14.5   INR 1.13     APTT: No results for input(s): APTT in the last 72 hours. BNP:  No results for input(s): BNP in the last 72 hours. CK, CKMB, Troponin: @LABRCNT (CKTOTAL:3, CKMB:3, TROPONINI:3)@    IMAGING:  Ct Head Wo Contrast    Result Date: 9/12/2020  EXAMINATION:  CT HEAD WO CONTRAST  9/12/2020 12:53 PM HISTORY: The patient fell. Rule out intracranial injury. TECHNIQUE: Multiple axial images were obtained through the brain without contrast infusion. Multiplanar images were reconstructed. DLP: 675 mGy-cm. Automated exposure control was utilized. COMPARISON: No comparison study. FINDINGS: There are no hemorrhage, edema or mass effect. There is moderate to severe atrophy. There is moderate to severe dilatation of the lateral and third ventricles. There is low-density in the hemispheric white matter that is nonspecific. The visualized paranasal sinuses and mastoid air cells are clear. No calvarial fracture is seen. There is biparietal calvarial thinning. 1. No hemorrhage, edema or mass effect. 2. Moderate to severe atrophy. Moderate to severe dilatation of the lateral and third ventricles is likely related. Hydrocephalus is not excluded. 3. Low-density in the hemispheric white matter is nonspecific and most likely due to chronic small vessel disease.  The full arthropathy. There is no significant central spinal canal narrowing. There is mild to moderate bilateral foraminal stenosis. At C6-7, there is moderate to severe disc narrowing. There is spondylitic and uncinate spurring. There is anterior spurring. There is facet arthropathy left greater than right. There is mild narrowing of the central spinal canal at this level. There is moderate right and mild left foraminal stenosis. At C7-T1, there is moderate to severe disc narrowing. There is mild anterior subluxation of C7 compared to T1. There is some scoliosis of the cervical-thoracic Junction in this area. This causes moderate to severe left-sided foraminal stenosis at this level. There is disc degeneration at multiple thoracic disc levels included on the cervical spine CT. No significant spinal or foraminal stenosis is visualized. 1. Type I dens fracture without displacement. There is a fairly large erosion in the posterior dens related to pannus formation. The type one fracture is seen best on the sagittal images anterior to the erosion. Therefore, this is likely a pathologic fracture given the presence of the erosion. 2. Degenerative changes throughout the cervical spine. 3. Dense carotid artery calcification in the neck. There is also some calcification involving the left vertebral artery in the neck. Results of the dens fracture were called to Dr. Carlos Abbott in the emergency room at 1:30 PM on 9/12/2020. Signed by Dr Richie Prince on 9/12/2020 1:31 PM    Ct Abdomen Pelvis W Iv Contrast Additional Contrast? None    Result Date: 9/12/2020  EXAMINATION:  CT ABDOMEN PELVIS W IV CONTRAST  9/12/2020 1:00 PM HISTORY: The patient fell. Epigastric pain. Prior surgical resection of the urinary bladder and appendectomy. TECHNIQUE: Spiral CT was performed of the abdomen and pelvis with contrast. Multiplanar images were reconstructed. DLP: 320 mGy-cm. Automated exposure control was utilized. COMPARISON: No comparison study.  LUNG BASES: The lung bases are clear. There is a moderate size hiatal hernia. LIVER AND SPLEEN: Prior cholecystectomy. There is intrahepatic and extrahepatic biliary tree dilatation probably related to reservoir effect. No visible obstructing stone is seen. The liver is otherwise unremarkable. The spleen is normal in size. PANCREAS: There is no pancreatic mass or inflammatory change. KIDNEYS AND ADRENALS: The adrenal glands are normal in size. There is cortical thinning of both kidneys. Both kidneys are small in size. The left kidney measures 5.8 cm and the right kidney measures 5.7 cm. The urinary bladder is surgically absent. There is an aortoiliac stent graft. The graft extends above the origins of both renal arteries. It also covers the origins of the celiac plexus and superior mesenteric arteries. The native aortic lumen measures 5.5 cm in transverse diameter. There is mild enhancement within the native lumen anteriorly suggesting an endoleak. The aortoiliac stent graft is patent. BOWEL: No oral contrast was given. There is underdistention of the stomach. Small bowel loops are nondilated. There is an ostomy in the right mid to lower abdomen likely related to the history of ureteral diversion. Some of the colon is underdistended. There is moderate to large stool in the colon. OTHER: There are degenerative changes of the spine. 1. Prior cholecystectomy. Intrahepatic and extrahepatic biliary tree dilatation is likely related to reservoir effect. No visible calcified stone is seen within the ductal system. 2. Diffuse cortical thinning of both kidneys. The kidneys are small in size. The left kidney measures 5.8 cm on the right kidney measures 5.7 cm. The renal arteries are covered by the aortoiliac stent graft. 3. Prior bladder resection. An ostomy in the right lower abdomen/upper pelvis is likely a ureteral diversion. 4. Patent aortoiliac stent graft. The native aortic lumen measures 5.5 cm transversely.  There is some contrast enhancement anteriorly in the native lumen suggesting endoleak. I do not see a connecting vessel that is patent at the endoleak. The superior mesenteric artery and celiac plexus are also covered by the stent graft. 5. No definite acute bowel abnormality. No oral contrast was given and therefore evaluation is very limited. The patient also has very little intra-abdominal fat. The full report of this exam was immediately signed and available to the emergency room. The patient is currently in the emergency room. Signed by Dr Richie Prince on 9/12/2020 1:10 PM    Xr Chest Portable    Result Date: 9/12/2020  EXAMINATION:  XR CHEST PORTABLE  9/12/2020 12:13 PM HISTORY: The patient fell. Chest pain. COMPARISON: 3/23/2016. FINDINGS:  There is no evidence of lung contusion or pneumothorax. There is no mediastinal widening. The heart is normal in size. The thoracic aorta is atheromatous. There is no dense infiltrate or effusion. There is mild biapical scarring. 1. Mild chronic changes. 2. No evidence of lung contusion, pneumothorax or mediastinal widening. 3. The patient has a chest CT already scheduled. Please see that report separately. Signed by Dr Richie Prince on 9/12/2020 12:14 PM        Assessment and Plan:    62-year-old gentleman with past medical history of coronary artery disease with prior PCI, ejection fraction 50% with inferobasal and basal septal severe hypokinesis, moderate to severe aortic stenosis, diabetes mellitus, end-stage renal disease on hemodialysis, prior AAA endovascular repair presenting with traumatic fall with syncope and found to have high-grade AV block status post and pre-pacemaker placement 9/12/2020.    1. Patient will require permanent pacemaker placement. Unfortunately his urine appears turbid, foul-smelling with growth of E. coli and Proteus. No fever or white count noted.  It would be preferable to treat possible UTI prior to placement of permanent pacemaker for at least 72 hours. We'll plan for pacemaker placement Thursday. Have spoken to patient and family in this regard.       Tee Burr MD 9/14/2020 8:06 PM

## 2020-09-15 NOTE — PROGRESS NOTES
Nurse called to notify Dr. Cesar Shelton that Dialysis RN unable to access patient fistula this morning. Rigoberto Khan APRN updated at this time.   Electronically signed by Daya Isidro RN on 9/15/2020 at 10:48 AM

## 2020-09-16 NOTE — PROGRESS NOTES
No acute incident this shift. Remains on temporal pacing, V-paced at 60. VSS. Vidal Shahdarryl remains on. Urostomy with about 400 ml of strong smelling urine obtained. Call light within reach. Will continue to monitor.

## 2020-09-16 NOTE — PROGRESS NOTES
 enoxaparin (LOVENOX) injection 30 mg  30 mg Subcutaneous Q24H Wright Brandon, DO   30 mg at 09/15/20 1733    oxyCODONE (ROXICODONE) immediate release tablet 5 mg  5 mg Oral Q4H PRN Wright Brandon, DO   5 mg at 09/15/20 1225    ipratropium (ATROVENT) 0.02 % nebulizer solution 0.5 mg  0.5 mg Nebulization 4x daily Wright Brandon, DO   0.5 mg at 09/16/20 3112    sodium chloride flush 0.9 % injection 10 mL  10 mL Intravenous 2 times per day Wright Brandon, DO   10 mL at 09/16/20 0900    sodium chloride flush 0.9 % injection 10 mL  10 mL Intravenous PRN Wright Brandon, DO   10 mL at 09/15/20 2315    acetaminophen (TYLENOL) tablet 650 mg  650 mg Oral Q6H PRN Wright Brandon, DO   650 mg at 09/15/20 3486    Or    acetaminophen (TYLENOL) suppository 650 mg  650 mg Rectal Q6H PRN Wright Brandon, DO        magnesium hydroxide (MILK OF MAGNESIA) 400 MG/5ML suspension 30 mL  30 mL Oral Daily PRN Wright Brandon, DO        promethazine (PHENERGAN) tablet 12.5 mg  12.5 mg Oral Q6H PRN Wright Brandon, DO        Or    ondansetron [facility]) injection 4 mg  4 mg Intravenous Q6H PRN Wright Brandon, DO   4 mg at 09/15/20 2314    morphine injection 2 mg  2 mg Intravenous Q4H PRN Wright Brandon, DO   2 mg at 09/13/20 1651    glucose (GLUTOSE) 40 % oral gel 15 g  15 g Oral PRN Jessie Bailey MD        dextrose 50 % IV solution  12.5 g Intravenous PRN Jessie Bailey MD   12.5 g at 09/15/20 0415    glucagon (rDNA) injection 1 mg  1 mg Intramuscular PRN Jessie Bailey MD        dextrose 5 % solution  100 mL/hr Intravenous PRN Jessie Bailey MD        insulin lispro (HUMALOG) injection vial 0-6 Units  0-6 Units Subcutaneous TID  Jessie Bailey MD   Stopped at 09/16/20 0752    insulin lispro (HUMALOG) injection vial 0-3 Units  0-3 Units Subcutaneous Nightly Jessie Bailey MD           Past Medical History:  Past Medical History:   Diagnosis Date    Anemia in on file     Active member of club or organization: Not on file     Attends meetings of clubs or organizations: Not on file     Relationship status: Not on file    Intimate partner violence     Fear of current or ex partner: Not on file     Emotionally abused: Not on file     Physically abused: Not on file     Forced sexual activity: Not on file   Other Topics Concern    Not on file   Social History Narrative    Not on file         Review of Systems:  History obtained from chart review and the patient  General ROS: No fever or chills  Respiratory ROS: positive for - shortness of breath  Cardiovascular ROS: No chest pain or palpitations  Gastrointestinal ROS: No abdominal pain or melena  Genito-Urinary ROS: No dysuria or hematuria  Musculoskeletal ROS: No joint pain or swelling   14 point ROS reviewed with the patient and negative except as noted above and in the HPI unless unable to obtain.     Objective:  Patient Vitals for the past 24 hrs:   BP Temp Temp src Pulse Resp SpO2 Weight   09/16/20 0711 -- -- -- 60 -- -- --   09/16/20 0630 (!) 164/48 -- -- 60 22 99 % --   09/16/20 0600 (!) 125/41 -- -- 60 18 95 % --   09/16/20 0530 (!) 139/52 -- -- 60 14 95 % --   09/16/20 0500 115/62 -- -- 60 11 96 % 112 lb 14.4 oz (51.2 kg)   09/16/20 0430 (!) 135/45 -- -- 60 17 95 % --   09/16/20 0400 (!) 116/43 97.8 °F (36.6 °C) Temporal 60 15 97 % --   09/16/20 0330 (!) 109/44 -- -- 60 (!) 7 94 % --   09/16/20 0300 (!) 135/41 -- -- 60 (!) 6 96 % --   09/16/20 0230 (!) 137/44 -- -- 60 15 96 % --   09/16/20 0200 (!) 135/42 98.7 °F (37.1 °C) Temporal 62 12 94 % --   09/16/20 0130 (!) 123/45 -- -- 60 8 95 % --   09/16/20 0100 (!) 122/45 -- -- 60 10 94 % --   09/16/20 0030 (!) 156/49 -- -- 60 14 95 % --   09/16/20 0000 (!) 153/57 98.8 °F (37.1 °C) Temporal 60 13 97 % --   09/15/20 2330 (!) 182/53 -- -- 60 13 97 % --   09/15/20 2300 (!) 168/48 -- -- 60 13 97 % --   09/15/20 2230 (!) 141/51 98.9 °F (37.2 °C) Temporal 60 13 96 % -- 09/15/20 2200 (!) 169/51 -- -- 60 15 96 % --   09/15/20 2157 -- -- -- 60 -- -- --   09/15/20 2130 (!) 177/55 -- -- 60 16 97 % --   09/15/20 2100 (!) 161/56 99.7 °F (37.6 °C) Temporal 60 15 96 % --   09/15/20 2030 -- -- -- 60 -- -- --   09/15/20 1900 (!) 123/52 -- -- 60 20 93 % --   09/15/20 1800 (!) 173/54 -- -- 60 14 97 % --   09/15/20 1700 (!) 169/62 -- -- 60 16 96 % --   09/15/20 1601 -- -- -- 60 -- -- --   09/15/20 1600 (!) 171/59 98.2 °F (36.8 °C) Temporal 60 15 96 % --   09/15/20 1500 (!) 162/52 -- -- 60 11 99 % --   09/15/20 1400 (!) 153/40 -- -- 60 13 94 % --   09/15/20 1300 (!) 154/62 -- -- 60 18 95 % --   09/15/20 1230 (!) 136/47 -- -- 60 15 97 % --   09/15/20 1201 -- -- -- 60 -- -- --   09/15/20 1200 (!) 160/55 98.3 °F (36.8 °C) Temporal 60 15 98 % --   09/15/20 1130 (!) 172/58 -- -- 60 14 97 % --   09/15/20 1100 (!) 181/61 -- -- 60 13 96 % --   09/15/20 1030 (!) 150/62 -- -- 60 16 99 % --   09/15/20 1000 (!) 175/63 -- -- 60 19 96 % --   09/15/20 0930 (!) 156/59 -- -- 60 12 95 % --   09/15/20 0900 (!) 159/56 -- -- 60 15 95 % --       Intake/Output Summary (Last 24 hours) at 9/16/2020 0855  Last data filed at 9/16/2020 0500  Gross per 24 hour   Intake 780 ml   Output 725 ml   Net 55 ml     General: awake/alert   HEENT: Normocephalic atraumatic head  Neck: Supple/neck collar.   Chest:  clear to auscultation bilaterally  CVS: regular rate and rhythm  Abdominal: soft, nontender, normal bowel sounds  Extremities: no cyanosis or edema  Skin: warm and dry without rash      Labs:  BMP:   Recent Labs     09/14/20  0439 09/15/20  0332 09/16/20 0417    142 139   K 4.6 4.6 4.8    109 109   CO2 19* 18* 17*   PHOS 5.6*  --   --    BUN 44* 45* 53*   CREATININE 4.9* 5.1* 5.3*   CALCIUM 8.9 8.6* 8.6*     CBC:   Recent Labs     09/15/20  0332 09/16/20  0417   WBC 7.1 5.7   HGB 9.5* 8.8*   HCT 31.4* 28.3*   .7* 100.4*    144     LIVER PROFILE:   Recent Labs     09/15/20  0332 09/16/20 0417   AST 7 7   ALT 13 11   BILITOT <0.2 <0.2   ALKPHOS 58 57     PT/INR:   No results for input(s): PROTIME, INR in the last 72 hours. APTT: No results for input(s): APTT in the last 72 hours. BNP:  No results for input(s): BNP in the last 72 hours. Ionized Calcium:No results for input(s): IONCA in the last 72 hours. Magnesium:No results for input(s): MG in the last 72 hours. Phosphorus:  Recent Labs     09/14/20  0439   PHOS 5.6*     HgbA1C: No results for input(s): LABA1C in the last 72 hours. Hepatic:   Recent Labs     09/15/20  0332 09/16/20  0417   ALKPHOS 58 57   ALT 13 11   AST 7 7   PROT 5.6* 5.0*   BILITOT <0.2 <0.2   LABALBU 3.0* 3.0*     Lactic Acid: No results for input(s): LACTA in the last 72 hours. Troponin: No results for input(s): CKTOTAL, CKMB, TROPONINT in the last 72 hours. ABGs: No results for input(s): PH, PCO2, PO2, HCO3, O2SAT in the last 72 hours. CRP:  No results for input(s): CRP in the last 72 hours. Sed Rate:  No results for input(s): SEDRATE in the last 72 hours. Cultures:   No results for input(s): CULTURE in the last 72 hours. No results for input(s): BC, Consuelo Sark in the last 72 hours. No results for input(s): CXSURG in the last 72 hours. Radiology reports as per the Radiologist  Radiology: Ct Head Wo Contrast    Result Date: 9/12/2020  EXAMINATION:  CT HEAD WO CONTRAST  9/12/2020 12:53 PM HISTORY: The patient fell. Rule out intracranial injury. TECHNIQUE: Multiple axial images were obtained through the brain without contrast infusion. Multiplanar images were reconstructed. DLP: 675 mGy-cm. Automated exposure control was utilized. COMPARISON: No comparison study. FINDINGS: There are no hemorrhage, edema or mass effect. There is moderate to severe atrophy. There is moderate to severe dilatation of the lateral and third ventricles. There is low-density in the hemispheric white matter that is nonspecific. The visualized paranasal sinuses and mastoid air cells are clear.  No calvarial fracture is seen. There is biparietal calvarial thinning. 1. No hemorrhage, edema or mass effect. 2. Moderate to severe atrophy. Moderate to severe dilatation of the lateral and third ventricles is likely related. Hydrocephalus is not excluded. 3. Low-density in the hemispheric white matter is nonspecific and most likely due to chronic small vessel disease. The full report of this exam was immediately signed and available to the emergency room. The patient is currently in the emergency room. Signed by Dr Patrice Bocanegra on 9/12/2020 12:56 PM    Ct Chest W Contrast    Result Date: 9/12/2020  EXAMINATION:  CT CHEST W CONTRAST  9/12/2020 1:11 PM HISTORY: The patient fell. Anterior chest skin tear. Hypotension during dialysis. Normal chest x-ray. COMPARISON: No comparison study. DLP: 320 mGy-cm. Automated exposure control was utilized. TECHNIQUE: Spiral CT was performed of the chest with contrast. Multiplanar images were reconstructed. MEDIASTINUM/VASCULAR: There is atheromatous calcification of the thoracic aorta and coronary arteries. Heart size is upper limits of normal. Pulmonary arteries are normal in caliber. There are no enlarged mediastinal lymph nodes. There is a moderate size hiatal hernia. There is wall thickening of the esophagus in the chest diffusely. LUNGS: There is centrilobular emphysema. There is paraseptal emphysema. There are few scattered calcified granulomas. There is no airspace consolidation or pleural effusion. There is no pneumothorax or lung contusion. BONES AND SOFT TISSUES: The clavicles are intact. The scapulae are intact. No definite rib fracture is seen. No visible acute thoracic spine fracture is seen. There are some degenerative changes of the spine. The bones are demineralized. The sternum appears to be intact. UPPER ABDOMEN: Please see the abdomen and pelvis CT report separately. 1. No evidence of pneumothorax or lung contusion. No mediastinal hematoma.  2. Atheromatous disease of the thoracic aorta and coronary arteries. Heart size is prominent. 3. Moderate size hiatal hernia. Diffuse thickening of the wall of the thoracic esophagus. Correlate with any history of esophagitis and reflux disease. 4. Centrilobular and paraseptal emphysema. Old granulomatous disease. 5. Demineralized bones. No definite acute fracture. The full report of this exam was immediately signed and available to the emergency room. The patient is currently in the emergency room. Signed by Dr Mukund Varela on 9/12/2020 1:16 PM    Ct Cervical Spine Wo Contrast    Result Date: 9/12/2020  EXAMINATION:  CT CERVICAL SPINE WO CONTRAST  9/12/2020 1:17 PM HISTORY: The patient fell. Rule out fracture. No x-rays obtained. TECHNIQUE: Spiral CT was performed of the cervical spine. Sagittal and coronal images were reconstructed. COMPARISON: No comparison study. DLP: 260 mGy-cm. Automated exposure control was utilized. FINDINGS: There is fairly prominent pannus formation along the posterior aspect of the dens. There is a fairly large dens erosion located posteriorly. There is a nondisplaced fracture line anteriorly that is likely related to the weakening of the dens related to the erosion. Therefore, this is likely a pathologic fracture. This is fairly high on the dens and I will classify this as a type I injury. There is no displacement. There is no disruption of the anterior vertebral line or the posterior spinolaminar line. No other cervical spine fracture is identified. There is fairly severe carotid artery calcification in the neck bilaterally. There is vertebral artery calcification on the left. At C2-3, there is moderate to severe disc narrowing. There is severe facet arthropathy. There is mild to moderate foraminal narrowing bilaterally. No central spinal canal narrowing. At C3-4, there is moderate disc narrowing. There is minimal anterior subluxation of C3 compared to C4.  There is bilateral uncinate spurring and bilateral facet arthropathy. There is severe bilateral foraminal stenosis at this level. At C4-5, there is moderate disc narrowing. There is uncinate spurring and facet arthropathy. The right-sided facet joint is fused. There is severe right and moderate left-sided foraminal stenosis. At C5-6, there is disc narrowing with spondylitic and uncinate spurring. There is bilateral facet arthropathy. There is no significant central spinal canal narrowing. There is mild to moderate bilateral foraminal stenosis. At C6-7, there is moderate to severe disc narrowing. There is spondylitic and uncinate spurring. There is anterior spurring. There is facet arthropathy left greater than right. There is mild narrowing of the central spinal canal at this level. There is moderate right and mild left foraminal stenosis. At C7-T1, there is moderate to severe disc narrowing. There is mild anterior subluxation of C7 compared to T1. There is some scoliosis of the cervical-thoracic Junction in this area. This causes moderate to severe left-sided foraminal stenosis at this level. There is disc degeneration at multiple thoracic disc levels included on the cervical spine CT. No significant spinal or foraminal stenosis is visualized. 1. Type I dens fracture without displacement. There is a fairly large erosion in the posterior dens related to pannus formation. The type one fracture is seen best on the sagittal images anterior to the erosion. Therefore, this is likely a pathologic fracture given the presence of the erosion. 2. Degenerative changes throughout the cervical spine. 3. Dense carotid artery calcification in the neck. There is also some calcification involving the left vertebral artery in the neck. Results of the dens fracture were called to Dr. Chen Carter in the emergency room at 1:30 PM on 9/12/2020.  Signed by Dr Valeria Cardoso on 9/12/2020 1:31 PM    Ct Abdomen Pelvis W Iv Contrast Additional Contrast? None    Result Date: 9/12/2020  EXAMINATION:  CT ABDOMEN PELVIS W IV CONTRAST  9/12/2020 1:00 PM HISTORY: The patient fell. Epigastric pain. Prior surgical resection of the urinary bladder and appendectomy. TECHNIQUE: Spiral CT was performed of the abdomen and pelvis with contrast. Multiplanar images were reconstructed. DLP: 320 mGy-cm. Automated exposure control was utilized. COMPARISON: No comparison study. LUNG BASES: The lung bases are clear. There is a moderate size hiatal hernia. LIVER AND SPLEEN: Prior cholecystectomy. There is intrahepatic and extrahepatic biliary tree dilatation probably related to reservoir effect. No visible obstructing stone is seen. The liver is otherwise unremarkable. The spleen is normal in size. PANCREAS: There is no pancreatic mass or inflammatory change. KIDNEYS AND ADRENALS: The adrenal glands are normal in size. There is cortical thinning of both kidneys. Both kidneys are small in size. The left kidney measures 5.8 cm and the right kidney measures 5.7 cm. The urinary bladder is surgically absent. There is an aortoiliac stent graft. The graft extends above the origins of both renal arteries. It also covers the origins of the celiac plexus and superior mesenteric arteries. The native aortic lumen measures 5.5 cm in transverse diameter. There is mild enhancement within the native lumen anteriorly suggesting an endoleak. The aortoiliac stent graft is patent. BOWEL: No oral contrast was given. There is underdistention of the stomach. Small bowel loops are nondilated. There is an ostomy in the right mid to lower abdomen likely related to the history of ureteral diversion. Some of the colon is underdistended. There is moderate to large stool in the colon. OTHER: There are degenerative changes of the spine. 1. Prior cholecystectomy. Intrahepatic and extrahepatic biliary tree dilatation is likely related to reservoir effect. No visible calcified stone is seen within the ductal system.  2. Diffuse cortical thinning of both kidneys. The kidneys are small in size. The left kidney measures 5.8 cm on the right kidney measures 5.7 cm. The renal arteries are covered by the aortoiliac stent graft. 3. Prior bladder resection. An ostomy in the right lower abdomen/upper pelvis is likely a ureteral diversion. 4. Patent aortoiliac stent graft. The native aortic lumen measures 5.5 cm transversely. There is some contrast enhancement anteriorly in the native lumen suggesting endoleak. I do not see a connecting vessel that is patent at the endoleak. The superior mesenteric artery and celiac plexus are also covered by the stent graft. 5. No definite acute bowel abnormality. No oral contrast was given and therefore evaluation is very limited. The patient also has very little intra-abdominal fat. The full report of this exam was immediately signed and available to the emergency room. The patient is currently in the emergency room. Signed by Dr Lucy Collins on 9/12/2020 1:10 PM    Xr Chest Portable    Result Date: 9/12/2020  EXAMINATION:  XR CHEST PORTABLE  9/12/2020 12:13 PM HISTORY: The patient fell. Chest pain. COMPARISON: 3/23/2016. FINDINGS:  There is no evidence of lung contusion or pneumothorax. There is no mediastinal widening. The heart is normal in size. The thoracic aorta is atheromatous. There is no dense infiltrate or effusion. There is mild biapical scarring. 1. Mild chronic changes. 2. No evidence of lung contusion, pneumothorax or mediastinal widening. 3. The patient has a chest CT already scheduled. Please see that report separately. Signed by Dr Lucy Collins on 9/12/2020 12:14 PM       Assessment   1. End-stage renal disease/need maintenance hemodialysis. 2.  Status post fall and neck fracture. 3.  Severe bradycardia/recurrent syncopal episode. 4.  Anemia of chronic kidney disease. 5.  Type II diabetic nephropathy. 6.  Secondary hyperparathyroidism. 7.  Hyperphosphatemia. 8.  Urinary tract infection.   9. Malfunctioning AV fistula. Plan:  1. Pending vascular surgery consultation  2. Possible hemodialysis today after fistulogram.  3.  Perma plan was discussed with patient and Dr. Janna Han.       Roly Nunn MD  09/16/20  8:55 AM

## 2020-09-16 NOTE — PROGRESS NOTES
BID PRN, Kassandra Cedric, DO, 100 mg at 09/15/20 1733    hydrALAZINE (APRESOLINE) injection 10 mg, 10 mg, Intravenous, Q4H PRN, Haley Oconnor MD, 10 mg at 09/15/20 2210    meropenem (MERREM) 1 g in sterile water 20 mL IV syringe, 1 g, Intravenous, Q24H, Kassandra Cedric, DO, 1 g at 09/15/20 1732    enoxaparin (LOVENOX) injection 30 mg, 30 mg, Subcutaneous, Q24H, Kassandra Cedric, DO, 30 mg at 09/15/20 1733    oxyCODONE (ROXICODONE) immediate release tablet 5 mg, 5 mg, Oral, Q4H PRN, Kassandra Cedric, DO, 5 mg at 09/15/20 1225    ipratropium (ATROVENT) 0.02 % nebulizer solution 0.5 mg, 0.5 mg, Nebulization, 4x daily, Kassandra Cedric, DO, 0.5 mg at 09/16/20 4200    sodium chloride flush 0.9 % injection 10 mL, 10 mL, Intravenous, 2 times per day, Kassandra Cedric, DO, 10 mL at 09/16/20 0900    sodium chloride flush 0.9 % injection 10 mL, 10 mL, Intravenous, PRN, Kassandra Cedric, DO, 10 mL at 09/15/20 2315    acetaminophen (TYLENOL) tablet 650 mg, 650 mg, Oral, Q6H PRN, 650 mg at 09/15/20 0716 **OR** acetaminophen (TYLENOL) suppository 650 mg, 650 mg, Rectal, Q6H PRN, Kassandra Cedric, DO    magnesium hydroxide (MILK OF MAGNESIA) 400 MG/5ML suspension 30 mL, 30 mL, Oral, Daily PRN, Kassandra Cedric, DO    promethazine (PHENERGAN) tablet 12.5 mg, 12.5 mg, Oral, Q6H PRN **OR** ondansetron (ZOFRAN) injection 4 mg, 4 mg, Intravenous, Q6H PRN, Kassandra Cedric, DO, 4 mg at 09/15/20 2314    morphine injection 2 mg, 2 mg, Intravenous, Q4H PRN, Kassandra Cedric, DO, 2 mg at 09/13/20 1651    glucose (GLUTOSE) 40 % oral gel 15 g, 15 g, Oral, PRN, Miguel Angel Wu MD    dextrose 50 % IV solution, 12.5 g, Intravenous, PRN, Miguel Angel Wu MD, 12.5 g at 09/15/20 0415    glucagon (rDNA) injection 1 mg, 1 mg, Intramuscular, PRN, Miguel Angel Wu MD    dextrose 5 % solution, 100 mL/hr, Intravenous, PRN, Miguel Angel Wu MD    insulin lispro (HUMALOG) injection vial 0-6 Units, 0-6 .4 (H) 09/16/2020     09/16/2020     Lab Results   Component Value Date     09/16/2020    K 4.8 09/16/2020     09/16/2020    CO2 17 (L) 09/16/2020    BUN 53 (H) 09/16/2020    CREATININE 5.3 (H) 09/16/2020    GLUCOSE 122 (H) 09/16/2020    CALCIUM 8.6 (L) 09/16/2020    PROT 5.0 (L) 09/16/2020    LABALBU 3.0 (L) 09/16/2020    BILITOT <0.2 09/16/2020    ALKPHOS 57 09/16/2020    AST 7 09/16/2020    ALT 11 09/16/2020    LABGLOM 11 (A) 09/16/2020    GFRAA 13 (L) 09/16/2020     Lab Results   Component Value Date    INR 1.13 09/13/2020    INR 2.87 (H) 03/25/2016    INR 1.76 (H) 03/24/2016    PROTIME 14.5 09/13/2020    PROTIME 28.7 (H) 03/25/2016    PROTIME 19.8 (H) 03/24/2016     EXAMINATION:  CT CERVICAL SPINE WO CONTRAST  9/12/2020 1:17 PM    HISTORY: The patient fell. Rule out fracture. No x-rays obtained. TECHNIQUE: Spiral CT was performed of the cervical spine. Sagittal and    coronal images were reconstructed. COMPARISON: No comparison study. DLP: 260 mGy-cm. Automated exposure control was utilized. FINDINGS: There is fairly prominent pannus formation along the    posterior aspect of the dens. There is a fairly large dens erosion    located posteriorly. There is a nondisplaced fracture line anteriorly    that is likely related to the weakening of the dens related to the    erosion. Therefore, this is likely a pathologic fracture. This is    fairly high on the dens and I will classify this as a type I injury. There is no displacement. There is no disruption of the anterior    vertebral line or the posterior spinolaminar line. No other cervical    spine fracture is identified. There is fairly severe carotid artery    calcification in the neck bilaterally. There is vertebral artery    calcification on the left. At C2-3, there is moderate to severe disc narrowing. There is severe    facet arthropathy. There is mild to moderate foraminal narrowing    bilaterally.  No central spinal canal narrowing. At C3-4, there is moderate disc narrowing. There is minimal anterior    subluxation of C3 compared to C4. There is bilateral uncinate spurring    and bilateral facet arthropathy. There is severe bilateral foraminal    stenosis at this level. At C4-5, there is moderate disc narrowing. There is uncinate spurring    and facet arthropathy. The right-sided facet joint is fused. There is    severe right and moderate left-sided foraminal stenosis. At C5-6, there is disc narrowing with spondylitic and uncinate    spurring. There is bilateral facet arthropathy. There is no    significant central spinal canal narrowing. There is mild to moderate    bilateral foraminal stenosis. At C6-7, there is moderate to severe disc narrowing. There is    spondylitic and uncinate spurring. There is anterior spurring. There    is facet arthropathy left greater than right. There is mild narrowing    of the central spinal canal at this level. There is moderate right and    mild left foraminal stenosis. At C7-T1, there is moderate to severe disc narrowing. There is mild    anterior subluxation of C7 compared to T1. There is some scoliosis of    the cervical-thoracic Junction in this area. This causes moderate to    severe left-sided foraminal stenosis at this level. There is disc degeneration at multiple thoracic disc levels included    on the cervical spine CT. No significant spinal or foraminal stenosis    is visualized.         Impression    1. Type I dens fracture without displacement. There is a fairly large    erosion in the posterior dens related to pannus formation. The type    one fracture is seen best on the sagittal images anterior to the    erosion. Therefore, this is likely a pathologic fracture given the    presence of the erosion. 2. Degenerative changes throughout the cervical spine. 3. Dense carotid artery calcification in the neck.  There is also some    calcification involving the left vertebral artery in the neck. Results of the dens fracture were called to Dr. Olvin Yang in the    emergency room at 1:30 PM on 9/12/2020. Signed by Dr Katherine Porter on 9/12/2020 1:31 PM            IMPRESSION  67year old male s/p fall with neck pain and a type I non-displaced odontoid fracture, neurologically stable    RECOMMENDATIONS:    Patient remains neurologically stable  No urgent neurosurgical intervention warranted  Again, the type I odontoid fracture is stable in nature and can be treated with hard collar for at least 6 weeks  Hard cervical collar in place. Keep cervical spine in neutral position during pacemaker placement. He may sit up or get OOB with assistance from a neurosurgical standpoint.         JUNAID Vincent

## 2020-09-16 NOTE — OP NOTE
Operative Note      Patient: Smith Hayes  YOB: 1945  MRN: [id number]      Date: 9/16/2020    Preoperative Diagnosis:  chronic renal failure    Postoperative Diagnosis:  Same    Operative procedure:    1. Ultrasound guided access of left femoral vein  2. Placement of a dual-lumen non-tunneled left femoral vein dialysis catheter    Surgeon:  Cecilia Mattson DO    Anesthesia:  Local    EBL:  Less than 50 ml    Findings:    1. The vein was patent per ultrasound. 2.  The catheter was placed without resistance. 3.  Both ports aspirated and flushed easily. The catheter is ready for use. Procedure in Detail:    After informed consent was obtained, the patient's left groin was prepped and draped in the usual sterile fashion. Skin and subcutaneous tissues over the femoral vein were anesthetized with lidocaine. Under ultrasound guidance, the femoral vein was cannulated with a micropuncture needle. An 0.018 wire was placed through the micropuncture needle and then the needle was replaced with the micropuncture catheter. A 0.035 j wire was then placed through the micropuncture catheter. The micropuncture catheter was removed and a small incision made over the wire with an 11 blade. Sequential dilators were passed over the wire without resistance and finally the catheter was placed over the wire without resistance. The wire and wire guide were removed from the catheter. Both ports aspirated and flushed easily with saline. Appropriate caps were placed. The catheter was secured to the skin with two 2-0 silk sutures. A biopatch was placed at the exit site and sterile dressings were applied.      Electronically signed by Cecilia Mattson DO on 9/16/2020 at 5:28 PM

## 2020-09-16 NOTE — CARE COORDINATION
CM spoke with Cord at [facility] and patient can return upon discharge when stable.   Laverda Dancer [phone]  f [phone]  Electronically signed by Dominique Gaviria RN on 9/16/2020 at 9:04 AM

## 2020-09-16 NOTE — PROGRESS NOTES
hypokinesis, moderate to severe aortic stenosis (mean gradient 25 mmHg). 4. AAA status post endovascular repair. 5. Diabetes mellitus. 6. Cervical fracture of dens post traumatic fall. Subjective:  Mr. Aruna Butt is planned for possible fistulogram today. Having COVID testing. Remains ventricular paced 100% of the time. Temporary pacemaker working well. Objective:   BP (!) 107/50   Pulse 60   Temp 98.4 °F (36.9 °C) (Temporal)   Resp 15   Ht 5' 7\" (1.702 m)   Wt 112 lb 14.4 oz (51.2 kg)   SpO2 95%   BMI 17.68 kg/m²       Intake/Output Summary (Last 24 hours) at 9/16/2020 1346  Last data filed at 9/16/2020 0800  Gross per 24 hour   Intake 545 ml   Output 750 ml   Net -205 ml       Prior to Admission medications    Medication Sig Start Date End Date Taking? Authorizing Provider   insulin lispro (HUMALOG) 100 UNIT/ML injection vial Inject into the skin See Admin Instructions Sliding Scale BID   Yes Historical Provider, MD   amiodarone (CORDARONE) 200 MG tablet Take 200 mg by mouth daily    Historical Provider, MD   Ferric Citrate (AURYXIA) 1  MG(Fe) TABS Take 2 tablets by mouth 3 times daily     Historical Provider, MD   Hypromellose (ISOPTO TEARS OP) Apply to eye    Historical Provider, MD   isosorbide dinitrate (ISORDIL) 10 MG tablet Take 10 mg by mouth 3 times daily    Historical Provider, MD   insulin glargine (LANTUS) 100 UNIT/ML injection vial Inject 12 Units into the skin nightly     Historical Provider, MD   midodrine (PROAMATINE) 5 MG tablet Take 5 mg by mouth 3 times daily    Historical Provider, MD   oxyCODONE 5 MG capsule Take 5 mg by mouth every 6 hours as needed for Pain.      Historical Provider, MD   budesonide-formoterol (SYMBICORT) 160-4.5 MCG/ACT AERO Inhale 2 puffs into the lungs 2 times daily     Historical Provider, MD   famotidine (PEPCID) 20 MG tablet Take 20 mg by mouth daily    Historical Provider, MD   acetaminophen 650 MG TABS Take 650 mg by mouth every 4 hours as needed  Patient taking differently: Take 650 mg by mouth every 6 hours as needed for Pain  3/25/16   Karen Campbell MD   aspirin 81 MG chewable tablet Take 81 mg by mouth daily    Historical Provider, MD   atorvastatin (LIPITOR) 20 MG tablet Take 20 mg by mouth daily     Historical Provider, MD   vitamin D 1000 UNITS CAPS Take 3 capsules by mouth daily     Historical Provider, MD   metoprolol (LOPRESSOR) 25 MG tablet Take 25 mg by mouth every other day MON, WED, FRI, SUN    Historical Provider, MD        meropenem  1 g Intravenous Q24H    enoxaparin  30 mg Subcutaneous Q24H    ipratropium  0.5 mg Nebulization 4x daily    sodium chloride flush  10 mL Intravenous 2 times per day    insulin lispro  0-6 Units Subcutaneous TID WC    insulin lispro  0-3 Units Subcutaneous Nightly       TELEMETRY: Sinus and ventricular paced with A-V dissociation    Physical Exam:      Physical Exam    No significant change      Lab Data:  CBC:   Recent Labs     09/15/20  0332 09/16/20 0417   WBC 7.1 5.7   HGB 9.5* 8.8*   HCT 31.4* 28.3*   .7* 100.4*    144     BMP:   Recent Labs     09/14/20  0439 09/15/20  0332 09/16/20 0417    142 139   K 4.6 4.6 4.8    109 109   CO2 19* 18* 17*   PHOS 5.6*  --   --    BUN 44* 45* 53*   CREATININE 4.9* 5.1* 5.3*     LIVER PROFILE:   Recent Labs     09/15/20  0332 09/16/20 0417   AST 7 7   ALT 13 11   BILITOT <0.2 <0.2   ALKPHOS 58 57     PT/INR: No results for input(s): PROTIME, INR in the last 72 hours. APTT: No results for input(s): APTT in the last 72 hours. BNP:  No results for input(s): BNP in the last 72 hours. CK, CKMB, Troponin: @LABRCNT (CKTOTAL:3, CKMB:3, TROPONINI:3)@    IMAGING:  Ct Head Wo Contrast    Result Date: 9/12/2020  EXAMINATION:  CT HEAD WO CONTRAST  9/12/2020 12:53 PM HISTORY: The patient fell. Rule out intracranial injury. TECHNIQUE: Multiple axial images were obtained through the brain without contrast infusion.  Multiplanar images were TISSUES: The clavicles are intact. The scapulae are intact. No definite rib fracture is seen. No visible acute thoracic spine fracture is seen. There are some degenerative changes of the spine. The bones are demineralized. The sternum appears to be intact. UPPER ABDOMEN: Please see the abdomen and pelvis CT report separately. 1. No evidence of pneumothorax or lung contusion. No mediastinal hematoma. 2. Atheromatous disease of the thoracic aorta and coronary arteries. Heart size is prominent. 3. Moderate size hiatal hernia. Diffuse thickening of the wall of the thoracic esophagus. Correlate with any history of esophagitis and reflux disease. 4. Centrilobular and paraseptal emphysema. Old granulomatous disease. 5. Demineralized bones. No definite acute fracture. The full report of this exam was immediately signed and available to the emergency room. The patient is currently in the emergency room. Signed by Dr Natasha Langley on 9/12/2020 1:16 PM    Ct Cervical Spine Wo Contrast    Result Date: 9/12/2020  EXAMINATION:  CT CERVICAL SPINE WO CONTRAST  9/12/2020 1:17 PM HISTORY: The patient fell. Rule out fracture. No x-rays obtained. TECHNIQUE: Spiral CT was performed of the cervical spine. Sagittal and coronal images were reconstructed. COMPARISON: No comparison study. DLP: 260 mGy-cm. Automated exposure control was utilized. FINDINGS: There is fairly prominent pannus formation along the posterior aspect of the dens. There is a fairly large dens erosion located posteriorly. There is a nondisplaced fracture line anteriorly that is likely related to the weakening of the dens related to the erosion. Therefore, this is likely a pathologic fracture. This is fairly high on the dens and I will classify this as a type I injury. There is no displacement. There is no disruption of the anterior vertebral line or the posterior spinolaminar line. No other cervical spine fracture is identified.  There is fairly severe carotid artery calcification in the neck bilaterally. There is vertebral artery calcification on the left. At C2-3, there is moderate to severe disc narrowing. There is severe facet arthropathy. There is mild to moderate foraminal narrowing bilaterally. No central spinal canal narrowing. At C3-4, there is moderate disc narrowing. There is minimal anterior subluxation of C3 compared to C4. There is bilateral uncinate spurring and bilateral facet arthropathy. There is severe bilateral foraminal stenosis at this level. At C4-5, there is moderate disc narrowing. There is uncinate spurring and facet arthropathy. The right-sided facet joint is fused. There is severe right and moderate left-sided foraminal stenosis. At C5-6, there is disc narrowing with spondylitic and uncinate spurring. There is bilateral facet arthropathy. There is no significant central spinal canal narrowing. There is mild to moderate bilateral foraminal stenosis. At C6-7, there is moderate to severe disc narrowing. There is spondylitic and uncinate spurring. There is anterior spurring. There is facet arthropathy left greater than right. There is mild narrowing of the central spinal canal at this level. There is moderate right and mild left foraminal stenosis. At C7-T1, there is moderate to severe disc narrowing. There is mild anterior subluxation of C7 compared to T1. There is some scoliosis of the cervical-thoracic Junction in this area. This causes moderate to severe left-sided foraminal stenosis at this level. There is disc degeneration at multiple thoracic disc levels included on the cervical spine CT. No significant spinal or foraminal stenosis is visualized. 1. Type I dens fracture without displacement. There is a fairly large erosion in the posterior dens related to pannus formation. The type one fracture is seen best on the sagittal images anterior to the erosion. Therefore, this is likely a pathologic fracture given the presence of the erosion.  2. Degenerative changes throughout the cervical spine. 3. Dense carotid artery calcification in the neck. There is also some calcification involving the left vertebral artery in the neck. Results of the dens fracture were called to Dr. Markell Contreras in the emergency room at 1:30 PM on 9/12/2020. Signed by Dr Jimena Portillo on 9/12/2020 1:31 PM    Ct Abdomen Pelvis W Iv Contrast Additional Contrast? None    Result Date: 9/12/2020  EXAMINATION:  CT ABDOMEN PELVIS W IV CONTRAST  9/12/2020 1:00 PM HISTORY: The patient fell. Epigastric pain. Prior surgical resection of the urinary bladder and appendectomy. TECHNIQUE: Spiral CT was performed of the abdomen and pelvis with contrast. Multiplanar images were reconstructed. DLP: 320 mGy-cm. Automated exposure control was utilized. COMPARISON: No comparison study. LUNG BASES: The lung bases are clear. There is a moderate size hiatal hernia. LIVER AND SPLEEN: Prior cholecystectomy. There is intrahepatic and extrahepatic biliary tree dilatation probably related to reservoir effect. No visible obstructing stone is seen. The liver is otherwise unremarkable. The spleen is normal in size. PANCREAS: There is no pancreatic mass or inflammatory change. KIDNEYS AND ADRENALS: The adrenal glands are normal in size. There is cortical thinning of both kidneys. Both kidneys are small in size. The left kidney measures 5.8 cm and the right kidney measures 5.7 cm. The urinary bladder is surgically absent. There is an aortoiliac stent graft. The graft extends above the origins of both renal arteries. It also covers the origins of the celiac plexus and superior mesenteric arteries. The native aortic lumen measures 5.5 cm in transverse diameter. There is mild enhancement within the native lumen anteriorly suggesting an endoleak. The aortoiliac stent graft is patent. BOWEL: No oral contrast was given. There is underdistention of the stomach. Small bowel loops are nondilated.  There is an ostomy in the right mid to lower abdomen likely related to the history of ureteral diversion. Some of the colon is underdistended. There is moderate to large stool in the colon. OTHER: There are degenerative changes of the spine. 1. Prior cholecystectomy. Intrahepatic and extrahepatic biliary tree dilatation is likely related to reservoir effect. No visible calcified stone is seen within the ductal system. 2. Diffuse cortical thinning of both kidneys. The kidneys are small in size. The left kidney measures 5.8 cm on the right kidney measures 5.7 cm. The renal arteries are covered by the aortoiliac stent graft. 3. Prior bladder resection. An ostomy in the right lower abdomen/upper pelvis is likely a ureteral diversion. 4. Patent aortoiliac stent graft. The native aortic lumen measures 5.5 cm transversely. There is some contrast enhancement anteriorly in the native lumen suggesting endoleak. I do not see a connecting vessel that is patent at the endoleak. The superior mesenteric artery and celiac plexus are also covered by the stent graft. 5. No definite acute bowel abnormality. No oral contrast was given and therefore evaluation is very limited. The patient also has very little intra-abdominal fat. The full report of this exam was immediately signed and available to the emergency room. The patient is currently in the emergency room. Signed by Dr Natasha Langley on 9/12/2020 1:10 PM    Xr Chest Portable    Result Date: 9/12/2020  EXAMINATION:  XR CHEST PORTABLE  9/12/2020 12:13 PM HISTORY: The patient fell. Chest pain. COMPARISON: 3/23/2016. FINDINGS:  There is no evidence of lung contusion or pneumothorax. There is no mediastinal widening. The heart is normal in size. The thoracic aorta is atheromatous. There is no dense infiltrate or effusion. There is mild biapical scarring. 1. Mild chronic changes. 2. No evidence of lung contusion, pneumothorax or mediastinal widening. 3. The patient has a chest CT already scheduled.  Please see that report separately. Signed by Dr Roberto Frank on 9/12/2020 12:14 PM        Assessment and Plan:    76-year-old gentleman with past medical history of coronary artery disease with prior PCI, ejection fraction 50% with inferobasal and basal septal severe hypokinesis, moderate to severe aortic stenosis, diabetes mellitus, end-stage renal disease on hemodialysis, prior AAA endovascular repair presenting with traumatic fall with syncope and found to have high-grade AV block status post temporary pacemaker placement 9/12/2020.    1. Possible fistulogram today. Plan is for pacemaker tomorrow. Has not received dialysis yesterday. Is making urine via nephrostomy bag. Risks, benefits, alternatives of dual-chamber permanent pacemaker placement discussed with the patient and full informed consent obtained.   Acceptable Mallampati score  Consent for moderate conscious sedation  ASA 3      Kings Mosher MD 9/16/2020 1:46 PM

## 2020-09-16 NOTE — PROGRESS NOTES
Vascular Surgery  Dr. Wang Roldan   Daily Progress Note    Pt Name: Terence Merrill Record Number: [id number]  Date of Birth 1945   Today's Date: 9/16/2020        SUBJECTIVE:     Patient was seen and examined. Continues to be v-paced. Off pressors.        OBJECTIVE:     Patient Vitals for the past 24 hrs:   BP Temp Temp src Pulse Resp SpO2 Weight   09/16/20 0711 -- -- -- 60 -- -- --   09/16/20 0630 (!) 164/48 -- -- 60 22 99 % --   09/16/20 0600 (!) 125/41 -- -- 60 18 95 % --   09/16/20 0530 (!) 139/52 -- -- 60 14 95 % --   09/16/20 0500 115/62 -- -- 60 11 96 % 112 lb 14.4 oz (51.2 kg)   09/16/20 0430 (!) 135/45 -- -- 60 17 95 % --   09/16/20 0400 (!) 116/43 97.8 °F (36.6 °C) Temporal 60 15 97 % --   09/16/20 0330 (!) 109/44 -- -- 60 (!) 7 94 % --   09/16/20 0300 (!) 135/41 -- -- 60 (!) 6 96 % --   09/16/20 0230 (!) 137/44 -- -- 60 15 96 % --   09/16/20 0200 (!) 135/42 98.7 °F (37.1 °C) Temporal 62 12 94 % --   09/16/20 0130 (!) 123/45 -- -- 60 8 95 % --   09/16/20 0100 (!) 122/45 -- -- 60 10 94 % --   09/16/20 0030 (!) 156/49 -- -- 60 14 95 % --   09/16/20 0000 (!) 153/57 98.8 °F (37.1 °C) Temporal 60 13 97 % --   09/15/20 2330 (!) 182/53 -- -- 60 13 97 % --   09/15/20 2300 (!) 168/48 -- -- 60 13 97 % --   09/15/20 2230 (!) 141/51 98.9 °F (37.2 °C) Temporal 60 13 96 % --   09/15/20 2200 (!) 169/51 -- -- 60 15 96 % --   09/15/20 2157 -- -- -- 60 -- -- --   09/15/20 2130 (!) 177/55 -- -- 60 16 97 % --   09/15/20 2100 (!) 161/56 99.7 °F (37.6 °C) Temporal 60 15 96 % --   09/15/20 2030 -- -- -- 60 -- -- --   09/15/20 1900 (!) 123/52 -- -- 60 20 93 % --   09/15/20 1800 (!) 173/54 -- -- 60 14 97 % --   09/15/20 1700 (!) 169/62 -- -- 60 16 96 % --   09/15/20 1601 -- -- -- 60 -- -- --   09/15/20 1600 (!) 171/59 98.2 °F (36.8 °C) Temporal 60 15 96 % --   09/15/20 1500 (!) 162/52 -- -- 60 11 99 % --   09/15/20 1400 (!) 153/40 -- -- 60 13 94 % --   09/15/20 1300 (!) 154/62 -- -- 60 18 95 % --   09/15/20 1230 (!) 136/47 -- -- 60 15 97 % --   09/15/20 1201 -- -- -- 60 -- -- --   09/15/20 1200 (!) 160/55 98.3 °F (36.8 °C) Temporal 60 15 98 % --   09/15/20 1130 (!) 172/58 -- -- 60 14 97 % --   09/15/20 1100 (!) 181/61 -- -- 60 13 96 % --   09/15/20 1030 (!) 150/62 -- -- 60 16 99 % --   09/15/20 1000 (!) 175/63 -- -- 60 19 96 % --   09/15/20 0930 (!) 156/59 -- -- 60 12 95 % --         Intake/Output Summary (Last 24 hours) at 9/16/2020 0908  Last data filed at 9/16/2020 0500  Gross per 24 hour   Intake 765 ml   Output 725 ml   Net 40 ml       In: 415 [P.O.:345; I.V.:70]  Out: 500 [Urine:500]    I/O last 3 completed shifts: In: 8372 [P.O.:1020; I.V.:170]  Out: 925 [Urine:925]     Date 09/16/20 0000 - 09/16/20 2359   Shift 5126-3438 9568-8658 2075-4338 24 Hour Total   INTAKE   Shift Total(mL/kg)       OUTPUT   Urine(mL/kg/hr) 300(0.7)   300   Shift Total(mL/kg) 300(5.9)   300(5.9)   Weight (kg) 51.2 51.2 51.2 51.2       Wt Readings from Last 3 Encounters:   09/16/20 112 lb 14.4 oz (51.2 kg)   03/25/16 113 lb 12.8 oz (51.6 kg)        Body mass index is 17.68 kg/m².      Diet: DIET GENERAL;        MEDS:     Scheduled Meds:   meropenem  1 g Intravenous Q24H    enoxaparin  30 mg Subcutaneous Q24H    ipratropium  0.5 mg Nebulization 4x daily    sodium chloride flush  10 mL Intravenous 2 times per day    insulin lispro  0-6 Units Subcutaneous TID WC    insulin lispro  0-3 Units Subcutaneous Nightly     Continuous Infusions:   dextrose       PRN Meds:docusate sodium, 100 mg, BID PRN  hydrALAZINE, 10 mg, Q4H PRN  oxyCODONE, 5 mg, Q4H PRN  sodium chloride flush, 10 mL, PRN  acetaminophen, 650 mg, Q6H PRN    Or  acetaminophen, 650 mg, Q6H PRN  magnesium hydroxide, 30 mL, Daily PRN  promethazine, 12.5 mg, Q6H PRN    Or  ondansetron, 4 mg, Q6H PRN  morphine, 2 mg, Q4H PRN  glucose, 15 g, PRN  dextrose, 12.5 g, PRN  glucagon (rDNA), 1 mg, PRN  dextrose, 100 mL/hr, PRN          PHYSICAL EXAM:     CONSTITUTIONAL: Alert and oriented times 3, no acute distress and cooperative to examination. HEENT: Normocephalic. Atraumatic. YANELI. NECK: Cervical collar in place. LUNGS:Clear to auscultation bilaterally without wheezes or rhonchi. CARDIOVASCULAR: Normal heart tones with regular rate and rhythm, S1, S2 normal. 2/6 systolic murmur, no gallops, or rubs auscultated. ABDOMEN: Soft, non-tender with active bowel sounds. NEUROLOGIC: Grossly intact. WOUND/INCISION: Temporary pacing wire via right groin - secured. No evidence of bleeding or hematoma. EXTREMITY: No clubbing or cyanosis. No peripheral edema. Peripheral pulses palpable. Left upper arm AV fistula - unable to palpate thrill. No bruit auscultated. LABS:     CBC:   Recent Labs     09/15/20  0332 09/16/20  0417   WBC 7.1 5.7   RBC 3.00* 2.82*   HGB 9.5* 8.8*   HCT 31.4* 28.3*   .7* 100.4*   MCH 31.7* 31.2*   MCHC 30.3* 31.1*   RDW 15.3* 14.9*    144   MPV 9.4 10.2      Last 3 CMP:   Recent Labs     09/14/20  0439 09/15/20  0332 09/16/20 0417    142 139   K 4.6 4.6 4.8    109 109   CO2 19* 18* 17*   BUN 44* 45* 53*   CREATININE 4.9* 5.1* 5.3*   GLUCOSE 44* 40* 122*   CALCIUM 8.9 8.6* 8.6*   PROT  --  5.6* 5.0*   LABALBU  --  3.0* 3.0*   BILITOT  --  <0.2 <0.2   ALKPHOS  --  58 57   AST  --  7 7   ALT  --  13 11      Calcium:   Lab Results   Component Value Date    CALCIUM 8.6 09/16/2020    CALCIUM 8.6 09/15/2020    CALCIUM 8.9 09/14/2020            DVT prophylaxis: Lovenox          ASSESSMENT:     1.         AV Fistula Malfunction, Left Upper Arm  2. Bradycardia - Temporary Pacer in Place  3. Fall at Home  4. Proteus, E-coli UTI  5. DM, Type 2  6. Essential HTN  7. CAD  8. DM, Type 2    PLAN:     1. Fistulogram with Possible Declot; Possible Permcath      Patient seen in conjunction with Dr. Precious Cisse. Plan discussed with patient.      JUNAID Garcia-BC

## 2020-09-16 NOTE — PLAN OF CARE
Problem: Skin Integrity:  Goal: Will show no infection signs and symptoms  Description: Will show no infection signs and symptoms  9/16/2020 1441 by Nain Cardoza RN  Outcome: Ongoing  9/16/2020 0606 by Gerry Mejia RN  Outcome: Ongoing  Goal: Absence of new skin breakdown  Description: Absence of new skin breakdown  9/16/2020 1441 by Nain Cardoza RN  Outcome: Ongoing  9/16/2020 0606 by Gerry Mejia RN  Outcome: Ongoing     Problem: Falls - Risk of:  Goal: Will remain free from falls  Description: Will remain free from falls  9/16/2020 1441 by Nain Cardoza RN  Outcome: Ongoing  9/16/2020 0606 by Gerry Mejia RN  Outcome: Ongoing  Goal: Absence of physical injury  Description: Absence of physical injury  9/16/2020 1441 by Nain Cardoza RN  Outcome: Ongoing  9/16/2020 0606 by Gerry Mejia RN  Outcome: Ongoing     Problem: Pain:  Goal: Pain level will decrease  Description: Pain level will decrease  9/16/2020 1441 by aNin Cardoza RN  Outcome: Ongoing  9/16/2020 0606 by Gerry Mejia RN  Outcome: Ongoing  Goal: Control of acute pain  Description: Control of acute pain  9/16/2020 1441 by Nain Cardoza RN  Outcome: Ongoing  9/16/2020 0606 by Gerry Mejia RN  Outcome: Ongoing  Goal: Control of chronic pain  Description: Control of chronic pain  9/16/2020 1441 by Nain Cardoza RN  Outcome: Ongoing  9/16/2020 0606 by Gerry Mejia RN  Outcome: Ongoing     Problem: Cardiac:  Goal: Ability to maintain vital signs within normal range will improve  Description: Ability to maintain vital signs within normal range will improve  9/16/2020 1441 by Nain Cardoza RN  Outcome: Ongoing  9/16/2020 0606 by Gerry Mejia RN  Outcome: Ongoing  Goal: Cardiovascular alteration will improve  Description: Cardiovascular alteration will improve  9/16/2020 1441 by Nain Cardoza RN  Outcome: Ongoing  9/16/2020 0606 by Gerry Mejia RN  Outcome: Ongoing     Problem: Physical Regulation:  Goal: Complications related to the disease process, condition or treatment will be avoided or minimized  Description: Complications related to the disease process, condition or treatment will be avoided or minimized  9/16/2020 1441 by Kimberly Blankenship RN  Outcome: Ongoing  9/16/2020 0606 by Kd Rodgers RN  Outcome: Ongoing

## 2020-09-16 NOTE — ACP (ADVANCE CARE PLANNING)
Advance Care Planning     Advance Care Planning Activator (Inpatient)  Conversation Note      Date of ACP Conversation: 9/16/2020    Conversation Conducted with: Patient    ACP Activator: Diane Rondon      [facility] Decision Maker:   Primary Decision Maker: Tarsha Landon Child - [phone]      Care Preferences    Ventilation: \"If you were in your present state of health and suddenly became very ill and were unable to breathe on your own, what would your preference be about the use of a ventilator (breathing machine) if it were available to you? \"      Would the patient desire the use of ventilator (breathing machine)?: No    \"If your health worsens and it becomes clear that your chance of recovery is unlikely, what would your preference be about the use of a ventilator (breathing machine) if it were available to you? \"     Would the patient desire the use of ventilator (breathing machine)?: No      Resuscitation  \"CPR works best to restart the heart when there is a sudden event, like a heart attack, in someone who is otherwise healthy. Unfortunately, CPR does not typically restart the heart for people who have serious health conditions or who are very sick. \"    \"In the event your heart stopped as a result of an underlying serious health condition, would you want attempts to be made to restart your heart (answer \"yes\" for attempt to resuscitate) or would you prefer a natural death (answer \"no\" for do not attempt to resuscitate)? \" No       [] Yes   [x] No   Educated Patient / Tni Venegas regarding differences between Advance Directives and portable DNR orders.       Conversation Outcomes:  [x] ACP discussion completed    Electronically signed by Diane Rondon on 9/16/2020 at 10:23 AM

## 2020-09-16 NOTE — PLAN OF CARE
Problem: Skin Integrity:  Goal: Will show no infection signs and symptoms  Description: Will show no infection signs and symptoms  Outcome: Ongoing  Goal: Absence of new skin breakdown  Description: Absence of new skin breakdown  Outcome: Ongoing     Problem: Falls - Risk of:  Goal: Will remain free from falls  Description: Will remain free from falls  Outcome: Ongoing  Goal: Absence of physical injury  Description: Absence of physical injury  Outcome: Ongoing     Problem: Pain:  Description: Pain management should include both nonpharmacologic and pharmacologic interventions.   Goal: Pain level will decrease  Description: Pain level will decrease  Outcome: Ongoing  Goal: Control of acute pain  Description: Control of acute pain  Outcome: Ongoing  Goal: Control of chronic pain  Description: Control of chronic pain  Outcome: Ongoing     Problem: Cardiac:  Goal: Ability to maintain vital signs within normal range will improve  Description: Ability to maintain vital signs within normal range will improve  Outcome: Ongoing  Goal: Cardiovascular alteration will improve  Description: Cardiovascular alteration will improve  Outcome: Ongoing     Problem: Physical Regulation:  Goal: Complications related to the disease process, condition or treatment will be avoided or minimized  Description: Complications related to the disease process, condition or treatment will be avoided or minimized  Outcome: Ongoing

## 2020-09-17 NOTE — PROGRESS NOTES
Pacemaker preliminary note:    Dual-lead pacemaker placed via left subclavian vein without any issues. Patient without distinctive carotid procedure. Cervical collar replaced post procedure. Patient atrial sensed ventricular paced. Temporary pacemaker removed. Echocardiogram obtained postprocedure confirms no significant pericardial effusion. Patient transferred to ICU in stable status.

## 2020-09-17 NOTE — PROGRESS NOTES
Pt out of unit at this time for pacemaker placement. Pt will also need temp line for HD followed by perm cath per nurse. Unsure when this will be done. Palliative following for support, goals.   Electronically signed by Serenity Bartholomew RN on 9/17/2020 at 10:38 AM

## 2020-09-17 NOTE — PLAN OF CARE
Problem: Skin Integrity:  Goal: Will show no infection signs and symptoms  Description: Will show no infection signs and symptoms  9/17/2020 0013 by Som Gutierrez RN  Outcome: Ongoing     Problem: Skin Integrity:  Goal: Absence of new skin breakdown  Description: Absence of new skin breakdown  9/17/2020 0013 by Som Gutierrez RN  Outcome: Ongoing     Problem: Falls - Risk of:  Goal: Will remain free from falls  Description: Will remain free from falls  9/17/2020 0013 by Som Gutierrez RN  Outcome: Ongoing     Problem: Falls - Risk of:  Goal: Absence of physical injury  Description: Absence of physical injury  9/17/2020 0013 by Som Gutierrez RN  Outcome: Ongoing     Problem: Pain:  Description: Pain management should include both nonpharmacologic and pharmacologic interventions. Goal: Pain level will decrease  Description: Pain level will decrease  9/17/2020 0013 by Som Gutierrez RN  Outcome: Ongoing     Problem: Pain:  Description: Pain management should include both nonpharmacologic and pharmacologic interventions. Goal: Control of acute pain  Description: Control of acute pain  9/17/2020 0013 by Som Gutierrez RN  Outcome: Ongoing     Problem: Pain:  Description: Pain management should include both nonpharmacologic and pharmacologic interventions.   Goal: Control of chronic pain  Description: Control of chronic pain  9/17/2020 0013 by Som Gutierrez RN  Outcome: Ongoing     Problem: Cardiac:  Goal: Ability to maintain vital signs within normal range will improve  Description: Ability to maintain vital signs within normal range will improve  9/17/2020 0013 by Som Gutierrez RN  Outcome: Ongoing     Problem: Cardiac:  Goal: Cardiovascular alteration will improve  Description: Cardiovascular alteration will improve  9/17/2020 0013 by Som Gutierrez RN  Outcome: Ongoing     Problem: Physical Regulation:  Goal: Complications related to the disease process, condition or treatment will be avoided or minimized  Description: Complications related to the disease process, condition or treatment will be avoided or minimized  9/17/2020 0013 by Ekta Fitzpatrick RN  Outcome: Ongoing

## 2020-09-17 NOTE — PROGRESS NOTES
Ivanukoff, DO   100 mg at 09/15/20 1733    hydrALAZINE (APRESOLINE) injection 10 mg  10 mg Intravenous Q4H PRN Gavino Navarro, DO   10 mg at 09/15/20 2210    oxyCODONE (ROXICODONE) immediate release tablet 5 mg  5 mg Oral Q4H PRN Gavino Navarro, DO   5 mg at 09/15/20 1225    ipratropium (ATROVENT) 0.02 % nebulizer solution 0.5 mg  0.5 mg Nebulization 4x daily Gavino Navarro, DO   0.5 mg at 09/16/20 1807    sodium chloride flush 0.9 % injection 10 mL  10 mL Intravenous 2 times per day Gavino Navarro, DO   10 mL at 09/16/20 2100    sodium chloride flush 0.9 % injection 10 mL  10 mL Intravenous PRN Gavino Navarro, DO   10 mL at 09/15/20 2315    acetaminophen (TYLENOL) tablet 650 mg  650 mg Oral Q6H PRN Gavino Navarro, DO   650 mg at 09/15/20 6154    Or    acetaminophen (TYLENOL) suppository 650 mg  650 mg Rectal Q6H PRN Gavino Navarro, DO        magnesium hydroxide (MILK OF MAGNESIA) 400 MG/5ML suspension 30 mL  30 mL Oral Daily PRN Gavino Navarro, DO        promethazine (PHENERGAN) tablet 12.5 mg  12.5 mg Oral Q6H PRN Gavino Navarro, DO        Or    ondansetron [facility]) injection 4 mg  4 mg Intravenous Q6H PRN Gavino Navarro, DO   4 mg at 09/15/20 2314    morphine injection 2 mg  2 mg Intravenous Q4H PRN Gavino Navarro, DO   2 mg at 09/16/20 1807    glucose (GLUTOSE) 40 % oral gel 15 g  15 g Oral PRN Gavino Navarro, DO        dextrose 50 % IV solution  12.5 g Intravenous PRN Gavino Navarro, DO   12.5 g at 09/15/20 0415    glucagon (rDNA) injection 1 mg  1 mg Intramuscular PRN Gavino Navarro, DO        dextrose 5 % solution  100 mL/hr Intravenous PRN Gavino Navarro, DO        insulin lispro (HUMALOG) injection vial 0-6 Units  0-6 Units Subcutaneous TID R Stacey Alex 23 Gavino Navarro DO   Stopped at 09/16/20 0752    insulin lispro (HUMALOG) injection vial 0-3 Units  0-3 Units Subcutaneous Nightly Gavino Navarro DO           Past Medical History:  Past Medical History:   Diagnosis Date    Anemia in chronic kidney disease (CODE)     Atherosclerotic heart disease     [facility]adder cancer ([facility]     CAD (coronary artery disease)     Cancer ([facility]     Chronic kidney disease     COPD (chronic obstructive pulmonary disease) ([facility]     Diabetes mellitus (HCC)     End stage renal disease (HCC)     Enterocolitis due to Clostridioides difficile     GERD (gastroesophageal reflux disease)     Hemodialysis patient ([facility]     Hyperlipidemia     Hypertension     Other chronic pain     Palliative care patient 09/15/2020    Prostate cancer ([facility]     Vitamin D deficiency        Past Surgical History:  Past Surgical History:   Procedure Laterality Date    ABDOMEN SURGERY      esophageal CA, used part of stomach     APPENDECTOMY      [facility]    [facility]ARDIAC CATHETERIZATION      [facility], had stents, unknown details    URETEROSTOMY         Family History  Family History   Problem Relation Age of Onset    Diabetes Mother        Social History  Social History     Socioeconomic History    Marital status:      Spouse name: Not on file    Number of children: Not on file    Years of education: Not on file    Highest education level: Not on file   Occupational History    Not on file   Social Needs    Financial resource strain: Not on file    Food insecurity     Worry: Not on file     Inability: Not on file    Transportation needs     Medical: Not on file     Non-medical: Not on file   Tobacco Use    Smoking status: Current Every Day Smoker     Packs/day: 2.00     Years: 55.00     Pack years: 110.00    Smokeless tobacco: Current User   Substance and Sexual Activity    Alcohol use: No    Drug use: No    Sexual activity: Not Currently   Lifestyle    Physical activity     Days per week: Not on file     Minutes per session: Not on file    Stress: Not on file   Relationships    Social connections     Talks on phone: Not on file Gets together: Not on file     Attends Scientologist service: Not on file     Active member of club or organization: Not on file     Attends meetings of clubs or organizations: Not on file     Relationship status: Not on file    Intimate partner violence     Fear of current or ex partner: Not on file     Emotionally abused: Not on file     Physically abused: Not on file     Forced sexual activity: Not on file   Other Topics Concern    Not on file   Social History Narrative    Not on file         Review of Systems:  History obtained from chart review and the patient  General ROS: No fever or chills  Respiratory ROS: positive for - shortness of breath  Cardiovascular ROS: No chest pain or palpitations  Gastrointestinal ROS: No abdominal pain or melena  Genito-Urinary ROS: No dysuria or hematuria  Musculoskeletal ROS: No joint pain or swelling   14 point ROS reviewed with the patient and negative except as noted above and in the HPI unless unable to obtain.     Objective:  Patient Vitals for the past 24 hrs:   BP Temp Temp src Pulse Resp SpO2 Weight   09/17/20 0700 (!) 127/45 -- -- 60 21 94 % --   09/17/20 0600 (!) 159/42 -- -- 60 17 94 % --   09/17/20 0500 (!) 139/42 -- -- 60 15 95 % --   09/17/20 0400 (!) 140/41 99.8 °F (37.7 °C) Temporal 60 15 98 % 106 lb 11.2 oz (48.4 kg)   09/17/20 0300 (!) 131/47 -- -- 60 17 95 % --   09/17/20 0200 (!) 143/46 -- -- 60 14 95 % --   09/17/20 0100 (!) 138/43 -- -- 60 10 94 % --   09/17/20 0030 (!) 121/48 -- -- 60 13 94 % --   09/17/20 0000 (!) 175/49 99.5 °F (37.5 °C) Temporal 60 12 96 % --   09/16/20 2300 (!) 134/51 -- -- 60 11 96 % --   09/16/20 2200 (!) 129/39 -- -- 60 10 94 % --   09/16/20 2100 (!) 156/45 -- -- 60 18 97 % --   09/16/20 2000 (!) 121/42 98.3 °F (36.8 °C) Temporal 60 18 98 % --   09/16/20 1900 (!) 103/48 -- -- 60 17 96 % --   09/16/20 1800 (!) 145/41 -- -- 60 13 96 % --   09/16/20 1731 (!) 131/39 -- -- 60 16 97 % --   09/16/20 1730 (!) 131/39 -- -- 60 16 97 % -- 09/16/20 1725 (!) 131/43 -- -- 60 16 96 % --   09/16/20 1720 (!) 161/38 -- -- 60 16 96 % --   09/16/20 1715 (!) 150/37 -- -- 60 16 96 % --   09/16/20 1710 (!) 128/40 -- -- 60 12 96 % --   09/16/20 1707 (!) 131/36 -- -- 60 12 96 % --   09/16/20 1705 (!) 131/36 -- -- 60 13 -- --   09/16/20 1400 (!) 134/45 -- -- 60 12 95 % --   09/16/20 1300 (!) 107/50 -- -- 60 15 95 % --   09/16/20 1200 (!) 117/48 98.4 °F (36.9 °C) Temporal 60 11 95 % --   09/16/20 1100 (!) 128/45 -- -- 60 12 96 % --   09/16/20 1000 (!) 142/46 -- -- 60 19 95 % --       Intake/Output Summary (Last 24 hours) at 9/17/2020 0905  Last data filed at 9/17/2020 0600  Gross per 24 hour   Intake 400 ml   Output 1435 ml   Net -1035 ml     General: awake/alert   HEENT: Normocephalic atraumatic head  Neck: Supple/neck collar. Chest:  clear to auscultation bilaterally  CVS: regular rate and rhythm  Abdominal: soft, nontender, normal bowel sounds  Extremities: no cyanosis or edema  Skin: warm and dry without rash      Labs:  BMP:   Recent Labs     09/15/20  0332 09/16/20  0417 09/17/20  0241    139 139   K 4.6 4.8 4.7    109 103   CO2 18* 17* 18*   BUN 45* 53* 34*   CREATININE 5.1* 5.3* 4.0*   CALCIUM 8.6* 8.6* 8.5*     CBC:   Recent Labs     09/15/20  0332 09/16/20 0417 09/17/20  0241   WBC 7.1 5.7 5.0   HGB 9.5* 8.8* 9.3*   HCT 31.4* 28.3* 29.9*   .7* 100.4* 101.0*    144 161     LIVER PROFILE:   Recent Labs     09/15/20  0332 09/16/20  0417 09/17/20  0241   AST 7 7 8   ALT 13 11 11   BILITOT <0.2 <0.2 <0.2   ALKPHOS 58 57 59     PT/INR:   No results for input(s): PROTIME, INR in the last 72 hours. APTT: No results for input(s): APTT in the last 72 hours. BNP:  No results for input(s): BNP in the last 72 hours. Ionized Calcium:No results for input(s): IONCA in the last 72 hours. Magnesium:  Recent Labs     09/17/20  0241   MG 2.1     Phosphorus:  No results for input(s): PHOS in the last 72 hours.   HgbA1C: No results for input(s): LABA1C in the last 72 hours. Hepatic:   Recent Labs     09/15/20  0332 09/16/20  0417 09/17/20  0241   ALKPHOS 58 57 59   ALT 13 11 11   AST 7 7 8   PROT 5.6* 5.0* 5.3*   BILITOT <0.2 <0.2 <0.2   LABALBU 3.0* 3.0* 3.2*     Lactic Acid: No results for input(s): LACTA in the last 72 hours. Troponin: No results for input(s): CKTOTAL, CKMB, TROPONINT in the last 72 hours. ABGs: No results for input(s): PH, PCO2, PO2, HCO3, O2SAT in the last 72 hours. CRP:  No results for input(s): CRP in the last 72 hours. Sed Rate:  No results for input(s): SEDRATE in the last 72 hours. Cultures:   No results for input(s): CULTURE in the last 72 hours. No results for input(s): BC, Patrice Ko in the last 72 hours. No results for input(s): CXSURG in the last 72 hours. Radiology reports as per the Radiologist  Radiology: Ct Head Wo Contrast    Result Date: 9/12/2020  EXAMINATION:  CT HEAD WO CONTRAST  9/12/2020 12:53 PM HISTORY: The patient fell. Rule out intracranial injury. TECHNIQUE: Multiple axial images were obtained through the brain without contrast infusion. Multiplanar images were reconstructed. DLP: 675 mGy-cm. Automated exposure control was utilized. COMPARISON: No comparison study. FINDINGS: There are no hemorrhage, edema or mass effect. There is moderate to severe atrophy. There is moderate to severe dilatation of the lateral and third ventricles. There is low-density in the hemispheric white matter that is nonspecific. The visualized paranasal sinuses and mastoid air cells are clear. No calvarial fracture is seen. There is biparietal calvarial thinning. 1. No hemorrhage, edema or mass effect. 2. Moderate to severe atrophy. Moderate to severe dilatation of the lateral and third ventricles is likely related. Hydrocephalus is not excluded. 3. Low-density in the hemispheric white matter is nonspecific and most likely due to chronic small vessel disease.  The full report of this exam was immediately signed and available to the emergency room. The patient is currently in the emergency room. Signed by Dr Apple Alvarenga on 9/12/2020 12:56 PM    Ct Chest W Contrast    Result Date: 9/12/2020  EXAMINATION:  CT CHEST W CONTRAST  9/12/2020 1:11 PM HISTORY: The patient fell. Anterior chest skin tear. Hypotension during dialysis. Normal chest x-ray. COMPARISON: No comparison study. DLP: 320 mGy-cm. Automated exposure control was utilized. TECHNIQUE: Spiral CT was performed of the chest with contrast. Multiplanar images were reconstructed. MEDIASTINUM/VASCULAR: There is atheromatous calcification of the thoracic aorta and coronary arteries. Heart size is upper limits of normal. Pulmonary arteries are normal in caliber. There are no enlarged mediastinal lymph nodes. There is a moderate size hiatal hernia. There is wall thickening of the esophagus in the chest diffusely. LUNGS: There is centrilobular emphysema. There is paraseptal emphysema. There are few scattered calcified granulomas. There is no airspace consolidation or pleural effusion. There is no pneumothorax or lung contusion. BONES AND SOFT TISSUES: The clavicles are intact. The scapulae are intact. No definite rib fracture is seen. No visible acute thoracic spine fracture is seen. There are some degenerative changes of the spine. The bones are demineralized. The sternum appears to be intact. UPPER ABDOMEN: Please see the abdomen and pelvis CT report separately. 1. No evidence of pneumothorax or lung contusion. No mediastinal hematoma. 2. Atheromatous disease of the thoracic aorta and coronary arteries. Heart size is prominent. 3. Moderate size hiatal hernia. Diffuse thickening of the wall of the thoracic esophagus. Correlate with any history of esophagitis and reflux disease. 4. Centrilobular and paraseptal emphysema. Old granulomatous disease. 5. Demineralized bones. No definite acute fracture.  The full report of this exam was immediately signed and available to the emergency room. The patient is currently in the emergency room. Signed by Dr Regine Zuniga on 9/12/2020 1:16 PM    Ct Cervical Spine Wo Contrast    Result Date: 9/12/2020  EXAMINATION:  CT CERVICAL SPINE WO CONTRAST  9/12/2020 1:17 PM HISTORY: The patient fell. Rule out fracture. No x-rays obtained. TECHNIQUE: Spiral CT was performed of the cervical spine. Sagittal and coronal images were reconstructed. COMPARISON: No comparison study. DLP: 260 mGy-cm. Automated exposure control was utilized. FINDINGS: There is fairly prominent pannus formation along the posterior aspect of the dens. There is a fairly large dens erosion located posteriorly. There is a nondisplaced fracture line anteriorly that is likely related to the weakening of the dens related to the erosion. Therefore, this is likely a pathologic fracture. This is fairly high on the dens and I will classify this as a type I injury. There is no displacement. There is no disruption of the anterior vertebral line or the posterior spinolaminar line. No other cervical spine fracture is identified. There is fairly severe carotid artery calcification in the neck bilaterally. There is vertebral artery calcification on the left. At C2-3, there is moderate to severe disc narrowing. There is severe facet arthropathy. There is mild to moderate foraminal narrowing bilaterally. No central spinal canal narrowing. At C3-4, there is moderate disc narrowing. There is minimal anterior subluxation of C3 compared to C4. There is bilateral uncinate spurring and bilateral facet arthropathy. There is severe bilateral foraminal stenosis at this level. At C4-5, there is moderate disc narrowing. There is uncinate spurring and facet arthropathy. The right-sided facet joint is fused. There is severe right and moderate left-sided foraminal stenosis. At C5-6, there is disc narrowing with spondylitic and uncinate spurring. There is bilateral facet arthropathy.  There is no significant central spinal canal narrowing. There is mild to moderate bilateral foraminal stenosis. At C6-7, there is moderate to severe disc narrowing. There is spondylitic and uncinate spurring. There is anterior spurring. There is facet arthropathy left greater than right. There is mild narrowing of the central spinal canal at this level. There is moderate right and mild left foraminal stenosis. At C7-T1, there is moderate to severe disc narrowing. There is mild anterior subluxation of C7 compared to T1. There is some scoliosis of the cervical-thoracic Junction in this area. This causes moderate to severe left-sided foraminal stenosis at this level. There is disc degeneration at multiple thoracic disc levels included on the cervical spine CT. No significant spinal or foraminal stenosis is visualized. 1. Type I dens fracture without displacement. There is a fairly large erosion in the posterior dens related to pannus formation. The type one fracture is seen best on the sagittal images anterior to the erosion. Therefore, this is likely a pathologic fracture given the presence of the erosion. 2. Degenerative changes throughout the cervical spine. 3. Dense carotid artery calcification in the neck. There is also some calcification involving the left vertebral artery in the neck. Results of the dens fracture were called to Dr. Opal Chatterjee in the emergency room at 1:30 PM on 9/12/2020. Signed by Dr Ramon Church on 9/12/2020 1:31 PM    Ct Abdomen Pelvis W Iv Contrast Additional Contrast? None    Result Date: 9/12/2020  EXAMINATION:  CT ABDOMEN PELVIS W IV CONTRAST  9/12/2020 1:00 PM HISTORY: The patient fell. Epigastric pain. Prior surgical resection of the urinary bladder and appendectomy. TECHNIQUE: Spiral CT was performed of the abdomen and pelvis with contrast. Multiplanar images were reconstructed. DLP: 320 mGy-cm. Automated exposure control was utilized. COMPARISON: No comparison study. LUNG BASES: The lung bases are clear.  There is a native lumen suggesting endoleak. I do not see a connecting vessel that is patent at the endoleak. The superior mesenteric artery and celiac plexus are also covered by the stent graft. 5. No definite acute bowel abnormality. No oral contrast was given and therefore evaluation is very limited. The patient also has very little intra-abdominal fat. The full report of this exam was immediately signed and available to the emergency room. The patient is currently in the emergency room. Signed by Dr Natasha Langley on 9/12/2020 1:10 PM    Xr Chest Portable    Result Date: 9/12/2020  EXAMINATION:  XR CHEST PORTABLE  9/12/2020 12:13 PM HISTORY: The patient fell. Chest pain. COMPARISON: 3/23/2016. FINDINGS:  There is no evidence of lung contusion or pneumothorax. There is no mediastinal widening. The heart is normal in size. The thoracic aorta is atheromatous. There is no dense infiltrate or effusion. There is mild biapical scarring. 1. Mild chronic changes. 2. No evidence of lung contusion, pneumothorax or mediastinal widening. 3. The patient has a chest CT already scheduled. Please see that report separately. Signed by Dr Natasha Langley on 9/12/2020 12:14 PM       Assessment   1. End-stage renal disease/need maintenance hemodialysis. 2.  Status post fall and neck fracture. 3.  Severe bradycardia/recurrent syncopal episode. 4.  Anemia of chronic kidney disease. 5.  Type II diabetic nephropathy. 6.  Secondary hyperparathyroidism. 7.  Hyperphosphatemia. 8.  Urinary tract infection. 9.  Malfunctioning AV fistula. Plan:  1. Permanent pacemaker placement today  2. Fistulogram/angioplasty, scheduled tomorrow.   3.  Plan was discussed with Dr. Luis Corbin MD  09/17/20  9:05 AM

## 2020-09-17 NOTE — OP NOTE
Operative Note      Patient: Shawna Messina  YOB: 1945  MRN: [id number]    Date of Procedure: 9/17/2020     Pre-Op Diagnosis: Complete AVN heart block    : Francisca Yates MD    Procedure: Dual lead pacemaker Placement     Anesthesia: Moderate conscious sedation  Anesthesia: Lidocaine LA  Sedation: Versed 0.5 mg; Fentanyl  25 mg  Start time: 8:22 AM   Stop time: 9:51 AM   ASA Class: 3  Estimated blood loss: Less than 20 mL  An independent trained observer pushed medications at my direction. The patient was monitored continuously with the ECG, pulse oximetry, blood pressure monitoring, and direct observation for level of consciousness. Complications: None    Detailed Description of Procedure:     After obtaining informed written consent, the patient was brought to the catheterization laboratory where the right groin was prepared in the usual sterile fashion. The patient was monitored continuously with ECG, pulse oximetry, blood pressure monitoring and direct observation. Additionally, please review the \"[facility] Hemodynamic Procedure Report\", which is generated electronically through the [employer]. This report includes additional details regarding this procedure including, but not limited to:     1. Patient Data,   2. Admission,   3. Procedure,   4. Hemodynamics,   5. Vital Signs,   6. Medications, including conscious sedation medications given during the procedure (as note above),   7. Procedure Log,   8. Device Usage,   9. Signature Audit Napoleon, and,   10. Signatures. It should be noted, that I sign the \"Signatures\" line electronically through the \"HPF Def Portals\" tab on my computer. The 's hands were scrubbed in a betadine solution for 5 minutes. Moderate conscious sedation was administered at my direction. Utilizing local anesthesia and a percutaneous technique, a single puncture was made in the left subclavian vein.  Utilizing a combination of sharp and blunt dissection, a pacemaker pocket was created. A second puncture was then made in the left subclavian vein. The right ventricular and right atrial leads were inserted without difficulty and appropriate thresholds were obtained. The lead anchors were secured to the floor of the pacemaker pocket. The pacemaker pocket was copiously irrigated with antibiotic solution. The leads were attached to the generator. An antibiotic pouch was placed around the generator and leads and placed in the pocket. The subcutaneous and cutaneous tissues were approximated, and a sterile dressing applied. He was then taken to his room in stable and satisfactory condition. Complications:  none    Technical Information:       Model # Serial #        Right Atrial Lead ([employer]tronic) 5076 - 52  [id number]    Right Ventricular Lead ([employer]tronic) 5076 - 58  PJN [id number]           Pulse Width (ms) Voltage (V) Current (mA) Impedance (Ohms) P / R Wave (mV)            Right Atrial Lead 0.5 1.5   570  1.5    Right Ventricular Lead 0.5 0.8   675  10.2          Generator Product Name Model #: Serial #Sudha RIVERA  [id number]             IMPRESSION:    1. Successful insertion of a dual chamber rate response pacemaker for complete AV ariana block. 2.  Successful insertion of a right atrial lead  3. Successful insertion of a right ventricular lead  4. Successful pacemaker pocket creation  5. Successful placement of an antibiotic pouch  6. Supervision of the administration of moderate conscious sedation  7. Successful Removal of temporary pacemaker.       Electronically signed by Melva Rosa MD on 9/17/20      Electronically signed by Melva Rosa MD on 9/17/2020 at 10:08 AM

## 2020-09-17 NOTE — PROGRESS NOTES
Pharmacy Renal Adjustment    Penelope Torres is a 67 y.o. male. Pharmacy has renally adjusted medications per protocol. Recent Labs     09/16/20  0417 09/17/20  0241   BUN 53* 34*       Recent Labs     09/16/20  0417 09/17/20  0241   CREATININE 5.3* 4.0*       CrCl cannot be calculated (Unknown ideal weight.). Height:   Ht Readings from Last 1 Encounters:   09/12/20 5' 7\" (1.702 m)     Weight:  Wt Readings from Last 1 Encounters:   09/17/20 106 lb 11.2 oz (48.4 kg)     Plan: Adjust the following medications based on renal function:           Adjusted Unasyn to 1.5 gm IV every 12 hours r/t patient's HD status. Pharmacy will continue to follow.     Electronically signed by America Hinkle PharmD, BCPS on 9/17/2020 at 2:22 PM

## 2020-09-17 NOTE — PROGRESS NOTES
Progress note:    Patient here for pacemaker placement, completely dependent on temporary pacemaker for the last 3 days. Attempted declotting reported by nurse which was unsuccessful and the left femoral dialysis catheter was placed yesterday. Patient has a cervical collar for dens fracture. Unable to access subclavian vessels with maintenance of sterility without removal of collar. Spoke with Dr. Chelsy Solorio who did recommend removal of the collar but trying to maintain head stability during procedure. The cushioned head brace was placed across forehead and stabilized head to table with patient comfortable prior to starting procedure.

## 2020-09-17 NOTE — PROGRESS NOTES
activity: Not Currently   Lifestyle    Physical activity     Days per week: Not on file     Minutes per session: Not on file    Stress: Not on file   Relationships    Social connections     Talks on phone: Not on file     Gets together: Not on file     Attends Zoroastrianism service: Not on file     Active member of club or organization: Not on file     Attends meetings of clubs or organizations: Not on file     Relationship status: Not on file    Intimate partner violence     Fear of current or ex partner: Not on file     Emotionally abused: Not on file     Physically abused: Not on file     Forced sexual activity: Not on file   Other Topics Concern    Not on file   Social History Narrative    Not on file       Current Facility-Administered Medications   Medication Dose Route Frequency Provider Last Rate Last Dose    ampicillin-sulbactam (UNASYN) 1.5 g IVPB minibag  1.5 g Intravenous Q6H Beatris Oneill MD        alteplase (CATHFLO) injection 6 mg  6 mg Intracatheter Once Evens Graves, DO        heparin (porcine) injection 5,000 Units  5,000 Units Subcutaneous 3 times per day Evens Graves, DO        docusate sodium (COLACE) capsule 100 mg  100 mg Oral BID PRN Evens Vernon, DO   100 mg at 09/15/20 1733    hydrALAZINE (APRESOLINE) injection 10 mg  10 mg Intravenous Q4H PRN Evens Graves, DO   10 mg at 09/15/20 2210    oxyCODONE (ROXICODONE) immediate release tablet 5 mg  5 mg Oral Q4H PRN Evens Vernon, DO   5 mg at 09/15/20 1225    ipratropium (ATROVENT) 0.02 % nebulizer solution 0.5 mg  0.5 mg Nebulization 4x daily Evens Vernon, DO   0.5 mg at 09/16/20 1807    sodium chloride flush 0.9 % injection 10 mL  10 mL Intravenous 2 times per day Evens Vernon, DO   10 mL at 09/16/20 2100    sodium chloride flush 0.9 % injection 10 mL  10 mL Intravenous PRN Evens Vernon, DO   10 mL at 09/15/20 2315    acetaminophen (TYLENOL) tablet 650 mg  650 mg Oral Q6H PRN [facility] Ivanukoff, DO   650 mg at 09/15/20 0716    Or    acetaminophen (TYLENOL) suppository 650 mg  650 mg Rectal Q6H PRN Markie Tee, DO        magnesium hydroxide (MILK OF MAGNESIA) 400 MG/5ML suspension 30 mL  30 mL Oral Daily PRN Markie Tee, DO        promethazine (PHENERGAN) tablet 12.5 mg  12.5 mg Oral Q6H PRN Markie Tee, DO        Or    [facility]) injection 4 mg  4 mg Intravenous Q6H PRN Markie Tee, DO   4 mg at 09/15/20 2314    morphine injection 2 mg  2 mg Intravenous Q4H PRN Markie Tee, DO   2 mg at 09/16/20 1807    glucose (GLUTOSE) 40 % oral gel 15 g  15 g Oral PRN Markie Tee, DO        dextrose 50 % IV solution  12.5 g Intravenous PRN Markie Tee, DO   12.5 g at 09/15/20 0415    glucagon (rDNA) injection 1 mg  1 mg Intramuscular PRN Markie Tee, DO        dextrose 5 % solution  100 mL/hr Intravenous PRN Markie Tee, DO        insulin lispro (HUMALOG) injection vial 0-6 Units  0-6 Units Subcutaneous TID [facility] Markie Tee, DO   Stopped at 09/16/20 0752    insulin lispro (HUMALOG) injection vial 0-3 Units  0-3 Units Subcutaneous Nightly [place] Ivanukoff, DO             dextrose          Objective:   BP (!) 127/45   Pulse 60   Temp 99.8 °F (37.7 °C) (Temporal)   Resp 21   Ht 5' 7\" (1.702 m)   Wt 106 lb 11.2 oz (48.4 kg)   SpO2 94%   BMI 16.71 kg/m²     General: no acute distress  HEENT: normocephalic, atraumatic  Neck: supple, symmetrical, trachea midline   Lungs: clear to auscultation bilaterally   Cardiovascular: s1 and s2 normal  Abdomen: soft, positive bowel sounds, nondistended, nontender  Extremities: no edema or cyanosis   Neuro: aaox3, no focal deficits   Skin: normal color and texture     Recent Labs     09/15/20  0332 09/16/20  0417 09/17/20  0241   WBC 7.1 5.7 5.0   RBC 3.00* 2.82* 2.96*   HGB 9.5* 8.8* 9.3*   HCT 31.4* 28.3* 29.9*   .7* 100.4* 101.0*   MCH 31.7* 31.2* 31.4*   MCHC 30.3* 31.1* 31.1*    144 161     Recent Labs     09/15/20  0332 09/16/20  0417 09/17/20  0241    139 139   K 4.6 4.8 4.7   ANIONGAP 15 13 18    109 103   CO2 18* 17* 18*   BUN 45* 53* 34*   CREATININE 5.1* 5.3* 4.0*   GLUCOSE 40* 122* 96   CALCIUM 8.6* 8.6* 8.5*     Recent Labs     09/17/20  0241   MG 2.1     Recent Labs     09/15/20  0332 09/16/20  0417 09/17/20  0241   AST 7 7 8   ALT 13 11 11   BILITOT <0.2 <0.2 <0.2   ALKPHOS 58 57 59     No results for input(s): PH, PO2, PCO2, HCO3, BE, O2SAT in the last 72 hours. No results for input(s): TROPONINI in the last 72 hours. No results for input(s): INR in the last 72 hours. No results for input(s): LACTA in the last 72 hours. Intake/Output Summary (Last 24 hours) at 9/17/2020 0834  Last data filed at 9/17/2020 0600  Gross per 24 hour   Intake 400 ml   Output 1435 ml   Net -1035 ml       No results found. Assessment and Plan:   1) symptomatic bradycardia  -cards following  -temp pacemaker in place   -plans for ppm today   -avoid offending agents  -h/o cad s/p pci  -h/o moderate to severe as  -h/o prior aaa endovascular repair     2) type 1 odontoid fracture  -s/p fall   -neurosurgery following   -maintain hard collar at least 6w  -no plans for surgery  -supportive care     3) esrd  -lue fistula clotted   -vascular surgery placed left femoral non-tunneled hd catheter 9/16/2020  -hd per renal  -follow renal/fxn/uop/lytes  -avoid offending agents     4) acute cystitis   -unasyn     5) concern for endoleak  -as per ct a/p 9/12/2020: Patent aortoiliac stent graft. The native aortic lumen measures 5.5 cm transversely. There is some contrast enhancement anteriorly in the native lumen suggesting endoleak. I do not see a connecting vessel that is patent at the endoleak.  The superior mesenteric artery and celiac plexus are also covered by the stent graft  -vascular surgery following      6) dm2  -meds on board      7) dvt ppx  -heparin     Total critical care time: 34 minutes    Sahara Puckett MD   9/17/2020 8:34 AM

## 2020-09-18 PROBLEM — E43 SEVERE MALNUTRITION (HCC): Status: ACTIVE | Noted: 2020-01-01

## 2020-09-18 NOTE — PROGRESS NOTES
Hospitalist Progress Note  [facility]     Patient: Kathi Patel  : 1945  MRN: [id number]  Code Status: [facility] Day: 6   Date of Service: 2020    Subjective:   Pt seen and examined. Hard of hearing. No acute distress.     Past Medical History:   Diagnosis Date    Anemia in chronic kidney disease (CODE)     Atherosclerotic heart disease     [facility]     CAD (coronary artery disease)     Cancer ([facility]     Chronic kidney disease     COPD (chronic obstructive pulmonary disease) [facility]     Diabetes mellitus (HCC)     End stage renal disease (HCC)     Enterocolitis due to Clostridioides difficile     GERD (gastroesophageal reflux disease)     [facility]     Hyperlipidemia     Hypertension     Other chronic pain     Palliative care patient 09/15/2020    Pro[facility] cancer ([facility]     Vitamin D deficiency        Past Surgical History:   Procedure Laterality Date    ABDOMEN SURGERY      esophageal CA, used part of stomach     APPENDECTOMY      [facility]    CARDIAC CATHETERIZATION      [facility], had stents, unknown details    URETEROSTOMY         Family History   Problem Relation Age of Onset    Diabetes Mother        Social History     Socioeconomic History    Marital status:      Spouse name: Not on file    Number of children: Not on file    Years of education: Not on file    Highest education level: Not on file   Occupational History    Not on file   Social Needs    Financial resource strain: Not on file    Food insecurity     Worry: Not on file     Inability: Not on file    Transportation needs     Medical: Not on file     Non-medical: Not on file   Tobacco Use    Smoking status: Current Every Day Smoker     Packs/day: 2.00     Years: 55.00     Pack years: 110.00    Smokeless tobacco: Current User   Substance and Sexual Activity    Alcohol use: No    Drug use: No    Sexual activity: Not Currently Lifestyle    Physical activity     Days per week: Not on file     Minutes per session: Not on file    Stress: Not on file   Relationships    Social connections     Talks on phone: Not on file     Gets together: Not on file     Attends Anabaptism service: Not on file     Active member of club or organization: Not on file     Attends meetings of clubs or organizations: Not on file     Relationship status: Not on file    Intimate partner violence     Fear of current or ex partner: Not on file     Emotionally abused: Not on file     Physically abused: Not on file     Forced sexual activity: Not on file   Other Topics Concern    Not on file   Social History Narrative    Not on file       Current Facility-Administered Medications   Medication Dose Route Frequency Provider Last Rate Last Dose    [START ON 9/20/2020] chlorhexidine (HIBICLENS) 4 % liquid   Topical BID JUNAID Schofield        [START ON 9/21/2020] vancomycin (VANCOCIN) 750 mg in dextrose 5 % 250 mL IVPB  750 mg Intravenous On Call to [id number] [facility], APRN        darbepoetin umesh-polysorbate [facility]) injection 100 mcg  100 mcg Subcutaneous Weekly - Monday Luis Miguel Griffin MD   100 mcg at 09/17/20 1113    sodium chloride flush 0.9 % injection 10 mL  10 mL Intravenous 2 times per day Lizzie Sosa MD   10 mL at 09/18/20 0932    sodium chloride flush 0.9 % injection 10 mL  10 mL Intravenous PRN Lizzie Sosa MD        ampicillin-sulbactam (UNASYN) 1.5 g IVPB minibag  1.5 g Intravenous Q12H Lisa Palmer MD   Stopped at 09/18/20 1032    heparin (porcine) injection 5,000 Units  5,000 Units Subcutaneous 3 times per day Alka [employer], DO   5,000 Units at 09/18/20 1418    docusate sodium (COLACE) capsule 100 mg  100 mg Oral BID PRN Alka [employer], DO   100 mg at 09/15/20 1733    hydrALAZINE (APRESOLINE) injection 10 mg  10 mg Intravenous Q4H PRN Alka [employer], DO   10 mg at 09/15/20 2210    oxyCODONE (ROXICODONE) immediate release tablet 5 mg  5 mg Oral Q4H PRN Maple Plaster, DO   5 mg at 09/15/20 1225    ipratropium (ATROVENT) 0.02 % nebulizer solution 0.5 mg  0.5 mg Nebulization 4x daily Maple Plaster, DO   0.5 mg at 09/18/20 1411    sodium chloride flush 0.9 % injection 10 mL  10 mL Intravenous 2 times per day Maple Plaster, DO   Stopped at 09/17/20 0900    sodium chloride flush 0.9 % injection 10 mL  10 mL Intravenous PRN Maple Plaster, DO   10 mL at 09/15/20 2315    acetaminophen (TYLENOL) tablet 650 mg  650 mg Oral Q6H PRN Maple Plaster, DO   650 mg at 09/15/20 8886    Or    acetaminophen (TYLENOL) suppository 650 mg  650 mg Rectal Q6H PRN Maple Plaster, DO        magnesium hydroxide (MILK OF MAGNESIA) 400 MG/5ML suspension 30 mL  30 mL Oral Daily PRN Maple Plaster, DO        promethazine (PHENERGAN) tablet 12.5 mg  12.5 mg Oral Q6H PRN Maple Plaster, DO        Or    ondansetron [facility]) injection 4 mg  4 mg Intravenous Q6H PRN Maple Plaster, DO   4 mg at 09/17/20 1054    morphine injection 2 mg  2 mg Intravenous Q4H PRN Maple Plaster, DO   2 mg at 09/17/20 2230    glucose (GLUTOSE) 40 % oral gel 15 g  15 g Oral PRN Maple Plaster, DO        dextrose 50 % IV solution  12.5 g Intravenous PRN Maple Plaster, DO   12.5 g at 09/15/20 0415    glucagon (rDNA) injection 1 mg  1 mg Intramuscular PRN Maple Plaster, DO        dextrose 5 % solution  100 mL/hr Intravenous PRN Maple Plaster, DO        insulin lispro (HUMALOG) injection vial 0-6 Units  0-6 Units Subcutaneous TID [facility] Maple Plaster, DO   2 Units at 09/18/20 1418    insulin lispro (HUMALOG) injection vial 0-3 Units  0-3 Units Subcutaneous Nightly Alonso Lares, DO             dextrose          Objective:   /64   Pulse 95   Temp 99 °F (37.2 °C) (Temporal)   Resp 16   Ht 5' 7\" (1.702 m)   Wt 106 lb 11.2 oz (48.4 kg)   SpO2 94%   BMI 16.71 kg/m²     General: no acute distress  HEENT: normocephalic, atraumatic  Neck: supple, symmetrical, trachea midline   Lungs: clear to auscultation bilaterally   Cardiovascular: s1 and s2 normal  Abdomen: soft, positive bowel sounds, nondistended  Extremities: no edema or cyanosis   Neuro: no focal deficits   Skin: normal color and texture     Recent Labs     09/16/20 0417 09/17/20  0241 09/18/20  0125   WBC 5.7 5.0 5.8   RBC 2.82* 2.96* 3.21*   HGB 8.8* 9.3* 10.0*   HCT 28.3* 29.9* 32.3*   .4* 101.0* 100.6*   MCH 31.2* 31.4* 31.2*   MCHC 31.1* 31.1* 31.0*    161 154     Recent Labs     09/16/20 0417 09/17/20  0241 09/18/20  0125    139 139   K 4.8 4.7 4.2   ANIONGAP 13 18 24*    103 96*   CO2 17* 18* 19*   BUN 53* 34* 28*   CREATININE 5.3* 4.0* 3.1*   GLUCOSE 122* 96 94   CALCIUM 8.6* 8.5* 8.5*     Recent Labs     09/17/20  0241 09/18/20  0125   MG 2.1 2.1   PHOS  --  4.8*     Recent Labs     09/16/20 0417 09/17/20  0241 09/18/20  0125   AST 7 8 11   ALT 11 11 10   BILITOT <0.2 <0.2 <0.2   ALKPHOS 57 59 67     No results for input(s): PH, PO2, PCO2, HCO3, BE, O2SAT in the last 72 hours. No results for input(s): TROPONINI in the last 72 hours. No results for input(s): INR in the last 72 hours. No results for input(s): LACTA in the last 72 hours. Intake/Output Summary (Last 24 hours) at 9/18/2020 1429  Last data filed at 9/18/2020 0455  Gross per 24 hour   Intake 50 ml   Output 2065 ml   Net -2015 ml       Xr Chest Portable    Result Date: 9/17/2020  XR CHEST PORTABLE 9/17/2020 10:09 AM HISTORY: Status post pacemaker placement COMPARISON: Chest exam dated 9/12/2020. FINDINGS: New left chest wall dual chamber pacemaker. Leads appear in good position. No lung consolidation. No pleural effusion or pneumothorax. The cardiomediastinal silhouette and pulmonary vascularity are within normal limits. The osseous structures and surrounding soft tissues demonstrate no acute abnormality. 1. New left chest wall dual chamber pacemaker. No pneumothorax. Signed by Dr Iglesia Anguiano on 9/17/2020 11:49 AM    Vl Vessel Mapping Prior To Hemodialysis Access    Result Date: 9/18/2020  Vascular Upper Extremities Arterial Mapping and Upper Extremity Vein Mapping Procedure  Demographics   Patient Name  Nadia Vail  Age                67                W   Patient       [id number]        Gender             Male  Number   Visit Number  [id number]     Interpreting       Barbie Woods MD                              Physician   Date of Birth 1945    Referring          Rylanleno Loyd                              Physician   Accession     [id number]    Sonographer        [address] [facility]  Number                                         RVS, RCS  Procedure Type of Study:   Extremities Arteries:Upper Extremities Arterial Mapping. Veins:Upper Extremity Vein Mapping, VAS NIV VES MAP PRIOR TO HEMODIALYSIS  ACCESS. Indications for Study:Pre-op vein mapping for dialysis access. Risk Factors   - The patient's risk factor(s) include: diabetes mellitus and arterial     hypertension.   - Current - Every day. - The patient's last creatinine was 4 mg/dl. Allergies   - Sulfa drugs. Impression   Bilateral upper extremity venous mapping with sizes as noted. Arterial  sizes and pressures as noted. Left upper arm cephalic vein occluded. Signature   ----------------------------------------------------------------  Electronically signed by Barbie Woods MD(Interpreting  physician) on 09/18/2020 11:35 AM  ----------------------------------------------------------------  Blood Pressure:Right arm 130/48 mmHg. Velocities are measured in cm/s ; Diameters are measured in mm Right Upper Extremities Arterial Mapping +------------------------+----+-------------+---------------------+--------+ ! Location                ! PSV ! AP Diam      !Trans Diam           !Quality ! +------------------------+----+-------------+---------------------+--------+ ! Prox Brachial ! 93. 2!             !                     !        ! +------------------------+----+-------------+---------------------+--------+ ! Dist Brachial           !86. 8! ! 5                    !        ! +------------------------+----+-------------+---------------------+--------+ ! Prox Radial             !86.8!             !                     !        ! +------------------------+----+-------------+---------------------+--------+ ! Mid Radial              !103 !             !                     !        ! +------------------------+----+-------------+---------------------+--------+ ! Dist Radial             !169 !             !3. 2                  !        ! +------------------------+----+-------------+---------------------+--------+ ! Prox Ulnar              !72.7!             !                     !        ! +------------------------+----+-------------+---------------------+--------+ ! Mid Ulnar               !82.7!             !                     !        ! +------------------------+----+-------------+---------------------+--------+ ! Dist Ulnar              !78.6!             !2.4                  !        ! +------------------------+----+-------------+---------------------+--------+ Left Upper Extremities Arterial Mapping +------------------------+----+-------------+---------------------+--------+ ! Location                ! PSV ! AP Diam      !Trans Diam           !Quality ! +------------------------+----+-------------+---------------------+--------+ ! Prox Brachial           !105 !             !                     !        ! +------------------------+----+-------------+---------------------+--------+ ! Dist Brachial           !99. 8!             !7.6                  !        ! +------------------------+----+-------------+---------------------+--------+ ! Prox Radial             !96.2!             !                     !        ! +------------------------+----+-------------+---------------------+--------+ ! Mid Radial              !135 !             !                     !        ! +------------------------+----+-------------+---------------------+--------+ ! Dist Radial             !174 !             !2. 6                  !        ! +------------------------+----+-------------+---------------------+--------+ ! Prox Ulnar              ! 125 !             !                     !        ! +------------------------+----+-------------+---------------------+--------+ ! Mid Ulnar               ! 118 !             !                     !        ! +------------------------+----+-------------+---------------------+--------+ ! Dist Ulnar              ! 122 !             !3. 2                  !        ! +------------------------+----+-------------+---------------------+--------+ Velocities are measured in cm/s ; Diameters are measured in mm Cephalic Mapping +------------------++--------+----------+-----++--------+----------+-------+ ! ! !Right   ! ! Left !!        !          !       ! +------------------++--------+----------+-----++--------+----------+-------+ ! Location          ! !AP Diam.! Trans Diam!Depth! !AP Diam.! Trans Diam!Depth  ! +------------------++--------+----------+-----++--------+----------+-------+ ! Cephalic at UA    !!        !2.9       !     !!        !3.8       !       ! +------------------++--------+----------+-----++--------+----------+-------+ ! Cephalic at Mid UA!!        !2.1       ! !!        !          !       ! +------------------++--------+----------+-----++--------+----------+-------+ ! Cephalic at AF    !!        !3.6       !     !!        !          !       ! +------------------++--------+----------+-----++--------+----------+-------+ ! Cephalic at Mid LA!!        !2.9       !     !!        !2.4       !       ! +------------------++--------+----------+-----++--------+----------+-------+ ! Cephalic at Wrist !!        !2.7       !     !!        !3         !       ! +------------------++--------+----------+-----++--------+----------+-------+ Basilic Mapping +------------------++-------+-----------+-----++-------+-----------+-------+ ! ! !Right  ! ! Left !!       !           !       ! +------------------++-------+-----------+-----++-------+-----------+-------+ ! Location          ! !AP Diam!Trans Diam !Depth! !AP Diam!Trans Diam !Depth  ! +------------------++-------+-----------+-----++-------+-----------+-------+ ! Basilic at Mid UA !!       !3.6        !     !!       !4.2        !       ! +------------------++-------+-----------+-----++-------+-----------+-------+ ! Basilic at AF     !!       !2.1        !     !!       !2.9        !       ! +------------------++-------+-----------+-----++-------+-----------+-------+ ! Basilic at [facility] LA !!       !2.7        !     !!       !2          !       ! +------------------++-------+-----------+-----++-------+-----------+-------+ ! Basilic at Wrist  !!       !2.8        !     !!       !2.5        !       ! +------------------++-------+-----------+-----++-------+-----------+-------+ ! Axillary          !!       !6.5        !     !!       !4.6        !       ! +------------------++-------+-----------+-----++-------+-----------+-------+ ! Prox Brachial     !!       !5.1        !     !!       !4          !       ! +------------------++-------+-----------+-----++-------+-----------+-------+ ! Dist Brachial     !!       !4.1        !      !!       !3.5        !       ! +------------------++-------+-----------+-----++-------+-----------+-------+       Assessment and Plan:   1) symptomatic bradycardia  -cards following  -ppm placed 9/17/2020 per dr Hudson Salazar  -avoid offending agents  -h/o cad s/p pci  -h/o moderate to severe as  -h/o prior aaa endovascular repair     2) type 1 odontoid fracture  -s/p fall   -neurosurgery following, since signed off   -maintain hard collar at least 6w  -no plans for surgery     3) esrd  -lue fistula malfunctioning   -vascular surgery placed left femoral non-tunneled hd catheter 9/16/2020  -discussed with vascular surgery, plans for new avf/permacath next week   -hd per renal  -follow renal/fxn/uop/lytes  -avoid offending agents     4) acute cystitis   -unasyn     5) concern for endoleak  -as per ct a/p 9/12/2020: Patent aortoiliac stent graft. The native aortic lumen measures 5.5 cm transversely. There is some contrast enhancement anteriorly in the native lumen suggesting endoleak. I do not see a connecting vessel that is patent at the endoleak.  The superior mesenteric artery and celiac plexus are also covered by the stent graft  -discussed with vascular surgery  -pt unsure where procedure was performed      6) dm2  -meds on board      7) dvt ppx  -heparin     Yehuda Carson MD   9/18/2020 2:29 PM

## 2020-09-18 NOTE — PROGRESS NOTES
renals and prior nephrostomy drainage. 3. CAD with history of prior PCI's, ejection fraction 50%, posterior basal and basal septal severe hypokinesis, moderate to severe aortic stenosis (mean gradient 25 mmHg). 4. AAA status post endovascular repair. 5. Diabetes mellitus. 6. Cervical fracture of dens post traumatic fall. Subjective:  Mr. Cheyanne Butt is doing well post pacemaker placement. No significant issues noted. Remains ventricular paced. Pacemaker functioning well. Objective:   /64   Pulse 95   Temp 99 °F (37.2 °C) (Temporal)   Resp 16   Ht 5' 7\" (1.702 m)   Wt 106 lb 11.2 oz (48.4 kg)   SpO2 94%   BMI 16.71 kg/m²       Intake/Output Summary (Last 24 hours) at 9/18/2020 1545  Last data filed at 9/18/2020 0455  Gross per 24 hour   Intake 50 ml   Output 2065 ml   Net -2015 ml       Prior to Admission medications    Medication Sig Start Date End Date Taking? Authorizing Provider   insulin lispro (HUMALOG) 100 UNIT/ML injection vial Inject into the skin See Admin Instructions Sliding Scale BID   Yes Historical Provider, MD   amiodarone (CORDARONE) 200 MG tablet Take 200 mg by mouth daily    Historical Provider, MD   Ferric Citrate (AURYXIA) 1  MG(Fe) TABS Take 2 tablets by mouth 3 times daily     Historical Provider, MD   Hypromellose (ISOPTO TEARS OP) Apply to eye    Historical Provider, MD   isosorbide dinitrate (ISORDIL) 10 MG tablet Take 10 mg by mouth 3 times daily    Historical Provider, MD   insulin glargine (LANTUS) 100 UNIT/ML injection vial Inject 12 Units into the skin nightly     Historical Provider, MD   midodrine (PROAMATINE) 5 MG tablet Take 5 mg by mouth 3 times daily    Historical Provider, MD   oxyCODONE 5 MG capsule Take 5 mg by mouth every 6 hours as needed for Pain.      Historical Provider, MD   budesonide-formoterol (SYMBICORT) 160-4.5 MCG/ACT AERO Inhale 2 puffs into the lungs 2 times daily     Historical Provider, MD   famotidine (PEPCID) 20 MG tablet Take 20 mg evidence of pneumothorax or lung contusion. No mediastinal hematoma. 2. Atheromatous disease of the thoracic aorta and coronary arteries. Heart size is prominent. 3. Moderate size hiatal hernia. Diffuse thickening of the wall of the thoracic esophagus. Correlate with any history of esophagitis and reflux disease. 4. Centrilobular and paraseptal emphysema. Old granulomatous disease. 5. Demineralized bones. No definite acute fracture. The full report of this exam was immediately signed and available to the emergency room. The patient is currently in the emergency room. Signed by Dr Jimena Portillo on 9/12/2020 1:16 PM    Ct Cervical Spine Wo Contrast    Result Date: 9/12/2020  EXAMINATION:  CT CERVICAL SPINE WO CONTRAST  9/12/2020 1:17 PM HISTORY: The patient fell. Rule out fracture. No x-rays obtained. TECHNIQUE: Spiral CT was performed of the cervical spine. Sagittal and coronal images were reconstructed. COMPARISON: No comparison study. DLP: 260 mGy-cm. Automated exposure control was utilized. FINDINGS: There is fairly prominent pannus formation along the posterior aspect of the dens. There is a fairly large dens erosion located posteriorly. There is a nondisplaced fracture line anteriorly that is likely related to the weakening of the dens related to the erosion. Therefore, this is likely a pathologic fracture. This is fairly high on the dens and I will classify this as a type I injury. There is no displacement. There is no disruption of the anterior vertebral line or the posterior spinolaminar line. No other cervical spine fracture is identified. There is fairly severe carotid artery calcification in the neck bilaterally. There is vertebral artery calcification on the left. At C2-3, there is moderate to severe disc narrowing. There is severe facet arthropathy. There is mild to moderate foraminal narrowing bilaterally. No central spinal canal narrowing. At C3-4, there is moderate disc narrowing.  There is minimal anterior subluxation of C3 compared to C4. There is bilateral uncinate spurring and bilateral facet arthropathy. There is severe bilateral foraminal stenosis at this level. At C4-5, there is moderate disc narrowing. There is uncinate spurring and facet arthropathy. The right-sided facet joint is fused. There is severe right and moderate left-sided foraminal stenosis. At C5-6, there is disc narrowing with spondylitic and uncinate spurring. There is bilateral facet arthropathy. There is no significant central spinal canal narrowing. There is mild to moderate bilateral foraminal stenosis. At C6-7, there is moderate to severe disc narrowing. There is spondylitic and uncinate spurring. There is anterior spurring. There is facet arthropathy left greater than right. There is mild narrowing of the central spinal canal at this level. There is moderate right and mild left foraminal stenosis. At C7-T1, there is moderate to severe disc narrowing. There is mild anterior subluxation of C7 compared to T1. There is some scoliosis of the cervical-thoracic Junction in this area. This causes moderate to severe left-sided foraminal stenosis at this level. There is disc degeneration at multiple thoracic disc levels included on the cervical spine CT. No significant spinal or foraminal stenosis is visualized. 1. Type I dens fracture without displacement. There is a fairly large erosion in the posterior dens related to pannus formation. The type one fracture is seen best on the sagittal images anterior to the erosion. Therefore, this is likely a pathologic fracture given the presence of the erosion. 2. Degenerative changes throughout the cervical spine. 3. Dense carotid artery calcification in the neck. There is also some calcification involving the left vertebral artery in the neck. Results of the dens fracture were called to Dr. Stacy Daniels in the emergency room at 1:30 PM on 9/12/2020.  Signed by Dr Elio Mccray on 9/12/2020 1:31 PM    Ct Abdomen Pelvis W Iv Contrast Additional Contrast? None    Result Date: 9/12/2020  EXAMINATION:  CT ABDOMEN PELVIS W IV CONTRAST  9/12/2020 1:00 PM HISTORY: The patient fell. Epigastric pain. Prior surgical resection of the urinary bladder and appendectomy. TECHNIQUE: Spiral CT was performed of the abdomen and pelvis with contrast. Multiplanar images were reconstructed. DLP: 320 mGy-cm. Automated exposure control was utilized. COMPARISON: No comparison study. LUNG BASES: The lung bases are clear. There is a moderate size hiatal hernia. LIVER AND SPLEEN: Prior cholecystectomy. There is intrahepatic and extrahepatic biliary tree dilatation probably related to reservoir effect. No visible obstructing stone is seen. The liver is otherwise unremarkable. The spleen is normal in size. PANCREAS: There is no pancreatic mass or inflammatory change. KIDNEYS AND ADRENALS: The adrenal glands are normal in size. There is cortical thinning of both kidneys. Both kidneys are small in size. The left kidney measures 5.8 cm and the right kidney measures 5.7 cm. The urinary bladder is surgically absent. There is an aortoiliac stent graft. The graft extends above the origins of both renal arteries. It also covers the origins of the celiac plexus and superior mesenteric arteries. The native aortic lumen measures 5.5 cm in transverse diameter. There is mild enhancement within the native lumen anteriorly suggesting an endoleak. The aortoiliac stent graft is patent. BOWEL: No oral contrast was given. There is underdistention of the stomach. Small bowel loops are nondilated. There is an ostomy in the right mid to lower abdomen likely related to the history of ureteral diversion. Some of the colon is underdistended. There is moderate to large stool in the colon. OTHER: There are degenerative changes of the spine. 1. Prior cholecystectomy. Intrahepatic and extrahepatic biliary tree dilatation is likely related to reservoir effect.  No visible calcified stone is seen within the ductal system. 2. Diffuse cortical thinning of both kidneys. The kidneys are small in size. The left kidney measures 5.8 cm on the right kidney measures 5.7 cm. The renal arteries are covered by the aortoiliac stent graft. 3. Prior bladder resection. An ostomy in the right lower abdomen/upper pelvis is likely a ureteral diversion. 4. Patent aortoiliac stent graft. The native aortic lumen measures 5.5 cm transversely. There is some contrast enhancement anteriorly in the native lumen suggesting endoleak. I do not see a connecting vessel that is patent at the endoleak. The superior mesenteric artery and celiac plexus are also covered by the stent graft. 5. No definite acute bowel abnormality. No oral contrast was given and therefore evaluation is very limited. The patient also has very little intra-abdominal fat. The full report of this exam was immediately signed and available to the emergency room. The patient is currently in the emergency room. Signed by Dr Mauro aMssey on 9/12/2020 1:10 PM    Xr Chest Portable    Result Date: 9/17/2020  XR CHEST PORTABLE 9/17/2020 10:09 AM HISTORY: Status post pacemaker placement COMPARISON: Chest exam dated 9/12/2020. FINDINGS: New left chest wall dual chamber pacemaker. Leads appear in good position. No lung consolidation. No pleural effusion or pneumothorax. The cardiomediastinal silhouette and pulmonary vascularity are within normal limits. The osseous structures and surrounding soft tissues demonstrate no acute abnormality. 1. New left chest wall dual chamber pacemaker. No pneumothorax. Signed by Dr Juan Roman on 9/17/2020 11:49 AM    Xr Chest Portable    Result Date: 9/12/2020  EXAMINATION:  XR CHEST PORTABLE  9/12/2020 12:13 PM HISTORY: The patient fell. Chest pain. COMPARISON: 3/23/2016. FINDINGS:  There is no evidence of lung contusion or pneumothorax. There is no mediastinal widening. The heart is normal in size.  The thoracic aorta is atheromatous. There is no dense infiltrate or effusion. There is mild biapical scarring. 1. Mild chronic changes. 2. No evidence of lung contusion, pneumothorax or mediastinal widening. 3. The patient has a chest CT already scheduled. Please see that report separately. Signed by Dr Nirmala Osborn on 9/12/2020 12:14 PM    Vl Vessel Mapping Prior To Hemodialysis Access    Result Date: 9/18/2020  Vascular Upper Extremities Arterial Mapping and Upper Extremity Vein Mapping Procedure  Demographics   Patient Name  oYgi Becerra  Age                67                W   Patient       [id number]        Gender             Male  Number   Visit Number  [id number]     Interpreting       Matthew Izaguirre MD                              Physician   Date of Birth 1945    Referring          Isael Mckeon                              Physician   Accession     [id number]    Sonographer        Yudi [address] [place] [address] Avenue  Number                                         RVS, RCS  Procedure Type of Study:   Extremities Arteries:Upper Extremities Arterial Mapping. Veins:Upper Extremity Vein Mapping, VAS NIV VES MAP PRIOR TO HEMODIALYSIS  ACCESS. Indications for Study:Pre-op vein mapping for dialysis access. Risk Factors   - The patient's risk factor(s) include: diabetes mellitus and arterial     hypertension.   - Current - Every day. - The patient's last creatinine was 4 mg/dl. Allergies   - Sulfa drugs. Impression   Bilateral upper extremity venous mapping with sizes as noted. Arterial  sizes and pressures as noted. Left upper arm cephalic vein occluded. Signature   ----------------------------------------------------------------  Electronically signed by Matthew Izaguirre MD(Interpreting  physician) on 09/18/2020 11:35 AM  ----------------------------------------------------------------  Blood Pressure:Right arm 130/48 mmHg.  Velocities are measured in cm/s ; Diameters are measured in mm Right Upper Extremities Arterial Mapping +------------------------+----+-------------+---------------------+--------+ ! Location                ! PSV ! AP Diam      !Trans Diam           !Quality ! +------------------------+----+-------------+---------------------+--------+ ! Prox Brachial           !93. 2!             !                     !        ! +------------------------+----+-------------+---------------------+--------+ ! Dist Brachial           !86. 8! ! 5                    !        ! +------------------------+----+-------------+---------------------+--------+ ! Prox Radial             !86.8!             !                     !        ! +------------------------+----+-------------+---------------------+--------+ ! Mid Radial              !103 !             !                     !        ! +------------------------+----+-------------+---------------------+--------+ ! Dist Radial             !169 !             !3. 2                  !        ! +------------------------+----+-------------+---------------------+--------+ ! Prox Ulnar              !72.7!             !                     !        ! +------------------------+----+-------------+---------------------+--------+ ! Mid Ulnar               !82.7!             !                     !        ! +------------------------+----+-------------+---------------------+--------+ ! Dist Ulnar              !78.6!             !2.4                  !        ! +------------------------+----+-------------+---------------------+--------+ Left Upper Extremities Arterial Mapping +------------------------+----+-------------+---------------------+--------+ ! Location                ! PSV ! AP Diam      !Trans Diam           !Quality ! +------------------------+----+-------------+---------------------+--------+ ! Prox Brachial           !105 !             !                     !        ! +------------------------+----+-------------+---------------------+--------+ ! Dist Brachial           !99. 8!             !7.6 !        ! +------------------------+----+-------------+---------------------+--------+ ! Prox Radial             !96.2!             !                     !        ! +------------------------+----+-------------+---------------------+--------+ ! Mid Radial              !135 !             !                     !        ! +------------------------+----+-------------+---------------------+--------+ ! Dist Radial             !174 !             !2. 6                  !        ! +------------------------+----+-------------+---------------------+--------+ ! Prox Ulnar              ! 125 !             !                     !        ! +------------------------+----+-------------+---------------------+--------+ ! Mid Ulnar               ! 118 !             !                     !        ! +------------------------+----+-------------+---------------------+--------+ ! Dist Ulnar              ! 122 !             !3. 2                  !        ! +------------------------+----+-------------+---------------------+--------+ Velocities are measured in cm/s ; Diameters are measured in mm Cephalic Mapping +------------------++--------+----------+-----++--------+----------+-------+ ! ! !Right   ! ! Left !!        !          !       ! +------------------++--------+----------+-----++--------+----------+-------+ ! Location          ! !AP Diam.! Trans Diam!Depth! !AP Diam.! Trans Diam!Depth  ! +------------------++--------+----------+-----++--------+----------+-------+ ! Cephalic at UA    !!        !2.9       !     !!        !3.8       !       ! +------------------++--------+----------+-----++--------+----------+-------+ ! Cephalic at Mid UA!!        !2.1       ! !!        !          !       ! +------------------++--------+----------+-----++--------+----------+-------+ ! Cephalic at AF    !!        !3.6       !     !!        !          !       !

## 2020-09-18 NOTE — PROGRESS NOTES
Spoke to daughter Margie Kirk regarding pts aorta stent graft, where it was done, who done it, and the last time it was checked. Margie Kirk was unsure of when, where, and who. She gave me 3 different phone numbers for [facility].   [phone] was not available  [phone] was not a working number  [phone] someone answered, put me on hold, and was I disconnected. Couldn't get back through. We need to request records per Tammy Cerda with [facility]. This was not accomplished today.    Electronically signed by Dina Rice RN on 9/18/2020 at 5:55 PM

## 2020-09-18 NOTE — PROGRESS NOTES
(ARANESP) injection 100 mcg, 100 mcg, Subcutaneous, Weekly - Monday, Elijah Reynoso MD, 100 mcg at 09/17/20 1113    sodium chloride flush 0.9 % injection 10 mL, 10 mL, Intravenous, 2 times per day, Kunal Gifford MD, 10 mL at 09/18/20 0932    sodium chloride flush 0.9 % injection 10 mL, 10 mL, Intravenous, PRN, Kunal Gifford MD    ampicillin-sulbactam (UNASYN) 1.5 g IVPB minibag, 1.5 g, Intravenous, Q12H, Julia Chin MD, Last Rate: 100 mL/hr at 09/18/20 0932, 1.5 g at 09/18/20 0932    alteplase (CATHFLO) injection 6 mg, 6 mg, Intracatheter, Once, Peggy Rank, DO    heparin (porcine) injection 5,000 Units, 5,000 Units, Subcutaneous, 3 times per day, Peggy Rank, DO, 5,000 Units at 09/18/20 6499    docusate sodium (COLACE) capsule 100 mg, 100 mg, Oral, BID PRN, Peggy Rank, DO, 100 mg at 09/15/20 1733    hydrALAZINE (APRESOLINE) injection 10 mg, 10 mg, Intravenous, Q4H PRN, Peggy Rank, DO, 10 mg at 09/15/20 2210    oxyCODONE (ROXICODONE) immediate release tablet 5 mg, 5 mg, Oral, Q4H PRN, Peggy Rank, DO, 5 mg at 09/15/20 1225    ipratropium (ATROVENT) 0.02 % nebulizer solution 0.5 mg, 0.5 mg, Nebulization, 4x daily, Peggy Rank, DO, 0.5 mg at 09/18/20 0636    sodium chloride flush 0.9 % injection 10 mL, 10 mL, Intravenous, 2 times per day, Peggy Rank, DO, Stopped at 09/17/20 0900    sodium chloride flush 0.9 % injection 10 mL, 10 mL, Intravenous, PRN, Peggy Rank, DO, 10 mL at 09/15/20 2315    acetaminophen (TYLENOL) tablet 650 mg, 650 mg, Oral, Q6H PRN, 650 mg at 09/15/20 0716 **OR** acetaminophen (TYLENOL) suppository 650 mg, 650 mg, Rectal, Q6H PRN, Peggy Rank, DO    magnesium hydroxide (MILK OF MAGNESIA) 400 MG/5ML suspension 30 mL, 30 mL, Oral, Daily PRN, Peggy Rank, DO    promethazine (PHENERGAN) tablet 12.5 mg, 12.5 mg, Oral, Q6H PRN **OR** ondansetron (ZOFRAN) injection 4 mg, 4 mg, Intravenous, Q6H PRN, Peggy Rank, DO, 4 mg at 09/17/20 1054    morphine injection 2 mg, 2 mg, Intravenous, Q4H PRN, Alexus Montano, DO, 2 mg at 09/17/20 2230    glucose (GLUTOSE) 40 % oral gel 15 g, 15 g, Oral, PRN, Alexus Montano, DO    dextrose 50 % IV solution, 12.5 g, Intravenous, PRN, Alexus Montano, DO, 12.5 g at 09/15/20 0415    glucagon (rDNA) injection 1 mg, 1 mg, Intramuscular, PRN, Alexus Montano, DO    dextrose 5 % solution, 100 mL/hr, Intravenous, PRN, Alexus Montano, DO    insulin lispro (HUMALOG) injection vial 0-6 Units, 0-6 Units, Subcutaneous, TID WC, Alexus Montano DO, Stopped at 09/16/20 7580    insulin lispro (HUMALOG) injection vial 0-3 Units, 0-3 Units, Subcutaneous, Nightly, Alexus Montano DO  Sulfa antibiotics    Social History  Social History     Tobacco Use   Smoking Status Current Every Day Smoker    Packs/day: 2.00    Years: 55.00    Pack years: 110.00   Smokeless Tobacco Current User     Social History     Substance and Sexual Activity   Alcohol Use No         Family History   Problem Relation Age of Onset    Diabetes Mother          REVIEW OF SYSTEMS:  Constitutional: No fevers No chills  Neck:No stiffness  Respiratory: No shortness of breath  Cardiovascular: + chest pain   Gastrointestinal: No abdominal pain    Genitourinary: No Dysuria  Neurological: No headache, no confusion    PHYSICAL EXAM:  Vitals:    09/18/20 0850   BP: 111/60   Pulse: 95   Resp:    Temp:    SpO2:        Constitutional: The patient appears well-developed and well-nourished.    Eyes - conjunctiva normal.  Conjugate Gaze  Ear, nose, throat - No scars, masses, or lesions over external nose or ears, no atrophy of tongue  Neck-symmetric, no masses noted, no jugular vein distension  Respiration- chest wall appears symmetric, good expansion, normal effort without use of accessory muscles  Musculoskeletal - no significant wasting of muscles noted, no bony deformities  Extremities-no clubbing, cyanosis or edema  Skin - fairly high on the dens and I will classify this as a type I injury. There is no displacement. There is no disruption of the anterior    vertebral line or the posterior spinolaminar line. No other cervical    spine fracture is identified. There is fairly severe carotid artery    calcification in the neck bilaterally. There is vertebral artery    calcification on the left. At C2-3, there is moderate to severe disc narrowing. There is severe    facet arthropathy. There is mild to moderate foraminal narrowing    bilaterally. No central spinal canal narrowing. At C3-4, there is moderate disc narrowing. There is minimal anterior    subluxation of C3 compared to C4. There is bilateral uncinate spurring    and bilateral facet arthropathy. There is severe bilateral foraminal    stenosis at this level. At C4-5, there is moderate disc narrowing. There is uncinate spurring    and facet arthropathy. The right-sided facet joint is fused. There is    severe right and moderate left-sided foraminal stenosis. At C5-6, there is disc narrowing with spondylitic and uncinate    spurring. There is bilateral facet arthropathy. There is no    significant central spinal canal narrowing. There is mild to moderate    bilateral foraminal stenosis. At C6-7, there is moderate to severe disc narrowing. There is    spondylitic and uncinate spurring. There is anterior spurring. There    is facet arthropathy left greater than right. There is mild narrowing    of the central spinal canal at this level. There is moderate right and    mild left foraminal stenosis. At C7-T1, there is moderate to severe disc narrowing. There is mild    anterior subluxation of C7 compared to T1. There is some scoliosis of    the cervical-thoracic Junction in this area. This causes moderate to    severe left-sided foraminal stenosis at this level. There is disc degeneration at multiple thoracic disc levels included    on the cervical spine CT.  No

## 2020-09-18 NOTE — PROGRESS NOTES
Vascular lab preliminary. Bilateral arterial and venous vein mapping for dialysis planning completed. Final report pending.

## 2020-09-18 NOTE — PROGRESS NOTES
Nephrology ([id number]) Progress Note      Patient:  Maria Eugenia Salazar  YOB: 1945  Date of Service: 9/18/2020  MRN: [id number]   Acct: [de-identified]   Primary Care Physician: Unknown Provider Result (Inactive)  Advance Directive: DNR-CC  Admit Date: 9/12/2020       Hospital Day: 6  Referring Provider: Jb Bridges MD    Patient independently seen and examined, Chart, Consults, Notes, Operative notes, Labs, Cardiology, and Radiology studies reviewed as available. Chief complaint: End-stage renal disease. Subjective:    Patient is a 78 y. o. man who was admitted after a near syncopal episode when he was on dialysis. He was found bradycardic.  At the emergency department, his heart rate was in the low 30s. He is currently in ICU and he has a temporary transvenous pacemaker. Patient has also reported a fall 2 days ago with head trauma.  CT of his neck shows a type I dens fracture without displacement. He was seen by neurosurgery and has a neck brace. His hemodialysis access has high venous pressure with the recirculation problem. Vascular surgery consultation to fix his AV fistula still pending. On September 16, he underwent femoral line placement for dialysis access. Vascular surgery is looking forward to have new permacatheter placement once he has pacemaker. On September 17, he underwent permanent pacemaker placement. He was initially admitted to ICU now moved to regular floor. This morning he is complaining of pain at his pacemaker site.     Sulfa antibiotics    Medicines:  Current Facility-Administered Medications   Medication Dose Route Frequency Provider Last Rate Last Dose    darbepoetin umesh-polysorbate (ARANESP) injection 100 mcg  100 mcg Subcutaneous Weekly - Monday Ritesh Sweeney MD   100 mcg at 09/17/20 1113    sodium chloride flush 0.9 % injection 10 mL  10 mL Intravenous 2 times per day Arthur Kurtz MD   10 mL at 09/18/20 0932    sodium chloride flush 0.9 % injection 10 mL  10 mL Intravenous PRN Komal Gomez MD        ampicillin-sulbactam (UNASYN) 1.5 g IVPB minibag  1.5 g Intravenous Q12H Lovette Aschoff,  mL/hr at 09/18/20 0932 1.5 g at 09/18/20 0932    alteplase (CATHFLO) injection 6 mg  6 mg Intracatheter Once Kacie, DO        heparin (porcine) injection 5,000 Units  5,000 Units Subcutaneous 3 times per day Kacie, DO   5,000 Units at 09/18/20 2487    docusate sodium (COLACE) capsule 100 mg  100 mg Oral BID PRN Kacie, DO   100 mg at 09/15/20 1733    hydrALAZINE (APRESOLINE) injection 10 mg  10 mg Intravenous Q4H PRN Kacie, DO   10 mg at 09/15/20 2210    oxyCODONE (ROXICODONE) immediate release tablet 5 mg  5 mg Oral Q4H PRN Kacie, DO   5 mg at 09/15/20 1225    ipratropium (ATROVENT) 0.02 % nebulizer solution 0.5 mg  0.5 mg Nebulization 4x daily Kacie, DO   0.5 mg at 09/18/20 0636    sodium chloride flush 0.9 % injection 10 mL  10 mL Intravenous 2 times per day Kacie, DO   Stopped at 09/17/20 0900    sodium chloride flush 0.9 % injection 10 mL  10 mL Intravenous PRN Kacie, DO   10 mL at 09/15/20 2315    acetaminophen (TYLENOL) tablet 650 mg  650 mg Oral Q6H PRN Kacie, DO   650 mg at 09/15/20 0716    Or    acetaminophen (TYLENOL) suppository 650 mg  650 mg Rectal Q6H PRN Kacei, DO        magnesium hydroxide (MILK OF MAGNESIA) 400 MG/5ML suspension 30 mL  30 mL Oral Daily PRN Kacie, DO        promethazine (PHENERGAN) tablet 12.5 mg  12.5 mg Oral Q6H PRN Kacie, DO        Or    ondansetron [facility]) injection 4 mg  4 mg Intravenous Q6H PRN Kacie, DO   4 mg at 09/17/20 1054    morphine injection 2 mg  2 mg Intravenous Q4H PRN Kacie, DO   2 mg at 09/17/20 2230    glucose (GLUTOSE) 40 % oral gel 15 g  15 g Oral PRN Kacie, DO        dextrose 50 % IV solution  12.5 g Intravenous PRN Honor Amy, DO   12.5 g at 09/15/20 0415    glucagon (rDNA) injection 1 mg  1 mg Intramuscular PRN Honor Amy, DO        dextrose 5 % solution  100 mL/hr Intravenous PRN Annamaria Reyes, DO        insulin lispro (HUMALOG) injection vial 0-6 Units  0-6 Units Subcutaneous [facility] [place] Asham, DO   Stopped at 09/16/20 1441    insulin lispro (HUMALOG) injection vial 0-3 Units  0-3 Units Subcutaneous [facility] [place] DO Amy           Past Medical History:  Past Medical History:   Diagnosis Date    Anemia in chronic kidney disease (CODE)     Atherosclerotic heart disease     [facility]     CAD (coronary artery disease)     Cancer ([facility]     Chronic kidney disease     COPD (chronic obstructive pulmonary disease) ([facility]     Diabetes mellitus ([facility]     End stage renal disease (HCC)     Enterocolitis due to Clostridioides difficile     GERD (gastroesophageal reflux disease)     Hemodialysis patient ([facility]     Hyperlipidemia     Hypertension     Other chronic pain     Palliative care patient 09/15/2020    Prostate cancer ([facility]     Vitamin D deficiency        Past Surgical History:  Past Surgical History:   Procedure Laterality Date    ABDOMEN SURGERY      esophageal CA, used part of stomach     APPENDECTOMY      [facility]    CARDIAC CATHETERIZATION      [facility], had stents, unknown details    URETEROSTOMY         Family History  Family History   Problem Relation Age of Onset    Diabetes Mother        Social History  Social History     Socioeconomic History    Marital status:      Spouse name: Not on file    Number of children: Not on file    Years of education: Not on file    Highest education level: Not on file   Occupational History    Not on file   Social Needs    Financial resource strain: Not on file    Food insecurity     Worry: Not on file     Inability: Not on file    Transportation needs     Medical: Not on file     Non-medical: Not on file   Tobacco Use    Smoking status: Current Every Day Smoker     Packs/day: 2.00     Years: 55.00     Pack years: 110.00    Smokeless tobacco: Current User   Substance and Sexual Activity    Alcohol use: No    Drug use: No    Sexual activity: Not Currently   Lifestyle    Physical activity     Days per week: Not on file     Minutes per session: Not on file    Stress: Not on file   Relationships    Social connections     Talks on phone: Not on file     Gets together: Not on file     Attends Islam service: Not on file     Active member of club or organization: Not on file     Attends meetings of clubs or organizations: Not on file     Relationship status: Not on file    Intimate partner violence     Fear of current or ex partner: Not on file     Emotionally abused: Not on file     Physically abused: Not on file     Forced sexual activity: Not on file   Other Topics Concern    Not on file   Social History Narrative    Not on file         Review of Systems:  History obtained from chart review and the patient  General ROS: No fever or chills  Respiratory ROS: positive for - shortness of breath  Cardiovascular ROS: No chest pain or palpitations  Gastrointestinal ROS: No abdominal pain or melena  Genito-Urinary ROS: No dysuria or hematuria  Musculoskeletal ROS: No joint pain or swelling   14 point ROS reviewed with the patient and negative except as noted above and in the HPI unless unable to obtain.     Objective:  Patient Vitals for the past 24 hrs:   BP Temp Temp src Pulse Resp SpO2   09/18/20 1001 103/64 99 °F (37.2 °C) Temporal 95 16 94 %   09/18/20 0850 111/60 -- -- 95 -- --   09/18/20 0658 111/60 99.5 °F (37.5 °C) Temporal 95 16 95 %   09/18/20 0214 129/74 99.5 °F (37.5 °C) Temporal 109 18 90 %   09/17/20 2210 115/67 99.6 °F (37.6 °C) Temporal 109 18 94 %   09/17/20 1600 (!) 135/54 -- -- 117 13 97 %   09/17/20 1500 (!) 101/45 -- -- 107 15 100 %   09/17/20 1400 (!) 111/52 -- -- 108 13 100 %   09/17/20 1300 (!) 116/38 -- -- 121 15 96 %   09/17/20 1200 (!) 149/49 99 °F (37.2 °C) Temporal 103 13 95 %   09/17/20 1100 (!) 153/45 -- -- 96 15 96 %       Intake/Output Summary (Last 24 hours) at 9/18/2020 1012  Last data filed at 9/18/2020 0455  Gross per 24 hour   Intake 110 ml   Output 2275 ml   Net -2165 ml     General: awake/alert   HEENT: Normocephalic atraumatic head  Neck: Supple/neck collar. Chest:  clear to auscultation bilaterally  CVS: regular rate and rhythm  Abdominal: soft, nontender, normal bowel sounds  Extremities: no cyanosis or edema  Skin: warm and dry without rash      Labs:  BMP:   Recent Labs     09/16/20 0417 09/17/20 0241 09/18/20  0125    139 139   K 4.8 4.7 4.2    103 96*   CO2 17* 18* 19*   PHOS  --   --  4.8*   BUN 53* 34* 28*   CREATININE 5.3* 4.0* 3.1*   CALCIUM 8.6* 8.5* 8.5*     CBC:   Recent Labs     09/16/20 0417 09/17/20 0241 09/18/20  0125   WBC 5.7 5.0 5.8   HGB 8.8* 9.3* 10.0*   HCT 28.3* 29.9* 32.3*   .4* 101.0* 100.6*    161 154     LIVER PROFILE:   Recent Labs     09/16/20 0417 09/17/20 0241 09/18/20  0125   AST 7 8 11   ALT 11 11 10   BILITOT <0.2 <0.2 <0.2   ALKPHOS 57 59 67     PT/INR:   No results for input(s): PROTIME, INR in the last 72 hours. APTT: No results for input(s): APTT in the last 72 hours. BNP:  No results for input(s): BNP in the last 72 hours. Ionized Calcium:No results for input(s): IONCA in the last 72 hours. Magnesium:  Recent Labs     09/17/20 0241 09/18/20  0125   MG 2.1 2.1     Phosphorus:  Recent Labs     09/18/20  0125   PHOS 4.8*     HgbA1C: No results for input(s): LABA1C in the last 72 hours. Hepatic:   Recent Labs     09/16/20  0417 09/17/20  0241 09/18/20  0125   ALKPHOS 57 59 67   ALT 11 11 10   AST 7 8 11   PROT 5.0* 5.3* 5.8*   BILITOT <0.2 <0.2 <0.2   LABALBU 3.0* 3.2* 3.5     Lactic Acid: No results for input(s): LACTA in the last 72 hours.   Troponin: No results for input(s): CKTOTAL, CKMB, TROPONINT in the last 72 hours. ABGs: No results for input(s): PH, PCO2, PO2, HCO3, O2SAT in the last 72 hours. CRP:  No results for input(s): CRP in the last 72 hours. Sed Rate:  No results for input(s): SEDRATE in the last 72 hours. Cultures:   No results for input(s): CULTURE in the last 72 hours. No results for input(s): BC, Soledad Math in the last 72 hours. No results for input(s): CXSURG in the last 72 hours. Radiology reports as per the Radiologist  Radiology: Ct Head Wo Contrast    Result Date: 9/12/2020  EXAMINATION:  CT HEAD WO CONTRAST  9/12/2020 12:53 PM HISTORY: The patient fell. Rule out intracranial injury. TECHNIQUE: Multiple axial images were obtained through the brain without contrast infusion. Multiplanar images were reconstructed. DLP: 675 mGy-cm. Automated exposure control was utilized. COMPARISON: No comparison study. FINDINGS: There are no hemorrhage, edema or mass effect. There is moderate to severe atrophy. There is moderate to severe dilatation of the lateral and third ventricles. There is low-density in the hemispheric white matter that is nonspecific. The visualized paranasal sinuses and mastoid air cells are clear. No calvarial fracture is seen. There is biparietal calvarial thinning. 1. No hemorrhage, edema or mass effect. 2. Moderate to severe atrophy. Moderate to severe dilatation of the lateral and third ventricles is likely related. Hydrocephalus is not excluded. 3. Low-density in the hemispheric white matter is nonspecific and most likely due to chronic small vessel disease. The full report of this exam was immediately signed and available to the emergency room. The patient is currently in the emergency room. Signed by Dr Regine Zuniga on 9/12/2020 12:56 PM    Ct Chest W Contrast    Result Date: 9/12/2020  EXAMINATION:  CT CHEST W CONTRAST  9/12/2020 1:11 PM HISTORY: The patient fell. Anterior chest skin tear. Hypotension during dialysis. Normal chest x-ray. COMPARISON: No comparison study. DLP: 320 mGy-cm. Automated exposure control was utilized. TECHNIQUE: Spiral CT was performed of the chest with contrast. Multiplanar images were reconstructed. MEDIASTINUM/VASCULAR: There is atheromatous calcification of the thoracic aorta and coronary arteries. Heart size is upper limits of normal. Pulmonary arteries are normal in caliber. There are no enlarged mediastinal lymph nodes. There is a moderate size hiatal hernia. There is wall thickening of the esophagus in the chest diffusely. LUNGS: There is centrilobular emphysema. There is paraseptal emphysema. There are few scattered calcified granulomas. There is no airspace consolidation or pleural effusion. There is no pneumothorax or lung contusion. BONES AND SOFT TISSUES: The clavicles are intact. The scapulae are intact. No definite rib fracture is seen. No visible acute thoracic spine fracture is seen. There are some degenerative changes of the spine. The bones are demineralized. The sternum appears to be intact. UPPER ABDOMEN: Please see the abdomen and pelvis CT report separately. 1. No evidence of pneumothorax or lung contusion. No mediastinal hematoma. 2. Atheromatous disease of the thoracic aorta and coronary arteries. Heart size is prominent. 3. Moderate size hiatal hernia. Diffuse thickening of the wall of the thoracic esophagus. Correlate with any history of esophagitis and reflux disease. 4. Centrilobular and paraseptal emphysema. Old granulomatous disease. 5. Demineralized bones. No definite acute fracture. The full report of this exam was immediately signed and available to the emergency room. The patient is currently in the emergency room. Signed by Dr Alexandre Allen on 9/12/2020 1:16 PM    Ct Cervical Spine Wo Contrast    Result Date: 9/12/2020  EXAMINATION:  CT CERVICAL SPINE WO CONTRAST  9/12/2020 1:17 PM HISTORY: The patient fell. Rule out fracture. No x-rays obtained.  TECHNIQUE: Spiral CT was performed of the cervical spine. Sagittal and coronal images were reconstructed. COMPARISON: No comparison study. DLP: 260 mGy-cm. Automated exposure control was utilized. FINDINGS: There is fairly prominent pannus formation along the posterior aspect of the dens. There is a fairly large dens erosion located posteriorly. There is a nondisplaced fracture line anteriorly that is likely related to the weakening of the dens related to the erosion. Therefore, this is likely a pathologic fracture. This is fairly high on the dens and I will classify this as a type I injury. There is no displacement. There is no disruption of the anterior vertebral line or the posterior spinolaminar line. No other cervical spine fracture is identified. There is fairly severe carotid artery calcification in the neck bilaterally. There is vertebral artery calcification on the left. At C2-3, there is moderate to severe disc narrowing. There is severe facet arthropathy. There is mild to moderate foraminal narrowing bilaterally. No central spinal canal narrowing. At C3-4, there is moderate disc narrowing. There is minimal anterior subluxation of C3 compared to C4. There is bilateral uncinate spurring and bilateral facet arthropathy. There is severe bilateral foraminal stenosis at this level. At C4-5, there is moderate disc narrowing. There is uncinate spurring and facet arthropathy. The right-sided facet joint is fused. There is severe right and moderate left-sided foraminal stenosis. At C5-6, there is disc narrowing with spondylitic and uncinate spurring. There is bilateral facet arthropathy. There is no significant central spinal canal narrowing. There is mild to moderate bilateral foraminal stenosis. At C6-7, there is moderate to severe disc narrowing. There is spondylitic and uncinate spurring. There is anterior spurring. There is facet arthropathy left greater than right.  There is mild narrowing of the central spinal canal at this level. There is moderate right and mild left foraminal stenosis. At C7-T1, there is moderate to severe disc narrowing. There is mild anterior subluxation of C7 compared to T1. There is some scoliosis of the cervical-thoracic Junction in this area. This causes moderate to severe left-sided foraminal stenosis at this level. There is disc degeneration at multiple thoracic disc levels included on the cervical spine CT. No significant spinal or foraminal stenosis is visualized. 1. Type I dens fracture without displacement. There is a fairly large erosion in the posterior dens related to pannus formation. The type one fracture is seen best on the sagittal images anterior to the erosion. Therefore, this is likely a pathologic fracture given the presence of the erosion. 2. Degenerative changes throughout the cervical spine. 3. Dense carotid artery calcification in the neck. There is also some calcification involving the left vertebral artery in the neck. Results of the dens fracture were called to Dr. Ruba Patricio in the emergency room at 1:30 PM on 9/12/2020. Signed by Dr Estanislado Boxer on 9/12/2020 1:31 PM    Ct Abdomen Pelvis W Iv Contrast Additional Contrast? None    Result Date: 9/12/2020  EXAMINATION:  CT ABDOMEN PELVIS W IV CONTRAST  9/12/2020 1:00 PM HISTORY: The patient fell. Epigastric pain. Prior surgical resection of the urinary bladder and appendectomy. TECHNIQUE: Spiral CT was performed of the abdomen and pelvis with contrast. Multiplanar images were reconstructed. DLP: 320 mGy-cm. Automated exposure control was utilized. COMPARISON: No comparison study. LUNG BASES: The lung bases are clear. There is a moderate size hiatal hernia. LIVER AND SPLEEN: Prior cholecystectomy. There is intrahepatic and extrahepatic biliary tree dilatation probably related to reservoir effect. No visible obstructing stone is seen. The liver is otherwise unremarkable. The spleen is normal in size.  PANCREAS: There is no pancreatic mass or inflammatory change. KIDNEYS AND ADRENALS: The adrenal glands are normal in size. There is cortical thinning of both kidneys. Both kidneys are small in size. The left kidney measures 5.8 cm and the right kidney measures 5.7 cm. The urinary bladder is surgically absent. There is an aortoiliac stent graft. The graft extends above the origins of both renal arteries. It also covers the origins of the celiac plexus and superior mesenteric arteries. The native aortic lumen measures 5.5 cm in transverse diameter. There is mild enhancement within the native lumen anteriorly suggesting an endoleak. The aortoiliac stent graft is patent. BOWEL: No oral contrast was given. There is underdistention of the stomach. Small bowel loops are nondilated. There is an ostomy in the right mid to lower abdomen likely related to the history of ureteral diversion. Some of the colon is underdistended. There is moderate to large stool in the colon. OTHER: There are degenerative changes of the spine. 1. Prior cholecystectomy. Intrahepatic and extrahepatic biliary tree dilatation is likely related to reservoir effect. No visible calcified stone is seen within the ductal system. 2. Diffuse cortical thinning of both kidneys. The kidneys are small in size. The left kidney measures 5.8 cm on the right kidney measures 5.7 cm. The renal arteries are covered by the aortoiliac stent graft. 3. Prior bladder resection. An ostomy in the right lower abdomen/upper pelvis is likely a ureteral diversion. 4. Patent aortoiliac stent graft. The native aortic lumen measures 5.5 cm transversely. There is some contrast enhancement anteriorly in the native lumen suggesting endoleak. I do not see a connecting vessel that is patent at the endoleak. The superior mesenteric artery and celiac plexus are also covered by the stent graft. 5. No definite acute bowel abnormality. No oral contrast was given and therefore evaluation is very limited.  The patient also has very little intra-abdominal fat. The full report of this exam was immediately signed and available to the emergency room. The patient is currently in the emergency room. Signed by Dr Jimena Portillo on 9/12/2020 1:10 PM    Xr Chest Portable    Result Date: 9/12/2020  EXAMINATION:  XR CHEST PORTABLE  9/12/2020 12:13 PM HISTORY: The patient fell. Chest pain. COMPARISON: 3/23/2016. FINDINGS:  There is no evidence of lung contusion or pneumothorax. There is no mediastinal widening. The heart is normal in size. The thoracic aorta is atheromatous. There is no dense infiltrate or effusion. There is mild biapical scarring. 1. Mild chronic changes. 2. No evidence of lung contusion, pneumothorax or mediastinal widening. 3. The patient has a chest CT already scheduled. Please see that report separately. Signed by Dr Jimena Portillo on 9/12/2020 12:14 PM       Assessment   1. End-stage renal disease/need maintenance hemodialysis. 2.  Status post fall and neck fracture. 3.  Severe bradycardia/recurrent syncopal episode. 4.  Anemia of chronic kidney disease. 5.  Type II diabetic nephropathy. 6.  Secondary hyperparathyroidism. 7.  Hyperphosphatemia. 8.  Urinary tract infection. 9.  Malfunctioning AV fistula. 10.  Status post permanent pacemaker placement. Plan:  1. Hemodialysis treatment tomorrow. 2.  Fistulogram/permacatheter placement next week  3. Pain medications as needed.       Caridad Peralta MD  09/18/20  10:12 AM

## 2020-09-18 NOTE — PROGRESS NOTES
Spoke to Dominga Solorzano with [employer] today at approx 0900. Stated pt had not had any episodes since pacemaker was placed and that she would be faxing a report to the nurses station for his chart.    Electronically signed by Daisha Snowden RN on 9/18/2020 at 6:21 PM

## 2020-09-18 NOTE — PROGRESS NOTES
Once    heparin (porcine)  5,000 Units Subcutaneous 3 times per day    ipratropium  0.5 mg Nebulization 4x daily    sodium chloride flush  10 mL Intravenous 2 times per day    insulin lispro  0-6 Units Subcutaneous TID WC    insulin lispro  0-3 Units Subcutaneous Nightly     Continuous Infusions:   dextrose       PRN Meds:sodium chloride flush, 10 mL, PRN  docusate sodium, 100 mg, BID PRN  hydrALAZINE, 10 mg, Q4H PRN  oxyCODONE, 5 mg, Q4H PRN  sodium chloride flush, 10 mL, PRN  acetaminophen, 650 mg, Q6H PRN    Or  acetaminophen, 650 mg, Q6H PRN  magnesium hydroxide, 30 mL, Daily PRN  promethazine, 12.5 mg, Q6H PRN    Or  ondansetron, 4 mg, Q6H PRN  morphine, 2 mg, Q4H PRN  glucose, 15 g, PRN  dextrose, 12.5 g, PRN  glucagon (rDNA), 1 mg, PRN  dextrose, 100 mL/hr, PRN      PHYSICAL EXAM:     CONSTITUTIONAL: Alert and mostly oriented times 3,  cooperative to examination. Hard of hearing. LUNGS: Chest expands equally bilaterally upon respiration, no accessory muscle used. Ausculation reveals no wheezes, rales or rhonchi.   CARDIOVASCULAR: Heart regular rate and rhythm, murmur noted  WOUND/INCISION: left groin non-tunneled temp HD catheter    LABS:     CBC:   Recent Labs     09/16/20 0417 09/17/20 0241 09/18/20  0125   WBC 5.7 5.0 5.8   RBC 2.82* 2.96* 3.21*   HGB 8.8* 9.3* 10.0*   HCT 28.3* 29.9* 32.3*   .4* 101.0* 100.6*   MCH 31.2* 31.4* 31.2*   MCHC 31.1* 31.1* 31.0*   RDW 14.9* 14.9* 14.9*    161 154   MPV 10.2 10.0 10.2      Last 3 CMP:   Recent Labs     09/16/20 0417 09/17/20  0241 09/18/20  0125    139 139   K 4.8 4.7 4.2    103 96*   CO2 17* 18* 19*   BUN 53* 34* 28*   CREATININE 5.3* 4.0* 3.1*   GLUCOSE 122* 96 94   CALCIUM 8.6* 8.5* 8.5*   PROT 5.0* 5.3* 5.8*   LABALBU 3.0* 3.2* 3.5   BILITOT <0.2 <0.2 <0.2   ALKPHOS 57 59 67   AST 7 8 11   ALT 11 11 10      Calcium:   Lab Results   Component Value Date    CALCIUM 8.5 09/18/2020    CALCIUM 8.5 09/17/2020    CALCIUM 8.6 09/16/2020        DVT prophylaxis:                                  [x] SQ Heparin                                    ASSESSMENT:     Operative procedure 9/16/20 :    1. Ultrasound guided access of left femoral vein  2. Placement of a dual-lumen non-tunneled left femoral vein dialysis catheter  1. HD # 6  Patient Active Problem List   Diagnosis    Complicated UTI (urinary tract infection)    Sepsis ([address]    JOY (acute kidney injury) ([address]    Hypokalemia    Lower abdominal pain    Hydronephrosis of right kidney    Diabetes mellitus ([address]    COPD (chronic obstructive pulmonary disease) ([address]    CAD (coronary artery disease)    Coronary artery disease involving native heart without angina pectoris    Chronic obstructive pulmonary disease (HCC)    Type 2 diabetes mellitus with diabetic chronic kidney disease ([address]    Essential hypertension    Bradycardia    Palliative care patient    Dialysis AV fistula malfunction (HCC)    Hypotensive episode   2. Chief Complaint:  Chief Complaint   Patient presents with    Abdominal Pain    Fall      out of bed no LOC skin tear to mid chest region       PLAN:     1. Bilateral VL arm mapping completed for new AVF planning  2. Patient still with temporary left femoral vein catheter for HD  3.  Will need tunneled dialysis catheter and new AVF in OR early next week

## 2020-09-18 NOTE — PROGRESS NOTES
I have applied a urostomy appliance on the established urostomy stoma. Patient tolerated well.  Electronically signed by Dean Renteria RN on 9/18/2020 at 1:06 PM

## 2020-09-18 NOTE — PROGRESS NOTES
Comprehensive Nutrition Assessment    Type and Reason for Visit:  Reassess    Nutrition Recommendations/Plan: start ONS    Nutrition Assessment:  Following for LOS. Pt now meets criteria for severe malnutrition AEB fat and muscle mass depletion. Pt is also at risk for nutritional support d/t decreased po intake. Malnutrition Assessment:  Malnutrition Status:  Severe malnutrition    Context:  Acute Illness     Findings of the 6 clinical characteristics of malnutrition:  Energy Intake:  7 - 50% or less of estimated energy requirements for 5 or more days  Weight Loss:  1 - 1% to 2% over 1 week     Body Fat Loss:  7 - Moderate body fat loss     Muscle Mass Loss:  7 - Moderate muscle mass loss    Fluid Accumulation:  No significant fluid accumulation Extremities   Strength:  Not Performed    Estimated Daily Nutrient Needs:  Energy (kcal):  7623-6070 kcals (30-35 kcals/kg);  Weight Used for Energy Requirements:  Current     Protein (g):  49-73g; Weight Used for Protein Requirements:  Current        Fluid (ml/day):  1452-1500ml; Weight Used for Fluid Requirements:     current    Nutrition Related Findings:  thin appearing      Wounds:  Surgical Wound       Current Nutrition Therapies:    DIET CARDIAC; Carb Control: 4 carb choices (60 gms)/meal; Renal  Diet NPO, After Midnight Exceptions are: Sips with Meds    Anthropometric Measures:  · Height: 5' 7\" (170.2 cm)  · Current Body Weight: 106 lb 11 oz (48.4 kg)   · Admission Body Weight: 110 lb (49.9 kg)    · Usual Body Weight: 104 lb (47.2 kg)(5/2020)     · Ideal Body Weight: 148 lbs; % Ideal Body Weight     · BMI: 16.7  · Adjusted Body Weight:  ; No Adjustment   · BMI Categories: Underweight (BMI less than 22) age over 72       Nutrition Diagnosis:   · Severe malnutrition, Inadequate protein-energy intake related to acute injury/trauma, biting/chewing (masticatory) difficulty, increase demand for energy/nutrients as evidenced by intake 0-25%, BMI, severe loss of subcutaneous fat, severe muscle loss, dialysis      Nutrition Interventions:   Food and/or Nutrient Delivery:  Continue Current Diet, Start Oral Nutrition Supplement  Nutrition Education/Counseling:  No recommendation at this time   Coordination of Nutrition Care:  Continued Inpatient Monitoring    Goals:  po intake 50% or greater. Weight stable or increase 1-3# per week       Nutrition Monitoring and Evaluation:   Behavioral-Environmental Outcomes:      Food/Nutrient Intake Outcomes:  Food and Nutrient Intake, Supplement Intake  Physical Signs/Symptoms Outcomes:  Biochemical Data, Chewing or Swallowing, Nutrition Focused Physical Findings, Skin, Weight     Discharge Planning:     Too soon to determine     Electronically signed by Elaina Conte MS, RD, LD on 9/18/20 at 2:18 PM CDT    Contact: [phone]

## 2020-09-19 NOTE — PROGRESS NOTES
UJNAID Mitchell        [START ON 9/21/2020] vancomycin (VANCOCIN) 750 mg in dextrose 5 % 250 mL IVPB  750 mg Intravenous On Call to [id number] [facility], JUNAID        darbepoetin umesh-polysorbate SEVEN [facility]) injection 100 mcg  100 mcg Subcutaneous Weekly - Monday Brandee Jones MD   100 mcg at 09/17/20 1113    sodium chloride flush 0.9 % injection 10 mL  10 mL Intravenous 2 times per day Alberto Hernandez MD   10 mL at 09/18/20 2044    sodium chloride flush 0.9 % injection 10 mL  10 mL Intravenous PRN Alberto Hernandez MD        ampicillin-sulbactam (UNASYN) 1.5 g IVPB minibag  1.5 g Intravenous Q12H Karolynn Sandifer, MD   Stopped at 09/18/20 2114    heparin (porcine) injection 5,000 Units  5,000 Units Subcutaneous 3 times per day Azucena Palmer, DO   5,000 Units at 09/18/20 2045    docusate sodium (COLACE) capsule 100 mg  100 mg Oral BID PRN Azucena Palmer, DO   100 mg at 09/15/20 1733    hydrALAZINE (APRESOLINE) injection 10 mg  10 mg Intravenous Q4H PRN Azucena Palmer, DO   10 mg at 09/15/20 2210    oxyCODONE (ROXICODONE) immediate release tablet 5 mg  5 mg Oral Q4H PRN Azucena Palmer, DO   5 mg at 09/18/20 1736    ipratropium (ATROVENT) 0.02 % nebulizer solution 0.5 mg  0.5 mg Nebulization 4x daily [facility] Ivjaironukoff, DO   0.5 mg at 09/19/20 1040    sodium chloride flush 0.9 % injection 10 mL  10 mL Intravenous 2 times per day Azucena Palmer, DO   10 mL at 09/18/20 2045    sodium chloride flush 0.9 % injection 10 mL  10 mL Intravenous PRN Azucena Palmer, DO   10 mL at 09/15/20 2315    acetaminophen (TYLENOL) tablet 650 mg  650 mg Oral Q6H PRN Azucena Michaels, DO   650 mg at 09/15/20 0716    Or    acetaminophen (TYLENOL) suppository 650 mg  650 mg Rectal Q6H PRN Azucena Palmer, DO        magnesium hydroxide (MILK OF MAGNESIA) 400 MG/5ML suspension 30 mL  30 mL Oral Daily PRN Azucena Palmer DO        promethazine (PHENERGAN) tablet 12.5 mg  12.5 mg Oral Q6H PRN Azucena Palmer, DO Or    ondansetron (ZOFRAN) injection 4 mg  4 mg Intravenous Q6H PRN Altamease Goldberg, DO   4 mg at 09/17/20 1054    morphine injection 2 mg  2 mg Intravenous Q4H PRN Altamease Goldberg, DO   2 mg at 09/17/20 2230    glucose (GLUTOSE) 40 % oral gel 15 g  15 g Oral PRN Altamease Goldberg, DO        dextrose 50 % IV solution  12.5 g Intravenous PRN Altamease Goldberg, DO   12.5 g at 09/15/20 0415    glucagon (rDNA) injection 1 mg  1 mg Intramuscular PRN Altamease Goldberg, DO        dextrose 5 % solution  100 mL/hr Intravenous PRN Altamease Goldberg, DO        insulin lispro (HUMALOG) injection vial 0-6 Units  0-6 Units Subcutaneous TID [facility]   2 Units at 09/18/20 1418    insulin lispro (HUMALOG) injection vial 0-3 Units  0-3 Units Subcutaneous [facility]   1 Units at 09/18/20 2045       Past Medical History:  Past Medical History:   Diagnosis Date    Anemia in chronic kidney disease (CODE)     Atherosclerotic heart disease     [facility] [address]     CAD (coronary artery disease)     Cancer ([facility] [address] [facility]     Chronic kidney disease     COPD (chronic obstructive pulmonary disease) ([facility]     Diabetes mellitus ([facility] [address] [facility]     End stage renal disease ([facility] [address] [facility]     Enterocolitis due to Clostridioides difficile     GERD (gastroesophageal reflux disease)     Hemodialysis patient ([facility] [address] [facility]     Hyperlipidemia     Hypertension     Other chronic pain     Palliative care patient 09/15/2020    Prostate cancer ([facility] [address]     Vitamin D deficiency        Past Surgical History:  Past Surgical History:   Procedure Laterality Date    ABDOMEN SURGERY      esophageal CA, used part of stomach     APPENDECTOMY      [facility]    [facility]ARDIAC CATHETERIZATION      [facility], had stents, unknown details    URETEROSTOMY         Family History  Family History   Problem Relation Age of Onset    Diabetes Mother        Social History  Social History     Socioeconomic History    Marital status:      Spouse name: Not on file    Number of children: Not on file    Years of education: Not on file    Highest education level: Not on file   Occupational History    Not on file   Social Needs    Financial resource strain: Not on file    Food insecurity     Worry: Not on file     Inability: Not on file    Transportation needs     Medical: Not on file     Non-medical: Not on file   Tobacco Use    Smoking status: Current Every Day Smoker     Packs/day: 2.00     Years: 55.00     Pack years: 110.00    Smokeless tobacco: Current User   Substance and Sexual Activity    Alcohol use: No    Drug use: No    Sexual activity: Not Currently   Lifestyle    Physical activity     Days per week: Not on file     Minutes per session: Not on file    Stress: Not on file   Relationships    Social connections     Talks on phone: Not on file     Gets together: Not on file     Attends Restorationist service: Not on file     Active member of club or organization: Not on file     Attends meetings of clubs or organizations: Not on file     Relationship status: Not on file    Intimate partner violence     Fear of current or ex partner: Not on file     Emotionally abused: Not on file     Physically abused: Not on file     Forced sexual activity: Not on file   Other Topics Concern    Not on file   Social History Narrative    Not on file         Review of Systems:  History obtained from chart review and the patient  General ROS: No fever or chills  Respiratory ROS: positive for - shortness of breath  Cardiovascular ROS: No chest pain or palpitations  Gastrointestinal ROS: No abdominal pain or melena  Genito-Urinary ROS: No dysuria or hematuria  Musculoskeletal ROS: No joint pain or swelling   14 point ROS reviewed with the patient and negative except as noted above and in the HPI unless unable to obtain.     Objective:  Patient Vitals for the past 24 hrs:   BP Temp Temp src Pulse Resp SpO2 Weight   09/19/20 0638 106/60 98.1 °F (36.7 °C) Temporal 86 18 100 % --   09/19/20 0407 -- -- -- -- -- -- 107 lb (48.5 kg)   09/19/20 0257 123/66 97.7 °F (36.5 °C) Temporal 88 18 96 % --   09/19/20 0130 (!) 101/59 98.3 °F (36.8 °C) Temporal 86 18 -- --   09/18/20 2238 (!) 101/59 98.3 °F (36.8 °C) Temporal 86 18 97 % --   09/18/20 2112 -- -- -- 107 -- -- --   09/18/20 1857 (!) 98/58 98.9 °F (37.2 °C) Temporal 107 18 93 % --       Intake/Output Summary (Last 24 hours) at 9/19/2020 1140  Last data filed at 9/19/2020 0328  Gross per 24 hour   Intake 100 ml   Output 375 ml   Net -275 ml     General: awake/alert   HEENT: Normocephalic atraumatic head  Neck: Supple/neck collar. Chest:  clear to auscultation bilaterally  CVS: regular rate and rhythm  Abdominal: soft, nontender, normal bowel sounds  Extremities: no cyanosis or edema  Skin: warm and dry without rash      Labs:  BMP:   Recent Labs     09/17/20 0241 09/18/20 0125 09/19/20  0321    139 140   K 4.7 4.2 4.1    96* 100   CO2 18* 19* 22   PHOS  --  4.8*  --    BUN 34* 28* 44*   CREATININE 4.0* 3.1* 4.2*   CALCIUM 8.5* 8.5* 8.6*     CBC:   Recent Labs     09/17/20 0241 09/18/20  0125 09/19/20  0321   WBC 5.0 5.8 4.9   HGB 9.3* 10.0* 9.7*   HCT 29.9* 32.3* 31.9*   .0* 100.6* 103.9*    154 156     LIVER PROFILE:   Recent Labs     09/17/20 0241 09/18/20  0125   AST 8 11   ALT 11 10   BILITOT <0.2 <0.2   ALKPHOS 59 67     PT/INR:   No results for input(s): PROTIME, INR in the last 72 hours. APTT: No results for input(s): APTT in the last 72 hours. BNP:  No results for input(s): BNP in the last 72 hours. Ionized Calcium:No results for input(s): IONCA in the last 72 hours. Magnesium:  Recent Labs     09/17/20  0241 09/18/20  0125 09/19/20  0321   MG 2.1 2.1 2.3     Phosphorus:  Recent Labs     09/18/20  0125   PHOS 4.8*     HgbA1C: No results for input(s): LABA1C in the last 72 hours.   Hepatic:   Recent Labs     09/17/20  0241 09/18/20  0125   ALKPHOS 59 67   ALT 11 10   AST 8 11   PROT 5.3* 5.8*   BILITOT <0.2 <0.2   LABALBU 3.2* 3.5     Lactic Acid: No results for input(s): LACTA in the last 72 hours. Troponin: No results for input(s): CKTOTAL, CKMB, TROPONINT in the last 72 hours. ABGs: No results for input(s): PH, PCO2, PO2, HCO3, O2SAT in the last 72 hours. CRP:  No results for input(s): CRP in the last 72 hours. Sed Rate:  No results for input(s): SEDRATE in the last 72 hours. Cultures:   No results for input(s): CULTURE in the last 72 hours. No results for input(s): BC, Vinie Snellen in the last 72 hours. No results for input(s): CXSURG in the last 72 hours. Radiology reports as per the Radiologist  Radiology: Ct Head Wo Contrast    Result Date: 9/12/2020  EXAMINATION:  CT HEAD WO CONTRAST  9/12/2020 12:53 PM HISTORY: The patient fell. Rule out intracranial injury. TECHNIQUE: Multiple axial images were obtained through the brain without contrast infusion. Multiplanar images were reconstructed. DLP: 675 mGy-cm. Automated exposure control was utilized. COMPARISON: No comparison study. FINDINGS: There are no hemorrhage, edema or mass effect. There is moderate to severe atrophy. There is moderate to severe dilatation of the lateral and third ventricles. There is low-density in the hemispheric white matter that is nonspecific. The visualized paranasal sinuses and mastoid air cells are clear. No calvarial fracture is seen. There is biparietal calvarial thinning. 1. No hemorrhage, edema or mass effect. 2. Moderate to severe atrophy. Moderate to severe dilatation of the lateral and third ventricles is likely related. Hydrocephalus is not excluded. 3. Low-density in the hemispheric white matter is nonspecific and most likely due to chronic small vessel disease. The full report of this exam was immediately signed and available to the emergency room. The patient is currently in the emergency room.  Signed by Dr Santos Ugarte on 9/12/2020 12:56 PM    Ct Chest W Contrast    Result Date: 9/12/2020  EXAMINATION:  CT CHEST W CONTRAST  9/12/2020 1:11 PM HISTORY: The patient fell. Anterior chest skin tear. Hypotension during dialysis. Normal chest x-ray. COMPARISON: No comparison study. DLP: 320 mGy-cm. Automated exposure control was utilized. TECHNIQUE: Spiral CT was performed of the chest with contrast. Multiplanar images were reconstructed. MEDIASTINUM/VASCULAR: There is atheromatous calcification of the thoracic aorta and coronary arteries. Heart size is upper limits of normal. Pulmonary arteries are normal in caliber. There are no enlarged mediastinal lymph nodes. There is a moderate size hiatal hernia. There is wall thickening of the esophagus in the chest diffusely. LUNGS: There is centrilobular emphysema. There is paraseptal emphysema. There are few scattered calcified granulomas. There is no airspace consolidation or pleural effusion. There is no pneumothorax or lung contusion. BONES AND SOFT TISSUES: The clavicles are intact. The scapulae are intact. No definite rib fracture is seen. No visible acute thoracic spine fracture is seen. There are some degenerative changes of the spine. The bones are demineralized. The sternum appears to be intact. UPPER ABDOMEN: Please see the abdomen and pelvis CT report separately. 1. No evidence of pneumothorax or lung contusion. No mediastinal hematoma. 2. Atheromatous disease of the thoracic aorta and coronary arteries. Heart size is prominent. 3. Moderate size hiatal hernia. Diffuse thickening of the wall of the thoracic esophagus. Correlate with any history of esophagitis and reflux disease. 4. Centrilobular and paraseptal emphysema. Old granulomatous disease. 5. Demineralized bones. No definite acute fracture. The full report of this exam was immediately signed and available to the emergency room. The patient is currently in the emergency room.  Signed by Dr Apple Alvarenga on 9/12/2020 1:16 PM    Ct Cervical Spine Wo Contrast    Result Date: 9/12/2020  EXAMINATION:  CT CERVICAL SPINE WO CONTRAST  9/12/2020 1:17 PM HISTORY: The patient fell. Rule out fracture. No x-rays obtained. TECHNIQUE: Spiral CT was performed of the cervical spine. Sagittal and coronal images were reconstructed. COMPARISON: No comparison study. DLP: 260 mGy-cm. Automated exposure control was utilized. FINDINGS: There is fairly prominent pannus formation along the posterior aspect of the dens. There is a fairly large dens erosion located posteriorly. There is a nondisplaced fracture line anteriorly that is likely related to the weakening of the dens related to the erosion. Therefore, this is likely a pathologic fracture. This is fairly high on the dens and I will classify this as a type I injury. There is no displacement. There is no disruption of the anterior vertebral line or the posterior spinolaminar line. No other cervical spine fracture is identified. There is fairly severe carotid artery calcification in the neck bilaterally. There is vertebral artery calcification on the left. At C2-3, there is moderate to severe disc narrowing. There is severe facet arthropathy. There is mild to moderate foraminal narrowing bilaterally. No central spinal canal narrowing. At C3-4, there is moderate disc narrowing. There is minimal anterior subluxation of C3 compared to C4. There is bilateral uncinate spurring and bilateral facet arthropathy. There is severe bilateral foraminal stenosis at this level. At C4-5, there is moderate disc narrowing. There is uncinate spurring and facet arthropathy. The right-sided facet joint is fused. There is severe right and moderate left-sided foraminal stenosis. At C5-6, there is disc narrowing with spondylitic and uncinate spurring. There is bilateral facet arthropathy. There is no significant central spinal canal narrowing. There is mild to moderate bilateral foraminal stenosis. At C6-7, there is moderate to severe disc narrowing. There is spondylitic and uncinate spurring. There is anterior spurring. There is facet arthropathy left greater than right. There is mild narrowing of the central spinal canal at this level. There is moderate right and mild left foraminal stenosis. At C7-T1, there is moderate to severe disc narrowing. There is mild anterior subluxation of C7 compared to T1. There is some scoliosis of the cervical-thoracic Junction in this area. This causes moderate to severe left-sided foraminal stenosis at this level. There is disc degeneration at multiple thoracic disc levels included on the cervical spine CT. No significant spinal or foraminal stenosis is visualized. 1. Type I dens fracture without displacement. There is a fairly large erosion in the posterior dens related to pannus formation. The type one fracture is seen best on the sagittal images anterior to the erosion. Therefore, this is likely a pathologic fracture given the presence of the erosion. 2. Degenerative changes throughout the cervical spine. 3. Dense carotid artery calcification in the neck. There is also some calcification involving the left vertebral artery in the neck. Results of the dens fracture were called to Dr. Markell Contreras in the emergency room at 1:30 PM on 9/12/2020. Signed by Dr Jimena Portillo on 9/12/2020 1:31 PM    Ct Abdomen Pelvis W Iv Contrast Additional Contrast? None    Result Date: 9/12/2020  EXAMINATION:  CT ABDOMEN PELVIS W IV CONTRAST  9/12/2020 1:00 PM HISTORY: The patient fell. Epigastric pain. Prior surgical resection of the urinary bladder and appendectomy. TECHNIQUE: Spiral CT was performed of the abdomen and pelvis with contrast. Multiplanar images were reconstructed. DLP: 320 mGy-cm. Automated exposure control was utilized. COMPARISON: No comparison study. LUNG BASES: The lung bases are clear. There is a moderate size hiatal hernia. LIVER AND SPLEEN: Prior cholecystectomy.  There is intrahepatic and extrahepatic biliary tree dilatation probably related to reservoir effect. No visible obstructing stone is seen. The liver is otherwise unremarkable. The spleen is normal in size. PANCREAS: There is no pancreatic mass or inflammatory change. KIDNEYS AND ADRENALS: The adrenal glands are normal in size. There is cortical thinning of both kidneys. Both kidneys are small in size. The left kidney measures 5.8 cm and the right kidney measures 5.7 cm. The urinary bladder is surgically absent. There is an aortoiliac stent graft. The graft extends above the origins of both renal arteries. It also covers the origins of the celiac plexus and superior mesenteric arteries. The native aortic lumen measures 5.5 cm in transverse diameter. There is mild enhancement within the native lumen anteriorly suggesting an endoleak. The aortoiliac stent graft is patent. BOWEL: No oral contrast was given. There is underdistention of the stomach. Small bowel loops are nondilated. There is an ostomy in the right mid to lower abdomen likely related to the history of ureteral diversion. Some of the colon is underdistended. There is moderate to large stool in the colon. OTHER: There are degenerative changes of the spine. 1. Prior cholecystectomy. Intrahepatic and extrahepatic biliary tree dilatation is likely related to reservoir effect. No visible calcified stone is seen within the ductal system. 2. Diffuse cortical thinning of both kidneys. The kidneys are small in size. The left kidney measures 5.8 cm on the right kidney measures 5.7 cm. The renal arteries are covered by the aortoiliac stent graft. 3. Prior bladder resection. An ostomy in the right lower abdomen/upper pelvis is likely a ureteral diversion. 4. Patent aortoiliac stent graft. The native aortic lumen measures 5.5 cm transversely. There is some contrast enhancement anteriorly in the native lumen suggesting endoleak. I do not see a connecting vessel that is patent at the endoleak.  The superior mesenteric artery and celiac plexus are also covered by the stent graft. 5. No definite acute bowel abnormality. No oral contrast was given and therefore evaluation is very limited. The patient also has very little intra-abdominal fat. The full report of this exam was immediately signed and available to the emergency room. The patient is currently in the emergency room. Signed by Dr Valeria Cardoso on 9/12/2020 1:10 PM    Xr Chest Portable    Result Date: 9/12/2020  EXAMINATION:  XR CHEST PORTABLE  9/12/2020 12:13 PM HISTORY: The patient fell. Chest pain. COMPARISON: 3/23/2016. FINDINGS:  There is no evidence of lung contusion or pneumothorax. There is no mediastinal widening. The heart is normal in size. The thoracic aorta is atheromatous. There is no dense infiltrate or effusion. There is mild biapical scarring. 1. Mild chronic changes. 2. No evidence of lung contusion, pneumothorax or mediastinal widening. 3. The patient has a chest CT already scheduled. Please see that report separately. Signed by Dr Valeria Cardoso on 9/12/2020 12:14 PM       Assessment   1. End-stage renal disease/currently seen on hemodialysis. 2.  Status post fall and neck fracture. 3.  Severe bradycardia/recurrent syncopal episode. 4.  Anemia of chronic kidney disease. 5.  Type II diabetic nephropathy. 6.  Secondary hyperparathyroidism. 7.  Hyperphosphatemia. 8.  Urinary tract infection. 9.  Malfunctioning AV fistula. 10.  Status post permanent pacemaker placement. Plan:  1. Tolerating dialysis very well. 2.  Fistulogram/permacatheter placement next week  3. Pain medications as needed.       Jacquelyn Paredes MD  09/19/20  11:40 AM

## 2020-09-19 NOTE — PLAN OF CARE
Problem: Skin Integrity:  Goal: Will show no infection signs and symptoms  Description: Will show no infection signs and symptoms  Outcome: Ongoing  Goal: Absence of new skin breakdown  Description: Absence of new skin breakdown  Outcome: Ongoing     Problem: Falls - Risk of:  Goal: Will remain free from falls  Description: Will remain free from falls  Outcome: Ongoing  Goal: Absence of physical injury  Description: Absence of physical injury  Outcome: Ongoing     Problem: Pain:  Goal: Pain level will decrease  Description: Pain level will decrease  Outcome: Ongoing  Goal: Control of acute pain  Description: Control of acute pain  Outcome: Ongoing  Goal: Control of chronic pain  Description: Control of chronic pain  Outcome: Ongoing     Problem: Cardiac:  Goal: Ability to maintain vital signs within normal range will improve  Description: Ability to maintain vital signs within normal range will improve  Outcome: Ongoing  Goal: Cardiovascular alteration will improve  Description: Cardiovascular alteration will improve  Outcome: Ongoing     Problem: Physical Regulation:  Goal: Complications related to the disease process, condition or treatment will be avoided or minimized  Description: Complications related to the disease process, condition or treatment will be avoided or minimized  Outcome: Ongoing

## 2020-09-19 NOTE — PROGRESS NOTES
Hospitalist Progress Note  [facility]     Patient: Kamilah Landeros  : 1945  MRN: [id number]  Code Status: [facility] Day: 7   Date of Service: 2020    Subjective:   Pt seen and examined right after HD today. Tolerated well. No current complaints.     Past Medical History:   Diagnosis Date    Anemia in chronic kidney disease (CODE)     Atherosclerotic heart disease     [facility]     CAD (coronary artery disease)     Cancer ([facility] [address] [facility]     Chronic kidney disease     COPD (chronic obstructive pulmonary disease) ([facility] [address] [facility]     Diabetes mellitus (HCC)     End stage renal disease (HCC)     Enterocolitis due to Clostridioides difficile     GERD (gastroesophageal reflux disease)     Hemodialysis patient ([facility] [address] [facility]     Hyperlipidemia     Hypertension     Other chronic pain     Palliative care patient 09/15/2020    Prostate cancer ([facility] [address]     Vitamin D deficiency        Past Surgical History:   Procedure Laterality Date    ABDOMEN SURGERY      esophageal CA, used part of stomach     APPENDECTOMY      [facility]    CARDIAC CATHETERIZATION      [facility], had stents, unknown details    URETEROSTOMY         Family History   Problem Relation Age of Onset    Diabetes Mother        Social History     Socioeconomic History    Marital status:      Spouse name: Not on file    Number of children: Not on file    Years of education: Not on file    Highest education level: Not on file   Occupational History    Not on file   Social Needs    Financial resource strain: Not on file    Food insecurity     Worry: Not on file     Inability: Not on file    Transportation needs     Medical: Not on file     Non-medical: Not on file   Tobacco Use    Smoking status: Current Every Day Smoker     Packs/day: 2.00     Years: 55.00     Pack years: 110.00    Smokeless tobacco: Current User   Substance and Sexual Activity    Alcohol use: No    Drug use: No    Sexual activity: Not Currently   Lifestyle    Physical activity     Days per week: Not on file     Minutes per session: Not on file    Stress: Not on file   Relationships    Social connections     Talks on phone: Not on file     Gets together: Not on file     Attends Baptism service: Not on file     Active member of club or organization: Not on file     Attends meetings of clubs or organizations: Not on file     Relationship status: Not on file    Intimate partner violence     Fear of current or ex partner: Not on file     Emotionally abused: Not on file     Physically abused: Not on file     Forced sexual activity: Not on file   Other Topics Concern    Not on file   Social History Narrative    Not on file       Current Facility-Administered Medications   Medication Dose Route Frequency Provider Last Rate Last Dose    [START ON 9/20/2020] chlorhexidine (HIBICLENS) 4 % liquid   Topical BID JUNAID Joyce        [START ON 9/21/2020] vancomycin (VANCOCIN) 750 mg in dextrose 5 % 250 mL IVPB  750 mg Intravenous On Call to [address] [facility], APR        darbepoetin umesh-polysorbate [facility]) injection 100 mcg  100 mcg Subcutaneous Weekly - Monday Cairdad Peralta MD   100 mcg at 09/17/20 1113    sodium chloride flush 0.9 % injection 10 mL  10 mL Intravenous 2 times per day Charis Blizzard, MD   10 mL at 09/18/20 2044    sodium chloride flush 0.9 % injection 10 mL  10 mL Intravenous PRN Charis Blizzard, MD        ampicillin-sulbactam (UNASYN) 1.5 g IVPB minibag  1.5 g Intravenous Q12H Rahul Granado  mL/hr at 09/19/20 1200 1.5 g at 09/19/20 1200    heparin (porcine) injection 5,000 Units  5,000 Units Subcutaneous 3 times per day Emily Maldonado DO   5,000 Units at 09/18/20 2045    docusate sodium (COLACE) capsule 100 mg  100 mg Oral BID PRN Emily Maldonado DO   100 mg at 09/15/20 1733    hydrALAZINE (APRESOLINE) injection 10 mg  10 mg Intravenous Q4H PRN Emily Maldonado DO   10 mg at 09/15/20 2210    oxyCODONE (ROXICODONE) immediate release tablet 5 mg  5 mg Oral Q4H PRN Holden Brome, DO   5 mg at 09/18/20 1736    ipratropium (ATROVENT) 0.02 % nebulizer solution 0.5 mg  0.5 mg Nebulization 4x daily Holden Brome, DO   0.5 mg at 09/19/20 1040    sodium chloride flush 0.9 % injection 10 mL  10 mL Intravenous 2 times per day Holden Brome, DO   10 mL at 09/18/20 2045    sodium chloride flush 0.9 % injection 10 mL  10 mL Intravenous PRN Holden Brome, DO   10 mL at 09/15/20 2315    acetaminophen (TYLENOL) tablet 650 mg  650 mg Oral Q6H PRN Holden Brome, DO   650 mg at 09/15/20 6355    Or    acetaminophen (TYLENOL) suppository 650 mg  650 mg Rectal Q6H PRN Holden Brome, DO        magnesium hydroxide (MILK OF MAGNESIA) 400 MG/5ML suspension 30 mL  30 mL Oral Daily PRN Emily Brome, DO        promethazine (PHENERGAN) tablet 12.5 mg  12.5 mg Oral Q6H PRN Holden Brome, DO        Or    ondansetron [facility]) injection 4 mg  4 mg Intravenous Q6H PRN Holden Brome, DO   4 mg at 09/17/20 1054    morphine injection 2 mg  2 mg Intravenous Q4H PRN Holden Brome, DO   2 mg at 09/17/20 2230    glucose (GLUTOSE) 40 % oral gel 15 g  15 g Oral PRN Holden Brome, DO        dextrose 50 % IV solution  12.5 g Intravenous PRN Holden Brome, DO   12.5 g at 09/15/20 0415    glucagon (rDNA) injection 1 mg  1 mg Intramuscular PRN Holden Brome, DO        dextrose 5 % solution  100 mL/hr Intravenous PRN Holden Brome, DO        insulin lispro (HUMALOG) injection vial 0-6 Units  0-6 Units Subcutaneous TID [facility] Holden Brome, DO   2 Units at 09/18/20 1418    insulin lispro (HUMALOG) injection vial 0-3 Units  0-3 Units Subcutaneous Nightly Holden Erica, DO   1 Units at 09/18/20 2045         dextrose          Objective:   /60   Pulse 86   Temp 98.1 °F (36.7 °C) (Temporal)   Resp 18   Ht 5' 7\" (1.702 m)   Wt 107 lb (48.5 kg)   SpO2 100% BMI 16.76 kg/m²     General: no acute distress  HEENT: normocephalic, atraumatic  Neck: supple, symmetrical, trachea midline   Lungs: clear to auscultation bilaterally   Cardiovascular: s1 and s2 normal  Abdomen: soft, positive bowel sounds, nondistended  Extremities: no edema or cyanosis   Neuro: no focal deficits   Skin: normal color and texture     Recent Labs     09/17/20 0241 09/18/20  0125 09/19/20  0321   WBC 5.0 5.8 4.9   RBC 2.96* 3.21* 3.07*   HGB 9.3* 10.0* 9.7*   HCT 29.9* 32.3* 31.9*   .0* 100.6* 103.9*   MCH 31.4* 31.2* 31.6*   MCHC 31.1* 31.0* 30.4*    154 156     Recent Labs     09/17/20  0241 09/18/20  0125 09/19/20  0321    139 140   K 4.7 4.2 4.1   ANIONGAP 18 24* 18    96* 100   CO2 18* 19* 22   BUN 34* 28* 44*   CREATININE 4.0* 3.1* 4.2*   GLUCOSE 96 94 104   CALCIUM 8.5* 8.5* 8.6*     Recent Labs     09/17/20 0241 09/18/20  0125 09/19/20  0321   MG 2.1 2.1 2.3   PHOS  --  4.8*  --      Recent Labs     09/17/20 0241 09/18/20  0125   AST 8 11   ALT 11 10   BILITOT <0.2 <0.2   ALKPHOS 59 67     No results for input(s): PH, PO2, PCO2, HCO3, BE, O2SAT in the last 72 hours. No results for input(s): TROPONINI in the last 72 hours. No results for input(s): INR in the last 72 hours. No results for input(s): LACTA in the last 72 hours.       Intake/Output Summary (Last 24 hours) at 9/19/2020 1405  Last data filed at 9/19/2020 1305  Gross per 24 hour   Intake 460 ml   Output 625 ml   Net -165 ml       Vl Vessel Mapping Prior To Hemodialysis Access    Result Date: 9/18/2020  Vascular Upper Extremities Arterial Mapping and Upper Extremity Vein Mapping Procedure  Demographics   Patient Name  Demetrius Guevara  Age                67                W   Patient       [id number]        Gender             Male  Number   Visit Number  [id number]     Interpreting       Loco Tovar MD                              Physician   Date of Birth 1945    Referring          Chauncey Mcdaniel Physician   Accession     [id number]    Sonographer        [address] [facility]  Number                                         RVS, RCS  Procedure Type of Study:   Extremities Arteries:Upper Extremities Arterial Mapping. Veins:Upper Extremity Vein Mapping, VAS NIV VES MAP PRIOR TO HEMODIALYSIS  ACCESS. Indications for Study:Pre-op vein mapping for dialysis access. Risk Factors   - The patient's risk factor(s) include: diabetes mellitus and arterial     hypertension.   - Current - Every day. - The patient's last creatinine was 4 mg/dl. Allergies   - Sulfa drugs. Impression   Bilateral upper extremity venous mapping with sizes as noted. Arterial  sizes and pressures as noted. Left upper arm cephalic vein occluded. Signature   ----------------------------------------------------------------  Electronically signed by Suad Perez MD(Interpreting  physician) on 09/18/2020 11:35 AM  ----------------------------------------------------------------  Blood Pressure:Right arm 130/48 mmHg. Velocities are measured in cm/s ; Diameters are measured in mm Right Upper Extremities Arterial Mapping +------------------------+----+-------------+---------------------+--------+ ! Location                ! PSV ! AP Diam      !Trans Diam           !Quality ! +------------------------+----+-------------+---------------------+--------+ ! Prox Brachial           !93. 2!             !                     !        ! +------------------------+----+-------------+---------------------+--------+ ! Dist Brachial           !86. 8! ! 5                    !        ! +------------------------+----+-------------+---------------------+--------+ ! Prox Radial             !86.8!             !                     !        ! +------------------------+----+-------------+---------------------+--------+ ! Mid Radial              !103 !             !                     !        ! +------------------------+----+-------------+---------------------+--------+ ! Dist Radial             !169 !             !3. 2                  !        ! +------------------------+----+-------------+---------------------+--------+ ! Prox Ulnar              !72.7!             !                     !        ! +------------------------+----+-------------+---------------------+--------+ ! Mid Ulnar               !82.7!             !                     !        ! +------------------------+----+-------------+---------------------+--------+ ! Dist Ulnar              !78.6!             !2.4                  !        ! +------------------------+----+-------------+---------------------+--------+ Left Upper Extremities Arterial Mapping +------------------------+----+-------------+---------------------+--------+ ! Location                ! PSV ! AP Diam      !Trans Diam           !Quality ! +------------------------+----+-------------+---------------------+--------+ ! Prox Brachial           !105 !             !                     !        ! +------------------------+----+-------------+---------------------+--------+ ! Dist Brachial           !99. 8!             !7.6                  !        ! +------------------------+----+-------------+---------------------+--------+ ! Prox Radial             !96.2!             !                     !        ! +------------------------+----+-------------+---------------------+--------+ ! Mid Radial              !135 !             !                     !        ! +------------------------+----+-------------+---------------------+--------+ ! Dist Radial             !174 !             !2. 6                  !        ! +------------------------+----+-------------+---------------------+--------+ ! Prox Ulnar              ! 125 !             !                     !        ! +------------------------+----+-------------+---------------------+--------+ ! Mid Ulnar               ! 118 !             ! !        ! +------------------------+----+-------------+---------------------+--------+ ! Dist Ulnar              ! 122 !             !3. 2                  !        ! +------------------------+----+-------------+---------------------+--------+ Velocities are measured in cm/s ; Diameters are measured in mm Cephalic Mapping +------------------++--------+----------+-----++--------+----------+-------+ ! ! !Right   ! ! Left !!        !          !       ! +------------------++--------+----------+-----++--------+----------+-------+ ! Location          ! !AP Diam.! Trans Diam!Depth! !AP Diam.! Trans Diam!Depth  ! +------------------++--------+----------+-----++--------+----------+-------+ ! Cephalic at UA    !!        !2.9       !     !!        !3.8       !       ! +------------------++--------+----------+-----++--------+----------+-------+ ! Cephalic at Mid UA!!        !2.1       ! !!        !          !       ! +------------------++--------+----------+-----++--------+----------+-------+ ! Cephalic at AF    !!        !3.6       !     !!        !          !       ! +------------------++--------+----------+-----++--------+----------+-------+ ! Cephalic at Mid LA!!        !2.9       !     !!        !2.4       !       ! +------------------++--------+----------+-----++--------+----------+-------+ ! Cephalic at Wrist !!        !2.7       !     !!        !3         !       ! +------------------++--------+----------+-----++--------+----------+-------+ Basilic Mapping +------------------++-------+-----------+-----++-------+-----------+-------+ ! ! !Right  ! ! Left !!       !           !       ! +------------------++-------+-----------+-----++-------+-----------+-------+ ! Location          ! !AP Diam!Trans Diam !Depth! !AP Diam!Trans Diam !Depth  ! +------------------++-------+-----------+-----++-------+-----------+-------+ ! Basilic at Mid UA !!       !3.6        !     !!       !4.2 !       ! +------------------++-------+-----------+-----++-------+-----------+-------+ ! Basilic at AF     !!       !2.1        !     !!       !2.9        !       ! +------------------++-------+-----------+-----++-------+-----------+-------+ ! Basilic at [facility] [place] !!       !2.7        !     !!       !2          !       ! +------------------++-------+-----------+-----++-------+-----------+-------+ ! Basilic at Wrist  !!       !2.8        !     !!       !2.5        !       ! +------------------++-------+-----------+-----++-------+-----------+-------+ ! Axillary          !!       !6.5        !     !!       !4.6        !       ! +------------------++-------+-----------+-----++-------+-----------+-------+ ! Prox Brachial     !!       !5.1        !     !!       !4          !       ! +------------------++-------+-----------+-----++-------+-----------+-------+ ! Dist Brachial     !!       !4.1        ! !!       !3.5        !       ! +------------------++-------+-----------+-----++-------+-----------+-------+       Assessment and Plan:   1) symptomatic bradycardia  -cards following  -ppm placed 9/17/2020 per dr Brayan Ibarra  -avoid offending agents  -h/o cad s/p pci  -h/o moderate to severe as  -h/o prior aaa endovascular repair     2) type 1 odontoid fracture  -s/p fall   -neurosurgery following, since signed off   -maintain hard collar at least 6w  -no plans for surgery     3) esrd  -lue fistula malfunctioning   -vascular surgery placed left femoral non-tunneled hd catheter 9/16/2020  -discussed with vascular surgery, plans for new avf/permacath next week   -hd per renal  -follow renal/fxn/uop/lytes  -avoid offending agents     4) acute cystitis   -unasyn     5) concern for endoleak  -as per ct a/p 9/12/2020: Patent aortoiliac stent graft. The native aortic lumen measures 5.5 cm transversely. There is some contrast enhancement anteriorly in the native lumen suggesting endoleak.  I do not see a connecting vessel that is patent at the endoleak.  The superior mesenteric artery and celiac plexus are also covered by the stent graft  -discussed with vascular surgery  -pt unsure where procedure was performed      6) dm2  -meds on board      7) dvt ppx  -heparin     Bibiana Jeffers MD   9/19/2020 2:05 PM

## 2020-09-20 NOTE — PROGRESS NOTES
Hospitalist Progress Note  [facility]     Patient: Steven Cummings  : 1945  MRN: [id number]  Code Status: [facility] Day: 8   Date of Service: 2020    Subjective:   Pt seen and examined. Resting in bed comfortably. No current complaints.     Past Medical History:   Diagnosis Date    Anemia in chronic kidney disease (CODE)     Atherosclerotic heart disease     [facility] ([facility]     CAD (coronary artery disease)     Cancer ([facility]     Chronic kidney disease     COPD (chronic obstructive pulmonary disease) ([facility]     Diabetes mellitus (HCC)     End stage renal disease (HCC)     Enterocolitis due to Clostridioides difficile     GERD (gastroesophageal reflux disease)     [facility]     Hyperlipidemia     Hypertension     Other chronic pain     Palliative care patient 09/15/2020    [facility]state cancer ([email or web address]     Vitamin D deficiency        Past Surgical History:   Procedure Laterality Date    ABDOMEN SURGERY      esophageal CA, used part of stomach     APPENDECTOMY      [facility]    CARDIAC CATHETERIZATION      [facility], had stents, unknown details    URETEROSTOMY         Family History   Problem Relation Age of Onset    Diabetes Mother        Social History     Socioeconomic History    Marital status:      Spouse name: Not on file    Number of children: Not on file    Years of education: Not on file    Highest education level: Not on file   Occupational History    Not on file   Social Needs    Financial resource strain: Not on file    Food insecurity     Worry: Not on file     Inability: Not on file    Transportation needs     Medical: Not on file     Non-medical: Not on file   Tobacco Use    Smoking status: Current Every Day Smoker     Packs/day: 2.00     Years: 55.00     Pack years: 110.00    Smokeless tobacco: Current User   Substance and Sexual Activity    Alcohol use: No    Drug use: No    Sexual activity: Not Currently   Lifestyle    Physical activity     Days per week: Not on file     Minutes per session: Not on file    Stress: Not on file   Relationships    Social connections     Talks on phone: Not on file     Gets together: Not on file     Attends Zoroastrianism service: Not on file     Active member of club or organization: Not on file     Attends meetings of clubs or organizations: Not on file     Relationship status: Not on file    Intimate partner violence     Fear of current or ex partner: Not on file     Emotionally abused: Not on file     Physically abused: Not on file     Forced sexual activity: Not on file   Other Topics Concern    Not on file   Social History Narrative    Not on file       Current Facility-Administered Medications   Medication Dose Route Frequency Provider Last Rate Last Dose    chlorhexidine (HIBICLENS) 4 % liquid   Topical BID JUNAID Jasso        [START ON 9/21/2020] vancomycin (VANCOCIN) 750 mg in dextrose 5 % 250 mL IVPB  750 mg Intravenous On Call to [facility] [facility], APRN        darbepoetin umesh-polysorbate [facility]) injection 100 mcg  100 mcg Subcutaneous Weekly - Monday Pippa Dsouza MD   100 mcg at 09/17/20 1113    sodium chloride flush 0.9 % injection 10 mL  10 mL Intravenous 2 times per day Suhail Dominguez MD   10 mL at 09/20/20 0819    sodium chloride flush 0.9 % injection 10 mL  10 mL Intravenous PRN Suhail Dominguez MD        ampicillin-sulbactam (UNASYN) 1.5 g IVPB minibag  1.5 g Intravenous Q12H Luann Wade MD   Stopped at 09/20/20 0848    heparin (porcine) injection 5,000 Units  5,000 Units Subcutaneous 3 times per day Nargis Brian, DO   5,000 Units at 09/20/20 5787    docusate sodium (COLACE) capsule 100 mg  100 mg Oral BID PRN Nargis Brian DO   100 mg at 09/15/20 1733    hydrALAZINE (APRESOLINE) injection 10 mg  10 mg Intravenous Q4H PRN Nargis Brian DO   10 mg at 09/15/20 2210    oxyCODONE (ROXICODONE) immediate release tablet 5 mg  5 mg Oral Q4H PRN Erika Drier, DO   5 mg at 09/19/20 2120    ipratropium (ATROVENT) 0.02 % nebulizer solution 0.5 mg  0.5 mg Nebulization 4x daily Erika Drier, DO   0.5 mg at 09/20/20 1409    sodium chloride flush 0.9 % injection 10 mL  10 mL Intravenous 2 times per day Erika Drier, DO   10 mL at 09/18/20 2045    sodium chloride flush 0.9 % injection 10 mL  10 mL Intravenous PRN Erika Drier, DO   10 mL at 09/15/20 2315    acetaminophen (TYLENOL) tablet 650 mg  650 mg Oral Q6H PRN Erika Drier, DO   650 mg at 09/15/20 4238    Or    acetaminophen (TYLENOL) suppository 650 mg  650 mg Rectal Q6H PRN Erika Drier, DO        magnesium hydroxide (MILK OF MAGNESIA) 400 MG/5ML suspension 30 mL  30 mL Oral Daily PRN Erika Drier, DO        promethazine (PHENERGAN) tablet 12.5 mg  12.5 mg Oral Q6H PRN Erika Drier, DO        Or    ondansetron [facility]) injection 4 mg  4 mg Intravenous Q6H PRN Erika Drier, DO   4 mg at 09/17/20 1054    morphine injection 2 mg  2 mg Intravenous Q4H PRN Erika Drier, DO   2 mg at 09/17/20 2230    glucose (GLUTOSE) 40 % oral gel 15 g  15 g Oral PRN Erika Drier, DO        dextrose 50 % IV solution  12.5 g Intravenous PRN Erika Drier, DO   12.5 g at 09/15/20 0415    glucagon (rDNA) injection 1 mg  1 mg Intramuscular PRN Erika Drier, DO        dextrose 5 % solution  100 mL/hr Intravenous PRN Erika Drier, DO        insulin lispro (HUMALOG) injection vial 0-6 Units  0-6 Units Subcutaneous TID [facility] Erika Drier, DO   2 Units at 09/20/20 1255    insulin lispro (HUMALOG) injection vial 0-3 Units  0-3 Units Subcutaneous Nightly Erika Drier, DO   2 Units at 09/19/20 2128         dextrose          Objective:   BP (!) 89/53   Pulse 101   Temp 97.5 °F (36.4 °C) (Temporal)   Resp 14   Ht 5' 7\" (1.702 m)   Wt 107 lb (48.5 kg)   SpO2 100%   BMI 16.76 kg/m²     General: no acute distress  HEENT: normocephalic, atraumatic  Neck: supple, symmetrical, trachea midline   Lungs: clear to auscultation bilaterally   Cardiovascular: s1 and s2 normal  Abdomen: soft, positive bowel sounds, nondistended  Extremities: no edema or cyanosis   Neuro: no focal deficits   Skin: normal color and texture     Recent Labs     09/18/20 0125 09/19/20  0321 09/20/20  0347   WBC 5.8 4.9 4.4*   RBC 3.21* 3.07* 3.05*   HGB 10.0* 9.7* 9.4*   HCT 32.3* 31.9* 29.8*   .6* 103.9* 97.7*   MCH 31.2* 31.6* 30.8   MCHC 31.0* 30.4* 31.5*    156 94*     Recent Labs     09/18/20  0125 09/19/20  0321 09/20/20  0347    140 139   K 4.2 4.1 4.2   ANIONGAP 24* 18 12   CL 96* 100 101   CO2 19* 22 26   BUN 28* 44* 34*   CREATININE 3.1* 4.2* 3.1*   GLUCOSE 94 104 120*   CALCIUM 8.5* 8.6* 8.5*     Recent Labs     09/18/20  0125 09/19/20  0321 09/20/20  0347   MG 2.1 2.3 2.3   PHOS 4.8*  --  3.8     Recent Labs     09/18/20 0125 09/20/20  0347   AST 11 8   ALT 10 <5*   BILITOT <0.2 <0.2   ALKPHOS 67 57     No results for input(s): PH, PO2, PCO2, HCO3, BE, O2SAT in the last 72 hours. No results for input(s): TROPONINI in the last 72 hours. No results for input(s): INR in the last 72 hours. No results for input(s): LACTA in the last 72 hours. Intake/Output Summary (Last 24 hours) at 9/20/2020 1438  Last data filed at 9/20/2020 0925  Gross per 24 hour   Intake 360 ml   Output --   Net 360 ml       No results found.      Assessment and Plan:   1) symptomatic bradycardia  -cards following  -ppm placed 9/17/2020 per dr Edelmira Brink  -avoid offending agents  -h/o cad s/p pci  -h/o moderate to severe as  -h/o prior aaa endovascular repair     2) type 1 odontoid fracture  -s/p fall   -neurosurgery following, since signed off   -maintain hard collar at least 6w  -no plans for surgery     3) esrd  -lue fistula malfunctioning   -vascular surgery placed left femoral non-tunneled hd catheter 9/16/2020  -discussed with vascular surgery, plans for new avf/permacath next week   -hd per renal  -follow renal/fxn/uop/lytes  -avoid offending agents  -follow plt     4) acute cystitis   -unasyn     5) concern for endoleak  -as per ct a/p 9/12/2020: Patent aortoiliac stent graft. The native aortic lumen measures 5.5 cm transversely. There is some contrast enhancement anteriorly in the native lumen suggesting endoleak. I do not see a connecting vessel that is patent at the endoleak.  The superior mesenteric artery and celiac plexus are also covered by the stent graft  -discussed with vascular surgery  -pt unsure where procedure was performed      6) dm2  -meds on board      7) dvt ppx  -heparin     Polo Dempsey MD   9/20/2020 2:38 PM

## 2020-09-20 NOTE — PLAN OF CARE
Problem: Skin Integrity:  Goal: Will show no infection signs and symptoms  Outcome: Ongoing  Goal: Absence of new skin breakdown  Outcome: Ongoing     Problem: Falls - Risk of:  Goal: Will remain free from falls  Outcome: Ongoing  Goal: Absence of physical injury  Outcome: Ongoing     Problem: Pain:  Goal: Pain level will decrease  Outcome: Ongoing  Goal: Control of acute pain  Outcome: Ongoing  Goal: Control of chronic pain  Outcome: Ongoing     Problem: Cardiac:  Goal: Ability to maintain vital signs within normal range will improve  Outcome: Ongoing  Goal: Cardiovascular alteration will improve  Outcome: Ongoing     Problem: Physical Regulation:  Goal: Complications related to the disease process, condition or treatment will be avoided or minimized  Outcome: Ongoing

## 2020-09-20 NOTE — PLAN OF CARE
Problem: Skin Integrity:  Goal: Will show no infection signs and symptoms  Description: Will show no infection signs and symptoms  9/20/2020 1416 by Jh Santiago RN  Outcome: Ongoing  9/20/2020 0547 by Sonia Rajput RN  Outcome: Ongoing  Goal: Absence of new skin breakdown  Description: Absence of new skin breakdown  9/20/2020 1416 by Jh Santiago RN  Outcome: Ongoing  9/20/2020 0547 by Sonia Rajput RN  Outcome: Ongoing     Problem: Falls - Risk of:  Goal: Will remain free from falls  Description: Will remain free from falls  9/20/2020 1416 by Jh Santiago RN  Outcome: Ongoing  9/20/2020 0547 by Sonia Rajput RN  Outcome: Ongoing  Goal: Absence of physical injury  Description: Absence of physical injury  9/20/2020 1416 by Jh Santiago RN  Outcome: Ongoing  9/20/2020 0547 by Sonia Rajput RN  Outcome: Ongoing     Problem: Pain:  Goal: Pain level will decrease  Description: Pain level will decrease  9/20/2020 1416 by Jh Santiago RN  Outcome: Ongoing  9/20/2020 0547 by Sonia Rajput RN  Outcome: Ongoing  Goal: Control of acute pain  Description: Control of acute pain  9/20/2020 1416 by Jh Santiago RN  Outcome: Ongoing  9/20/2020 0547 by Sonia Rajput RN  Outcome: Ongoing  Goal: Control of chronic pain  Description: Control of chronic pain  9/20/2020 1416 by Jh Santiago RN  Outcome: Ongoing  9/20/2020 0547 by Sonia Rajput RN  Outcome: Ongoing     Problem: Cardiac:  Goal: Ability to maintain vital signs within normal range will improve  Description: Ability to maintain vital signs within normal range will improve  9/20/2020 1416 by Jh Santiago RN  Outcome: Ongoing  9/20/2020 0547 by Sonia Rajput RN  Outcome: Ongoing  Goal: Cardiovascular alteration will improve  Description: Cardiovascular alteration will improve  9/20/2020 1416 by Jh Santiago RN  Outcome: Ongoing  9/20/2020 0547 by Sonia Rajput RN  Outcome: Ongoing     Problem: Physical Regulation:  Goal: Complications related to the disease process, condition or treatment will be avoided or minimized  Description: Complications related to the disease process, condition or treatment will be avoided or minimized  9/20/2020 1416 by Jh Santiago RN  Outcome: Ongoing  9/20/2020 0547 by Sonia Rajput RN  Outcome: Ongoing

## 2020-09-20 NOTE — PROGRESS NOTES
Nephrology ([id number]) Progress Note      Patient:  Ramiro Sanchez  YOB: 1945  Date of Service: 9/20/2020  MRN: [id number]   Acct: [de-identified]   Primary Care Physician: Unknown Provider Result (Inactive)  Advance Directive: DNR-CC  Admit Date: 9/12/2020       Hospital Day: 8  Referring Provider: Beatris Oneill MD    Patient independently seen and examined, Chart, Consults, Notes, Operative notes, Labs, Cardiology, and Radiology studies reviewed as available. Chief complaint: End-stage renal disease. Subjective:    Patient is a 78 y. o. man who was admitted after a near syncopal episode when he was on dialysis. He was found bradycardic.  At the emergency department, his heart rate was in the low 30s. He is currently in ICU and he has a temporary transvenous pacemaker. Patient has also reported a fall 2 days ago with head trauma.  CT of his neck shows a type I dens fracture without displacement. He was seen by neurosurgery and has a neck brace. His hemodialysis access has high venous pressure with the recirculation problem. Vascular surgery consultation to fix his AV fistula still pending. On September 16, he underwent femoral line placement for dialysis access. On September 17, he underwent permanent pacemaker implant. Transvenous pacemaker has been removed. Patient will get a new permacatheter versus angioplasty of clotted fistula.     Sulfa antibiotics    Medicines:  Current Facility-Administered Medications   Medication Dose Route Frequency Provider Last Rate Last Dose    chlorhexidine (HIBICLENS) 4 % liquid   Topical BID JUNAID Hoover        [START ON 9/21/2020] vancomycin (VANCOCIN) 750 mg in dextrose 5 % 250 mL IVPB  750 mg Intravenous [id number] [facility]        darbepoetin umesh-polysorbate [facility]) injection 100 mcg  100 mcg Subcutaneous Weekly - Monday Melanie Edmonds MD   100 mcg at 09/17/20 1113    sodium chloride flush 0.9 % injection 10 mL  10 mL Intravenous 2 times per day Arthur Kurtz MD   10 mL at 09/20/20 0819    sodium chloride flush 0.9 % injection 10 mL  10 mL Intravenous PRN Arthur Kurtz MD        ampicillin-sulbactam (UNASYN) 1.5 g IVPB minibag  1.5 g Intravenous Q12H Jb Bridges  mL/hr at 09/20/20 0818 1.5 g at 09/20/20 0818    heparin (porcine) injection 5,000 Units  5,000 Units Subcutaneous 3 times per day Celester Chard, DO   5,000 Units at 09/20/20 5178    docusate sodium (COLACE) capsule 100 mg  100 mg Oral BID PRN Celester Chard, DO   100 mg at 09/15/20 1733    hydrALAZINE (APRESOLINE) injection 10 mg  10 mg Intravenous Q4H PRN Celester Chard, DO   10 mg at 09/15/20 2210    oxyCODONE (ROXICODONE) immediate release tablet 5 mg  5 mg Oral Q4H PRN Celester Chard, DO   5 mg at 09/19/20 2120    ipratropium (ATROVENT) 0.02 % nebulizer solution 0.5 mg  0.5 mg Nebulization 4x daily Celester Chard, DO   0.5 mg at 09/20/20 0636    sodium chloride flush 0.9 % injection 10 mL  10 mL Intravenous 2 times per day Celester Chard, DO   10 mL at 09/18/20 2045    sodium chloride flush 0.9 % injection 10 mL  10 mL Intravenous PRN Celester Chard, DO   10 mL at 09/15/20 2315    acetaminophen (TYLENOL) tablet 650 mg  650 mg Oral Q6H PRN Celester Chard, DO   650 mg at 09/15/20 0716    Or    acetaminophen (TYLENOL) suppository 650 mg  650 mg Rectal Q6H PRN Celester Chard, DO        magnesium hydroxide (MILK OF MAGNESIA) 400 MG/5ML suspension 30 mL  30 mL Oral Daily PRN Celester Chard, DO        promethazine (PHENERGAN) tablet 12.5 mg  12.5 mg Oral Q6H PRN Celester Chard, DO        Or    ondansetron [facility]) injection 4 mg  4 mg Intravenous Q6H PRN Celester Chard, DO   4 mg at 09/17/20 1054    morphine injection 2 mg  2 mg Intravenous Q4H PRN Mary Garsia DO   2 mg at 09/17/20 2230    glucose (GLUTOSE) 40 % oral gel 15 g  15 g Oral PRN Mary Garsia DO        dextrose 50 % IV solution  12.5 g Intravenous PRN Evy Senthilcarmen, DO   12.5 g at 09/15/20 0415    glucagon (rDNA) injection 1 mg  1 mg Intramuscular PRN Evy Jaksch, DO        dextrose 5 % solution  100 mL/hr Intravenous PRN Evy Jaksch, DO        insulin lispro (HUMALOG) injection vial 0-6 Units  0-6 Units Subcutaneous TID [facility] Evy Jaksch, DO   1 Units at 09/19/20 1812    insulin lispro (HUMALOG) injection vial 0-3 Units  0-3 Units Subcutaneous Nightly Evy Senthilcarmen, DO   2 Units at 09/19/20 2128       Past Medical History:  Past Medical History:   Diagnosis Date    Anemia in chronic kidney disease (CODE)     Atherosclerotic heart disease     Bladder [address] ([address]     CAD (coronary artery disease)     Cancer ([address]     Chronic kidney disease     COPD (chronic obstructive pulmonary disease) (HCC)     Diabetes mellitus (HCC)     End stage renal disease (HCC)     Enterocolitis due to Clostridioides difficile     GERD (gastroesophageal reflux disease)     Hemodialysis patient ([address]     Hyperlipidemia     Hypertension     Other chronic pain     Palliative care patient 09/15/2020    Prostate cancer ([address]     Vitamin D deficiency        Past Surgical History:  Past Surgical History:   Procedure Laterality Date    ABDOMEN SURGERY      esophageal CA, used part of stomach     APPENDECTOMY      [facility]    CARDIAC CATHETERIZATION      [facility], had stents, unknown details    URETEROSTOMY         Family History  Family History   Problem Relation Age of Onset    Diabetes Mother        Social History  Social History     Socioeconomic History    Marital status:      Spouse name: Not on file    Number of children: Not on file    Years of education: Not on file    Highest education level: Not on file   Occupational History    Not on file   Social Needs    Financial resource strain: Not on file    Food insecurity     Worry: Not on file     Inability: Not on file    Transportation needs     Medical: Not on file     Non-medical: Not on file   Tobacco Use    Smoking status: Current Every Day Smoker     Packs/day: 2.00     Years: 55.00     Pack years: 110.00    Smokeless tobacco: Current User   Substance and Sexual Activity    Alcohol use: No    Drug use: No    Sexual activity: Not Currently   Lifestyle    Physical activity     Days per week: Not on file     Minutes per session: Not on file    Stress: Not on file   Relationships    Social connections     Talks on phone: Not on file     Gets together: Not on file     Attends Congregational service: Not on file     Active member of club or organization: Not on file     Attends meetings of clubs or organizations: Not on file     Relationship status: Not on file    Intimate partner violence     Fear of current or ex partner: Not on file     Emotionally abused: Not on file     Physically abused: Not on file     Forced sexual activity: Not on file   Other Topics Concern    Not on file   Social History Narrative    Not on file         Review of Systems:  History obtained from chart review and the patient  General ROS: No fever or chills  Respiratory ROS: positive for - shortness of breath  Cardiovascular ROS: No chest pain or palpitations  Gastrointestinal ROS: No abdominal pain or melena  Genito-Urinary ROS: No dysuria or hematuria  Musculoskeletal ROS: No joint pain or swelling   14 point ROS reviewed with the patient and negative except as noted above and in the HPI unless unable to obtain.     Objective:  Patient Vitals for the past 24 hrs:   BP Temp Temp src Pulse Resp SpO2   09/20/20 0720 110/68 96.6 °F (35.9 °C) Temporal 88 15 98 %   09/20/20 0046 117/68 97.5 °F (36.4 °C) Temporal 78 16 92 %   09/19/20 1852 (!) 125/59 97.4 °F (36.3 °C) Temporal 76 16 93 %   09/19/20 1513 (!) 121/53 97.7 °F (36.5 °C) Temporal 71 18 95 %       Intake/Output Summary (Last 24 hours) at 9/20/2020 1017  Last data filed at 9/20/2020 9014  Gross per 24 hour   Intake 720 ml   Output 250 ml   Net 470 ml     General: awake/alert   HEENT: Normocephalic atraumatic head  Neck: Supple/neck collar. Chest:  clear to auscultation bilaterally  CVS: regular rate and rhythm  Abdominal: soft, nontender, normal bowel sounds  Extremities: no cyanosis or edema  Skin: warm and dry without rash      Labs:  BMP:   Recent Labs     09/18/20 0125 09/19/20 0321 09/20/20 0347    140 139   K 4.2 4.1 4.2   CL 96* 100 101   CO2 19* 22 26   PHOS 4.8*  --  3.8   BUN 28* 44* 34*   CREATININE 3.1* 4.2* 3.1*   CALCIUM 8.5* 8.6* 8.5*     CBC:   Recent Labs     09/18/20 0125 09/19/20 0321 09/20/20 0347   WBC 5.8 4.9 4.4*   HGB 10.0* 9.7* 9.4*   HCT 32.3* 31.9* 29.8*   .6* 103.9* 97.7*    156 94*     LIVER PROFILE:   Recent Labs     09/18/20 0125 09/20/20 0347   AST 11 8   ALT 10 <5*   BILITOT <0.2 <0.2   ALKPHOS 67 57     PT/INR:   No results for input(s): PROTIME, INR in the last 72 hours. APTT: No results for input(s): APTT in the last 72 hours. BNP:  No results for input(s): BNP in the last 72 hours. Ionized Calcium:No results for input(s): IONCA in the last 72 hours. Magnesium:  Recent Labs     09/18/20  0125 09/19/20 0321 09/20/20 0347   MG 2.1 2.3 2.3     Phosphorus:  Recent Labs     09/18/20 0125 09/20/20 0347   PHOS 4.8* 3.8     HgbA1C: No results for input(s): LABA1C in the last 72 hours. Hepatic:   Recent Labs     09/18/20  0125 09/20/20 0347   ALKPHOS 67 57   ALT 10 <5*   AST 11 8   PROT 5.8* 5.3*   BILITOT <0.2 <0.2   LABALBU 3.5 3.1*     Lactic Acid: No results for input(s): LACTA in the last 72 hours. Troponin: No results for input(s): CKTOTAL, CKMB, TROPONINT in the last 72 hours. ABGs: No results for input(s): PH, PCO2, PO2, HCO3, O2SAT in the last 72 hours. CRP:  No results for input(s): CRP in the last 72 hours. Sed Rate:  No results for input(s): SEDRATE in the last 72 hours.       Cultures:   No results for input(s): CULTURE in the last 72 hours. No results for input(s): BC, Uri Devang in the last 72 hours. No results for input(s): CXSURG in the last 72 hours. Radiology reports as per the Radiologist  Radiology: Ct Head Wo Contrast    Result Date: 9/12/2020  EXAMINATION:  CT HEAD WO CONTRAST  9/12/2020 12:53 PM HISTORY: The patient fell. Rule out intracranial injury. TECHNIQUE: Multiple axial images were obtained through the brain without contrast infusion. Multiplanar images were reconstructed. DLP: 675 mGy-cm. Automated exposure control was utilized. COMPARISON: No comparison study. FINDINGS: There are no hemorrhage, edema or mass effect. There is moderate to severe atrophy. There is moderate to severe dilatation of the lateral and third ventricles. There is low-density in the hemispheric white matter that is nonspecific. The visualized paranasal sinuses and mastoid air cells are clear. No calvarial fracture is seen. There is biparietal calvarial thinning. 1. No hemorrhage, edema or mass effect. 2. Moderate to severe atrophy. Moderate to severe dilatation of the lateral and third ventricles is likely related. Hydrocephalus is not excluded. 3. Low-density in the hemispheric white matter is nonspecific and most likely due to chronic small vessel disease. The full report of this exam was immediately signed and available to the emergency room. The patient is currently in the emergency room. Signed by Dr Natasha Langley on 9/12/2020 12:56 PM    Ct Chest W Contrast    Result Date: 9/12/2020  EXAMINATION:  CT CHEST W CONTRAST  9/12/2020 1:11 PM HISTORY: The patient fell. Anterior chest skin tear. Hypotension during dialysis. Normal chest x-ray. COMPARISON: No comparison study. DLP: 320 mGy-cm. Automated exposure control was utilized. TECHNIQUE: Spiral CT was performed of the chest with contrast. Multiplanar images were reconstructed.  MEDIASTINUM/VASCULAR: There is atheromatous calcification of the thoracic aorta and coronary arteries. Heart size is upper limits of normal. Pulmonary arteries are normal in caliber. There are no enlarged mediastinal lymph nodes. There is a moderate size hiatal hernia. There is wall thickening of the esophagus in the chest diffusely. LUNGS: There is centrilobular emphysema. There is paraseptal emphysema. There are few scattered calcified granulomas. There is no airspace consolidation or pleural effusion. There is no pneumothorax or lung contusion. BONES AND SOFT TISSUES: The clavicles are intact. The scapulae are intact. No definite rib fracture is seen. No visible acute thoracic spine fracture is seen. There are some degenerative changes of the spine. The bones are demineralized. The sternum appears to be intact. UPPER ABDOMEN: Please see the abdomen and pelvis CT report separately. 1. No evidence of pneumothorax or lung contusion. No mediastinal hematoma. 2. Atheromatous disease of the thoracic aorta and coronary arteries. Heart size is prominent. 3. Moderate size hiatal hernia. Diffuse thickening of the wall of the thoracic esophagus. Correlate with any history of esophagitis and reflux disease. 4. Centrilobular and paraseptal emphysema. Old granulomatous disease. 5. Demineralized bones. No definite acute fracture. The full report of this exam was immediately signed and available to the emergency room. The patient is currently in the emergency room. Signed by Dr Bk Kessler on 9/12/2020 1:16 PM    Ct Cervical Spine Wo Contrast    Result Date: 9/12/2020  EXAMINATION:  CT CERVICAL SPINE WO CONTRAST  9/12/2020 1:17 PM HISTORY: The patient fell. Rule out fracture. No x-rays obtained. TECHNIQUE: Spiral CT was performed of the cervical spine. Sagittal and coronal images were reconstructed. COMPARISON: No comparison study. DLP: 260 mGy-cm. Automated exposure control was utilized. FINDINGS: There is fairly prominent pannus formation along the posterior aspect of the dens.  There is a fairly large dens erosion located posteriorly. There is a nondisplaced fracture line anteriorly that is likely related to the weakening of the dens related to the erosion. Therefore, this is likely a pathologic fracture. This is fairly high on the dens and I will classify this as a type I injury. There is no displacement. There is no disruption of the anterior vertebral line or the posterior spinolaminar line. No other cervical spine fracture is identified. There is fairly severe carotid artery calcification in the neck bilaterally. There is vertebral artery calcification on the left. At C2-3, there is moderate to severe disc narrowing. There is severe facet arthropathy. There is mild to moderate foraminal narrowing bilaterally. No central spinal canal narrowing. At C3-4, there is moderate disc narrowing. There is minimal anterior subluxation of C3 compared to C4. There is bilateral uncinate spurring and bilateral facet arthropathy. There is severe bilateral foraminal stenosis at this level. At C4-5, there is moderate disc narrowing. There is uncinate spurring and facet arthropathy. The right-sided facet joint is fused. There is severe right and moderate left-sided foraminal stenosis. At C5-6, there is disc narrowing with spondylitic and uncinate spurring. There is bilateral facet arthropathy. There is no significant central spinal canal narrowing. There is mild to moderate bilateral foraminal stenosis. At C6-7, there is moderate to severe disc narrowing. There is spondylitic and uncinate spurring. There is anterior spurring. There is facet arthropathy left greater than right. There is mild narrowing of the central spinal canal at this level. There is moderate right and mild left foraminal stenosis. At C7-T1, there is moderate to severe disc narrowing. There is mild anterior subluxation of C7 compared to T1. There is some scoliosis of the cervical-thoracic Junction in this area.  This causes moderate to severe left-sided foraminal stenosis at this level. There is disc degeneration at multiple thoracic disc levels included on the cervical spine CT. No significant spinal or foraminal stenosis is visualized. 1. Type I dens fracture without displacement. There is a fairly large erosion in the posterior dens related to pannus formation. The type one fracture is seen best on the sagittal images anterior to the erosion. Therefore, this is likely a pathologic fracture given the presence of the erosion. 2. Degenerative changes throughout the cervical spine. 3. Dense carotid artery calcification in the neck. There is also some calcification involving the left vertebral artery in the neck. Results of the dens fracture were called to Dr. Melissa Quintana in the emergency room at 1:30 PM on 9/12/2020. Signed by Dr Ghulam Martinez on 9/12/2020 1:31 PM    Ct Abdomen Pelvis W Iv Contrast Additional Contrast? None    Result Date: 9/12/2020  EXAMINATION:  CT ABDOMEN PELVIS W IV CONTRAST  9/12/2020 1:00 PM HISTORY: The patient fell. Epigastric pain. Prior surgical resection of the urinary bladder and appendectomy. TECHNIQUE: Spiral CT was performed of the abdomen and pelvis with contrast. Multiplanar images were reconstructed. DLP: 320 mGy-cm. Automated exposure control was utilized. COMPARISON: No comparison study. LUNG BASES: The lung bases are clear. There is a moderate size hiatal hernia. LIVER AND SPLEEN: Prior cholecystectomy. There is intrahepatic and extrahepatic biliary tree dilatation probably related to reservoir effect. No visible obstructing stone is seen. The liver is otherwise unremarkable. The spleen is normal in size. PANCREAS: There is no pancreatic mass or inflammatory change. KIDNEYS AND ADRENALS: The adrenal glands are normal in size. There is cortical thinning of both kidneys. Both kidneys are small in size. The left kidney measures 5.8 cm and the right kidney measures 5.7 cm. The urinary bladder is surgically absent.  There is an aortoiliac stent graft. The graft extends above the origins of both renal arteries. It also covers the origins of the celiac plexus and superior mesenteric arteries. The native aortic lumen measures 5.5 cm in transverse diameter. There is mild enhancement within the native lumen anteriorly suggesting an endoleak. The aortoiliac stent graft is patent. BOWEL: No oral contrast was given. There is underdistention of the stomach. Small bowel loops are nondilated. There is an ostomy in the right mid to lower abdomen likely related to the history of ureteral diversion. Some of the colon is underdistended. There is moderate to large stool in the colon. OTHER: There are degenerative changes of the spine. 1. Prior cholecystectomy. Intrahepatic and extrahepatic biliary tree dilatation is likely related to reservoir effect. No visible calcified stone is seen within the ductal system. 2. Diffuse cortical thinning of both kidneys. The kidneys are small in size. The left kidney measures 5.8 cm on the right kidney measures 5.7 cm. The renal arteries are covered by the aortoiliac stent graft. 3. Prior bladder resection. An ostomy in the right lower abdomen/upper pelvis is likely a ureteral diversion. 4. Patent aortoiliac stent graft. The native aortic lumen measures 5.5 cm transversely. There is some contrast enhancement anteriorly in the native lumen suggesting endoleak. I do not see a connecting vessel that is patent at the endoleak. The superior mesenteric artery and celiac plexus are also covered by the stent graft. 5. No definite acute bowel abnormality. No oral contrast was given and therefore evaluation is very limited. The patient also has very little intra-abdominal fat. The full report of this exam was immediately signed and available to the emergency room. The patient is currently in the emergency room.  Signed by Dr Zandra Goltz on 9/12/2020 1:10 PM    Xr Chest Portable    Result Date: 9/12/2020  EXAMINATION:  XR CHEST PORTABLE 9/12/2020 12:13 PM HISTORY: The patient fell. Chest pain. COMPARISON: 3/23/2016. FINDINGS:  There is no evidence of lung contusion or pneumothorax. There is no mediastinal widening. The heart is normal in size. The thoracic aorta is atheromatous. There is no dense infiltrate or effusion. There is mild biapical scarring. 1. Mild chronic changes. 2. No evidence of lung contusion, pneumothorax or mediastinal widening. 3. The patient has a chest CT already scheduled. Please see that report separately. Signed by Dr Mauro Massey on 9/12/2020 12:14 PM       Assessment   1. End-stage renal disease/maintenance dialysis TTS. 2.  Status post fall and neck fracture. 3.  Severe bradycardia/recurrent syncopal episode. 4.  Anemia of chronic kidney disease. 5.  Type II diabetic nephropathy. 6.  Secondary hyperparathyroidism. 7.  Hyperphosphatemia. 8.  Urinary tract infection. 9.  Clotted AV fistula. 10.  Status post permanent pacemaker placement. Plan:  1. Tolerating dialysis very well. 2.  Fistulogram/permacatheter next week. 3.  Pain medications as needed.       Shahram Antonio MD  09/20/20  10:17 AM

## 2020-09-21 NOTE — OP NOTE
Operative Note      Patient: Bartolo Whitaker  YOB: 1945  MRN: [id number]    Date of Procedure: 9/21/2020    Pre-Op Diagnosis: T82.590A    Post-Op Diagnosis: Same       Procedure(s):  CREATION RIGHT AV GRAFT    Surgeon(s):  Gavino Navarro DO    Assistant:   * No surgical staff found *    Anesthesia: General    Estimated Blood Loss (mL): less than 50     Complications: None    Specimens:   * No specimens in log *    Implants:  Implant Name Type Inv. Item Serial No.  Lot No. LRB No. Used Action   GRAFT VASC ACUSEAL TAPRD [id number] - [id number] Vascular/Graft/Patch/Filter GRAFT VASC ACUSEAL TAPRD [id number]  [employer]  Right 1 Implanted         Drains:   [REMOVED] Nephrostomy Right (Removed)       [REMOVED] Urostomy RLQ (Removed)   Stomal Appliance Clean; Intact;Dry 09/21/20 1100   Stoma  Assessment [place] 09/21/20 1100   Peristomal Assessment Clean; Intact 09/21/20 1100   Urine Color Yellow 09/21/20 1100   Urine Appearance Cloudy 09/20/20 2250   Urine Odor Malodorous 09/20/20 2100   Output (ml) 250 ml 09/21/20 0415       Findings: good thrill in the AV graft and palpable radial pulse with biphasic doppler signal      Procedure in detail:    After the Patient consented and was given IV antibiotics and a block was performed by anesthesia, He was brought to the operating room and placed in supine position. General anesthesia was administered and the patients's right arm was prepped and draped in the usual sterile procedure. A longitudinal incision is made in the distal upper arm over the brachial artery with a scalpel. This is carried down through subcutaneous tissue with sharp dissection. Vesseloops were placed proximally and distally for future vascular control. An incision was made in the medial proximal arm with the scalpel over the axillary vein. This was carried through subcutaneous tissue with Bovie electrocautery.   The proximal brachial and axillary veins were then controlled with vessel loops for further vascular control. The 4-7 Acuseal early access graft was brought up into the field. It was soaked in the 60ml of Rifampin solution for 15 min. It was then tunneled subcutaneously from the axillary wound to the brachial wound. The patient was then given intravenous heparin. Vessel loops on the artery were tightened on the vein. A longitudinal arteriotomy was made with an 11 blade and Kumar scissors. The graft's 4mm end was tapered. An qmg-udqdjqrli-pm-side-artery anastomosis was created with 6-0 Prolene running suture. Prior to completing the anastomosis, a graft clamp was place. Flow was restored to our hand. The vessels loops were then tightened over axillary vein and proximal control was gained using Statinsky clamp. A longitudinal venotomy was made with a 11 blade and Kumar scissors. The graft was cut to length and tapered, and then nzq-quwtp-ll-side-vein anastomosis was created with 6-0 Prolene running suture. Prior to completing this anastomosis, standard flushing techniques are used to remove any air and debris from the native vessels and flwo was restored through the graft. There were doppler signals in the radial artery. The patient has a palpable thrill and there is no tension on the arteriovenous anastomosis and hemostasis is excellent. The wound is closed in layers with 3-0 vicryl subcutaneous sutures, 4-0 Monocryl. Steri strips and sterile dressing applied. The patient tolerated the procedure well. He is brought to the recovery room in good condition. The graft is ready for access.         Electronically signed by Emily Maldonado DO on 9/21/2020 at 6:31 PM

## 2020-09-21 NOTE — ANESTHESIA PRE PROCEDURE
Department of Anesthesiology  Preprocedure Note       Name:  Dilia Wilson   Age:  67 y.o.  :  1945                                          MRN:  [id number]         Date:  2020      Surgeon: Uma Sethi):  Altamease Goldberg, DO    Procedure: Procedure(s):  CREATION RIGHT AV GRAFT    Medications prior to admission:   Prior to Admission medications    Medication Sig Start Date End Date Taking? Authorizing Provider   insulin lispro (HUMALOG) 100 UNIT/ML injection vial Inject into the skin See Admin Instructions Sliding Scale BID   Yes Historical Provider, MD   amiodarone (CORDARONE) 200 MG tablet Take 200 mg by mouth daily    Historical Provider, MD   Ferric Citrate (AURYXIA) 1  MG(Fe) TABS Take 2 tablets by mouth 3 times daily     Historical Provider, MD   Hypromellose (ISOPTO TEARS OP) Apply to eye    Historical Provider, MD   isosorbide dinitrate (ISORDIL) 10 MG tablet Take 10 mg by mouth 3 times daily    Historical Provider, MD   insulin glargine (LANTUS) 100 UNIT/ML injection vial Inject 12 Units into the skin nightly     Historical Provider, MD   midodrine (PROAMATINE) 5 MG tablet Take 5 mg by mouth 3 times daily    Historical Provider, MD   oxyCODONE 5 MG capsule Take 5 mg by mouth every 6 hours as needed for Pain.      Historical Provider, MD   budesonide-formoterol (SYMBICORT) 160-4.5 MCG/ACT AERO Inhale 2 puffs into the lungs 2 times daily     Historical Provider, MD   famotidine (PEPCID) 20 MG tablet Take 20 mg by mouth daily    Historical Provider, MD   acetaminophen 650 MG TABS Take 650 mg by mouth every 4 hours as needed  Patient taking differently: Take 650 mg by mouth every 6 hours as needed for Pain  3/25/16   Crystal Watters MD   aspirin 81 MG chewable tablet Take 81 mg by mouth daily    Historical Provider, MD   atorvastatin (LIPITOR) 20 MG tablet Take 20 mg by mouth daily     Historical Provider, MD   vitamin D 1000 UNITS CAPS Take 3 capsules by mouth daily     Historical Provider, MD   metoprolol (LOPRESSOR) 25 MG tablet Take 25 mg by mouth every other day MON, WED, FRI, SUN    Historical Provider, MD       Current medications:    Current Facility-Administered Medications   Medication Dose Route Frequency Provider Last Rate Last Dose    0.45 % sodium chloride infusion   Intravenous Continuous Danni Farah  mL/hr at 09/21/20 1416      midazolam (VERSED) injection 2 mg  2 mg Intravenous Once Danni Farah MD       [employer] [facility] Hold] vancomycin (VANCOCIN) 750 mg in dextrose 5 % 250 mL IVPB  750 mg Intravenous On Call to [id number] [facility] [place], APRN        [MAR Hold] darbepoetin umesh-polysorbate [facility]) injection 100 mcg  100 mcg Subcutaneous Weekly - Monday Lesli Negron MD   100 mcg at 09/21/20 1100    [MAR Hold] sodium chloride flush 0.9 % injection 10 mL  10 mL Intravenous 2 times per day Jackelyn Allen MD   10 mL at 09/21/20 1100    [MAR Hold] sodium chloride flush 0.9 % injection 10 mL  10 mL Intravenous PRN Jackelyn Allen MD        [facility] Hold] ampicillin-sulbactam (UNASYN) 1.5 g IVPB minibag  1.5 g Intravenous Q12H Jesus August MD   Stopped at 09/21/20 1153    [MAR Hold] heparin (porcine) injection 5,000 Units  5,000 Units Subcutaneous 3 times per day Markie Tee, DO   5,000 Units at 09/21/20 0630    [MAR Hold] docusate sodium (COLACE) capsule 100 mg  100 mg Oral BID PRN Markie Tee, DO   100 mg at 09/20/20 2237    [MAR Hold] hydrALAZINE (APRESOLINE) injection 10 mg  10 mg Intravenous Q4H PRN Markie Tee, DO   10 mg at 09/15/20 2210    [MAR Hold] oxyCODONE (ROXICODONE) immediate release tablet 5 mg  5 mg Oral Q4H PRN Markie Tee, DO   5 mg at 09/20/20 2237    [MAR Hold] ipratropium (ATROVENT) 0.02 % nebulizer solution 0.5 mg  0.5 mg Nebulization 4x daily Markie Tee, DO   0.5 mg at 09/21/20 1109    [MAR Hold] sodium chloride flush 0.9 % injection 10 mL  10 mL Intravenous 2 times per day Markie Tee DO   10 mL at 09/18/20 7568   [facility] Hold] sodium chloride flush 0.9 % injection 10 mL  10 mL Intravenous PRN Parker Revel, DO   10 mL at 09/15/20 2315    [MAR Hold] acetaminophen (TYLENOL) tablet 650 mg  650 mg Oral Q6H PRN Parker Revel, DO   650 mg at 09/15/20 0716    Or    [MAR Hold] acetaminophen (TYLENOL) suppository 650 mg  650 mg Rectal Q6H PRN Parker Revel, DO        [facility] Hold] magnesium hydroxide (MILK OF MAGNESIA) 400 MG/5ML suspension 30 mL  30 mL Oral Daily PRN Jarod Revel, DO        [facility] Hold] promethazine (PHENERGAN) tablet 12.5 mg  12.5 mg Oral Q6H PRN Parker Revel, DO        Or    [facility] Hold] ondansetron [facility]) injection 4 mg  4 mg Intravenous Q6H PRN Parker Revel, DO   4 mg at 09/17/20 1054    [MAR Hold] morphine injection 2 mg  2 mg Intravenous Q4H PRN Jarod Revel, DO   2 mg at 09/17/20 2230    [MAR Hold] glucose (GLUTOSE) 40 % oral gel 15 g  15 g Oral PRN Parker Revel, DO        [facility] Hold] dextrose 50 % IV solution  12.5 g Intravenous PRN Jarod Revel, DO   12.5 g at 09/15/20 0415    [MAR Hold] glucagon (rDNA) injection 1 mg  1 mg Intramuscular PRN Parker Revel, DO        [facility] Hold] dextrose 5 % solution  100 mL/hr Intravenous PRN Jarod Revel, DO        [facility] Hold] insulin lispro (HUMALOG) injection vial 0-6 Units  0-6 Units Subcutaneous TID [facility] Jarod Revel, DO   1 Units at 09/20/20 1758    [facility] Hold] insulin lispro (HUMALOG) injection vial 0-3 Units  0-3 Units Subcutaneous Nightly Parker Revel, DO   1 Units at 09/20/20 2214       Allergies:     Allergies   Allergen Reactions    Sulfa Antibiotics Other (See Comments)     diarrhea       Problem List:    Patient Active Problem List   Diagnosis Code    Complicated UTI (urinary tract infection) N39.0    Sepsis ([facility] [address] A41.9    JOY (acute kidney injury) ([facility] [address] N17.9    Hypokalemia E87.6    Lower abdominal pain R10.30    Hydronephrosis of right kidney N13.30    Diabetes mellitus ([facility] [address] E11.9    COPD (chronic obstructive pulmonary disease) (HCC) J44.9    CAD (coronary artery disease) I25.10    Coronary artery disease involving native heart without angina pectoris I25.10    Chronic obstructive pulmonary disease (HCC) J44.9    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    Essential hypertension I10    Bradycardia R00.1    Palliative care patient Z51.5    Dialysis AV fistula malfunction (HCC) T82.590A    Hypotensive episode I95.9    Severe malnutrition (HCC) E43       Past Medical History:        Diagnosis Date    Anemia in chronic kidney disease (CODE)     Atherosclerotic heart disease     Bladder cancer (HCC)     CAD (coronary artery disease)     Cancer (HCC)     Chronic kidney disease     COPD (chronic obstructive pulmonary disease) (HCC)     Diabetes mellitus (HCC)     End stage renal disease (HCC)     Enterocolitis due to Clostridioides difficile     GERD (gastroesophageal reflux disease)     Hemodialysis patient ([facility] [address]     Hyperlipidemia     Hypertension     Other chronic pain     Palliative care patient 09/15/2020    Prostate cancer ([facility] [address]     Vitamin D deficiency        Past Surgical History:        Procedure Laterality Date    ABDOMEN SURGERY      esophageal CA, used part of stomach     APPENDECTOMY      [facility]    CARDIAC CATHETERIZATION      [facility], had stents, unknown details    URETEROSTOMY         Social History:    Social History     Tobacco Use    Smoking status: Current Every Day Smoker     Packs/day: 2.00     Years: 55.00     Pack years: 110.00    Smokeless tobacco: Current User   Substance Use Topics    Alcohol use:  No                                Ready to quit: Not Answered  Counseling given: Not Answered      Vital Signs (Current):   Vitals:    09/21/20 0009 09/21/20 0654 09/21/20 0740 09/21/20 1213   BP: 108/63 117/65  117/65   Pulse: 94 86  86   Resp: 16 18     Temp: 97.3 °F (36.3 °C) 97.5 °F (36.4 °C)     TempSrc: Temporal Temporal     SpO2: 93% 99%     Weight:   109 lb 9 oz (49.7 kg)    Height:                                                  BP Readings from Last 3 Encounters:   09/21/20 117/65   03/25/16 127/78       NPO Status: Time of last liquid consumption: 2350                        Time of last solid consumption: 2350                        Date of last liquid consumption: 09/20/20                        Date of last solid food consumption: 09/20/20    BMI:   Wt Readings from Last 3 Encounters:   09/21/20 109 lb 9 oz (49.7 kg)   03/25/16 113 lb 12.8 oz (51.6 kg)     Body mass index is 17.16 kg/m².     CBC:   Lab Results   Component Value Date    WBC 4.9 09/21/2020    RBC 2.99 09/21/2020    HGB 9.4 09/21/2020    HCT 29.2 09/21/2020    MCV 97.7 09/21/2020    RDW 14.6 09/21/2020     09/21/2020       CMP:   Lab Results   Component Value Date     09/21/2020    K 4.3 09/21/2020    K 4.6 09/14/2020     09/21/2020    CO2 23 09/21/2020    BUN 47 09/21/2020    CREATININE 4.1 09/21/2020    GFRAA 17 09/21/2020    LABGLOM 14 09/21/2020    GLUCOSE 137 09/21/2020    PROT 5.4 09/21/2020    CALCIUM 8.4 09/21/2020    BILITOT <0.2 09/21/2020    ALKPHOS 57 09/21/2020    AST 8 09/21/2020    ALT <5 09/21/2020       POC Tests:   Recent Labs     09/21/20  1122   POCGLU 154*       Coags:   Lab Results   Component Value Date    PROTIME 14.5 09/13/2020    INR 1.13 09/13/2020    APTT 32.8 03/19/2016       HCG (If Applicable): No results found for: PREGTESTUR, PREGSERUM, HCG, HCGQUANT     ABGs: No results found for: PHART, PO2ART, WDN4WZG, LQP4UTJ, BEART, Q3AAIBQR     Type & Screen (If Applicable):  No results found for: LABABO, LABRH    Drug/Infectious Status (If Applicable):  No results found for: HIV, HEPCAB    COVID-19 Screening (If Applicable):   Lab Results   Component Value Date    COVID19 Not Detected 09/16/2020         Anesthesia Evaluation  Patient summary reviewed and Nursing notes reviewed no history of anesthetic complications:   Airway: Mallampati: II  TM distance: >3 FB   Neck ROM: full   Dental:          Pulmonary:   (+) COPD:                             Cardiovascular:    (+) hypertension:, pacemaker:, CAD:,       ECG reviewed               Beta Blocker:  Not on Beta Blocker         Neuro/Psych:      (-) seizures and CVA           GI/Hepatic/Renal:   (+) GERD: well controlled, renal disease: ARF,           Endo/Other:    (+) DiabetesType II DM, , .                 Abdominal:           Vascular: negative vascular ROS. Anesthesia Plan      general     ASA 4       Induction: intravenous. MIPS: Postoperative opioids intended and Prophylactic antiemetics administered. Anesthetic plan and risks discussed with patient.                       Terry Dupree MD   9/21/2020

## 2020-09-21 NOTE — PROGRESS NOTES
Nephrology ([address] [facility]) Progress Note    Patient:  Steven Cummings  YOB: 1945  Date of Service: 9/21/2020  MRN: [id number]   Acct: [de-identified]   Primary Care Physician: Unknown Provider Result (Inactive)  Advance Directive: DNR-CC  Admit Date: 9/12/2020       Hospital Day: 9  Referring Provider: Davin Aranda MD    Patient independently seen and examined, Chart, Consults, Notes, Operative notes, Labs, Cardiology, and Radiology studies reviewed as available. Subjective:  Steven Cummings is a 67 y.o. male  whom we were consulted for ESRD. He was admitted after a near syncopal episode when he was on dialysis. He was found bradycardic.  At the emergency department, his heart rate was in the low 30s. He was admitted to the ICU and had a temporary transvenous pacemaker. Patient has also reported a fall 2 days ago with head trauma.  CT of his neck shows a type I dens fracture without displacement. He was seen by neurosurgery and had a neck brace. His hemodialysis access had high venous pressure with the recirculation problem. On September 16, he underwent femoral line placement for dialysis access. On September 17, he underwent permanent pacemaker implant. Transvenous pacemaker was removed. Today, no overnight events. Vascular surgery considering quick access graft placement to avoid femoral PermCath. Patient chronically hard of hearing but denied specific complaints including chest pain, shortness of air, nausea vomiting.       Allergies:  Sulfa antibiotics    Medicines:  Current Facility-Administered Medications   Medication Dose Route Frequency Provider Last Rate Last Dose    chlorhexidine (HIBICLENS) 4 % liquid   Topical BID JUNAID Olivarez        vancomycin (VANCOCIN) 750 mg in dextrose 5 % 250 mL IVPB  750 mg [facility]venous [facility] [facility]        darbepoetin umesh-polysorbate [facility]) injection 100 mcg  100 mcg Subcutaneous Weekly - Monday Abad Patel MD   100 mcg at 09/17/20 1113    sodium chloride flush 0.9 % injection 10 mL  10 mL Intravenous 2 times per day Qi Reis MD   10 mL at 09/20/20 2100    sodium chloride flush 0.9 % injection 10 mL  10 mL Intravenous PRN Qi Reis MD        ampicillin-sulbactam (UNASYN) 1.5 g IVPB minibag  1.5 g Intravenous Q12H Sharee Edmonds MD   Stopped at 09/20/20 2130    heparin (porcine) injection 5,000 Units  5,000 Units Subcutaneous 3 times per day Cecilia Haw, DO   5,000 Units at 09/21/20 0630    docusate sodium (COLACE) capsule 100 mg  100 mg Oral BID PRN Cecilia Haw, DO   100 mg at 09/20/20 2237    hydrALAZINE (APRESOLINE) injection 10 mg  10 mg Intravenous Q4H PRN [place] Haw, DO   10 mg at 09/15/20 2210    oxyCODONE (ROXICODONE) immediate release tablet 5 mg  5 mg Oral Q4H [facility]   5 mg at 09/20/20 2237    ipratropium (ATROVENT) 0.02 % nebulizer solution 0.5 mg  0.5 mg Nebulization 4x daily [place] Haw, DO   0.5 mg at 09/21/20 5910    sodium chloride flush 0.9 % injection 10 mL  10 mL Intravenous 2 times per day [place] Haw, DO   10 mL at 09/18/20 2045    sodium chloride flush 0.9 % injection 10 mL  10 mL Intravenous PRN [place] Haw, DO   10 mL at 09/15/20 2315    acetaminophen (TYLENOL) tablet 650 mg  650 mg Oral Q6H PRN Cecilia Haw, DO   650 mg at 09/15/20 0716    Or    acetaminophen (TYLENOL) suppository 650 mg  650 mg Rectal Q6H PRN [place] Haw, DO        magnesium hydroxide (MILK OF MAGNESIA) 400 MG/5ML suspension 30 mL  30 mL Oral Daily PRN [place] Haw, DO        promethazine (PHENERGAN) tablet 12.5 mg  12.5 mg Oral Q6H PRN Cecilia Haw, DO        Or    ondansetron [facility]) injection 4 mg  4 mg Intravenous Q6H PRN Cecilia Haw, DO   4 mg at 09/17/20 1054    morphine injection 2 mg  2 mg Intravenous Q4H PRN Cecilia Haw, DO   2 mg at 09/17/20 8701    glucose (GLUTOSE) 40 % oral gel 15 g  15 g Oral PRN Frutoso Apodaca, DO        dextrose 50 % IV solution  12.5 g Intravenous PRN Frutoso Apodaca, DO   12.5 g at 09/15/20 0415    glucagon (rDNA) injection 1 mg  1 mg Intramuscular PRN Frutoso Apodaca, DO        dextrose 5 % solution  100 mL/hr Intravenous PRN Frutoso Apodaca, DO        insulin lispro (HUMALOG) injection vial 0-6 Units  0-6 Units Subcutaneous TID [facility] Frutoso Apodaca, DO   1 Units at 09/20/20 1758    insulin lispro (HUMALOG) injection vial 0-3 Units  0-3 Units Subcutaneous Nightly Frutoso Apodaca, DO   1 Units at 09/20/20 2214       Past Medical History:  Past Medical History:   Diagnosis Date    Anemia in chronic kidney disease (CODE)     Atherosclerotic heart disease     [facility]     CAD (coronary artery disease)     Cancer ([facility]     Chronic kidney disease     COPD (chronic obstructive pulmonary disease) ([facility]     Diabetes mellitus ([facility]     End stage renal disease ([facility]     Enterocolitis due to Clostridioides difficile     GERD (gastroesophageal reflux disease)     Hemodialysis patient ([facility]     Hyperlipidemia     Hypertension     Other chronic pain     Palliative care patient 09/15/2020    Prostate cancer ([facility]     Vitamin D deficiency        Past Surgical History:  Past Surgical History:   Procedure Laterality Date    ABDOMEN SURGERY      esophageal CA, used part of stomach     APPENDECTOMY      [facility]    CARDIAC CATHETERIZATION      [facility], had stents, unknown details    URETEROSTOMY         Family History  Family History   Problem Relation Age of Onset    Diabetes Mother        Social History  Social History     Socioeconomic History    Marital status:      Spouse name: Not on file    Number of children: Not on file    Years of education: Not on file    Highest education level: Not on file   Occupational History    Not on file   Social Needs    Financial resource strain: Not on file   Trenton Mccarty Food insecurity     Worry: Not on file     Inability: Not on file    Transportation needs     Medical: Not on file     Non-medical: Not on file   Tobacco Use    Smoking status: Current Every Day Smoker     Packs/day: 2.00     Years: 55.00     Pack years: 110.00    Smokeless tobacco: Current User   Substance and Sexual Activity    Alcohol use: No    Drug use: No    Sexual activity: Not Currently   Lifestyle    Physical activity     Days per week: Not on file     Minutes per session: Not on file    Stress: Not on file   Relationships    Social connections     Talks on phone: Not on file     Gets together: Not on file     Attends Latter-day service: Not on file     Active member of club or organization: Not on file     Attends meetings of clubs or organizations: Not on file     Relationship status: Not on file    Intimate partner violence     Fear of current or ex partner: Not on file     Emotionally abused: Not on file     Physically abused: Not on file     Forced sexual activity: Not on file   Other Topics Concern    Not on file   Social History Narrative    Not on file         Review of Systems:  History obtained from chart review and the patient  General ROS: No fever or chills  Respiratory ROS: No cough, shortness of breath, wheezing  Cardiovascular ROS: No chest pain or palpitations  Gastrointestinal ROS: No abdominal pain or melena  Genito-Urinary ROS: No dysuria or hematuria  Musculoskeletal ROS: No joint pain or swelling         Objective:  Patient Vitals for the past 24 hrs:   BP Temp Temp src Pulse Resp SpO2 Weight   09/21/20 0740 -- -- -- -- -- -- 109 lb 9 oz (49.7 kg)   09/21/20 0654 117/65 97.5 °F (36.4 °C) Temporal 86 18 99 % --   09/21/20 0009 108/63 97.3 °F (36.3 °C) Temporal 94 16 93 % --   09/20/20 1848 (!) 103/59 97.4 °F (36.3 °C) Temporal 98 16 94 % --   09/20/20 1415 (!) 89/53 97.5 °F (36.4 °C) Temporal 101 14 100 % --       Intake/Output Summary (Last 24 hours) at 9/21/2020 1057  Last Recent Labs     09/12/20 2045   LABURIN >50,000 CFU/ml*  Light growth  Light growth  Light growth       Radiology reports as per the Radiologist  Radiology: Ct Head Wo Contrast    Result Date: 9/12/2020  EXAMINATION:  CT HEAD WO CONTRAST  9/12/2020 12:53 PM HISTORY: The patient fell. Rule out intracranial injury. TECHNIQUE: Multiple axial images were obtained through the brain without contrast infusion. Multiplanar images were reconstructed. DLP: 675 mGy-cm. Automated exposure control was utilized. COMPARISON: No comparison study. FINDINGS: There are no hemorrhage, edema or mass effect. There is moderate to severe atrophy. There is moderate to severe dilatation of the lateral and third ventricles. There is low-density in the hemispheric white matter that is nonspecific. The visualized paranasal sinuses and mastoid air cells are clear. No calvarial fracture is seen. There is biparietal calvarial thinning. 1. No hemorrhage, edema or mass effect. 2. Moderate to severe atrophy. Moderate to severe dilatation of the lateral and third ventricles is likely related. Hydrocephalus is not excluded. 3. Low-density in the hemispheric white matter is nonspecific and most likely due to chronic small vessel disease. The full report of this exam was immediately signed and available to the emergency room. The patient is currently in the emergency room. Signed by Dr Bk Kessler on 9/12/2020 12:56 PM    Ct Chest W Contrast    Result Date: 9/12/2020  EXAMINATION:  CT CHEST W CONTRAST  9/12/2020 1:11 PM HISTORY: The patient fell. Anterior chest skin tear. Hypotension during dialysis. Normal chest x-ray. COMPARISON: No comparison study. DLP: 320 mGy-cm. Automated exposure control was utilized. TECHNIQUE: Spiral CT was performed of the chest with contrast. Multiplanar images were reconstructed. MEDIASTINUM/VASCULAR: There is atheromatous calcification of the thoracic aorta and coronary arteries.  Heart size is upper limits of normal. Pulmonary arteries are normal in caliber. There are no enlarged mediastinal lymph nodes. There is a moderate size hiatal hernia. There is wall thickening of the esophagus in the chest diffusely. LUNGS: There is centrilobular emphysema. There is paraseptal emphysema. There are few scattered calcified granulomas. There is no airspace consolidation or pleural effusion. There is no pneumothorax or lung contusion. BONES AND SOFT TISSUES: The clavicles are intact. The scapulae are intact. No definite rib fracture is seen. No visible acute thoracic spine fracture is seen. There are some degenerative changes of the spine. The bones are demineralized. The sternum appears to be intact. UPPER ABDOMEN: Please see the abdomen and pelvis CT report separately. 1. No evidence of pneumothorax or lung contusion. No mediastinal hematoma. 2. Atheromatous disease of the thoracic aorta and coronary arteries. Heart size is prominent. 3. Moderate size hiatal hernia. Diffuse thickening of the wall of the thoracic esophagus. Correlate with any history of esophagitis and reflux disease. 4. Centrilobular and paraseptal emphysema. Old granulomatous disease. 5. Demineralized bones. No definite acute fracture. The full report of this exam was immediately signed and available to the emergency room. The patient is currently in the emergency room. Signed by Dr Zandra Goltz on 9/12/2020 1:16 PM    Ct Cervical Spine Wo Contrast    Result Date: 9/12/2020  EXAMINATION:  CT CERVICAL SPINE WO CONTRAST  9/12/2020 1:17 PM HISTORY: The patient fell. Rule out fracture. No x-rays obtained. TECHNIQUE: Spiral CT was performed of the cervical spine. Sagittal and coronal images were reconstructed. COMPARISON: No comparison study. DLP: 260 mGy-cm. Automated exposure control was utilized. FINDINGS: There is fairly prominent pannus formation along the posterior aspect of the dens. There is a fairly large dens erosion located posteriorly.  There is a nondisplaced fracture line anteriorly that is likely related to the weakening of the dens related to the erosion. Therefore, this is likely a pathologic fracture. This is fairly high on the dens and I will classify this as a type I injury. There is no displacement. There is no disruption of the anterior vertebral line or the posterior spinolaminar line. No other cervical spine fracture is identified. There is fairly severe carotid artery calcification in the neck bilaterally. There is vertebral artery calcification on the left. At C2-3, there is moderate to severe disc narrowing. There is severe facet arthropathy. There is mild to moderate foraminal narrowing bilaterally. No central spinal canal narrowing. At C3-4, there is moderate disc narrowing. There is minimal anterior subluxation of C3 compared to C4. There is bilateral uncinate spurring and bilateral facet arthropathy. There is severe bilateral foraminal stenosis at this level. At C4-5, there is moderate disc narrowing. There is uncinate spurring and facet arthropathy. The right-sided facet joint is fused. There is severe right and moderate left-sided foraminal stenosis. At C5-6, there is disc narrowing with spondylitic and uncinate spurring. There is bilateral facet arthropathy. There is no significant central spinal canal narrowing. There is mild to moderate bilateral foraminal stenosis. At C6-7, there is moderate to severe disc narrowing. There is spondylitic and uncinate spurring. There is anterior spurring. There is facet arthropathy left greater than right. There is mild narrowing of the central spinal canal at this level. There is moderate right and mild left foraminal stenosis. At C7-T1, there is moderate to severe disc narrowing. There is mild anterior subluxation of C7 compared to T1. There is some scoliosis of the cervical-thoracic Junction in this area. This causes moderate to severe left-sided foraminal stenosis at this level.  There is disc degeneration at multiple thoracic disc levels included on the cervical spine CT. No significant spinal or foraminal stenosis is visualized. 1. Type I dens fracture without displacement. There is a fairly large erosion in the posterior dens related to pannus formation. The type one fracture is seen best on the sagittal images anterior to the erosion. Therefore, this is likely a pathologic fracture given the presence of the erosion. 2. Degenerative changes throughout the cervical spine. 3. Dense carotid artery calcification in the neck. There is also some calcification involving the left vertebral artery in the neck. Results of the dens fracture were called to Dr. Corinne Juarez in the emergency room at 1:30 PM on 9/12/2020. Signed by Dr Edenilson Tavares on 9/12/2020 1:31 PM    Ct Abdomen Pelvis W Iv Contrast Additional Contrast? None    Result Date: 9/12/2020  EXAMINATION:  CT ABDOMEN PELVIS W IV CONTRAST  9/12/2020 1:00 PM HISTORY: The patient fell. Epigastric pain. Prior surgical resection of the urinary bladder and appendectomy. TECHNIQUE: Spiral CT was performed of the abdomen and pelvis with contrast. Multiplanar images were reconstructed. DLP: 320 mGy-cm. Automated exposure control was utilized. COMPARISON: No comparison study. LUNG BASES: The lung bases are clear. There is a moderate size hiatal hernia. LIVER AND SPLEEN: Prior cholecystectomy. There is intrahepatic and extrahepatic biliary tree dilatation probably related to reservoir effect. No visible obstructing stone is seen. The liver is otherwise unremarkable. The spleen is normal in size. PANCREAS: There is no pancreatic mass or inflammatory change. KIDNEYS AND ADRENALS: The adrenal glands are normal in size. There is cortical thinning of both kidneys. Both kidneys are small in size. The left kidney measures 5.8 cm and the right kidney measures 5.7 cm. The urinary bladder is surgically absent. There is an aortoiliac stent graft.  The graft extends above the placement COMPARISON: Chest exam dated 9/12/2020. FINDINGS: New left chest wall dual chamber pacemaker. Leads appear in good position. No lung consolidation. No pleural effusion or pneumothorax. The cardiomediastinal silhouette and pulmonary vascularity are within normal limits. The osseous structures and surrounding soft tissues demonstrate no acute abnormality. 1. New left chest wall dual chamber pacemaker. No pneumothorax. Signed by Dr Smiley Gutierrez on 9/17/2020 11:49 AM    Xr Chest Portable    Result Date: 9/12/2020  EXAMINATION:  XR CHEST PORTABLE  9/12/2020 12:13 PM HISTORY: The patient fell. Chest pain. COMPARISON: 3/23/2016. FINDINGS:  There is no evidence of lung contusion or pneumothorax. There is no mediastinal widening. The heart is normal in size. The thoracic aorta is atheromatous. There is no dense infiltrate or effusion. There is mild biapical scarring. 1. Mild chronic changes. 2. No evidence of lung contusion, pneumothorax or mediastinal widening. 3. The patient has a chest CT already scheduled. Please see that report separately. Signed by Dr Valeria Cardoso on 9/12/2020 12:14 PM    Vl Vessel Mapping Prior To Hemodialysis Access    Result Date: 9/18/2020  Vascular Upper Extremities Arterial Mapping and Upper Extremity Vein Mapping Procedure  Demographics   Patient Name  Yin Mayers  Age                67                W   Patient       [id number]        Gender             Male  Number   Visit Number  [id number]     Interpreting       Mary Murphy MD                              Physician   Date of Birth 1945    Referring          Napoleon Hernandez                              Physician   Accession     [id number]    Sonographer        Yudi [address] [place] [address]  Number                                         RVS, RCS  Procedure Type of Study:   Extremities Arteries:Upper Extremities Arterial Mapping. Veins:Upper Extremity Vein Mapping, VAS NIV VES MAP PRIOR TO HEMODIALYSIS  ACCESS. Indications for Study:Pre-op vein mapping for dialysis access. Risk Factors   - The patient's risk factor(s) include: diabetes mellitus and arterial     hypertension.   - Current - Every day. - The patient's last creatinine was 4 mg/dl. Allergies   - Sulfa drugs. Impression   Bilateral upper extremity venous mapping with sizes as noted. Arterial  sizes and pressures as noted. Left upper arm cephalic vein occluded. Signature   ----------------------------------------------------------------  Electronically signed by Shon Hwang MD(Interpreting  physician) on 09/18/2020 11:35 AM  ----------------------------------------------------------------  Blood Pressure:Right arm 130/48 mmHg. Velocities are measured in cm/s ; Diameters are measured in mm Right Upper Extremities Arterial Mapping +------------------------+----+-------------+---------------------+--------+ ! Location                ! PSV ! AP Diam      !Trans Diam           !Quality ! +------------------------+----+-------------+---------------------+--------+ ! Prox Brachial           !93. 2!             !                     !        ! +------------------------+----+-------------+---------------------+--------+ ! Dist Brachial           !86. 8! ! 5                    !        ! +------------------------+----+-------------+---------------------+--------+ ! Prox Radial             !86.8!             !                     !        ! +------------------------+----+-------------+---------------------+--------+ ! Mid Radial              !103 !             !                     !        ! +------------------------+----+-------------+---------------------+--------+ ! Dist Radial             !169 !             !3. 2                  !        ! +------------------------+----+-------------+---------------------+--------+ ! Prox Ulnar              !72.7!             !                     !        ! +------------------------+----+-------------+---------------------+--------+ ! Mid Ulnar               !82.7!             !                     !        ! +------------------------+----+-------------+---------------------+--------+ ! Dist Ulnar              !78.6!             !2.4                  !        ! +------------------------+----+-------------+---------------------+--------+ Left Upper Extremities Arterial Mapping +------------------------+----+-------------+---------------------+--------+ ! Location                ! PSV ! AP Diam      !Trans Diam           !Quality ! +------------------------+----+-------------+---------------------+--------+ ! Prox Brachial           !105 !             !                     !        ! +------------------------+----+-------------+---------------------+--------+ ! Dist Brachial           !99. 8!             !7.6                  !        ! +------------------------+----+-------------+---------------------+--------+ ! Prox Radial             !96.2!             !                     !        ! +------------------------+----+-------------+---------------------+--------+ ! Mid Radial              !135 !             !                     !        ! +------------------------+----+-------------+---------------------+--------+ ! Dist Radial             !174 !             !2. 6                  !        ! +------------------------+----+-------------+---------------------+--------+ ! Prox Ulnar              ! 125 !             !                     !        ! +------------------------+----+-------------+---------------------+--------+ ! Mid Ulnar               ! 118 !             !                     !        ! +------------------------+----+-------------+---------------------+--------+ ! Dist Ulnar              ! 122 !             !3. 2                  !        ! +------------------------+----+-------------+---------------------+--------+ Velocities are measured in cm/s ; Diameters are measured in mm Cephalic Mapping +------------------++--------+----------+-----++--------+----------+-------+ ! ! !Right   ! ! Left !!        !          !       ! +------------------++--------+----------+-----++--------+----------+-------+ ! Location          ! !AP Diam.! Trans Diam!Depth! !AP Diam.! Trans Diam!Depth  ! +------------------++--------+----------+-----++--------+----------+-------+ ! Cephalic at UA    !!        !2.9       !     !!        !3.8       !       ! +------------------++--------+----------+-----++--------+----------+-------+ ! Cephalic at Mid UA!!        !2.1       ! !!        !          !       ! +------------------++--------+----------+-----++--------+----------+-------+ ! Cephalic at AF    !!        !3.6       !     !!        !          !       ! +------------------++--------+----------+-----++--------+----------+-------+ ! Cephalic at Mid LA!!        !2.9       !     !!        !2.4       !       ! +------------------++--------+----------+-----++--------+----------+-------+ ! Cephalic at Wrist !!        !2.7       !     !!        !3         !       ! +------------------++--------+----------+-----++--------+----------+-------+ Basilic Mapping +------------------++-------+-----------+-----++-------+-----------+-------+ ! ! !Right  ! ! Left !!       !           !       ! +------------------++-------+-----------+-----++-------+-----------+-------+ ! Location          ! !AP Diam!Trans Diam !Depth! !AP Diam!Trans Diam !Depth  ! +------------------++-------+-----------+-----++-------+-----------+-------+ ! Basilic at Mid UA !!       !3.6        !     !!       !4.2        !       ! +------------------++-------+-----------+-----++-------+-----------+-------+ ! Basilic at AF     !!       !2.1        !     !!       !2.9        !       ! +------------------++-------+-----------+-----++-------+-----------+-------+ ! Basilic at Mid LA !!       !2.7        !     !!       !2 !       ! +------------------++-------+-----------+-----++-------+-----------+-------+ ! Basilic at Wrist  !!       !2.8        !     !!       !2.5        !       ! +------------------++-------+-----------+-----++-------+-----------+-------+ ! Axillary          !!       !6.5        !     !!       !4.6        !       ! +------------------++-------+-----------+-----++-------+-----------+-------+ ! Prox Brachial     !!       !5.1        !     !!       !4          !       ! +------------------++-------+-----------+-----++-------+-----------+-------+ ! Dist Brachial     !!       !4.1        !      !!       !3.5        !       ! +------------------++-------+-----------+-----++-------+-----------+-------+       Assessment   End-stage renal disease  Anemia chronic kidney disease  Diabetes type 2 with diabetic nephropathy  Secondary hyperparathyroidism  Anemia of chronic kidney disease  Fall with type I dens fracture  Dialysis access malfunction  Bradycardia status post permanent pacemaker placement      Plan:  Hemodialysis Tuesday Thursday Saturday  Discussed with vascular surgery about dialysis access options  Monitor labs  Discussed with patient, nursing    Leona Juarez MD  09/21/20  10:57 AM

## 2020-09-21 NOTE — ANESTHESIA POSTPROCEDURE EVALUATION
Department of Anesthesiology  Postprocedure Note    Patient: Dilia Wilson  MRN: [id number]  YOB: 1945  Date of evaluation: 9/21/2020  Time:  6:46 PM     Procedure Summary     Date:  09/21/20 Room / Location:  [address]    Anesthesia Start:  7222 Anesthesia Stop:      Procedure:  CREATION RIGHT AV GRAFT (Right ) Diagnosis:  (T82.590A)    Surgeon:  Altamease Goldberg, DO Responsible Provider:  JUNAID Emmanuel CRNA    Anesthesia Type:  general ASA Status:  4          Anesthesia Type: general    Nando Phase I: Nando Score: 10    Nando Phase II:      Last vitals: Reviewed and per EMR flowsheets.        Anesthesia Post Evaluation    Patient location during evaluation: PACU  Patient participation: waiting for patient participation  Level of consciousness: awake and lethargic  Pain score: 0  Airway patency: patent  Nausea & Vomiting: no nausea and no vomiting  Complications: no  Cardiovascular status: blood pressure returned to baseline  Respiratory status: acceptable  Hydration status: euvolemic  Comments: Report to RN

## 2020-09-21 NOTE — PROGRESS NOTES
Vascular Surgery  Dr. Erika Lauren   Daily Progress Note    Pt Name: Eva Marroquin Record Number: [id number]  Date of Birth 1945   Today's Date: 9/21/2020        SUBJECTIVE:     Patient was seen and examined. No events over the weekend. Tolerating his diet - no nausea/vomiting. OBJECTIVE:     Patient Vitals for the past 24 hrs:   BP Temp Temp src Pulse Resp SpO2 Weight   09/21/20 0740 -- -- -- -- -- -- 109 lb 9 oz (49.7 kg)   09/21/20 0654 117/65 97.5 °F (36.4 °C) Temporal 86 18 99 % --   09/21/20 0009 108/63 97.3 °F (36.3 °C) Temporal 94 16 93 % --   09/20/20 1848 (!) 103/59 97.4 °F (36.3 °C) Temporal 98 16 94 % --   09/20/20 1415 (!) 89/53 97.5 °F (36.4 °C) Temporal 101 14 100 % --         Intake/Output Summary (Last 24 hours) at 9/21/2020 1122  Last data filed at 9/21/2020 0415  Gross per 24 hour   Intake --   Output 1220 ml   Net -1220 ml       In: -   Out: 7268 [Urine:1220]    I/O last 3 completed shifts: In: 360 [P.O.:360]  Out: 9755 [MGJSI:1523]     Date 09/21/20 0000 - 09/21/20 2359   Shift 3083-9672 3958-6311 2439-4797 24 Hour Total   INTAKE   Shift Total(mL/kg)       OUTPUT   Urine(mL/kg/hr) 250(0.6)   250   Shift Total(mL/kg) 250(5)   250(5)   Weight (kg) 49.7 49.7 49.7 49.7       Wt Readings from Last 3 Encounters:   09/21/20 109 lb 9 oz (49.7 kg)   03/25/16 113 lb 12.8 oz (51.6 kg)        Body mass index is 17.16 kg/m².      Diet: Diet NPO, After Midnight Exceptions are: Sips with Meds        MEDS:     Scheduled Meds:   vancomycin  750 mg Intravenous On Call to OR    darbepoetin umesh-polysorbate  100 mcg Subcutaneous Weekly - Monday    sodium chloride flush  10 mL Intravenous 2 times per day    ampicillin-sulbactam  1.5 g Intravenous Q12H    heparin (porcine)  5,000 Units Subcutaneous 3 times per day    ipratropium  0.5 mg Nebulization 4x daily    sodium chloride flush  10 mL Intravenous 2 times per day    insulin lispro  0-6 Units Subcutaneous TID     insulin lispro 0-3 Units Subcutaneous Nightly     Continuous Infusions:   dextrose       PRN Meds:sodium chloride flush, 10 mL, PRN  docusate sodium, 100 mg, BID PRN  hydrALAZINE, 10 mg, Q4H PRN  oxyCODONE, 5 mg, Q4H PRN  sodium chloride flush, 10 mL, PRN  acetaminophen, 650 mg, Q6H PRN    Or  acetaminophen, 650 mg, Q6H PRN  magnesium hydroxide, 30 mL, Daily PRN  promethazine, 12.5 mg, Q6H PRN    Or  ondansetron, 4 mg, Q6H PRN  morphine, 2 mg, Q4H PRN  glucose, 15 g, PRN  dextrose, 12.5 g, PRN  glucagon (rDNA), 1 mg, PRN  dextrose, 100 mL/hr, PRN          PHYSICAL EXAM:     CONSTITUTIONAL: Alert and oriented times 3, no acute distress and cooperative to examination. LUNGS: Clear bilateral breath sounds without wheezes or rhonchi. CARDIOVASCULAR: Normal HT with RRR. 2/6 systolic murmur, no gallops or rubs. ABDOMEN: Soft, non-tender with active bowel sounds x 4. NEUROLOGIC: Grossly intact. WOUND/INCISION: Non-tunneled temporary dialysis catheter - left groin. Left subclavian pacer site with dressing dry and intact. Moderate ecchymosis noted. EXTREMITY: No clubbing or cyanosis. No peripheral edema. Peripheral pulses palpable. Left upper arm AV fistula - unable to palpate thrill. No bruit auscultated.     LABS:     CBC:   Recent Labs     09/19/20 0321 09/20/20 0347 09/21/20  0304   WBC 4.9 4.4* 4.9   RBC 3.07* 3.05* 2.99*   HGB 9.7* 9.4* 9.4*   HCT 31.9* 29.8* 29.2*   .9* 97.7* 97.7*   MCH 31.6* 30.8 31.4*   MCHC 30.4* 31.5* 32.2*   RDW 15.0* 14.6* 14.6*    94* 115*   MPV 9.4 11.5 11.5      Last 3 CMP:   Recent Labs     09/19/20  0321 09/20/20  0347 09/21/20  0304    139 139   K 4.1 4.2 4.3    101 101   CO2 22 26 23   BUN 44* 34* 47*   CREATININE 4.2* 3.1* 4.1*   GLUCOSE 104 120* 137*   CALCIUM 8.6* 8.5* 8.4*   PROT  --  5.3* 5.4*   LABALBU  --  3.1* 3.1*   BILITOT  --  <0.2 <0.2   ALKPHOS  --  57 57   AST  --  8 8   ALT  --  <5* <5*      Calcium:   Lab Results   Component Value Date    CALCIUM 8.4 09/21/2020    CALCIUM 8.5 09/20/2020    CALCIUM 8.6 09/19/2020        DVT prophylaxis: Heparin 5000 units sq q8h          ASSESSMENT:     1.         AV Fistula Malfunction, Left Upper Arm - S/P Placement of a dual-lumen non-tunneled left femoral vein dialysis catheter on 09/16/2020  2. Bradycardia - S/P Permanent Pacer on 09/17/2020  3. Fall at Home  4. Proteus, E-coli UTI - on Unasyn  5.         DM, Type 2  6. Essential HTN  7. CAD  8. DM, Type 2      PLAN:     1. Plan for AV Fistula Creation This Afternoon by Dr. Vern Ly.        Anibal Sellers, APRN-BC

## 2020-09-21 NOTE — ANESTHESIA PROCEDURE NOTES
Peripheral Block    Patient location during procedure: holding area  Staffing  Anesthesiologist: Linda Echavarria MD  Performed: anesthesiologist   Preanesthetic Checklist  Completed: patient identified, site marked, surgical consent, pre-op evaluation, timeout performed, IV checked, risks and benefits discussed, monitors and equipment checked, anesthesia consent given, oxygen available and patient being monitored  Peripheral Block  Patient position: sitting  Prep: ChloraPrep  Patient monitoring: cardiac monitor, continuous pulse ox, frequent blood pressure checks and IV access  Block type: Brachial plexus  Laterality: right  Injection technique: single-shot  Procedures: ultrasound guided  Local infiltration: lidocaine  Infiltration strength: 1 %  Dose: 1 mL  Supraclavicular  Provider prep: mask and sterile gloves  Local infiltration: lidocaine  Needle  Needle type: combined needle/nerve stimulator   Needle gauge: 20 G  Needle length: 5 cm  Needle localization: ultrasound guidance  Assessment  Injection assessment: negative aspiration for heme, no paresthesia on injection and local visualized surrounding nerve on ultrasound  Paresthesia pain: none  Slow fractionated injection: yes  Hemodynamics: stable  Additional Notes  20 cc 0.5% Ropivacaine injected    Under ultrasound (\"US\") guidance, a 22 gauge needle was inserted and placed in close proximity to the brachial plexus nerves. Ultrasound was also used to visualize the spread of the anesthetic in close proximity to the nerve being blocked. The nerve appeared anatomically normal, and there were no abnormal pathological findings. A permanent image was recorded.    Medications Administered  Lidocaine injection 1%, 1 mL  ropivacaine (NAROPIN) injection 0.5%, 15 mL  lidocaine injection 2%, 15 mL  Reason for block: post-op pain management

## 2020-09-22 NOTE — PROGRESS NOTES
Nephrology ([address] [facility]) Progress Note    Patient:  Dionicio Acevedo  YOB: 1945  Date of Service: 9/22/2020  MRN: [id number]   Acct: [de-identified]   Primary Care Physician: Unknown Provider Result (Inactive)  Advance Directive: DNR-CC  Admit Date: 9/12/2020       Hospital Day: 10  Referring Provider: Julia Chin MD    Patient independently seen and examined, Chart, Consults, Notes, Operative notes, Labs, Cardiology, and Radiology studies reviewed as available. Subjective:  Dionicio Acevedo is a 67 y.o. male  whom we were consulted for ESRD. He was admitted after a near syncopal episode when he was on dialysis. He was found bradycardic.  At the emergency department, his heart rate was in the low 30s. He was admitted to the ICU and had a temporary transvenous pacemaker. Patient has also reported a fall 2 days ago with head trauma.  CT of his neck shows a type I dens fracture without displacement. He was seen by neurosurgery and had a neck brace. His hemodialysis access had high venous pressure with the recirculation problem. On September 16, he underwent femoral line placement for dialysis access. On September 17, he underwent permanent pacemaker implant. Transvenous pacemaker was removed. Today, no overnight events. Patient chronically hard of hearing but denied specific complaints including chest pain, shortness of air, nausea vomiting. Some difficulties in accessing new graft still has 1 port in the graft and the second in the temporary access. Also with small hematoma at the graft site.     Dialysis   Pt was seen on RRT  Modality: Hemodialysis  Access: Arterial Venous Graft  Location: right upper  QB: 150  QD: 300  UF: 360      Allergies:  Sulfa antibiotics    Medicines:  Current Facility-Administered Medications   Medication Dose Route Frequency Provider Last Rate Last Dose    0.45 % sodium chloride infusion   Intravenous Continuous Shanna Lares,  mL/hr at 09/21/20 2037      sodium chloride flush 0.9 % injection 10 mL  10 mL Intravenous 2 times per day Altamease Goldberg, DO   10 mL at 09/22/20 1253    sodium chloride flush 0.9 % injection 10 mL  10 mL Intravenous PRN Altamease Goldberg, DO        acetaminophen (TYLENOL) tablet 650 mg  650 mg Oral Q4H PRN Altamease Goldberg, DO        darbepoetin umesh-polysorbate SEVEN [facility]) injection 100 mcg  100 mcg Subcutaneous Weekly - Monday Liset Lopez MD   100 mcg at 09/21/20 1100    ampicillin-sulbactam (UNASYN) 1.5 g IVPB minibag  1.5 g Intravenous Q12H Altamease Goldberg, DO   Stopped at 09/22/20 1322    heparin (porcine) injection 5,000 Units  5,000 Units Subcutaneous 3 times per day Altamease Goldberg, DO   5,000 Units at 09/22/20 1416    docusate sodium (COLACE) capsule 100 mg  100 mg Oral BID PRN Altcruzitoase Goldberg, DO   100 mg at 09/20/20 2237    hydrALAZINE (APRESOLINE) injection 10 mg  10 mg Intravenous Q4H PRN Altamease Goldberg, DO   10 mg at 09/15/20 2210    oxyCODONE (ROXICODONE) immediate release tablet 5 mg  5 mg Oral Q4H PRN Altamease Goldberg, DO   5 mg at 09/22/20 1254    ipratropium (ATROVENT) 0.02 % nebulizer solution 0.5 mg  0.5 mg Nebulization 4x daily [facility] L[facility], DO   0.5 mg at 09/22/20 1410    acetaminophen (TYLENOL) suppository 650 mg  650 mg Rectal Q6H PRN Altamease Goldberg, DO        magnesium hydroxide (MILK OF MAGNESIA) 400 MG/5ML suspension 30 mL  30 mL Oral Daily PRN Altamease Goldberg, DO        promethazine (PHENERGAN) tablet 12.5 mg  12.5 mg Oral Q6H PRN Altamease Goldberg, DO        Or    ondansetron [facility]) injection 4 mg  4 mg Intravenous Q6H PRN Altamease Goldberg, DO   4 mg at 09/17/20 1054    morphine injection 2 mg  2 mg Intravenous Q4H PRN Altamease Goldberg, DO   2 mg at 09/17/20 2230    glucose (GLUTOSE) 40 % oral gel 15 g  15 g Oral PRN Altcruzitoase Goldberg, DO        dextrose 50 % IV solution  12.5 g Intravenous PRN Shanna Lares, DO   12.5 g at 09/15/20 0415    glucagon (rDNA) injection 1 mg  1 mg Intramuscular PRN Kacie, DO        dextrose 5 % solution  100 mL/hr Intravenous PRN Kacie, DO        insulin lispro (HUMALOG) injection vial 0-6 Units  0-6 Units Subcutaneous TID R [address] Kacie, DO   1 Units at 09/20/20 1758    insulin lispro (HUMALOG) injection vial 0-3 Units  0-3 Units Subcutaneous Nightly Kacie, DO   1 Units at 09/21/20 2122       Past Medical History:  Past Medical History:   Diagnosis Date    Anemia in chronic kidney disease (CODE)     Atherosclerotic heart disease     [facility]     CAD (coronary artery disease)     Cancer [facility]     Chronic kidney disease     COPD [facility]chronic obstructive pulmonary disease) [facility]     Diabetes mellitus ([facility]     End stage renal disease (HCC)     Enterocolitis due to Clostridioides difficile     GERD (gastroesophageal reflux disease)     Hemod[facility] ([facility]     Hyperlipidemia     Hypertension     Other chronic pain     Palliative care patient 09/15/2020    Prostate cancer ([facility]     Vitamin D deficiency        Past Surgical History:  Past Surgical History:   Procedure Laterality Date    ABDOMEN SURGERY      esophageal CA, used part of stomach     APPENDECTOMY      [facility]    [facility]      [facility] [place] st[place], unknown details    DIALYSIS FISTULA CREATION Right 9/21/2020    CREATION RIGHT AV GRAFT performed by DO Kacie at [address] URETEROSTOMY         Family History  Family History   Problem Relation Age of Onset    Diabetes Mother        Social History  Social History     Socioeconomic History    Marital status:      Spouse name: Not on file    Number of children: Not on file    Years of education: Not on file    Highest education level: Not on file   Occupational History    Not on file   Social Needs    Financial resource strain: Not on file   Demetrius-Lalito insecurity     Worry: Not on file     Inability: Not on file    Transportation needs     Medical: Not on file     Non-medical: Not on file   Tobacco Use    Smoking status: Current Every Day Smoker     Packs/day: 2.00     Years: 55.00     Pack years: 110.00    Smokeless tobacco: Current User   Substance and Sexual Activity    Alcohol use: No    Drug use: No    Sexual activity: Not Currently   Lifestyle    Physical activity     Days per week: Not on file     Minutes per session: Not on file    Stress: Not on file   Relationships    Social connections     Talks on phone: Not on file     Gets together: Not on file     Attends Religion service: Not on file     Active member of club or organization: Not on file     Attends meetings of clubs or organizations: Not on file     Relationship status: Not on file    Intimate partner violence     Fear of current or ex partner: Not on file     Emotionally abused: Not on file     Physically abused: Not on file     Forced sexual activity: Not on file   Other Topics Concern    Not on file   Social History Narrative    Not on file         Review of Systems:  History obtained from chart review and the patient  General ROS: No fever or chills  Respiratory ROS: No cough, shortness of breath, wheezing  Cardiovascular ROS: No chest pain or palpitations  Gastrointestinal ROS: No abdominal pain or melena  Genito-Urinary ROS: No dysuria or hematuria  Musculoskeletal ROS: No joint pain or swelling         Objective:  Patient Vitals for the past 24 hrs:   BP Temp Temp src Pulse Resp SpO2 Weight   09/22/20 1237 (!) 92/52 -- -- -- -- -- --   09/22/20 1231 (!) 94/53 -- Temporal 109 16 97 % --   09/22/20 0313 104/62 97.9 °F (36.6 °C) Temporal 97 18 95 % --   09/21/20 2332 111/61 97.6 °F (36.4 °C) Temporal 92 18 98 % --   09/21/20 2239 111/64 97.7 °F (36.5 °C) Temporal 95 16 95 % --   09/21/20 2136 109/70 97.8 °F (36.6 °C) Temporal 103 18 97 % --   09/21/20 2034 131/75 97.5 °F (36.4 °C) Temporal 101 18 94 % --   09/21/20 1941 118/67 98.1 °F (36.7 °C) Temporal 101 18 94 % --   09/21/20 1925 (!) 113/44 -- -- 104 15 93 % --   09/21/20 1920 (!) 120/49 97.8 °F (36.6 °C) Temporal 104 12 93 % --   09/21/20 1915 (!) 110/43 -- -- 105 18 95 % --   09/21/20 1910 134/68 -- -- 105 14 96 % --   09/21/20 1905 (!) 125/49 -- -- 102 18 95 % --   09/21/20 1900 (!) 124/46 -- -- 102 23 95 % --   09/21/20 1855 (!) 116/44 -- -- 96 16 96 % --   09/21/20 1850 (!) 114/43 -- -- 97 16 98 % --   09/21/20 1845 (!) 125/45 98.5 °F (36.9 °C) Temporal 95 12 100 % --       Intake/Output Summary (Last 24 hours) at 9/22/2020 1423  Last data filed at 9/22/2020 1252  Gross per 24 hour   Intake 810 ml   Output 1280 ml   Net -470 ml     General: awake/alert   Chest:  clear to auscultation bilaterally  CVS: regular rate and rhythm  Abdominal: soft, nontender, normal bowel sounds  Extremities: no cyanosis or edema  Skin: warm and dry without rash    Labs:  BMP:   Recent Labs     09/20/20 0347 09/21/20 0304 09/22/20  0253    139 137   K 4.2 4.3 4.7    101 104   CO2 26 23 18*   PHOS 3.8 3.4  --    BUN 34* 47* 51*   CREATININE 3.1* 4.1* 4.3*   CALCIUM 8.5* 8.4* 8.5*     CBC:   Recent Labs     09/20/20 0347 09/21/20  0304 09/22/20  0253   WBC 4.4* 4.9 5.9   HGB 9.4* 9.4* 9.2*   HCT 29.8* 29.2* 30.1*   MCV 97.7* 97.7* 102.4*   PLT 94* 115* 182     LIVER PROFILE:   Recent Labs     09/20/20  0347 09/21/20  0304   AST 8 8   ALT <5* <5*   BILITOT <0.2 <0.2   ALKPHOS 57 57     PT/INR: No results for input(s): PROTIME, INR in the last 72 hours. APTT: No results for input(s): APTT in the last 72 hours. BNP:  No results for input(s): BNP in the last 72 hours. Ionized Calcium:No results for input(s): IONCA in the last 72 hours.   Magnesium:  Recent Labs     09/20/20  0347 09/21/20  0304 09/22/20  0253   MG 2.3 2.5* 2.4     Phosphorus:  Recent Labs     09/20/20  0347 09/21/20  0304   PHOS 3.8 3.4     HgbA1C: No results for input(s): LABA1C in the last 72 hours. Hepatic:   Recent Labs     09/20/20  0347 09/21/20  0304   ALKPHOS 57 57   ALT <5* <5*   AST 8 8   PROT 5.3* 5.4*   BILITOT <0.2 <0.2   LABALBU 3.1* 3.1*     Lactic Acid: No results for input(s): LACTA in the last 72 hours. Troponin: No results for input(s): CKTOTAL, CKMB, TROPONINT in the last 72 hours. ABGs: No results found for: PHART, PO2ART, [id number]  CRP:  No results for input(s): CRP in the last 72 hours. Sed Rate:  No results for input(s): SEDRATE in the last 72 hours. Culture Results:   Blood Culture Recent: No results for input(s): BC in the last 720 hours. Urine Culture Recent :   Recent Labs     09/12/20 2045   LABURIN >50,000 CFU/ml*  Light growth  Light growth  Light growth       Radiology reports as per the Radiologist  Radiology: Ct Head Wo Contrast    Result Date: 9/12/2020  EXAMINATION:  CT HEAD WO CONTRAST  9/12/2020 12:53 PM HISTORY: The patient fell. Rule out intracranial injury. TECHNIQUE: Multiple axial images were obtained through the brain without contrast infusion. Multiplanar images were reconstructed. DLP: 675 mGy-cm. Automated exposure control was utilized. COMPARISON: No comparison study. FINDINGS: There are no hemorrhage, edema or mass effect. There is moderate to severe atrophy. There is moderate to severe dilatation of the lateral and third ventricles. There is low-density in the hemispheric white matter that is nonspecific. The visualized paranasal sinuses and mastoid air cells are clear. No calvarial fracture is seen. There is biparietal calvarial thinning. 1. No hemorrhage, edema or mass effect. 2. Moderate to severe atrophy. Moderate to severe dilatation of the lateral and third ventricles is likely related. Hydrocephalus is not excluded. 3. Low-density in the hemispheric white matter is nonspecific and most likely due to chronic small vessel disease.  The full report of this exam was immediately signed and available to the emergency room. The patient is currently in the emergency room. Signed by Dr Henry Dale on 9/12/2020 12:56 PM    Ct Chest W Contrast    Result Date: 9/12/2020  EXAMINATION:  CT CHEST W CONTRAST  9/12/2020 1:11 PM HISTORY: The patient fell. Anterior chest skin tear. Hypotension during dialysis. Normal chest x-ray. COMPARISON: No comparison study. DLP: 320 mGy-cm. Automated exposure control was utilized. TECHNIQUE: Spiral CT was performed of the chest with contrast. Multiplanar images were reconstructed. MEDIASTINUM/VASCULAR: There is atheromatous calcification of the thoracic aorta and coronary arteries. Heart size is upper limits of normal. Pulmonary arteries are normal in caliber. There are no enlarged mediastinal lymph nodes. There is a moderate size hiatal hernia. There is wall thickening of the esophagus in the chest diffusely. LUNGS: There is centrilobular emphysema. There is paraseptal emphysema. There are few scattered calcified granulomas. There is no airspace consolidation or pleural effusion. There is no pneumothorax or lung contusion. BONES AND SOFT TISSUES: The clavicles are intact. The scapulae are intact. No definite rib fracture is seen. No visible acute thoracic spine fracture is seen. There are some degenerative changes of the spine. The bones are demineralized. The sternum appears to be intact. UPPER ABDOMEN: Please see the abdomen and pelvis CT report separately. 1. No evidence of pneumothorax or lung contusion. No mediastinal hematoma. 2. Atheromatous disease of the thoracic aorta and coronary arteries. Heart size is prominent. 3. Moderate size hiatal hernia. Diffuse thickening of the wall of the thoracic esophagus. Correlate with any history of esophagitis and reflux disease. 4. Centrilobular and paraseptal emphysema. Old granulomatous disease. 5. Demineralized bones. No definite acute fracture. The full report of this exam was immediately signed and available to the emergency room.  The patient is currently in the emergency room. Signed by Dr Katherine Porter on 9/12/2020 1:16 PM    Ct Cervical Spine Wo Contrast    Result Date: 9/12/2020  EXAMINATION:  CT CERVICAL SPINE WO CONTRAST  9/12/2020 1:17 PM HISTORY: The patient fell. Rule out fracture. No x-rays obtained. TECHNIQUE: Spiral CT was performed of the cervical spine. Sagittal and coronal images were reconstructed. COMPARISON: No comparison study. DLP: 260 mGy-cm. Automated exposure control was utilized. FINDINGS: There is fairly prominent pannus formation along the posterior aspect of the dens. There is a fairly large dens erosion located posteriorly. There is a nondisplaced fracture line anteriorly that is likely related to the weakening of the dens related to the erosion. Therefore, this is likely a pathologic fracture. This is fairly high on the dens and I will classify this as a type I injury. There is no displacement. There is no disruption of the anterior vertebral line or the posterior spinolaminar line. No other cervical spine fracture is identified. There is fairly severe carotid artery calcification in the neck bilaterally. There is vertebral artery calcification on the left. At C2-3, there is moderate to severe disc narrowing. There is severe facet arthropathy. There is mild to moderate foraminal narrowing bilaterally. No central spinal canal narrowing. At C3-4, there is moderate disc narrowing. There is minimal anterior subluxation of C3 compared to C4. There is bilateral uncinate spurring and bilateral facet arthropathy. There is severe bilateral foraminal stenosis at this level. At C4-5, there is moderate disc narrowing. There is uncinate spurring and facet arthropathy. The right-sided facet joint is fused. There is severe right and moderate left-sided foraminal stenosis. At C5-6, there is disc narrowing with spondylitic and uncinate spurring. There is bilateral facet arthropathy. There is no significant central spinal canal narrowing.  There is mild to moderate bilateral foraminal stenosis. At C6-7, there is moderate to severe disc narrowing. There is spondylitic and uncinate spurring. There is anterior spurring. There is facet arthropathy left greater than right. There is mild narrowing of the central spinal canal at this level. There is moderate right and mild left foraminal stenosis. At C7-T1, there is moderate to severe disc narrowing. There is mild anterior subluxation of C7 compared to T1. There is some scoliosis of the cervical-thoracic Junction in this area. This causes moderate to severe left-sided foraminal stenosis at this level. There is disc degeneration at multiple thoracic disc levels included on the cervical spine CT. No significant spinal or foraminal stenosis is visualized. 1. Type I dens fracture without displacement. There is a fairly large erosion in the posterior dens related to pannus formation. The type one fracture is seen best on the sagittal images anterior to the erosion. Therefore, this is likely a pathologic fracture given the presence of the erosion. 2. Degenerative changes throughout the cervical spine. 3. Dense carotid artery calcification in the neck. There is also some calcification involving the left vertebral artery in the neck. Results of the dens fracture were called to Dr. Sheryle Pearson in the emergency room at 1:30 PM on 9/12/2020. Signed by Dr Mauro Massey on 9/12/2020 1:31 PM    Ct Abdomen Pelvis W Iv Contrast Additional Contrast? None    Result Date: 9/12/2020  EXAMINATION:  CT ABDOMEN PELVIS W IV CONTRAST  9/12/2020 1:00 PM HISTORY: The patient fell. Epigastric pain. Prior surgical resection of the urinary bladder and appendectomy. TECHNIQUE: Spiral CT was performed of the abdomen and pelvis with contrast. Multiplanar images were reconstructed. DLP: 320 mGy-cm. Automated exposure control was utilized. COMPARISON: No comparison study. LUNG BASES: The lung bases are clear. There is a moderate size hiatal hernia. LIVER AND SPLEEN: Prior cholecystectomy. There is intrahepatic and extrahepatic biliary tree dilatation probably related to reservoir effect. No visible obstructing stone is seen. The liver is otherwise unremarkable. The spleen is normal in size. PANCREAS: There is no pancreatic mass or inflammatory change. KIDNEYS AND ADRENALS: The adrenal glands are normal in size. There is cortical thinning of both kidneys. Both kidneys are small in size. The left kidney measures 5.8 cm and the right kidney measures 5.7 cm. The urinary bladder is surgically absent. There is an aortoiliac stent graft. The graft extends above the origins of both renal arteries. It also covers the origins of the celiac plexus and superior mesenteric arteries. The native aortic lumen measures 5.5 cm in transverse diameter. There is mild enhancement within the native lumen anteriorly suggesting an endoleak. The aortoiliac stent graft is patent. BOWEL: No oral contrast was given. There is underdistention of the stomach. Small bowel loops are nondilated. There is an ostomy in the right mid to lower abdomen likely related to the history of ureteral diversion. Some of the colon is underdistended. There is moderate to large stool in the colon. OTHER: There are degenerative changes of the spine. 1. Prior cholecystectomy. Intrahepatic and extrahepatic biliary tree dilatation is likely related to reservoir effect. No visible calcified stone is seen within the ductal system. 2. Diffuse cortical thinning of both kidneys. The kidneys are small in size. The left kidney measures 5.8 cm on the right kidney measures 5.7 cm. The renal arteries are covered by the aortoiliac stent graft. 3. Prior bladder resection. An ostomy in the right lower abdomen/upper pelvis is likely a ureteral diversion. 4. Patent aortoiliac stent graft. The native aortic lumen measures 5.5 cm transversely.  There is some contrast enhancement anteriorly in the native lumen suggesting endoleak. I do not see a connecting vessel that is patent at the endoleak. The superior mesenteric artery and celiac plexus are also covered by the stent graft. 5. No definite acute bowel abnormality. No oral contrast was given and therefore evaluation is very limited. The patient also has very little intra-abdominal fat. The full report of this exam was immediately signed and available to the emergency room. The patient is currently in the emergency room. Signed by Dr Elio Mccray on 9/12/2020 1:10 PM    Xr Chest Portable    Result Date: 9/17/2020  XR CHEST PORTABLE 9/17/2020 10:09 AM HISTORY: Status post pacemaker placement COMPARISON: Chest exam dated 9/12/2020. FINDINGS: New left chest wall dual chamber pacemaker. Leads appear in good position. No lung consolidation. No pleural effusion or pneumothorax. The cardiomediastinal silhouette and pulmonary vascularity are within normal limits. The osseous structures and surrounding soft tissues demonstrate no acute abnormality. 1. New left chest wall dual chamber pacemaker. No pneumothorax. Signed by Dr Arnie Brooke on 9/17/2020 11:49 AM    Xr Chest Portable    Result Date: 9/12/2020  EXAMINATION:  XR CHEST PORTABLE  9/12/2020 12:13 PM HISTORY: The patient fell. Chest pain. COMPARISON: 3/23/2016. FINDINGS:  There is no evidence of lung contusion or pneumothorax. There is no mediastinal widening. The heart is normal in size. The thoracic aorta is atheromatous. There is no dense infiltrate or effusion. There is mild biapical scarring. 1. Mild chronic changes. 2. No evidence of lung contusion, pneumothorax or mediastinal widening. 3. The patient has a chest CT already scheduled. Please see that report separately.  Signed by Dr Elio Mccray on 9/12/2020 12:14 PM    Vl Vessel Mapping Prior To Hemodialysis Access    Result Date: 9/18/2020  Vascular Upper Extremities Arterial Mapping and Upper Extremity Vein Mapping Procedure  Demographics   Patient Name  Darnell Correia Age                67                W   Patient       [id number]        Gender             Male  Number   Visit Number  [id number]     Interpreting       Laurent Hidalgo MD                              Physician   Date of Birth 1945    Referring          Inna Mace                              Physician   Accession     [id number]    Sonographer        [address] [facility]  Number                                         RVS, RCS  Procedure Type of Study:   Extremities Arteries:Upper Extremities Arterial Mapping. Veins:Upper Extremity Vein Mapping, VAS NIV VES MAP PRIOR TO HEMODIALYSIS  ACCESS. Indications for Study:Pre-op vein mapping for dialysis access. Risk Factors   - The patient's risk factor(s) include: diabetes mellitus and arterial     hypertension.   - Current - Every day. - The patient's last creatinine was 4 mg/dl. Allergies   - Sulfa drugs. Impression   Bilateral upper extremity venous mapping with sizes as noted. Arterial  sizes and pressures as noted. Left upper arm cephalic vein occluded. Signature   ----------------------------------------------------------------  Electronically signed by Laurent Hidalgo MD(Interpreting  physician) on 09/18/2020 11:35 AM  ----------------------------------------------------------------  Blood Pressure:Right arm 130/48 mmHg. Velocities are measured in cm/s ; Diameters are measured in mm Right Upper Extremities Arterial Mapping +------------------------+----+-------------+---------------------+--------+ ! Location                ! PSV ! AP Diam      !Trans Diam           !Quality ! +------------------------+----+-------------+---------------------+--------+ ! Prox Brachial           !93. 2!             !                     !        ! +------------------------+----+-------------+---------------------+--------+ ! Dist Brachial           !86. 8! ! 5                    !        ! +------------------------+----+-------------+---------------------+--------+ ! Prox Radial             !86.8!             !                     !        ! +------------------------+----+-------------+---------------------+--------+ ! Mid Radial              !103 !             !                     !        ! +------------------------+----+-------------+---------------------+--------+ ! Dist Radial             !169 !             !3. 2                  !        ! +------------------------+----+-------------+---------------------+--------+ ! Prox Ulnar              !72.7!             !                     !        ! +------------------------+----+-------------+---------------------+--------+ ! Mid Ulnar               !82.7!             !                     !        ! +------------------------+----+-------------+---------------------+--------+ ! Dist Ulnar              !78.6!             !2.4                  !        ! +------------------------+----+-------------+---------------------+--------+ Left Upper Extremities Arterial Mapping +------------------------+----+-------------+---------------------+--------+ ! Location                ! PSV ! AP Diam      !Trans Diam           !Quality ! +------------------------+----+-------------+---------------------+--------+ ! Prox Brachial           !105 !             !                     !        ! +------------------------+----+-------------+---------------------+--------+ ! Dist Brachial           !99. 8!             !7.6                  !        ! +------------------------+----+-------------+---------------------+--------+ ! Prox Radial             !96.2!             !                     !        ! +------------------------+----+-------------+---------------------+--------+ ! Mid Radial              !135 !             !                     !        ! +------------------------+----+-------------+---------------------+--------+ ! Dist Radial             !174 !             !2. 6 !        ! +------------------------+----+-------------+---------------------+--------+ ! Prox Ulnar              ! 125 !             !                     !        ! +------------------------+----+-------------+---------------------+--------+ ! Mid Ulnar               ! 118 !             !                     !        ! +------------------------+----+-------------+---------------------+--------+ ! Dist Ulnar              ! 122 !             !3. 2                  !        ! +------------------------+----+-------------+---------------------+--------+ Velocities are measured in cm/s ; Diameters are measured in mm Cephalic Mapping +------------------++--------+----------+-----++--------+----------+-------+ ! ! !Right   ! ! Left !!        !          !       ! +------------------++--------+----------+-----++--------+----------+-------+ ! Location          ! !AP Diam.! Trans Diam!Depth! !AP Diam.! Trans Diam!Depth  ! +------------------++--------+----------+-----++--------+----------+-------+ ! Cephalic at UA    !!        !2.9       !     !!        !3.8       !       ! +------------------++--------+----------+-----++--------+----------+-------+ ! Cephalic at Mid UA!!        !2.1       ! !!        !          !       ! +------------------++--------+----------+-----++--------+----------+-------+ ! Cephalic at AF    !!        !3.6       !     !!        !          !       ! +------------------++--------+----------+-----++--------+----------+-------+ ! Cephalic at Mid LA!!        !2.9       !     !!        !2.4       !       ! +------------------++--------+----------+-----++--------+----------+-------+ ! Cephalic at Wrist !!        !2.7       !     !!        !3         !       ! +------------------++--------+----------+-----++--------+----------+-------+ Basilic Mapping +------------------++-------+-----------+-----++-------+-----------+-------+ ! ! !Right  ! ! Left !!       !

## 2020-09-22 NOTE — PLAN OF CARE
Problem: Skin Integrity:  Goal: Will show no infection signs and symptoms  Description: Will show no infection signs and symptoms  9/22/2020 0054 by Lalo Spear RN  Outcome: Ongoing  9/21/2020 1215 by Alec Valencia RN  Outcome: Ongoing  Goal: Absence of new skin breakdown  Description: Absence of new skin breakdown  9/22/2020 0054 by Lalo Spear RN  Outcome: Ongoing  9/21/2020 1215 by Alec Valencia RN  Outcome: Ongoing     Problem: Falls - Risk of:  Goal: Will remain free from falls  Description: Will remain free from falls  9/22/2020 0054 by Lalo Spear RN  Outcome: Ongoing  9/21/2020 1215 by Alec Valencia RN  Outcome: Ongoing  Goal: Absence of physical injury  Description: Absence of physical injury  9/22/2020 0054 by Lalo Spear RN  Outcome: Ongoing  9/21/2020 1215 by Alec Valencia RN  Outcome: Ongoing     Problem: Pain:  Goal: Pain level will decrease  Description: Pain level will decrease  9/22/2020 0054 by Lalo Spear RN  Outcome: Ongoing  9/21/2020 1215 by Alec Valencia RN  Outcome: Ongoing  Goal: Control of acute pain  Description: Control of acute pain  9/22/2020 0054 by Lalo Spear RN  Outcome: Ongoing  9/21/2020 1215 by Alec Valencia RN  Outcome: Ongoing  Goal: Control of chronic pain  Description: Control of chronic pain  9/22/2020 0054 by Lalo Spear RN  Outcome: Ongoing  9/21/2020 1215 by Alec Valencia RN  Outcome: Ongoing     Problem: Cardiac:  Goal: Ability to maintain vital signs within normal range will improve  Description: Ability to maintain vital signs within normal range will improve  9/22/2020 0054 by Lalo Spear RN  Outcome: Ongoing  9/21/2020 1215 by Alec Valencia RN  Outcome: Ongoing  Goal: Cardiovascular alteration will improve  Description: Cardiovascular alteration will improve  9/22/2020 0054 by Lalo Spear RN  Outcome: Ongoing  9/21/2020 1215 by Alec Valencia RN  Outcome: Ongoing     Problem: Physical Regulation:  Goal: Complications related to the disease process, condition or treatment will be avoided or minimized  Description: Complications related to the disease process, condition or treatment will be avoided or minimized  9/22/2020 0054 by Josh Torres RN  Outcome: Ongoing  9/21/2020 1215 by Casimiro Finley RN  Outcome: Ongoing

## 2020-09-22 NOTE — PROGRESS NOTES
Hospitalist Progress Note  [facility]     Patient: Bartolo Whitaker  : 1945  MRN: [id number]  Code Status: [facility] Day: 9   Date of Service: 2020    Subjective:   Pt seen and examined. Returned from or s/p right av graft creation. No current complaints.      Past Medical History:   Diagnosis Date    Anemia in chronic kidney disease (CODE)     Atherosclerotic heart disease     [facility]     CAD (coronary artery disease)     Cancer ([facility]     Chronic kidney disease     COPD [facility]chronic obstructive pulmonary disease) [facility]     Diabetes mellitus (HCC)     End stage renal disease (HCC)     Enterocolitis due to Clostridioides difficile     GERD (gastroesophageal reflux disease)     Hemodialysis patient ([facility]     Hyperlipidemia     Hypertension     Other chronic pain     Palliative care patient 09/15/2020    [facility]     Vitamin D deficiency        Past Surgical History:   Procedure Laterality Date    ABDOMEN SURGERY      esophageal CA, used part of stomach     APPENDECTOMY      [facility]    CARDIAC CATHETERIZATION      [facility], had stents, unknown details    URETEROSTOMY         Family History   Problem Relation Age of Onset    Diabetes Mother        Social History     Socioeconomic History    Marital status:      Spouse name: Not on file    Number of children: Not on file    Years of education: Not on file    Highest education level: Not on file   Occupational History    Not on file   Social Needs    Financial resource strain: Not on file    Food insecurity     Worry: Not on file     Inability: Not on file    Transportation needs     Medical: Not on file     Non-medical: Not on file   Tobacco Use    Smoking status: Current Every Day Smoker     Packs/day: 2.00     Years: 55.00     Pack years: 110.00    Smokeless tobacco: Current User   Substance and Sexual Activity    Alcohol use: No    Drug use: No    injection 10 mg  10 mg Intravenous Q4H PRN Nick Glaze, DO   10 mg at 09/15/20 2210    oxyCODONE (ROXICODONE) immediate release tablet 5 mg  5 mg Oral Q4H PRN Nick Glaze, DO   5 mg at 09/21/20 2122    ipratropium (ATROVENT) 0.02 % nebulizer solution 0.5 mg  0.5 mg Nebulization 4x daily Nick Glaze, DO   0.5 mg at 09/21/20 1109    acetaminophen (TYLENOL) suppository 650 mg  650 mg Rectal Q6H PRN Nick Glaze, DO        magnesium hydroxide (MILK OF MAGNESIA) 400 MG/5ML suspension 30 mL  30 mL Oral Daily PRN Nick Glaze, DO        promethazine (PHENERGAN) tablet 12.5 mg  12.5 mg Oral Q6H PRN Nick Glaze, DO        Or    ondansetron [facility]) injection 4 mg  4 mg Intravenous Q6H PRN Nick Glaze, DO   4 mg at 09/17/20 1054    morphine injection 2 mg  2 mg Intravenous Q4H PRN Nick Glaze, DO   2 mg at 09/17/20 2230    glucose (GLUTOSE) 40 % oral gel 15 g  15 g Oral PRN Nick Glaze, DO        dextrose 50 % IV solution  12.5 g Intravenous PRN Nick Glaze, DO   12.5 g at 09/15/20 0415    glucagon (rDNA) injection 1 mg  1 mg Intramuscular PRN Nick Glaze, DO        dextrose 5 % solution  100 mL/hr Intravenous PRN Nick Glaze, DO        insulin lispro (HUMALOG) injection vial 0-6 Units  0-6 Units Subcutaneous TID R [address] Nick Glaze, DO   1 Units at 09/20/20 1758    insulin lispro (HUMALOG) injection vial 0-3 Units  0-3 Units Subcutaneous Nightly Nick Glaze, DO   1 Units at 09/21/20 2122         sodium chloride 100 mL/hr at 09/21/20 2037    dextrose          Objective:   /70   Pulse 103   Temp 97.8 °F (36.6 °C) (Temporal)   Resp 18   Ht 5' 7\" (1.702 m)   Wt 109 lb 9 oz (49.7 kg)   SpO2 97%   BMI 17.16 kg/m²     General: no acute distress  HEENT: normocephalic, atraumatic  Neck: supple, symmetrical, trachea midline   Lungs: clear to auscultation bilaterally   Cardiovascular: s1 and s2 normal  Abdomen: soft, positive bowel sounds, nondistended  Extremities: no edema or cyanosis   Neuro: no focal deficits   Skin: normal color and texture    Recent Labs     09/19/20 0321 09/20/20 0347 09/21/20  0304   WBC 4.9 4.4* 4.9   RBC 3.07* 3.05* 2.99*   HGB 9.7* 9.4* 9.4*   HCT 31.9* 29.8* 29.2*   .9* 97.7* 97.7*   MCH 31.6* 30.8 31.4*   MCHC 30.4* 31.5* 32.2*    94* 115*     Recent Labs     09/19/20 0321 09/20/20  0347 09/21/20  0304    139 139   K 4.1 4.2 4.3   ANIONGAP 18 12 15    101 101   CO2 22 26 23   BUN 44* 34* 47*   CREATININE 4.2* 3.1* 4.1*   GLUCOSE 104 120* 137*   CALCIUM 8.6* 8.5* 8.4*     Recent Labs     09/19/20 0321 09/20/20  0347 09/21/20  0304   MG 2.3 2.3 2.5*   PHOS  --  3.8 3.4     Recent Labs     09/20/20 0347 09/21/20  0304   AST 8 8   ALT <5* <5*   BILITOT <0.2 <0.2   ALKPHOS 57 57     No results for input(s): PH, PO2, PCO2, HCO3, BE, O2SAT in the last 72 hours. No results for input(s): TROPONINI in the last 72 hours. No results for input(s): INR in the last 72 hours. No results for input(s): LACTA in the last 72 hours. Intake/Output Summary (Last 24 hours) at 9/21/2020 2224  Last data filed at 9/21/2020 1942  Gross per 24 hour   Intake 250 ml   Output 1700 ml   Net -1450 ml       No results found.      Assessment and Plan:   1) symptomatic bradycardia  -cards following  -ppm placed 9/17/2020 per dr Ty Lopez  -avoid offending agents  -h/o cad s/p pci  -h/o moderate to severe as  -h/o prior aaa endovascular repair     2) type 1 odontoid fracture  -s/p fall   -neurosurgery following, since signed off   -maintain hard collar at least 6w  -no plans for surgery     3) esrd  -lue fistula malfunctioning   -vascular surgery placed left femoral non-tunneled hd catheter 9/16/2020  -s/p right av graft creation 9/21/2020 per dr Abdiel Slaughter  -potential plans for permacath   -hd per renal  -follow renal/fxn/uop/lytes  -avoid offending agents  -follow plt     4) acute cystitis   -unasyn     5) concern for endoleak  -as per ct a/p 9/12/2020: Patent aortoiliac stent graft. The native aortic lumen measures 5.5 cm transversely. There is some contrast enhancement anteriorly in the native lumen suggesting endoleak. I do not see a connecting vessel that is patent at the endoleak.  The superior mesenteric artery and celiac plexus are also covered by the stent graft  -discussed with vascular surgery  -pt unsure where procedure was performed      6) dm2  -meds on board      7) dvt ppx  -heparin     Baltazar Savage MD   9/21/2020 10:24 PM

## 2020-09-22 NOTE — PROGRESS NOTES
Vascular Surgery  Dr. Nick Garza   Daily Progress Note    Pt Name: Marty Nation Record Number: [id number]  Date of Birth 1945   Today's Date: 9/22/2020        SUBJECTIVE:     Patient was seen and examined while undergoing hemodialysis. Developed small hematoma at graft site, right upper arm. OBJECTIVE:     Patient Vitals for the past 24 hrs:   BP Temp Temp src Pulse Resp SpO2 Weight   09/22/20 0600 -- -- -- -- -- -- 202 lb 9.6 oz (91.9 kg)   09/22/20 0313 104/62 97.9 °F (36.6 °C) Temporal 97 18 95 % --   09/21/20 2332 111/61 97.6 °F (36.4 °C) Temporal 92 18 98 % --   09/21/20 2239 111/64 97.7 °F (36.5 °C) Temporal 95 16 95 % --   09/21/20 2136 109/70 97.8 °F (36.6 °C) Temporal 103 18 97 % --   09/21/20 2034 131/75 97.5 °F (36.4 °C) Temporal 101 18 94 % --   09/21/20 1941 118/67 98.1 °F (36.7 °C) Temporal 101 18 94 % --   09/21/20 1925 (!) 113/44 -- -- 104 15 93 % --   09/21/20 1920 (!) 120/49 97.8 °F (36.6 °C) Temporal 104 12 93 % --   09/21/20 1915 (!) 110/43 -- -- 105 18 95 % --   09/21/20 1910 134/68 -- -- 105 14 96 % --   09/21/20 1905 (!) 125/49 -- -- 102 18 95 % --   09/21/20 1900 (!) 124/46 -- -- 102 23 95 % --   09/21/20 1855 (!) 116/44 -- -- 96 16 96 % --   09/21/20 1850 (!) 114/43 -- -- 97 16 98 % --   09/21/20 1845 (!) 125/45 98.5 °F (36.9 °C) Temporal 95 12 100 % --   09/21/20 1213 117/65 -- -- 86 -- -- --         Intake/Output Summary (Last 24 hours) at 9/22/2020 1049  Last data filed at 9/22/2020 0710  Gross per 24 hour   Intake 800 ml   Output 1080 ml   Net -280 ml       In: 800 [P.O.:550; I.V.:250]  Out: 480 [Urine:450]    I/O last 3 completed shifts: In: 550 [P.O.:300;  I.V.:250]  Out: 1080 [Urine:1050; Blood:30]     Date 09/22/20 0000 - 09/22/20 2359   Shift 9482-4047 5374-1040 2488-8070 24 Hour Total   INTAKE   P.O. 550   550   Shift Total(mL/kg) 550(6)   550(6)   OUTPUT   Shift Total(mL/kg)       Weight (kg) 91.9 91.9 91.9 91.9       Wt Readings from Last 3 Encounters: 09/22/20 202 lb 9.6 oz (91.9 kg)   03/25/16 113 lb 12.8 oz (51.6 kg)        Body mass index is 31.73 kg/m². Diet: DIET RENAL; Carb Control: 4 carb choices (60 gms)/meal; Renal        MEDS:     Scheduled Meds:   sodium chloride flush  10 mL Intravenous 2 times per day    darbepoetin umesh-polysorbate  100 mcg Subcutaneous Weekly - Monday    ampicillin-sulbactam  1.5 g Intravenous Q12H    heparin (porcine)  5,000 Units Subcutaneous 3 times per day    ipratropium  0.5 mg Nebulization 4x daily    insulin lispro  0-6 Units Subcutaneous TID WC    insulin lispro  0-3 Units Subcutaneous Nightly     Continuous Infusions:   sodium chloride 100 mL/hr at 09/21/20 2037    dextrose       PRN Meds:sodium chloride flush, 10 mL, PRN  acetaminophen, 650 mg, Q4H PRN  docusate sodium, 100 mg, BID PRN  hydrALAZINE, 10 mg, Q4H PRN  oxyCODONE, 5 mg, Q4H PRN  acetaminophen, 650 mg, Q6H PRN  magnesium hydroxide, 30 mL, Daily PRN  promethazine, 12.5 mg, Q6H PRN    Or  ondansetron, 4 mg, Q6H PRN  morphine, 2 mg, Q4H PRN  glucose, 15 g, PRN  dextrose, 12.5 g, PRN  glucagon (rDNA), 1 mg, PRN  dextrose, 100 mL/hr, PRN          PHYSICAL EXAM:     CONSTITUTIONAL: Alert and oriented times 3, no acute distress and cooperative to examination. LUNGS: Clear bilateral breath sounds without wheezes or rhonchi. CARDIOVASCULAR: Normal HT with RRR. 2/6 systolic murmur, no gallops or rubs. ABDOMEN: Soft, non-tender with active bowel sounds x 4. NEUROLOGIC: Grossly intact. WOUND/INCISION: Non-tunneled temporary dialysis catheter - left groin. Left subclavian pacer site with dressing dry and intact. Moderate ecchymosis noted. EXTREMITY: No clubbing or cyanosis. No peripheral edema. Peripheral pulses palpable. Left upper arm AV fistula - unable to palpate thrill. No bruit auscultated. Right hand cool with palpable radial pulse.      LABS:     CBC:   Recent Labs     09/20/20  0347 09/21/20  0304 09/22/20  0253   WBC 4.4* 4.9 5.9   RBC 3.05* 2.99* 2.94*   HGB 9.4* 9.4* 9.2*   HCT 29.8* 29.2* 30.1*   MCV 97.7* 97.7* 102.4*   MCH 30.8 31.4* 31.3*   MCHC 31.5* 32.2* 30.6*   RDW 14.6* 14.6* 14.6*   PLT 94* 115* 182   MPV 11.5 11.5 9.6      Last 3 CMP:   Recent Labs     09/20/20  0347 09/21/20  0304 09/22/20  0253    139 137   K 4.2 4.3 4.7    101 104   CO2 26 23 18*   BUN 34* 47* 51*   CREATININE 3.1* 4.1* 4.3*   GLUCOSE 120* 137* 142*   CALCIUM 8.5* 8.4* 8.5*   PROT 5.3* 5.4*  --    LABALBU 3.1* 3.1*  --    BILITOT <0.2 <0.2  --    ALKPHOS 57 57  --    AST 8 8  --    ALT <5* <5*  --       Calcium:   Lab Results   Component Value Date    CALCIUM 8.5 09/22/2020    CALCIUM 8.4 09/21/2020    CALCIUM 8.5 09/20/2020        DVT prophylaxis: Heparin 5000 units sq q8h          ASSESSMENT:     1.         AV Fistula Malfunction, Left Upper Arm - S/P Placement of a dual-lumen non-tunneled left femoral vein dialysis catheter on 09/16/2020  2. Bradycardia - S/P Permanent Pacer on 09/17/2020  3. Fall at Home  4. Proteus, E-coli UTI - on Unasyn  5.         DM, Type 2  6. Essential HTN  7. CAD  8. DM, Type 2      PLAN:     1. D/C Temporary Dialysis when Able to Use AV graft. Patient seen in conjunction with Dr. Lina Rogers.        Angelia Goldstein, APRN-BC

## 2020-09-23 NOTE — PROGRESS NOTES
Hospitalist Progress Note    Patient:  Nino Denis  YOB: 1945  Date of Service: 9/23/2020  MRN: [id number]   Acct: [de-identified]   Primary Care Physician: Unknown Provider Result (Inactive)  Advance Directive: DNR-CC  Admit Date: 9/12/2020       Hospital Day: 11  Referring Provider: Kyle Taylor DO    Patient Seen, Chart, Consults, Notes, Labs, Radiology studies reviewed. Subjective:  Nino Denis is a 67 y.o. male  whom we are following for end-stage renal disease, type I odontoid fracture, status post pacemaker. He still remains fairly weak. Still having problems with malnutrition. He has a temporary catheter in his left groin. They used his graft in his right arm for dialysis yesterday. They are going to attempt to use it again tomorrow. He refuses to wear a cervical collar.     Allergies:  Sulfa antibiotics    Medicines:  Current Facility-Administered Medications   Medication Dose Route Frequency Provider Last Rate Last Dose    0.45 % sodium chloride infusion   Intravenous Continuous Garo Mccormack  mL/hr at 09/21/20 2037      sodium chloride flush 0.9 % injection 10 mL  10 mL Intravenous 2 times per day Garo Mccormack DO   10 mL at 09/22/20 2124    sodium chloride flush 0.9 % injection 10 mL  10 mL Intravenous PRN Garo Mccormack DO        acetaminophen (TYLENOL) tablet 650 mg  650 mg Oral Q4H PRN Garo Mccormack DO        darbepoetin umesh-polysorbate SEVEN [facility]) injection 100 mcg  100 mcg Subcutaneous Weekly - Monday Adelina Eagle MD   100 mcg at 09/21/20 1100    ampicillin-sulbactam (UNASYN) 1.5 g IVPB minibag  1.5 g Intravenous Q12H Garo Mccormack DO   Stopped at 09/23/20 4823    heparin (porcine) injection 5,000 Units  5,000 Units Subcutaneous 3 times per day Garo Mccormack DO   5,000 Units at 09/23/20 2070    docusate sodium (COLACE) capsule 100 mg  100 mg Oral BID PRN Garo Mccormack DO   100 mg at 09/22/20 2128    hydrALAZINE (APRESOLINE) injection 10 mg  10 mg Intravenous Q4H PRN Halima Bouillon, DO   10 mg at 09/15/20 2210    oxyCODONE (ROXICODONE) immediate release tablet 5 mg  5 mg Oral Q4H PRN Halima Bouillon, DO   5 mg at 09/22/20 2128    ipratropium (ATROVENT) 0.02 % nebulizer solution 0.5 mg  0.5 mg Nebulization 4x daily Halima Bouillon, DO   0.5 mg at 09/23/20 1440    acetaminophen (TYLENOL) suppository 650 mg  650 mg Rectal Q6H PRN Halima Bouillon, DO        magnesium hydroxide (MILK OF MAGNESIA) 400 MG/5ML suspension 30 mL  30 mL Oral Daily PRN Halima Bouillon, DO        promethazine (PHENERGAN) tablet 12.5 mg  12.5 mg Oral Q6H PRN Halima Bouillon, DO        Or    ondansetron [facility]) injection 4 mg  4 mg Intravenous Q6H PRN Halima Bouillon, DO   4 mg at 09/17/20 1054    morphine injection 2 mg  2 mg Intravenous Q4H PRN Halima Bouillon, DO   2 mg at 09/17/20 2230    glucose (GLUTOSE) 40 % oral gel 15 g  15 g Oral PRN Halima Bouillon, DO        dextrose 50 % IV solution  12.5 g Intravenous PRN Halima Bouillon, DO   12.5 g at 09/15/20 0415    glucagon (rDNA) injection 1 mg  1 mg Intramuscular PRN Halima Bouillon, DO        dextrose 5 % solution  100 mL/hr Intravenous PRN Halima Bouillon, DO        insulin lispro (HUMALOG) injection vial 0-6 Units  0-6 Units Subcutaneous TID [facility] Halima Bouillon, DO   1 Units at 09/20/20 1758    insulin lispro (HUMALOG) injection vial 0-3 Units  0-3 Units Subcutaneous Nightly Halima Bouillon, DO   1 Units at 09/22/20 2129       Past Medical History:  Past Medical History:   Diagnosis Date    Anemia in chronic kidney disease (CODE)     Atherosclerotic heart disease     [facility]     CAD (coronary artery disease)     Cancer [facility]     Chronic kidney disease     COPD [facility]chronic obstructive pulmonary disease) [facility]     Diabetes mellitus [facility]     End stage renal disease ([facility]     Enterocolitis due to Clostridioides difficile     GERD (gastroesophageal reflux disease)     [facility]od[facility]     Hyperlipidemia     Hypertension     Other chronic pain     Palliative care patient 09/15/2020    Prostate cancer ([facility]     Vitamin D deficiency        Past Surgical History:  Past Surgical History:   Procedure Laterality Date    ABDOMEN SURGERY      esophageal CA, used part of stomach     APPENDECTOMY      [facility]    CARDIAC CATHETERIZATION      [facility], had stents, unknown details    DIALYSIS FISTULA CREATION Right 9/21/2020    CREATION RIGHT AV GRAFT performed by Velia Church DO at [address] URETEROSTOMY         Family History  Family History   Problem Relation Age of Onset    Diabetes Mother        Social History  Social History     Socioeconomic History    Marital status:      Spouse name: Not on file    Number of children: Not on file    Years of education: Not on file    Highest education level: Not on file   Occupational History    Not on file   Social Needs    Financial resource strain: Not on file    Food insecurity     Worry: Not on file     Inability: Not on file    Transportation needs     Medical: Not on file     Non-medical: Not on file   Tobacco Use    Smoking status: Current Every Day Smoker     Packs/day: 2.00     Years: 55.00     Pack years: 110.00    Smokeless tobacco: Current User   Substance and Sexual Activity    Alcohol use: No    Drug use: No    Sexual activity: Not Currently   Lifestyle    Physical activity     Days per week: Not on file     Minutes per session: Not on file    Stress: Not on file   Relationships    Social connections     Talks on phone: Not on file     Gets together: Not on file     Attends Jain service: Not on file     Active member of club or organization: Not on file     Attends meetings of clubs or organizations: Not on file     Relationship status: Not on file    Intimate partner violence     Fear of current or ex partner: Not on file     Emotionally abused: Not on file     Physically abused: Not on file     Forced sexual activity: Not on file   Other Topics Concern    Not on file   Social History Narrative    Not on file         Review of Systems:    Review of Systems   Constitutional: Negative for activity change and fever. Respiratory: Negative for shortness of breath. Cardiovascular: Negative for chest pain and leg swelling. Gastrointestinal: Negative for constipation, diarrhea, nausea and vomiting. Genitourinary: Negative for difficulty urinating and dysuria. Musculoskeletal: Negative for back pain and neck pain. Neurological: Negative for dizziness and light-headedness. Objective:  Blood pressure (!) 106/56, pulse 104, temperature 98.6 °F (37 °C), temperature source Temporal, resp. rate 16, height 5' 7\" (1.702 m), weight 109 lb 2 oz (49.5 kg), SpO2 97 %. Intake/Output Summary (Last 24 hours) at 9/23/2020 1723  Last data filed at 9/23/2020 1423  Gross per 24 hour   Intake 700 ml   Output 200 ml   Net 500 ml       Physical Exam  Vitals signs reviewed. Constitutional:       Appearance: He is well-developed. HENT:      Head: Normocephalic and atraumatic. Eyes:      Conjunctiva/sclera: Conjunctivae normal.      Pupils: Pupils are equal, round, and reactive to light. Cardiovascular:      Rate and Rhythm: Normal rate and regular rhythm. Heart sounds: Normal heart sounds. Pulmonary:      Effort: Pulmonary effort is normal.      Breath sounds: Normal breath sounds. Abdominal:      General: Abdomen is flat. Palpations: Abdomen is soft. Skin:     General: Skin is warm and dry. Neurological:      Mental Status: He is alert and oriented to person, place, and time.          Labs:  BMP:   Recent Labs     09/21/20  0304 09/22/20  0253 09/23/20  0229    137 139   K 4.3 4.7 3.7    104 101   CO2 23 18* 24   PHOS 3.4  --   --    BUN 47* 51* 28*   CREATININE 4.1* 4.3* 3.2*   CALCIUM 8.4* atrophy. There is moderate to severe dilatation of the lateral and third ventricles. There is low-density in the hemispheric white matter that is nonspecific. The visualized paranasal sinuses and mastoid air cells are clear. No calvarial fracture is seen. There is biparietal calvarial thinning. 1. No hemorrhage, edema or mass effect. 2. Moderate to severe atrophy. Moderate to severe dilatation of the lateral and third ventricles is likely related. Hydrocephalus is not excluded. 3. Low-density in the hemispheric white matter is nonspecific and most likely due to chronic small vessel disease. The full report of this exam was immediately signed and available to the emergency room. The patient is currently in the emergency room. Signed by Dr Dimitri Navarrete on 9/12/2020 12:56 PM    Ct Chest W Contrast    Result Date: 9/12/2020  EXAMINATION:  CT CHEST W CONTRAST  9/12/2020 1:11 PM HISTORY: The patient fell. Anterior chest skin tear. Hypotension during dialysis. Normal chest x-ray. COMPARISON: No comparison study. DLP: 320 mGy-cm. Automated exposure control was utilized. TECHNIQUE: Spiral CT was performed of the chest with contrast. Multiplanar images were reconstructed. MEDIASTINUM/VASCULAR: There is atheromatous calcification of the thoracic aorta and coronary arteries. Heart size is upper limits of normal. Pulmonary arteries are normal in caliber. There are no enlarged mediastinal lymph nodes. There is a moderate size hiatal hernia. There is wall thickening of the esophagus in the chest diffusely. LUNGS: There is centrilobular emphysema. There is paraseptal emphysema. There are few scattered calcified granulomas. There is no airspace consolidation or pleural effusion. There is no pneumothorax or lung contusion. BONES AND SOFT TISSUES: The clavicles are intact. The scapulae are intact. No definite rib fracture is seen. No visible acute thoracic spine fracture is seen. There are some degenerative changes of the spine. The bones are demineralized. The sternum appears to be intact. UPPER ABDOMEN: Please see the abdomen and pelvis CT report separately. 1. No evidence of pneumothorax or lung contusion. No mediastinal hematoma. 2. Atheromatous disease of the thoracic aorta and coronary arteries. Heart size is prominent. 3. Moderate size hiatal hernia. Diffuse thickening of the wall of the thoracic esophagus. Correlate with any history of esophagitis and reflux disease. 4. Centrilobular and paraseptal emphysema. Old granulomatous disease. 5. Demineralized bones. No definite acute fracture. The full report of this exam was immediately signed and available to the emergency room. The patient is currently in the emergency room. Signed by Dr Valeria Cardoso on 9/12/2020 1:16 PM    Ct Cervical Spine Wo Contrast    Result Date: 9/12/2020  EXAMINATION:  CT CERVICAL SPINE WO CONTRAST  9/12/2020 1:17 PM HISTORY: The patient fell. Rule out fracture. No x-rays obtained. TECHNIQUE: Spiral CT was performed of the cervical spine. Sagittal and coronal images were reconstructed. COMPARISON: No comparison study. DLP: 260 mGy-cm. Automated exposure control was utilized. FINDINGS: There is fairly prominent pannus formation along the posterior aspect of the dens. There is a fairly large dens erosion located posteriorly. There is a nondisplaced fracture line anteriorly that is likely related to the weakening of the dens related to the erosion. Therefore, this is likely a pathologic fracture. This is fairly high on the dens and I will classify this as a type I injury. There is no displacement. There is no disruption of the anterior vertebral line or the posterior spinolaminar line. No other cervical spine fracture is identified. There is fairly severe carotid artery calcification in the neck bilaterally. There is vertebral artery calcification on the left. At C2-3, there is moderate to severe disc narrowing. There is severe facet arthropathy.  There is mild to moderate foraminal narrowing bilaterally. No central spinal canal narrowing. At C3-4, there is moderate disc narrowing. There is minimal anterior subluxation of C3 compared to C4. There is bilateral uncinate spurring and bilateral facet arthropathy. There is severe bilateral foraminal stenosis at this level. At C4-5, there is moderate disc narrowing. There is uncinate spurring and facet arthropathy. The right-sided facet joint is fused. There is severe right and moderate left-sided foraminal stenosis. At C5-6, there is disc narrowing with spondylitic and uncinate spurring. There is bilateral facet arthropathy. There is no significant central spinal canal narrowing. There is mild to moderate bilateral foraminal stenosis. At C6-7, there is moderate to severe disc narrowing. There is spondylitic and uncinate spurring. There is anterior spurring. There is facet arthropathy left greater than right. There is mild narrowing of the central spinal canal at this level. There is moderate right and mild left foraminal stenosis. At C7-T1, there is moderate to severe disc narrowing. There is mild anterior subluxation of C7 compared to T1. There is some scoliosis of the cervical-thoracic Junction in this area. This causes moderate to severe left-sided foraminal stenosis at this level. There is disc degeneration at multiple thoracic disc levels included on the cervical spine CT. No significant spinal or foraminal stenosis is visualized. 1. Type I dens fracture without displacement. There is a fairly large erosion in the posterior dens related to pannus formation. The type one fracture is seen best on the sagittal images anterior to the erosion. Therefore, this is likely a pathologic fracture given the presence of the erosion. 2. Degenerative changes throughout the cervical spine. 3. Dense carotid artery calcification in the neck. There is also some calcification involving the left vertebral artery in the neck.  Results of the dens fracture were called to Dr. Opal Chatterjee in the emergency room at 1:30 PM on 9/12/2020. Signed by Dr Navarro Has on 9/12/2020 1:31 PM    Ct Abdomen Pelvis W Iv Contrast Additional Contrast? None    Result Date: 9/12/2020  EXAMINATION:  CT ABDOMEN PELVIS W IV CONTRAST  9/12/2020 1:00 PM HISTORY: The patient fell. Epigastric pain. Prior surgical resection of the urinary bladder and appendectomy. TECHNIQUE: Spiral CT was performed of the abdomen and pelvis with contrast. Multiplanar images were reconstructed. DLP: 320 mGy-cm. Automated exposure control was utilized. COMPARISON: No comparison study. LUNG BASES: The lung bases are clear. There is a moderate size hiatal hernia. LIVER AND SPLEEN: Prior cholecystectomy. There is intrahepatic and extrahepatic biliary tree dilatation probably related to reservoir effect. No visible obstructing stone is seen. The liver is otherwise unremarkable. The spleen is normal in size. PANCREAS: There is no pancreatic mass or inflammatory change. KIDNEYS AND ADRENALS: The adrenal glands are normal in size. There is cortical thinning of both kidneys. Both kidneys are small in size. The left kidney measures 5.8 cm and the right kidney measures 5.7 cm. The urinary bladder is surgically absent. There is an aortoiliac stent graft. The graft extends above the origins of both renal arteries. It also covers the origins of the celiac plexus and superior mesenteric arteries. The native aortic lumen measures 5.5 cm in transverse diameter. There is mild enhancement within the native lumen anteriorly suggesting an endoleak. The aortoiliac stent graft is patent. BOWEL: No oral contrast was given. There is underdistention of the stomach. Small bowel loops are nondilated. There is an ostomy in the right mid to lower abdomen likely related to the history of ureteral diversion. Some of the colon is underdistended. There is moderate to large stool in the colon.  OTHER: There are degenerative changes of the spine.    1. Prior cholecystectomy. Intrahepatic and extrahepatic biliary tree dilatation is likely related to reservoir effect. No visible calcified stone is seen within the ductal system. 2. Diffuse cortical thinning of both kidneys. The kidneys are small in size. The left kidney measures 5.8 cm on the right kidney measures 5.7 cm. The renal arteries are covered by the aortoiliac stent graft. 3. Prior bladder resection. An ostomy in the right lower abdomen/upper pelvis is likely a ureteral diversion. 4. Patent aortoiliac stent graft. The native aortic lumen measures 5.5 cm transversely. There is some contrast enhancement anteriorly in the native lumen suggesting endoleak. I do not see a connecting vessel that is patent at the endoleak. The superior mesenteric artery and celiac plexus are also covered by the stent graft. 5. No definite acute bowel abnormality. No oral contrast was given and therefore evaluation is very limited. The patient also has very little intra-abdominal fat. The full report of this exam was immediately signed and available to the emergency room. The patient is currently in the emergency room. Signed by Dr Apple Alvarenga on 9/12/2020 1:10 PM    Xr Chest Portable    Result Date: 9/12/2020  EXAMINATION:  XR CHEST PORTABLE  9/12/2020 12:13 PM HISTORY: The patient fell. Chest pain. COMPARISON: 3/23/2016. FINDINGS:  There is no evidence of lung contusion or pneumothorax. There is no mediastinal widening. The heart is normal in size. The thoracic aorta is atheromatous. There is no dense infiltrate or effusion. There is mild biapical scarring. 1. Mild chronic changes. 2. No evidence of lung contusion, pneumothorax or mediastinal widening. 3. The patient has a chest CT already scheduled. Please see that report separately. Signed by Dr Apple Alvarenga on 9/12/2020 12:14 PM       Assessment     Symptomatic bradycardia.   Status post permanent pacemaker.     Type I odontoid fracture of the cervical spine.  Hard cervical collar for 6 weeks. Lennie Coles No plans for surgery.     End-stage renal disease. Continue maintenance dialysis.     Type 2 diabetes. Monitor blood glucose. Severe malnutrition. Continue nutritional support.     COPD. Clinically stable.       Skylar Tang,

## 2020-09-23 NOTE — PROGRESS NOTES
Nephrology ([facility]) Progress Note    Patient:  Steven Cummings  YOB: 1945  Date of Service: 9/23/2020  MRN: [id number]   Acct: [de-identified]   Primary Care Physician: Unknown Provider Result (Inactive)  Advance Directive: DNR-CC  Admit Date: 9/12/2020       Hospital Day: 11  Referring Provider: Shane Bolivar DO    Patient independently seen and examined, Chart, Consults, Notes, Operative notes, Labs, Cardiology, and Radiology studies reviewed as available. Subjective:  Steven Cummings is a 67 y.o. male  whom we were consulted for ESRD. He was admitted after a near syncopal episode when he was on dialysis. He was found bradycardic.  At the emergency department, his heart rate was in the low 30s. He was admitted to the ICU and had a temporary transvenous pacemaker. Patient has also reported a fall 2 days ago with head trauma.  CT of his neck shows a type I dens fracture without displacement. He was seen by neurosurgery and had a neck brace. His hemodialysis access had high venous pressure with the recirculation problem. On September 16, he underwent femoral line placement for dialysis access. On September 17, he underwent permanent pacemaker implant. Transvenous pacemaker was removed. Today, no overnight events. Patient chronically hard of hearing but denied specific complaints including chest pain, shortness of air, nausea vomiting. Some difficulties in accessing new graft yesterday.     Allergies:  Sulfa antibiotics    Medicines:  Current Facility-Administered Medications   Medication Dose Route Frequency Provider Last Rate Last Dose    0.45 % sodium chloride infusion   Intravenous Continuous Lona Mckeon,  mL/hr at 09/21/20 2037      sodium chloride flush 0.9 % injection 10 mL  10 mL Intravenous 2 times per day Lona Tavaresu, DO   10 mL at 09/22/20 2124    sodium chloride flush 0.9 % injection 10 mL  10 mL Intravenous [facility]N [facility],         acetaminophen (TYLENOL) tablet 650 mg  650 mg Oral Q4H PRN Azucena Michaels, DO        darbepoetin umesh-polysorbate SEVEN [facility]) injection 100 mcg  100 mcg Subcutaneous Weekly - Monday Brandee Jones MD   100 mcg at 09/21/20 1100    ampicillin-sulbactam (UNASYN) 1.5 g IVPB minibag  1.5 g Intravenous Q12H Azucena Palmer,  mL/hr at 09/23/20 0822 1.5 g at 09/23/20 1710    heparin (porcine) injection 5,000 Units  5,000 Units Subcutaneous 3 times per day Azucena Palmer, DO   5,000 Units at 09/23/20 8938    docusate sodium (COLACE) capsule 100 mg  100 mg Oral BID PRN Azucena Michaels, DO   100 mg at 09/22/20 2128    hydrALAZINE (APRESOLINE) injection 10 mg  10 mg Intravenous Q4H PRN Azucena Michaels, DO   10 mg at 09/15/20 2210    oxyCODONE (ROXICODONE) immediate release tablet 5 mg  5 mg Oral Q4H PRN Azucena Michaels, DO   5 mg at 09/22/20 2128    ipratropium (ATROVENT) 0.02 % nebulizer solution 0.5 mg  0.5 mg Nebulization 4x daily Azucena Michaels, DO   0.5 mg at 09/23/20 1045    acetaminophen (TYLENOL) suppository 650 mg  650 mg Rectal Q6H PRN Azucena Michaels, DO        magnesium hydroxide (MILK OF MAGNESIA) 400 MG/5ML suspension 30 mL  30 mL Oral Daily PRN Azucena Michaels, DO        promethazine (PHENERGAN) tablet 12.5 mg  12.5 mg Oral Q6H PRN Azucena Michaels, DO        Or    ondansetron [facility]) injection 4 mg  4 mg Intravenous Q6H PRN Azucena Michaels, DO   4 mg at 09/17/20 1054    morphine injection 2 mg  2 mg Intravenous Q4H PRN Azucena Michaels, DO   2 mg at 09/17/20 2230    glucose (GLUTOSE) 40 % oral gel 15 g  15 g Oral PRN Azucena Michaels, DO        dextrose 50 % IV solution  12.5 g Intravenous PRN Azucena Palmer, DO   12.5 g at 09/15/20 0415    glucagon (rDNA) injection 1 mg  1 mg Intramuscular PRN Azucena Palmer, DO        dextrose 5 % solution  100 mL/hr Intravenous PRN Azucena Palmer,         insulin lispro (HUMALOG) injection vial 0-6 Units  0-6 Units Subcutaneous TID WC Karina Tomlinson DO   1 Units at 09/20/20 1758    insulin lispro (HUMALOG) injection vial 0-3 Units  0-3 Units Subcutaneous Nightly Karina Tomlinson DO   1 Units at 09/22/20 2129       Past Medical History:  Past Medical History:   Diagnosis Date    Anemia in chronic kidney disease (CODE)     Atherosclerotic heart disease     [facility]     CAD (coronary artery disease)     Cancer (HCC)     Chronic kidney disease     COPD [facility]chronic obstructive pulmonary disease) [facility]     Diabetes mellitus (HCC)     End stage renal disease (HCC)     Enterocolitis due to Clostridioides difficile     GERD (gastroesophageal reflux disease)     Hemodialysis patient ([facility]     Hyperlipidemia     Hypertension     Other chronic pain     Palliative care patient 09/15/2020    Prostate cancer ([facility]     Vitamin D deficiency        Past Surgical History:  Past Surgical History:   Procedure Laterality Date    ABDOMEN SURGERY      esophageal CA, used part of stomach     APPENDECTOMY      [facility]    [facility]ARDIAC CATHETERIZATION      [facility], had stents, unknown details    DIALYSIS FISTULA CREATION Right 9/21/2020    CREATION RIGHT AV GRAFT performed by Karina Tomlinson DO at [facility] URETEROSTOMY         Family History  Family History   Problem Relation Age of Onset    Diabetes Mother        Social History  Social History     Socioeconomic History    Marital status:      Spouse name: Not on file    Number of children: Not on file    Years of education: Not on file    Highest education level: Not on file   Occupational History    Not on file   Social Needs    Financial resource strain: Not on file    Food insecurity     Worry: Not on file     Inability: Not on file    Transportation needs     Medical: Not on file     Non-medical: Not on file   Tobacco Use    Smoking status: Current Every Day Smoker     Packs/day: 2.00 Years: 55.00     Pack years: 110.00    Smokeless tobacco: Current User   Substance and Sexual Activity    Alcohol use: No    Drug use: No    Sexual activity: Not Currently   Lifestyle    Physical activity     Days per week: Not on file     Minutes per session: Not on file    Stress: Not on file   Relationships    Social connections     Talks on phone: Not on file     Gets together: Not on file     Attends Sabianist service: Not on file     Active member of club or organization: Not on file     Attends meetings of clubs or organizations: Not on file     Relationship status: Not on file    Intimate partner violence     Fear of current or ex partner: Not on file     Emotionally abused: Not on file     Physically abused: Not on file     Forced sexual activity: Not on file   Other Topics Concern    Not on file   Social History Narrative    Not on file         Review of Systems:  History obtained from chart review and the patient  General ROS: No fever or chills  Respiratory ROS: No cough, shortness of breath, wheezing  Cardiovascular ROS: No chest pain or palpitations  Gastrointestinal ROS: No abdominal pain or melena  Genito-Urinary ROS: No dysuria or hematuria  Musculoskeletal ROS: No joint pain or swelling         Objective:  Patient Vitals for the past 24 hrs:   BP Temp Temp src Pulse Resp SpO2 Weight   09/23/20 1043 114/60 97 °F (36.1 °C) Temporal 97 16 95 % --   09/23/20 0655 (!) 96/52 97.2 °F (36.2 °C) Temporal 95 16 97 % 109 lb 2 oz (49.5 kg)   09/22/20 1945 (!) 119/50 97.6 °F (36.4 °C) Temporal 105 17 94 % --   09/22/20 1703 (!) 124/59 -- -- 109 14 98 % --   09/22/20 1600 -- -- -- 109 -- -- --   09/22/20 1400 -- -- -- 104 -- -- --   09/22/20 1237 (!) 92/52 -- -- -- -- -- --   09/22/20 1231 (!) 94/53 -- Temporal 109 16 97 % --       Intake/Output Summary (Last 24 hours) at 9/23/2020 1053  Last data filed at 9/22/2020 2154  Gross per 24 hour   Intake 629.16 ml   Output 1425 ml   Net -795.84 ml General: awake/alert   Chest:  clear to auscultation bilaterally  CVS: regular rate and rhythm  Abdominal: soft, nontender, normal bowel sounds  Extremities: no cyanosis or edema  Skin: warm and dry without rash    Labs:  BMP:   Recent Labs     09/21/20 0304 09/22/20 0253 09/23/20 0229    137 139   K 4.3 4.7 3.7    104 101   CO2 23 18* 24   PHOS 3.4  --   --    BUN 47* 51* 28*   CREATININE 4.1* 4.3* 3.2*   CALCIUM 8.4* 8.5* 8.6*     CBC:   Recent Labs     09/21/20 0304 09/22/20 0253 09/23/20 0229   WBC 4.9 5.9 5.3   HGB 9.4* 9.2* 8.6*   HCT 29.2* 30.1* 28.4*   MCV 97.7* 102.4* 103.6*   * 182 192     LIVER PROFILE:   Recent Labs     09/21/20 0304   AST 8   ALT <5*   BILITOT <0.2   ALKPHOS 57     PT/INR: No results for input(s): PROTIME, INR in the last 72 hours. APTT: No results for input(s): APTT in the last 72 hours. BNP:  No results for input(s): BNP in the last 72 hours. Ionized Calcium:No results for input(s): IONCA in the last 72 hours. Magnesium:  Recent Labs     09/21/20 0304 09/22/20 0253 09/23/20 0229   MG 2.5* 2.4 2.3     Phosphorus:  Recent Labs     09/21/20 0304   PHOS 3.4     HgbA1C: No results for input(s): LABA1C in the last 72 hours. Hepatic:   Recent Labs     09/21/20 0304   ALKPHOS 57   ALT <5*   AST 8   PROT 5.4*   BILITOT <0.2   LABALBU 3.1*     Lactic Acid: No results for input(s): LACTA in the last 72 hours. Troponin: No results for input(s): CKTOTAL, CKMB, TROPONINT in the last 72 hours. ABGs: No results found for: PHART, PO2ART, [id number]  CRP:  No results for input(s): CRP in the last 72 hours. Sed Rate:  No results for input(s): SEDRATE in the last 72 hours. Culture Results:   Blood Culture Recent: No results for input(s): BC in the last 720 hours.     Urine Culture Recent :   Recent Labs     09/12/20 2045   LABURIN >50,000 CFU/ml*  Light growth  Light growth  Light growth       Radiology reports as per the Radiologist  Radiology: Ct Head Wo Contrast    Result Date: 9/12/2020  EXAMINATION:  CT HEAD WO CONTRAST  9/12/2020 12:53 PM HISTORY: The patient fell. Rule out intracranial injury. TECHNIQUE: Multiple axial images were obtained through the brain without contrast infusion. Multiplanar images were reconstructed. DLP: 675 mGy-cm. Automated exposure control was utilized. COMPARISON: No comparison study. FINDINGS: There are no hemorrhage, edema or mass effect. There is moderate to severe atrophy. There is moderate to severe dilatation of the lateral and third ventricles. There is low-density in the hemispheric white matter that is nonspecific. The visualized paranasal sinuses and mastoid air cells are clear. No calvarial fracture is seen. There is biparietal calvarial thinning. 1. No hemorrhage, edema or mass effect. 2. Moderate to severe atrophy. Moderate to severe dilatation of the lateral and third ventricles is likely related. Hydrocephalus is not excluded. 3. Low-density in the hemispheric white matter is nonspecific and most likely due to chronic small vessel disease. The full report of this exam was immediately signed and available to the emergency room. The patient is currently in the emergency room. Signed by Dr Bk Kessler on 9/12/2020 12:56 PM    Ct Chest W Contrast    Result Date: 9/12/2020  EXAMINATION:  CT CHEST W CONTRAST  9/12/2020 1:11 PM HISTORY: The patient fell. Anterior chest skin tear. Hypotension during dialysis. Normal chest x-ray. COMPARISON: No comparison study. DLP: 320 mGy-cm. Automated exposure control was utilized. TECHNIQUE: Spiral CT was performed of the chest with contrast. Multiplanar images were reconstructed. MEDIASTINUM/VASCULAR: There is atheromatous calcification of the thoracic aorta and coronary arteries. Heart size is upper limits of normal. Pulmonary arteries are normal in caliber. There are no enlarged mediastinal lymph nodes. There is a moderate size hiatal hernia.  There is wall thickening of the There is mild enhancement within the native lumen anteriorly suggesting an endoleak. The aortoiliac stent graft is patent. BOWEL: No oral contrast was given. There is underdistention of the stomach. Small bowel loops are nondilated. There is an ostomy in the right mid to lower abdomen likely related to the history of ureteral diversion. Some of the colon is underdistended. There is moderate to large stool in the colon. OTHER: There are degenerative changes of the spine. 1. Prior cholecystectomy. Intrahepatic and extrahepatic biliary tree dilatation is likely related to reservoir effect. No visible calcified stone is seen within the ductal system. 2. Diffuse cortical thinning of both kidneys. The kidneys are small in size. The left kidney measures 5.8 cm on the right kidney measures 5.7 cm. The renal arteries are covered by the aortoiliac stent graft. 3. Prior bladder resection. An ostomy in the right lower abdomen/upper pelvis is likely a ureteral diversion. 4. Patent aortoiliac stent graft. The native aortic lumen measures 5.5 cm transversely. There is some contrast enhancement anteriorly in the native lumen suggesting endoleak. I do not see a connecting vessel that is patent at the endoleak. The superior mesenteric artery and celiac plexus are also covered by the stent graft. 5. No definite acute bowel abnormality. No oral contrast was given and therefore evaluation is very limited. The patient also has very little intra-abdominal fat. The full report of this exam was immediately signed and available to the emergency room. The patient is currently in the emergency room. Signed by Dr Natasha Langley on 9/12/2020 1:10 PM    Xr Chest Portable    Result Date: 9/17/2020  XR CHEST PORTABLE 9/17/2020 10:09 AM HISTORY: Status post pacemaker placement COMPARISON: Chest exam dated 9/12/2020. FINDINGS: New left chest wall dual chamber pacemaker. Leads appear in good position. No lung consolidation.  No pleural effusion or pneumothorax. The cardiomediastinal silhouette and pulmonary vascularity are within normal limits. The osseous structures and surrounding soft tissues demonstrate no acute abnormality. 1. New left chest wall dual chamber pacemaker. No pneumothorax. Signed by Dr Clay Sparks on 9/17/2020 11:49 AM    Xr Chest Portable    Result Date: 9/12/2020  EXAMINATION:  XR CHEST PORTABLE  9/12/2020 12:13 PM HISTORY: The patient fell. Chest pain. COMPARISON: 3/23/2016. FINDINGS:  There is no evidence of lung contusion or pneumothorax. There is no mediastinal widening. The heart is normal in size. The thoracic aorta is atheromatous. There is no dense infiltrate or effusion. There is mild biapical scarring. 1. Mild chronic changes. 2. No evidence of lung contusion, pneumothorax or mediastinal widening. 3. The patient has a chest CT already scheduled. Please see that report separately. Signed by Dr May Mackey on 9/12/2020 12:14 PM    Vl Vessel Mapping Prior To Hemodialysis Access    Result Date: 9/18/2020  Vascular Upper Extremities Arterial Mapping and Upper Extremity Vein Mapping Procedure  Demographics   Patient Name  Nancy Jamil  Age                67                W   Patient       [id number]        Gender             Male  Number   Visit Number  [id number]     Interpreting       Eduardo Gallegos MD                              Physician   Date of Birth 1945    Referring          Wilfredo Benz                              Physician   Accession     [id number]    Sonographer        Yudi [address] [place] [address]  Number                                         RVS, RCS  Procedure Type of Study:   Extremities Arteries:Upper Extremities Arterial Mapping. Veins:Upper Extremity Vein Mapping, VAS NIV VES MAP PRIOR TO HEMODIALYSIS  ACCESS. Indications for Study:Pre-op vein mapping for dialysis access.  Risk Factors   - The patient's risk factor(s) include: diabetes mellitus and arterial     hypertension.   - Current - Every day.   - The patient's last creatinine was 4 mg/dl. Allergies   - Sulfa drugs. Impression   Bilateral upper extremity venous mapping with sizes as noted. Arterial  sizes and pressures as noted. Left upper arm cephalic vein occluded. Signature   ----------------------------------------------------------------  Electronically signed by Vivian Mosher MD(Interpreting  physician) on 09/18/2020 11:35 AM  ----------------------------------------------------------------  Blood Pressure:Right arm 130/48 mmHg. Velocities are measured in cm/s ; Diameters are measured in mm Right Upper Extremities Arterial Mapping +------------------------+----+-------------+---------------------+--------+ ! Location                ! PSV ! AP Diam      !Trans Diam           !Quality ! +------------------------+----+-------------+---------------------+--------+ ! Prox Brachial           !93. 2!             !                     !        ! +------------------------+----+-------------+---------------------+--------+ ! Dist Brachial           !86. 8! ! 5                    !        ! +------------------------+----+-------------+---------------------+--------+ ! Prox Radial             !86.8!             !                     !        ! +------------------------+----+-------------+---------------------+--------+ ! Mid Radial              !103 !             !                     !        ! +------------------------+----+-------------+---------------------+--------+ ! Dist Radial             !169 !             !3. 2                  !        ! +------------------------+----+-------------+---------------------+--------+ ! Prox Ulnar              !72.7!             !                     !        ! +------------------------+----+-------------+---------------------+--------+ ! Mid Ulnar               !82.7!             !                     !        ! +------------------------+----+-------------+---------------------+--------+ ! Dist Ulnar ! 78. 6!             !2.4                  !        ! +------------------------+----+-------------+---------------------+--------+ Left Upper Extremities Arterial Mapping +------------------------+----+-------------+---------------------+--------+ ! Location                ! PSV ! AP Diam      !Trans Diam           !Quality ! +------------------------+----+-------------+---------------------+--------+ ! Prox Brachial           !105 !             !                     !        ! +------------------------+----+-------------+---------------------+--------+ ! Dist Brachial           !99. 8!             !7.6                  !        ! +------------------------+----+-------------+---------------------+--------+ ! Prox Radial             !96.2!             !                     !        ! +------------------------+----+-------------+---------------------+--------+ ! Mid Radial              !135 !             !                     !        ! +------------------------+----+-------------+---------------------+--------+ ! Dist Radial             !174 !             !2. 6                  !        ! +------------------------+----+-------------+---------------------+--------+ ! Prox Ulnar              ! 125 !             !                     !        ! +------------------------+----+-------------+---------------------+--------+ ! Mid Ulnar               ! 118 !             !                     !        ! +------------------------+----+-------------+---------------------+--------+ ! Dist Ulnar              ! 122 !             !3. 2                  !        ! +------------------------+----+-------------+---------------------+--------+ Velocities are measured in cm/s ; Diameters are measured in mm Cephalic Mapping +------------------++--------+----------+-----++--------+----------+-------+ ! ! !Right   ! ! Left !!        !          !       ! +------------------++--------+----------+-----++--------+----------+-------+ !Location          ! !AP Diam.! Trans Diam!Depth! !AP Diam.! Trans Diam!Depth  ! +------------------++--------+----------+-----++--------+----------+-------+ ! Cephalic at UA    !!        !2.9       !     !!        !3.8       !       ! +------------------++--------+----------+-----++--------+----------+-------+ ! Cephalic at Mid UA!!        !2.1       ! !!        !          !       ! +------------------++--------+----------+-----++--------+----------+-------+ ! Cephalic at AF    !!        !3.6       !     !!        !          !       ! +------------------++--------+----------+-----++--------+----------+-------+ ! Cephalic at Mid LA!!        !2.9       !     !!        !2.4       !       ! +------------------++--------+----------+-----++--------+----------+-------+ ! Cephalic at Wrist !!        !2.7       !     !!        !3         !       ! +------------------++--------+----------+-----++--------+----------+-------+ Basilic Mapping +------------------++-------+-----------+-----++-------+-----------+-------+ ! ! !Right  ! ! Left !!       !           !       ! +------------------++-------+-----------+-----++-------+-----------+-------+ ! Location          ! !AP Diam!Trans Diam !Depth! !AP Diam!Trans Diam !Depth  ! +------------------++-------+-----------+-----++-------+-----------+-------+ ! Basilic at Mid UA !!       !3.6        !     !!       !4.2        !       ! +------------------++-------+-----------+-----++-------+-----------+-------+ ! Basilic at AF     !!       !2.1        !     !!       !2.9        !       ! +------------------++-------+-----------+-----++-------+-----------+-------+ ! Basilic at [facility] LA !!       !2.7        !     !!       !2          !       ! +------------------++-------+-----------+-----++-------+-----------+-------+ ! Basilic at Wrist  !!       !2.8        !     !!       !2.5        !       ! +------------------++-------+-----------+-----++-------+-----------+-------+ !Axillary          !!       !6.5        !     !!       !4.6        !       ! +------------------++-------+-----------+-----++-------+-----------+-------+ ! Prox Brachial     !!       !5.1        !     !!       !4          !       ! +------------------++-------+-----------+-----++-------+-----------+-------+ ! Dist Brachial     !!       !4.1        !      !!       !3.5        !       ! +------------------++-------+-----------+-----++-------+-----------+-------+       Assessment   End-stage renal disease  Anemia chronic kidney disease  Diabetes type 2 with diabetic nephropathy  Secondary hyperparathyroidism  Anemia of chronic kidney disease  Fall with type I dens fracture  Dialysis access malfunction  Bradycardia status post permanent pacemaker placement      Plan:  Hemodialysis Tuesday Thursday Saturday  Monitor labs  Discussed with patient, nursing  Will try to successfully access graft again tomorrow    Michelle Snow MD  09/23/20  10:53 AM

## 2020-09-23 NOTE — PROGRESS NOTES
Pt denies pain. R arm sore post fistula placement. Dressing in place over site. Small amt of blood drng noted on distal part of dressing,  Bruit thrill noted. Pt HR Vpaced per telemetry. Pacer site with dry dressing in place. L groin with temp dialysis cath in place and dressing over ends of catheters. Pulses palpable. Scattered bruising over body. Lungs CTA, but diminished. Pt is alert and oriented and follows commands. Pt is very [place].  Electronically signed by Mabel Daley RN on 9/23/2020 at 7:31 PM

## 2020-09-24 PROBLEM — Z51.5 PALLIATIVE CARE PATIENT: Status: RESOLVED | Noted: 2020-01-01 | Resolved: 2020-01-01

## 2020-09-24 PROBLEM — E43 SEVERE MALNUTRITION (HCC): Status: RESOLVED | Noted: 2020-01-01 | Resolved: 2020-01-01

## 2020-09-24 PROBLEM — R00.1 BRADYCARDIA: Status: RESOLVED | Noted: 2020-01-01 | Resolved: 2020-01-01

## 2020-09-24 NOTE — DISCHARGE SUMMARY
Discharge Summary    Alban Dupree  :  1945  MRN:  [id number]    Admit date:  2020  Discharge date: 2020     Admitting Physician:  Phil Dumont DO    Advance Directive: SPECIAL[facility]    Consults: cardiology, nephrology, vascular surgery and neurosurgery    Primary Care Physician:  Unknown Provider Result (Inactive)    Discharge Diagnoses: Active Problems:    * No active hospital problems. *  Resolved Problems:    Bradycardia    Palliative care patient    Dialysis AV fistula malfunction (HCC)    Hypotensive episode    Severe malnutrition (HCC)      Significant Diagnostic Studies:   Ct Head Wo Contrast    Result Date: 2020  EXAMINATION:  CT HEAD WO CONTRAST  2020 12:53 PM HISTORY: The patient fell. Rule out intracranial injury. TECHNIQUE: Multiple axial images were obtained through the brain without contrast infusion. Multiplanar images were reconstructed. DLP: 675 mGy-cm. Automated exposure control was utilized. COMPARISON: No comparison study. FINDINGS: There are no hemorrhage, edema or mass effect. There is moderate to severe atrophy. There is moderate to severe dilatation of the lateral and third ventricles. There is low-density in the hemispheric white matter that is nonspecific. The visualized paranasal sinuses and mastoid air cells are clear. No calvarial fracture is seen. There is biparietal calvarial thinning. 1. No hemorrhage, edema or mass effect. 2. Moderate to severe atrophy. Moderate to severe dilatation of the lateral and third ventricles is likely related. Hydrocephalus is not excluded. 3. Low-density in the hemispheric white matter is nonspecific and most likely due to chronic small vessel disease. The full report of this exam was immediately signed and available to the emergency room. The patient is currently in the emergency room.  Signed by Dr Valeria Cardoso on 2020 12:56 PM    Ct Chest W Contrast    Result Date: 2020  EXAMINATION:  CT CHEST W CONTRAST 9/12/2020 1:11 PM HISTORY: The patient fell. Anterior chest skin tear. Hypotension during dialysis. Normal chest x-ray. COMPARISON: No comparison study. DLP: 320 mGy-cm. Automated exposure control was utilized. TECHNIQUE: Spiral CT was performed of the chest with contrast. Multiplanar images were reconstructed. MEDIASTINUM/VASCULAR: There is atheromatous calcification of the thoracic aorta and coronary arteries. Heart size is upper limits of normal. Pulmonary arteries are normal in caliber. There are no enlarged mediastinal lymph nodes. There is a moderate size hiatal hernia. There is wall thickening of the esophagus in the chest diffusely. LUNGS: There is centrilobular emphysema. There is paraseptal emphysema. There are few scattered calcified granulomas. There is no airspace consolidation or pleural effusion. There is no pneumothorax or lung contusion. BONES AND SOFT TISSUES: The clavicles are intact. The scapulae are intact. No definite rib fracture is seen. No visible acute thoracic spine fracture is seen. There are some degenerative changes of the spine. The bones are demineralized. The sternum appears to be intact. UPPER ABDOMEN: Please see the abdomen and pelvis CT report separately. 1. No evidence of pneumothorax or lung contusion. No mediastinal hematoma. 2. Atheromatous disease of the thoracic aorta and coronary arteries. Heart size is prominent. 3. Moderate size hiatal hernia. Diffuse thickening of the wall of the thoracic esophagus. Correlate with any history of esophagitis and reflux disease. 4. Centrilobular and paraseptal emphysema. Old granulomatous disease. 5. Demineralized bones. No definite acute fracture. The full report of this exam was immediately signed and available to the emergency room. The patient is currently in the emergency room.  Signed by Dr Natasha Langley on 9/12/2020 1:16 PM    Ct Cervical Spine Wo Contrast    Result Date: 9/12/2020  EXAMINATION:  CT CERVICAL SPINE WO CONTRAST 9/12/2020 1:17 PM HISTORY: The patient fell. Rule out fracture. No x-rays obtained. TECHNIQUE: Spiral CT was performed of the cervical spine. Sagittal and coronal images were reconstructed. COMPARISON: No comparison study. DLP: 260 mGy-cm. Automated exposure control was utilized. FINDINGS: There is fairly prominent pannus formation along the posterior aspect of the dens. There is a fairly large dens erosion located posteriorly. There is a nondisplaced fracture line anteriorly that is likely related to the weakening of the dens related to the erosion. Therefore, this is likely a pathologic fracture. This is fairly high on the dens and I will classify this as a type I injury. There is no displacement. There is no disruption of the anterior vertebral line or the posterior spinolaminar line. No other cervical spine fracture is identified. There is fairly severe carotid artery calcification in the neck bilaterally. There is vertebral artery calcification on the left. At C2-3, there is moderate to severe disc narrowing. There is severe facet arthropathy. There is mild to moderate foraminal narrowing bilaterally. No central spinal canal narrowing. At C3-4, there is moderate disc narrowing. There is minimal anterior subluxation of C3 compared to C4. There is bilateral uncinate spurring and bilateral facet arthropathy. There is severe bilateral foraminal stenosis at this level. At C4-5, there is moderate disc narrowing. There is uncinate spurring and facet arthropathy. The right-sided facet joint is fused. There is severe right and moderate left-sided foraminal stenosis. At C5-6, there is disc narrowing with spondylitic and uncinate spurring. There is bilateral facet arthropathy. There is no significant central spinal canal narrowing. There is mild to moderate bilateral foraminal stenosis. At C6-7, there is moderate to severe disc narrowing. There is spondylitic and uncinate spurring. There is anterior spurring.  There is liver is otherwise unremarkable. The spleen is normal in size. PANCREAS: There is no pancreatic mass or inflammatory change. KIDNEYS AND ADRENALS: The adrenal glands are normal in size. There is cortical thinning of both kidneys. Both kidneys are small in size. The left kidney measures 5.8 cm and the right kidney measures 5.7 cm. The urinary bladder is surgically absent. There is an aortoiliac stent graft. The graft extends above the origins of both renal arteries. It also covers the origins of the celiac plexus and superior mesenteric arteries. The native aortic lumen measures 5.5 cm in transverse diameter. There is mild enhancement within the native lumen anteriorly suggesting an endoleak. The aortoiliac stent graft is patent. BOWEL: No oral contrast was given. There is underdistention of the stomach. Small bowel loops are nondilated. There is an ostomy in the right mid to lower abdomen likely related to the history of ureteral diversion. Some of the colon is underdistended. There is moderate to large stool in the colon. OTHER: There are degenerative changes of the spine. 1. Prior cholecystectomy. Intrahepatic and extrahepatic biliary tree dilatation is likely related to reservoir effect. No visible calcified stone is seen within the ductal system. 2. Diffuse cortical thinning of both kidneys. The kidneys are small in size. The left kidney measures 5.8 cm on the right kidney measures 5.7 cm. The renal arteries are covered by the aortoiliac stent graft. 3. Prior bladder resection. An ostomy in the right lower abdomen/upper pelvis is likely a ureteral diversion. 4. Patent aortoiliac stent graft. The native aortic lumen measures 5.5 cm transversely. There is some contrast enhancement anteriorly in the native lumen suggesting endoleak. I do not see a connecting vessel that is patent at the endoleak. The superior mesenteric artery and celiac plexus are also covered by the stent graft.  5. No definite acute bowel (SYMBICORT) 160-4.5 MCG/ACT AERO  Inhale 2 puffs into the lungs 2 times daily              famotidine (PEPCID) 20 MG tablet  Take 20 mg by mouth daily             Ferric Citrate (AURYXIA) 1  MG(Fe) TABS  Take 2 tablets by mouth 3 times daily              Hypromellose (ISOPTO TEARS OP)  Apply to eye             insulin glargine (LANTUS) 100 UNIT/ML injection vial  Inject 12 Units into the skin nightly              insulin lispro (HUMALOG) 100 UNIT/ML injection vial  Inject into the skin See Admin Instructions Sliding Scale BID             ipratropium (ATROVENT) 0.02 % nebulizer solution  Take 2.5 mLs by nebulization 4 times daily             isosorbide dinitrate (ISORDIL) 10 MG tablet  Take 10 mg by mouth 3 times daily             metoprolol (LOPRESSOR) 25 MG tablet  Take 25 mg by mouth every other day MON, WED, FRI, SUN             midodrine (PROAMATINE) 5 MG tablet  Take 5 mg by mouth 3 times daily             oxyCODONE 5 MG capsule  Take 5 mg by mouth every 6 hours as needed for Pain.              vitamin D 1000 UNITS CAPS  Take 3 capsules by mouth daily                  Discharge Instructions: Follow up with PCP in 3-5 days. Take medications as directed. Resume activity as tolerated. Diet: DIET RENAL; Carb Control: 4 carb choices (60 gms)/meal; Renal     Disposition: Patient is medically stable and will be discharged to skilled nursing. Time spent on discharge greater than 30 minutes.     Signed:  Donald Donovan DO

## 2020-09-24 NOTE — PROGRESS NOTES
Vascular Surgery  Dr. Alexus Montano   Daily Progress Note    Pt Name: Silver Orourke Record Number: [id number]  Date of Birth 1945   Today's Date: 9/24/2020        SUBJECTIVE:     Patient was seen and examined in hemodialysis. States his arms are still sore. OBJECTIVE:     Patient Vitals for the past 24 hrs:   BP Temp Temp src Pulse Resp SpO2 Weight   09/24/20 0835 (!) 93/52 98.8 °F (37.1 °C) Temporal 104 18 94 % --   09/24/20 0526 -- -- -- -- -- -- 109 lb 3 oz (49.5 kg)   09/24/20 0039 (!) 103/52 97.2 °F (36.2 °C) Temporal 68 18 91 % --   09/23/20 1918 (!) 105/59 97.7 °F (36.5 °C) Temporal 100 18 99 % --   09/23/20 1423 (!) 106/56 98.6 °F (37 °C) Temporal 104 16 97 % --         Intake/Output Summary (Last 24 hours) at 9/24/2020 1118  Last data filed at 9/24/2020 0945  Gross per 24 hour   Intake 650 ml   Output 350 ml   Net 300 ml       In: 530 [P.O.:530]  Out: 150 [Urine:150]    I/O last 3 completed shifts: In: 200 [P.O.:930; IV Piggyback:50]  Out: 350 [Urine:350]     Date 09/24/20 0000 - 09/24/20 2359   Shift 6542-4330 3118-2452 2605-0594 24 Hour Total   INTAKE   P.O. 120 200  320   Shift Total(mL/kg) 120(2.4) 200(4)  320(6.5)   OUTPUT   Urine(mL/kg/hr) 150(0.4)   150   Shift Total(mL/kg) 150(3)   150(3)   Weight (kg) 49.5 49.5 49.5 49.5       Wt Readings from Last 3 Encounters:   09/24/20 109 lb 3 oz (49.5 kg)   03/25/16 113 lb 12.8 oz (51.6 kg)        Body mass index is 17.1 kg/m².      Diet: DIET RENAL; Carb Control: 4 carb choices (60 gms)/meal; Renal        MEDS:     Scheduled Meds:   sodium chloride flush  10 mL Intravenous 2 times per day    darbepoetin umesh-polysorbate  100 mcg Subcutaneous Weekly - Monday    ampicillin-sulbactam  1.5 g Intravenous Q12H    heparin (porcine)  5,000 Units Subcutaneous 3 times per day    ipratropium  0.5 mg Nebulization 4x daily    insulin lispro  0-6 Units Subcutaneous TID WC    insulin lispro  0-3 Units Subcutaneous Nightly     Continuous Infusions:   sodium chloride 100 mL/hr at 09/21/20 2037    dextrose       PRN Meds:sodium chloride flush, 10 mL, PRN  acetaminophen, 650 mg, Q4H PRN  docusate sodium, 100 mg, BID PRN  hydrALAZINE, 10 mg, Q4H PRN  oxyCODONE, 5 mg, Q4H PRN  acetaminophen, 650 mg, Q6H PRN  magnesium hydroxide, 30 mL, Daily PRN  promethazine, 12.5 mg, Q6H PRN    Or  ondansetron, 4 mg, Q6H PRN  morphine, 2 mg, Q4H PRN  glucose, 15 g, PRN  dextrose, 12.5 g, PRN  glucagon (rDNA), 1 mg, PRN  dextrose, 100 mL/hr, PRN          PHYSICAL EXAM:     CONSTITUTIONAL: Alert and oriented times 3, no acute distress and cooperative to examination. LUNGS: Clear bilateral breath sounds without wheezes or rhonchi. CARDIOVASCULAR: Normal HT with RRR. 2/6 systolic murmur, no gallops or rubs. ABDOMEN: Soft, non-tender with active bowel sounds x 4. NEUROLOGIC: Grossly intact. WOUND/INCISION: Non-tunneled temporary dialysis catheter - left groin. Left subclavian pacer site with dressing dry and intact. Resolving ecchymosis noted. EXTREMITY: No clubbing or cyanosis. No peripheral edema. Peripheral pulses palpable. Left upper arm AV fistula - unable to palpate thrill. No bruit auscultated. Right hand warm to touch with strong palpable radial pulse.      LABS:     CBC:   Recent Labs     09/22/20 0253 09/23/20 0229   WBC 5.9 5.3   RBC 2.94* 2.74*   HGB 9.2* 8.6*   HCT 30.1* 28.4*   .4* 103.6*   MCH 31.3* 31.4*   MCHC 30.6* 30.3*   RDW 14.6* 14.8*    192   MPV 9.6 9.6      Last 3 CMP:   Recent Labs     09/22/20 0253 09/23/20 0229    139   K 4.7 3.7    101   CO2 18* 24   BUN 51* 28*   CREATININE 4.3* 3.2*   GLUCOSE 142* 101   CALCIUM 8.5* 8.6*      Calcium:   Lab Results   Component Value Date    CALCIUM 8.6 09/23/2020    CALCIUM 8.5 09/22/2020    CALCIUM 8.4 09/21/2020        DVT prophylaxis: Heparin 5000 units sq q8h          ASSESSMENT:     1.         AV Fistula Malfunction, Left Upper Arm - S/P Placement of a dual-lumen

## 2020-09-24 NOTE — PROGRESS NOTES
Morphine 2mg wasted-given to Neville Anna) Electronically signed by German Brown RN on 9/23/2020 at 9:26 PM

## 2020-09-24 NOTE — PROGRESS NOTES
Nephrology ([address]) Progress Note    Patient:  Cassie Menon  YOB: 1945  Date of Service: 9/24/2020  MRN: [id number]   Acct: [de-identified]   Primary Care Physician: Unknown Provider Result (Inactive)  Advance Directive: DNR-CC  Admit Date: 9/12/2020       Hospital Day: 12  Referring Provider: Casimiro Saba DO    Patient independently seen and examined, Chart, Consults, Notes, Operative notes, Labs, Cardiology, and Radiology studies reviewed as available. Subjective:  Cassie Menon is a 67 y.o. male  whom we were consulted for ESRD. He was admitted after a near syncopal episode when he was on dialysis. He was found bradycardic.  At the emergency department, his heart rate was in the low 30s. He was admitted to the ICU and had a temporary transvenous pacemaker. Patient has also reported a fall 2 days ago with head trauma.  CT of his neck shows a type I dens fracture without displacement. He was seen by neurosurgery and had a neck brace. His hemodialysis access had high venous pressure with the recirculation problem. On September 16, he underwent femoral line placement for dialysis access. On September 17, he underwent permanent pacemaker implant. Transvenous pacemaker was removed. Today, no overnight events. Patient chronically hard of hearing but denied specific complaints including chest pain, shortness of air, nausea vomiting. No issues with HD today.     Dialysis   Pt was seen on RRT  Modality: Hemodialysis  Access: Arterial Venous Graft  Location: right upper  QB: 250  QD: 400  UF: 500      Allergies:  Sulfa antibiotics    Medicines:  Current Facility-Administered Medications   Medication Dose Route Frequency Provider Last Rate Last Dose    0.45 % sodium chloride infusion   Intravenous Continuous Nick Garza  mL/hr at 09/21/20 2037      sodium chloride flush 0.9 % injection 10 mL  10 mL Intravenous 2 times per day Nick Garza DO 10 mL at 09/23/20 2047    sodium chloride flush 0.9 % injection 10 mL  10 mL Intravenous PRN Ruthe Mulligan, DO        acetaminophen (TYLENOL) tablet 650 mg  650 mg Oral Q4H PRN Ruthe Mulligan, DO        darbepoetin umesh-polysorbate SEVEN [facility]) injection 100 mcg  100 mcg Subcutaneous Weekly - Monday Terrence Bledsoe MD   100 mcg at 09/21/20 1100    ampicillin-sulbactam (UNASYN) 1.5 g IVPB minibag  1.5 g Intravenous Q12H Ruthe Mulligan, DO   Stopped at 09/23/20 2115    heparin (porcine) injection 5,000 Units  5,000 Units Subcutaneous 3 times per day Ruthe Mulligan, DO   5,000 Units at 09/24/20 0514    docusate sodium (COLACE) capsule 100 mg  100 mg Oral BID PRN Ruthe Mulligan, DO   100 mg at 09/22/20 2128    hydrALAZINE (APRESOLINE) injection 10 mg  10 mg Intravenous Q4H PRN Ruthe Mulligan, DO   10 mg at 09/15/20 2210    oxyCODONE (ROXICODONE) immediate release tablet 5 mg  5 mg Oral Q4H PRN Ruthe Mulligan, DO   5 mg at 09/23/20 2048    ipratropium (ATROVENT) 0.02 % nebulizer solution 0.5 mg  0.5 mg Nebulization 4x daily Ruthe Mulligan, DO   0.5 mg at 09/24/20 4767    acetaminophen (TYLENOL) suppository 650 mg  650 mg Rectal Q6H PRN Ruthe Mulligan, DO        magnesium hydroxide (MILK OF MAGNESIA) 400 MG/5ML suspension 30 mL  30 mL Oral Daily PRN Ruthe Mulligan, DO        promethazine (PHENERGAN) tablet 12.5 mg  12.5 mg Oral Q6H PRN Ruthe Mulligan, DO        Or    ondansetron [facility]) injection 4 mg  4 mg Intravenous Q6H PRN Ruthe Mulligan, DO   4 mg at 09/17/20 1054    morphine injection 2 mg  2 mg Intravenous Q4H PRN Ruthe Mulligan, DO   2 mg at 09/17/20 2230    glucose (GLUTOSE) 40 % oral gel 15 g  15 g Oral PRN Ruthe Mulligan, DO        dextrose 50 % IV solution  12.5 g Intravenous PRN Ruthe Mulligan, DO   12.5 g at 09/15/20 0415    glucagon (rDNA) injection 1 mg  1 mg Intramuscular PRN Ruthe Mulligan, DO        dextrose 5 % solution  100 mL/hr Intravenous PRN Alexus Montano DO        insulin lispro (HUMALOG) injection vial 0-6 Units  0-6 Units Subcutaneous TID [facility] Alexus Montano DO   3 Units at 09/23/20 1804    insulin lispro (HUMALOG) injection vial 0-3 Units  0-3 Units Subcutaneous Nightly Alexus Montano DO   2 Units at 09/23/20 2044       Past Medical History:  Past Medical History:   Diagnosis Date    Anemia in chronic kidney disease (CODE)     Atherosclerotic heart disease     [facility]     CAD (coronary artery disease)     Cancer ([facility]     Chronic kidney disease     COPD (chronic obstructive pulmonary disease) ([facility]     Diabetes mellitus ([facility]     End stage renal disease (HCC)     Enterocolitis due to Clostridioides difficile     GERD (gastroesophageal reflux disease)     Hemodialysis patient ([facility]     Hyperlipidemia     Hypertension     Other chronic pain     Palliative care patient 09/15/2020    Prostate cancer ([facility]     Vitamin D deficiency        Past Surgical History:  Past Surgical History:   Procedure Laterality Date    ABDOMEN SURGERY      esophageal CA, used part of stomach     APPENDECTOMY      [facility]    CARDIAC CATHETERIZATION      [facility], had stents, unknown details    DIALYSIS FISTULA CREATION Right 9/21/2020    CREATION RIGHT AV GRAFT performed by Alexus Montano DO at [address] URETEROSTOMY         Family History  Family History   Problem Relation Age of Onset    Diabetes Mother        Social History  Social History     Socioeconomic History    Marital status:      Spouse name: Not on file    Number of children: Not on file    Years of education: Not on file    Highest education level: Not on file   Occupational History    Not on file   Social Needs    Financial resource strain: Not on file    Food insecurity     Worry: Not on file     Inability: Not on file    Transportation needs     Medical: Not on file     Non-medical: Not on file Tobacco Use    Smoking status: Current Every Day Smoker     Packs/day: 2.00     Years: 55.00     Pack years: 110.00    Smokeless tobacco: Current User   Substance and Sexual Activity    Alcohol use: No    Drug use: No    Sexual activity: Not Currently   Lifestyle    Physical activity     Days per week: Not on file     Minutes per session: Not on file    Stress: Not on file   Relationships    Social connections     Talks on phone: Not on file     Gets together: Not on file     Attends Confucianism service: Not on file     Active member of club or organization: Not on file     Attends meetings of clubs or organizations: Not on file     Relationship status: Not on file    Intimate partner violence     Fear of current or ex partner: Not on file     Emotionally abused: Not on file     Physically abused: Not on file     Forced sexual activity: Not on file   Other Topics Concern    Not on file   Social History Narrative    Not on file         Review of Systems:  History obtained from chart review and the patient  General ROS: No fever or chills  Respiratory ROS: No cough, shortness of breath, wheezing  Cardiovascular ROS: No chest pain or palpitations  Gastrointestinal ROS: No abdominal pain or melena  Genito-Urinary ROS: No dysuria or hematuria  Musculoskeletal ROS: No joint pain or swelling         Objective:  Patient Vitals for the past 24 hrs:   BP Temp Temp src Pulse Resp SpO2 Weight   09/24/20 0835 (!) 93/52 98.8 °F (37.1 °C) Temporal 104 18 94 % --   09/24/20 0526 -- -- -- -- -- -- 109 lb 3 oz (49.5 kg)   09/24/20 0039 (!) 103/52 97.2 °F (36.2 °C) Temporal 68 18 91 % --   09/23/20 1918 (!) 105/59 97.7 °F (36.5 °C) Temporal 100 18 99 % --   09/23/20 1423 (!) 106/56 98.6 °F (37 °C) Temporal 104 16 97 % --       Intake/Output Summary (Last 24 hours) at 9/24/2020 1311  Last data filed at 9/24/2020 0945  Gross per 24 hour   Intake 650 ml   Output 350 ml   Net 300 ml     General: awake/alert   Chest:  clear to auscultation bilaterally  CVS: regular rate and rhythm  Abdominal: soft, nontender, normal bowel sounds  Extremities: no cyanosis or edema  Skin: warm and dry without rash    Labs:  BMP:   Recent Labs     09/22/20 0253 09/23/20 0229    139   K 4.7 3.7    101   CO2 18* 24   BUN 51* 28*   CREATININE 4.3* 3.2*   CALCIUM 8.5* 8.6*     CBC:   Recent Labs     09/22/20 0253 09/23/20 0229   WBC 5.9 5.3   HGB 9.2* 8.6*   HCT 30.1* 28.4*   .4* 103.6*    192     LIVER PROFILE:   No results for input(s): AST, ALT, LIPASE, BILIDIR, BILITOT, ALKPHOS in the last 72 hours. Invalid input(s): AMYLASE,  ALB  PT/INR: No results for input(s): PROTIME, INR in the last 72 hours. APTT: No results for input(s): APTT in the last 72 hours. BNP:  No results for input(s): BNP in the last 72 hours. Ionized Calcium:No results for input(s): IONCA in the last 72 hours. Magnesium:  Recent Labs     09/22/20 0253 09/23/20 0229   MG 2.4 2.3     Phosphorus:  No results for input(s): PHOS in the last 72 hours. HgbA1C: No results for input(s): LABA1C in the last 72 hours. Hepatic:   No results for input(s): ALKPHOS, ALT, AST, PROT, BILITOT, BILIDIR, LABALBU in the last 72 hours. Lactic Acid: No results for input(s): LACTA in the last 72 hours. Troponin: No results for input(s): CKTOTAL, CKMB, TROPONINT in the last 72 hours. ABGs: No results found for: PHART, PO2ART, JHI3BHQ  CRP:  No results for input(s): CRP in the last 72 hours. Sed Rate:  No results for input(s): SEDRATE in the last 72 hours. Culture Results:   Blood Culture Recent: No results for input(s): BC in the last 720 hours. Urine Culture Recent :   Recent Labs     09/12/20  2045   LABURIN >50,000 CFU/ml*  Light growth  Light growth  Light growth       Radiology reports as per the Radiologist  Radiology: Ct Head Wo Contrast    Result Date: 9/12/2020  EXAMINATION:  CT HEAD WO CONTRAST  9/12/2020 12:53 PM HISTORY: The patient fell.  Rule out intracranial injury. TECHNIQUE: Multiple axial images were obtained through the brain without contrast infusion. Multiplanar images were reconstructed. DLP: 675 mGy-cm. Automated exposure control was utilized. COMPARISON: No comparison study. FINDINGS: There are no hemorrhage, edema or mass effect. There is moderate to severe atrophy. There is moderate to severe dilatation of the lateral and third ventricles. There is low-density in the hemispheric white matter that is nonspecific. The visualized paranasal sinuses and mastoid air cells are clear. No calvarial fracture is seen. There is biparietal calvarial thinning. 1. No hemorrhage, edema or mass effect. 2. Moderate to severe atrophy. Moderate to severe dilatation of the lateral and third ventricles is likely related. Hydrocephalus is not excluded. 3. Low-density in the hemispheric white matter is nonspecific and most likely due to chronic small vessel disease. The full report of this exam was immediately signed and available to the emergency room. The patient is currently in the emergency room. Signed by Dr Zandra Goltz on 9/12/2020 12:56 PM    Ct Chest W Contrast    Result Date: 9/12/2020  EXAMINATION:  CT CHEST W CONTRAST  9/12/2020 1:11 PM HISTORY: The patient fell. Anterior chest skin tear. Hypotension during dialysis. Normal chest x-ray. COMPARISON: No comparison study. DLP: 320 mGy-cm. Automated exposure control was utilized. TECHNIQUE: Spiral CT was performed of the chest with contrast. Multiplanar images were reconstructed. MEDIASTINUM/VASCULAR: There is atheromatous calcification of the thoracic aorta and coronary arteries. Heart size is upper limits of normal. Pulmonary arteries are normal in caliber. There are no enlarged mediastinal lymph nodes. There is a moderate size hiatal hernia. There is wall thickening of the esophagus in the chest diffusely. LUNGS: There is centrilobular emphysema. There is paraseptal emphysema.  There are few scattered calcified granulomas. There is no airspace consolidation or pleural effusion. There is no pneumothorax or lung contusion. BONES AND SOFT TISSUES: The clavicles are intact. The scapulae are intact. No definite rib fracture is seen. No visible acute thoracic spine fracture is seen. There are some degenerative changes of the spine. The bones are demineralized. The sternum appears to be intact. UPPER ABDOMEN: Please see the abdomen and pelvis CT report separately. 1. No evidence of pneumothorax or lung contusion. No mediastinal hematoma. 2. Atheromatous disease of the thoracic aorta and coronary arteries. Heart size is prominent. 3. Moderate size hiatal hernia. Diffuse thickening of the wall of the thoracic esophagus. Correlate with any history of esophagitis and reflux disease. 4. Centrilobular and paraseptal emphysema. Old granulomatous disease. 5. Demineralized bones. No definite acute fracture. The full report of this exam was immediately signed and available to the emergency room. The patient is currently in the emergency room. Signed by Dr Amita Guzman on 9/12/2020 1:16 PM    Ct Cervical Spine Wo Contrast    Result Date: 9/12/2020  EXAMINATION:  CT CERVICAL SPINE WO CONTRAST  9/12/2020 1:17 PM HISTORY: The patient fell. Rule out fracture. No x-rays obtained. TECHNIQUE: Spiral CT was performed of the cervical spine. Sagittal and coronal images were reconstructed. COMPARISON: No comparison study. DLP: 260 mGy-cm. Automated exposure control was utilized. FINDINGS: There is fairly prominent pannus formation along the posterior aspect of the dens. There is a fairly large dens erosion located posteriorly. There is a nondisplaced fracture line anteriorly that is likely related to the weakening of the dens related to the erosion. Therefore, this is likely a pathologic fracture. This is fairly high on the dens and I will classify this as a type I injury. There is no displacement.  There is no disruption of the anterior vertebral line or the posterior spinolaminar line. No other cervical spine fracture is identified. There is fairly severe carotid artery calcification in the neck bilaterally. There is vertebral artery calcification on the left. At C2-3, there is moderate to severe disc narrowing. There is severe facet arthropathy. There is mild to moderate foraminal narrowing bilaterally. No central spinal canal narrowing. At C3-4, there is moderate disc narrowing. There is minimal anterior subluxation of C3 compared to C4. There is bilateral uncinate spurring and bilateral facet arthropathy. There is severe bilateral foraminal stenosis at this level. At C4-5, there is moderate disc narrowing. There is uncinate spurring and facet arthropathy. The right-sided facet joint is fused. There is severe right and moderate left-sided foraminal stenosis. At C5-6, there is disc narrowing with spondylitic and uncinate spurring. There is bilateral facet arthropathy. There is no significant central spinal canal narrowing. There is mild to moderate bilateral foraminal stenosis. At C6-7, there is moderate to severe disc narrowing. There is spondylitic and uncinate spurring. There is anterior spurring. There is facet arthropathy left greater than right. There is mild narrowing of the central spinal canal at this level. There is moderate right and mild left foraminal stenosis. At C7-T1, there is moderate to severe disc narrowing. There is mild anterior subluxation of C7 compared to T1. There is some scoliosis of the cervical-thoracic Junction in this area. This causes moderate to severe left-sided foraminal stenosis at this level. There is disc degeneration at multiple thoracic disc levels included on the cervical spine CT. No significant spinal or foraminal stenosis is visualized. 1. Type I dens fracture without displacement. There is a fairly large erosion in the posterior dens related to pannus formation.  The type one fracture is seen best on the sagittal images anterior to the erosion. Therefore, this is likely a pathologic fracture given the presence of the erosion. 2. Degenerative changes throughout the cervical spine. 3. Dense carotid artery calcification in the neck. There is also some calcification involving the left vertebral artery in the neck. Results of the dens fracture were called to Dr. Phil Arteaga in the emergency room at 1:30 PM on 9/12/2020. Signed by Dr May Mackey on 9/12/2020 1:31 PM    Ct Abdomen Pelvis W Iv Contrast Additional Contrast? None    Result Date: 9/12/2020  EXAMINATION:  CT ABDOMEN PELVIS W IV CONTRAST  9/12/2020 1:00 PM HISTORY: The patient fell. Epigastric pain. Prior surgical resection of the urinary bladder and appendectomy. TECHNIQUE: Spiral CT was performed of the abdomen and pelvis with contrast. Multiplanar images were reconstructed. DLP: 320 mGy-cm. Automated exposure control was utilized. COMPARISON: No comparison study. LUNG BASES: The lung bases are clear. There is a moderate size hiatal hernia. LIVER AND SPLEEN: Prior cholecystectomy. There is intrahepatic and extrahepatic biliary tree dilatation probably related to reservoir effect. No visible obstructing stone is seen. The liver is otherwise unremarkable. The spleen is normal in size. PANCREAS: There is no pancreatic mass or inflammatory change. KIDNEYS AND ADRENALS: The adrenal glands are normal in size. There is cortical thinning of both kidneys. Both kidneys are small in size. The left kidney measures 5.8 cm and the right kidney measures 5.7 cm. The urinary bladder is surgically absent. There is an aortoiliac stent graft. The graft extends above the origins of both renal arteries. It also covers the origins of the celiac plexus and superior mesenteric arteries. The native aortic lumen measures 5.5 cm in transverse diameter. There is mild enhancement within the native lumen anteriorly suggesting an endoleak. The aortoiliac stent graft is patent.  BOWEL: No oral contrast was given. There is underdistention of the stomach. Small bowel loops are nondilated. There is an ostomy in the right mid to lower abdomen likely related to the history of ureteral diversion. Some of the colon is underdistended. There is moderate to large stool in the colon. OTHER: There are degenerative changes of the spine. 1. Prior cholecystectomy. Intrahepatic and extrahepatic biliary tree dilatation is likely related to reservoir effect. No visible calcified stone is seen within the ductal system. 2. Diffuse cortical thinning of both kidneys. The kidneys are small in size. The left kidney measures 5.8 cm on the right kidney measures 5.7 cm. The renal arteries are covered by the aortoiliac stent graft. 3. Prior bladder resection. An ostomy in the right lower abdomen/upper pelvis is likely a ureteral diversion. 4. Patent aortoiliac stent graft. The native aortic lumen measures 5.5 cm transversely. There is some contrast enhancement anteriorly in the native lumen suggesting endoleak. I do not see a connecting vessel that is patent at the endoleak. The superior mesenteric artery and celiac plexus are also covered by the stent graft. 5. No definite acute bowel abnormality. No oral contrast was given and therefore evaluation is very limited. The patient also has very little intra-abdominal fat. The full report of this exam was immediately signed and available to the emergency room. The patient is currently in the emergency room. Signed by Dr Apple Alvarenga on 9/12/2020 1:10 PM    Xr Chest Portable    Result Date: 9/17/2020  XR CHEST PORTABLE 9/17/2020 10:09 AM HISTORY: Status post pacemaker placement COMPARISON: Chest exam dated 9/12/2020. FINDINGS: New left chest wall dual chamber pacemaker. Leads appear in good position. No lung consolidation. No pleural effusion or pneumothorax. The cardiomediastinal silhouette and pulmonary vascularity are within normal limits.  The osseous structures and surrounding soft tissues demonstrate no acute abnormality. 1. New left chest wall dual chamber pacemaker. No pneumothorax. Signed by Dr Christy Rosen on 9/17/2020 11:49 AM    Xr Chest Portable    Result Date: 9/12/2020  EXAMINATION:  XR CHEST PORTABLE  9/12/2020 12:13 PM HISTORY: The patient fell. Chest pain. COMPARISON: 3/23/2016. FINDINGS:  There is no evidence of lung contusion or pneumothorax. There is no mediastinal widening. The heart is normal in size. The thoracic aorta is atheromatous. There is no dense infiltrate or effusion. There is mild biapical scarring. 1. Mild chronic changes. 2. No evidence of lung contusion, pneumothorax or mediastinal widening. 3. The patient has a chest CT already scheduled. Please see that report separately. Signed by Dr Henry Dale on 9/12/2020 12:14 PM    Vl Vessel Mapping Prior To Hemodialysis Access    Result Date: 9/18/2020  Vascular Upper Extremities Arterial Mapping and Upper Extremity Vein Mapping Procedure  Demographics   Patient Name  Carola Lee  Age                67                W   Patient       [id number]        Gender             Male  Number   Visit Number  [id number]     Interpreting       David Graham MD                              Physician   Date of Birth 1945    Referring          Chely Benavidez                              Physician   Accession     [id number]    Sonographer        [address]  Number                                         RVS, RCS  Procedure Type of Study:   Extremities Arteries:Upper Extremities Arterial Mapping. Veins:Upper Extremity Vein Mapping, VAS NIV VES MAP PRIOR TO HEMODIALYSIS  ACCESS. Indications for Study:Pre-op vein mapping for dialysis access. Risk Factors   - The patient's risk factor(s) include: diabetes mellitus and arterial     hypertension.   - Current - Every day. - The patient's last creatinine was 4 mg/dl. Allergies   - Sulfa drugs.   Impression   Bilateral upper extremity venous mapping with sizes as noted. Arterial  sizes and pressures as noted. Left upper arm cephalic vein occluded. Signature   ----------------------------------------------------------------  Electronically signed by Janny Calzada MD(Interpreting  physician) on 09/18/2020 11:35 AM  ----------------------------------------------------------------  Blood Pressure:Right arm 130/48 mmHg. Velocities are measured in cm/s ; Diameters are measured in mm Right Upper Extremities Arterial Mapping +------------------------+----+-------------+---------------------+--------+ ! Location                ! PSV ! AP Diam      !Trans Diam           !Quality ! +------------------------+----+-------------+---------------------+--------+ ! Prox Brachial           !93. 2!             !                     !        ! +------------------------+----+-------------+---------------------+--------+ ! Dist Brachial           !86. 8! ! 5                    !        ! +------------------------+----+-------------+---------------------+--------+ ! Prox Radial             !86.8!             !                     !        ! +------------------------+----+-------------+---------------------+--------+ ! Mid Radial              !103 !             !                     !        ! +------------------------+----+-------------+---------------------+--------+ ! Dist Radial             !169 !             !3. 2                  !        ! +------------------------+----+-------------+---------------------+--------+ ! Prox Ulnar              !72.7!             !                     !        ! +------------------------+----+-------------+---------------------+--------+ ! Mid Ulnar               !82.7!             !                     !        ! +------------------------+----+-------------+---------------------+--------+ ! Dist Ulnar              !78.6!             !2.4                  !        ! +------------------------+----+-------------+---------------------+--------+ Left Upper Extremities Arterial Mapping +------------------------+----+-------------+---------------------+--------+ ! Location                ! PSV ! AP Diam      !Trans Diam           !Quality ! +------------------------+----+-------------+---------------------+--------+ ! Prox Brachial           !105 !             !                     !        ! +------------------------+----+-------------+---------------------+--------+ ! Dist Brachial           !99. 8!             !7.6                  !        ! +------------------------+----+-------------+---------------------+--------+ ! Prox Radial             !96.2!             !                     !        ! +------------------------+----+-------------+---------------------+--------+ ! Mid Radial              !135 !             !                     !        ! +------------------------+----+-------------+---------------------+--------+ ! Dist Radial             !174 !             !2. 6                  !        ! +------------------------+----+-------------+---------------------+--------+ ! Prox Ulnar              ! 125 !             !                     !        ! +------------------------+----+-------------+---------------------+--------+ ! Mid Ulnar               ! 118 !             !                     !        ! +------------------------+----+-------------+---------------------+--------+ ! Dist Ulnar              ! 122 !             !3. 2                  !        ! +------------------------+----+-------------+---------------------+--------+ Velocities are measured in cm/s ; Diameters are measured in mm Cephalic Mapping +------------------++--------+----------+-----++--------+----------+-------+ ! ! !Right   ! ! Left !!        !          !       ! +------------------++--------+----------+-----++--------+----------+-------+ ! Location          ! !AP Diam.! Trans Diam!Depth! !AP Diam.! Trans Diam!Depth  ! +------------------++--------+----------+-----++--------+----------+-------+ ! Cephalic at UA    !!        !2.9       !     !!        !3.8       !       ! +------------------++--------+----------+-----++--------+----------+-------+ ! Cephalic at Mid UA!!        !2.1       ! !!        !          !       ! +------------------++--------+----------+-----++--------+----------+-------+ ! Cephalic at AF    !!        !3.6       !     !!        !          !       ! +------------------++--------+----------+-----++--------+----------+-------+ ! Cephalic at Mid LA!!        !2.9       !     !!        !2.4       !       ! +------------------++--------+----------+-----++--------+----------+-------+ ! Cephalic at Wrist !!        !2.7       !     !!        !3         !       ! +------------------++--------+----------+-----++--------+----------+-------+ Basilic Mapping +------------------++-------+-----------+-----++-------+-----------+-------+ ! ! !Right  ! ! Left !!       !           !       ! +------------------++-------+-----------+-----++-------+-----------+-------+ ! Location          ! !AP Diam!Trans Diam !Depth! !AP Diam!Trans Diam !Depth  ! +------------------++-------+-----------+-----++-------+-----------+-------+ ! Basilic at Mid UA !!       !3.6        !     !!       !4.2        !       ! +------------------++-------+-----------+-----++-------+-----------+-------+ ! Basilic at AF     !!       !2.1        !     !!       !2.9        !       ! +------------------++-------+-----------+-----++-------+-----------+-------+ ! Basilic at [facility] LA !!       !2.7        !     !!       !2          !       ! +------------------++-------+-----------+-----++-------+-----------+-------+ ! Basilic at Wrist  !!       !2.8        !     !!       !2.5        !       ! +------------------++-------+-----------+-----++-------+-----------+-------+ ! Axillary          !!       !6.5        !     !!       !4.6 !       ! +------------------++-------+-----------+-----++-------+-----------+-------+ ! Prox Brachial     !!       !5.1        !     !!       !4          !       ! +------------------++-------+-----------+-----++-------+-----------+-------+ ! Dist Brachial     !!       !4.1        !      !!       !3.5        !       ! +------------------++-------+-----------+-----++-------+-----------+-------+       Assessment   End-stage renal disease  Anemia chronic kidney disease  Diabetes type 2 with diabetic nephropathy  Secondary hyperparathyroidism  Anemia of chronic kidney disease  Fall with type I dens fracture  Dialysis access malfunction  Bradycardia status post permanent pacemaker placement      Plan:  Hemodialysis Tuesday Thursday Saturday  Monitor labs  Discussed with patient, nursing  [facility] for d/c when ok with vascular    Raymond Sosa MD  09/24/20  1:11 PM

## 2020-09-25 NOTE — PROGRESS NOTES
Physician Progress Note      PATIENT:               Regine Rocha  CSN #:                  [id number]  :                       1945  ADMIT DATE:       2020 11:07 AM  DISCH DATE:        2020 7:30 PM  RESPONDING  PROVIDER #:        Shane Stewart 1938 [facility] DO          QUERY TEXT:    Pt admitted with bradycardia and LUE fistula malfunctioning and has   malnutrition documented. Please document in the progress notes and discharge   summary if we are treating: The medical record reflects the following:  Risk Factors: Odontoid fracture, ESRD, DMII BMI 17  Clinical Indicators: Loss of energy, moderate body fat loss and muscle mass   lost.  Treatment: Dietary consult and treatment    Thank you    George Cornejo RN, BSN, [facility]  [phone]  Options provided:  -- Severe Protein Calorie Malnutrition confirmed POA  -- Severe Protein Calorie Malnutrition confirmed not POA  -- Severe Protein Calorie Malnutrition ruled out  -- Other - I will add my own diagnosis  -- Disagree - Not applicable / Not valid  -- Disagree - Clinically unable to determine / Unknown  -- Refer to Clinical Documentation Reviewer    PROVIDER RESPONSE TEXT:    The diagnosis of Severe Protein Calorie Malnutrition was confirmed as POA.     Query created by: Yari Pimentel on 2020 3:25 PM      Electronically signed by:  Anel Garcia DO 2020 8:03 AM

## 2020-10-29 NOTE — LETTER
Albert Simmons  1945    Fistulogram Instructions    1. Report to the [facility] at [facility] (go in the front door and to the left) on Friday 10/30/20, at 7:30am for your procedure with Dr. Casimiro Tyler. 2. Nothing to eat or drink after midnight the night before the procedure. 3. Please take all medications as normally scheduled to take, including heart and blood pressure medicines with a sip of water. 4. Do not take Lasix, insulin, or any diabetic medicine the morning of the procedure. If you take insulin, you may only take 1/2 of any scheduled nightly dose, and none the morning of the procedure. You may take all regularly scheduled heart, cholesterol, and blood pressure medicines with a sip of water. 5. Continue Aspirin medication. 6. If you take Glucophage, Metformin, Actos Plus Met, or Glucovance,you can not take this the day of your procedure and two days after the procedure. 7. Hold Plavix/Coumadin for 0 days prior to surgery. 8. If you have sleep apnea and require C-PAP, please bring this with you to the hospital.  9. Bring a list of all of your allergies and medications with you to the hospital.  10. Please let our nurse know if you have had an allergy to iodine, shellfish, or x-ray dye. 6. Let the nurse know if you take any of the followin. Over the counter herbal supplements  2. Diclofenec, indomethacin, ketoprofen, Caridopa/levadopa, naproxen, sulindac, piroxicam, glucosamine, Chondrotin, cocchine, or methotrexate. 12. Plan to stay at the hospital for 4 - 6 hours before being released  by the physician. You will need someone to drive you home after the procedure. 13. Medications instructed to hold: Please see above. 14. Please stop at your local [employer] or pharmacy and buy a bottle of Hibiclens.  Wash thoroughly with this the night before and the morning of the procedure, paying special attention to the area that will be operated on.  Make sure you rinse very well. The Hibiclens should only be used prior to surgery. 15. To ensure the health and safety of our patients and staff, [facility] has implemented visitor restrictions. Only one person will be allowed to accompany you for your procedure. If you or your visitor are exhibiting signs & symptoms of illness such as fever, cough, sore throat or body aches, we ask that you reschedule your procedure to a later date after your symptoms have been resolved. 12. New policy requires that anyone who comes into the hospital will be required to wear a face mask. A cloth mask is acceptable. 17. Other Directions: please call our office with questions [phone]. You will have a rapid COVID-19 test on arrival on 10/30/20. PLEASE NOTE:  If the patient is unable to sign his/her own paperwork, the appointed caregiver (POA, child, sibling, etc) must be present at the time of registration for all testing and procedures. Transportation to and from all testing and procedure appointments is the sole responsibility of the patient, caregiver, and/or nursing facility in which they reside. Please remember you will not be able to drive after you are discharged. Please call the office at [phone] with any questions or concerns. Please allow 48-72 hours notice for cancellations or rescheduling. We will attempt to accommodate your needs to the best of our capabilities, however, strict policies with procedure room availability does not allow much flexibility.

## 2020-10-29 NOTE — PROGRESS NOTES
daily      oxyCODONE 5 MG capsule Take 5 mg by mouth every 6 hours as needed for Pain.  budesonide-formoterol (SYMBICORT) 160-4.5 MCG/ACT AERO Inhale 2 puffs into the lungs 2 times daily       famotidine (PEPCID) 20 MG tablet Take 20 mg by mouth daily      acetaminophen 650 MG TABS Take 650 mg by mouth every 4 hours as needed (Patient taking differently: Take 650 mg by mouth every 6 hours as needed for Pain ) 120 tablet 3    aspirin 81 MG chewable tablet Take 81 mg by mouth daily      atorvastatin (LIPITOR) 20 MG tablet Take 20 mg by mouth daily       vitamin D 1000 UNITS CAPS Take 3 capsules by mouth daily       insulin lispro (HUMALOG) 100 UNIT/ML injection vial Inject into the skin See Admin Instructions Sliding Scale BID      metoprolol (LOPRESSOR) 25 MG tablet Take 25 mg by mouth every other day MON, WED, FRI, SUN      ipratropium (ATROVENT) 0.02 % nebulizer solution Take 2.5 mLs by nebulization 4 times daily (Patient not taking: Reported on 10/29/2020) 2.5 mL 3     No current facility-administered medications for this visit.       Allergies: Sulfa antibiotics  Past Medical History:   Diagnosis Date    Anemia in chronic kidney disease (CODE)     Atherosclerotic heart disease     [facility]     CAD (coronary artery disease)     Cancer ([facility]     Chronic kidney disease     COPD (chronic obstructive pulmonary disease) [facility]     Diabetes mellitus (HCC)     End stage renal disease (HCC)     Enterocolitis due to Clostridioides difficile     GERD (gastroesophageal reflux disease)     Hemodialysis patient ([facility]     Hyperlipidemia     Hypertension     Other chronic pain     Palliative care patient 09/15/2020    Prostate cancer ([facility]     Vitamin D deficiency      Past Surgical History:   Procedure Laterality Date    ABDOMEN SURGERY      esophageal CA, used part of stomach     APPENDECTOMY      [facility]    [facility]ARDIAC CATHETERIZATION      [facility], had stents, unknown details    DIALYSIS FISTULA CREATION Right 9/21/2020    CREATION RIGHT AV GRAFT performed by Gayla Sen DO at [address] [facility] URETEROSTOMY       Family History   Problem Relation Age of Onset    Diabetes Mother      Social History     Tobacco Use    Smoking status: Current Every Day Smoker     Packs/day: 2.00     Years: 55.00     Pack years: 110.00    Smokeless tobacco: Current User   Substance Use Topics    Alcohol use: No       Review of Systems    Constitutional - no significant activity change, appetite change, or unexpected weight change. No fever or chills. No diaphoresis or significant fatigue. HENT - no significant rhinorrhea or epistaxis. No tinnitus or significant hearing loss. Eyes - no sudden vision change or amaurosis. Respiratory - no significant shortness of breath, wheezing, or stridor. No apnea, cough, or chest tightness associated with shortness of breath. Cardiovascular - no chest pain, syncope, or significant dizziness. No palpitations or significant leg swelling. No claudication. He reports bleeding from his access arm after 3 attempts to cannulate the graft. Gastrointestinal - no abdominal swelling or pain. No blood in stool. No severe constipation, diarrhea, nausea, or vomiting. Genitourinary - No dysuria, frequency, or urgency. No flank pain or hematuria. Musculoskeletal - no back pain, gait disturbance, or myalgia. Skin - no color change, rash, pallor, or new wound. Neurologic - no dizziness, facial asymmetry, or light headedness. No seizures. No speech difficulty or lateralizing weakness. Hematologic - no easy bruising or excessive bleeding. Psychiatric - no severe anxiety or nervousness. No confusion. All other review of systems are negative. Physical Exam    BP (!) 90/40 (Site: Left Upper Arm, Position: Sitting, Cuff Size: Medium Adult)   Pulse 93   Resp 18   SpO2 98%     Constitutional - frail appearing.   No diaphoresis or acute

## 2020-10-29 NOTE — H&P (VIEW-ONLY)
Patient Care Team:  Unknown Provider Result (Inactive) as PCP - General      History and Physical    Mr. Felipe Garza is a 68 yo male who has a history of ESRD on chronic dialysis. He underwent a right brachial - axillary AV graft on 9/21/2020 by Dr. Gabby Smith. He comes in today due to pain in RUE and bleeding from the graft. He dialyzed at 5 am today. He reports that they stuck him 3 times to cannulate graft. He has tenderness over the graft. No fever.      Elmer Zelaya is a 67 y.o. male with the following history reviewed and recorded in [facility]:  Patient Active Problem List    Diagnosis Date Noted    Complicated UTI (urinary tract infection) 03/18/2016     Priority: High    Sepsis ([facility] 03/18/2016     Priority: High    Diabetes mellitus ([facility] 03/22/2016     Priority: Medium     A1c 8.6 on 3/19/16      COPD (chronic obstructive pulmonary disease) ([facility] 03/22/2016     Priority: Medium    CAD (coronary artery disease) 03/22/2016     Priority: Medium    JOY (acute kidney injury) ([facility] 03/18/2016     Priority: Medium    Lower abdominal pain 03/18/2016     Priority: Medium    Hydronephrosis of right kidney      Priority: Medium    Hypokalemia 03/18/2016     Priority: Low    Coronary artery disease involving native heart without angina pectoris     Chronic obstructive pulmonary disease (HCC)     Type 2 diabetes mellitus with diabetic chronic kidney disease ([facility]     Essential hypertension      Current Outpatient Medications   Medication Sig Dispense Refill    ipratropium (ATROVENT) 0.02 % nebulizer solution Take 2.5 mLs by nebulization 4 times daily (Patient not taking: Reported on 10/29/2020) 2.5 mL 3    amiodarone (CORDARONE) 200 MG tablet Take 200 mg by mouth daily      Ferric Citrate (AURYXIA) 1  MG(Fe) TABS Take 2 tablets by mouth 3 times daily       Hypromellose (ISOPTO TEARS OP) Apply to eye      isosorbide dinitrate (ISORDIL) 10 MG tablet Take 10 mg by mouth 3 times daily      insulin glargine (LANTUS) 100 UNIT/ML injection vial Inject 12 Units into the skin nightly       midodrine (PROAMATINE) 5 MG tablet Take 5 mg by mouth 3 times daily      oxyCODONE 5 MG capsule Take 5 mg by mouth every 6 hours as needed for Pain.  budesonide-formoterol (SYMBICORT) 160-4.5 MCG/ACT AERO Inhale 2 puffs into the lungs 2 times daily       famotidine (PEPCID) 20 MG tablet Take 20 mg by mouth daily      acetaminophen 650 MG TABS Take 650 mg by mouth every 4 hours as needed (Patient taking differently: Take 650 mg by mouth every 6 hours as needed for Pain ) 120 tablet 3    aspirin 81 MG chewable tablet Take 81 mg by mouth daily      atorvastatin (LIPITOR) 20 MG tablet Take 20 mg by mouth daily       vitamin D 1000 UNITS CAPS Take 3 capsules by mouth daily       insulin lispro (HUMALOG) 100 UNIT/ML injection vial Inject into the skin See Admin Instructions Sliding Scale BID      metoprolol (LOPRESSOR) 25 MG tablet Take 25 mg by mouth every other day MON, WED, FRI, SUN       No current facility-administered medications for this visit.       Allergies: Sulfa antibiotics  Past Medical History:   Diagnosis Date    Anemia in chronic kidney disease (CODE)     Atherosclerotic heart disease     [facility]     CAD (coronary artery disease)     Cancer ([facility]     Chronic kidney disease     COPD (chronic obstructive pulmonary disease) ([facility]     Diabetes mellitus (HCC)     End stage renal disease (HCC)     Enterocolitis due to Clostridioides difficile     GERD (gastroesophageal reflux disease)     Hemodialysis patient ([facility]     Hyperlipidemia     Hypertension     Other chronic pain     Palliative care patient 09/15/2020    Prostate cancer ([facility]     Vitamin D deficiency      Past Surgical History:   Procedure Laterality Date    ABDOMEN SURGERY      esophageal CA, used part of stomach     APPENDECTOMY      [facility]    CARDIAC CATHETERIZATION      [facility], had stents, unknown details    DIALYSIS FISTULA CREATION Right 9/21/2020    CREATION RIGHT AV GRAFT performed by Kaye Landis DO at [address] [facility] [address] URETEROSTOMY       Family History   Problem Relation Age of Onset    Diabetes Mother      Social History     Tobacco Use    Smoking status: Current Every Day Smoker     Packs/day: 2.00     Years: 55.00     Pack years: 110.00    Smokeless tobacco: Current User   Substance Use Topics    Alcohol use: No       Review of Systems    Constitutional - no significant activity change, appetite change, or unexpected weight change. No fever or chills. No diaphoresis or significant fatigue. HENT - no significant rhinorrhea or epistaxis. No tinnitus or significant hearing loss. Eyes - no sudden vision change or amaurosis. Respiratory - no significant shortness of breath, wheezing, or stridor. No apnea, cough, or chest tightness associated with shortness of breath. Cardiovascular - no chest pain, syncope, or significant dizziness. No palpitations or significant leg swelling. No claudication. He reports bleeding from his access arm after 3 attempts to cannulate the graft. Gastrointestinal - no abdominal swelling or pain. No blood in stool. No severe constipation, diarrhea, nausea, or vomiting. Genitourinary - No dysuria, frequency, or urgency. No flank pain or hematuria. Musculoskeletal - no back pain, gait disturbance, or myalgia. Skin - no color change, rash, pallor, or new wound. Neurologic - no dizziness, facial asymmetry, or light headedness. No seizures. No speech difficulty or lateralizing weakness. Hematologic - no easy bruising or excessive bleeding. Psychiatric - no severe anxiety or nervousness. No confusion. All other review of systems are negative. Physical Exam      Constitutional - frail appearing. No diaphoresis or acute distress. HENT - head normocephalic. [place]. Eyes - conjunctiva normal.  EOMS normal.  No exudate. No icterus.   Neck- ROM appears normal, no tracheal deviation. Has hard neck collar on. Cardiovascular - Regular rate and rhythm. Heart sounds are normal.  No murmur, rub, or gallop. Carotid pulses are 2+ to palpation bilaterally without bruit. Extremities - Radial and ulnar pulses are 2+ to palpation bilaterally. RUE with slight swelling and bruising noted. Small amount of a very slow oozing of sanguinous drainage noted. Thrill and bruit decreased mid aspect of graft. No erythema of signs of infection. No cyanosis, clubbing. No signs atheroembolic event. Pulmonary - effort appears normal.  No respiratory distress. Lungs - Breath sounds normal. No wheezes or rales. GI - Abdomen - soft, non tender, bowel sounds X 4 quadrants. No guarding or rebound tenderness. No distension or palpable mass. Genitourinary - deferred. Musculoskeletal - ROM appears normal.  No significant edema. Neurologic - alert and oriented X 3. Physiologic. Skin - warm, dry, and intact. No rash, erythema, or pallor. Psychiatric - mood, affect, and behavior appear normal.  Judgment and thought processes appear normal.    Options have been discussed with the patient including continued medical management vs. proceeding with Fistulogram possible angioplasty/stent; possible Permcath placement. Patient has opted to proceed with Fistulogram possible angioplasty/stent; possible Permcath placement. Risks have been discussed with the patient including but not limited to MI, death, CVA, bleed, nerve injury, steal, and infection. Assessment      1.  ESRD on chronic dialysis      Plan      Fistulogram with possible angioplasty/stent

## 2020-10-29 NOTE — H&P
glargine (LANTUS) 100 UNIT/ML injection vial Inject 12 Units into the skin nightly       midodrine (PROAMATINE) 5 MG tablet Take 5 mg by mouth 3 times daily      oxyCODONE 5 MG capsule Take 5 mg by mouth every 6 hours as needed for Pain.  budesonide-formoterol (SYMBICORT) 160-4.5 MCG/ACT AERO Inhale 2 puffs into the lungs 2 times daily       famotidine (PEPCID) 20 MG tablet Take 20 mg by mouth daily      acetaminophen 650 MG TABS Take 650 mg by mouth every 4 hours as needed (Patient taking differently: Take 650 mg by mouth every 6 hours as needed for Pain ) 120 tablet 3    aspirin 81 MG chewable tablet Take 81 mg by mouth daily      atorvastatin (LIPITOR) 20 MG tablet Take 20 mg by mouth daily       vitamin D 1000 UNITS CAPS Take 3 capsules by mouth daily       insulin lispro (HUMALOG) 100 UNIT/ML injection vial Inject into the skin See Admin Instructions Sliding Scale BID      metoprolol (LOPRESSOR) 25 MG tablet Take 25 mg by mouth every other day MON, WED, FRI, SUN       No current facility-administered medications for this visit.       Allergies: Sulfa antibiotics  Past Medical History:   Diagnosis Date    Anemia in chronic kidney disease (CODE)     Atherosclerotic heart disease     [facility]     CAD (coronary artery disease)     Cancer [facility]     Chronic kidney disease     COPD [facility]chronic obstructive pulmonary disease) [facility]     Diabetes mellitus (HCC)     End stage renal disease (HCC)     Enterocolitis due to Clostridioides difficile     GERD (gastroesophageal reflux disease)     [facility]od[facility]     Hyperlipidemia     Hypertension     Other chronic pain     Palliative care patient 09/15/2020    Prostate cancer ([facility]     Vitamin D deficiency      Past Surgical History:   Procedure Laterality Date    ABDOMEN SURGERY      esophageal CA, used part of stomach     APPENDECTOMY      [facility]    CARDIAC CATHETERIZATION      [facility], had stents, appears normal, no tracheal deviation. Has hard neck collar on. Cardiovascular - Regular rate and rhythm. Heart sounds are normal.  No murmur, rub, or gallop. Carotid pulses are 2+ to palpation bilaterally without bruit. Extremities - Radial and ulnar pulses are 2+ to palpation bilaterally. RUE with slight swelling and bruising noted. Small amount of a very slow oozing of sanguinous drainage noted. Thrill and bruit decreased mid aspect of graft. No erythema of signs of infection. No cyanosis, clubbing. No signs atheroembolic event. Pulmonary - effort appears normal.  No respiratory distress. Lungs - Breath sounds normal. No wheezes or rales. GI - Abdomen - soft, non tender, bowel sounds X 4 quadrants. No guarding or rebound tenderness. No distension or palpable mass. Genitourinary - deferred. Musculoskeletal - ROM appears normal.  No significant edema. Neurologic - alert and oriented X 3. Physiologic. Skin - warm, dry, and intact. No rash, erythema, or pallor. Psychiatric - mood, affect, and behavior appear normal.  Judgment and thought processes appear normal.    Options have been discussed with the patient including continued medical management vs. proceeding with Fistulogram possible angioplasty/stent; possible Permcath placement. Patient has opted to proceed with Fistulogram possible angioplasty/stent; possible Permcath placement. Risks have been discussed with the patient including but not limited to MI, death, CVA, bleed, nerve injury, steal, and infection. Assessment      1.  ESRD on chronic dialysis      Plan      Fistulogram with possible angioplasty/stent

## 2020-10-30 NOTE — INTERVAL H&P NOTE
Update History & Physical    The patient's History and Physical of October 29, 2020 was reviewed with the patient and I examined the patient. There was no change. The surgical site was confirmed by the patient and me. Plan: The risks, benefits, expected outcome, and alternative to the recommended procedure have been discussed with the patient. Patient understands and wants to proceed with the procedure.    ASA III, [place]    Electronically signed by Den Villarreal DO on 10/30/2020 at 9:51 AM

## 2020-10-30 NOTE — PROGRESS NOTES
Returned post fistulogram.  Awake and alert. Puncture site to right upper arm clear with bandaid in place. Denies any c/o pain.

## 2020-10-30 NOTE — PROGRESS NOTES
Patient instructed on care of right arm post fistulogram.  Given written instructions to return with him to nursing home. Stated understanding.

## 2020-10-30 NOTE — H&P
Patient Name: Evgeny Villgeas   YOB: 1945   MRN: [id number]     Procedure Date: 10/30/2020     Pre Op Diagnosis: prolonged bleeding right arm AVG    Post Op Diagnosis: same    Procedure: imaging of right brachial axillary AVG, right upper extremity venogram    Anesthesia: Local with Moderate Sedation      Estimated Blood Loss: Minimal    Complications: None    Implants: none    Procedure Details: Patient was brought to the angiogram suite and placed in supine position. His right upper extremity was prepped and draped in sterile fashion. Timeout was performed. He is right brachiobasilic AV graft was accessed in the venous direction fashion using standard micropuncture technique after injection local anesthetic.  4 Malagasy glide sheath was inserted and the graft imaging and right upper extremity venogram were performed as mentioned below findings. The reflux injection was made to look at the arterial portion of the graft. The sheath was removed and manual pressure held. Findings: AV graft patent, outflow veins with axillary, subclavian, right innominate and SVC veins are patent. The arterial portion of the graft is patent with brachial artery patent.     Disposition:aroused from sedation, and taken to the recovery room in a stable condition    Kaye Landis DO  10/30/2020 10:09 AM

## 2020-11-04 NOTE — TELEPHONE ENCOUNTER
Left message for patients daughter to return call. Patient does not have carelink monitor ordered. No follow up is scheduled at this time.

## 2020-11-06 NOTE — OP NOTE
Patient Name: Richa Rain   YOB: 1945   MRN: [id number]     Procedure Date: 10/30/2020    Pre Op Diagnosis: bleeding after dialysis    Post Op Diagnosis: same    Procedure: AVG  imaging, right upper extremity venogram    Anesthesia: Local with Moderate Sedation    Estimated Blood Loss: Minimal    Complications: None    Implants: none    Procedure Details: Patient was brought to the angiogram suite and placed in supine position. His right upper extremity was prepped and draped in sterile fashion. The AV graft was accessed in the venous direction using standard micropuncture technique. 4 Maltese glide sheath was inserted. The imaging was performed of the outflow as mentioned below findings. The reflux imaging was imaged for arterial anastomosis. The sheath was removed and manual pressure held. Patient tolerated procedure well. She will continue having dialysis through AV graft. There was no stenosis found. Findings: Right brachial axillary AV graft patent, axillary vein, subclavian vein, right innominate and SVC patent. .  Arterial portion of the graft with anastomosis patent.     Disposition:aroused from sedation, and taken to the recovery room in a stable condition    Milana Bay DO  11/6/2020 2:37 PM

## 2020-11-11 NOTE — TELEPHONE ENCOUNTER
Left message for patient's daughter to return call so we can find out how best to assist getting patient a carelink home monitor. Per chart, patient is at [facility].

## 2020-12-06 PROBLEM — U07.1 COVID-19: Status: ACTIVE | Noted: 2020-01-01

## 2020-12-07 PROBLEM — E11.22 TYPE 2 DIABETES MELLITUS WITH CHRONIC KIDNEY DISEASE ON CHRONIC DIALYSIS, WITH LONG-TERM CURRENT USE OF INSULIN (HCC): Status: ACTIVE | Noted: 2020-01-01

## 2020-12-07 PROBLEM — Z99.2 ESRD ON HEMODIALYSIS (HCC): Status: ACTIVE | Noted: 2020-01-01

## 2020-12-07 PROBLEM — Z79.4 TYPE 2 DIABETES MELLITUS WITH CHRONIC KIDNEY DISEASE ON CHRONIC DIALYSIS, WITH LONG-TERM CURRENT USE OF INSULIN (HCC): Status: ACTIVE | Noted: 2020-01-01

## 2020-12-07 PROBLEM — N18.6 ESRD ON HEMODIALYSIS (HCC): Status: ACTIVE | Noted: 2020-01-01

## 2020-12-07 PROBLEM — Z66 DNR (DO NOT RESUSCITATE): Status: ACTIVE | Noted: 2020-01-01

## 2020-12-07 PROBLEM — N18.6 TYPE 2 DIABETES MELLITUS WITH CHRONIC KIDNEY DISEASE ON CHRONIC DIALYSIS, WITH LONG-TERM CURRENT USE OF INSULIN (HCC): Status: ACTIVE | Noted: 2020-01-01

## 2020-12-07 PROBLEM — Z51.5 PALLIATIVE CARE PATIENT: Status: ACTIVE | Noted: 2020-01-01

## 2020-12-07 PROBLEM — Z99.2 TYPE 2 DIABETES MELLITUS WITH CHRONIC KIDNEY DISEASE ON CHRONIC DIALYSIS, WITH LONG-TERM CURRENT USE OF INSULIN (HCC): Status: ACTIVE | Noted: 2020-01-01

## 2020-12-07 NOTE — ED NOTES
Bed: 06  Expected date:   Expected time:   Means of arrival: [facility]  Comments:  [address], RN  12/06/20 4829

## 2020-12-07 NOTE — ED NOTES
Patient has a urostomy, Dr. Mary Licona updated.   Cath urine cancelled        Rolan Mora RN  12/06/20 1451

## 2020-12-07 NOTE — CONSULTS
Palliative Care:   Pt was sent to ED by [facility] d/t \"tremors\" . Found to be COVID pos. Admitted for obs. Will need HD. Past Medical History:        Past Medical History:   Diagnosis Date    Anemia in chronic kidney disease (CODE)     Arthritis     Atherosclerotic heart disease     [facility]     CAD (coronary artery disease)     Cancer (HCC)     Cerebral artery occlusion with cerebral infarction ([facility]     Chronic kidney disease     COPD [facility]chronic obstructive pulmonary disease) [facility]     Diabetes mellitus (HCC)     End stage renal disease (HCC)     Enterocolitis due to Clostridioides difficile     GERD (gastroesophageal reflux disease)     Hemodialysis [facility]     History of blood transfusion     Hx of blood clots     Hyperlipidemia     Hypertension     Other chronic pain     Palliative care patient 09/15/2020    [facility] ([facility]     Vitamin D deficiency        Advance Directives:   DNR-CC      Pain/Other Symptoms:   Unable to assess. Activity:   As kennedy          Psychological/Spiritual:  Limited family support. Dtr is POA. No other contacts to be listed per dtr/POA. Plan:   Med management     Patient/family discussion r/t goals:  Plan is for pt to return to [facility]. Dtr believes pt will then be trans back and forth to [facility], since they are the facility that is able to do HD on COVID pt. She also states [facility] and [facility] through them. Apparently she had some trouble with this being \"taken out\" of the system. Dtr reviewed pt hx with me. States Zurdo Crandall can be stubborn and rude to others\". She also tells me \"If I'm there he does what I tell him to\". She explains pt had ca of esophagus and explains the surgery that followed that allows pt to continue to eat. Review of any needed services:  \"Explored comfort measures\" with dtr should pt not do well with COVID and/or continue to refuse food/water or HD.      Palliative will follow for support, goals of care.     Electronically signed by Jam Alvarez RN on 12/7/2020 at 3:31 PM

## 2020-12-07 NOTE — DISCHARGE SUMMARY
mg p.o. to complete a 10 days course. He is advised to follow-up closely with nephrology as an outpatient and continue to do his hemodialysis schedule exactly as recommended by Nephrology without missing out on any treatments. He is being discharged home with home health care with instruction to self quarantine as per directions from health department! 12/6/2020  History of Present Illness:   Eliseo Burleson presents to [facility] with confusion and new tremor. Pt is not answering any questions, seems dehydrated and asking to have more water. When given water he starts chocking. He is on HD, refused to have one on Saturday, he is making urine. Does not appear volume overloaded. K slightly up. He is hypotensive and mildly tachycardic. Urine and blood cultures pending. Started on empiric ab. He tested positive for Covid. OBJECTIVE:  BP (!) 107/47   Pulse 86   Temp 97.4 °F (36.3 °C) (Temporal)   Resp 20   Ht 5' 7\" (1.702 m)   Wt 90 lb 1.6 oz (40.9 kg)   SpO2 94%   BMI 14.11 kg/m²       Heart: RRR   Lungs:  Bilateral fair air entry both lungs   Abdomen: Soft, non-tender   Extremities: +1 bilateral pitting edema   Neurologic: Alert and oriented   Skin: Warm and dry          Laboratory Data:  Recent Labs     12/06/20  1820   WBC 8.4   HGB 12.5*   *     Recent Labs     12/06/20  1843 12/06/20 1920 12/07/20  0425   NA  --  144 144   K 4.9 5.2* 5.9*   CL  --  97* 96*   CO2  --  20* 18*   BUN  --  93* 109*   CREATININE  --  8.6* 9.4*   GLUCOSE  --  138* 148*     Recent Labs     12/06/20 1920   AST 52*   ALT 58*   BILITOT 0.4   ALKPHOS 85     Troponin T:   Recent Labs     12/06/20 1920   TROPONINI 0.43*     Pro-BNP: No results for input(s): BNP in the last 72 hours. INR: No results for input(s): INR in the last 72 hours.   UA:No results for input(s): NITRITE, COLORU, PHUR, LABCAST, [address], RBCUA, MUCUS, TRICHOMONAS, YEAST, BACTERIA, CLARITYU, SPECGRAV, LEUKOCYTESUR, [address], BLOODU, GLUCOSEU, AMORPHOUS in the last 72 hours. Invalid input(s): Fabiana Mancini  A1C: No results for input(s): LABA1C in the last 72 hours. ABG:  Recent Labs     12/06/20  1843   PHART 7.370   TBE5MIA 37.0   PO2ART 56.0*   [id number] 21.4*   BEART -3.4*   HGBAE 12.1*   [id number] 85.2*   CARBOXHGBART 3.1            Impressions of imaging performed in 48 hours before discharge:    Xr Chest Portable    Result Date: 12/6/2020  Portable chest x-ray demonstrates no active disease.  Signed by Dr Dre Casillas on 12/6/2020 7:24 PM        [facility] Medication Instructions KRL:[id number]    Printed on:12/07/20 4866   Medication Information                      acetaminophen 650 MG TABS  Take 650 mg by mouth every 4 hours as needed             amiodarone (CORDARONE) 200 MG tablet  Take 200 mg by mouth daily             apixaban (ELIQUIS) 2.5 MG TABS tablet  Take 1 tablet by mouth 2 times daily             aspirin 81 MG chewable tablet  Take 81 mg by mouth daily             atorvastatin (LIPITOR) 20 MG tablet  Take 40 mg by mouth daily              budesonide-formoterol (SYMBICORT) 160-4.5 MCG/ACT AERO  Inhale 2 puffs into the lungs 2 times daily              dexamethasone (DECADRON) 6 MG tablet  Take 1 tablet by mouth every 6 hours for 9 days             famotidine (PEPCID) 20 MG tablet  Take 20 mg by mouth 2 times daily              Ferric Citrate (AURYXIA) 1  MG(Fe) TABS  Take 2 tablets by mouth 3 times daily              Hypromellose (ISOPTO TEARS OP)  Apply to eye             insulin glargine (LANTUS) 100 UNIT/ML injection vial  Inject 12 Units into the skin nightly              insulin lispro (HUMALOG) 100 UNIT/ML injection vial  Inject into the skin See Admin Instructions Sliding Scale BID             ipratropium (ATROVENT) 0.02 % nebulizer solution  Take 2.5 mLs by nebulization 4 times daily             isosorbide dinitrate (ISORDIL) 10 MG tablet  Take 10 mg by mouth 3 times daily MD  3:33 PM 12/7/2020

## 2020-12-07 NOTE — CONSULTS
[address], [address]  [place], [place]  Phone [phone]     Neurology Consultation     Date of Admission: 2020  6:01 PM  Date of Consultation: 20    Attending Provider: Kevyn De La Garza MD  Consulting Provider: Bryce Mike M.D. Patient: Erin Miranda  :  1945  Age:  67 y.o. MRN:  [id number]    CHIEF COMPLAINT:  Diminished responsiveness    History Source: History obtained from chart review and the patient. PCP: Unknown Provider Result (Inactive)    HISTORY OF PRESENT ILLNESS:   Erin Miranda is a 67y.o. year old man with a history of hypertension, hyperlipidemia, diabetes, CAD, stroke, COPD, and ESRD on HD who was admitted  with diminished responsiveness and COVID-19. He resides in a NH. He was admitted here  through  with fall and hypotension and was seen again 10/30 for AVF repair. During those evaluations, he was awake, alert, conversant and without obvious neurologic deficit. A few days ago he was having some respiratory symptoms and tested positive for COVID-19. Yesterday, he had a change in his mental status and was sent to the ER and admitted with respiratory failure and uremia. He gets dialysis on Tuesday, Thursday, and Saturday. He refused dialysis on . On examination, he would not communicate or follow instructions. He would not answer questions. He would move some and had diffuse tremors.       PAST MEDICAL HISTORY:    Medical History:      Diagnosis Date    Anemia in chronic kidney disease (CODE)     Arthritis     Atherosclerotic heart disease     Bladder [address] ([address]     CAD (coronary artery disease)     Cancer ([address]     Cerebral artery occlusion with cerebral infarction ([address]     Chronic kidney disease     COPD (chronic obstructive pulmonary disease) ([address]     Diabetes mellitus ([address]     End stage renal disease ([address]     Enterocolitis due to Clostridioides difficile     GERD (gastroesophageal reflux disease)     [facility]od[facility] ([facility])     History of blood transfusion     Hx of blood clots     Hyperlipidemia     Hypertension     Other chronic pain     Palliative care patient 09/15/2020    Prostate cancer ([facility])     Vitamin D deficiency        Surgical History:      Procedure Laterality Date    ABDOMEN SURGERY      esophageal CA, used part of stomach     APPENDECTOMY      [facility]    CARDIAC CATHETERIZATION      [facility], had stents, unknown details    DIALYSIS FISTULA CREATION Right 9/21/2020    CREATION RIGHT AV GRAFT performed by Mikayla Larose DO at [address]         Medications Prior to Admission:    Prior to Admission medications    Medication Sig Start Date End Date Taking? Authorizing Provider   dexamethasone (DECADRON) 4 MG tablet Take 4 mg by mouth daily (with breakfast) X 7 days end date 12/13   Yes Historical Provider, MD   vitamin C (ASCORBIC ACID) 500 MG tablet Take 1,000 mg by mouth daily X 14 days end date 12/20   Yes Historical Provider, MD   zinc sulfate (ZINCATE) 220 (50 Zn) MG capsule Take 50 mg by mouth daily X 14 days end 12/20   Yes Historical Provider, MD   Multiple Vitamins-Iron (DAILY VITAMIN FORMULA+IRON) TABS Take 1 tablet by mouth daily   Yes Historical Provider, MD   lidocaine-prilocaine (EMLA) 2.5-2.5 % cream Apply topically as needed for Pain Apply topically as needed.    Yes Historical Provider, MD   amiodarone (CORDARONE) 200 MG tablet Take 200 mg by mouth daily   Yes Historical Provider, MD   Ferric Citrate (AURYXIA) 1  MG(Fe) TABS Take 2 tablets by mouth 3 times daily    Yes Historical Provider, MD   Hypromellose (ISOPTO TEARS OP) Apply to eye   Yes Historical Provider, MD   isosorbide dinitrate (ISORDIL) 10 MG tablet Take 10 mg by mouth 3 times daily   Yes Historical Provider, MD   insulin glargine (LANTUS) 100 UNIT/ML injection vial Inject 12 Units into the skin nightly    Yes Historical Provider, MD   midodrine (PROAMATINE) 5 MG tablet Take 5 mg by mouth 3 times daily   Yes Historical Provider, MD   oxyCODONE 5 MG capsule Take 5 mg by mouth every 6 hours as needed for Pain.     Yes Historical Provider, MD   budesonide-formoterol (SYMBICORT) 160-4.5 MCG/ACT AERO Inhale 2 puffs into the lungs 2 times daily    Yes Historical Provider, MD   famotidine (PEPCID) 20 MG tablet Take 20 mg by mouth 2 times daily    Yes Historical Provider, MD   acetaminophen 650 MG TABS Take 650 mg by mouth every 4 hours as needed  Patient taking differently: Take 650 mg by mouth every 4 hours as needed for Pain  3/25/16  Yes Lisette David MD   aspirin 81 MG chewable tablet Take 81 mg by mouth daily   Yes Historical Provider, MD   atorvastatin (LIPITOR) 20 MG tablet Take 40 mg by mouth daily    Yes Historical Provider, MD   vitamin D 1000 UNITS CAPS Take 3 capsules by mouth daily    Yes Historical Provider, MD   insulin lispro (HUMALOG) 100 UNIT/ML injection vial Inject into the skin See Admin Instructions Sliding Scale BID   Yes Historical Provider, MD   metoprolol (LOPRESSOR) 25 MG tablet Take 25 mg by mouth every other day MON, WED, FRI, SUN   Yes Historical Provider, MD   ipratropium (ATROVENT) 0.02 % nebulizer solution Take 2.5 mLs by nebulization 4 times daily  Patient not taking: Reported on 10/29/2020 9/24/20   Roger Saucedo DO       Current Medications:  Current Facility-Administered Medications: acetaminophen (TYLENOL) tablet 650 mg, 650 mg, Oral, Q4H PRN  amiodarone (CORDARONE) tablet 200 mg, 200 mg, Oral, Daily  atorvastatin (LIPITOR) tablet 40 mg, 40 mg, Oral, Daily  famotidine (PEPCID) tablet 20 mg, 20 mg, Oral, Daily  Ferric Citrate TABS 420 mg, 420 mg, Oral, TID  isosorbide dinitrate (ISORDIL) tablet 10 mg, 10 mg, Oral, TID  metoprolol tartrate (LOPRESSOR) tablet 25 mg, 25 mg, Oral, Every Other Day  oxyCODONE (ROXICODONE) immediate release tablet 5 mg, 5 mg, Oral, Q6H PRN  Vitamin D (CHOLECALCIFEROL) tablet 2,000 Units, 2,000 Units, Oral, Daily  sodium polystyrene (KAYEXALATE) 15 GM/60ML suspension 15 g, 15 g, Oral, Once  sodium bicarbonate tablet 650 mg, 650 mg, Oral, 4x Daily  insulin lispro (HUMALOG) injection vial 0-6 Units, 0-6 Units, Subcutaneous, TID WC  insulin lispro (HUMALOG) injection vial 0-3 Units, 0-3 Units, Subcutaneous, Nightly  glucose (GLUTOSE) 40 % oral gel 15 g, 15 g, Oral, PRN  dextrose 50 % IV solution, 12.5 g, Intravenous, PRN  glucagon (rDNA) injection 1 mg, 1 mg, Intramuscular, PRN  dextrose 5 % solution, 100 mL/hr, Intravenous, PRN  midodrine (PROAMATINE) tablet 5 mg, 5 mg, Oral, TID  dexamethasone (PF) (DECADRON) injection 6 mg, 6 mg, Intravenous, Daily  meropenem (MERREM) 0.5 g in sterile water 20 mL IV syringe, 0.5 g, Intravenous, Q24H  nystatin (MYCOSTATIN) 735875 UNIT/ML suspension 500,000 Units, 5 mL, Oral, 4x Daily  sodium polystyrene (KAYEXALATE) 15 GM/60ML suspension 15 g, 15 g, Oral, Once    Allergies:  Gabapentin; Nsaids; and Sulfa antibiotics    Habits:  TOBACCO:   reports that he has been smoking. He has a 13.75 pack-year smoking history. He has never used smokeless tobacco.  ETOH:   reports no history of alcohol use. DRUGS:    Social History     Substance and Sexual Activity   Drug Use No       SOCIAL HISTORY:   Zulma Mcgraw is , lives in a NH in [place], [place], and is retired. FAMILY HISTORY:       Problem Relation Age of Onset    Diabetes Mother        REVIEW OF SYSTEMS:  Review of Systems   Unable to perform ROS: Mental status change       PHYSICAL EXAMINATION:  Vitals: BP (!) 107/47   Pulse 86   Temp 97.4 °F (36.3 °C) (Temporal)   Resp 20   Ht 5' 7\" (1.702 m)   Wt 90 lb 1.6 oz (40.9 kg)   SpO2 94%   BMI 14.11 kg/m²   General appearance: He is an elderly chronically debilitated man who is acutely ill  Skin: Skin color, texture, turgor normal. No rashes or lesions  HEENT: Head: Normal, normocephalic, atraumatic.   He is diffusely rigid including his neck. Neck:no adenopathy, no carotid bruit, no JVD, supple, symmetrical, trachea midline and thyroid not enlarged, symmetric, no tenderness/mass/nodules  Lungs: rhonchi bilaterally  Heart: tachycardic  Abdomen: soft, non-tender; bowel sounds normal; no masses,  no organomegaly  Extremities: extremities normal, atraumatic, no cyanosis or edema      NEUROLOGIC EXAMINATION:  Neurologic Exam     Mental Status   Level of consciousness: arousable by tactile stimuli  He wakens briefly and moans. He does not answer questions or follow commands. He does not talk or say any discernable words. Cranial Nerves   PERRL. He blinks to threat. No facial asymmetry. Motor Exam   He moves his extremities spontaneously, do not appear purposeful. He has axial and appendicular rigidity. He has rapid tremulous movements in arms and legs. ADDITIONAL REVIEW:  CBC:    Recent Labs     12/06/20  1820   WBC 8.4   HGB 12.5*   *     BMP:     Recent Labs     12/06/20  1843 12/06/20  1920 12/07/20  0425   NA  --  144 144   K 4.9 5.2* 5.9*   CL  --  97* 96*   CO2  --  20* 18*   BUN  --  93* 109*   CREATININE  --  8.6* 9.4*   GLUCOSE  --  138* 148*     Hepatic:  Recent Labs     12/06/20 1920   AST 52*   ALT 58*   BILITOT 0.4   ALKPHOS 85     Troponin T:    Recent Labs     12/06/20 1920   TROPONINI 0.43*     ABGs:    Lab Results   Component Value Date    PHART 7.370 12/06/2020    PO2ART 56.0 12/06/2020    [id number] 37.0 12/06/2020       IMPRESSION:  1. Encephalopathy, metabolic  2. COVID. Consider sepsis, pneumonia  3. ESRD on HD  4. Respiratory failure with hypoxia  5. Acute on chronic heart failure. BNP is elevated  6. Elevated liver enzymes    PLAN:  1. Supportive care - empiric abx, cultures pending, oxygen, Nebs, HD  2. Echo  3. CT head  4.  EEG    Cheri Valdez M.D.

## 2020-12-07 NOTE — CONSULTS
Nephrology ([address]) Consult Note      Patient:  Laurelyn Schaumann  YOB: 1945  Date of Service: 2020  MRN: [id number]   Acct: [de-identified]   Primary Care Physician: Unknown Provider Result (Inactive)  Advance Directive: Prior  Admit Date: 2020       Hospital Day: 1  Referring Provider: Reba Kan MD    Patient independently seen and examined, Chart, Consults, Notes, Operative notes, Labs, Cardiology, and Radiology studies reviewed as available. Subjective:  Laurelyn Schaumann is a 67 y.o. male  whom we were consulted for end-stage renal disease. Patient was admitted from nursing home on  after having missed dialysis . He had developed worsening mental status after missing dialysis. Also had poor intake noted at the nursing home due to choking. He was placed in Covid precautions and examined under them with nursing assistance and audiovisual devieces. He was continued on dialysis with make-up treatment on  when we were consulted. Chronic hearing issues noted and reviewed with nursing. No events per nursing.   Patient unable to fully participate in the history and physical examination due to mental status and isolation procedures    Dialysis   Pt was seen on RRT and tolerating well at 1500, called back by nursing at 1600 that pt   Modality: Hemodialysis  Access: Arterial Venous Fistula  Location: left upper  QB: 450  QD: 500  UF: 500      Allergies:  Gabapentin; Nsaids; and Sulfa antibiotics    Medicines:  Current Facility-Administered Medications   Medication Dose Route Frequency Provider Last Rate Last Dose    acetaminophen (TYLENOL) tablet 650 mg  650 mg Oral Q4H PRN Glenn Lozoya MD        amiodarone (CORDARONE) tablet 200 mg  200 mg Oral Daily Glenn Lozoya MD   200 mg at 20 1046    atorvastatin (LIPITOR) tablet 40 mg  40 mg Oral Daily Glenn Lozoya MD   40 mg at 20 1048    suspension 500,000 Units  5 mL Oral 4x Daily Geneva Townsedn MD   500,000 Units at 12/07/20 1048    sodium polystyrene (KAYEXALATE) 15 GM/60ML suspension 15 g  15 g Oral Once Geneva Townsend MD           Past Medical History:  Past Medical History:   Diagnosis Date    Anemia in chronic kidney disease (CODE)     Arthritis     Atherosclerotic heart disease     [facility]     CAD (coronary artery disease)     Cancer ([facility]     Cerebral artery occlusion with cerebral infarction ([facility]     Chronic kidney disease     COPD (chronic obstructive pulmonary disease) ([facility]     Diabetes mellitus ([facility]     End stage renal disease ([facility]     Enterocolitis due to Clostridioides difficile     GERD (gastroesophageal reflux disease)     Hemodialysis patient ([facility]     History of blood transfusion     Hx of blood clots     Hyperlipidemia     Hypertension     Other chronic pain     Palliative care patient 09/15/2020    Pro[facility] ([facility]     Vitamin D deficiency        Past Surgical History:  Past Surgical History:   Procedure Laterality Date    ABDOMEN SURGERY      esophageal CA, used part of stomach     APPENDECTOMY      [facility]    CARDIAC CATHETERIZATION      [facility], had stents, unknown details    DIALYSIS FISTULA CREATION Right 9/21/2020    CREATION RIGHT AV GRAFT performed by Jed Sullivan DO at [address]      URETEROSTOMY         Family History  Family History   Problem Relation Age of Onset    Diabetes Mother        Social History  Social History     Socioeconomic History    Marital status:      Spouse name: Not on file    Number of children: Not on file    Years of education: Not on file    Highest education level: Not on file   Occupational History    Not on file   Social Needs    Financial resource strain: Not on file    Food insecurity     Worry: Not on file     Inability: Not on file   Fleksy needs results for input(s): Page RUIZ in the last 72 hours. No results for input(s): CXSURG in the last 72 hours. Radiology reports as per the Radiologist  Radiology: Xr Chest Portable    Result Date: 2020  EXAMINATION:  XR CHEST PORTABLE  2020 7:23 PM HISTORY: Respiratory failure. COMPARISON: 2020. FINDINGS:  There is no dense infiltrate or effusion. The heart is normal in size. The thoracic aorta is atheromatous. There is a pacemaker on the left. Portable chest x-ray demonstrates no active disease. Signed by Dr Arleth Mon on 2020 7:24 PM       Assessment   End-stage renal disease  Hyperkalemia  COVID-19 with acute hypoxemic respiratory failure  Secondary hyperparathyroidism  Anemia chronic kidney disease      Plan:  Dialysis today to get back on schedule and correct hyperkalemia  Discussed with patient, nursing  Monitor labs  Antibiotics per hospitalist service    Addendum: pt later  unexpectedly, events reviewed with nursing    Thank you for the consult, we appreciate the opportunity to provide care to your patients. Feel free to contact me if I can be of any further assistance.       Daksha Mays MD  20  2:52 PM

## 2020-12-07 NOTE — PROGRESS NOTES
4 Eyes Skin Assessment    Reyna Nolasco is being assessed upon: Admission    I agree that Kiersten Donahue, along with Negrita Vick RN (either 2 RN's or 1 LPN and 1 RN) have performed a thorough Head to Toe Skin Assessment on the patient. ALL assessment sites listed below have been assessed. Areas assessed by both nurses:     [x]   Head, Face, and Ears   [x]   Shoulders, Back, and Chest  [x]   Arms, Elbows, and Hands   [x]   Coccyx, Sacrum, and Ischium  [x]   Legs, Feet, and Heels    Does the Patient have Skin Breakdown? Yes, wound(s) noted upon assessment. It is the responsibility of the Primary Nurse to assure that the following documentation, preventions, orders, and consults are complete on the above noted wound(s): Wound LDA initiated. LDA Flowsheet Documentation includes the Chika-wound, Wound Assessment, Measurements, Dressing Treatment, Drainage, and Color.     Leroy Prevention initiated: Yes  Wound Care Orders initiated: No    WOC nurse consulted for Pressure Injury (Stage 3,4, Unstageable, DTI, NWPT, and Complex wounds) and New or Established Ostomies: No        Primary Nurse eSignature: Ruth Ann Collins RN on 12/7/2020 at 1:48 AM      Co-Signer eSignature: Electronically signed by Negrita Vick RN on 12/7/20 at 2:58 AM CST

## 2020-12-07 NOTE — ED NOTES
Spoke with pt's daughter, Khadra Rodríguez ([phone]) she states that pt does dialysis on T, [place], and Saturday but according to [facility] pt refused dialysis on Saturday.       Angelina Jurado RN  12/06/20 2118

## 2020-12-07 NOTE — CARE COORDINATION
Pt can return to [facility] for outpt HD switch.    Padmaja  [phone] P  [phone] F    When calling report, please call Kurtis Najera  at [facility], ext. [phone]     Electronically signed by Cat Anderson on 12/7/2020 at 2:52 PM

## 2020-12-07 NOTE — PROGRESS NOTES
Contains critical data  Blood Gas, Arterial   Status:  Final result    Ref Range & Units  12/06/20 1843   pH, Arterial  7.350 - 7.450  7.370    pCO2, Arterial  35.0 - 45.0 mmHg  37.0    pO2, Arterial  80.0 - 100.0 mmHg  56. 0Low      HCO3, Arterial  22.0 - 26.0 mmol/L  21.4Low      Base Excess, Arterial  -2.0 - 2.0 mmol/L  -3.4Low      Hemoglobin, Art, Extended  14.0 - 18.0 g/dL  12.1Low      O2 Sat, Arterial  >92 %  85. 2Low Panic      Carboxyhgb, Arterial  0.0 - 5.0 %  3.1    Comment:      0.0-1.5   (Smokers 1.5-5.0)    Methemoglobin, Arterial  <1.5 %  1.2    O2 Content, Arterial  Not Established mL/dL  14.5    O2 Therapy   Unknown    Resulting Agency   [facility]    Narrative     CALL  Rojas  KLE tel. ,   DIANNA Infante RN ER, 12/06/2020 18:44, by KARI DUMONT [facility]         Specimen Collected: 12/06/20 1843  Last Resulted: 12/06/20 1844  View Other Order Details         Room Air  LR

## 2020-12-07 NOTE — ED NOTES
Called pt's daughter again, Ness, and updated her that pt is being admitted to [facility].      Gideon Ramirez RN  12/06/20 4031

## 2020-12-07 NOTE — ED PROVIDER NOTES
[facility] EMER[facility]  eMERGENCY dEPARTMENT eNCOUnter      Pt Name: Roman Sparrow  MRN: [id number]  [place] 1945  Date of evaluation: 12/6/2020  Provider: Micheal Shen MD    CHIEF COMPLAINT       Chief Complaint   Patient presents with   [facility] stating he is tremoring and posturing          HISTORY OF PRESENT ILLNESS   (Location/Symptom, Timing/Onset,Context/Setting, Quality, Duration, Modifying Factors, Severity)  Note limiting factors. Roman Sparrow is a 67 y.o. male who presents to the emergency department evaluation of a respiratory failure reported Covid positive    48-year-old male reported Covid positive sent in for respiratory failure. He is also hemodialysis patient. Limited history from this patient. He does not answer any questions. Report from nursing home was tremors. Question if these could have been rigors or chills. The history is provided by the patient, medical records and the nursing home. The history is limited by the condition of the patient. NursingNotes were reviewed. REVIEW OF SYSTEMS    (2-9 systems for level 4, 10 or more for level 5)     Review of Systems   Unable to perform ROS: Patient nonverbal (Not sure what the patient's baseline is but he is not answering questions or carrying on a conversation though he can open his eyes and complain.)       A complete review of systems was performed and is negative except as noted above in the HPI.        PAST MEDICAL HISTORY     Past Medical History:   Diagnosis Date    Anemia in chronic kidney disease (CODE)     Arthritis     Atherosclerotic heart disease     Bladder cancer ([address]     CAD (coronary artery disease)     Cancer ([address]     Cerebral artery occlusion with cerebral infarction ([address]     Chronic kidney disease     COPD (chronic obstructive pulmonary disease) ([address]     Diabetes mellitus ([address]     End stage renal disease ([address]     Enterocolitis due to Clostridioides difficile  GERD (gastroesophageal reflux disease)     [facility]od[facility]     History of blood transfusion     Hx of blood clots     Hyperlipidemia     Hypertension     Other chronic pain     Palliative care patient 09/15/2020    [facility] ([facility]     Vitamin D deficiency          SURGICAL HISTORY       Past Surgical History:   Procedure Laterality Date    ABDOMEN SURGERY      esophageal CA, used part of stomach     APPENDECTOMY      [facility]    CARDIAC CATHETERIZATION      [facility], had stents, unknown details    DIALYSIS FISTULA CREATION Right 9/21/2020    CREATION RIGHT AV GRAFT performed by Ramiro Kiran, DO at [address]      URETEROSTOMY           CURRENT MEDICATIONS       Previous Medications    ACETAMINOPHEN 650 MG TABS    Take 650 mg by mouth every 4 hours as needed    AMIODARONE (CORDARONE) 200 MG TABLET    Take 200 mg by mouth daily    ASPIRIN 81 MG CHEWABLE TABLET    Take 81 mg by mouth daily    ATORVASTATIN (LIPITOR) 20 MG TABLET    Take 40 mg by mouth daily     BUDESONIDE-FORMOTEROL (SYMBICORT) 160-4.5 MCG/ACT AERO    Inhale 2 puffs into the lungs 2 times daily     FAMOTIDINE (PEPCID) 20 MG TABLET    Take 20 mg by mouth daily    FERRIC CITRATE (AURYXIA) 1  MG(FE) TABS    Take 2 tablets by mouth 3 times daily     HYPROMELLOSE (ISOPTO TEARS OP)    Apply to eye    INSULIN GLARGINE (LANTUS) 100 UNIT/ML INJECTION VIAL    Inject 12 Units into the skin nightly     INSULIN LISPRO (HUMALOG) 100 UNIT/ML INJECTION VIAL    Inject into the skin See Admin Instructions Sliding Scale BID    IPRATROPIUM (ATROVENT) 0.02 % NEBULIZER SOLUTION    Take 2.5 mLs by nebulization 4 times daily    ISOSORBIDE DINITRATE (ISORDIL) 10 MG TABLET    Take 10 mg by mouth 3 times daily    LIDOCAINE-PRILOCAINE (EMLA) 2.5-2.5 % CREAM    Apply topically as needed for Pain Apply topically as needed.     METOPROLOL (LOPRESSOR) 25 MG TABLET    Take 25 mg by mouth every other 12/6/2020 7:24 PM            ED BEDSIDE ULTRASOUND:   Performed by ED Physician - none    LABS:  Labs Reviewed   RESPIRATORY PANEL, MOLECULAR, WITH COVID-19 - Abnormal; Notable for the following components:       Result Value    SARS-CoV-2, PCR DETECTED (*)     All other components within normal limits    Narrative:     Sushila Driscoll tel. ,  Microbiology results called to and read back by Stephanie Huff RN ED,  12/06/2020 20:54, by Renea Concepcion   CBC WITH AUTO DIFFERENTIAL - Abnormal; Notable for the following components:    RBC 4.00 (*)     Hemoglobin 12.5 (*)     Hematocrit 40.4 (*)     .0 (*)     MCH 31.3 (*)     MCHC 30.9 (*)     RDW 15.3 (*)     Platelets 978 (*)     Neutrophils % 83.4 (*)     Lymphocytes % 10.9 (*)     Lymphocytes Absolute 0.9 (*)     All other components within normal limits   BLOOD GAS, ARTERIAL - Abnormal; Notable for the following components:    pO2, Arterial 56.0 (*)     HCO3, Arterial 21.4 (*)     Base Excess, Arterial -3.4 (*)     Hemoglobin, Art, Extended 12.1 (*)     O2 Sat, Arterial 85.2 (*)     All other components within normal limits    Narrative:     Sushila Driscoll tel. ,  DIANNA Powers RN ER, 12/06/2020 18:44, by [id number] W/ REFLEX TO MG FOR LOW K - Abnormal; Notable for the following components:    Potassium reflex Magnesium 5.2 (*)     Chloride 97 (*)     CO2 20 (*)     Anion Gap 27 (*)     Glucose 138 (*)     BUN 93 (*)     CREATININE 8.6 (*)     GFR Non- 6 (*)     GFR  7 (*)     Alb 3.4 (*)     ALT 58 (*)     AST 52 (*)     All other components within normal limits   TROPONIN - Abnormal; Notable for the following components:    Troponin 0.43 (*)     All other components within normal limits    Narrative:     CALL  Bob FAYE tel. ,  Chemistry results called to and read back by [facility] RN in ED, 12/06/2020 20:03,  by [id number] - Abnormal; Notable for the following components:    Pro-BNP 35,000

## 2020-12-07 NOTE — PROGRESS NOTES
Pharmacy Renal Adjustment    Maira Huston is a 67 y.o. male. Pharmacy has renally adjusted medications per protocol. Recent Labs     12/06/20 1920 12/07/20  0425   BUN 93* 109*       Recent Labs     12/06/20 1920 12/07/20  0425   CREATININE 8.6* 9.4*       CrCl cannot be calculated (Unknown ideal weight.).     Height:   Ht Readings from Last 1 Encounters:   12/07/20 5' 7\" (1.702 m)     Weight:  Wt Readings from Last 1 Encounters:   12/07/20 90 lb 1.6 oz (40.9 kg)       CKD stage: ESRD on HD             Plan: Adjust the following medications based on renal function:           Famotidine 20 mg po daily to 10 mg po daily    Electronically signed by Zane Gross [facility] on 12/7/2020 at 3:15 PM

## 2020-12-07 NOTE — PROGRESS NOTES
Eliseo Burleson arrived to room # 31 [id number] 12. Presented with: acute respiratory failure due to COVID-19  Mental Status: Patient is oriented. Vitals:    12/07/20 0115   BP: (!) 108/50   Pulse: 100   Resp: 16   Temp: 98.3 °F (36.8 °C)   SpO2: 94%     Patient safety contract and falls prevention contract reviewed with patient Yes. Oriented Patient to room. Call light within reach. Yes.   Needs, issues or concerns expressed at this time: no.      Electronically signed by Jackelyn Romberg, RN on 12/7/2020 at 1:47 AM

## 2020-12-07 NOTE — H&P
mouth daily    Historical Provider, MD   lidocaine-prilocaine (EMLA) 2.5-2.5 % cream Apply topically as needed for Pain Apply topically as needed. Historical Provider, MD   ipratropium (ATROVENT) 0.02 % nebulizer solution Take 2.5 mLs by nebulization 4 times daily  Patient not taking: Reported on 10/29/2020 9/24/20   Awilda Jack DO   amiodarone (CORDARONE) 200 MG tablet Take 200 mg by mouth daily    Historical Provider, MD   Ferric Citrate (AURYXIA) 1  MG(Fe) TABS Take 2 tablets by mouth 3 times daily     Historical Provider, MD   Hypromellose (ISOPTO TEARS OP) Apply to eye    Historical Provider, MD   isosorbide dinitrate (ISORDIL) 10 MG tablet Take 10 mg by mouth 3 times daily    Historical Provider, MD   insulin glargine (LANTUS) 100 UNIT/ML injection vial Inject 12 Units into the skin nightly     Historical Provider, MD   midodrine (PROAMATINE) 5 MG tablet Take 5 mg by mouth 3 times daily    Historical Provider, MD   oxyCODONE 5 MG capsule Take 5 mg by mouth every 6 hours as needed for Pain.      Historical Provider, MD   budesonide-formoterol (SYMBICORT) 160-4.5 MCG/ACT AERO Inhale 2 puffs into the lungs 2 times daily     Historical Provider, MD   famotidine (PEPCID) 20 MG tablet Take 20 mg by mouth daily    Historical Provider, MD   acetaminophen 650 MG TABS Take 650 mg by mouth every 4 hours as needed  Patient taking differently: Take 650 mg by mouth every 4 hours as needed for Pain  3/25/16   Shannon Rather, MD   aspirin 81 MG chewable tablet Take 81 mg by mouth daily    Historical Provider, MD   atorvastatin (LIPITOR) 20 MG tablet Take 40 mg by mouth daily     Historical Provider, MD   vitamin D 1000 UNITS CAPS Take 3 capsules by mouth daily     Historical Provider, MD   insulin lispro (HUMALOG) 100 UNIT/ML injection vial Inject into the skin See Admin Instructions Sliding Scale BID    Historical Provider, MD   metoprolol (LOPRESSOR) 25 MG tablet Take 25 mg by mouth every other day MON, WED, FRI, SUN    Historical Provider, MD       Allergies:  Gabapentin; Nsaids; and Sulfa antibiotics     Review of Systems:   · Not able to obtain, pt does not answer any questions    Physical Examination:    Vital Signs: BP (!) 108/50   Pulse 100   Temp 98.3 °F (36.8 °C) (Temporal)   Resp 16   Ht 5' 7\" (1.702 m)   Wt 90 lb 1.6 oz (40.9 kg)   SpO2 94%   BMI 14.11 kg/m²   General appearance: moderate to severe distress, coughing, chocking on water  Skin: Skin color, texture, turgor normal. No rashes or lesions. No induration or tightening palpated. Head: Normocephalic. No masses, lesions, tenderness or abnormalities  Eyes: conjunctivae/corneas clear. Sclera non icteric. Ears: External ears normal.  Hearing diminished  Nose/Sinuses: Nares normal. Septum midline. Mucosa normal. No drainage or sinus tenderness. Oropharynx: Lips, mucosa, and tongue dry  Neck: Neck supple, and symmetric. No adenopathy. Trachea is midline. Carotids brisk in upstroke without bruits, No abnormal JVP noted at 45°. Lungs: Lungs clear to auscultation bilaterally. No retractions or use of accessory muscles. No vocal fremitus. No ronchi, crackle or rale. Heart:  S1 > S2. Regular rate and rhythm. No gallop, murmur, rub, palpable thrill or heave noted. Abdomen: Abdomen soft, non-tender. BS normal. No masses, organomegaly. No hernia noted. Extremities: Extremities normal. No deformities, edema, or skin discoloration. No cyanosis or clubbing noted to the nails. Peripheral pulses 4/4. Musculoskeletal: Spine ROM normal. Muscular strength intact.   Neuro: tremor in both UEs, no weakness of LEs    Pertinent Labs:  CBC:   Recent Labs     12/06/20  1820   WBC 8.4   RBC 4.00*   HGB 12.5*   HCT 40.4*   .0*   MCH 31.3*   MCHC 30.9*   RDW 15.3*   *   MPV 10.7     BMP:   Recent Labs     12/06/20  1843 12/06/20  1920   NA  --  144   K 4.9 5.2*   CL  --  97*   CO2  --  20*   BUN  --  93*   CREATININE  --  8.6*   GLUCOSE  -- 138*   CALCIUM  --  9.2     ABGs: No results for input(s): PO2, PCO2, PH, HCO3, BE, O2SAT in the last 72 hours. INR: No results for input(s): INR, PROTIME in the last 72 hours. BNP:  No results found for: BNP  TSH: No results found for: TSH  Cardiac Injury Profile:   Lab Results   Component Value Date    TROPONINI 0.43 ([facility]) 12/06/2020     Lipid Profile: No components found for: CHLPL  No results found for: TRIG  No results found for: HDL  No results found for: LDLCALC  No results found for: LABVLDL  Hemoglobin A1C:   Lab Results   Component Value Date    LABA1C 8.6 (H) 03/19/2016     No results found for: EAG      Assessment/Plan:  ·   Covid- 19 with acute hypoxic resp failure - cont NC oxygen, decadron  · ESRD- missed HD Saturday- nephrology consulted   · Hyperkalemia - mild- ka oxylate one dose  · Acute encephalopathy- rule out infection- blood and urine cultures   · New tremor- neurology to evaluate  Patient Active Problem List:     Diabetes mellitus ([facility]- ISS     Coronary artery disease involving native heart without angina pectoris     Chronic obstructive pulmonary disease ([facility]                   I have reviewed my findings and recommendations in detail with Medina Souza.     Moises Rojas MD     Admission level 2

## 2020-12-07 NOTE — CARE COORDINATION
Received home health referral on this patient. He is from [facility] and is documented that he will return there. Will sign off.   Electronically signed by King Marei RN on 12/7/20 at 4:08 PM CST

## 2020-12-07 NOTE — PROGRESS NOTES
Patient was receiving dialysis and passed away at 1525. Dina Yu RN (dialysis nurse)  Informed nurse that the patient has passed away and was unable to get a blood pressure. Checked on patient and patient was unresponsive and no pulse found. Dr. Matthew Whaley and Clinical house notified, as well as Family.

## 2020-12-12 LAB
BLOOD CULTURE, ROUTINE: NORMAL
CULTURE, BLOOD 2: NORMAL

## 2022-04-20 NOTE — BRIEF OP NOTE
Brief Postoperative Note      Patient: Steven Cummings  YOB: 1945  MRN: [id number]    Date of Procedure: 9/21/2020    Pre-Op Diagnosis: T82.590A    Post-Op Diagnosis: Same       Procedure(s):  CREATION RIGHT AV GRAFT    Surgeon(s):  Lona Mckeon DO    Assistant:  * No surgical staff found *    Anesthesia: General    Estimated Blood Loss (mL): less than 50     Complications: None    Specimens:   * No specimens in log *    Implants:  Implant Name Type Inv. Item Serial No.  Lot No. LRB No. Used Action   GRAFT VASC ACUSEAL TAPRD [id number] - [id number] Vascular/Graft/Patch/Filter GRAFT VASC ACUSEAL TAPRD [id number]  [employer]  Right 1 Implanted         Drains:   [REMOVED] Nephrostomy Right (Removed)       [REMOVED] Urostomy RLQ (Removed)   Stomal Appliance Clean; Intact;Dry 09/21/20 1100   Stoma  Assessment [employer] 09/21/20 1100   Peristomal Assessment Clean; Intact 09/21/20 1100   Urine Color Yellow 09/21/20 1100   Urine Appearance Cloudy 09/20/20 2250   Urine Odor Malodorous 09/20/20 2100   Output (ml) 250 ml 09/21/20 0415       Findings: good thrill in the AV graft and palpable radial pulse with biphasic doppler signal    Electronically signed by Lona Mckeon DO on 9/21/2020 at 6:30 PM
Detail Level: Detailed

## 2023-07-14 NOTE — PROGRESS NOTES
Hospitalist Progress Note  [facility]     Patient: Tasia De  : 1945  MRN: [id number]  Code Status: [place] [facility] Day: 10   Date of Service: 2020    Subjective:   Pt seen and examined. No current complaints.      Past Medical History:   Diagnosis Date    Anemia in chronic kidney disease (CODE)     Atherosclerotic heart disease     [facility] ([facility]     CAD (coronary artery disease)     Cancer ([facility]     Chronic kidney disease     COPD (chronic obstructive pulmonary disease) ([facility]     Diabetes mellitus (HCC)     End stage renal disease (HCC)     Enterocolitis due to Clostridioides difficile     GERD (gastroesophageal reflux disease)     Hemodialysis patient ([facility]     Hyperlipidemia     Hypertension     Other chronic pain     Palliative care patient 09/15/2020    Prostate cancer ([facility]     Vitamin D deficiency        Past Surgical History:   Procedure Laterality Date    ABDOMEN SURGERY      esophageal CA, used part of stomach     APPENDECTOMY      [facility]    [facility]ARDIAC CATHETERIZATION      [facility], had stents, unknown details    DIALYSIS FISTULA CREATION Right 2020    CREATION RIGHT AV GRAFT performed by Markie Tee DO at [address] [place] [address] URETEROSTOMY         Family History   Problem Relation Age of Onset    Diabetes Mother        Social History     Socioeconomic History    Marital status:      Spouse name: Not on file    Number of children: Not on file    Years of education: Not on file    Highest education level: Not on file   Occupational History    Not on file   Social Needs    Financial resource strain: Not on file    Food insecurity     Worry: Not on file     Inability: Not on file    Transportation needs     Medical: Not on file     Non-medical: Not on file   Tobacco Use    Smoking status: Current Every Day Smoker     Packs/day: 2.00     Years: 55.00     Pack years: 110.00    Smokeless tobacco: Current User Substance and Sexual Activity    Alcohol use: No    Drug use: No    Sexual activity: Not Currently   Lifestyle    Physical activity     Days per week: Not on file     Minutes per session: Not on file    Stress: Not on file   Relationships    Social connections     Talks on phone: Not on file     Gets together: Not on file     Attends Ten[employer] service: Not on file     Active member of club or organization: Not on file     Attends meetings of clubs or organizations: Not on file     Relationship status: Not on file    Intimate partner violence     Fear of current or ex partner: Not on file     Emotionally abused: Not on file     Physically abused: Not on file     Forced sexual activity: Not on file   Other Topics Concern    Not on file   Social History Narrative    Not on file       Current Facility-Administered Medications   Medication Dose Route Frequency Provider Last Rate Last Dose    0.45 % sodium chloride infusion   Intravenous Continuous Radha Pranay,  mL/hr at 09/21/20 2037      sodium chloride flush 0.9 % injection 10 mL  10 mL Intravenous 2 times per day Radha Pranay, DO   10 mL at 09/22/20 2124    sodium chloride flush 0.9 % injection 10 mL  10 mL Intravenous PRN Radha Pranay, DO        acetaminophen (TYLENOL) tablet 650 mg  650 mg Oral Q4H PRN Radha Pranay, DO        darbepoetin umesh-polysorbate [facility]) injection 100 mcg  100 mcg Subcutaneous Weekly - Monday Camron Garcia MD   100 mcg at 09/21/20 1100    ampicillin-sulbactam (UNASYN) 1.5 g IVPB minibag  1.5 g Intravenous Q12H Radha Pranay, DO   Stopped at 09/22/20 2154    heparin (porcine) injection 5,000 Units  5,000 Units Subcutaneous 3 times per day Radha Pranay, DO   5,000 Units at 09/22/20 2129    docusate sodium (COLACE) capsule 100 mg  100 mg Oral BID PRN Radha Pranay, DO   100 mg at 09/22/20 2128    hydrALAZINE (APRESOLINE) injection 10 mg  10 mg Intravenous Q4H PRN [facility] Ivanukoff, DO   10 mg at 09/15/20 2210    oxyCODONE (ROXICODONE) immediate release tablet 5 mg  5 mg Oral Q4H PRN Evy Jaksch, DO   5 mg at 09/22/20 2128    ipratropium (ATROVENT) 0.02 % nebulizer solution 0.5 mg  0.5 mg Nebulization 4x daily Evy Jaksch, DO   0.5 mg at 09/22/20 1836    acetaminophen (TYLENOL) suppository 650 mg  650 mg Rectal Q6H PRN Evy Jaksch, DO        magnesium hydroxide (MILK OF MAGNESIA) 400 MG/5ML suspension 30 mL  30 mL Oral Daily PRN Evy Senthilsch, DO        promethazine (PHENERGAN) tablet 12.5 mg  12.5 mg Oral Q6H PRN Evy Senthilsch, DO        Or    ondansetron [facility]) injection 4 mg  4 mg Intravenous Q6H PRN Evy Jaksch, DO   4 mg at 09/17/20 1054    morphine injection 2 mg  2 mg Intravenous Q4H PRN Evy Jaksch, DO   2 mg at 09/17/20 2230    glucose (GLUTOSE) 40 % oral gel 15 g  15 g Oral PRN Evy Jaksch, DO        dextrose 50 % IV solution  12.5 g Intravenous PRN Evy Senthilsch, DO   12.5 g at 09/15/20 0415    glucagon (rDNA) injection 1 mg  1 mg Intramuscular PRN Evy Jaksch, DO        dextrose 5 % solution  100 mL/hr Intravenous PRN Evy Jaksch, DO        insulin lispro (HUMALOG) injection vial 0-6 Units  0-6 Units Subcutaneous TID [facility] Evy Jaksch, DO   1 Units at 09/20/20 1758    insulin lispro (HUMALOG) injection vial 0-3 Units  0-3 Units Subcutaneous Nightly Evy Jaksch, DO   1 Units at 09/22/20 2129         sodium chloride 100 mL/hr at 09/21/20 2037    dextrose          Objective:   BP (!) 119/50   Pulse 105   Temp 97.6 °F (36.4 °C) (Temporal)   Resp 17   Ht 5' 7\" (1.702 m)   Wt 109 lb 9 oz (49.7 kg)   SpO2 94%   BMI 17.16 kg/m²     General: no acute distress  HEENT: normocephalic, atraumatic  Neck: supple, symmetrical, trachea midline   Lungs: clear to auscultation bilaterally   Cardiovascular: s1 and s2 normal  Abdomen: soft, positive bowel sounds, nondistended  Extremities: no edema or cyanosis   Neuro: no focal deficits   Skin: normal color and texture    Recent Labs     09/20/20 0347 09/21/20  0304 09/22/20  0253   WBC 4.4* 4.9 5.9   RBC 3.05* 2.99* 2.94*   HGB 9.4* 9.4* 9.2*   HCT 29.8* 29.2* 30.1*   MCV 97.7* 97.7* 102.4*   MCH 30.8 31.4* 31.3*   MCHC 31.5* 32.2* 30.6*   PLT 94* 115* 182     Recent Labs     09/20/20 0347 09/21/20  0304 09/22/20  0253    139 137   K 4.2 4.3 4.7   ANIONGAP 12 15 15    101 104   CO2 26 23 18*   BUN 34* 47* 51*   CREATININE 3.1* 4.1* 4.3*   GLUCOSE 120* 137* 142*   CALCIUM 8.5* 8.4* 8.5*     Recent Labs     09/20/20 0347 09/21/20  0304 09/22/20  0253   MG 2.3 2.5* 2.4   PHOS 3.8 3.4  --      Recent Labs     09/20/20 0347 09/21/20  0304   AST 8 8   ALT <5* <5*   BILITOT <0.2 <0.2   ALKPHOS 57 57     No results for input(s): PH, PO2, PCO2, HCO3, BE, O2SAT in the last 72 hours. No results for input(s): TROPONINI in the last 72 hours. No results for input(s): INR in the last 72 hours. No results for input(s): LACTA in the last 72 hours. Intake/Output Summary (Last 24 hours) at 9/22/2020 2314  Last data filed at 9/22/2020 2154  Gross per 24 hour   Intake 1179.16 ml   Output 1425 ml   Net -245.84 ml       No results found. Assessment and Plan:   1) symptomatic bradycardia  -cards following  -ppm placed 9/17/2020 per dr Ifeoma Iniguez  -avoid offending agents  -h/o cad s/p pci  -h/o moderate to severe as  -h/o prior aaa endovascular repair     2) type 1 odontoid fracture  -s/p fall   -neurosurgery following, since signed off   -maintain hard collar at least 6w  -no plans for surgery     3) esrd  -lue fistula malfunctioning   -vascular surgery placed left femoral non-tunneled hd catheter 9/16/2020  -s/p right av graft creation 9/21/2020 per dr Alejandra Hawkins  -hd per renal  -follow renal/fxn/uop/lytes  -avoid offending agents     4) acute cystitis   -unasyn     5) concern for endoleak  -as per ct a/p 9/12/2020: Patent aortoiliac stent graft.  The native aortic lumen measures 5.5 cm transversely. There is some contrast enhancement anteriorly in the native lumen suggesting endoleak. I do not see a connecting vessel that is patent at the endoleak.  The superior mesenteric artery and celiac plexus are also covered by the stent graft  -discussed with vascular surgery  -pt unsure where procedure was performed      6) dm2  -meds on board      7) dvt ppx  -heparin     Gavi Cardoza MD   9/22/2020 11:14 PM Carac Counseling:  I discussed with the patient the risks of Carac including but not limited to erythema, scaling, itching, weeping, crusting, and pain.

## 2024-06-10 NOTE — PLAN OF CARE
Problem: Pneumonia (Adult)  Goal: Signs and Symptoms of Listed Potential Problems Will be Absent, Minimized or Managed (Pneumonia)  Outcome: Ongoing (interventions implemented as appropriate)      Problem: Fall Risk (Adult)  Goal: Absence of Fall  Outcome: Ongoing (interventions implemented as appropriate)      Problem: Skin Injury Risk (Adult)  Goal: Skin Health and Integrity  Outcome: Ongoing (interventions implemented as appropriate)      Problem: Nutrition, Imbalanced: Inadequate Oral Intake (Adult)  Goal: Improved Oral Intake  Outcome: Ongoing (interventions implemented as appropriate)      Problem: Patient Care Overview  Goal: Plan of Care Review  Outcome: Ongoing (interventions implemented as appropriate)   10/24/18 0731   Plan of Care Review   Progress no change   OTHER   Outcome Summary Plan to DC to [place] tomorrow. Dialysis today, urostomy appliance changed for leaking.    Coping/Psychosocial   Plan of Care Reviewed With patient     Goal: Individualization and Mutuality  Outcome: Ongoing (interventions implemented as appropriate)    Goal: Discharge Needs Assessment  Outcome: Ongoing (interventions implemented as appropriate)    Goal: Interprofessional Rounds/Family Conf  Outcome: Ongoing (interventions implemented as appropriate)      Problem: Hemodialysis (Adult)  Goal: Signs and Symptoms of Listed Potential Problems Will be Absent, Minimized or Managed (Hemodialysis)  Outcome: Ongoing (interventions implemented as appropriate)         Spoke with patient, she states now is not a good time and to call back another day.

## (undated) DEVICE — SYR SLP TP 10ML DISP

## (undated) DEVICE — INTENDED FOR TISSUE SEPARATION, AND OTHER PROCEDURES THAT REQUIRE A SHARP SURGICAL BLADE TO PUNCTURE OR CUT.: Brand: BARD-PARKER ® STAINLESS STEEL BLADES

## (undated) DEVICE — COVER US PRB W15XL120CM W/ GEL RUBBERBAND TAPE STRP FLD GEN

## (undated) DEVICE — Z DISCONTINUED USE 2429233 DRESSING FOAM W10XL10CM 5 LAYR SELF ADH VERSATILE SAFETAC

## (undated) DEVICE — CLIP INT SM WIDE RED TI TRNSVRS GRV CHEVRON SHP W/ PRECIS

## (undated) DEVICE — LOOP VES W1.3MM THK0.9MM MINI WHT SIL FLD REPELLENT

## (undated) DEVICE — SUTURE NONABSORBABLE MONOFILAMENT 6-0 BV-1 1X30 IN PROLENE 8709H

## (undated) DEVICE — GEL US 20GM NONIRRITATING OVERWRAPPED FILE PCH TRNSMIT

## (undated) DEVICE — PK TURNOVER RM ADV

## (undated) DEVICE — SNAP KOVER: Brand: UNBRANDED

## (undated) DEVICE — SYR LUERLOK 20CC

## (undated) DEVICE — APPL CHLORAPREP W/TINT 26ML ORNG

## (undated) DEVICE — MASTISOL ADHESIVE LIQ 2/3ML

## (undated) DEVICE — STRIP SKIN CLSR W0.25XL4IN WHT SPUNBOUND FBR NYL HI ADH

## (undated) DEVICE — NEEDLE ECHOGENIC 22GA L2IN INSUL W/ 30DEG BVL EXTN SET

## (undated) DEVICE — GLOVE SURG SZ 65 L12IN FNGR THK79MIL GRN LTX FREE

## (undated) DEVICE — GLV SURG SENSICARE W/ALOE PF LF 7.5 STRL

## (undated) DEVICE — GLV SURG TRIUMPH GREEN W/ALOE PF LTX 8 STRL

## (undated) DEVICE — SYR PRECISIONGLIDE LL 5CC 20X1 1/2IN

## (undated) DEVICE — TOWEL,OR,DSP,ST,BLUE,DLX,4/PK,20PK/CS: Brand: MEDLINE

## (undated) DEVICE — Z INACTIVE USE 2535480 CLIP LIG M BLU TI HRT SHP WIRE HORZ 180 PER BX

## (undated) DEVICE — DRESSING FOAM W4XL4IN SIL FACE BORD ADH PD SUP ABSRB COR

## (undated) DEVICE — GLOVE SURG SZ 7 CRM LTX FREE POLYISOPRENE POLYMER BEAD ANTI

## (undated) DEVICE — SUTURE PERMAHAND SZ 3-0 L30IN NONABSORBABLE BLK SILK BRAID A304H

## (undated) DEVICE — SUT MNCRYL 4/0 PS2 27IN UD MCP426H

## (undated) DEVICE — CVR PROB GEN PURP W ISOSILK 6X48

## (undated) DEVICE — EQUISTREAM XK HEMODIALYSIS CATH W/STYLET, ST, AIRGUARD,16 FR. 19CM: Brand: EQUISTREAM XK LONG-TERM HEMODIALYSIS CATHETER WITH PRELOADED STYLET

## (undated) DEVICE — PROXIMATE RH ROTATING HEAD SKIN STAPLERS (35 WIDE) CONTAINS 35 STAINLESS STEEL STAPLES: Brand: PROXIMATE

## (undated) DEVICE — SOLUTION IV IRRIG POUR BRL 0.9% SODIUM CHL 2F7124

## (undated) DEVICE — SKIN AFFIX SURG ADHESIVE 72/CS 0.55ML: Brand: MEDLINE

## (undated) DEVICE — PAD MINOR UNIVERSAL: Brand: MEDLINE INDUSTRIES, INC.

## (undated) DEVICE — NON-STERILE (14 X 30CM) COVER: Brand: CIV-FLEX™ TRANSDUCER COVER

## (undated) DEVICE — SPNG GZ 2S 2X2 8PLY STRL PK/2

## (undated) DEVICE — SUTURE PERMAHAND SZ 4-0 L12X30IN NONABSORBABLE BLK SILK A303H

## (undated) DEVICE — SOLUTION IV 500ML 0.9% SOD CHL PH 5 INJ USP VIAFLX PLAS

## (undated) DEVICE — ZINACTIVE USE 2539609 APPLICATOR MEDICATED 10.5 CC SOLUTION HI LT ORNG CHLORAPREP

## (undated) DEVICE — SMART SLEEVE SURGICAL GOWN, XL, POLYREINFORCED FRONT: Brand: CONVERTORS

## (undated) DEVICE — 3M™ STERI-STRIP™ REINFORCED ADHESIVE SKIN CLOSURES, R1547, 1/2 IN X 4 IN (12 MM X 100 MM), 6 STRIPS/ENVELOPE: Brand: 3M™ STERI-STRIP™

## (undated) DEVICE — SURGICAL PROCEDURE PACK VASC LOURDES HOSP

## (undated) DEVICE — 3M™ STERI-DRAPE™ INSTRUMENT POUCH 1018: Brand: STERI-DRAPE™

## (undated) DEVICE — AMBU AURA-I U SIZE 4, DISPOSABLE LARYNGEAL MASK: Brand: AURA-I

## (undated) DEVICE — SUT ETHLN 3/0 FS1 30IN 669H